# Patient Record
Sex: MALE | Race: WHITE | Employment: FULL TIME | URBAN - METROPOLITAN AREA
[De-identification: names, ages, dates, MRNs, and addresses within clinical notes are randomized per-mention and may not be internally consistent; named-entity substitution may affect disease eponyms.]

---

## 2017-11-24 ENCOUNTER — APPOINTMENT (OUTPATIENT)
Dept: GENERAL RADIOLOGY | Facility: CLINIC | Age: 32
End: 2017-11-24
Attending: EMERGENCY MEDICINE
Payer: COMMERCIAL

## 2017-11-24 ENCOUNTER — HOSPITAL ENCOUNTER (EMERGENCY)
Facility: CLINIC | Age: 32
Discharge: HOME | End: 2017-11-25
Attending: EMERGENCY MEDICINE | Admitting: EMERGENCY MEDICINE
Payer: COMMERCIAL

## 2017-11-24 DIAGNOSIS — R07.9 CHEST PAIN, UNSPECIFIED TYPE: ICD-10-CM

## 2017-11-24 DIAGNOSIS — T50.905A ADVERSE EFFECT OF DRUG, INITIAL ENCOUNTER: ICD-10-CM

## 2017-11-24 PROCEDURE — 93005 ELECTROCARDIOGRAM TRACING: CPT

## 2017-11-24 PROCEDURE — 71020 XR CHEST 2 VW: CPT

## 2017-11-24 PROCEDURE — 99285 EMERGENCY DEPT VISIT HI MDM: CPT | Mod: 25

## 2017-11-24 PROCEDURE — 83690 ASSAY OF LIPASE: CPT | Performed by: EMERGENCY MEDICINE

## 2017-11-24 PROCEDURE — 85025 COMPLETE CBC W/AUTO DIFF WBC: CPT | Performed by: EMERGENCY MEDICINE

## 2017-11-24 PROCEDURE — 83735 ASSAY OF MAGNESIUM: CPT | Performed by: EMERGENCY MEDICINE

## 2017-11-24 PROCEDURE — 80053 COMPREHEN METABOLIC PANEL: CPT | Performed by: EMERGENCY MEDICINE

## 2017-11-24 PROCEDURE — 84484 ASSAY OF TROPONIN QUANT: CPT | Performed by: EMERGENCY MEDICINE

## 2017-11-24 RX ORDER — ASPIRIN 325 MG
325 TABLET ORAL ONCE
Status: COMPLETED | OUTPATIENT
Start: 2017-11-24 | End: 2017-11-25

## 2017-11-24 RX ORDER — IBUPROFEN 600 MG/1
600 TABLET, FILM COATED ORAL ONCE
Status: COMPLETED | OUTPATIENT
Start: 2017-11-24 | End: 2017-11-25

## 2017-11-24 NOTE — ED AVS SNAPSHOT
[facility] Emergency Department    [address]    [facility] [address]    Phone:  [phone]    Fax:  [phone]                                       Anil Montoya   MRN: [id number]    Department:  [facility] Emergency Department   Date of Visit:  11/24/2017           After Visit Summary Signature Page     I have received my discharge instructions, and my questions have been answered. I have discussed any challenges I see with this plan with the nurse or doctor.    ..........................................................................................................................................  Patient/Patient Representative Signature      ..........................................................................................................................................  Patient Representative Print Name and Relationship to Patient    ..................................................               ................................................  Date                                            Time    ..........................................................................................................................................  Reviewed by Signature/Title    ...................................................              ..............................................  Date                                                            Time

## 2017-11-24 NOTE — ED AVS SNAPSHOT
[facility] Emergency Department    [address]    [facility] [address]    Phone:  [phone]    Fax:  [phone]                                       Anil Montoya   MRN: [id number]    Department:  [facility] Emergency Department   Date of Visit:  11/24/2017           Patient Information     Date Of Birth          1985        Your diagnoses for this visit were:     Chest pain, unspecified type     Adverse effect of drug, initial encounter        You were seen by Salo Borrego MD.      Follow-up Information     Follow up with [facility] Emergency Department.    Specialty:  EMERGENCY MEDICINE    Why:  If symptoms worsen    Contact information:    [address]  [facility] [address] [phone]        Schedule an appointment as soon as possible for a visit with Turner Juarez    Specialty:  Internal Medicine    Contact information:    [facility]  [address]   [facility] [address]  [phone]          Schedule an appointment as soon as possible for a visit with Cardiology.    Why:  They will call to schedule stress test and follow up        Discharge Instructions       Discharge Instructions  Chest Pain    You have been seen today for chest pain or discomfort.  At this time, your provider has found no signs that your chest pain is due to a serious or life-threatening condition, (or you have declined more testing and/or admission to the hospital). However, sometimes there is a serious problem that does not show up right away. Your evaluation today may not be complete and you may need further testing and evaluation.     Generally, every Emergency Department visit should have a follow-up clinic visit with either a primary or a specialty clinic/provider. Please follow-up as instructed by your emergency provider today.  Return to the Emergency Department if:    Your chest pain changes, gets worse,  starts to happen more often, or comes with less activity.    You are newly short of breath.    You get very weak or tired.    You pass out or faint.    You have any new symptoms, like fever, cough, numb legs, or you cough up blood.    You have anything else that worries you.    Until you follow-up with your regular provider, please do the following:    Take one aspirin daily unless you have an allergy or are told not to by your provider.    If a stress test appointment has been made, go to the appointment.    If you have questions, contact your regular provider.    Follow-up with your regular provider/clinic as directed; this is very important.    If you were given a prescription for medicine here today, be sure to read all of the information (including the package insert) that comes with your prescription.  This will include important information about the medicine, its side effects, and any warnings that you need to know about.  The pharmacist who fills the prescription can provide more information and answer questions you may have about the medicine.  If you have questions or concerns that the pharmacist cannot address, please call or return to the Emergency Department.       Remember that you can always come back to the Emergency Department if you are not able to see your regular provider in the amount of time listed above, if you get any new symptoms, or if there is anything that worries you.      24 Hour Appointment Hotline       To make an appointment at any [facility], call [phone] ([phone]). If you don't have a family doctor or clinic, we will help you find one. [facility] clinics are conveniently located to serve the needs of you and your family.          ED Discharge Orders     Exercise Stress Echocardiogram       Administration of IV contrast will be tailored to this examination per the appropriate written protocol listed in the Echocardiography department Protocol Book, or by the  supervising Cardiologist. This may result in an order change.    Use of contrast is at the discretion of the supervising Cardiologist.            Follow-Up with Cardiologist                    Review of your medicines      START taking        Dose / Directions Last dose taken    amoxicillin-clavulanate 875-125 MG per tablet   Commonly known as:  AUGMENTIN   Dose:  1 tablet   Quantity:  14 tablet        Take 1 tablet by mouth 2 times daily for 7 days   Refills:  0          Our records show that you are taking the medicines listed below. If these are incorrect, please call your family doctor or clinic.        Dose / Directions Last dose taken    FLUCONAZOLE PO        Take  by mouth.   Refills:  0        HYDROcodone-acetaminophen 5-325 MG per tablet   Commonly known as:  NORCO   Dose:  1-2 tablet   Quantity:  15 tablet        Take 1-2 tablets by mouth every 6 hours as needed for pain.   Refills:  0        levofloxacin 750 MG tablet   Commonly known as:  LEVAQUIN   Dose:  750 mg   Quantity:  7 tablet        Take 1 tablet by mouth daily.   Refills:  0        WELLBUTRIN PO        Take  by mouth.   Refills:  0                Prescriptions were sent or printed at these locations (1 Prescription)                   Other Prescriptions                Printed at Department/Unit printer (1 of 1)         amoxicillin-clavulanate (AUGMENTIN) 875-125 MG per tablet                Procedures and tests performed during your visit     CBC with platelets differential    Cardiac Continuous Monitoring    Comprehensive metabolic panel    EKG 12 lead    Lipase    Magnesium    Troponin I    Troponin I (now)    XR Chest 2 Views      Orders Needing Specimen Collection     None      Pending Results     Date and Time Order Name Status Description    11/24/2017 2341 EKG 12 lead Preliminary             Pending Culture Results     No orders found for last 3 day(s).            Pending Results Instructions     If you had any lab results that were not  finalized at the time of your Discharge, you can call the ED Lab Result RN at [phone]. You will be contacted by this team for any positive Lab results or changes in treatment. The nurses are available 7 days a week from 10A to 6:30P.  You can leave a message 24 hours per day and they will return your call.        Test Results From Your Hospital Stay        11/25/2017 12:13 AM      Component Results     Component Value Ref Range & Units Status    WBC 6.8 4.0 - 11.0 10e9/L Final    RBC Count 5.16 4.4 - 5.9 10e12/L Final    Hemoglobin 15.2 13.3 - 17.7 g/dL Final    Hematocrit 43.7 40.0 - 53.0 % Final    MCV 85 78 - 100 fl Final    MCH 29.5 26.5 - 33.0 pg Final    MCHC 34.8 31.5 - 36.5 g/dL Final    RDW 12.9 10.0 - 15.0 % Final    Platelet Count 215 150 - 450 10e9/L Final    Diff Method Automated Method  Final    % Neutrophils 52.8 % Final    % Lymphocytes 30.7 % Final    % Monocytes 11.1 % Final    % Eosinophils 3.7 % Final    % Basophils 1.0 % Final    % Immature Granulocytes 0.7 % Final    Nucleated RBCs 0 0 /100 Final    Absolute Neutrophil 3.6 1.6 - 8.3 10e9/L Final    Absolute Lymphocytes 2.1 0.8 - 5.3 10e9/L Final    Absolute Monocytes 0.8 0.0 - 1.3 10e9/L Final    Absolute Eosinophils 0.3 0.0 - 0.7 10e9/L Final    Absolute Basophils 0.1 0.0 - 0.2 10e9/L Final    Abs Immature Granulocytes 0.1 0 - 0.4 10e9/L Final    Absolute Nucleated RBC 0.0  Final         11/25/2017 12:35 AM      Component Results     Component Value Ref Range & Units Status    Troponin I ES <0.015 0.000 - 0.045 ug/L Final    The 99th percentile for upper reference range is 0.045 ug/L.  Troponin values   in the range of 0.045 - 0.120 ug/L may be associated with risks of adverse   clinical events.           11/25/2017  1:04 AM      Narrative     XR CHEST 2 VW   11/25/2017 12:58 AM     INDICATION: Chest pain.    COMPARISON: 1/20/2014.        Impression     IMPRESSION: No infiltrates or other acute findings. Normal-sized  cardiac silhouette.      ABUNDIO STAUFFER MD         11/25/2017 12:35 AM      Component Results     Component Value Ref Range & Units Status    Sodium 137 133 - 144 mmol/L Final    Potassium 3.8 3.4 - 5.3 mmol/L Final    Chloride 104 94 - 109 mmol/L Final    Carbon Dioxide 24 20 - 32 mmol/L Final    Anion Gap 9 3 - 14 mmol/L Final    Glucose 270 (H) 70 - 99 mg/dL Final    Urea Nitrogen 18 7 - 30 mg/dL Final    Creatinine 1.07 0.66 - 1.25 mg/dL Final    GFR Estimate 81 >60 mL/min/1.7m2 Final    Non  GFR Calc    GFR Estimate If Black >90 >60 mL/min/1.7m2 Final    African American GFR Calc    Calcium 8.3 (L) 8.5 - 10.1 mg/dL Final    Bilirubin Total 0.3 0.2 - 1.3 mg/dL Final    Albumin 3.5 3.4 - 5.0 g/dL Final    Protein Total 7.5 6.8 - 8.8 g/dL Final    Alkaline Phosphatase 87 40 - 150 U/L Final    ALT 51 0 - 70 U/L Final    AST 28 0 - 45 U/L Final         11/25/2017 12:35 AM      Component Results     Component Value Ref Range & Units Status    Lipase 270 73 - 393 U/L Final         11/25/2017 12:35 AM      Component Results     Component Value Ref Range & Units Status    Magnesium 2.2 1.6 - 2.3 mg/dL Final         11/25/2017  3:19 AM      Component Results     Component Value Ref Range & Units Status    Troponin I ES <0.015 0.000 - 0.045 ug/L Final    The 99th percentile for upper reference range is 0.045 ug/L.  Troponin values   in the range of 0.045 - 0.120 ug/L may be associated with risks of adverse   clinical events.                  Clinical Quality Measure: Blood Pressure Screening     Your blood pressure was checked while you were in the emergency department today. The last reading we obtained was  BP: 155/86 . Please read the guidelines below about what these numbers mean and what you should do about them.  If your systolic blood pressure (the top number) is less than 120 and your diastolic blood pressure (the bottom number) is less than 80, then your blood pressure is normal. There is nothing more that you need  "to do about it.  If your systolic blood pressure (the top number) is 120-139 or your diastolic blood pressure (the bottom number) is 80-89, your blood pressure may be higher than it should be. You should have your blood pressure rechecked within a year by a primary care provider.  If your systolic blood pressure (the top number) is 140 or greater or your diastolic blood pressure (the bottom number) is 90 or greater, you may have high blood pressure. High blood pressure is treatable, but if left untreated over time it can put you at risk for heart attack, stroke, or kidney failure. You should have your blood pressure rechecked by a primary care provider within the next 4 weeks.  If your provider in the emergency department today gave you specific instructions to follow-up with your doctor or provider even sooner than that, you should follow that instruction and not wait for up to 4 weeks for your follow-up visit.        Thank you for choosing [facility]       Thank you for choosing [facility] for your care. Our goal is always to provide you with excellent care. Hearing back from our patients is one way we can continue to improve our services. Please take a few minutes to complete the written survey that you may receive in the mail after you visit with us. Thank you!        [employer] Information     [employer] lets you send messages to your doctor, view your test results, renew your prescriptions, schedule appointments and more. To sign up, go to [email or web address] . Click on \"Log in\" on the left side of the screen, which will take you to the Welcome page. Then click on \"Sign up Now\" on the right side of the page.     You will be asked to enter the access code listed below, as well as some personal information. Please follow the directions to create your username and password.     Your access code is: [email or web address]  Expires: 2018  3:27 AM     Your access code will  in 90 days. If you need help or a new code, " please call your [place] clinic or [phone].        Care EveryWhere ID     This is your Care EveryWhere ID. This could be used by other organizations to access your [place] medical records  [id number]        Equal Access to Services     MAKENNA HUNTER : Eddi Claire, waaxda luqadaha, qaybta kaalmada alessiaxanderteresa, josselyn contreras. So [facility] [phone].    ATENCIÓN: Si habla español, tiene a alba disposición servicios gratuitos de asistencia lingüística. Llame al [phone].    We comply with applicable federal civil rights laws and [place] laws. We do not discriminate on the basis of race, color, national origin, age, disability, sex, sexual orientation, or gender identity.            After Visit Summary       This is your record. Keep this with you and show to your community pharmacist(s) and doctor(s) at your next visit.

## 2017-11-25 VITALS
HEART RATE: 73 BPM | DIASTOLIC BLOOD PRESSURE: 86 MMHG | RESPIRATION RATE: 13 BRPM | SYSTOLIC BLOOD PRESSURE: 155 MMHG | OXYGEN SATURATION: 97 % | TEMPERATURE: 98.7 F

## 2017-11-25 LAB
ALBUMIN SERPL-MCNC: 3.5 G/DL (ref 3.4–5)
ALP SERPL-CCNC: 87 U/L (ref 40–150)
ALT SERPL W P-5'-P-CCNC: 51 U/L (ref 0–70)
ANION GAP SERPL CALCULATED.3IONS-SCNC: 9 MMOL/L (ref 3–14)
AST SERPL W P-5'-P-CCNC: 28 U/L (ref 0–45)
BASOPHILS # BLD AUTO: 0.1 10E9/L (ref 0–0.2)
BASOPHILS NFR BLD AUTO: 1 %
BILIRUB SERPL-MCNC: 0.3 MG/DL (ref 0.2–1.3)
BUN SERPL-MCNC: 18 MG/DL (ref 7–30)
CALCIUM SERPL-MCNC: 8.3 MG/DL (ref 8.5–10.1)
CHLORIDE SERPL-SCNC: 104 MMOL/L (ref 94–109)
CO2 SERPL-SCNC: 24 MMOL/L (ref 20–32)
CREAT SERPL-MCNC: 1.07 MG/DL (ref 0.66–1.25)
DIFFERENTIAL METHOD BLD: NORMAL
EOSINOPHIL # BLD AUTO: 0.3 10E9/L (ref 0–0.7)
EOSINOPHIL NFR BLD AUTO: 3.7 %
ERYTHROCYTE [DISTWIDTH] IN BLOOD BY AUTOMATED COUNT: 12.9 % (ref 10–15)
GFR SERPL CREATININE-BSD FRML MDRD: 81 ML/MIN/1.7M2
GLUCOSE SERPL-MCNC: 270 MG/DL (ref 70–99)
HCT VFR BLD AUTO: 43.7 % (ref 40–53)
HGB BLD-MCNC: 15.2 G/DL (ref 13.3–17.7)
IMM GRANULOCYTES # BLD: 0.1 10E9/L (ref 0–0.4)
IMM GRANULOCYTES NFR BLD: 0.7 %
LIPASE SERPL-CCNC: 270 U/L (ref 73–393)
LYMPHOCYTES # BLD AUTO: 2.1 10E9/L (ref 0.8–5.3)
LYMPHOCYTES NFR BLD AUTO: 30.7 %
MAGNESIUM SERPL-MCNC: 2.2 MG/DL (ref 1.6–2.3)
MCH RBC QN AUTO: 29.5 PG (ref 26.5–33)
MCHC RBC AUTO-ENTMCNC: 34.8 G/DL (ref 31.5–36.5)
MCV RBC AUTO: 85 FL (ref 78–100)
MONOCYTES # BLD AUTO: 0.8 10E9/L (ref 0–1.3)
MONOCYTES NFR BLD AUTO: 11.1 %
NEUTROPHILS # BLD AUTO: 3.6 10E9/L (ref 1.6–8.3)
NEUTROPHILS NFR BLD AUTO: 52.8 %
NRBC # BLD AUTO: 0 10*3/UL
NRBC BLD AUTO-RTO: 0 /100
PLATELET # BLD AUTO: 215 10E9/L (ref 150–450)
POTASSIUM SERPL-SCNC: 3.8 MMOL/L (ref 3.4–5.3)
PROT SERPL-MCNC: 7.5 G/DL (ref 6.8–8.8)
RBC # BLD AUTO: 5.16 10E12/L (ref 4.4–5.9)
SODIUM SERPL-SCNC: 137 MMOL/L (ref 133–144)
TROPONIN I SERPL-MCNC: <0.015 UG/L (ref 0–0.04)
TROPONIN I SERPL-MCNC: <0.015 UG/L (ref 0–0.04)
WBC # BLD AUTO: 6.8 10E9/L (ref 4–11)

## 2017-11-25 PROCEDURE — 25000132 ZZH RX MED GY IP 250 OP 250 PS 637: Performed by: EMERGENCY MEDICINE

## 2017-11-25 PROCEDURE — 25000125 ZZHC RX 250: Performed by: EMERGENCY MEDICINE

## 2017-11-25 PROCEDURE — 84484 ASSAY OF TROPONIN QUANT: CPT | Performed by: EMERGENCY MEDICINE

## 2017-11-25 RX ADMIN — ASPIRIN 325 MG ORAL TABLET 325 MG: 325 PILL ORAL at 00:00

## 2017-11-25 RX ADMIN — LIDOCAINE HYDROCHLORIDE 30 ML: 20 SOLUTION ORAL; TOPICAL at 00:00

## 2017-11-25 RX ADMIN — IBUPROFEN 600 MG: 600 TABLET ORAL at 00:00

## 2017-11-25 ASSESSMENT — ENCOUNTER SYMPTOMS
COUGH: 1
FEVER: 0

## 2017-11-25 NOTE — ED PROVIDER NOTES
History     Chief Complaint:  Chest Pain    HPI   Anil Montoya is a 32 year old male with a history of cryptococcus pneumonia and untreated high cholesterol who presents to the emergency department for evaluation of chest pain. The patient reports being treated with Clindamycin for an infected dog bite on his right hand starting with a dose on Tuesday, 2017. He experienced onset of chest pain about 45 minutes after taking each dose of the Clindamycin, with the pain lasting 2 to 3 hours. Tonight he presents to the emergency department because his chest pain has become more persistent, and is accompanied by a cough. The pain is provoked when eating, laying down, or breathing deeply. The patient's wife is concerned about his reaction to the medication with his past medical history, and adds that he typically gets the most severe side effects when taking antibiotics since his hospitalization at [facility]. The patient denies any fever and does not smoke or drink alcohol. Of note, the patient's three children have are recovering from strep throat. Of further note, the patient has a family history of blood clots and his mother  of complications of diabetes.     HEART Score  Background  Calculates the overall risk of adverse event in patient's presenting with chest pain.  Based on 5 criteria (each assigned 0-2 points) including suspiciousness of history, EKG, age, risk factors and troponin.    Data  32 year old male   does not have a problem list on file.   has no tobacco history on file.  family history is not on file.  Lab Results   Component Value Date    TROPI <0.012 2014     Criteria   0-2 points for each of 5 items (maximum of 10 points):  Score 0- History slightly suspicious for coronary syndrome  Score 1- EKG with Non-specific repolarization disturbance  Score 0- Age <45 years old  Score 1- One to 2 risk factors for atherosclerotic disease  Score 0- Within normal limits for  troponin levels  Interpretation  Risk of adverse outcome  Heart Score: 2  Total Score 0-3- Adverse Outcome Risk 2.5% - Supports early discharge with appropriate follow-up    Allergies:  Vancomycin    Medications:    Fluconazole  Wellbutrin  Hydrocodone-acetaminophen  Levaquin    Past Medical History:    Pneumonia    Past Surgical History:    Past surgical history reviewed. No pertinent surgical history.    Family History:    Family history reviewed. No pertinent family history.    Social History:  The patient was accompanied to the emergency department by his wife.  Smoking Status: Never Smoker  Tobacco Use: Unknown  Alcohol Use: No  Marital Status:      Review of Systems   Constitutional: Negative for fever.   Respiratory: Positive for cough.    Cardiovascular: Positive for chest pain.     Physical Exam     Patient Vitals for the past 24 hrs:   BP Temp Pulse Heart Rate Resp SpO2   11/25/17 0148 - - - 79 - 97 %   11/25/17 0147 - - - 74 13 98 %   11/25/17 0145 - - - 86 13 98 %   11/25/17 0142 - - - 84 - 97 %   11/25/17 0139 - - - 78 - 97 %   11/25/17 0136 - - - 78 - 98 %   11/25/17 0130 - - - - 17 97 %   11/25/17 0115 - - - - 21 96 %   11/25/17 0100 - - - - 17 98 %   11/25/17 0030 155/86 - - 75 17 96 %   11/25/17 0015 154/85 - - 71 17 97 %   11/25/17 0000 (!) 183/122 - - 75 14 97 %   11/24/17 2345 (!) 180/109 - - - - 96 %   11/24/17 2330 (!) 187/142 - - - - 98 %   11/24/17 2242 (!) 160/105 98.7  F (37.1  C) 73 - 16 99 %     Physical Exam  General: Alert, appears well-developed and well-nourished. Cooperative.     In mild distress  HEENT:  Head:  Atraumatic  Ears:  External ears are normal  Mouth/Throat:  Oropharynx is without erythema or exudate and mucous membranes are moist.   Eyes:   Conjunctivae normal and EOM are normal. No scleral icterus.    Pupils are equal, round, and reactive to light.   Neck:   Normal range of motion. Neck supple.  CV:  Normal rate, regular rhythm, normal heart sounds and radial and  dorsalis pedis pulses are 2+ and symmetric.  No murmur.  Resp:  Breath sounds are clear bilaterally    Non-labored, no retractions or accessory muscle use  GI:  Obese. Abdomen is soft, no distension, no tenderness. No rebound or guarding.  MS:  Normal range of motion. No edema.    Normal strength in all 4 extremities.     Back atraumatic.  Skin:  Warm and dry.  Papular lesion to right dorsal hand from prior animal scratch.  No new lesions identified.  No evidence of cellulitis.  Neuro: Alert. Normal strength.  Sensation intact in all 4 extremities. GCS: 15  Psych:  Normal mood and affect.    Emergency Department Course     ECG:  ECG taken at 2338, ECG read at 2342  Normal sinus rhythm  Anterior infarct, age undetermined  Abnormal ECG  No significant change compared to EKG dated 1/20/2014  Rate 63 bpm. CT interval 170 ms. QRS duration 106 ms. QT/QTc 434/444 ms. P-R-T axes 50 50 16.    Imaging:  Radiology findings were communicated with the patient who voiced understanding of the findings.    XR Chest 2 Views  No infiltrates or other acute findings. Normal-sized  cardiac silhouette.   Reading per radiology.    Laboratory:  Laboratory findings were communicated with the patient who voiced understanding of the findings.    CBC: WBC 6.8, HGB 15.2,   Troponin (Collected 2350): <0.015   Troponin (Collected 0250): <0.015  CMP: Glucose 270 (H), Calcium 8.3 (L) o/w WNL (Creatinine 1.07)  Lipase: 270  Magnesium: 2.2    Interventions:  0000 Aspirin 325 mg PO  0000 Ibuprofen 600 mg PO  0000 GI cocktail 30 mL PO    Emergency Department Course:    Nursing notes and vitals reviewed.    I performed an exam of the patient as documented above.     0150 I reassessed and updated the patient.    I personally reviewed the laboratory, imaging, and EKG results with the patient and answered all related questions prior to discharge.    Impression & Plan      Medical Decision Making:  Anil Montoya is a 32 year old male with a history  of cryptococcal pneumonia who presents with intermittent chest pain since Tuesday. Patient notes the chest pain is mid sternum. No chest wall tenderness. No radiation of this chest pain. He does relate it to ingestion of clindamycin tablets for which he is taking for an dog bite wound to his right hand. The clindamycin was prescribed at an outside facility and there is no other ingestion of medications or food that influenced this chest pain presentation. Patient had no improvement with a GI cocktail, so I am much less concerned for gastritis or peptic ulcer disease. His EKG was non ischemic.  Patient had a negative troponin. Low concern for ACS at this time. Patient's heart score was 2 and with a repeat delta troponin at three hours being negative, I am much more reassured that this is unlikely ACS. Patient had a chest x-ray which showed no evidence of pneumothorax or pneumonia. Lab work was grossly unremarkable with normal hepatic function tests and lipase. Normal electrolytes. No elevated white blood cell count. I discussed with patient that this may be related to the clindamycin as he believes this to be so. The chest pain has only been around since he has been taking the clindamycin and typically resolves a couple hours after ingestion of the clindamycin. It seems a little more suspect for possible pill associated gastritis with the clindamycin, but as stated previously, patient had no improvement with GI cocktails and I have lower suspicion for this. Patient does have elevated blood pressure, no significant history of hypertension but I assume with a blood pressure greater than 150 he likely does carry a history of hypertension.     I think he warrants a stress test with these chest pain intermittent episodes over the last several days. I have scheduled this for him and he would get this performed in an outpatient setting. We did switch the patient's clindamycin to Augmentin for animal bite infection of the  right dorsum of his hand. He will follow up closely with his primary care provider and stress test as scheduled on outpatient basis. Patient understood close return precautions and follow up instructions. Discharged home.    Diagnosis:    ICD-10-CM    1. Chest pain, unspecified type R07.9 Troponin I (now)     Follow-Up with Cardiologist     Exercise Stress Echocardiogram   2. Adverse effect of drug, initial encounter T88.7XXA      Disposition:   The patient was discharged home.    Discharge Medications:  Discharge Medication List as of 11/25/2017  3:35 AM      START taking these medications    Details   amoxicillin-clavulanate (AUGMENTIN) 875-125 MG per tablet Take 1 tablet by mouth 2 times daily for 7 days, Disp-14 tablet, R-0, Local Print           Scribe Disclosure:  I, Merry Valderrama, am serving as a scribe at 11:40 PM on 11/24/2017 to document services personally performed by Salo Borrego MD, based on my observations and the provider's statements to me.    [facility] EMERGENCY DEPARTMENT       aSlo Borrego MD  11/25/17 1549

## 2017-11-26 LAB — INTERPRETATION ECG - MUSE: NORMAL

## 2022-03-22 ENCOUNTER — APPOINTMENT (OUTPATIENT)
Dept: GENERAL RADIOLOGY | Facility: CLINIC | Age: 37
DRG: 871 | End: 2022-03-22
Attending: HOSPITALIST
Payer: COMMERCIAL

## 2022-03-22 ENCOUNTER — APPOINTMENT (OUTPATIENT)
Dept: ULTRASOUND IMAGING | Facility: CLINIC | Age: 37
DRG: 871 | End: 2022-03-22
Attending: EMERGENCY MEDICINE
Payer: COMMERCIAL

## 2022-03-22 ENCOUNTER — APPOINTMENT (OUTPATIENT)
Dept: CT IMAGING | Facility: CLINIC | Age: 37
DRG: 871 | End: 2022-03-22
Attending: EMERGENCY MEDICINE
Payer: COMMERCIAL

## 2022-03-22 ENCOUNTER — HOSPITAL ENCOUNTER (INPATIENT)
Facility: CLINIC | Age: 37
LOS: 6 days | Discharge: ANOTHER HOSPITAL | DRG: 871 | End: 2022-03-28
Attending: EMERGENCY MEDICINE | Admitting: HOSPITALIST
Payer: COMMERCIAL

## 2022-03-22 DIAGNOSIS — J18.9 PNEUMONIA OF LEFT LOWER LOBE DUE TO INFECTIOUS ORGANISM: ICD-10-CM

## 2022-03-22 DIAGNOSIS — R94.31 LONG QT INTERVAL: ICD-10-CM

## 2022-03-22 DIAGNOSIS — E11.65 HYPERGLYCEMIA DUE TO DIABETES MELLITUS (H): ICD-10-CM

## 2022-03-22 LAB
ANION GAP SERPL CALCULATED.3IONS-SCNC: 10 MMOL/L (ref 3–14)
BASOPHILS # BLD MANUAL: 0 10E3/UL (ref 0–0.2)
BASOPHILS NFR BLD MANUAL: 0 %
BUN SERPL-MCNC: 33 MG/DL (ref 7–30)
CALCIUM SERPL-MCNC: 8.3 MG/DL (ref 8.5–10.1)
CHLORIDE BLD-SCNC: 101 MMOL/L (ref 94–109)
CO2 SERPL-SCNC: 22 MMOL/L (ref 20–32)
CREAT SERPL-MCNC: 1.06 MG/DL (ref 0.66–1.25)
CRP SERPL-MCNC: 393 MG/L (ref 0–8)
EOSINOPHIL # BLD MANUAL: 0.3 10E3/UL (ref 0–0.7)
EOSINOPHIL NFR BLD MANUAL: 3 %
ERYTHROCYTE [DISTWIDTH] IN BLOOD BY AUTOMATED COUNT: 13.7 % (ref 10–15)
FLUAV RNA SPEC QL NAA+PROBE: NEGATIVE
FLUBV RNA RESP QL NAA+PROBE: NEGATIVE
GFR SERPL CREATININE-BSD FRML MDRD: >90 ML/MIN/1.73M2
GLUCOSE BLD-MCNC: 457 MG/DL (ref 70–99)
GLUCOSE BLDC GLUCOMTR-MCNC: 414 MG/DL (ref 70–99)
GLUCOSE BLDC GLUCOMTR-MCNC: 432 MG/DL (ref 70–99)
HCT VFR BLD AUTO: 40.1 % (ref 40–53)
HGB BLD-MCNC: 13.3 G/DL (ref 13.3–17.7)
HOLD SPECIMEN: NORMAL
KETONES BLD-SCNC: 0.9 MMOL/L (ref 0–0.6)
LACTATE SERPL-SCNC: 1.6 MMOL/L (ref 0.7–2)
LACTATE SERPL-SCNC: 1.7 MMOL/L (ref 0.7–2)
LACTATE SERPL-SCNC: 2.3 MMOL/L (ref 0.7–2)
LYMPHOCYTES # BLD MANUAL: 1 10E3/UL (ref 0.8–5.3)
LYMPHOCYTES NFR BLD MANUAL: 12 %
MAGNESIUM SERPL-MCNC: 2.1 MG/DL (ref 1.6–2.3)
MCH RBC QN AUTO: 29 PG (ref 26.5–33)
MCHC RBC AUTO-ENTMCNC: 33.2 G/DL (ref 31.5–36.5)
MCV RBC AUTO: 88 FL (ref 78–100)
METAMYELOCYTES # BLD MANUAL: 0.2 10E3/UL
METAMYELOCYTES NFR BLD MANUAL: 2 %
MONOCYTES # BLD MANUAL: 0.3 10E3/UL (ref 0–1.3)
MONOCYTES NFR BLD MANUAL: 4 %
MYELOCYTES # BLD MANUAL: 0.1 10E3/UL
MYELOCYTES NFR BLD MANUAL: 1 %
NEUTROPHILS # BLD MANUAL: 6.6 10E3/UL (ref 1.6–8.3)
NEUTROPHILS NFR BLD MANUAL: 78 %
PH BLDV: 7.39 [PH] (ref 7.32–7.43)
PLAT MORPH BLD: ABNORMAL
PLATELET # BLD AUTO: 98 10E3/UL (ref 150–450)
POTASSIUM BLD-SCNC: 3.7 MMOL/L (ref 3.4–5.3)
PROCALCITONIN SERPL-MCNC: 6.19 NG/ML
RBC # BLD AUTO: 4.58 10E6/UL (ref 4.4–5.9)
RBC MORPH BLD: ABNORMAL
SARS-COV-2 RNA RESP QL NAA+PROBE: NEGATIVE
SODIUM SERPL-SCNC: 133 MMOL/L (ref 133–144)
WBC # BLD AUTO: 8.5 10E3/UL (ref 4–11)

## 2022-03-22 PROCEDURE — 36415 COLL VENOUS BLD VENIPUNCTURE: CPT | Performed by: EMERGENCY MEDICINE

## 2022-03-22 PROCEDURE — 82800 BLOOD PH: CPT | Performed by: EMERGENCY MEDICINE

## 2022-03-22 PROCEDURE — 87149 DNA/RNA DIRECT PROBE: CPT | Performed by: EMERGENCY MEDICINE

## 2022-03-22 PROCEDURE — 76705 ECHO EXAM OF ABDOMEN: CPT

## 2022-03-22 PROCEDURE — 250N000011 HC RX IP 250 OP 636: Performed by: EMERGENCY MEDICINE

## 2022-03-22 PROCEDURE — 99223 1ST HOSP IP/OBS HIGH 75: CPT | Mod: AI | Performed by: HOSPITALIST

## 2022-03-22 PROCEDURE — 99285 EMERGENCY DEPT VISIT HI MDM: CPT | Mod: 25

## 2022-03-22 PROCEDURE — 250N000013 HC RX MED GY IP 250 OP 250 PS 637: Performed by: HOSPITALIST

## 2022-03-22 PROCEDURE — 250N000012 HC RX MED GY IP 250 OP 636 PS 637: Performed by: EMERGENCY MEDICINE

## 2022-03-22 PROCEDURE — 93005 ELECTROCARDIOGRAM TRACING: CPT

## 2022-03-22 PROCEDURE — 87636 SARSCOV2 & INF A&B AMP PRB: CPT | Performed by: HOSPITALIST

## 2022-03-22 PROCEDURE — 85027 COMPLETE CBC AUTOMATED: CPT | Performed by: EMERGENCY MEDICINE

## 2022-03-22 PROCEDURE — 258N000003 HC RX IP 258 OP 636: Performed by: HOSPITALIST

## 2022-03-22 PROCEDURE — 83605 ASSAY OF LACTIC ACID: CPT | Performed by: HOSPITALIST

## 2022-03-22 PROCEDURE — C9803 HOPD COVID-19 SPEC COLLECT: HCPCS

## 2022-03-22 PROCEDURE — 250N000011 HC RX IP 250 OP 636: Performed by: HOSPITALIST

## 2022-03-22 PROCEDURE — 87077 CULTURE AEROBIC IDENTIFY: CPT | Performed by: EMERGENCY MEDICINE

## 2022-03-22 PROCEDURE — 96366 THER/PROPH/DIAG IV INF ADDON: CPT

## 2022-03-22 PROCEDURE — 258N000003 HC RX IP 258 OP 636: Performed by: EMERGENCY MEDICINE

## 2022-03-22 PROCEDURE — 74177 CT ABD & PELVIS W/CONTRAST: CPT

## 2022-03-22 PROCEDURE — 36415 COLL VENOUS BLD VENIPUNCTURE: CPT | Performed by: HOSPITALIST

## 2022-03-22 PROCEDURE — 84145 PROCALCITONIN (PCT): CPT | Performed by: HOSPITALIST

## 2022-03-22 PROCEDURE — 83605 ASSAY OF LACTIC ACID: CPT | Performed by: EMERGENCY MEDICINE

## 2022-03-22 PROCEDURE — 71045 X-RAY EXAM CHEST 1 VIEW: CPT

## 2022-03-22 PROCEDURE — 96361 HYDRATE IV INFUSION ADD-ON: CPT

## 2022-03-22 PROCEDURE — 120N000001 HC R&B MED SURG/OB

## 2022-03-22 PROCEDURE — 80048 BASIC METABOLIC PNL TOTAL CA: CPT | Performed by: EMERGENCY MEDICINE

## 2022-03-22 PROCEDURE — 83735 ASSAY OF MAGNESIUM: CPT | Performed by: EMERGENCY MEDICINE

## 2022-03-22 PROCEDURE — 86140 C-REACTIVE PROTEIN: CPT | Performed by: EMERGENCY MEDICINE

## 2022-03-22 PROCEDURE — 82010 KETONE BODYS QUAN: CPT | Performed by: EMERGENCY MEDICINE

## 2022-03-22 PROCEDURE — 87633 RESP VIRUS 12-25 TARGETS: CPT | Performed by: HOSPITALIST

## 2022-03-22 PROCEDURE — 96365 THER/PROPH/DIAG IV INF INIT: CPT

## 2022-03-22 PROCEDURE — 96375 TX/PRO/DX INJ NEW DRUG ADDON: CPT

## 2022-03-22 PROCEDURE — 87486 CHLMYD PNEUM DNA AMP PROBE: CPT | Performed by: HOSPITALIST

## 2022-03-22 PROCEDURE — 80048 BASIC METABOLIC PNL TOTAL CA: CPT | Performed by: HOSPITALIST

## 2022-03-22 RX ORDER — LEVOTHYROXINE SODIUM 125 UG/1
150 TABLET ORAL DAILY
COMMUNITY

## 2022-03-22 RX ORDER — SODIUM CHLORIDE, SODIUM LACTATE, POTASSIUM CHLORIDE, CALCIUM CHLORIDE 600; 310; 30; 20 MG/100ML; MG/100ML; MG/100ML; MG/100ML
INJECTION, SOLUTION INTRAVENOUS CONTINUOUS
Status: DISCONTINUED | OUTPATIENT
Start: 2022-03-22 | End: 2022-03-24

## 2022-03-22 RX ORDER — NICOTINE POLACRILEX 4 MG
15-30 LOZENGE BUCCAL
Status: DISCONTINUED | OUTPATIENT
Start: 2022-03-22 | End: 2022-03-28 | Stop reason: HOSPADM

## 2022-03-22 RX ORDER — IBUPROFEN 200 MG
600 TABLET ORAL EVERY 6 HOURS PRN
Status: ON HOLD | COMMUNITY
End: 2022-04-18

## 2022-03-22 RX ORDER — BUPROPION HYDROCHLORIDE 150 MG/1
300 TABLET ORAL EVERY MORNING
Status: DISCONTINUED | OUTPATIENT
Start: 2022-03-23 | End: 2022-03-28 | Stop reason: HOSPADM

## 2022-03-22 RX ORDER — LEVOTHYROXINE SODIUM 125 UG/1
250 TABLET ORAL
Status: DISCONTINUED | OUTPATIENT
Start: 2022-03-27 | End: 2022-03-28 | Stop reason: HOSPADM

## 2022-03-22 RX ORDER — ONDANSETRON 2 MG/ML
4 INJECTION INTRAMUSCULAR; INTRAVENOUS EVERY 6 HOURS PRN
Status: DISCONTINUED | OUTPATIENT
Start: 2022-03-22 | End: 2022-03-28 | Stop reason: HOSPADM

## 2022-03-22 RX ORDER — KETOROLAC TROMETHAMINE 15 MG/ML
15 INJECTION, SOLUTION INTRAMUSCULAR; INTRAVENOUS ONCE
Status: COMPLETED | OUTPATIENT
Start: 2022-03-22 | End: 2022-03-22

## 2022-03-22 RX ORDER — ACETAMINOPHEN 325 MG/1
650 TABLET ORAL EVERY 6 HOURS PRN
Status: DISCONTINUED | OUTPATIENT
Start: 2022-03-22 | End: 2022-03-28 | Stop reason: HOSPADM

## 2022-03-22 RX ORDER — LEVOTHYROXINE SODIUM 125 UG/1
125 TABLET ORAL
Status: DISCONTINUED | OUTPATIENT
Start: 2022-03-22 | End: 2022-03-28 | Stop reason: HOSPADM

## 2022-03-22 RX ORDER — MORPHINE SULFATE 2 MG/ML
4 INJECTION, SOLUTION INTRAMUSCULAR; INTRAVENOUS ONCE
Status: COMPLETED | OUTPATIENT
Start: 2022-03-22 | End: 2022-03-22

## 2022-03-22 RX ORDER — CIPROFLOXACIN 750 MG/1
750 TABLET, FILM COATED ORAL 2 TIMES DAILY
Status: ON HOLD | COMMUNITY
End: 2022-04-18

## 2022-03-22 RX ORDER — AZITHROMYCIN 500 MG/5ML
500 INJECTION, POWDER, LYOPHILIZED, FOR SOLUTION INTRAVENOUS EVERY 24 HOURS
Status: DISCONTINUED | OUTPATIENT
Start: 2022-03-22 | End: 2022-03-24

## 2022-03-22 RX ORDER — SODIUM CHLORIDE 9 MG/ML
INJECTION, SOLUTION INTRAVENOUS CONTINUOUS
Status: DISCONTINUED | OUTPATIENT
Start: 2022-03-22 | End: 2022-03-22

## 2022-03-22 RX ORDER — ONDANSETRON 4 MG/1
4 TABLET, ORALLY DISINTEGRATING ORAL EVERY 6 HOURS PRN
Status: DISCONTINUED | OUTPATIENT
Start: 2022-03-22 | End: 2022-03-28 | Stop reason: HOSPADM

## 2022-03-22 RX ORDER — ACETAMINOPHEN 500 MG
1000 TABLET ORAL EVERY 6 HOURS PRN
Status: ON HOLD | COMMUNITY
End: 2022-04-18

## 2022-03-22 RX ORDER — IOPAMIDOL 755 MG/ML
500 INJECTION, SOLUTION INTRAVASCULAR ONCE
Status: COMPLETED | OUTPATIENT
Start: 2022-03-22 | End: 2022-03-22

## 2022-03-22 RX ORDER — LOSARTAN POTASSIUM 50 MG/1
50 TABLET ORAL DAILY
COMMUNITY
End: 2022-04-18

## 2022-03-22 RX ORDER — BUPROPION HYDROCHLORIDE 300 MG/1
300 TABLET ORAL EVERY MORNING
COMMUNITY

## 2022-03-22 RX ORDER — LIDOCAINE 40 MG/G
CREAM TOPICAL
Status: DISCONTINUED | OUTPATIENT
Start: 2022-03-22 | End: 2022-03-28 | Stop reason: HOSPADM

## 2022-03-22 RX ORDER — CEFTRIAXONE 2 G/1
2 INJECTION, POWDER, FOR SOLUTION INTRAMUSCULAR; INTRAVENOUS EVERY 24 HOURS
Status: DISCONTINUED | OUTPATIENT
Start: 2022-03-22 | End: 2022-03-24

## 2022-03-22 RX ORDER — LEVOTHYROXINE SODIUM 125 UG/1
250 TABLET ORAL WEEKLY
COMMUNITY
End: 2024-09-14

## 2022-03-22 RX ORDER — ACETAMINOPHEN 650 MG/1
650 SUPPOSITORY RECTAL EVERY 6 HOURS PRN
Status: DISCONTINUED | OUTPATIENT
Start: 2022-03-22 | End: 2022-03-28 | Stop reason: HOSPADM

## 2022-03-22 RX ORDER — LIRAGLUTIDE 6 MG/ML
1.8 INJECTION SUBCUTANEOUS DAILY
COMMUNITY
End: 2024-09-14

## 2022-03-22 RX ORDER — DEXTROSE MONOHYDRATE 25 G/50ML
25-50 INJECTION, SOLUTION INTRAVENOUS
Status: DISCONTINUED | OUTPATIENT
Start: 2022-03-22 | End: 2022-03-28 | Stop reason: HOSPADM

## 2022-03-22 RX ADMIN — SODIUM CHLORIDE 1000 ML: 9 INJECTION, SOLUTION INTRAVENOUS at 15:28

## 2022-03-22 RX ADMIN — CEFTRIAXONE 2 G: 2 INJECTION, POWDER, FOR SOLUTION INTRAMUSCULAR; INTRAVENOUS at 23:03

## 2022-03-22 RX ADMIN — AZITHROMYCIN MONOHYDRATE 500 MG: 500 INJECTION, POWDER, LYOPHILIZED, FOR SOLUTION INTRAVENOUS at 20:34

## 2022-03-22 RX ADMIN — LEVOTHYROXINE SODIUM 125 MCG: 125 TABLET ORAL at 23:02

## 2022-03-22 RX ADMIN — SODIUM CHLORIDE, POTASSIUM CHLORIDE, SODIUM LACTATE AND CALCIUM CHLORIDE: 600; 310; 30; 20 INJECTION, SOLUTION INTRAVENOUS at 20:35

## 2022-03-22 RX ADMIN — INSULIN ASPART 10 UNITS: 100 INJECTION, SOLUTION INTRAVENOUS; SUBCUTANEOUS at 20:30

## 2022-03-22 RX ADMIN — TAZOBACTAM SODIUM AND PIPERACILLIN SODIUM 3.38 G: 375; 3 INJECTION, SOLUTION INTRAVENOUS at 13:50

## 2022-03-22 RX ADMIN — IOPAMIDOL 100 ML: 755 INJECTION, SOLUTION INTRAVENOUS at 15:12

## 2022-03-22 RX ADMIN — KETOROLAC TROMETHAMINE 15 MG: 15 INJECTION, SOLUTION INTRAMUSCULAR; INTRAVENOUS at 13:08

## 2022-03-22 RX ADMIN — MORPHINE SULFATE 4 MG: 2 INJECTION, SOLUTION INTRAMUSCULAR; INTRAVENOUS at 14:08

## 2022-03-22 RX ADMIN — SODIUM CHLORIDE 1000 ML: 9 INJECTION, SOLUTION INTRAVENOUS at 13:02

## 2022-03-22 ASSESSMENT — ACTIVITIES OF DAILY LIVING (ADL)
WEAR_GLASSES_OR_BLIND: NO
ADLS_ACUITY_SCORE: 14
WALKING_OR_CLIMBING_STAIRS_DIFFICULTY: NO
ADLS_ACUITY_SCORE: 14
ADLS_ACUITY_SCORE: 14
TOILETING_ISSUES: NO
DOING_ERRANDS_INDEPENDENTLY_DIFFICULTY: NO
CHANGE_IN_FUNCTIONAL_STATUS_SINCE_ONSET_OF_CURRENT_ILLNESS/INJURY: YES
FALL_HISTORY_WITHIN_LAST_SIX_MONTHS: NO
DIFFICULTY_COMMUNICATING: NO
DIFFICULTY_EATING/SWALLOWING: NO
ADLS_ACUITY_SCORE: 14
CONCENTRATING,_REMEMBERING_OR_MAKING_DECISIONS_DIFFICULTY: NO
HEARING_DIFFICULTY_OR_DEAF: NO
DRESSING/BATHING_DIFFICULTY: NO
ADLS_ACUITY_SCORE: 14

## 2022-03-22 ASSESSMENT — ENCOUNTER SYMPTOMS
DIARRHEA: 0
DYSURIA: 0
WOUND: 1
SORE THROAT: 0
FATIGUE: 1
VOMITING: 0
MYALGIAS: 1
RHINORRHEA: 0
FEVER: 1
ABDOMINAL PAIN: 1

## 2022-03-22 NOTE — ED PROVIDER NOTES
"  History     Chief Complaint:  Generalized Weakness      HPI   Anil Montoya is a 36 year old male who presents with generalized weakness and generalized pain \"like my whole body is a cramp.\"  Started on Thursday (~5 days ago) with fever to 101.9 F.  Patient then developed R flank pain.  He was finally seen at Urgent Care yesterday.  They dx a UTI and started him on ciprofloxacin.  Today he wasn't feeling much better so he went back to Urgent Care.  They did labs and a CXR.  Labs showed hyperglycemia, elevated LFTs.  CXR was neg.  They gave patient an IVF bolus and sent him to the ED.  Pt is a diabetic on Victoza.  Also has history of cryptococcal pneumonia.      Review of Systems   Constitutional: Positive for fatigue and fever.   HENT: Negative for rhinorrhea and sore throat.    Cardiovascular: Negative for chest pain.   Gastrointestinal: Positive for abdominal pain. Negative for diarrhea and vomiting.   Genitourinary: Negative for dysuria.   Musculoskeletal: Positive for myalgias.   Skin: Positive for wound.   All other systems reviewed and are negative.      Allergies:  Clindamycin  Vancomycin    Medications:    acetaminophen (TYLENOL) 500 MG tablet  buPROPion (WELLBUTRIN XL) 300 MG 24 hr tablet  ciprofloxacin (CIPRO) 750 MG tablet  dextromethorphan (TUSSIN COUGH) 15 MG/5ML syrup  ibuprofen (ADVIL/MOTRIN) 200 MG tablet  levothyroxine (SYNTHROID/LEVOTHROID) 125 MCG tablet  levothyroxine (SYNTHROID/LEVOTHROID) 125 MCG tablet  liraglutide (VICTOZA) 18 MG/3ML solution  losartan (COZAAR) 50 MG tablet  metFORMIN (GLUCOPHAGE) 1000 MG tablet         Past Medical History:   Past Surgical History:     Past Medical History:   Diagnosis Date     Pneumonia     No past surgical history on file.   Patient Active Problem List    Diagnosis Date Noted     Long QT interval 03/22/2022     Priority: Medium     Pneumonia of left lower lobe due to infectious organism 03/22/2022     Priority: Medium     Hyperglycemia " due to diabetes mellitus (H) 03/22/2022     Priority: Medium          Family History  No family history on file.    Social History:  Presents to the ED with his wife.  They have four children.  No tobacco or recreational drug use.  Rare ETOH.    Physical Exam     Patient Vitals for the past 24 hrs:   BP Temp Temp src Pulse Resp SpO2 Weight   03/22/22 1815 (!) 155/88 -- -- 113 -- -- --   03/22/22 1800 (!) 148/86 -- -- 108 -- -- --   03/22/22 1745 (!) 143/87 -- -- 106 -- -- --   03/22/22 1730 129/75 -- -- 104 -- -- --   03/22/22 1715 139/84 -- -- 107 -- -- --   03/22/22 1700 (!) 148/87 -- -- 111 -- -- --   03/22/22 1645 (!) 140/84 -- -- 104 -- -- --   03/22/22 1630 (!) 144/88 -- -- 104 -- -- --   03/22/22 1615 (!) 153/90 -- -- 108 -- -- --   03/22/22 1600 (!) 150/89 -- -- 103 -- -- --   03/22/22 1545 (!) 144/90 -- -- 104 -- -- --   03/22/22 1530 (!) 138/90 -- -- 107 -- -- --   03/22/22 1500 124/75 -- -- 106 -- -- --   03/22/22 1445 126/77 -- -- 101 -- -- --   03/22/22 1430 127/74 -- -- 102 -- -- --   03/22/22 1400 127/81 -- -- 107 -- -- --   03/22/22 1330 133/84 -- -- -- -- -- --   03/22/22 1300 -- -- -- -- 20 99 % --   03/22/22 1124 (!) 152/93 97.4  F (36.3  C) Oral 108 20 99 % 113.4 kg (250 lb)       Physical Exam  Vitals and nursing note reviewed.   Constitutional:       General: He is not in acute distress.     Appearance: Normal appearance. He is obese. He is ill-appearing. He is not toxic-appearing.   HENT:      Head: Normocephalic and atraumatic.      Right Ear: External ear normal.      Left Ear: External ear normal.      Nose: Nose normal.   Eyes:      Conjunctiva/sclera: Conjunctivae normal.   Cardiovascular:      Rate and Rhythm: Regular rhythm. Tachycardia present.      Heart sounds: No murmur heard.  Pulmonary:      Effort: Pulmonary effort is normal. No respiratory distress.      Breath sounds: No wheezing, rhonchi or rales.   Abdominal:      General: Abdomen is flat. There is no distension.       Palpations: Abdomen is soft.      Tenderness: There is abdominal tenderness in the right upper quadrant. There is right CVA tenderness and guarding (RUQ). There is no rebound.   Musculoskeletal:         General: No swelling or deformity.      Cervical back: Normal range of motion and neck supple.   Skin:     General: Skin is warm and dry.      Findings: No rash.      Comments: Small open wound to the R medial upper thigh without sig drainage, erythema, induration   Neurological:      Mental Status: He is alert and oriented to person, place, and time.   Psychiatric:         Mood and Affect: Mood normal.         Behavior: Behavior normal.         Emergency Department Course   ECG:  ECG taken at 1305, ECG read at 1707  Sinus tachycardia, Long QT   Sinus tachy and long QT as compared to prior, dated 11/24/17.  Rate 107 bpm. MD interval 168 ms. QRS duration 110 ms. QT/QTc 390/520 ms. P-R-T axes 59 19 30.     Imaging:  CT Abdomen Pelvis w Contrast   Final Result   IMPRESSION:    1. Patchy consolidation at the left lung base suggestive for   pneumonia.   2. Bilateral nonobstructing intrarenal stones. No hydronephrosis.   3. Fatty liver. Enlarged liver.   4. Indeterminant pulmonary nodule at the right lung base. See below   for follow up imaging guidelines.      Recommendations for an incidental lung nodule = or > 6mm to 8mm:     Low risk patients: Initial follow-up CT at 6-12 months, then   consider CT at 18-24 months if no change.     High risk patients: Initial follow-up CT at 6-12 months, then CT at   18-24 months if no change.      *Low Risk: Minimal or absent history of smoking or other known risk   factors.   *Nonsolid (ground-glass) or partly solid nodules may require longer   follow-up to exclude indolent adenocarcinoma.   *Recommendations based on Guidelines for the Management of Incidental   Pulmonary Nodules Detected at CT: From the Fleischner Society 2017,   Radiology 2017.      ALIZA DONATO MD            SYSTEM  ID:  [id number]      US Abdomen Limited (RUQ)   Final Result   IMPRESSION:   1.  Fatty liver.   2.  No gallstones. Negative sonographic Rachel sign.      ALIZA DONATO MD            SYSTEM ID:  [id number]      XR Chest Port 1 View    (Results Pending)       Laboratory:  Labs Ordered and Resulted from Time of ED Arrival to Time of ED Departure   CRP INFLAMMATION - Abnormal       Result Value    CRP Inflammation 393.0 (*)    LACTIC ACID WHOLE BLOOD - Abnormal    Lactic Acid 2.3 (*)    KETONE BETA-HYDROXYBUTYRATE QUANTITATIVE, RAPID - Abnormal    Ketone (Beta-Hydroxybutyrate) Quantitative 0.9 (*)    CBC WITH PLATELETS AND DIFFERENTIAL - Abnormal    WBC Count 8.5      RBC Count 4.58      Hemoglobin 13.3      Hematocrit 40.1      MCV 88      MCH 29.0      MCHC 33.2      RDW 13.7      Platelet Count 98 (*)    DIFFERENTIAL - Abnormal    % Neutrophils 78      % Lymphocytes 12      % Monocytes 4      % Eosinophils 3      % Basophils 0      % Metamyelocytes 2      % Myelocytes 1      Absolute Neutrophils 6.6      Absolute Lymphocytes 1.0      Absolute Monocytes 0.3      Absolute Eosinophils 0.3      Absolute Basophils 0.0      Absolute Metamyelocytes 0.2 (*)     Absolute Myelocytes 0.1 (*)     RBC Morphology Confirmed RBC Indices      Platelet Assessment        Value: Automated Count Confirmed. Platelet morphology is normal.   BASIC METABOLIC PANEL - Abnormal    Sodium 133      Potassium 3.7      Chloride 101      Carbon Dioxide (CO2) 22      Anion Gap 10      Urea Nitrogen 33 (*)     Creatinine 1.06      Calcium 8.3 (*)     Glucose 457 (*)     GFR Estimate >90     PH VENOUS - Normal    pH Venous 7.39     MAGNESIUM - Normal    Magnesium 2.1     LACTIC ACID WHOLE BLOOD - Normal    Lactic Acid 1.7     INFLUENZA A/B & SARS-COV2 PCR MULTIPLEX   PROCALCITONIN   BLOOD CULTURE   BLOOD CULTURE         Emergency Department Course:           Reviewed:  I reviewed nursing notes, vitals, past history and care  "everywhere    Assessments/Consults:   ED Course as of 03/22/22 1836   Tue Mar 22, 2022   1704 Basic metabolic panel       Interventions:  Medications   0.9% sodium chloride BOLUS (0 mLs Intravenous Stopped 3/22/22 1524)     Followed by   sodium chloride 0.9% infusion (has no administration in time range)   insulin aspart (NovoLOG) injection (RAPID ACTING) (has no administration in time range)   ketorolac (TORADOL) injection 15 mg (15 mg Intravenous Given 3/22/22 1308)   piperacillin-tazobactam (ZOSYN) infusion 3.375 g (0 g Intravenous Stopped 3/22/22 1621)   0.9% sodium chloride BOLUS (0 mLs Intravenous Stopped 3/22/22 1633)   morphine (PF) injection 4 mg (4 mg Intravenous Given 3/22/22 1408)   iopamidol (ISOVUE-370) solution 500 mL (100 mLs Intravenous Given 3/22/22 1512)   sodium chloride (PF) 0.9% PF flush 100 mL (65 mLs Intravenous Given 3/22/22 1512)       Disposition:  The patient was admitted to the hospital under the care of Dr. Valdivia.    Impression & Plan        CMS Diagnoses:   The patient has signs of Severe Sepsis as evidenced by:    1. 2 SIRS criteria, AND  2. Suspected infection, AND   3. Organ dysfunction: Lactic Acidosis with value >2.0    Time severe sepsis diagnosis confirmed: 1254  03/22/22 as this was the time when Lactate resulted, and the level was > 2.0    3 Hour Severe Sepsis Bundle Completion:    1. Initial Lactic Acid Result:   Recent Labs   Lab Test 03/22/22  1633 03/22/22  1254   LACT 1.7 2.3*     2. Blood Cultures before Antibiotics: Yes  3. Broad Spectrum Antibiotics Administered:  yes       Anti-infectives (From admission through now)    Start     Dose/Rate Route Frequency Ordered Stop    03/22/22 1340  piperacillin-tazobactam (ZOSYN) infusion 3.375 g        Note to Pharmacy: For [facility], [facility] and [facility]: For Zosyn-naive patients, use the \"Zosyn initial dose + extended infusion\" order panel.    3.375 g  100 mL/hr over 30 Minutes Intravenous ONCE 03/22/22 1336 03/22/22 1621          4. Fluid " volume administered in ED:  Obese patient (BMI >30); 30 mL/kg bolus based on IBW given (see amount below).    BMI Readings from Last 1 Encounters:   No data found for BMI     30 mL/kg fluids based on weight: 3,400 mL  30 mL/kg fluids based on IBW (must be >= 60 inches tall): Patient ideal weight not available.                Severe Sepsis reassessment:  1. Repeat Lactic Acid Level: 1.7  2. MAP>65 after initial IVF bolus, will continue to monitor fluid status and vital signs    I attest to having performed a repeat sepsis exam and assessment of perfusion at 1640   and the results demonstrate improved perfusion.       Medical Decision Makin yo M with fever, fatigue, found to be hyperglycemic with elevated LFTs at clinic today.  Has RUQ and flank tenderness on exam.  On cipro currently for UTI.  DDx includes cholecystitis or choledocholithiasis, pyelo, infected ureteral stone, DKA, HNK, etc.  Reviewed labs from clinic today.  Will send cx's and lactic, repeat CBC, add CRP.  Will check RUQ US and give IVF and pain meds.      1804 D/w Dr Valdivia.  Patient's CRP is significantly elevated.  Ketones are only mildly elevated and venous PH is normal.  Repeat BMP after 2 L of IVF shows resolution of elevated AG and CO2 is now normal as well. His RUQ was neg, a CT was ordered for further eval and actually shows a LLL pneumonia.  He was given empiric Zosyn and IVF boluses.  Gave <30 mL/kg due to patient's obesity.  He also received IVF at  prior to arrival to ED. His lactic did improve with the IVF.  Dr Valdivia accepts admission, requests 10 units of Novolog be given to patient.        Covid-19  Anil Montoya was evaluated during a global COVID-19 pandemic, which necessitated consideration that the patient might be at risk for infection with the SARS-CoV-2 virus that causes COVID-19.  Applicable protocols for evaluation were followed during the patient's care. COVID-19 was considered as part of the patient's  evaluation.       Diagnosis:    ICD-10-CM    1. Pneumonia of left lower lobe due to infectious organism  J18.9    2. Hyperglycemia due to diabetes mellitus (H)  E11.65    3. Long QT interval  R94.31        Discharge Medications:  New Prescriptions    No medications on file              Terrence Grover MD  03/22/22 7208

## 2022-03-22 NOTE — ED TRIAGE NOTES
Pt presents from clinic with , generalized body aches, and kidney infection. Fever of 101.9 this weekend, afebrile since Sunday. No CP or SOB. Blood work and UA done at clinic. ABCs intact.

## 2022-03-22 NOTE — PHARMACY-ADMISSION MEDICATION HISTORY
Admission medication history interview status for this patient is complete. See [facility] admission navigator for allergy information, prior to admission medications and immunization status.     Medication history interview done, indicate source(s): Patient and Family - spouse, Arin  Medication history resources (including written lists, pill bottles, clinic record): SureScripts and Care Everywhere  Pharmacy: [facility]. [facility] for discharge    Changes made to PTA medication list:  Added: all meds  Changed: -  Reported as Not Taking: -  Removed: fluconazole, norco, levaquin (all from 2013)    Actions taken by pharmacist (provider contacted, etc):None     Additional medication history information: Patient has taken 3/20 doses of ciprofloxacin thus far (2 doses on 3/22/22 already)    Medication reconciliation/reorder completed by provider prior to medication history?  N   (Y/N)     Prior to Admission medications    Medication Sig Last Dose Taking? Auth Provider   acetaminophen (TYLENOL) 500 MG tablet Take 1,000 mg by mouth every 6 hours as needed for mild pain 3/21/2022 at PM Yes Unknown, Entered By History   buPROPion (WELLBUTRIN XL) 300 MG 24 hr tablet Take 300 mg by mouth every morning 3/21/2022 at AM Yes Unknown, Entered By History   ciprofloxacin (CIPRO) 750 MG tablet Take 750 mg by mouth 2 times daily For 10 days (3/21-3/31). 3/22/2022 at 1000 Yes Unknown, Entered By History   dextromethorphan (TUSSIN COUGH) 15 MG/5ML syrup Take 10 mLs by mouth 4 times daily as needed for cough 3/18/2022 at AM Yes Unknown, Entered By History   ibuprofen (ADVIL/MOTRIN) 200 MG tablet Take 600 mg by mouth every 6 hours as needed for mild pain 3/21/2022 at PM Yes Unknown, Entered By History   levothyroxine (SYNTHROID/LEVOTHROID) 125 MCG tablet Take by mouth daily 125mcg Monday - Saturday  250mcg Sundays 3/21/2022 at AM Yes Unknown, Entered By History   liraglutide (VICTOZA) 18 MG/3ML solution Inject 1.8 mg  Subcutaneous daily 3/21/2022 at AM Yes Unknown, Entered By History   losartan (COZAAR) 50 MG tablet Take 50 mg by mouth daily 3/21/2022 at AM Yes Unknown, Entered By History   metFORMIN (GLUCOPHAGE) 1000 MG tablet Take 1,000 mg by mouth 2 times daily (with meals) 3/21/2022 at PM Yes Unknown, Entered By History

## 2022-03-22 NOTE — LETTER
[facility]  [address] [facility] [address]  [phone] March 28, 2022    RE:  Anil Montoya                                                                                                                                                       To whom it may concern:    Anil Montoya is under my professional care for acute illness.  He will be under medical care for the forseeable future. Ultimate duration of illness is unclear at this point. However, it will at least be for 4-6 weeks. Please call with any questions.       Sincerely,        Justo Bright, DO

## 2022-03-22 NOTE — ED NOTES
"[facility]  ED Nurse Handoff Report    Anil Montoya is a 36 year old male   ED Chief complaint: Generalized Weakness  . ED Diagnosis:   Final diagnoses:   None     Allergies:   Allergies   Allergen Reactions     Clindamycin      Vancomycin      \"Red Sonia Syndrome\"       Code Status: Full Code  Activity level - Baseline/Home:  Independent. Activity Level - Current:   Assist x 1 Lift room needed: No. Bariatric: No   Needed: No   Isolation: No. Infection: Not Applicable.     Vital Signs:   Vitals:    03/22/22 1400 03/22/22 1430 03/22/22 1445 03/22/22 1500   BP: 127/81 127/74 126/77 124/75   Pulse: 107 102 101 106   Resp:       Temp:       TempSrc:       SpO2:       Weight:           Cardiac Rhythm:  ,      Pain level:    Patient confused: No. Patient Falls Risk: Yes.   Elimination Status: Has voided   Patient Report - Initial Complaint: generalized weakness. Focused Assessment: Pt presents from clinic with , generalized body aches, and kidney infection. Fever of 101.9 this weekend, afebrile since Sunday. No CP or SOB. Blood work and UA done at clinic. ABCs intact.    Tests Performed: imaging, labs Abnormal Results:   CT Abdomen Pelvis w Contrast   Final Result   IMPRESSION:    1. Patchy consolidation at the left lung base suggestive for   pneumonia.   2. Bilateral nonobstructing intrarenal stones. No hydronephrosis.   3. Fatty liver. Enlarged liver.   4. Indeterminant pulmonary nodule at the right lung base. See below   for follow up imaging guidelines.      Recommendations for an incidental lung nodule = or > 6mm to 8mm:     Low risk patients: Initial follow-up CT at 6-12 months, then   consider CT at 18-24 months if no change.     High risk patients: Initial follow-up CT at 6-12 months, then CT at   18-24 months if no change.      *Low Risk: Minimal or absent history of smoking or other known risk   factors.   *Nonsolid (ground-glass) or partly solid nodules may " require longer   follow-up to exclude indolent adenocarcinoma.   *Recommendations based on Guidelines for the Management of Incidental   Pulmonary Nodules Detected at CT: From the Fleischner Society 2017,   Radiology 2017.      ALIZA DONATO MD            SYSTEM ID:  [id number]      US Abdomen Limited (RUQ)   Final Result   IMPRESSION:   1.  Fatty liver.   2.  No gallstones. Negative sonographic Rachel sign.      ALIZA DONATO MD            SYSTEM ID:  [id number]        Labs Ordered and Resulted from Time of ED Arrival to Time of ED Departure   CRP INFLAMMATION - Abnormal       Result Value    CRP Inflammation 393.0 (*)    LACTIC ACID WHOLE BLOOD - Abnormal    Lactic Acid 2.3 (*)    KETONE BETA-HYDROXYBUTYRATE QUANTITATIVE, RAPID - Abnormal    Ketone (Beta-Hydroxybutyrate) Quantitative 0.9 (*)    CBC WITH PLATELETS AND DIFFERENTIAL - Abnormal    WBC Count 8.5      RBC Count 4.58      Hemoglobin 13.3      Hematocrit 40.1      MCV 88      MCH 29.0      MCHC 33.2      RDW 13.7      Platelet Count 98 (*)    DIFFERENTIAL - Abnormal    % Neutrophils 78      % Lymphocytes 12      % Monocytes 4      % Eosinophils 3      % Basophils 0      % Metamyelocytes 2      % Myelocytes 1      Absolute Neutrophils 6.6      Absolute Lymphocytes 1.0      Absolute Monocytes 0.3      Absolute Eosinophils 0.3      Absolute Basophils 0.0      Absolute Metamyelocytes 0.2 (*)     Absolute Myelocytes 0.1 (*)     RBC Morphology Confirmed RBC Indices      Platelet Assessment        Value: Automated Count Confirmed. Platelet morphology is normal.   PH VENOUS - Normal    pH Venous 7.39     MAGNESIUM - Normal    Magnesium 2.1     LACTIC ACID WHOLE BLOOD   BASIC METABOLIC PANEL   BLOOD CULTURE   BLOOD CULTURE      Treatments provided: See MAR  Family Comments: family at bedside  OBS brochure/video discussed/provided to patient:  No  ED Medications:   Medications   0.9% sodium chloride BOLUS (0 mLs Intravenous Stopped 3/22/22 6462)     Followed by   sodium  chloride 0.9% infusion (has no administration in time range)   0.9% sodium chloride BOLUS (1,000 mLs Intravenous New Bag 3/22/22 1528)   ketorolac (TORADOL) injection 15 mg (15 mg Intravenous Given 3/22/22 1308)   piperacillin-tazobactam (ZOSYN) infusion 3.375 g (3.375 g Intravenous New Bag 3/22/22 1350)   morphine (PF) injection 4 mg (4 mg Intravenous Given 3/22/22 1408)   iopamidol (ISOVUE-370) solution 500 mL (100 mLs Intravenous Given 3/22/22 1512)   sodium chloride (PF) 0.9% PF flush 100 mL (65 mLs Intravenous Given 3/22/22 1512)     Drips infusing:  Yes  For the majority of the shift, the patient's behavior Green. Interventions performed were N/A.    Sepsis treatment initiated: No     Patient tested for COVID 19 prior to admission: NO - negative test 3/21 per chart review.    ED Nurse Name/Phone Number: Reema Workman RN,   4:03 PM    RECEIVING UNIT ED HANDOFF REVIEW    Above ED Nurse Handoff Report was reviewed: Yes  Reviewed by: Shashank Antonio RN on March 22, 2022 at 6:39 PM

## 2022-03-22 NOTE — H&P
[facility]    History and Physical  Hospitalist       Date of Admission:  3/22/2022    Assessment & Plan   Anil Montoya is a 36 year old obese male with a past medical history of cryptococcal pneumonia in 2012, diabetes mellitus, hypothyroidism and hypertension who presents with generalized weakness.    #Severe sepsis secondary to possible CAP vs. Viral infection: Patient notes his symptoms started on Thursday, 3/17 with fevers at home.  He had also noticed headache at that time.  Through the weekend he then had general weakness and myalgias but fever broke.  He has just been feeling very weak.  Has had cough without much production.  Denies any dysuria or frequency of urination.  No nausea or vomiting.  He does have some upper abdominal discomfort but not severe.  No diarrhea.  He has not noticed any skin changes.  No changes in vision.  No unilateral weakness.  Denies recent travel. No neck stiffness/decreased ROM of cervical spine.   -Patient initially went to urgent care on 3/21.  He was diagnosed with UTI and given ciprofloxacin.  His symptoms did not improve so back to urgent care.  He was noted to have mild elevation in LFTs along with glucose of 465.  Bicarb mildly low at 19 with anion gap of 18.  He had an x-ray done that showed no acute infiltrate.  He was sent to the ER for evaluation.  -ER, pt afebrile, tachycardic and tachyneic to low 20s.  Saturating well on RA.  CBC without leukocytosis but with thrombocytopenia.  Timothy BMP showed bicarb of 22.  CRP elevated at 393.  Lactic acid elevated at 2.3 but improved to 1.7 with IV fluids.  Underwent CT abdomen pelvis that showed patchy consolidation of the left lung base suggestive for pneumonia along with bilateral nonobstructing intrarenal stones.  No hydronephrosis.  He also had abdominal ultrasound that was negative for any gallstones or Rachel sign.  His UA from 3/22 was negative nitrite and LE.  Blood cultures  drawn and patient given Zosyn.  -Follow blood cultures and check procalcitonin.  Will also check respiratory viral panel.  Check sputum culture.   -Recheck lactic acid in AM (normalized with IVF).   -Will treat for possible CAP with ceftriaxone and azithromycin.  Check for COVID-19 and influenza (not vaccinated for COVID-19). Will have precautions in place until we have negative test.   -Consider ID consulted in AM pending w/u as above.     #Uncontrolled DM2: Pt with hyperglycemia with BS over 400.  At urgent care his bicarb was mildly low at 19 with anion gap of 18.  In the ER, bicarb at 22, pH within normal limits.  Given 10 units novolog in the ED.  He is chronically on metformin and victoza.  -Check A1c.  -Will start lantus 10 units daily. sliding scale insulin.  Monitor BMP every 6 hours overnight. Holding metformin in setting of mildly elevated lactic acid.     #Thrombocytopenia: Suspect secondary to sepsis as above.  Monitor daily. No evidence of bleeding.     #Elevation of LFT's: Mild elevation noted at urgent care. AST 65, ALT 74, Bilirubin 1.5.  Suspicious for possible viral infection. CT A/P without major abnormality. US without biliary dilation.  Showed fatty liver.  -Monitor LFT's.     #Hypothyroidism: Continue home LT4  #Anxiety/ADHD: Resume home meds once verfiied.   #HTN: Hold losartan for now in setting of severe sepsis.      DVT Prophylaxis: Pneumatic Compression Devices  Code Status: Full Code  Dispo: Admit to inpatient    Salo Valdivia MD    Primary Care Physician   Amanda Dawkins    Chief Complaint   Generalized weakness    History is obtained from the patient, patient's chart and discussed with ER physician    History of Present Illness   Anil Kolby Montoya is a 36 year old obese male with a past medical history of diabetes mellitus, hypothyroidism and hypertension who presents with generalized weakness.    Patient notes his symptoms started on Thursday with fevers at home.  He had  also noticed headache at that time.  Through the weekend he then had general weakness and myalgias.  He has just been feeling very weak.  Has had cough without much production.  Denies any dysuria or frequency of urination.  No nausea or vomiting.  He does have some upper abdominal discomfort but not severe.  No diarrhea.  He has not noticed any skin changes.  No changes in vision.  No unilateral weakness.  Denies recent travel.     Patient initially went to urgent care on 3/21.  He was diagnosed with UTI and given ciprofloxacin.  His symptoms did not improve so back to urgent care.  He was noted to have mild elevation in LFTs along with glucose of 465.  Bicarb mildly low at 19 with anion gap of 18.  He had an x-ray done that showed no acute infiltrate.  He was sent to the ER for evaluation.    In the ER, pt afebrile, tachycardic and tachyneic to low 20s.  Saturating well on RA.  CBC without leukocytosis but with thrombocytopenia.  Timothy BMP showed bicarb of 22.  CRP elevated at 393.  Lactic acid elevated at 2.3 but improved to 1.7 with IV fluids.  Underwent CT abdomen pelvis that showed patchy consolidation of the left lung base suggestive for pneumonia along with bilateral nonobstructing intrarenal stones.  No hydronephrosis.  He also had abdominal ultrasound that was negative for any gallstones or Rachel sign.  Blood cultures drawn and patient given Zosyn.    Past Medical History    I have reviewed this patient's medical history and updated it with pertinent information if needed.   Past Medical History:   Diagnosis Date     Pneumonia    Diabetes Mellitus  HTN  Hypothyroidism    Past Surgical History   Thoacotomy in past    Prior to Admission Medications   Prior to Admission Medications   Prescriptions Last Dose Informant Patient Reported? Taking?   acetaminophen (TYLENOL) 500 MG tablet 3/21/2022 at PM  Yes Yes   Sig: Take 1,000 mg by mouth every 6 hours as needed for mild pain   buPROPion (WELLBUTRIN XL) 300 MG  "24 hr tablet 3/21/2022 at AM  Yes Yes   Sig: Take 300 mg by mouth every morning   ciprofloxacin (CIPRO) 750 MG tablet 3/22/2022 at 1000  Yes Yes   Sig: Take 750 mg by mouth 2 times daily For 10 days (3/21-3/31).   dextromethorphan (TUSSIN COUGH) 15 MG/5ML syrup 3/18/2022 at AM  Yes Yes   Sig: Take 10 mLs by mouth 4 times daily as needed for cough   ibuprofen (ADVIL/MOTRIN) 200 MG tablet 3/21/2022 at PM  Yes Yes   Sig: Take 600 mg by mouth every 6 hours as needed for mild pain   levothyroxine (SYNTHROID/LEVOTHROID) 125 MCG tablet 3/21/2022 at AM  Yes Yes   Sig: Take by mouth daily 125mcg Monday - Saturday  250mcg Sundays   liraglutide (VICTOZA) 18 MG/3ML solution 3/21/2022 at AM  Yes Yes   Sig: Inject 1.8 mg Subcutaneous daily   losartan (COZAAR) 50 MG tablet 3/21/2022 at AM  Yes Yes   Sig: Take 50 mg by mouth daily   metFORMIN (GLUCOPHAGE) 1000 MG tablet 3/21/2022 at PM  Yes Yes   Sig: Take 1,000 mg by mouth 2 times daily (with meals)      Facility-Administered Medications: None     Allergies   Allergies   Allergen Reactions     Clindamycin      Vancomycin      \"Red Sonia Syndrome\"       Social History   I have reviewed this patient's social history and updated it with pertinent information if needed. Anil Montoya    Nonsmoker. Rare drinker. No illicit drug use    Family History   Reviewed - noncontributroy    Review of Systems   The 10 point Review of Systems is negative other than noted in the HPI or here.     Physical Exam   Temp: 97.4  F (36.3  C) Temp src: Oral BP: 139/84 Pulse: 107   Resp: 20 SpO2: 99 % O2 Device: None (Room air)    Vital Signs with Ranges  Temp:  [97.4  F (36.3  C)] 97.4  F (36.3  C)  Pulse:  [101-111] 107  Resp:  [20] 20  BP: (124-153)/(74-93) 139/84  SpO2:  [99 %] 99 %  250 lbs 0 oz    Constitutional: Obese. Fatigued. Answers appropriately.   HEENT: Normocephalic, Mucous membranes dry. No elevation of JVD noted. Full ROM of cervical spine.   Respiratory: +tachypnea. " Crackles at bases bilaterally with rhonchi at left base. No wheeze.   Cardiovascular: Tachy, Regular, no murmur  GI: Obese, BS+, NT, ND, no rebound or guarding  Lymph/Hematologic: No bruising. No cervical LAD  Skin: No rash  Musculoskeletal: Nl Tone, No edema noted  Neurologic: A&Ox3, Answers appropriately. CNII-XII intact. Moves all extremities. No tremor  Psychiatric: Calm    Data   Data reviewed today:  I personally reviewed  Recent Labs   Lab 03/22/22  1204   WBC 8.5   HGB 13.3   MCV 88   PLT 98*      POTASSIUM 3.7   CHLORIDE 101   CO2 22   BUN 33*   CR 1.06   ANIONGAP 10   IMAN 8.3*   *       Recent Results (from the past 24 hour(s))   US Abdomen Limited (RUQ)    Narrative    ULTRASOUND ABDOMEN LIMITED  3/22/2022 1:36 PM    CLINICAL HISTORY: Right upper quadrant abdominal pain, fever, elevated  LFTs.    TECHNIQUE: Limited abdominal ultrasound.    COMPARISON: None.    FINDINGS:  GALLBLADDER: The gallbladder is normal. No gallstones, wall  thickening, or pericholecystic fluid. Negative sonographic Rachel's  sign.    BILE DUCTS: There is no biliary dilatation. The common duct measures 3  mm.    LIVER: Fatty liver. No focal lesion.    RIGHT KIDNEY: No hydronephrosis.    PANCREAS: The visualized portions of the pancreas are normal.    No ascites.      Impression    IMPRESSION:  1.  Fatty liver.  2.  No gallstones. Negative sonographic Rachel sign.    ALIZA DONATO MD         SYSTEM ID:  [id number]   CT Abdomen Pelvis w Contrast    Narrative    CT ABDOMEN AND PELVIS WITH CONTRAST 3/22/2022 3:21 PM    CLINICAL HISTORY: Abdominal pain, fever; Abdominal pain, acute,  nonlocalized.    TECHNIQUE: CT scan of the abdomen and pelvis was performed following  injection of IV contrast. Multiplanar reformats were obtained. Dose  reduction techniques were used.    CONTRAST: 100mL Isovue-370    COMPARISON: Ultrasound abdomen 3/22/2022.    FINDINGS:   LOWER CHEST: Patchy groundglass consolidation of the left lower  lobe  series 4 image 26. Solid nodule medial right lower lobe is 0.6 cm  image 14.    HEPATOBILIARY: Hepatic steatosis. No acute liver abnormality otherwise  seen. Hepatomegaly. Unremarkable gallbladder.    PANCREAS: Normal.    SPLEEN: Normal.    ADRENAL GLANDS: Normal.    KIDNEYS/BLADDER: A few small bilateral renal cysts not requiring  specific imaging follow-up. Bilateral intrarenal stones. An example at  the right mid kidney is 0.4 cm series 4 image 129. No bladder stones.  No hydronephrosis or ureter stone.    BOWEL: Normal appendix. No obstruction or inflammation.    PELVIC ORGANS: Normal.    ADDITIONAL FINDINGS: None.    MUSCULOSKELETAL: Unremarkable.      Impression    IMPRESSION:   1. Patchy consolidation at the left lung base suggestive for  pneumonia.  2. Bilateral nonobstructing intrarenal stones. No hydronephrosis.  3. Fatty liver. Enlarged liver.  4. Indeterminant pulmonary nodule at the right lung base. See below  for follow up imaging guidelines.    Recommendations for an incidental lung nodule = or > 6mm to 8mm:    Low risk patients: Initial follow-up CT at 6-12 months, then  consider CT at 18-24 months if no change.    High risk patients: Initial follow-up CT at 6-12 months, then CT at  18-24 months if no change.    *Low Risk: Minimal or absent history of smoking or other known risk  factors.  *Nonsolid (ground-glass) or partly solid nodules may require longer  follow-up to exclude indolent adenocarcinoma.  *Recommendations based on Guidelines for the Management of Incidental  Pulmonary Nodules Detected at CT: From the Fleischner Society 2017,  Radiology 2017.    ALIZA DONATO MD         SYSTEM ID:  [id number]       Clinically Significant Risk Factors Present on Admission                # Thrombocytopenia: Plts = 98 10e3/uL (Ref range: 150 - 450 10e3/uL) on admission, will monitor for bleeding

## 2022-03-23 LAB
ALBUMIN SERPL-MCNC: 1.5 G/DL (ref 3.4–5)
ALP SERPL-CCNC: 225 U/L (ref 40–150)
ALT SERPL W P-5'-P-CCNC: 67 U/L (ref 0–70)
ANION GAP SERPL CALCULATED.3IONS-SCNC: 4 MMOL/L (ref 3–14)
ANION GAP SERPL CALCULATED.3IONS-SCNC: 7 MMOL/L (ref 3–14)
ANION GAP SERPL CALCULATED.3IONS-SCNC: 7 MMOL/L (ref 3–14)
ANION GAP SERPL CALCULATED.3IONS-SCNC: 8 MMOL/L (ref 3–14)
AST SERPL W P-5'-P-CCNC: 63 U/L (ref 0–45)
ATRIAL RATE - MUSE: 107 BPM
BACTERIA SPT CULT: NORMAL
BILIRUB SERPL-MCNC: 1.2 MG/DL (ref 0.2–1.3)
BUN SERPL-MCNC: 20 MG/DL (ref 7–30)
BUN SERPL-MCNC: 22 MG/DL (ref 7–30)
C PNEUM DNA SPEC QL NAA+PROBE: NOT DETECTED
CALCIUM SERPL-MCNC: 8.1 MG/DL (ref 8.5–10.1)
CALCIUM SERPL-MCNC: 8.3 MG/DL (ref 8.5–10.1)
CALCIUM SERPL-MCNC: 8.3 MG/DL (ref 8.5–10.1)
CALCIUM SERPL-MCNC: 8.6 MG/DL (ref 8.5–10.1)
CHLORIDE BLD-SCNC: 104 MMOL/L (ref 94–109)
CHLORIDE BLD-SCNC: 106 MMOL/L (ref 94–109)
CO2 SERPL-SCNC: 22 MMOL/L (ref 20–32)
CO2 SERPL-SCNC: 23 MMOL/L (ref 20–32)
CO2 SERPL-SCNC: 23 MMOL/L (ref 20–32)
CO2 SERPL-SCNC: 26 MMOL/L (ref 20–32)
CREAT SERPL-MCNC: 0.82 MG/DL (ref 0.66–1.25)
CREAT SERPL-MCNC: 0.87 MG/DL (ref 0.66–1.25)
CREAT SERPL-MCNC: 0.87 MG/DL (ref 0.66–1.25)
CREAT SERPL-MCNC: 0.99 MG/DL (ref 0.66–1.25)
DIASTOLIC BLOOD PRESSURE - MUSE: NORMAL MMHG
ENTEROCOCCUS FAECALIS: NOT DETECTED
ENTEROCOCCUS FAECIUM: NOT DETECTED
ERYTHROCYTE [DISTWIDTH] IN BLOOD BY AUTOMATED COUNT: 14 % (ref 10–15)
FLUAV H1 2009 PAND RNA SPEC QL NAA+PROBE: NOT DETECTED
FLUAV H1 RNA SPEC QL NAA+PROBE: NOT DETECTED
FLUAV H3 RNA SPEC QL NAA+PROBE: NOT DETECTED
FLUAV RNA SPEC QL NAA+PROBE: NOT DETECTED
FLUBV RNA SPEC QL NAA+PROBE: NOT DETECTED
GFR SERPL CREATININE-BSD FRML MDRD: >90 ML/MIN/1.73M2
GLUCOSE BLD-MCNC: 316 MG/DL (ref 70–99)
GLUCOSE BLD-MCNC: 316 MG/DL (ref 70–99)
GLUCOSE BLD-MCNC: 341 MG/DL (ref 70–99)
GLUCOSE BLD-MCNC: 347 MG/DL (ref 70–99)
GLUCOSE BLDC GLUCOMTR-MCNC: 273 MG/DL (ref 70–99)
GLUCOSE BLDC GLUCOMTR-MCNC: 285 MG/DL (ref 70–99)
GLUCOSE BLDC GLUCOMTR-MCNC: 303 MG/DL (ref 70–99)
GLUCOSE BLDC GLUCOMTR-MCNC: 318 MG/DL (ref 70–99)
GLUCOSE BLDC GLUCOMTR-MCNC: 325 MG/DL (ref 70–99)
GRAM STAIN RESULT: NORMAL
HADV DNA SPEC QL NAA+PROBE: NOT DETECTED
HCOV PNL SPEC NAA+PROBE: NOT DETECTED
HCT VFR BLD AUTO: 39.4 % (ref 40–53)
HGB BLD-MCNC: 12.5 G/DL (ref 13.3–17.7)
HMPV RNA SPEC QL NAA+PROBE: NOT DETECTED
HPIV1 RNA SPEC QL NAA+PROBE: NOT DETECTED
HPIV2 RNA SPEC QL NAA+PROBE: NOT DETECTED
HPIV3 RNA SPEC QL NAA+PROBE: NOT DETECTED
HPIV4 RNA SPEC QL NAA+PROBE: NOT DETECTED
INTERPRETATION ECG - MUSE: NORMAL
LACTATE SERPL-SCNC: 1.4 MMOL/L (ref 0.7–2)
LISTERIA SPECIES (DETECTED/NOT DETECTED): NOT DETECTED
M PNEUMO DNA SPEC QL NAA+PROBE: NOT DETECTED
MCH RBC QN AUTO: 29 PG (ref 26.5–33)
MCHC RBC AUTO-ENTMCNC: 31.7 G/DL (ref 31.5–36.5)
MCV RBC AUTO: 91 FL (ref 78–100)
P AXIS - MUSE: 59 DEGREES
PLATELET # BLD AUTO: 100 10E3/UL (ref 150–450)
POTASSIUM BLD-SCNC: 3.6 MMOL/L (ref 3.4–5.3)
POTASSIUM BLD-SCNC: 3.7 MMOL/L (ref 3.4–5.3)
PR INTERVAL - MUSE: 168 MS
PROT SERPL-MCNC: 5.9 G/DL (ref 6.8–8.8)
QRS DURATION - MUSE: 110 MS
QT - MUSE: 390 MS
QTC - MUSE: 520 MS
R AXIS - MUSE: 19 DEGREES
RBC # BLD AUTO: 4.31 10E6/UL (ref 4.4–5.9)
RSV RNA SPEC QL NAA+PROBE: NOT DETECTED
RSV RNA SPEC QL NAA+PROBE: NOT DETECTED
RV+EV RNA SPEC QL NAA+PROBE: NOT DETECTED
SODIUM SERPL-SCNC: 134 MMOL/L (ref 133–144)
SODIUM SERPL-SCNC: 136 MMOL/L (ref 133–144)
STAPHYLOCOCCUS AUREUS: DETECTED
STAPHYLOCOCCUS EPIDERMIDIS: NOT DETECTED
STAPHYLOCOCCUS LUGDUNENSIS: NOT DETECTED
STREPTOCOCCUS AGALACTIAE: NOT DETECTED
STREPTOCOCCUS ANGINOSUS GROUP: NOT DETECTED
STREPTOCOCCUS PNEUMONIAE: NOT DETECTED
STREPTOCOCCUS PYOGENES: NOT DETECTED
STREPTOCOCCUS SPECIES: NOT DETECTED
SYSTOLIC BLOOD PRESSURE - MUSE: NORMAL MMHG
T AXIS - MUSE: 30 DEGREES
VENTRICULAR RATE- MUSE: 107 BPM
WBC # BLD AUTO: 9.4 10E3/UL (ref 4–11)

## 2022-03-23 PROCEDURE — 82040 ASSAY OF SERUM ALBUMIN: CPT | Performed by: HOSPITALIST

## 2022-03-23 PROCEDURE — 250N000013 HC RX MED GY IP 250 OP 250 PS 637: Performed by: HOSPITALIST

## 2022-03-23 PROCEDURE — 36415 COLL VENOUS BLD VENIPUNCTURE: CPT | Performed by: HOSPITALIST

## 2022-03-23 PROCEDURE — 258N000003 HC RX IP 258 OP 636: Performed by: INTERNAL MEDICINE

## 2022-03-23 PROCEDURE — 85014 HEMATOCRIT: CPT | Performed by: HOSPITALIST

## 2022-03-23 PROCEDURE — 87070 CULTURE OTHR SPECIMN AEROBIC: CPT | Performed by: HOSPITALIST

## 2022-03-23 PROCEDURE — 83605 ASSAY OF LACTIC ACID: CPT | Performed by: HOSPITALIST

## 2022-03-23 PROCEDURE — 250N000011 HC RX IP 250 OP 636: Performed by: INTERNAL MEDICINE

## 2022-03-23 PROCEDURE — 87077 CULTURE AEROBIC IDENTIFY: CPT | Performed by: INTERNAL MEDICINE

## 2022-03-23 PROCEDURE — 36415 COLL VENOUS BLD VENIPUNCTURE: CPT | Performed by: INTERNAL MEDICINE

## 2022-03-23 PROCEDURE — 120N000001 HC R&B MED SURG/OB

## 2022-03-23 PROCEDURE — 250N000012 HC RX MED GY IP 250 OP 636 PS 637: Performed by: HOSPITALIST

## 2022-03-23 PROCEDURE — 99233 SBSQ HOSP IP/OBS HIGH 50: CPT | Performed by: HOSPITALIST

## 2022-03-23 PROCEDURE — 250N000011 HC RX IP 250 OP 636: Performed by: HOSPITALIST

## 2022-03-23 PROCEDURE — 258N000003 HC RX IP 258 OP 636: Performed by: HOSPITALIST

## 2022-03-23 PROCEDURE — 82310 ASSAY OF CALCIUM: CPT | Performed by: HOSPITALIST

## 2022-03-23 RX ORDER — DEXTROMETHORPHAN POLISTIREX 30 MG/5ML
5 SUSPENSION ORAL 2 TIMES DAILY PRN
Status: DISCONTINUED | OUTPATIENT
Start: 2022-03-23 | End: 2022-03-28 | Stop reason: HOSPADM

## 2022-03-23 RX ORDER — CEFAZOLIN SODIUM 1 G/50ML
2500 SOLUTION INTRAVENOUS ONCE
Status: COMPLETED | OUTPATIENT
Start: 2022-03-23 | End: 2022-03-23

## 2022-03-23 RX ORDER — IBUPROFEN 600 MG/1
600 TABLET, FILM COATED ORAL EVERY 6 HOURS PRN
Status: DISCONTINUED | OUTPATIENT
Start: 2022-03-23 | End: 2022-03-28 | Stop reason: HOSPADM

## 2022-03-23 RX ORDER — LOSARTAN POTASSIUM 50 MG/1
50 TABLET ORAL DAILY
Status: DISCONTINUED | OUTPATIENT
Start: 2022-03-23 | End: 2022-03-28 | Stop reason: HOSPADM

## 2022-03-23 RX ORDER — CEFAZOLIN SODIUM 1 G/50ML
2500 SOLUTION INTRAVENOUS ONCE
Status: DISCONTINUED | OUTPATIENT
Start: 2022-03-23 | End: 2022-03-23

## 2022-03-23 RX ADMIN — ACETAMINOPHEN 650 MG: 325 TABLET, FILM COATED ORAL at 10:01

## 2022-03-23 RX ADMIN — AZITHROMYCIN MONOHYDRATE 500 MG: 500 INJECTION, POWDER, LYOPHILIZED, FOR SOLUTION INTRAVENOUS at 21:26

## 2022-03-23 RX ADMIN — IBUPROFEN 600 MG: 600 TABLET, FILM COATED ORAL at 14:42

## 2022-03-23 RX ADMIN — ACETAMINOPHEN 650 MG: 325 TABLET, FILM COATED ORAL at 03:40

## 2022-03-23 RX ADMIN — VANCOMYCIN HYDROCHLORIDE 2500 MG: 10 INJECTION, POWDER, LYOPHILIZED, FOR SOLUTION INTRAVENOUS at 06:03

## 2022-03-23 RX ADMIN — ACETAMINOPHEN 650 MG: 325 TABLET, FILM COATED ORAL at 20:21

## 2022-03-23 RX ADMIN — SODIUM CHLORIDE, POTASSIUM CHLORIDE, SODIUM LACTATE AND CALCIUM CHLORIDE: 600; 310; 30; 20 INJECTION, SOLUTION INTRAVENOUS at 23:37

## 2022-03-23 RX ADMIN — IBUPROFEN 600 MG: 600 TABLET, FILM COATED ORAL at 21:35

## 2022-03-23 RX ADMIN — BUPROPION HYDROCHLORIDE 300 MG: 150 TABLET, FILM COATED, EXTENDED RELEASE ORAL at 08:00

## 2022-03-23 RX ADMIN — LOSARTAN POTASSIUM 50 MG: 50 TABLET, FILM COATED ORAL at 14:42

## 2022-03-23 RX ADMIN — INSULIN GLARGINE 10 UNITS: 100 INJECTION, SOLUTION SUBCUTANEOUS at 21:27

## 2022-03-23 RX ADMIN — LEVOTHYROXINE SODIUM 125 MCG: 125 TABLET ORAL at 08:02

## 2022-03-23 RX ADMIN — SODIUM CHLORIDE, POTASSIUM CHLORIDE, SODIUM LACTATE AND CALCIUM CHLORIDE: 600; 310; 30; 20 INJECTION, SOLUTION INTRAVENOUS at 11:43

## 2022-03-23 RX ADMIN — INSULIN GLARGINE 10 UNITS: 100 INJECTION, SOLUTION SUBCUTANEOUS at 00:47

## 2022-03-23 RX ADMIN — CEFTRIAXONE 2 G: 2 INJECTION, POWDER, FOR SOLUTION INTRAMUSCULAR; INTRAVENOUS at 20:08

## 2022-03-23 RX ADMIN — VANCOMYCIN HYDROCHLORIDE 1250 MG: 10 INJECTION, POWDER, LYOPHILIZED, FOR SOLUTION INTRAVENOUS at 18:13

## 2022-03-23 RX ADMIN — DEXTROMETHORPHAN POLISTIREX 30 MG: 30 SUSPENSION ORAL at 16:03

## 2022-03-23 ASSESSMENT — ACTIVITIES OF DAILY LIVING (ADL)
ADLS_ACUITY_SCORE: 5
ADLS_ACUITY_SCORE: 5
ADLS_ACUITY_SCORE: 14
ADLS_ACUITY_SCORE: 14
ADLS_ACUITY_SCORE: 5
ADLS_ACUITY_SCORE: 4
ADLS_ACUITY_SCORE: 4
ADLS_ACUITY_SCORE: 14
ADLS_ACUITY_SCORE: 4
ADLS_ACUITY_SCORE: 14
ADLS_ACUITY_SCORE: 4
ADLS_ACUITY_SCORE: 5
ADLS_ACUITY_SCORE: 5
ADLS_ACUITY_SCORE: 4
ADLS_ACUITY_SCORE: 14
ADLS_ACUITY_SCORE: 4
ADLS_ACUITY_SCORE: 4
ADLS_ACUITY_SCORE: 14
ADLS_ACUITY_SCORE: 4
ADLS_ACUITY_SCORE: 4
ADLS_ACUITY_SCORE: 14
ADLS_ACUITY_SCORE: 5

## 2022-03-23 NOTE — PROGRESS NOTES
[facility]    Medicine Progress Note - Hospitalist Service    Date of Admission:  3/22/2022    Assessment & Plan            Anil Montoya is a 36 year old obese male with a past medical history of cryptococcal pneumonia in 2012, diabetes mellitus, hypothyroidism and hypertension who presents with generalized weakness.  Patient notes his symptoms started on Thursday, 3/17 with fevers at home.  He had also noticed headache at that time.  Through the weekend he then had general weakness and myalgias but fever broke.  He has just been feeling very weak.  Has had cough without much production.  Denies any dysuria or frequency of urination.  No nausea or vomiting.  He does have some upper abdominal discomfort but not severe.  No diarrhea.  He has not noticed any skin changes.  No changes in vision.  No unilateral weakness.  Denies recent travel. No neck stiffness/decreased ROM of cervical spine.   -Patient initially went to urgent care on 3/21.  He was diagnosed with UTI and given ciprofloxacin.  His symptoms did not improve so back to urgent care.  He was noted to have mild elevation in LFTs along with glucose of 465.  Bicarb mildly low at 19 with anion gap of 18.  He had an x-ray done that showed no acute infiltrate.  He was sent to the ER for evaluation.  -ER, pt afebrile, tachycardic and tachyneic to low 20s.  Saturating well on RA.  CBC without leukocytosis but with thrombocytopenia.  Timothy BMP showed bicarb of 22.  CRP elevated at 393.  Lactic acid elevated at 2.3 but improved to 1.7 with IV fluids.  Underwent CT abdomen pelvis that showed patchy consolidation of the left lung base suggestive for pneumonia along with bilateral nonobstructing intrarenal stones.  No hydronephrosis.  He also had abdominal ultrasound that was negative for any gallstones or Rachel sign.  His UA from 3/22 was negative nitrite and LE.  Blood cultures drawn and patient given Zosyn.    #Severe sepsis  secondary to possible CAP   -Blood cultures are positive for gram-positive cocci in clusters.  -Repeat blood cultures were obtained  -Procalcitonin was elevated  -Started him on vancomycin  -Continue ceftriaxone and azithromycin along with vancomycin  -Recheck lactic acid in AM (normalized with IVF).   -Negative for Covid      #Uncontrolled DM2: Pt with hyperglycemia with BS over 400.  At urgent care his bicarb was mildly low at 19 with anion gap of 18.  In the ER, bicarb at 22, pH within normal limits.  Given 10 units novolog in the ED.  He is chronically on metformin and victoza.  -Check A1c.  -Will start lantus 10 units daily. sliding scale insulin.  -Continue to hold Metformin    #Thrombocytopenia: Suspect secondary to sepsis as above.  Monitor daily. No evidence of bleeding.     #Elevation of LFT's: Mild elevation noted at urgent care. AST 65, ALT 74, Bilirubin 1.5.  Suspicious for possible viral infection. CT A/P without major abnormality. US without biliary dilation.  Showed fatty liver.  -Monitor LFT's.     #Hypothyroidism: Continue home LT4   #Anxiety/ADHD: Resume home meds once verfiied.   #HTN: Hold losartan for now in setting of severe sepsis.             Diet: Moderate Consistent Carb (60 g CHO per Meal) Diet    DVT Prophylaxis: Pneumatic Compression Devices  Schwartz Catheter: Not present  Central Lines: None  Cardiac Monitoring: None  Code Status: Full Code      Disposition Plan   Expected Discharge: 03/24/2022     Anticipated discharge location:  Awaiting care coordination huddle       The patient's care was discussed with the Patient.    Peri Campbell MD  Hospitalist Service  [facility]  Securely message with the Vocera Web Console (learn more here)  Text page via [employer] Paging/Directory       ______________________________________________________________________    Interval History   Patient is feeling little better still tired.  He denies any chest pain, shortness of  breath, cough.  No fevers or chills noted.    Data reviewed today: I reviewed all medications, new labs and imaging results over the last 24 hours.    Physical Exam   Vital Signs: Temp: (!) 96.6  F (35.9  C) Temp src: Temporal BP: (!) 146/85 Pulse: 103   Resp: 18 SpO2: 97 % O2 Device: Nasal cannula Oxygen Delivery: 2 LPM  Weight: 275 lbs 0 oz  Constitutional: awake, alert, cooperative, no apparent distress, and appears stated age  Eyes: Lids and lashes normal, pupils equal, round and reactive to light, extra ocular muscles intact, sclera clear, conjunctiva normal  Respiratory: No increased work of breathing, good air exchange, clear to auscultation bilaterally, no crackles or wheezing  Cardiovascular: Normal apical impulse, regular rate and rhythm, normal S1 and S2, no S3 or S4, and no murmur noted  GI: No scars, normal bowel sounds, soft, non-distended, non-tender, no masses palpated, no hepatosplenomegally  Neurologic: Awake, alert, oriented to name, place and time.  Cranial nerves II-XII are grossly intact.  Motor is 5 out of 5 bilaterally.  Cerebellar finger to nose, heel to shin intact.  Sensory is intact.  Babinski down going, Romberg negative, and gait is normal.    Data   Recent Labs   Lab 03/23/22  1218 03/23/22  1154 03/23/22  0740 03/23/22  0231 03/22/22  2340 03/22/22  1918 03/22/22  1204   WBC  --   --  9.4  --   --   --  8.5   HGB  --   --  12.5*  --   --   --  13.3   MCV  --   --  91  --   --   --  88   PLT  --   --  100*  --   --   --  98*     --  134  134  --  136  --  133   POTASSIUM 3.7  --  3.6  3.6  --  3.6  --  3.7   CHLORIDE 104  --  104  104  --  106  --  101   CO2 26  --  23  23  --  22  --  22   BUN 20  --  20  20  --  22  --  33*   CR 0.82  --  0.87  0.87  --  0.99  --  1.06   ANIONGAP 4  --  7  7  --  8  --  10   IMAN 8.6  --  8.3*  8.3*  --  8.1*  --  8.3*   * 273* 316*  316*   < > 347*   < > 457*   ALBUMIN  --   --  1.5*  --   --   --   --    PROTTOTAL  --   --   5.9*  --   --   --   --    BILITOTAL  --   --  1.2  --   --   --   --    ALKPHOS  --   --  225*  --   --   --   --    ALT  --   --  67  --   --   --   --    AST  --   --  63*  --   --   --   --     < > = values in this interval not displayed.     Recent Results (from the past 24 hour(s))   CT Abdomen Pelvis w Contrast    Narrative    CT ABDOMEN AND PELVIS WITH CONTRAST 3/22/2022 3:21 PM    CLINICAL HISTORY: Abdominal pain, fever; Abdominal pain, acute,  nonlocalized.    TECHNIQUE: CT scan of the abdomen and pelvis was performed following  injection of IV contrast. Multiplanar reformats were obtained. Dose  reduction techniques were used.    CONTRAST: 100mL Isovue-370    COMPARISON: Ultrasound abdomen 3/22/2022.    FINDINGS:   LOWER CHEST: Patchy groundglass consolidation of the left lower lobe  series 4 image 26. Solid nodule medial right lower lobe is 0.6 cm  image 14.    HEPATOBILIARY: Hepatic steatosis. No acute liver abnormality otherwise  seen. Hepatomegaly. Unremarkable gallbladder.    PANCREAS: Normal.    SPLEEN: Normal.    ADRENAL GLANDS: Normal.    KIDNEYS/BLADDER: A few small bilateral renal cysts not requiring  specific imaging follow-up. Bilateral intrarenal stones. An example at  the right mid kidney is 0.4 cm series 4 image 129. No bladder stones.  No hydronephrosis or ureter stone.    BOWEL: Normal appendix. No obstruction or inflammation.    PELVIC ORGANS: Normal.    ADDITIONAL FINDINGS: None.    MUSCULOSKELETAL: Unremarkable.      Impression    IMPRESSION:   1. Patchy consolidation at the left lung base suggestive for  pneumonia.  2. Bilateral nonobstructing intrarenal stones. No hydronephrosis.  3. Fatty liver. Enlarged liver.  4. Indeterminant pulmonary nodule at the right lung base. See below  for follow up imaging guidelines.    Recommendations for an incidental lung nodule = or > 6mm to 8mm:    Low risk patients: Initial follow-up CT at 6-12 months, then  consider CT at 18-24 months if no  change.    High risk patients: Initial follow-up CT at 6-12 months, then CT at  18-24 months if no change.    *Low Risk: Minimal or absent history of smoking or other known risk  factors.  *Nonsolid (ground-glass) or partly solid nodules may require longer  follow-up to exclude indolent adenocarcinoma.  *Recommendations based on Guidelines for the Management of Incidental  Pulmonary Nodules Detected at CT: From the Fleischner Society 2017,  Radiology 2017.    ALIZA DONATO MD         SYSTEM ID:  [id number]   XR Chest Port 1 View    Narrative    EXAM: XR CHEST PORT 1 VIEW  LOCATION: [facility]  DATE/TIME: 3/22/2022 8:01 PM    INDICATION: SOB, fevers.  COMPARISON: 01/01/2013      Impression    IMPRESSION: Negative chest.

## 2022-03-23 NOTE — PLAN OF CARE
Pt still on IV abxs, able to ambulate in the medeiros to room 600 and back, BM today, generalized back leg pain controlled with PRN tylenol and ibuprofen. Some chest pain with coughing relieved with cough syrup. Sputum culture rejected, unable to cough up anything during shift. Will keep monitoring.

## 2022-03-23 NOTE — PLAN OF CARE
Tachy hr. Afebrile. Lungs clr-O2 2L mid 90s. Voiding. Ivf. Generalized achiness. Rocephin & Zithromax for antibiotic therapy. Tolerating diet. Repositioning self. Scab to thigh. No other significant issues noted this evening.

## 2022-03-23 NOTE — PROGRESS NOTES
MD called and said to wait to hang Vancomycin until blood cultures are collected.       Pharmacy also called and advised running vancomycin slowly around 3 hours to avoid adverse reaction.

## 2022-03-23 NOTE — PROGRESS NOTES
Lab called at 2:52 a.m and notified Writer pt. Has gram positive Cocci in cluster. Pageanabell BANKS.       Patient A & O x4. C/o of some back pain, dry mouth and weakness. Patient Covid test come back negative.Voiding adequately via urinal. Fluids running. BG monitoring. Will continues to monitor.

## 2022-03-23 NOTE — PROGRESS NOTES
Cross Cover    Notified that patient has positive blood cultures with gram positive cocci in clusters.  Vancomycin ordered.    Consuelo Grace MD

## 2022-03-23 NOTE — PROGRESS NOTES
Notified that the second set of blood culture also came back positive for gram-positive cocci in clusters.  Blood cultures x2 start ordered, patient has not receive yet the vancomycin ordered about an hour ago.  Follow-up further identification and sensitivity result    Addendum: 5:47AM  Culture came back positive for Staph aureus, continue same antibiotics for now.  Consider involving ID in AM.

## 2022-03-24 ENCOUNTER — APPOINTMENT (OUTPATIENT)
Dept: MRI IMAGING | Facility: CLINIC | Age: 37
DRG: 871 | End: 2022-03-24
Attending: HOSPITALIST
Payer: COMMERCIAL

## 2022-03-24 LAB
ALBUMIN SERPL-MCNC: 1.4 G/DL (ref 3.4–5)
ALP SERPL-CCNC: 252 U/L (ref 40–150)
ALT SERPL W P-5'-P-CCNC: 67 U/L (ref 0–70)
ANION GAP SERPL CALCULATED.3IONS-SCNC: 8 MMOL/L (ref 3–14)
AST SERPL W P-5'-P-CCNC: 63 U/L (ref 0–45)
BASOPHILS # BLD MANUAL: 0 10E3/UL (ref 0–0.2)
BASOPHILS NFR BLD MANUAL: 0 %
BILIRUB SERPL-MCNC: 1 MG/DL (ref 0.2–1.3)
BUN SERPL-MCNC: 20 MG/DL (ref 7–30)
CALCIUM SERPL-MCNC: 8.5 MG/DL (ref 8.5–10.1)
CHLORIDE BLD-SCNC: 105 MMOL/L (ref 94–109)
CO2 SERPL-SCNC: 23 MMOL/L (ref 20–32)
CREAT SERPL-MCNC: 0.78 MG/DL (ref 0.66–1.25)
EOSINOPHIL # BLD MANUAL: 0 10E3/UL (ref 0–0.7)
EOSINOPHIL NFR BLD MANUAL: 0 %
ERYTHROCYTE [DISTWIDTH] IN BLOOD BY AUTOMATED COUNT: 14.4 % (ref 10–15)
GFR SERPL CREATININE-BSD FRML MDRD: >90 ML/MIN/1.73M2
GLUCOSE BLD-MCNC: 273 MG/DL (ref 70–99)
GLUCOSE BLDC GLUCOMTR-MCNC: 271 MG/DL (ref 70–99)
GLUCOSE BLDC GLUCOMTR-MCNC: 302 MG/DL (ref 70–99)
GLUCOSE BLDC GLUCOMTR-MCNC: 348 MG/DL (ref 70–99)
GLUCOSE BLDC GLUCOMTR-MCNC: 393 MG/DL (ref 70–99)
HCT VFR BLD AUTO: 38.2 % (ref 40–53)
HGB BLD-MCNC: 12.3 G/DL (ref 13.3–17.7)
LACTATE SERPL-SCNC: 1.2 MMOL/L (ref 0.7–2)
LYMPHOCYTES # BLD MANUAL: 0.3 10E3/UL (ref 0.8–5.3)
LYMPHOCYTES NFR BLD MANUAL: 3 %
MCH RBC QN AUTO: 28.7 PG (ref 26.5–33)
MCHC RBC AUTO-ENTMCNC: 32.2 G/DL (ref 31.5–36.5)
MCV RBC AUTO: 89 FL (ref 78–100)
METAMYELOCYTES # BLD MANUAL: 0.2 10E3/UL
METAMYELOCYTES NFR BLD MANUAL: 2 %
MONOCYTES # BLD MANUAL: 0.7 10E3/UL (ref 0–1.3)
MONOCYTES NFR BLD MANUAL: 7 %
MYELOCYTES # BLD MANUAL: 0.2 10E3/UL
MYELOCYTES NFR BLD MANUAL: 2 %
NEUTROPHILS # BLD MANUAL: 8.7 10E3/UL (ref 1.6–8.3)
NEUTROPHILS NFR BLD MANUAL: 84 %
PLAT MORPH BLD: ABNORMAL
PLATELET # BLD AUTO: 97 10E3/UL (ref 150–450)
POTASSIUM BLD-SCNC: 3.6 MMOL/L (ref 3.4–5.3)
PROMYELOCYTES # BLD MANUAL: 0.2 10E3/UL
PROMYELOCYTES NFR BLD MANUAL: 2 %
PROT SERPL-MCNC: 5.9 G/DL (ref 6.8–8.8)
RBC # BLD AUTO: 4.29 10E6/UL (ref 4.4–5.9)
RBC MORPH BLD: ABNORMAL
SODIUM SERPL-SCNC: 136 MMOL/L (ref 133–144)
TOXIC GRANULES BLD QL SMEAR: PRESENT
WBC # BLD AUTO: 10.4 10E3/UL (ref 4–11)

## 2022-03-24 PROCEDURE — 82040 ASSAY OF SERUM ALBUMIN: CPT | Performed by: HOSPITALIST

## 2022-03-24 PROCEDURE — 36415 COLL VENOUS BLD VENIPUNCTURE: CPT | Performed by: HOSPITALIST

## 2022-03-24 PROCEDURE — 250N000013 HC RX MED GY IP 250 OP 250 PS 637: Performed by: HOSPITALIST

## 2022-03-24 PROCEDURE — 99233 SBSQ HOSP IP/OBS HIGH 50: CPT | Performed by: HOSPITALIST

## 2022-03-24 PROCEDURE — 250N000011 HC RX IP 250 OP 636: Performed by: INTERNAL MEDICINE

## 2022-03-24 PROCEDURE — 255N000002 HC RX 255 OP 636: Performed by: HOSPITALIST

## 2022-03-24 PROCEDURE — 80053 COMPREHEN METABOLIC PANEL: CPT | Performed by: HOSPITALIST

## 2022-03-24 PROCEDURE — 72158 MRI LUMBAR SPINE W/O & W/DYE: CPT

## 2022-03-24 PROCEDURE — 120N000001 HC R&B MED SURG/OB

## 2022-03-24 PROCEDURE — 83605 ASSAY OF LACTIC ACID: CPT | Performed by: HOSPITALIST

## 2022-03-24 PROCEDURE — 85027 COMPLETE CBC AUTOMATED: CPT | Performed by: HOSPITALIST

## 2022-03-24 PROCEDURE — 87077 CULTURE AEROBIC IDENTIFY: CPT | Performed by: HOSPITALIST

## 2022-03-24 PROCEDURE — A9585 GADOBUTROL INJECTION: HCPCS | Performed by: HOSPITALIST

## 2022-03-24 PROCEDURE — 258N000003 HC RX IP 258 OP 636: Performed by: INTERNAL MEDICINE

## 2022-03-24 RX ORDER — GADOBUTROL 604.72 MG/ML
12.5 INJECTION INTRAVENOUS ONCE
Status: COMPLETED | OUTPATIENT
Start: 2022-03-24 | End: 2022-03-24

## 2022-03-24 RX ORDER — CEFAZOLIN SODIUM 2 G/100ML
2 INJECTION, SOLUTION INTRAVENOUS EVERY 8 HOURS
Status: DISCONTINUED | OUTPATIENT
Start: 2022-03-24 | End: 2022-03-28 | Stop reason: HOSPADM

## 2022-03-24 RX ADMIN — LEVOTHYROXINE SODIUM 125 MCG: 125 TABLET ORAL at 07:53

## 2022-03-24 RX ADMIN — CEFAZOLIN SODIUM 2 G: 2 INJECTION, SOLUTION INTRAVENOUS at 17:57

## 2022-03-24 RX ADMIN — BUPROPION HYDROCHLORIDE 300 MG: 150 TABLET, FILM COATED, EXTENDED RELEASE ORAL at 07:53

## 2022-03-24 RX ADMIN — IBUPROFEN 600 MG: 600 TABLET, FILM COATED ORAL at 06:41

## 2022-03-24 RX ADMIN — GADOBUTROL 12.5 ML: 604.72 INJECTION INTRAVENOUS at 20:31

## 2022-03-24 RX ADMIN — LOSARTAN POTASSIUM 50 MG: 50 TABLET, FILM COATED ORAL at 07:53

## 2022-03-24 RX ADMIN — VANCOMYCIN HYDROCHLORIDE 1250 MG: 10 INJECTION, POWDER, LYOPHILIZED, FOR SOLUTION INTRAVENOUS at 07:22

## 2022-03-24 RX ADMIN — IBUPROFEN 600 MG: 600 TABLET, FILM COATED ORAL at 16:52

## 2022-03-24 RX ADMIN — DEXTROMETHORPHAN POLISTIREX 30 MG: 30 SUSPENSION ORAL at 18:06

## 2022-03-24 ASSESSMENT — ACTIVITIES OF DAILY LIVING (ADL)
ADLS_ACUITY_SCORE: 5
ADLS_ACUITY_SCORE: 9
ADLS_ACUITY_SCORE: 6
ADLS_ACUITY_SCORE: 9
ADLS_ACUITY_SCORE: 5
ADLS_ACUITY_SCORE: 6
ADLS_ACUITY_SCORE: 9
ADLS_ACUITY_SCORE: 9
ADLS_ACUITY_SCORE: 5
ADLS_ACUITY_SCORE: 5
ADLS_ACUITY_SCORE: 9
ADLS_ACUITY_SCORE: 5
ADLS_ACUITY_SCORE: 9
ADLS_ACUITY_SCORE: 6
ADLS_ACUITY_SCORE: 5

## 2022-03-24 NOTE — CONSULTS
Consult Date: 03/24/2022    INFECTIOUS DISEASE CONSULTATION    LOCATION:  Room 547, [facility].    REFERRING PHYSICIAN:  Dr. Sifuentes    IMPRESSION:     1.  A 36-year-old male with 1 week febrile illness, cause now immediately apparent with multiple blood cultures growing sensitive Staphylococcus aureus, he has Staph aureus bacteremia syndrome, probably right groin is source and concern for back is secondary involved site.  2.  Severe low back pain, new and acute problem, coincident with this infection raises concern about secondary diskitis.  3.  Right groin boil-like area, resolving on its own, but likely primary source for this bacteremia.  4.  Pulmonary and urine findings are likely insignificant, not the source of this nor involved areas -- nonspecific sepsis related findings.  5.  Prior history in 2012 of a very unusual severe cryptococcal lung infection with prolonged treatment needed.  This is fully resolved.  Never had any other signs of underlying immunosuppression, AIDS or other underlying conditions and has had no other opportunistic infections. Fully resolved with no chronic lung problems.  6.  In 2005, Staph aureus bacteremia and skin boil requiring prolonged treatment.  7.  Diabetes mellitus, control level poor recently.  8.  VANCOMYCIN AND CLINDAMYCIN LISTED ALLERGIES WITH VANCOMYCIN UNLIKELY TO BE REAL AND HAS BEEN RECHALLENGED.    RECOMMENDATIONS:     1.  Discontinue current antibiotics to Ancef alone.  2.  Serial daily blood cultures until clear.  3.  We will need prolonged treatment, but no long line until clear.  4.  Follow for secondary sites and already has an obvious concerning site, which is his back, needs an MRI of the spine.  5.  If blood cultures are not clearing, will need transesophageal echocardiogram, but okay to delay that for now.  6.  Follow for other secondary sites.  7.  Discussed in great detail with the patient and family that, despite the 2 prior unusual  infections several years apart and now his third episode,  nothing really to suggest immunosuppression or long-term risks above normal.    HISTORY OF PRESENT ILLNESS:  This 36-year-old male is seen in consultation due to Staphylococcus aureus bacteremia.  The patient has a history of being in his usual state of health, which has included some prior infection problems, but no recent issues. A couple of weeks ago, he had a boil-like area in his right groin.  It actually drained spontaneously when left alone with no antibiotics.  Then, about 1 week ago, he had acute onset of chills, malaise, fatigue and low back pain.  These all started at the same time.  He subsequent to that had intermittent fevers as high as 102.  He eventually sought urgent care attention, was diagnosed with a possible UTI.  It is unclear if the urine showed, he was put on antibiotics.  He then continued to have severe and unrelenting back pain, ongoing fevers and sought medical attention. At admission, he had some mild respiratory distress with a negative chest x-ray, diagnosed with pneumonia and put on multiple pneumonia-type treating antibiotics despite negative chest x-ray. The actual cause of problem is now immediately apparent as multiple admissions blood cultures are growing Staphylococcus aureus, which turns out to be sensitive Staph.  Of note, he is having ongoing, unrelenting major lower back pain of high level concern.  Also, he is having discomfort radiating into his legs and thighs and muscle discomfort.    PAST MEDICAL HISTORY:    1.  Prior history of Staphylococcus aureus bacteremia in the [facility] system in the early 2000s, had a boil at that time as well.  2.  Prior history of cryptococcal pneumonia with a severe long-term course with 1 year of antifungal treatment.  This has fully resolved,  never any signs of any underlying immunosuppression, a very unusual problem, but has not recurred and he has had no other opportunistic  infections, so likely no major immunologic problems.  3.  History of obesity and diabetes control level has been poor lately as he stopped some of his meds.    SOCIAL AND FAMILY HISTORY:  No recent travel exposures.  No history of resistant Staph.    ALLERGIES:  LISTED AS VANCOMYCIN AND CLINDAMYCIN, BOTH QUESTIONABLE INCLUDING THE VANCOMYCIN BEING DUE TO RAPID INFUSION-TYPE REACTION.    MEDICATIONS:  As listed.    REVIEW OF SYSTEMS:  Largely as above.  Ongoing back pain is his only real pain complaints, some weakness in his legs and pain in the thighs.    PHYSICAL EXAMINATION:    GENERAL:  The patient appears his stated age.  He is an excellent historian.  He is on oxygen, but actually not hypoxic, does not look that toxic or ill.   VITAL SIGNS:  Earlier temp was 102.  He is tachycardic at 100.  HEENT:  No thrush or intraoral lesions.  Pupils reactive.  NECK:  Supple and nontender.  HEART:  Tachycardic, but no major murmur.  LUNGS:  Clear bilaterally, good air movement throughout.  ABDOMEN:  Soft and nontender.  In his right groin area, there is some slight erythema, a residual from a possible prior boil.  EXTREMITIES:  Fairly unremarkable.  No embolic lesions.    LABORATORY DATA:  Blood cultures, all bottles and all cultures rapidly positive for Staphylococcus aureus and the first followup culture on  also positive.  No imaging of the back yet.  Chest x-ray was negative.  Urine not repeated here.    Thank you very much for this consultation.  I will follow the patient with you.     Eliu Eng MD        D: 2022   T: 2022   MT: [id number]    Name:     DARION OROPEZA  MRN:      [id number]        Account:      [id number]   :      1985           Consult Date: 2022     Document: [id number]

## 2022-03-24 NOTE — CONSULTS
ID consult dictated IMP 1 35 yo male acute sepsis, cause immediately apparent MSSA high grade [id number], likely resolving R groin abscess as source, likely disacitis    REC 1 to ancef , daily BC MRI lumbar spine, will need 4-6 wks Iv, if not clearing TEJINDER

## 2022-03-24 NOTE — PROGRESS NOTES
[facility]    Medicine Progress Note - Hospitalist Service    Date of Admission:  3/22/2022    Assessment & Plan            Anil Montoya is a 36 year old obese male with a past medical history of cryptococcal pneumonia in 2012, diabetes mellitus, hypothyroidism and hypertension who presents with generalized weakness.  Patient notes his symptoms started on Thursday, 3/17 with fevers at home.  He had also noticed headache at that time.  Through the weekend he then had general weakness and myalgias but fever broke.  He has just been feeling very weak.  Has had cough without much production.  Denies any dysuria or frequency of urination.  No nausea or vomiting.  He does have some upper abdominal discomfort but not severe.  No diarrhea.  He has not noticed any skin changes.  No changes in vision.  No unilateral weakness.  Denies recent travel. No neck stiffness/decreased ROM of cervical spine.   -Patient initially went to urgent care on 3/21.  He was diagnosed with UTI and given ciprofloxacin.  His symptoms did not improve so back to urgent care.  He was noted to have mild elevation in LFTs along with glucose of 465.  Bicarb mildly low at 19 with anion gap of 18.  He had an x-ray done that showed no acute infiltrate.  He was sent to the ER for evaluation.  -ER, pt afebrile, tachycardic and tachyneic to low 20s.  Saturating well on RA.  CBC without leukocytosis but with thrombocytopenia.  Timothy BMP showed bicarb of 22.  CRP elevated at 393.  Lactic acid elevated at 2.3 but improved to 1.7 with IV fluids.  Underwent CT abdomen pelvis that showed patchy consolidation of the left lung base suggestive for pneumonia along with bilateral nonobstructing intrarenal stones.  No hydronephrosis.  He also had abdominal ultrasound that was negative for any gallstones or Rachel sign.  His UA from 3/22 was negative nitrite and LE.  Blood cultures drawn and patient given Zosyn.    #Severe sepsis  secondary to possible CAP   Resolving groin abscess- possible source  -Blood cultures are positive for gram-positive cocci in clusters.  -Repeat blood cultures were obtained  -Procalcitonin was elevated  -stopped vancomycin  -ID consulted- appreciate the consult- recommended Ancef and MRI lumbar spine  - repeat BC ordered      #Uncontrolled DM2:   -Increased Lantus to 15 units  -Continue  sliding scale insulin.  -Continue to hold Metformin    #Thrombocytopenia: Suspect secondary to sepsis as above.  Monitor daily. No evidence of bleeding.     #Elevation of LFT's:   -From above   -CT A/P without major abnormality. US without biliary dilation. Showed fatty liver.  -Monitor LFT's.     #Hypothyroidism: Continue home LT4   #Anxiety/ADHD: Resume home meds  #HTN: Hold losartan for now in setting of severe sepsis.           Diet: Moderate Consistent Carb (60 g CHO per Meal) Diet    DVT Prophylaxis: Pneumatic Compression Devices  Schwartz Catheter: Not present  Central Lines: None  Cardiac Monitoring: None  Code Status: Full Code      Disposition Plan   Expected Discharge: 03/24/2022     Anticipated discharge location:  Awaiting care coordination huddle       The patient's care was discussed with the Patient.    Peri Campbell MD  Hospitalist Service  [facility]  Securely message with the [employer] Web Console (learn more here)  Text page via [employer] Paging/Directory       ______________________________________________________________________    Interval History   Patient is feeling little better still tired.  He denies any chest pain, shortness of breath, cough.  No fevers or chills noted.    Data reviewed today: I reviewed all medications, new labs and imaging results over the last 24 hours.    Physical Exam   Vital Signs: Temp: 97.1  F (36.2  C) Temp src: Temporal BP: (!) 152/88 Pulse: 110   Resp: 22 SpO2: 94 % O2 Device: Nasal cannula Oxygen Delivery: 2.5 LPM  Weight: 275 lbs 4.8 oz  Constitutional:  awake, alert, cooperative, no apparent distress, and appears stated age  Eyes: Lids and lashes normal, pupils equal, round and reactive to light, extra ocular muscles intact, sclera clear, conjunctiva normal  Respiratory: No increased work of breathing, good air exchange, clear to auscultation bilaterally, no crackles or wheezing  Cardiovascular: Normal apical impulse, regular rate and rhythm, normal S1 and S2, no S3 or S4, and no murmur noted  GI: No scars, normal bowel sounds, soft, non-distended, non-tender, no masses palpated, no hepatosplenomegally  Neurologic: Awake, alert, oriented to name, place and time.  Cranial nerves II-XII are grossly intact.  Motor is 5 out of 5 bilaterally.  Cerebellar finger to nose, heel to shin intact.  Sensory is intact.  Babinski down going, Romberg negative, and gait is normal.    Data   Recent Labs   Lab 03/24/22  0657 03/24/22  0216 03/23/22  2125 03/23/22  1753 03/23/22  1218 03/23/22  1154 03/23/22  0740 03/22/22  1918 03/22/22  1204   WBC 10.4  --   --   --   --   --  9.4  --  8.5   HGB 12.3*  --   --   --   --   --  12.5*  --  13.3   MCV 89  --   --   --   --   --  91  --  88   PLT 97*  --   --   --   --   --  100*  --  98*     --   --   --  134  --  134  134   < > 133   POTASSIUM 3.6  --   --   --  3.7  --  3.6  3.6   < > 3.7   CHLORIDE 105  --   --   --  104  --  104  104   < > 101   CO2 23  --   --   --  26  --  23  23   < > 22   BUN 20  --   --   --  20  --  20  20   < > 33*   CR 0.78  --   --   --  0.82  --  0.87  0.87   < > 1.06   ANIONGAP 8  --   --   --  4  --  7  7   < > 10   IMAN 8.5  --   --   --  8.6  --  8.3*  8.3*   < > 8.3*   * 271* 285*   < > 341*   < > 316*  316*   < > 457*   ALBUMIN 1.4*  --   --   --   --   --  1.5*  --   --    PROTTOTAL 5.9*  --   --   --   --   --  5.9*  --   --    BILITOTAL 1.0  --   --   --   --   --  1.2  --   --    ALKPHOS 252*  --   --   --   --   --  225*  --   --    ALT 67  --   --   --   --   --  67  --    --    AST 63*  --   --   --   --   --  63*  --   --     < > = values in this interval not displayed.     No results found for this or any previous visit (from the past 24 hour(s)).

## 2022-03-24 NOTE — PLAN OF CARE
A&Ox4. VSS; on 2L at night; usually wears a cpap at home. LR infusing. Tolerating a mod CHO diet. Ambulates with Ax1 using the walker and gait belt. Lungs clear; infrequent, nonproductive cough. Red face and neck from Vancomycin. Taking Advil and Tylenol for back pain. Blood cxs growing gram + cocci in clusters; taking Azithromycin, Vanco, and Rocephin.   Plan: discharge pending based on pt progress.

## 2022-03-24 NOTE — PLAN OF CARE
Ms. Wright    Thank you for visiting [facility] in [place].  Please contact me if you have questions or problems before your next appointment.  If your symptoms worsen or do not resolve please return to see me or visit our Walk-In.  Visit the nearest Emergency Room for emergent symptoms.  If you are unsure of whom to follow up with, call [phone] and ask to speak to my office or the on-call provider.     Test results:  If we ordered blood work or tests today and you have an upcoming  appointment, we will discuss these results at your appointment.  I will notify you sooner if there are any concerning findings that cannot wait.  If you do not have an upcoming follow up appointment, results can always be obtained via our patient portal, [employer], [email or web address].  Alternatively, we will contact you via letter or phone with testing results within 1 week.    Referrals:  If you are referred to a specialist, our Referrals department will call you with your appointment date and time within 2 business days. If you have not heard from them in this time frame, please call [phone] and ask for the Referrals department.  If you were scheduled for an appointment with a specialist during your visit, your appointment information will be listed below.  If you have been referred to behavioral health.  You will need to call to set up an appointment with them.  Call [phone] and ask for behavioral health.    Radiology: please call our Pre-Service Department to schedule anterior earliest convenience [phone].    Contact Us:  For your convenience, please consider using the online patient portal.  This will allow you to communicate with your care team when it is convenient for you.  [employer] gives you immediate online access to your personal health information, including lab tests and results.  It also allows you to schedule appointments, send messages to me or my nurse with questions, request refills, and  Pt now receiving IV ancef only, saline locked, good appetite, back pain controlled with heating pad, no tylenol or ibuprofen requested. Will keep monitoring.    even pay your bills.  Sign up now at [email or web address].      Thank you again for visiting [facility].  Donald Ace MD

## 2022-03-25 LAB
ALBUMIN SERPL-MCNC: 1.3 G/DL (ref 3.4–5)
ALP SERPL-CCNC: 223 U/L (ref 40–150)
ALT SERPL W P-5'-P-CCNC: 61 U/L (ref 0–70)
ANION GAP SERPL CALCULATED.3IONS-SCNC: 6 MMOL/L (ref 3–14)
AST SERPL W P-5'-P-CCNC: 51 U/L (ref 0–45)
BACTERIA BLD CULT: ABNORMAL
BASOPHILS # BLD MANUAL: 0 10E3/UL (ref 0–0.2)
BASOPHILS NFR BLD MANUAL: 0 %
BILIRUB SERPL-MCNC: 0.8 MG/DL (ref 0.2–1.3)
BUN SERPL-MCNC: 21 MG/DL (ref 7–30)
CALCIUM SERPL-MCNC: 8.3 MG/DL (ref 8.5–10.1)
CHLORIDE BLD-SCNC: 103 MMOL/L (ref 94–109)
CO2 SERPL-SCNC: 25 MMOL/L (ref 20–32)
CREAT SERPL-MCNC: 0.81 MG/DL (ref 0.66–1.25)
EOSINOPHIL # BLD MANUAL: 0.1 10E3/UL (ref 0–0.7)
EOSINOPHIL NFR BLD MANUAL: 1 %
ERYTHROCYTE [DISTWIDTH] IN BLOOD BY AUTOMATED COUNT: 14.2 % (ref 10–15)
GFR SERPL CREATININE-BSD FRML MDRD: >90 ML/MIN/1.73M2
GLUCOSE BLD-MCNC: 367 MG/DL (ref 70–99)
GLUCOSE BLDC GLUCOMTR-MCNC: 295 MG/DL (ref 70–99)
GLUCOSE BLDC GLUCOMTR-MCNC: 299 MG/DL (ref 70–99)
GLUCOSE BLDC GLUCOMTR-MCNC: 302 MG/DL (ref 70–99)
GLUCOSE BLDC GLUCOMTR-MCNC: 309 MG/DL (ref 70–99)
GLUCOSE BLDC GLUCOMTR-MCNC: 359 MG/DL (ref 70–99)
GLUCOSE BLDC GLUCOMTR-MCNC: 362 MG/DL (ref 70–99)
HCT VFR BLD AUTO: 35.5 % (ref 40–53)
HGB BLD-MCNC: 11.3 G/DL (ref 13.3–17.7)
LYMPHOCYTES # BLD MANUAL: 0.6 10E3/UL (ref 0.8–5.3)
LYMPHOCYTES NFR BLD MANUAL: 5 %
MCH RBC QN AUTO: 28.3 PG (ref 26.5–33)
MCHC RBC AUTO-ENTMCNC: 31.8 G/DL (ref 31.5–36.5)
MCV RBC AUTO: 89 FL (ref 78–100)
METAMYELOCYTES # BLD MANUAL: 0.1 10E3/UL
METAMYELOCYTES NFR BLD MANUAL: 1 %
MONOCYTES # BLD MANUAL: 0.7 10E3/UL (ref 0–1.3)
MONOCYTES NFR BLD MANUAL: 6 %
MYELOCYTES # BLD MANUAL: 0.1 10E3/UL
MYELOCYTES NFR BLD MANUAL: 1 %
NEUTROPHILS # BLD MANUAL: 9.8 10E3/UL (ref 1.6–8.3)
NEUTROPHILS NFR BLD MANUAL: 84 %
PLAT MORPH BLD: ABNORMAL
PLATELET # BLD AUTO: 110 10E3/UL (ref 150–450)
POTASSIUM BLD-SCNC: 3.8 MMOL/L (ref 3.4–5.3)
PROMYELOCYTES # BLD MANUAL: 0.2 10E3/UL
PROMYELOCYTES NFR BLD MANUAL: 2 %
PROT SERPL-MCNC: 5.7 G/DL (ref 6.8–8.8)
RBC # BLD AUTO: 3.99 10E6/UL (ref 4.4–5.9)
RBC MORPH BLD: ABNORMAL
SODIUM SERPL-SCNC: 134 MMOL/L (ref 133–144)
TOXIC GRANULES BLD QL SMEAR: PRESENT
WBC # BLD AUTO: 11.7 10E3/UL (ref 4–11)

## 2022-03-25 PROCEDURE — 120N000001 HC R&B MED SURG/OB

## 2022-03-25 PROCEDURE — 36415 COLL VENOUS BLD VENIPUNCTURE: CPT | Performed by: INTERNAL MEDICINE

## 2022-03-25 PROCEDURE — 99233 SBSQ HOSP IP/OBS HIGH 50: CPT | Performed by: HOSPITALIST

## 2022-03-25 PROCEDURE — 250N000011 HC RX IP 250 OP 636: Performed by: INTERNAL MEDICINE

## 2022-03-25 PROCEDURE — 80053 COMPREHEN METABOLIC PANEL: CPT | Performed by: HOSPITALIST

## 2022-03-25 PROCEDURE — 250N000013 HC RX MED GY IP 250 OP 250 PS 637: Performed by: HOSPITALIST

## 2022-03-25 PROCEDURE — 85027 COMPLETE CBC AUTOMATED: CPT | Performed by: HOSPITALIST

## 2022-03-25 PROCEDURE — 87077 CULTURE AEROBIC IDENTIFY: CPT | Performed by: INTERNAL MEDICINE

## 2022-03-25 RX ADMIN — DEXTROMETHORPHAN POLISTIREX 30 MG: 30 SUSPENSION ORAL at 18:28

## 2022-03-25 RX ADMIN — LOSARTAN POTASSIUM 50 MG: 50 TABLET, FILM COATED ORAL at 08:02

## 2022-03-25 RX ADMIN — ACETAMINOPHEN 650 MG: 325 TABLET, FILM COATED ORAL at 08:06

## 2022-03-25 RX ADMIN — CEFAZOLIN SODIUM 2 G: 2 INJECTION, SOLUTION INTRAVENOUS at 01:15

## 2022-03-25 RX ADMIN — IBUPROFEN 600 MG: 600 TABLET, FILM COATED ORAL at 18:23

## 2022-03-25 RX ADMIN — CEFAZOLIN SODIUM 2 G: 2 INJECTION, SOLUTION INTRAVENOUS at 09:45

## 2022-03-25 RX ADMIN — BUPROPION HYDROCHLORIDE 300 MG: 150 TABLET, FILM COATED, EXTENDED RELEASE ORAL at 08:02

## 2022-03-25 RX ADMIN — ACETAMINOPHEN 650 MG: 325 TABLET, FILM COATED ORAL at 15:50

## 2022-03-25 RX ADMIN — ACETAMINOPHEN 650 MG: 325 TABLET, FILM COATED ORAL at 01:14

## 2022-03-25 RX ADMIN — CEFAZOLIN SODIUM 2 G: 2 INJECTION, SOLUTION INTRAVENOUS at 17:44

## 2022-03-25 RX ADMIN — LEVOTHYROXINE SODIUM 125 MCG: 125 TABLET ORAL at 08:02

## 2022-03-25 RX ADMIN — IBUPROFEN 600 MG: 600 TABLET, FILM COATED ORAL at 05:23

## 2022-03-25 ASSESSMENT — ACTIVITIES OF DAILY LIVING (ADL)
ADLS_ACUITY_SCORE: 7
ADLS_ACUITY_SCORE: 9
ADLS_ACUITY_SCORE: 7
ADLS_ACUITY_SCORE: 9
ADLS_ACUITY_SCORE: 7
ADLS_ACUITY_SCORE: 9
ADLS_ACUITY_SCORE: 9
ADLS_ACUITY_SCORE: 7
ADLS_ACUITY_SCORE: 7
ADLS_ACUITY_SCORE: 9
ADLS_ACUITY_SCORE: 7
ADLS_ACUITY_SCORE: 9
ADLS_ACUITY_SCORE: 7
ADLS_ACUITY_SCORE: 9
ADLS_ACUITY_SCORE: 7
ADLS_ACUITY_SCORE: 9
ADLS_ACUITY_SCORE: 7

## 2022-03-25 NOTE — PLAN OF CARE
DX: PNA, long QT  Tele: na  A&O x4  Activity: A-1 walker, gait belt  Diet: Mod Carb  VSS: Q8  O2: 2L nc  B, 302, 359 insulin orders changed to high correction sliding scale   PIV: SL LA  Pain: Denies  GI/: up to bathroom, urinal at bedside  CMS: intact  Labs: Blood cultures pos gram cocci clusters, pos MSSA, Staph aureus   Plan: Ancef Q8, ID  Discharge: TBD continue plan of care

## 2022-03-25 NOTE — PLAN OF CARE
For vital signs and complete assessments, please see documentation flowsheets.   RN cares [phone]    Pertinent assessments: Pt is alert, oriented x4.  Lungs are diminished with cough present, non productive, requires 2 L 02.  Pt has low grade fever, mildly tachycardic.  Sepsis protocol fired with normal lactic result.  Skin with generalized pink-red rash, hung to R chest.  Reports having diarrhea BM this pm.  BG are in the 300s.  ABX to Ancef IV. Gave delsym prn for cough.  Right groin has intact skin with scab, pink skin. Pt eating voiding without difficulty. Pt appears to have generalized trace edema and to LEs as well.  Will monitor urine output.  Major Shift Events: MRI at 2000 done.  Await today's Blood Culture results.  Plan (Upcoming Events): Monitor temps, labs, MRI results.  Discharge/Transfer Needs: Per ID, TBD    Bedside Shift Report Completed : Yes  Bedside Safety Check Completed: Yes       Goal Outcome Evaluation:

## 2022-03-25 NOTE — PROGRESS NOTES
Pt. is alert and oriented, lung sounds diminished, up with assist of one walker and gait. Tolerating regular diet, passing gas, md sánchez.RA,non productive cough.Tylenol iboprufen given for pain. Redness to face and neck continues to clear,repeat Blood culture growing gram +ve cocci in clusters,Continues on Ancef q8hrs, Voiding without difficulty, will continue to monitor.

## 2022-03-25 NOTE — PROGRESS NOTES
[facility]    Medicine Progress Note - Hospitalist Service    Date of Admission:  3/22/2022    Assessment & Plan              Anil Montoya is a 36 year old obese male with a past medical history of cryptococcal pneumonia in 2012, diabetes mellitus, hypothyroidism and hypertension who presents with generalized weakness.  Patient notes his symptoms started on Thursday, 3/17 with fevers at home.  He had also noticed headache at that time.  Through the weekend he then had general weakness and myalgias but fever broke.  He has just been feeling very weak.  Has had cough without much production.  Denies any dysuria or frequency of urination.  No nausea or vomiting.  He does have some upper abdominal discomfort but not severe.  No diarrhea.  He has not noticed any skin changes.  No changes in vision.  No unilateral weakness.  Denies recent travel. No neck stiffness/decreased ROM of cervical spine.   -Patient initially went to urgent care on 3/21.  He was diagnosed with UTI and given ciprofloxacin.  His symptoms did not improve so back to urgent care.  He was noted to have mild elevation in LFTs along with glucose of 465.  Bicarb mildly low at 19 with anion gap of 18.  He had an x-ray done that showed no acute infiltrate.  He was sent to the ER for evaluation.  -ER, pt afebrile, tachycardic and tachyneic to low 20s.  Saturating well on RA.  CBC without leukocytosis but with thrombocytopenia.  Timothy BMP showed bicarb of 22.  CRP elevated at 393.  Lactic acid elevated at 2.3 but improved to 1.7 with IV fluids.  Underwent CT abdomen pelvis that showed patchy consolidation of the left lung base suggestive for pneumonia along with bilateral nonobstructing intrarenal stones.  No hydronephrosis.  He also had abdominal ultrasound that was negative for any gallstones or Rachel sign.  His UA from 3/22 was negative nitrite and LE.  Blood cultures drawn and patient given Zosyn.    #Severe sepsis  secondary to possible CAP   - so far 3 sets of Blood cultures remaine positive showing staph aureus- first culture showed it is sensitive to Ancef and patient is on it.  Repeat BC x3 ordered  -Procalcitonin was elevated on admission  -stopped vancomycin  -ID on board-  -MRI spine- negaitve      #Uncontrolled DM2:   -Increased Lantus to 15 units  -Continue  sliding scale insulin- increased to high correction  -Continue to hold Metformin    #Thrombocytopenia: Suspect secondary to sepsis as above.  Improving  Monitor daily. No evidence of bleeding.     #Elevation of LFT's:   -From above - remained stable  -CT A/P without major abnormality. US without biliary dilation. Showed fatty liver.  -Monitor LFT's.     #Hypothyroidism: Continue home LT4   #Anxiety/ADHD: Resume home meds  #HTN: Hold losartan for now in setting of severe sepsis.           Diet: Moderate Consistent Carb (60 g CHO per Meal) Diet    DVT Prophylaxis: Pneumatic Compression Devices  Schwartz Catheter: Not present  Central Lines: None  Cardiac Monitoring: None  Code Status: Full Code      Disposition Plan   Expected Discharge: 03/28/2022     Anticipated discharge location:  Awaiting care coordination huddle       The patient's care was discussed with the Patient.    Peri Campbell MD  Hospitalist Service  [facility]  Securely message with the [email or web address] Web Console (learn more here)  Text page via [employer] Paging/Directory       ______________________________________________________________________    Interval History   Patient is c/o low back pain , no other symptoms. He denies any chest pain, shortness of breath, cough.  No fevers or chills noted.    Data reviewed today: I reviewed all medications, new labs and imaging results over the last 24 hours.    Physical Exam   Vital Signs: Temp: 97.3  F (36.3  C) Temp src: Oral BP: (!) 146/89 Pulse: 94   Resp: 20 SpO2: 97 % O2 Device: Nasal cannula Oxygen Delivery: 2 LPM  Weight: 280 lbs 0  oz  Constitutional: awake, alert, cooperative, no apparent distress, and appears stated age  Eyes: Lids and lashes normal, pupils equal, round and reactive to light, extra ocular muscles intact, sclera clear, conjunctiva normal  Respiratory: No increased work of breathing, good air exchange, clear to auscultation bilaterally, no crackles or wheezing  Cardiovascular: Normal apical impulse, regular rate and rhythm, normal S1 and S2, no S3 or S4, and no murmur noted  GI: No scars, normal bowel sounds, soft, non-distended, non-tender, no masses palpated, no hepatosplenomegally  Neurologic: Awake, alert, oriented to name, place and time.  Cranial nerves II-XII are grossly intact.  Motor is 5 out of 5 bilaterally.  Cerebellar finger to nose, heel to shin intact.  Sensory is intact.  Babinski down going, Romberg negative, and gait is normal.    Data   Recent Labs   Lab 03/25/22  1151 03/25/22  0848 03/25/22  0738 03/24/22  1235 03/24/22  0657 03/23/22  1753 03/23/22  1218 03/23/22  1154 03/23/22  0740   WBC  --  11.7*  --   --  10.4  --   --   --  9.4   HGB  --  11.3*  --   --  12.3*  --   --   --  12.5*   MCV  --  89  --   --  89  --   --   --  91   PLT  --  110*  --   --  97*  --   --   --  100*   NA  --  134  --   --  136  --  134  --  134  134   POTASSIUM  --  3.8  --   --  3.6  --  3.7  --  3.6  3.6   CHLORIDE  --  103  --   --  105  --  104  --  104  104   CO2  --  25  --   --  23  --  26  --  23  23   BUN  --  21  --   --  20  --  20  --  20  20   CR  --  0.81  --   --  0.78  --  0.82  --  0.87  0.87   ANIONGAP  --  6  --   --  8  --  4  --  7  7   IMAN  --  8.3*  --   --  8.5  --  8.6  --  8.3*  8.3*   * 367* 295*   < > 273*   < > 341*   < > 316*  316*   ALBUMIN  --  1.3*  --   --  1.4*  --   --   --  1.5*   PROTTOTAL  --  5.7*  --   --  5.9*  --   --   --  5.9*   BILITOTAL  --  0.8  --   --  1.0  --   --   --  1.2   ALKPHOS  --  223*  --   --  252*  --   --   --  225*   ALT  --  61  --   --  67  --    --   --  67   AST  --  51*  --   --  63*  --   --   --  63*    < > = values in this interval not displayed.     Recent Results (from the past 24 hour(s))   MR Lumbar Spine w/o & w Contrast    Narrative    EXAM: MR LUMBAR SPINE W/O and W CONTRAST  LOCATION: [facility]  DATE/TIME: 3/24/2022 7:50 PM    INDICATION: Low back pain, infection suspected  COMPARISON: None.  CONTRAST: 12.5 mL Gadavist   TECHNIQUE: Routine Lumbar Spine MRI without and with IV contrast.    FINDINGS:   Nomenclature is based on 5 lumbar type vertebral bodies. Normal vertebral body heights, alignment and marrow signal. Normal distal spinal cord and cauda equina with conus medullaris at T12-L1. No extraspinal abnormality. Unremarkable visualized bony   pelvis. No pathologic enhancement.    T12-L1: Normal disc height and signal. No herniation. Normal facets. No spinal canal or neural foraminal stenosis.     L1-L2: Normal disc height and signal. No herniation. Normal facets. No spinal canal or neural foraminal stenosis.    L2-L3: Normal disc height and signal. No herniation. Normal facets. No spinal canal or neural foraminal stenosis.     L3-L4: Normal disc height and signal. No herniation. Mild disc bulge. Mild facet arthropathy. No spinal canal or neural foraminal stenosis.    L4-L5: Mild loss of disc space height and signal. Tiny central disc protrusion. Mild disc bulge. Normal facets. No significant spinal canal or neural foraminal stenosis.    L5-S1: Mild loss of disc height and signal. No herniation. Mild disc bulge. Mild facet arthropathy. No spinal canal or neural foraminal stenosis. Prominent epidural fat incidentally noted L5-S2.      Impression    IMPRESSION:  1.  Mild multilevel lumbar spondylosis.  2.  No evidence of discitis or other infection. No abnormal enhancement following contrast administration.

## 2022-03-25 NOTE — PROGRESS NOTES
Notified repeat blood culture from 3/24/2022 came back positive for gram-positive cocci in clusters.  Blood culture from 3/23/2022 is positive for MSSA.   -Blood culture daily x 3 days ordered.  -Follow-up culture and sensitivity result for repeat blood culture expect to be the same organism and sensitivity pattern.  -Continue Ancef.

## 2022-03-26 ENCOUNTER — APPOINTMENT (OUTPATIENT)
Dept: CARDIOLOGY | Facility: CLINIC | Age: 37
DRG: 871 | End: 2022-03-26
Attending: STUDENT IN AN ORGANIZED HEALTH CARE EDUCATION/TRAINING PROGRAM
Payer: COMMERCIAL

## 2022-03-26 LAB
ALBUMIN SERPL-MCNC: 1.5 G/DL (ref 3.4–5)
ALP SERPL-CCNC: 216 U/L (ref 40–150)
ALT SERPL W P-5'-P-CCNC: 57 U/L (ref 0–70)
ANION GAP SERPL CALCULATED.3IONS-SCNC: 5 MMOL/L (ref 3–14)
AST SERPL W P-5'-P-CCNC: 54 U/L (ref 0–45)
BASOPHILS # BLD MANUAL: 0 10E3/UL (ref 0–0.2)
BASOPHILS NFR BLD MANUAL: 0 %
BILIRUB SERPL-MCNC: 1.1 MG/DL (ref 0.2–1.3)
BUN SERPL-MCNC: 20 MG/DL (ref 7–30)
CALCIUM SERPL-MCNC: 7.9 MG/DL (ref 8.5–10.1)
CHLORIDE BLD-SCNC: 104 MMOL/L (ref 94–109)
CO2 SERPL-SCNC: 27 MMOL/L (ref 20–32)
CREAT SERPL-MCNC: 0.81 MG/DL (ref 0.66–1.25)
EOSINOPHIL # BLD MANUAL: 0 10E3/UL (ref 0–0.7)
EOSINOPHIL NFR BLD MANUAL: 0 %
ERYTHROCYTE [DISTWIDTH] IN BLOOD BY AUTOMATED COUNT: 14.2 % (ref 10–15)
GFR SERPL CREATININE-BSD FRML MDRD: >90 ML/MIN/1.73M2
GLUCOSE BLD-MCNC: 251 MG/DL (ref 70–99)
GLUCOSE BLDC GLUCOMTR-MCNC: 239 MG/DL (ref 70–99)
GLUCOSE BLDC GLUCOMTR-MCNC: 279 MG/DL (ref 70–99)
GLUCOSE BLDC GLUCOMTR-MCNC: 285 MG/DL (ref 70–99)
GLUCOSE BLDC GLUCOMTR-MCNC: 348 MG/DL (ref 70–99)
GLUCOSE BLDC GLUCOMTR-MCNC: 356 MG/DL (ref 70–99)
HCT VFR BLD AUTO: 37.8 % (ref 40–53)
HGB BLD-MCNC: 12.2 G/DL (ref 13.3–17.7)
LVEF ECHO: NORMAL
LYMPHOCYTES # BLD MANUAL: 1.5 10E3/UL (ref 0.8–5.3)
LYMPHOCYTES NFR BLD MANUAL: 10 %
MCH RBC QN AUTO: 28.6 PG (ref 26.5–33)
MCHC RBC AUTO-ENTMCNC: 32.3 G/DL (ref 31.5–36.5)
MCV RBC AUTO: 89 FL (ref 78–100)
METAMYELOCYTES # BLD MANUAL: 0.2 10E3/UL
METAMYELOCYTES NFR BLD MANUAL: 1 %
MONOCYTES # BLD MANUAL: 1.4 10E3/UL (ref 0–1.3)
MONOCYTES NFR BLD MANUAL: 9 %
MYELOCYTES # BLD MANUAL: 0.6 10E3/UL
MYELOCYTES NFR BLD MANUAL: 4 %
NEUTROPHILS # BLD MANUAL: 11.6 10E3/UL (ref 1.6–8.3)
NEUTROPHILS NFR BLD MANUAL: 76 %
PLAT MORPH BLD: ABNORMAL
PLATELET # BLD AUTO: 124 10E3/UL (ref 150–450)
POTASSIUM BLD-SCNC: 3.3 MMOL/L (ref 3.4–5.3)
POTASSIUM BLD-SCNC: 3.4 MMOL/L (ref 3.4–5.3)
POTASSIUM BLD-SCNC: 3.6 MMOL/L (ref 3.4–5.3)
PROT SERPL-MCNC: 6.3 G/DL (ref 6.8–8.8)
RBC # BLD AUTO: 4.27 10E6/UL (ref 4.4–5.9)
RBC MORPH BLD: ABNORMAL
SODIUM SERPL-SCNC: 136 MMOL/L (ref 133–144)
WBC # BLD AUTO: 15.3 10E3/UL (ref 4–11)

## 2022-03-26 PROCEDURE — 84132 ASSAY OF SERUM POTASSIUM: CPT | Performed by: STUDENT IN AN ORGANIZED HEALTH CARE EDUCATION/TRAINING PROGRAM

## 2022-03-26 PROCEDURE — 93306 TTE W/DOPPLER COMPLETE: CPT | Mod: 26 | Performed by: INTERNAL MEDICINE

## 2022-03-26 PROCEDURE — 36415 COLL VENOUS BLD VENIPUNCTURE: CPT | Performed by: HOSPITALIST

## 2022-03-26 PROCEDURE — 250N000011 HC RX IP 250 OP 636: Performed by: INTERNAL MEDICINE

## 2022-03-26 PROCEDURE — 99207 PR MOONLIGHTING INDICATOR: CPT | Performed by: INTERNAL MEDICINE

## 2022-03-26 PROCEDURE — 250N000013 HC RX MED GY IP 250 OP 250 PS 637: Performed by: HOSPITALIST

## 2022-03-26 PROCEDURE — 36415 COLL VENOUS BLD VENIPUNCTURE: CPT | Performed by: STUDENT IN AN ORGANIZED HEALTH CARE EDUCATION/TRAINING PROGRAM

## 2022-03-26 PROCEDURE — 87077 CULTURE AEROBIC IDENTIFY: CPT | Performed by: STUDENT IN AN ORGANIZED HEALTH CARE EDUCATION/TRAINING PROGRAM

## 2022-03-26 PROCEDURE — 85027 COMPLETE CBC AUTOMATED: CPT | Performed by: HOSPITALIST

## 2022-03-26 PROCEDURE — 120N000001 HC R&B MED SURG/OB

## 2022-03-26 PROCEDURE — 250N000013 HC RX MED GY IP 250 OP 250 PS 637: Performed by: INTERNAL MEDICINE

## 2022-03-26 PROCEDURE — 99233 SBSQ HOSP IP/OBS HIGH 50: CPT | Performed by: STUDENT IN AN ORGANIZED HEALTH CARE EDUCATION/TRAINING PROGRAM

## 2022-03-26 PROCEDURE — 999N000208 ECHOCARDIOGRAM COMPLETE

## 2022-03-26 PROCEDURE — 80053 COMPREHEN METABOLIC PANEL: CPT | Performed by: HOSPITALIST

## 2022-03-26 PROCEDURE — 255N000002 HC RX 255 OP 636: Performed by: HOSPITALIST

## 2022-03-26 PROCEDURE — 250N000013 HC RX MED GY IP 250 OP 250 PS 637: Performed by: STUDENT IN AN ORGANIZED HEALTH CARE EDUCATION/TRAINING PROGRAM

## 2022-03-26 PROCEDURE — 87077 CULTURE AEROBIC IDENTIFY: CPT | Performed by: INTERNAL MEDICINE

## 2022-03-26 RX ORDER — POTASSIUM CHLORIDE 1500 MG/1
40 TABLET, EXTENDED RELEASE ORAL ONCE
Status: COMPLETED | OUTPATIENT
Start: 2022-03-26 | End: 2022-03-26

## 2022-03-26 RX ORDER — FUROSEMIDE 20 MG
20 TABLET ORAL ONCE
Status: COMPLETED | OUTPATIENT
Start: 2022-03-26 | End: 2022-03-26

## 2022-03-26 RX ADMIN — POTASSIUM CHLORIDE 40 MEQ: 1500 TABLET, EXTENDED RELEASE ORAL at 17:18

## 2022-03-26 RX ADMIN — LOSARTAN POTASSIUM 50 MG: 50 TABLET, FILM COATED ORAL at 07:39

## 2022-03-26 RX ADMIN — ACETAMINOPHEN 650 MG: 325 TABLET, FILM COATED ORAL at 16:31

## 2022-03-26 RX ADMIN — DEXTROMETHORPHAN POLISTIREX 30 MG: 30 SUSPENSION ORAL at 12:44

## 2022-03-26 RX ADMIN — CEFAZOLIN SODIUM 2 G: 2 INJECTION, SOLUTION INTRAVENOUS at 17:16

## 2022-03-26 RX ADMIN — FUROSEMIDE 20 MG: 20 TABLET ORAL at 21:39

## 2022-03-26 RX ADMIN — IBUPROFEN 600 MG: 600 TABLET, FILM COATED ORAL at 20:11

## 2022-03-26 RX ADMIN — HUMAN ALBUMIN MICROSPHERES AND PERFLUTREN 3 ML: 10; .22 INJECTION, SOLUTION INTRAVENOUS at 11:17

## 2022-03-26 RX ADMIN — ACETAMINOPHEN 650 MG: 325 TABLET, FILM COATED ORAL at 02:02

## 2022-03-26 RX ADMIN — BUPROPION HYDROCHLORIDE 300 MG: 150 TABLET, FILM COATED, EXTENDED RELEASE ORAL at 07:39

## 2022-03-26 RX ADMIN — CEFAZOLIN SODIUM 2 G: 2 INJECTION, SOLUTION INTRAVENOUS at 02:03

## 2022-03-26 RX ADMIN — LEVOTHYROXINE SODIUM 125 MCG: 125 TABLET ORAL at 09:01

## 2022-03-26 RX ADMIN — IBUPROFEN 600 MG: 600 TABLET, FILM COATED ORAL at 07:42

## 2022-03-26 RX ADMIN — CEFAZOLIN SODIUM 2 G: 2 INJECTION, SOLUTION INTRAVENOUS at 10:31

## 2022-03-26 ASSESSMENT — ACTIVITIES OF DAILY LIVING (ADL)
ADLS_ACUITY_SCORE: 7
ADLS_ACUITY_SCORE: 11
ADLS_ACUITY_SCORE: 11
ADLS_ACUITY_SCORE: 7

## 2022-03-26 NOTE — PROGRESS NOTES
Pt. is alert and oriented, lung sounds diminished,2L of O2, up with assist of one walker and gait. Tolerating Mod carb diet, passing gas, non productive cough.Tylenol/ iboprufen given for pain. Redness to face and neck continues to clear,  Serial daily Blood cultures, Continues on Ancef q8hrs, Voiding without difficulty, will continue to monitor.

## 2022-03-26 NOTE — PROGRESS NOTES
Cross cover notified of patient with persistent hyperglycemia at 348.  He received 20 minutes of glargine this evening and 5 units aspart roughly 3 hours ago.  Reviewed his blood glucose/insulin readings on the last 48 hours.  He appears to be quite resistant.  -Give additional 10 units aspart now

## 2022-03-26 NOTE — PLAN OF CARE
Patient had a good shift. Resting through out shift.  Rash on face and chest is improving.   Noticeable swelling on bilateral arms/hands and legs. MD aware.  Echo done today.   K+ 3.3, replaced, redraw scheduled for this evening.     IV ancef in place.   BC's remain positive

## 2022-03-26 NOTE — PROVIDER NOTIFICATION
Pt's hs blood glucose 362, asymptomatic.Given sliding scale of novolog  Insulin 7 units per orders.Md notified.

## 2022-03-26 NOTE — PROGRESS NOTES
INFECTIOUS DISEASE Progress Note  March 26, 2022  [id number]  Anil Montoya      ANTIBIOTICS:  Cefazolin 2 g iv q 8  hours    SUBJECTIVE: afebrile, notes reviewed, c/o low back pain, worse when he gets up, also cramps in calves, feels like he has hot flashes, no other pain    TTE  3/26-  Technically difficult imaging  Left ventricular systolic function is normal.  The visual ejection fraction is 55-60%.  The left ventricle is normal in size.  The right ventricle is normal in structure, function and size.  Doppler interrogation does not demonstrate significant stenosis or  insufficiency involving cardiac valevs.     NO old studies available for comparison  ______________________________________________________________________________  Left Ventricle  The left ventricle is normal in size. There is normal left ventricular wall  thickness. Left ventricular systolic function is normal. The visual ejection  fraction is 55-60%. Left ventricular diastolic function is indeterminate. No  regional wall motion abnormalities noted. There is no thrombus seen in the  left ventricle.     Right Ventricle  The right ventricle is normal in structure, function and size. There is no  mass or thrombus in the right ventricle.     Atria  Normal left atrial size. Right atrial size is normal. There is no atrial shunt  seen. The left atrial appendage is not well visualized.     Mitral Valve  The mitral valve leaflets appear normal. There is no evidence of stenosis,  fluttering, or prolapse. There is no mitral regurgitation noted. There is no  mitral valve stenosis.     Tricuspid Valve  Normal tricuspid valve. No tricuspid regurgitation. Right ventricular systolic  pressure could not be approximated due to inadequate tricuspid regurgitation.  There is no tricuspid stenosis.     Aortic Valve  The aortic valve is trileaflet. No aortic regurgitation is present. No aortic  stenosis is present.     Pulmonic Valve  The pulmonic  "valve is not well visualized. There is no pulmonic valvular  regurgitation. There is no pulmonic valvular stenosis.  OBJECTIVE:  BP (!) 152/90 (BP Location: Right arm)   Pulse 107   Temp 97.1  F (36.2  C) (Temporal)   Resp 18   Ht 1.778 m (5' 10\")   Wt 125.2 kg (276 lb 1.6 oz)   SpO2 93%   BMI 39.62 kg/m    No stigmata IE  Conj ok  Lung CTA  RRR, no murmur I could hear  abd soft, N T,  ND  R proximal medial thigh--healing boil-no pus or erythema,   No other skin lesions    LAB Data:    MICROBIOLOGY:  BC 3/22 MSSA  BC 3/23 MSSA  BC 3/24 S.aureus  BC 3/25 staph  BC 3/26 pending    IMAGING:    CT abd-  IMPRESSION:   1. Patchy consolidation at the left lung base suggestive for  pneumonia.  2. Bilateral nonobstructing intrarenal stones. No hydronephrosis.  3. Fatty liver. Enlarged liver.  4. Indeterminant pulmonary nodule at the right lung base. See below  for follow up imaging guidelines.  MRI  FINDINGS:   Nomenclature is based on 5 lumbar type vertebral bodies. Normal vertebral body heights, alignment and marrow signal. Normal distal spinal cord and cauda equina with conus medullaris at T12-L1. No extraspinal abnormality. Unremarkable visualized bony   pelvis. No pathologic enhancement.     T12-L1: Normal disc height and signal. No herniation. Normal facets. No spinal canal or neural foraminal stenosis.      L1-L2: Normal disc height and signal. No herniation. Normal facets. No spinal canal or neural foraminal stenosis.     L2-L3: Normal disc height and signal. No herniation. Normal facets. No spinal canal or neural foraminal stenosis.      L3-L4: Normal disc height and signal. No herniation. Mild disc bulge. Mild facet arthropathy. No spinal canal or neural foraminal stenosis.     L4-L5: Mild loss of disc space height and signal. Tiny central disc protrusion. Mild disc bulge. Normal facets. No significant spinal canal or neural foraminal stenosis.     L5-S1: Mild loss of disc height and signal. No herniation. " Mild disc bulge. Mild facet arthropathy. No spinal canal or neural foraminal stenosis. Prominent epidural fat incidentally noted L5-S2.                                                                      IMPRESSION:  1.  Mild multilevel lumbar spondylosis.  2.  No evidence of discitis or other infection. No abnormal enhancement following contrast administration.      Attestation:  I have reviewed today's vital signs, notes, medications, labs and imaging.    ASSESSMENT:  1. S.AUREUS, MSSA BACTEREMIA-F/U BC remain +, TTE neg,   2. LOW BACK PAIN-MRI OK  3. H/O BOIL resolved, ? Source  4. H/O CRYPTOCOCCAL PNEUMONIA, S.AUREUS BACTEREMIA AGE 19, ? IMMUNE Defect    REC  1. Serial BC  2. Cont cefazolin 2 g iv q 8 hours  3. F/u for complications  4. TEJINDER needed      URI SMITH M.D.  O:[phone]   B:[phone]

## 2022-03-26 NOTE — PLAN OF CARE
Goal Outcome Evaluation:  Vital signs stable, on O 2@ 2 lpm nasal cannula, sats 90's.  Lungs diminished occasional non productive cough, Delsym susp given with relief.  C/o lower back pain, tylenol and ibuprofen take,  Face  and chest still red but blanchable.  Pt on iv Ancef.Encouraged inspirometer use, ambulation and po hydration.  Blood glucose monitoring.  Plan of Care Reviewed With: patient

## 2022-03-26 NOTE — PROGRESS NOTES
[facility]  Hospitalist Progress Note  Justo Bright, DO 03/26/22       Reason for Stay (Diagnosis): MSSA bacteremia         Assessment and Plan:      Summary of Stay:Anil Montoya is a 36 year old obese male with a past medical history of cryptococcal pneumonia in 2012, diabetes mellitus, hypothyroidism and hypertension admitted on 3/22/2022 with generalized weakness.    ER, pt afebrile, tachycardic and tachyneic to low 20s.  Saturating well on RA.  CBC without leukocytosis but with thrombocytopenia.  Timothy BMP showed bicarb of 22.  CRP elevated at 393.  Lactic acid elevated at 2.3 but improved to 1.7 with IV fluids.  Underwent CT abdomen pelvis that showed patchy consolidation of the left lung base suggestive for pneumonia along with bilateral nonobstructing intrarenal stones.  The patient was admitted on Vanco/Zosyn given sepsis.    Ultimately, the patient's blood cultures resulted positive for MSSA.  These have been persistently positive for 3 to 4 days now.  Infectious disease have been consulted and recommend ongoing treatment with IV Ancef.    Problem List/Assessment and Plan:   Severe sepsis 2/2 MSSA bacteremia  So far 3 sets of Blood cultures remaine positive showing MSSA.  Source of bacteremia is unclear but most likely related to right groin boil which is now resolved on its own.  Suspect that patient has an increased risk due to uncontrolled diabetes.  It is unclear if the patient has endocarditis at this point and therefore I have ordered a TTE for further work-up.  Of note the patient had severe low back pain which is new and acute -initial imaging with MRI negative for osteo or discitis.   -Infectious disease following, appreciate recommendations   -IV Ancef, will need prolonged course   -TEJINDER on Monday  -I will order TTE today as it be helpful to know if the patient has any obvious vegetations on echo  -Daily blood cultures until negative x48 hours     Uncontrolled DM2:  "  -Lantus increased to 20 units daily  -HD sliding scale insulin  -2 units NovoLog per 15 g carb  -Continue to hold Metformin     Thrombocytopenia:   Suspect secondary to sepsis as above.  Improving.  No evidence of bleeding.   -Daily CBC     Elevation of LFT's:   Suspect secondary to severe sepsis.  CT A/P without major abnormality.  US without biliary dilation.  Showed fatty liver.     Hypothyroidism: Continue home LT4   Anxiety/ADHD: Resume home meds  HTN: Hold losartan for now in setting of severe sepsis.         DVT Prophylaxis: Pneumatic Compression Devices  Code Status: Full Code  Discharge Dispo/Date: Pending clearance of blood cultures and formal antibiotic regimen per ID recommendations.  I anticipate that the patient will be here at least 2-3 more days.        Interval History (Subjective):      The patient is overall feeling okay.  Today his primary complaint is ongoing back pain and sensation of lower extremity swelling.  The swelling is most notable per his report in the groin region.  On examination there is no obvious swelling of his groin.  He does have trace edema the bilateral lower extremities.  He reports the last time he experiences when he was critically ill in the ICU and intubated.                  Physical Exam:      Last Vital Signs:  BP (!) 160/88 (BP Location: Right arm)   Pulse 107   Temp 97.1  F (36.2  C) (Temporal)   Resp 20   Ht 1.778 m (5' 10\")   Wt 125.2 kg (276 lb 1.6 oz)   SpO2 92%   BMI 39.62 kg/m      I/O last 3 completed shifts:  In: 915 [P.O.:915]  Out: -     General: Alert, awake, no acute distress. Mildly flushed/diaphoretic  HEENT: NC/AT, eyes anicteric, external occular movements intact, face symmetric.  Dentition WNL, MM moist.  Cardiac: Mild tachycardia, S1, S2.  No murmurs appreciated.  Pulmonary: Normal work of breathing.  Lungs CTAB.  Abdomen: soft, non-tender, non-distended.  Bowel sounds present.  No guarding.  Extremities: no deformities.  Warm, well " perfused. No obvious edema of the thighs. Trace edema of the BLE.  Skin: no rashes or lesions noted.  Warm and dry.  Neuro: No gross deficits noted.  Speech clear.    Psych: Appropriate affect.         Medications:      All current medications were reviewed with changes reflected in problem list.         Data:      All new lab and imaging data was reviewed.   Labs:       Lab Results   Component Value Date     03/26/2022     03/25/2022     03/24/2022     11/24/2017    Lab Results   Component Value Date    CHLORIDE 104 03/26/2022    CHLORIDE 103 03/25/2022    CHLORIDE 105 03/24/2022    CHLORIDE 104 11/24/2017    Lab Results   Component Value Date    BUN 20 03/26/2022    BUN 21 03/25/2022    BUN 20 03/24/2022    BUN 18 11/24/2017      Lab Results   Component Value Date    POTASSIUM 3.3 03/26/2022    POTASSIUM 3.8 03/25/2022    POTASSIUM 3.6 03/24/2022    POTASSIUM 3.8 11/24/2017    Lab Results   Component Value Date    CO2 27 03/26/2022    CO2 25 03/25/2022    CO2 23 03/24/2022    CO2 24 11/24/2017    Lab Results   Component Value Date    CR 0.81 03/26/2022    CR 0.81 03/25/2022    CR 0.78 03/24/2022    CR 1.07 11/24/2017        Recent Labs   Lab 03/26/22  0740   WBC 15.3*   HGB 12.2*   HCT 37.8*   MCV 89   *      Imaging:   Recent Results (from the past 48 hour(s))   MR Lumbar Spine w/o & w Contrast    Narrative    EXAM: MR LUMBAR SPINE W/O and W CONTRAST  LOCATION: [facility]  DATE/TIME: 3/24/2022 7:50 PM    INDICATION: Low back pain, infection suspected  COMPARISON: None.  CONTRAST: 12.5 mL Gadavist   TECHNIQUE: Routine Lumbar Spine MRI without and with IV contrast.    FINDINGS:   Nomenclature is based on 5 lumbar type vertebral bodies. Normal vertebral body heights, alignment and marrow signal. Normal distal spinal cord and cauda equina with conus medullaris at T12-L1. No extraspinal abnormality. Unremarkable visualized bony   pelvis. No pathologic  enhancement.    T12-L1: Normal disc height and signal. No herniation. Normal facets. No spinal canal or neural foraminal stenosis.     L1-L2: Normal disc height and signal. No herniation. Normal facets. No spinal canal or neural foraminal stenosis.    L2-L3: Normal disc height and signal. No herniation. Normal facets. No spinal canal or neural foraminal stenosis.     L3-L4: Normal disc height and signal. No herniation. Mild disc bulge. Mild facet arthropathy. No spinal canal or neural foraminal stenosis.    L4-L5: Mild loss of disc space height and signal. Tiny central disc protrusion. Mild disc bulge. Normal facets. No significant spinal canal or neural foraminal stenosis.    L5-S1: Mild loss of disc height and signal. No herniation. Mild disc bulge. Mild facet arthropathy. No spinal canal or neural foraminal stenosis. Prominent epidural fat incidentally noted L5-S2.      Impression    IMPRESSION:  1.  Mild multilevel lumbar spondylosis.  2.  No evidence of discitis or other infection. No abnormal enhancement following contrast administration.   Echocardiogram Complete   Result Value    LVEF  55-60%    [facility]    [id number]  [id number]  [id number]  [id number]     [facility]  Echocardiography Laboratory  [address] [place] [address]     Name: DARION OROPEZA  MRN: [id number]  : 1985  Study Date: 2022 10:55 AM  Age: 36 yrs  Gender: Male  Patient Location: [facility] [place]  Reason For Study: Abn EKG  Ordering Physician: LYDAI GUSMAN  Referring Physician: Amanda Dawkins  Performed By: Ngozi Meneses RDCS     BSA: 2.4 m2  Height: 70 in  Weight: 276 lb  BP: 152/90 mmHg  ______________________________________________________________________________  Procedure  Complete Portable Echo Adult. Optison (NDC #[id number]) given intravenously.  ______________________________________________________________________________  Interpretation Summary     Technically difficult  imaging  Left ventricular systolic function is normal.  The visual ejection fraction is 55-60%.  The left ventricle is normal in size.  The right ventricle is normal in structure, function and size.  Doppler interrogation does not demonstrate significant stenosis or  insufficiency involving cardiac valevs.     NO old studies available for comparison  ______________________________________________________________________________  Left Ventricle  The left ventricle is normal in size. There is normal left ventricular wall  thickness. Left ventricular systolic function is normal. The visual ejection  fraction is 55-60%. Left ventricular diastolic function is indeterminate. No  regional wall motion abnormalities noted. There is no thrombus seen in the  left ventricle.     Right Ventricle  The right ventricle is normal in structure, function and size. There is no  mass or thrombus in the right ventricle.     Atria  Normal left atrial size. Right atrial size is normal. There is no atrial shunt  seen. The left atrial appendage is not well visualized.     Mitral Valve  The mitral valve leaflets appear normal. There is no evidence of stenosis,  fluttering, or prolapse. There is no mitral regurgitation noted. There is no  mitral valve stenosis.     Tricuspid Valve  Normal tricuspid valve. No tricuspid regurgitation. Right ventricular systolic  pressure could not be approximated due to inadequate tricuspid regurgitation.  There is no tricuspid stenosis.     Aortic Valve  The aortic valve is trileaflet. No aortic regurgitation is present. No aortic  stenosis is present.     Pulmonic Valve  The pulmonic valve is not well visualized. There is no pulmonic valvular  regurgitation. There is no pulmonic valvular stenosis.     Vessels  The aortic root is normal size. Normal size ascending aorta. Inferior vena  cava not well visualized for estimation of right atrial pressure. The  pulmonary artery is normal size.     Pericardium  The  pericardium appears normal. There is no pleural effusion.     Rhythm  Sinus rhythm was noted.  ______________________________________________________________________________  MMode/2D Measurements & Calculations     IVSd: 0.96 cm  LVIDd: 4.1 cm  LVIDs: 3.5 cm  LVPWd: 1.2 cm  FS: 14.2 %  LV mass(C)d: 143.6 grams  LV mass(C)dI: 60.0 grams/m2  Ao root diam: 3.9 cm  LA dimension: 3.5 cm  asc Aorta Diam: 3.4 cm  LA/Ao: 0.90  LVOT diam: 2.5 cm  LVOT area: 4.7 cm2  LA Volume (BP): 62.3 ml  LA Volume Index (BP): 26.1 ml/m2  RWT: 0.57     Doppler Measurements & Calculations  MV E max teresa: 67.9 cm/sec  MV A max teresa: 81.9 cm/sec  MV E/A: 0.83  MV dec time: 0.18 sec  Ao V2 max: 144.0 cm/sec  Ao max P.0 mmHg  PA acc time: 0.15 sec  E/E' av.7  Lateral E/e': 7.5  Medial E/e': 7.8     ______________________________________________________________________________  Report approved by: Dr. Kolby Ledezma 2022 11:57 AM                 Justo Bright DO

## 2022-03-26 NOTE — PROGRESS NOTES
XC    Blood glucose of 362.  Following changes done:  -Additional 5 units of Lantus.  Increase Lantus to 20 units at bedtime from tomorrow.  -Additional 5 units of NovoLog.  -Added premeal insulin per carb counting (2 units per 15 g of carbs).

## 2022-03-27 LAB
ANION GAP SERPL CALCULATED.3IONS-SCNC: 4 MMOL/L (ref 3–14)
BACTERIA BLD CULT: ABNORMAL
BUN SERPL-MCNC: 21 MG/DL (ref 7–30)
CALCIUM SERPL-MCNC: 7.8 MG/DL (ref 8.5–10.1)
CHLORIDE BLD-SCNC: 103 MMOL/L (ref 94–109)
CO2 SERPL-SCNC: 28 MMOL/L (ref 20–32)
CREAT SERPL-MCNC: 0.88 MG/DL (ref 0.66–1.25)
ERYTHROCYTE [DISTWIDTH] IN BLOOD BY AUTOMATED COUNT: 13.9 % (ref 10–15)
GFR SERPL CREATININE-BSD FRML MDRD: >90 ML/MIN/1.73M2
GLUCOSE BLD-MCNC: 278 MG/DL (ref 70–99)
GLUCOSE BLDC GLUCOMTR-MCNC: 261 MG/DL (ref 70–99)
GLUCOSE BLDC GLUCOMTR-MCNC: 267 MG/DL (ref 70–99)
GLUCOSE BLDC GLUCOMTR-MCNC: 295 MG/DL (ref 70–99)
GLUCOSE BLDC GLUCOMTR-MCNC: 303 MG/DL (ref 70–99)
GLUCOSE BLDC GLUCOMTR-MCNC: 310 MG/DL (ref 70–99)
HBA1C MFR BLD: 12.6 % (ref 0–5.6)
HCT VFR BLD AUTO: 34.1 % (ref 40–53)
HGB BLD-MCNC: 10.9 G/DL (ref 13.3–17.7)
LACTATE SERPL-SCNC: 0.8 MMOL/L (ref 0.7–2)
MCH RBC QN AUTO: 28.7 PG (ref 26.5–33)
MCHC RBC AUTO-ENTMCNC: 32 G/DL (ref 31.5–36.5)
MCV RBC AUTO: 90 FL (ref 78–100)
PLATELET # BLD AUTO: 152 10E3/UL (ref 150–450)
POTASSIUM BLD-SCNC: 3.8 MMOL/L (ref 3.4–5.3)
RBC # BLD AUTO: 3.8 10E6/UL (ref 4.4–5.9)
SODIUM SERPL-SCNC: 135 MMOL/L (ref 133–144)
WBC # BLD AUTO: 12.5 10E3/UL (ref 4–11)

## 2022-03-27 PROCEDURE — 87077 CULTURE AEROBIC IDENTIFY: CPT | Performed by: STUDENT IN AN ORGANIZED HEALTH CARE EDUCATION/TRAINING PROGRAM

## 2022-03-27 PROCEDURE — 120N000001 HC R&B MED SURG/OB

## 2022-03-27 PROCEDURE — 36415 COLL VENOUS BLD VENIPUNCTURE: CPT | Performed by: INTERNAL MEDICINE

## 2022-03-27 PROCEDURE — 83605 ASSAY OF LACTIC ACID: CPT | Performed by: STUDENT IN AN ORGANIZED HEALTH CARE EDUCATION/TRAINING PROGRAM

## 2022-03-27 PROCEDURE — 87077 CULTURE AEROBIC IDENTIFY: CPT | Performed by: INTERNAL MEDICINE

## 2022-03-27 PROCEDURE — 99207 PR NO BILLABLE SERVICE THIS VISIT: CPT | Performed by: STUDENT IN AN ORGANIZED HEALTH CARE EDUCATION/TRAINING PROGRAM

## 2022-03-27 PROCEDURE — 250N000013 HC RX MED GY IP 250 OP 250 PS 637: Performed by: HOSPITALIST

## 2022-03-27 PROCEDURE — 250N000011 HC RX IP 250 OP 636: Performed by: INTERNAL MEDICINE

## 2022-03-27 PROCEDURE — 250N000013 HC RX MED GY IP 250 OP 250 PS 637: Performed by: STUDENT IN AN ORGANIZED HEALTH CARE EDUCATION/TRAINING PROGRAM

## 2022-03-27 PROCEDURE — 85027 COMPLETE CBC AUTOMATED: CPT | Performed by: STUDENT IN AN ORGANIZED HEALTH CARE EDUCATION/TRAINING PROGRAM

## 2022-03-27 PROCEDURE — 83036 HEMOGLOBIN GLYCOSYLATED A1C: CPT | Performed by: HOSPITALIST

## 2022-03-27 PROCEDURE — 99233 SBSQ HOSP IP/OBS HIGH 50: CPT | Performed by: STUDENT IN AN ORGANIZED HEALTH CARE EDUCATION/TRAINING PROGRAM

## 2022-03-27 PROCEDURE — 80048 BASIC METABOLIC PNL TOTAL CA: CPT | Performed by: STUDENT IN AN ORGANIZED HEALTH CARE EDUCATION/TRAINING PROGRAM

## 2022-03-27 PROCEDURE — 36415 COLL VENOUS BLD VENIPUNCTURE: CPT | Performed by: STUDENT IN AN ORGANIZED HEALTH CARE EDUCATION/TRAINING PROGRAM

## 2022-03-27 PROCEDURE — 87149 DNA/RNA DIRECT PROBE: CPT | Performed by: STUDENT IN AN ORGANIZED HEALTH CARE EDUCATION/TRAINING PROGRAM

## 2022-03-27 RX ORDER — FUROSEMIDE 20 MG
20 TABLET ORAL DAILY
Status: DISCONTINUED | OUTPATIENT
Start: 2022-03-27 | End: 2022-03-28 | Stop reason: HOSPADM

## 2022-03-27 RX ADMIN — ACETAMINOPHEN 650 MG: 325 TABLET, FILM COATED ORAL at 01:16

## 2022-03-27 RX ADMIN — LOSARTAN POTASSIUM 50 MG: 50 TABLET, FILM COATED ORAL at 09:11

## 2022-03-27 RX ADMIN — CEFAZOLIN SODIUM 2 G: 2 INJECTION, SOLUTION INTRAVENOUS at 09:56

## 2022-03-27 RX ADMIN — CEFAZOLIN SODIUM 2 G: 2 INJECTION, SOLUTION INTRAVENOUS at 18:53

## 2022-03-27 RX ADMIN — ACETAMINOPHEN 650 MG: 325 TABLET, FILM COATED ORAL at 11:32

## 2022-03-27 RX ADMIN — FUROSEMIDE 20 MG: 20 TABLET ORAL at 09:56

## 2022-03-27 RX ADMIN — BUPROPION HYDROCHLORIDE 300 MG: 150 TABLET, FILM COATED, EXTENDED RELEASE ORAL at 09:11

## 2022-03-27 RX ADMIN — CEFAZOLIN SODIUM 2 G: 2 INJECTION, SOLUTION INTRAVENOUS at 01:15

## 2022-03-27 RX ADMIN — LEVOTHYROXINE SODIUM 250 MCG: 125 TABLET ORAL at 09:13

## 2022-03-27 RX ADMIN — IBUPROFEN 600 MG: 600 TABLET, FILM COATED ORAL at 16:09

## 2022-03-27 ASSESSMENT — ACTIVITIES OF DAILY LIVING (ADL)
ADLS_ACUITY_SCORE: 11
ADLS_ACUITY_SCORE: 11
ADLS_ACUITY_SCORE: 9
ADLS_ACUITY_SCORE: 11
ADLS_ACUITY_SCORE: 9
ADLS_ACUITY_SCORE: 9
ADLS_ACUITY_SCORE: 11
ADLS_ACUITY_SCORE: 11
ADLS_ACUITY_SCORE: 9
ADLS_ACUITY_SCORE: 11
ADLS_ACUITY_SCORE: 9

## 2022-03-27 NOTE — PROGRESS NOTES
INFECTIOUS DISEASE Progress Note  March 26, 2022  [id number]  Anil Montoya      ANTIBIOTICS:  Cefazolin 2 g iv q 8  hours    SUBJECTIVE: c/o R rib pain R  Upper abdomen  In  Flank area,  afebrile, notes reviewed, c/o low back pain, worse when he gets up, also cramps in calves, feels like he has hot flashes, he  Did walk and  Showered this am    TTE  3/26-  Technically difficult imaging  Left ventricular systolic function is normal.  The visual ejection fraction is 55-60%.  The left ventricle is normal in size.  The right ventricle is normal in structure, function and size.  Doppler interrogation does not demonstrate significant stenosis or  insufficiency involving cardiac valevs.     NO old studies available for comparison  ______________________________________________________________________________  Left Ventricle  The left ventricle is normal in size. There is normal left ventricular wall  thickness. Left ventricular systolic function is normal. The visual ejection  fraction is 55-60%. Left ventricular diastolic function is indeterminate. No  regional wall motion abnormalities noted. There is no thrombus seen in the  left ventricle.     Right Ventricle  The right ventricle is normal in structure, function and size. There is no  mass or thrombus in the right ventricle.     Atria  Normal left atrial size. Right atrial size is normal. There is no atrial shunt  seen. The left atrial appendage is not well visualized.     Mitral Valve  The mitral valve leaflets appear normal. There is no evidence of stenosis,  fluttering, or prolapse. There is no mitral regurgitation noted. There is no  mitral valve stenosis.     Tricuspid Valve  Normal tricuspid valve. No tricuspid regurgitation. Right ventricular systolic  pressure could not be approximated due to inadequate tricuspid regurgitation.  There is no tricuspid stenosis.     Aortic Valve  The aortic valve is trileaflet. No aortic regurgitation is  "present. No aortic  stenosis is present.     Pulmonic Valve  The pulmonic valve is not well visualized. There is no pulmonic valvular  regurgitation. There is no pulmonic valvular stenosis.  OBJECTIVE:  BP (!) 140/91 (BP Location: Right arm)   Pulse 92   Temp (!) 96.1  F (35.6  C) (Temporal)   Resp 18   Ht 1.778 m (5' 10\")   Wt 125.7 kg (277 lb 3.2 oz)   SpO2 93%   BMI 39.77 kg/m    Alert,  Ox 3 , NAD  No stigmata IE  Conj ok  Lung CTA  RRR, no murmur I could hear  abd soft, N T,  ND  R lateral  Chest rib  Tenderness in lower  Chest  area  R proximal medial thigh--healing boil-no pus or erythema,   No other skin lesions    LAB Data:    MICROBIOLOGY:  BC 3/22 MSSA  BC 3/23 MSSA  BC 3/24 S.aureus  BC 3/25 staph  BC 3/26 Staph  BC 3/27 x 2 pending    IMAGING:    CT abd-  IMPRESSION:   1. Patchy consolidation at the left lung base suggestive for  pneumonia.  2. Bilateral nonobstructing intrarenal stones. No hydronephrosis.  3. Fatty liver. Enlarged liver.  4. Indeterminant pulmonary nodule at the right lung base. See below  for follow up imaging guidelines.  MRI  FINDINGS:   Nomenclature is based on 5 lumbar type vertebral bodies. Normal vertebral body heights, alignment and marrow signal. Normal distal spinal cord and cauda equina with conus medullaris at T12-L1. No extraspinal abnormality. Unremarkable visualized bony   pelvis. No pathologic enhancement.     T12-L1: Normal disc height and signal. No herniation. Normal facets. No spinal canal or neural foraminal stenosis.      L1-L2: Normal disc height and signal. No herniation. Normal facets. No spinal canal or neural foraminal stenosis.     L2-L3: Normal disc height and signal. No herniation. Normal facets. No spinal canal or neural foraminal stenosis.      L3-L4: Normal disc height and signal. No herniation. Mild disc bulge. Mild facet arthropathy. No spinal canal or neural foraminal stenosis.     L4-L5: Mild loss of disc space height and signal. Tiny central " disc protrusion. Mild disc bulge. Normal facets. No significant spinal canal or neural foraminal stenosis.     L5-S1: Mild loss of disc height and signal. No herniation. Mild disc bulge. Mild facet arthropathy. No spinal canal or neural foraminal stenosis. Prominent epidural fat incidentally noted L5-S2.                                                                      IMPRESSION:  1.  Mild multilevel lumbar spondylosis.  2.  No evidence of discitis or other infection. No abnormal enhancement following contrast administration.      Attestation:  I have reviewed today's vital signs, notes, medications, labs and imaging.    ASSESSMENT:  1. S.AUREUS, MSSA BACTEREMIA-F/U BC remain +, TTE neg, bacteremia ongoin  2. LOW BACK PAIN-MRI OK  3. H/O BOIL resolved, ? Source  4. H/O CRYPTOCOCCAL PNEUMONIA, S.AUREUS BACTEREMIA AGE 19, ? IMMUNE Defect  5. R LATERAL  CHEST/RIB TENDERNESS-?  Etiology, ?  Seeding of bacteremia  But  Would  But  Odd  location    REC  1. Serial BC  2. Cont cefazolin 2 g iv q 8 hours  3. F/u for complications  4. TEJINDER needed      URI SMITH M.D.  O:[phone]   B:[phone]

## 2022-03-27 NOTE — PROGRESS NOTES
CROSS COVER    Nursing notified pt c/o swelling in hands and feet so much so he cannot get his ring off.  Pt seen and examined.  Pt with minimal edema.  Denies sob.  Pt did get some IVF for elevated LA.  Now off IVF.  TTE today showed EF 55%.  Admitted for severe sepsis with MSSA bacteremia.  Plan for TEJINDER Monday.  Will give lasix 20 mg PO x1.  Pt agreeable.

## 2022-03-27 NOTE — PROGRESS NOTES
[facility]  Hospitalist Progress Note  Justo Bright,  03/27/22       Reason for Stay (Diagnosis): MSSA bacteremia         Assessment and Plan:      Summary of Stay:Anil Montoya is a 36 year old obese male with a past medical history of cryptococcal pneumonia in 2012, diabetes mellitus, hypothyroidism and hypertension admitted on 3/22/2022 with generalized weakness.    ER, pt afebrile, tachycardic and tachyneic to low 20s.  Saturating well on RA.  CBC without leukocytosis but with thrombocytopenia.  Timothy BMP showed bicarb of 22.  CRP elevated at 393.  Lactic acid elevated at 2.3 but improved to 1.7 with IV fluids.  Underwent CT abdomen pelvis that showed patchy consolidation of the left lung base suggestive for pneumonia along with bilateral nonobstructing intrarenal stones.  The patient was admitted on Vanco/Zosyn given sepsis.    Ultimately, the patient's blood cultures resulted positive for MSSA.  These have been persistently positive for 3 to 4 days now.  Infectious disease have been consulted and recommend ongoing treatment with IV Ancef.    Problem List/Assessment and Plan:   Severe sepsis 2/2 MSSA bacteremia  So far 5 sets of blood cultures remaine positive showing MSSA.  Source of bacteremia is unclear but thought related to right groin boil which is now resolved on its own.  Suspect that patient has an increased risk due to uncontrolled diabetes.  It is unclear if the patient has endocarditis at this point. TTE negative for vegetations, but plan for TEJINDER on 3/28. Of note the patient had severe low back pain which is new and acute -initial imaging with MRI negative for osteo or discitis.   -Infectious disease following, appreciate recommendations   -IV Ancef, will need prolonged course   -TEJINDER on Monday, NPO at midnight  -Daily blood cultures until negative x48 hours     Uncontrolled DM2:   -Increase Lantus to 30 units daily (approximately 0.25 unit(s)/KG)  -HD sliding scale  "insulin  -2 units NovoLog per 15 g carb  -Hold Metformin     Thrombocytopenia, resolved:   Suspect secondary to sepsis as above.  Improving.  No evidence of bleeding.   -Daily CBC     Elevation of LFT's:   Suspect secondary to severe sepsis.  CT A/P without major abnormality.  US without biliary dilation.  Showed fatty liver.     Hypothyroidism: Continue home synthroid  Anxiety/ADHD: Resume home meds  HTN: PTA losartan 50 mg daily       DVT Prophylaxis: Pneumatic Compression Devices  Code Status: Full Code  Discharge Dispo/Date: Pending clearance of blood cultures and formal antibiotic regimen per ID recommendations.  I anticipate that the patient will be here at least 2-3 more days.        Interval History (Subjective):      Patient reports that he had increased swelling last night became quite uncomfortable.  The overnight document dose of Lasix and he reports that his symptoms have improved significantly.  He also reports that for some reason the Lasix improved the pain that he was experiencing in his ribs and lower back.  Otherwise there were no acute events overnight.  Blood cultures remain positive over the past 5 days.  No new symptoms.                  Physical Exam:      Last Vital Signs:  BP (!) 150/90 (BP Location: Right arm)   Pulse 93   Temp 97.5  F (36.4  C) (Temporal)   Resp 18   Ht 1.778 m (5' 10\")   Wt 125.7 kg (277 lb 3.2 oz)   SpO2 91%   BMI 39.77 kg/m      I/O last 3 completed shifts:  In: 1303 [P.O.:1300; I.V.:3]  Out: 900 [Urine:900]    General: Alert, awake, no acute distress.  HEENT: NC/AT, eyes anicteric, external occular movements intact, face symmetric.  Cardiac: Mild tachycardia, S1, S2.  No murmurs appreciated.  Pulmonary: Normal work of breathing.  Lungs CTAB.  Abdomen: soft, non-tender, non-distended.  Bowel sounds present.  No guarding.  Extremities: no deformities.  Warm, well perfused. No obvious edema of the thighs. Trace edema of the BLE.  Skin: no rashes or lesions noted.  " Warm and dry.  Neuro: No gross deficits noted.  Speech clear.    Psych: Appropriate affect.         Medications:      All current medications were reviewed with changes reflected in problem list.         Data:      All new lab and imaging data was reviewed.   Labs:       Lab Results   Component Value Date     03/26/2022     03/25/2022     03/24/2022     11/24/2017    Lab Results   Component Value Date    CHLORIDE 104 03/26/2022    CHLORIDE 103 03/25/2022    CHLORIDE 105 03/24/2022    CHLORIDE 104 11/24/2017    Lab Results   Component Value Date    BUN 20 03/26/2022    BUN 21 03/25/2022    BUN 20 03/24/2022    BUN 18 11/24/2017      Lab Results   Component Value Date    POTASSIUM 3.3 03/26/2022    POTASSIUM 3.8 03/25/2022    POTASSIUM 3.6 03/24/2022    POTASSIUM 3.8 11/24/2017    Lab Results   Component Value Date    CO2 27 03/26/2022    CO2 25 03/25/2022    CO2 23 03/24/2022    CO2 24 11/24/2017    Lab Results   Component Value Date    CR 0.81 03/26/2022    CR 0.81 03/25/2022    CR 0.78 03/24/2022    CR 1.07 11/24/2017        Recent Labs   Lab 03/27/22  0652   WBC 12.5*   HGB 10.9*   HCT 34.1*   MCV 90         Imaging:   Recent Results (from the past 48 hour(s))   MR Lumbar Spine w/o & w Contrast    Narrative    EXAM: MR LUMBAR SPINE W/O and W CONTRAST  LOCATION: [facility]  DATE/TIME: 3/24/2022 7:50 PM    INDICATION: Low back pain, infection suspected  COMPARISON: None.  CONTRAST: 12.5 mL Gadavist   TECHNIQUE: Routine Lumbar Spine MRI without and with IV contrast.    FINDINGS:   Nomenclature is based on 5 lumbar type vertebral bodies. Normal vertebral body heights, alignment and marrow signal. Normal distal spinal cord and cauda equina with conus medullaris at T12-L1. No extraspinal abnormality. Unremarkable visualized bony   pelvis. No pathologic enhancement.    T12-L1: Normal disc height and signal. No herniation. Normal facets. No spinal canal or neural  foraminal stenosis.     L1-L2: Normal disc height and signal. No herniation. Normal facets. No spinal canal or neural foraminal stenosis.    L2-L3: Normal disc height and signal. No herniation. Normal facets. No spinal canal or neural foraminal stenosis.     L3-L4: Normal disc height and signal. No herniation. Mild disc bulge. Mild facet arthropathy. No spinal canal or neural foraminal stenosis.    L4-L5: Mild loss of disc space height and signal. Tiny central disc protrusion. Mild disc bulge. Normal facets. No significant spinal canal or neural foraminal stenosis.    L5-S1: Mild loss of disc height and signal. No herniation. Mild disc bulge. Mild facet arthropathy. No spinal canal or neural foraminal stenosis. Prominent epidural fat incidentally noted L5-S2.      Impression    IMPRESSION:  1.  Mild multilevel lumbar spondylosis.  2.  No evidence of discitis or other infection. No abnormal enhancement following contrast administration.   Echocardiogram Complete   Result Value    LVEF  55-60%    Narrative    [id number]  [id number]  [id number]  [id number]     [facility]  Echocardiography Laboratory  [address] [place] [address]     Name: DARION OROPEZA  MRN: [id number]  : 1985  Study Date: 2022 10:55 AM  Age: 36 yrs  Gender: Male  Patient Location: [facility]  Reason For Study: Abn EKG  Ordering Physician: LYDIA GUSMAN  Referring Physician: Amanda Dawkins  Performed By: Ngozi Meneses RDCS     BSA: 2.4 m2  Height: 70 in  Weight: 276 lb  BP: 152/90 mmHg  ______________________________________________________________________________  Procedure  Complete Portable Echo Adult. Optison (NDC #[id number]) given intravenously.  ______________________________________________________________________________  Interpretation Summary     Technically difficult imaging  Left ventricular systolic function is normal.  The visual ejection fraction is 55-60%.  The left  ventricle is normal in size.  The right ventricle is normal in structure, function and size.  Doppler interrogation does not demonstrate significant stenosis or  insufficiency involving cardiac valevs.     NO old studies available for comparison  ______________________________________________________________________________  Left Ventricle  The left ventricle is normal in size. There is normal left ventricular wall  thickness. Left ventricular systolic function is normal. The visual ejection  fraction is 55-60%. Left ventricular diastolic function is indeterminate. No  regional wall motion abnormalities noted. There is no thrombus seen in the  left ventricle.     Right Ventricle  The right ventricle is normal in structure, function and size. There is no  mass or thrombus in the right ventricle.     Atria  Normal left atrial size. Right atrial size is normal. There is no atrial shunt  seen. The left atrial appendage is not well visualized.     Mitral Valve  The mitral valve leaflets appear normal. There is no evidence of stenosis,  fluttering, or prolapse. There is no mitral regurgitation noted. There is no  mitral valve stenosis.     Tricuspid Valve  Normal tricuspid valve. No tricuspid regurgitation. Right ventricular systolic  pressure could not be approximated due to inadequate tricuspid regurgitation.  There is no tricuspid stenosis.     Aortic Valve  The aortic valve is trileaflet. No aortic regurgitation is present. No aortic  stenosis is present.     Pulmonic Valve  The pulmonic valve is not well visualized. There is no pulmonic valvular  regurgitation. There is no pulmonic valvular stenosis.     Vessels  The aortic root is normal size. Normal size ascending aorta. Inferior vena  cava not well visualized for estimation of right atrial pressure. The  pulmonary artery is normal size.     Pericardium  The pericardium appears normal. There is no pleural effusion.     Rhythm  Sinus rhythm was  noted.  ______________________________________________________________________________  MMode/2D Measurements & Calculations     IVSd: 0.96 cm  LVIDd: 4.1 cm  LVIDs: 3.5 cm  LVPWd: 1.2 cm  FS: 14.2 %  LV mass(C)d: 143.6 grams  LV mass(C)dI: 60.0 grams/m2  Ao root diam: 3.9 cm  LA dimension: 3.5 cm  asc Aorta Diam: 3.4 cm  LA/Ao: 0.90  LVOT diam: 2.5 cm  LVOT area: 4.7 cm2  LA Volume (BP): 62.3 ml  LA Volume Index (BP): 26.1 ml/m2  RWT: 0.57     Doppler Measurements & Calculations  MV E max teresa: 67.9 cm/sec  MV A max teresa: 81.9 cm/sec  MV E/A: 0.83  MV dec time: 0.18 sec  Ao V2 max: 144.0 cm/sec  Ao max P.0 mmHg  PA acc time: 0.15 sec  E/E' av.7  Lateral E/e': 7.5  Medial E/e': 7.8     ______________________________________________________________________________  Report approved by: Dr. Kolby Ledezma 2022 11:57 AM             Justo Bright DO

## 2022-03-27 NOTE — PLAN OF CARE
Patient had a good shift. VSS. No fever.   Showered this afternoon.   Lasix started, good urine output. Patient reports feeling less swollen in his hands and legs.     Will be NPO at midnight for TEJINDER in am.    Prn Tylenol and Ibuprofen given today for back pain. Heat pack in place.     Blood Sugars 261, 295, 303    Ancef in place.   BC's remain positive.

## 2022-03-27 NOTE — PLAN OF CARE
Problem: Plan of Care - These are the overarching goals to be used throughout the patient stay.    Goal: Optimal Comfort and Wellbeing  Intervention: Monitor Pain and Promote Comfort  Recent Flowsheet Documentation  Taken 3/26/2022 2011 by Miguel Sotelo RN  Pain Management Interventions: medication (see MAR)     Problem: Fluid Imbalance (Pneumonia)  Goal: Fluid Balance  Outcome: Ongoing, Progressing     Problem: Infection (Pneumonia)  Goal: Resolution of Infection Signs and Symptoms  Outcome: Ongoing, Progressing   Goal Outcome Evaluation:    Pt is alerts and oriented. Ibuprofen and tylenol were administered for generalized body ache-- it was effective. Pt reported swollen hand and legs, hospitalist was web paged and ordered one time dose of furosemide. Pt denied SOB. He still has some redness in his face and body. Potassium came back as 3.6, will recheck in the morning.

## 2022-03-27 NOTE — PROVIDER NOTIFICATION
Hospitalist web paged--Pt is c/o swelling in his bilateral upper and lower extremities. he said his finger is so swollen that he cant take his ring off. he also c/o pain with the swelling and requesting meds to help reduce swelling. i gave ibuprofen for pain.

## 2022-03-28 ENCOUNTER — APPOINTMENT (OUTPATIENT)
Dept: CARDIOLOGY | Facility: CLINIC | Age: 37
DRG: 871 | End: 2022-03-28
Attending: STUDENT IN AN ORGANIZED HEALTH CARE EDUCATION/TRAINING PROGRAM
Payer: COMMERCIAL

## 2022-03-28 ENCOUNTER — HOSPITAL ENCOUNTER (INPATIENT)
Facility: CLINIC | Age: 37
LOS: 21 days | Discharge: HOME | DRG: 853 | End: 2022-04-18
Attending: HOSPITALIST | Admitting: INTERNAL MEDICINE
Payer: COMMERCIAL

## 2022-03-28 VITALS
WEIGHT: 267 LBS | OXYGEN SATURATION: 96 % | RESPIRATION RATE: 22 BRPM | DIASTOLIC BLOOD PRESSURE: 85 MMHG | HEIGHT: 70 IN | TEMPERATURE: 97.2 F | SYSTOLIC BLOOD PRESSURE: 130 MMHG | HEART RATE: 95 BPM | BODY MASS INDEX: 38.22 KG/M2

## 2022-03-28 DIAGNOSIS — R78.81 MSSA BACTEREMIA: ICD-10-CM

## 2022-03-28 DIAGNOSIS — I38 ENDOCARDITIS DETERMINED BY ECHOCARDIOGRAPHY: ICD-10-CM

## 2022-03-28 DIAGNOSIS — B95.61 MSSA BACTEREMIA: ICD-10-CM

## 2022-03-28 DIAGNOSIS — Z95.4 S/P TVR (TRICUSPID VALVE REPLACEMENT): Primary | ICD-10-CM

## 2022-03-28 DIAGNOSIS — I46.9 CARDIAC ARREST (H): ICD-10-CM

## 2022-03-28 LAB
ANION GAP SERPL CALCULATED.3IONS-SCNC: 4 MMOL/L (ref 3–14)
BACTERIA BLD CULT: ABNORMAL
BACTERIA BLD CULT: ABNORMAL
BUN SERPL-MCNC: 19 MG/DL (ref 7–30)
CALCIUM SERPL-MCNC: 8.1 MG/DL (ref 8.5–10.1)
CHLORIDE BLD-SCNC: 103 MMOL/L (ref 94–109)
CO2 SERPL-SCNC: 27 MMOL/L (ref 20–32)
CREAT SERPL-MCNC: 0.82 MG/DL (ref 0.66–1.25)
ENTEROCOCCUS FAECALIS: NOT DETECTED
ENTEROCOCCUS FAECIUM: NOT DETECTED
ERYTHROCYTE [DISTWIDTH] IN BLOOD BY AUTOMATED COUNT: 13.9 % (ref 10–15)
GFR SERPL CREATININE-BSD FRML MDRD: >90 ML/MIN/1.73M2
GLUCOSE BLD-MCNC: 291 MG/DL (ref 70–99)
GLUCOSE BLDC GLUCOMTR-MCNC: 255 MG/DL (ref 70–99)
GLUCOSE BLDC GLUCOMTR-MCNC: 262 MG/DL (ref 70–99)
GLUCOSE BLDC GLUCOMTR-MCNC: 264 MG/DL (ref 70–99)
GLUCOSE BLDC GLUCOMTR-MCNC: 295 MG/DL (ref 70–99)
GLUCOSE BLDC GLUCOMTR-MCNC: 337 MG/DL (ref 70–99)
HCT VFR BLD AUTO: 34.2 % (ref 40–53)
HGB BLD-MCNC: 10.8 G/DL (ref 13.3–17.7)
LISTERIA SPECIES (DETECTED/NOT DETECTED): NOT DETECTED
MCH RBC QN AUTO: 28.6 PG (ref 26.5–33)
MCHC RBC AUTO-ENTMCNC: 31.6 G/DL (ref 31.5–36.5)
MCV RBC AUTO: 91 FL (ref 78–100)
PLATELET # BLD AUTO: 239 10E3/UL (ref 150–450)
POTASSIUM BLD-SCNC: 4.3 MMOL/L (ref 3.4–5.3)
RBC # BLD AUTO: 3.78 10E6/UL (ref 4.4–5.9)
SODIUM SERPL-SCNC: 134 MMOL/L (ref 133–144)
STAPHYLOCOCCUS AUREUS: DETECTED
STAPHYLOCOCCUS EPIDERMIDIS: NOT DETECTED
STAPHYLOCOCCUS LUGDUNENSIS: NOT DETECTED
STREPTOCOCCUS AGALACTIAE: NOT DETECTED
STREPTOCOCCUS ANGINOSUS GROUP: NOT DETECTED
STREPTOCOCCUS PNEUMONIAE: NOT DETECTED
STREPTOCOCCUS PYOGENES: NOT DETECTED
STREPTOCOCCUS SPECIES: NOT DETECTED
WBC # BLD AUTO: 14.1 10E3/UL (ref 4–11)

## 2022-03-28 PROCEDURE — 250N000011 HC RX IP 250 OP 636: Performed by: HOSPITALIST

## 2022-03-28 PROCEDURE — 99223 1ST HOSP IP/OBS HIGH 75: CPT | Mod: AI | Performed by: INTERNAL MEDICINE

## 2022-03-28 PROCEDURE — 36415 COLL VENOUS BLD VENIPUNCTURE: CPT | Performed by: STUDENT IN AN ORGANIZED HEALTH CARE EDUCATION/TRAINING PROGRAM

## 2022-03-28 PROCEDURE — 250N000009 HC RX 250: Performed by: HOSPITALIST

## 2022-03-28 PROCEDURE — 82310 ASSAY OF CALCIUM: CPT | Performed by: STUDENT IN AN ORGANIZED HEALTH CARE EDUCATION/TRAINING PROGRAM

## 2022-03-28 PROCEDURE — 85027 COMPLETE CBC AUTOMATED: CPT | Performed by: STUDENT IN AN ORGANIZED HEALTH CARE EDUCATION/TRAINING PROGRAM

## 2022-03-28 PROCEDURE — 99222 1ST HOSP IP/OBS MODERATE 55: CPT | Mod: 25 | Performed by: INTERNAL MEDICINE

## 2022-03-28 PROCEDURE — 250N000011 HC RX IP 250 OP 636: Performed by: INTERNAL MEDICINE

## 2022-03-28 PROCEDURE — 99152 MOD SED SAME PHYS/QHP 5/>YRS: CPT | Performed by: INTERNAL MEDICINE

## 2022-03-28 PROCEDURE — 87077 CULTURE AEROBIC IDENTIFY: CPT | Performed by: INTERNAL MEDICINE

## 2022-03-28 PROCEDURE — 93312 ECHO TRANSESOPHAGEAL: CPT | Mod: 26 | Performed by: INTERNAL MEDICINE

## 2022-03-28 PROCEDURE — 93320 DOPPLER ECHO COMPLETE: CPT | Mod: 26 | Performed by: INTERNAL MEDICINE

## 2022-03-28 PROCEDURE — 250N000013 HC RX MED GY IP 250 OP 250 PS 637: Performed by: HOSPITALIST

## 2022-03-28 PROCEDURE — 93325 DOPPLER ECHO COLOR FLOW MAPG: CPT

## 2022-03-28 PROCEDURE — 99239 HOSP IP/OBS DSCHRG MGMT >30: CPT | Performed by: STUDENT IN AN ORGANIZED HEALTH CARE EDUCATION/TRAINING PROGRAM

## 2022-03-28 PROCEDURE — 120N000001 HC R&B MED SURG/OB

## 2022-03-28 PROCEDURE — 93325 DOPPLER ECHO COLOR FLOW MAPG: CPT | Mod: 26 | Performed by: INTERNAL MEDICINE

## 2022-03-28 PROCEDURE — 250N000013 HC RX MED GY IP 250 OP 250 PS 637: Performed by: STUDENT IN AN ORGANIZED HEALTH CARE EDUCATION/TRAINING PROGRAM

## 2022-03-28 PROCEDURE — 99207 PR NO BILLABLE SERVICE THIS VISIT: CPT | Performed by: STUDENT IN AN ORGANIZED HEALTH CARE EDUCATION/TRAINING PROGRAM

## 2022-03-28 RX ORDER — ONDANSETRON 4 MG/1
4 TABLET, ORALLY DISINTEGRATING ORAL EVERY 6 HOURS PRN
Status: DISCONTINUED | OUTPATIENT
Start: 2022-03-28 | End: 2022-04-01

## 2022-03-28 RX ORDER — NALOXONE HYDROCHLORIDE 0.4 MG/ML
0.4 INJECTION, SOLUTION INTRAMUSCULAR; INTRAVENOUS; SUBCUTANEOUS
Status: DISCONTINUED | OUTPATIENT
Start: 2022-03-28 | End: 2022-04-01

## 2022-03-28 RX ORDER — ONDANSETRON 2 MG/ML
4 INJECTION INTRAMUSCULAR; INTRAVENOUS EVERY 6 HOURS PRN
Status: DISCONTINUED | OUTPATIENT
Start: 2022-03-28 | End: 2022-04-01

## 2022-03-28 RX ORDER — FENTANYL CITRATE 50 UG/ML
25 INJECTION, SOLUTION INTRAMUSCULAR; INTRAVENOUS
Status: DISCONTINUED | OUTPATIENT
Start: 2022-03-28 | End: 2022-03-28 | Stop reason: HOSPADM

## 2022-03-28 RX ORDER — AMOXICILLIN 250 MG
1 CAPSULE ORAL 2 TIMES DAILY PRN
Status: DISCONTINUED | OUTPATIENT
Start: 2022-03-28 | End: 2022-04-01

## 2022-03-28 RX ORDER — AMOXICILLIN 250 MG
2 CAPSULE ORAL 2 TIMES DAILY PRN
Status: DISCONTINUED | OUTPATIENT
Start: 2022-03-28 | End: 2022-04-01

## 2022-03-28 RX ORDER — GLYCOPYRROLATE 0.2 MG/ML
INJECTION INTRAMUSCULAR; INTRAVENOUS
Status: DISPENSED
Start: 2022-03-28 | End: 2022-03-28

## 2022-03-28 RX ORDER — FENTANYL CITRATE 50 UG/ML
INJECTION, SOLUTION INTRAMUSCULAR; INTRAVENOUS
Status: DISPENSED
Start: 2022-03-28 | End: 2022-03-28

## 2022-03-28 RX ORDER — NALOXONE HYDROCHLORIDE 0.4 MG/ML
0.2 INJECTION, SOLUTION INTRAMUSCULAR; INTRAVENOUS; SUBCUTANEOUS
Status: DISCONTINUED | OUTPATIENT
Start: 2022-03-28 | End: 2022-04-01

## 2022-03-28 RX ORDER — DEXTROSE MONOHYDRATE 25 G/50ML
9.5 INJECTION, SOLUTION INTRAVENOUS
Status: DISCONTINUED | OUTPATIENT
Start: 2022-03-28 | End: 2022-03-28 | Stop reason: HOSPADM

## 2022-03-28 RX ORDER — NALOXONE HYDROCHLORIDE 0.4 MG/ML
0.2 INJECTION, SOLUTION INTRAMUSCULAR; INTRAVENOUS; SUBCUTANEOUS
Status: DISCONTINUED | OUTPATIENT
Start: 2022-03-28 | End: 2022-03-28 | Stop reason: HOSPADM

## 2022-03-28 RX ORDER — ACETAMINOPHEN 325 MG/1
650 TABLET ORAL EVERY 6 HOURS PRN
Status: DISCONTINUED | OUTPATIENT
Start: 2022-03-28 | End: 2022-04-01

## 2022-03-28 RX ORDER — HYDROMORPHONE HYDROCHLORIDE 1 MG/ML
0.5 INJECTION, SOLUTION INTRAMUSCULAR; INTRAVENOUS; SUBCUTANEOUS
Status: DISCONTINUED | OUTPATIENT
Start: 2022-03-28 | End: 2022-03-31

## 2022-03-28 RX ORDER — LIDOCAINE HYDROCHLORIDE 20 MG/ML
SOLUTION OROPHARYNGEAL
Status: DISPENSED
Start: 2022-03-28 | End: 2022-03-28

## 2022-03-28 RX ORDER — FLUMAZENIL 0.1 MG/ML
0.2 INJECTION, SOLUTION INTRAVENOUS
Status: DISCONTINUED | OUTPATIENT
Start: 2022-03-28 | End: 2022-03-28 | Stop reason: HOSPADM

## 2022-03-28 RX ORDER — CEFAZOLIN SODIUM 2 G/100ML
2 INJECTION, SOLUTION INTRAVENOUS EVERY 8 HOURS
Status: DISCONTINUED | OUTPATIENT
Start: 2022-03-29 | End: 2022-04-07

## 2022-03-28 RX ORDER — GLYCOPYRROLATE 0.2 MG/ML
0.1 INJECTION, SOLUTION INTRAMUSCULAR; INTRAVENOUS ONCE
Status: COMPLETED | OUTPATIENT
Start: 2022-03-28 | End: 2022-03-28

## 2022-03-28 RX ORDER — LIDOCAINE 50 MG/G
OINTMENT TOPICAL ONCE
Status: DISCONTINUED | OUTPATIENT
Start: 2022-03-28 | End: 2022-03-28 | Stop reason: HOSPADM

## 2022-03-28 RX ORDER — PROCHLORPERAZINE MALEATE 5 MG
10 TABLET ORAL EVERY 6 HOURS PRN
Status: DISCONTINUED | OUTPATIENT
Start: 2022-03-28 | End: 2022-04-01

## 2022-03-28 RX ORDER — SODIUM CHLORIDE 9 MG/ML
INJECTION, SOLUTION INTRAVENOUS CONTINUOUS PRN
Status: DISCONTINUED | OUTPATIENT
Start: 2022-03-28 | End: 2022-03-28 | Stop reason: HOSPADM

## 2022-03-28 RX ORDER — FENTANYL CITRATE 50 UG/ML
50 INJECTION, SOLUTION INTRAMUSCULAR; INTRAVENOUS ONCE
Status: COMPLETED | OUTPATIENT
Start: 2022-03-28 | End: 2022-03-28

## 2022-03-28 RX ORDER — LANOLIN ALCOHOL/MO/W.PET/CERES
3 CREAM (GRAM) TOPICAL
Status: DISCONTINUED | OUTPATIENT
Start: 2022-03-28 | End: 2022-04-18 | Stop reason: HOSPADM

## 2022-03-28 RX ORDER — PROCHLORPERAZINE 25 MG
25 SUPPOSITORY, RECTAL RECTAL EVERY 12 HOURS PRN
Status: DISCONTINUED | OUTPATIENT
Start: 2022-03-28 | End: 2022-04-01

## 2022-03-28 RX ORDER — LIDOCAINE 40 MG/G
CREAM TOPICAL
Status: DISCONTINUED | OUTPATIENT
Start: 2022-03-28 | End: 2022-03-28 | Stop reason: HOSPADM

## 2022-03-28 RX ORDER — POLYETHYLENE GLYCOL 3350 17 G/17G
17 POWDER, FOR SOLUTION ORAL DAILY PRN
Status: DISCONTINUED | OUTPATIENT
Start: 2022-03-28 | End: 2022-04-01

## 2022-03-28 RX ORDER — LACTOBACILLUS RHAMNOSUS GG 10B CELL
1 CAPSULE ORAL 2 TIMES DAILY
Status: DISCONTINUED | OUTPATIENT
Start: 2022-03-28 | End: 2022-03-28 | Stop reason: HOSPADM

## 2022-03-28 RX ORDER — LIDOCAINE HYDROCHLORIDE 40 MG/ML
1.5 SOLUTION TOPICAL ONCE
Status: DISCONTINUED | OUTPATIENT
Start: 2022-03-28 | End: 2022-03-28 | Stop reason: HOSPADM

## 2022-03-28 RX ORDER — FLUMAZENIL 0.1 MG/ML
INJECTION, SOLUTION INTRAVENOUS
Status: DISCONTINUED
Start: 2022-03-28 | End: 2022-03-28 | Stop reason: WASHOUT

## 2022-03-28 RX ORDER — NALOXONE HYDROCHLORIDE 0.4 MG/ML
INJECTION, SOLUTION INTRAMUSCULAR; INTRAVENOUS; SUBCUTANEOUS
Status: DISCONTINUED
Start: 2022-03-28 | End: 2022-03-28 | Stop reason: WASHOUT

## 2022-03-28 RX ORDER — NALOXONE HYDROCHLORIDE 0.4 MG/ML
0.4 INJECTION, SOLUTION INTRAMUSCULAR; INTRAVENOUS; SUBCUTANEOUS
Status: DISCONTINUED | OUTPATIENT
Start: 2022-03-28 | End: 2022-03-28 | Stop reason: HOSPADM

## 2022-03-28 RX ORDER — BISACODYL 10 MG
10 SUPPOSITORY, RECTAL RECTAL DAILY PRN
Status: DISCONTINUED | OUTPATIENT
Start: 2022-03-28 | End: 2022-04-01

## 2022-03-28 RX ORDER — LIDOCAINE 40 MG/G
CREAM TOPICAL
Status: DISCONTINUED | OUTPATIENT
Start: 2022-03-28 | End: 2022-04-01

## 2022-03-28 RX ORDER — HYDROMORPHONE HYDROCHLORIDE 2 MG/1
4 TABLET ORAL EVERY 4 HOURS PRN
Status: DISCONTINUED | OUTPATIENT
Start: 2022-03-28 | End: 2022-03-29

## 2022-03-28 RX ORDER — LIDOCAINE HYDROCHLORIDE 20 MG/ML
15 SOLUTION OROPHARYNGEAL ONCE
Status: COMPLETED | OUTPATIENT
Start: 2022-03-28 | End: 2022-03-28

## 2022-03-28 RX ORDER — ACETAMINOPHEN 650 MG/1
650 SUPPOSITORY RECTAL EVERY 6 HOURS PRN
Status: DISCONTINUED | OUTPATIENT
Start: 2022-03-28 | End: 2022-04-01

## 2022-03-28 RX ADMIN — FENTANYL CITRATE 25 MCG: 50 INJECTION INTRAMUSCULAR; INTRAVENOUS at 08:33

## 2022-03-28 RX ADMIN — ACETAMINOPHEN 650 MG: 325 TABLET, FILM COATED ORAL at 09:53

## 2022-03-28 RX ADMIN — IBUPROFEN 600 MG: 600 TABLET, FILM COATED ORAL at 18:33

## 2022-03-28 RX ADMIN — TOPICAL ANESTHETIC 1 ML: 200 SPRAY DENTAL; PERIODONTAL at 08:12

## 2022-03-28 RX ADMIN — IBUPROFEN 600 MG: 600 TABLET, FILM COATED ORAL at 12:19

## 2022-03-28 RX ADMIN — MIDAZOLAM 1 MG: 1 INJECTION INTRAMUSCULAR; INTRAVENOUS at 08:33

## 2022-03-28 RX ADMIN — CEFAZOLIN SODIUM 2 G: 2 INJECTION, SOLUTION INTRAVENOUS at 09:53

## 2022-03-28 RX ADMIN — MIDAZOLAM 1 MG: 1 INJECTION INTRAMUSCULAR; INTRAVENOUS at 08:36

## 2022-03-28 RX ADMIN — Medication 1 CAPSULE: at 19:41

## 2022-03-28 RX ADMIN — FUROSEMIDE 20 MG: 20 TABLET ORAL at 09:53

## 2022-03-28 RX ADMIN — CEFAZOLIN SODIUM 2 G: 2 INJECTION, SOLUTION INTRAVENOUS at 18:33

## 2022-03-28 RX ADMIN — MIDAZOLAM 2 MG: 1 INJECTION INTRAMUSCULAR; INTRAVENOUS at 08:30

## 2022-03-28 RX ADMIN — FENTANYL CITRATE 50 MCG: 50 INJECTION INTRAMUSCULAR; INTRAVENOUS at 08:31

## 2022-03-28 RX ADMIN — ACETAMINOPHEN 650 MG: 325 TABLET, FILM COATED ORAL at 21:53

## 2022-03-28 RX ADMIN — LEVOTHYROXINE SODIUM 125 MCG: 125 TABLET ORAL at 09:53

## 2022-03-28 RX ADMIN — ACETAMINOPHEN 650 MG: 325 TABLET, FILM COATED ORAL at 15:35

## 2022-03-28 RX ADMIN — BUPROPION HYDROCHLORIDE 300 MG: 150 TABLET, FILM COATED, EXTENDED RELEASE ORAL at 09:53

## 2022-03-28 RX ADMIN — Medication 1 CAPSULE: at 10:44

## 2022-03-28 RX ADMIN — CEFAZOLIN SODIUM 2 G: 2 INJECTION, SOLUTION INTRAVENOUS at 02:01

## 2022-03-28 RX ADMIN — GLYCOPYRROLATE 0.1 MG: 0.2 INJECTION, SOLUTION INTRAMUSCULAR; INTRAVENOUS at 08:09

## 2022-03-28 RX ADMIN — LIDOCAINE HYDROCHLORIDE 30 ML: 20 SOLUTION ORAL; TOPICAL at 08:08

## 2022-03-28 RX ADMIN — FENTANYL CITRATE 25 MCG: 50 INJECTION INTRAMUSCULAR; INTRAVENOUS at 08:36

## 2022-03-28 RX ADMIN — LOSARTAN POTASSIUM 50 MG: 50 TABLET, FILM COATED ORAL at 09:53

## 2022-03-28 ASSESSMENT — ACTIVITIES OF DAILY LIVING (ADL)
ADLS_ACUITY_SCORE: 9

## 2022-03-28 NOTE — PROGRESS NOTES
[facility]  Infectious Disease Progress Note          Assessment and Plan:   IMPRESSION:     1.  A 36-year-old male with 1 week febrile illness, cause now immediately apparent with multiple blood cultures growing sensitive Staphylococcus aureus, he has Staph aureus bacteremia syndrome, probably right groin is source and concern for back is secondary involved site.  2.  Severe low back pain, new and acute problem, coincident with this infection raises concern about secondary diskitis.  3.  Right groin boil-like area, resolving on its own, but likely primary source for this bacteremia.  4.  Pulmonary and urine findings are likely insignificant, not the source of this nor involved areas -- nonspecific sepsis related findings.  5.  Prior history in 2012 of a very unusual severe cryptococcal lung infection with prolonged treatment needed.  This is fully resolved.  Never had any other signs of underlying immunosuppression, AIDS or other underlying conditions and has had no other opportunistic infections. Fully resolved with no chronic lung problems.  6.  In 2005, Staph aureus bacteremia and skin boil requiring prolonged treatment.  7.  Diabetes mellitus, control level poor recently.  8.  VANCOMYCIN AND CLINDAMYCIN LISTED ALLERGIES WITH VANCOMYCIN UNLIKELY TO BE REAL AND HAS BEEN RECHALLENGED.     RECOMMENDATIONS:     1.  Continue  Ancef   2.  Serial daily blood cultures , all rapidly +thru 3/27  3.  We will need prolonged treatment, but no long line until clear.  4.  Follow for secondary sites and already has an obvious concerning site, which is his back,  MRI  Spine neg , may still be early discitis but ABX alone likely OK.  5. With  blood cultures  not clearing,  transesophageal echocardiogram, defines the situation major veg and valve issue    6.  Discussed in great detail with the patient  And wife, to [facility] will follow there    7 despite the 2 prior unusual infections several years apart and now  "his third episode,  nothing really to suggest immunosuppression or long-term risks above normal.        Interval History:   no new complaints but still fever and back pain no cp, sob, n/v/d, or abd pain. No new pain site or issue on exam  BC all + TEJINDER as noted              Medications:       benzocaine 20%         buPROPion  300 mg Oral QAM     ceFAZolin  2 g Intravenous Q8H     fentaNYL (PF)         furosemide  20 mg Oral Daily     glycopyrrolate         insulin aspart  1-10 Units Subcutaneous TID AC     insulin aspart  1-7 Units Subcutaneous At Bedtime     insulin aspart   Subcutaneous TID AC     insulin glargine  25 Units Subcutaneous BID     lactobacillus rhamnosus (GG)  1 capsule Oral BID     levothyroxine  125 mcg Oral Once per day on Mon Tue Wed Thu Fri Sat     levothyroxine  250 mcg Oral Once per day on Sun     lidocaine (viscous)         lidocaine   Topical Once    Or     lidocaine  1.5 mL Topical Once     losartan  50 mg Oral Daily     midazolam         sodium chloride (PF)  3 mL Intracatheter Q8H     sodium chloride (PF)  3 mL Intracatheter Q8H                  Physical Exam:   Blood pressure 129/76, pulse 109, temperature 97.8  F (36.6  C), temperature source Temporal, resp. rate 24, height 1.778 m (5' 10\"), weight 121.1 kg (267 lb), SpO2 94 %.  Wt Readings from Last 2 Encounters:   03/28/22 121.1 kg (267 lb)   01/01/13 113.4 kg (250 lb)     Vital Signs with Ranges  Temp:  [97.8  F (36.6  C)-99.5  F (37.5  C)] 97.8  F (36.6  C)  Pulse:  [104-118] 109  Resp:  [18-28] 24  BP: (120-162)/(76-94) 129/76  SpO2:  [93 %-97 %] 94 %    Constitutional: Awake, alert, cooperative, no apparent distress rigors now   Lungs: Clear to auscultation bilaterally, no crackles or wheezing   Cardiovascular: Regular rate and rhythm, normal S1 and S2, and no murmur noted   Abdomen: Normal bowel sounds, soft, non-distended, non-tender   Skin: No rashes, no cyanosis, no edema   Other:           Data:   All microbiology laboratory " data reviewed.  Recent Labs   Lab Test 03/28/22  0647 03/27/22  0652 03/26/22  0740   WBC 14.1* 12.5* 15.3*   HGB 10.8* 10.9* 12.2*   HCT 34.2* 34.1* 37.8*   MCV 91 90 89    152 124*     Recent Labs   Lab Test 03/28/22  0647 03/27/22  0652 03/26/22  0740   CR 0.82 0.88 0.81     No lab results found.  No lab results found.    Invalid input(s): UC

## 2022-03-28 NOTE — PLAN OF CARE
Problem: Plan of Care - These are the overarching goals to be used throughout the patient stay.    Goal: Optimal Comfort and Wellbeing  Outcome: Ongoing, Progressing     Problem: Fluid Imbalance (Pneumonia)  Goal: Fluid Balance  Outcome: Ongoing, Progressing     Problem: Infection (Pneumonia)  Goal: Resolution of Infection Signs and Symptoms  Outcome: Ongoing, Progressing     Problem: Respiratory Compromise (Pneumonia)  Goal: Effective Oxygenation and Ventilation  Intervention: Optimize Oxygenation and Ventilation  Recent Flowsheet Documentation  Taken 3/28/2022 0000 by Miguel Sotelo RN  Head of Bed (HOB) Positioning: HOB at 20-30 degrees  Taken 3/27/2022 1954 by Miguel Sotelo RN  Head of Bed (HOB) Positioning: HOB at 20-30 degrees   Goal Outcome Evaluation:    Pt is alert and oriented x 4. He ambulated in room and hallway this shift, he is assist of 1 with gait belt and walker. He remains on IV antibiotics therapy, otherwise he is saline locked. Lungs sound clear, he denied SOB. PT is NPO at Midnight d/t TEJINDER schedule for AM. He is on K protocol--RN managed. VSS

## 2022-03-28 NOTE — CONSULTS
[facility]    Cardiology Consultation     Date of Admission:  3/22/2022    Primary Care Physician   Amanda Dawkisn     Consult Date: 03/28/2022    REASON FOR CONSULTATION:  Bacterial endocarditis.    IMPRESSION:  This is a pleasant 36-year-old gentleman with uncontrolled diabetes mellitus admitted with Staphylococcus aureus sepsis with persistently positive blood cultures.  His transesophageal echocardiogram was personally reviewed with Dr. Tate.  He has multiple large vegetations involving the tricuspid valve, as well as right ventricular endothelium.  The largest one is at least 2.5 cm.  As such, I think he will need open heart surgery to have these removed.  His QTc is prolonged and may be due to hypocalcemia    Currently, he has no evidence of CHF.   I recommend transfer to [facility] with consultation with our CV surgeons.  I discussed my recommendations with him and his wife and they are agreeable.  I have also discussed it with Dr. Bright, who has kindly agreed to arrange to transfer.     HISTORY OF PRESENT ILLNESS:  Harris 36-year-old gentleman who works for [employer].  No previous cardiac history.  No history of intravenous drug use.  No history of illicit drug abuse, tobacco abuse or alcohol abuse.  He has been diabetic on and off for the past 10 years or so.  More recently, he has stopped taking his Victoza, per his wife, and his glucoses have been as high as 460.  He was admitted several days ago with very profound weakness.  A few days prior to that, he had fevers at home.  Two weeks prior to that, he had hematuria, but he did not seek any medical attention.  He denies chest pains, worsening shortness of breath, or GI disturbances.  Last in hospital, his blood cultures came back as positive for Staph aureus.  A transesophageal echocardiogram was performed by Dr. Erasto Tate with the results as stated above.  Fortunately, no other vegetations are seen on his other cardiac  valves.  His wife thinks his illness may have started off with a small boil in the inner aspect of his right thigh.      Past Medical History   I have reviewed this patient's medical history and updated it with pertinent information if needed.   Past Medical History:   Diagnosis Date     Pneumonia        Past Surgical History   I have reviewed this patient's surgical history and updated it with pertinent information if needed.  No past surgical history on file.    Prior to Admission Medications   Prior to Admission Medications   Prescriptions Last Dose Informant Patient Reported? Taking?   acetaminophen (TYLENOL) 500 MG tablet 3/21/2022 at PM  Yes Yes   Sig: Take 1,000 mg by mouth every 6 hours as needed for mild pain   buPROPion (WELLBUTRIN XL) 300 MG 24 hr tablet 3/21/2022 at AM  Yes Yes   Sig: Take 300 mg by mouth every morning   ciprofloxacin (CIPRO) 750 MG tablet 3/22/2022 at 1000  Yes Yes   Sig: Take 750 mg by mouth 2 times daily For 10 days (3/21-3/31).   dextromethorphan (TUSSIN COUGH) 15 MG/5ML syrup 3/18/2022 at AM  Yes Yes   Sig: Take 10 mLs by mouth 4 times daily as needed for cough   ibuprofen (ADVIL/MOTRIN) 200 MG tablet 3/21/2022 at PM  Yes Yes   Sig: Take 600 mg by mouth every 6 hours as needed for mild pain   levothyroxine (SYNTHROID/LEVOTHROID) 125 MCG tablet 3/21/2022 at AM  Yes Yes   Sig: Take 125 mcg by mouth six times a week Monday thru Saturday   levothyroxine (SYNTHROID/LEVOTHROID) 125 MCG tablet   Yes Yes   Sig: Take 250 mcg by mouth once a week On Sunday   liraglutide (VICTOZA) 18 MG/3ML solution 3/21/2022 at AM  Yes Yes   Sig: Inject 1.8 mg Subcutaneous daily   losartan (COZAAR) 50 MG tablet 3/21/2022 at AM  Yes Yes   Sig: Take 50 mg by mouth daily   metFORMIN (GLUCOPHAGE) 1000 MG tablet 3/21/2022 at PM  Yes Yes   Sig: Take 1,000 mg by mouth 2 times daily (with meals)      Facility-Administered Medications: None     Allergies   Allergies   Allergen Reactions     Clindamycin      Vancomycin  "     \"Red [place]onia Syndrome\"       Social History   I have reviewed this patient's social history and updated it with pertinent information if needed. Anil Montoya      Family History   I have reviewed this patient's family history and updated it with pertinent information if needed.   No family history on file.    Review of Systems   The 10 point Review of Systems is negative other than noted in the HPI or here.     Physical Exam   Temp: 97.8  F (36.6  C) Temp src: Temporal BP: 129/76 Pulse: 109   Resp: 24 SpO2: 94 % O2 Device: None (Room air) Oxygen Delivery: 2 LPM  Vital Signs with Ranges  Temp:  [97.8  F (36.6  C)-99.5  F (37.5  C)] 97.8  F (36.6  C)  Pulse:  [104-118] 109  Resp:  [18-28] 24  BP: (120-162)/(76-94) 129/76  SpO2:  [93 %-97 %] 94 %  267 lbs 0 oz    GENERAL:  Pleasant gentleman, in no acute distress.  VITAL SIGNS:  Temperature is 97.8.  SKIN:  No clubbing or cutaneous signs of bacterial endocarditis.  HEENT:  Mucous membranes appear a little pale.  NECK:  JVP not elevated.  No thyromegaly.  CARDIAC:  [place] not palpable.  Heart sounds are unremarkable.  CHEST:  Symmetrical expansion without the use of accessory muscles.  LUNGS:  Breath sounds are normal.  ABDOMEN:  Soft and nontender.  No hepatosplenomegaly.  The abdominal aorta is not palpable.  EXTREMITIES:  No significant peripheral edema.  Foot pulses are preserved and intact.  CENTRAL NERVOUS SYSTEM:  Grossly intact.  PSYCHIATRIC:  Mood appears normal.    LABORATORY INVESTIGATIONS:  Hemoglobin 10.8 with mildly decreased MCV.  Electrolytes within normal limits.  Glucose of 262.  Chest x-ray was reported as negative.  MRI of the lumbar spine showed mild multilevel lumbar spondylosis.  Ultrasound of the abdomen shows fatty liver, but no gallstones.  Blood cultures positive for Staph aureus.      Data   Results for orders placed or performed during the hospital encounter of 03/22/22 (from the past 24 hour(s))   Glucose by meter "   Result Value Ref Range    GLUCOSE BY METER POCT 303 (H) 70 - 99 mg/dL   Glucose by meter   Result Value Ref Range    GLUCOSE BY METER POCT 310 (H) 70 - 99 mg/dL   Glucose by meter   Result Value Ref Range    GLUCOSE BY METER POCT 264 (H) 70 - 99 mg/dL   Basic metabolic panel   Result Value Ref Range    Sodium 134 133 - 144 mmol/L    Potassium 4.3 3.4 - 5.3 mmol/L    Chloride 103 94 - 109 mmol/L    Carbon Dioxide (CO2) 27 20 - 32 mmol/L    Anion Gap 4 3 - 14 mmol/L    Urea Nitrogen 19 7 - 30 mg/dL    Creatinine 0.82 0.66 - 1.25 mg/dL    Calcium 8.1 (L) 8.5 - 10.1 mg/dL    Glucose 291 (H) 70 - 99 mg/dL    GFR Estimate >90 >60 mL/min/1.73m2   CBC with platelets   Result Value Ref Range    WBC Count 14.1 (H) 4.0 - 11.0 10e3/uL    RBC Count 3.78 (L) 4.40 - 5.90 10e6/uL    Hemoglobin 10.8 (L) 13.3 - 17.7 g/dL    Hematocrit 34.2 (L) 40.0 - 53.0 %    MCV 91 78 - 100 fL    MCH 28.6 26.5 - 33.0 pg    MCHC 31.6 31.5 - 36.5 g/dL    RDW 13.9 10.0 - 15.0 %    Platelet Count 239 150 - 450 10e3/uL   Echocardiogram TEJINDER   Result Value Ref Range    LVEF  55-60%     [facility]    [id number]  [id number]  [id number]^UZMA^LYDIA     [facility]  Echocardiography Laboratory  [address] [place] [address]     Name: DARION OROPEZA  MRN: [id number]  : 1985  Study Date: 2022 08:00 AM  Age: 36 yrs  Gender: Male  Patient Location: [facility]  Reason For Study: Endocarditis  Ordering Physician: LYDIA GUSMAN  Performed By: Jazzy Hui     BSA: 2.4 m2  Height: 70 in  Weight: 267 lb  HR: 111  ______________________________________________________________________________  Procedure  Complete TEJINDER Adult. TEJINDER Probe serial #[id number] (R) was used during the  procedure. The heart rate, respiratory rate and response to care were  monitored throughout the procedure with the assistance of the nurse.  ______________________________________________________________________________  Interpretation  Summary     Tricuspid valve leaflets are thickened, and the atrial aspect of the septal  leaflet is irregularly bordered with small mobile filamentous elements (0.7cm  long). There is a large fixed heterogeneous mass in the right ventricle  attached to the interventricular septum (2.5 x 2cm), appears to be connected  to the tricuspid subvalvular apparatus, with a pedunculated round mobile  element (1.8 x 1cm). There is no significant tricuspid regurgitation.  Differential includes endocarditis involving the tricuspid valve with  extension of a large vegetation into the RV with associated thrombus, or  possibly endocarditis and a separate RV tumor with associated thrombus.  This mobile RVOT component does not appear to interfere with the pulmonary  valve apparatus, normal PV function on Doppler interrogation.     Left ventricular systolic function is normal. The visual ejection fraction is  55-60%.  Right ventricle is normal in structure, function and size.  A contrast injection (Bubble Study) was performed that was negative for flow  across the interatrial septum.  The left atrial appendage was well visualized and free of thrombus.  No evidence of vegetations involving the mitral valve aortic valve, pulmonic  valve.  ______________________________________________________________________________  TEJINDER  Normal transesophageal echocardiogram. I determined this patient to be an  appropriate candidate for the planned sedation and procedure and have  reassessed the patient immediately prior to sedation and procedure. Total  sedation time: 12 mins minutes of continuous bedside 1:1 monitoring. Versed  (4mg) was given intravenously. Fentanyl (100mcg) was given intravenously.  Consent to the procedure was obtained prior to sedation. Prior to the exam,  the oral cavity was checked and no overcrowding was noted. The transesophageal  probe was passed without difficulty. There were no complications associated  with this  procedure.     Left Ventricle  The left ventricle is normal in size. Left ventricular systolic function is  normal. The visual ejection fraction is 55-60%.     Right Ventricle  The right ventricle is normal in structure, function and size.     Atria  Normal left atrial size. Right atrial size is normal. Intact atrial septum. A  contrast injection (Bubble Study) was performed that was negative for flow  across the interatrial septum. The left atrial appendage was well visualized  and free of thrombus.     Mitral Valve  The mitral valve is normal in structure and function. No evidence of  vegetations seen.     Tricuspid Valve  Tricuspid valve leaflets are thickened, and the atrial aspect of the septal  leaflet is irregularly bordered with small mobile filamentous elements (0.7cm  long). There is a large fixed heterogeneous mass in the right ventricle  attached to the interventricular septum (2.5 x 2cm), appears to be connected  to the tricuspid subvalvular apparatus, with a pedunculated round mobile  element (1.8 x 1cm). There is no significant tricuspid regurgitation.  Differential includes endocarditis involving the tricuspid valve with  extension of a large vegetation into the RV with associated thrombus, or  possibly endocarditis and a separate RV tumor with associated thrombus.  This mobile RVOT component does not appear to interfere with the pulmonary  valve apparatus, normal PV function on Doppler interrogation. Vegetation on  tricuspid valve leaflet(s).     Aortic Valve  The aortic valve is normal in structure and function.     Pulmonic Valve  The pulmonic valve is normal in structure and function. No evidence of  vegetations seen.     Vessels  The aortic root is normal size. Inferior vena cava not well visualized for  estimation of right atrial pressure.     Pericardial/Pleural  There is no pericardial effusion.  ______________________________________________________________________________  Report approved  by: Sourav Steel 2022 09:51 AM     ______________________________________________________________________________      Glucose by meter   Result Value Ref Range    GLUCOSE BY METER POCT 262 (H) 70 - 99 mg/dL           Raghavendra Dorsey MD, [facility]        D: 2022   T: 2022   MT: CURT    Name:     DARION OROPEZAJulia  MRN:      [id number]        Account:      [id number]   :      1985           Consult Date: 2022     Document: [id number]

## 2022-03-28 NOTE — PROVIDER NOTIFICATION
Hospitalist web paged  FYI--Pts blood culture from yesterday 3/27 came back as gram positive cocci, growing in clusters

## 2022-03-28 NOTE — SEDATION DOCUMENTATION
TEJINDER done with Mod sedation-4mg Versed and 100mcg of fentanyl- pt monitored throughout Procedure-  Findings reported to Pt/ Pt's family and Hospital MD's per Dr Tate.

## 2022-03-28 NOTE — PROGRESS NOTES
[facility]  Hospitalist Progress Note  Justo Bright, DO 03/28/22       Reason for Stay (Diagnosis): MSSA bacteremia         Assessment and Plan:      Summary of Stay:Anil Montoya is a 36 year old obese male with a past medical history of cryptococcal pneumonia in 2012, diabetes mellitus, hypothyroidism and hypertension admitted on 3/22/2022 with generalized weakness.    ER, pt afebrile, tachycardic and tachyneic to low 20s.  Saturating well on RA.  CBC without leukocytosis but with thrombocytopenia.  Timothy BMP showed bicarb of 22.  CRP elevated at 393.  Lactic acid elevated at 2.3 but improved to 1.7 with IV fluids.  Underwent CT abdomen pelvis that showed patchy consolidation of the left lung base suggestive for pneumonia along with bilateral nonobstructing intrarenal stones.  The patient was admitted on Vanco/Zosyn given sepsis.    Ultimately, the patient's blood cultures resulted positive for MSSA.  These have been persistently positive for 3 to 4 days now.  TEJINDER notable for large TV vegetation consistent with infective endocarditis.  Infectious disease have been consulted and recommend ongoing treatment with IV Ancef.  Cardiology recommend transfer to [facility] for CV surgery evaluation.     Patient will transfer to [facility] once bed available. Signed out to accepting hospitalist, Dr. Orozco.    Problem List/Assessment and Plan:   TV infective endocarditis  Severe sepsis 2/2 MSSA bacteremia  So far 5 sets of blood cultures remaine positive showing MSSA.  Source of bacteremia is unclear but thought related to right groin boil which is now resolved on its own.  Suspect that patient has an increased risk due to uncontrolled diabetes.  TTE unremarkable. TEJINDER with evidence for large vegetation on TV valve.  Of note the patient had severe low back pain which is new and acute -initial imaging with MRI negative for osteo or discitis.   -Infectious disease following, appreciate recommendations   -IV  "Ancef, will need prolonged course  -I discussed the case with cardiology today 3/28  -Daily blood cultures until negative x48 hours     Uncontrolled DM2:   A1c. Poorly controlled. Patient stopped taking Victoza and metformin prior to admission for unclear reasons. Has never required insulin per patient and spouse.   -Increase Lantus to 25u BID  -HD sliding scale insulin  -2 units NovoLog per 15 g carb  -Hold PTA Metformin     Thrombocytopenia, resolved:   Suspect secondary to sepsis as above.  Improving.  No evidence of bleeding.   -Daily CBC     Elevation of LFT's:   Suspect secondary to severe sepsis.  CT A/P without major abnormality.  US without biliary dilation.  Showed fatty liver.     Hypothyroidism: Continue home synthroid  Anxiety/ADHD: Resume home meds  HTN: PTA losartan 50 mg daily       DVT Prophylaxis: Pneumatic Compression Devices  Code Status: Full Code  Discharge Dispo/Date: Will transfer to [facility] for CV surgery evaluation.         Interval History (Subjective):      Patient feels okay today.  He does endorse some mild ongoing back pain and right-sided rib pain which she reports is usually treated by his chiropractor.  Otherwise denies any significant symptoms.    His wife was at bedside and had many questions which I answered to the best my ability.                  Physical Exam:      Last Vital Signs:  /76 (BP Location: Right arm)   Pulse 109   Temp 97.8  F (36.6  C) (Temporal)   Resp 24   Ht 1.778 m (5' 10\")   Wt 121.1 kg (267 lb)   SpO2 94%   BMI 38.31 kg/m      I/O last 3 completed shifts:  In: 1613 [P.O.:1610; I.V.:3]  Out: 2200 [Urine:2200]    General: Alert, awake, no acute distress.  HEENT: NC/AT, eyes anicteric, external occular movements intact, face symmetric.  Cardiac: Mild tachycardia, S1, S2.  No murmurs appreciated.  Pulmonary: Normal work of breathing.  Lungs CTAB.  Abdomen: soft, non-tender, non-distended.  Bowel sounds present.  No guarding.  Extremities: no " deformities.  Warm, well perfused. No obvious edema of the thighs. Trace edema of the BLE.  Skin: no rashes or lesions noted.  Warm and dry.  Neuro: No gross deficits noted.  Speech clear.    Psych: Appropriate affect.         Medications:      All current medications were reviewed with changes reflected in problem list.         Data:      All new lab and imaging data was reviewed.   Labs:       Lab Results   Component Value Date     03/26/2022     03/25/2022     03/24/2022     11/24/2017    Lab Results   Component Value Date    CHLORIDE 104 03/26/2022    CHLORIDE 103 03/25/2022    CHLORIDE 105 03/24/2022    CHLORIDE 104 11/24/2017    Lab Results   Component Value Date    BUN 20 03/26/2022    BUN 21 03/25/2022    BUN 20 03/24/2022    BUN 18 11/24/2017      Lab Results   Component Value Date    POTASSIUM 3.3 03/26/2022    POTASSIUM 3.8 03/25/2022    POTASSIUM 3.6 03/24/2022    POTASSIUM 3.8 11/24/2017    Lab Results   Component Value Date    CO2 27 03/26/2022    CO2 25 03/25/2022    CO2 23 03/24/2022    CO2 24 11/24/2017    Lab Results   Component Value Date    CR 0.81 03/26/2022    CR 0.81 03/25/2022    CR 0.78 03/24/2022    CR 1.07 11/24/2017        Recent Labs   Lab 03/28/22  0647   WBC 14.1*   HGB 10.8*   HCT 34.2*   MCV 91         Imaging:   Recent Results (from the past 48 hour(s))   MR Lumbar Spine w/o & w Contrast    Narrative    EXAM: MR LUMBAR SPINE W/O and W CONTRAST  LOCATION: [facility]  DATE/TIME: 3/24/2022 7:50 PM    INDICATION: Low back pain, infection suspected  COMPARISON: None.  CONTRAST: 12.5 mL Gadavist   TECHNIQUE: Routine Lumbar Spine MRI without and with IV contrast.    FINDINGS:   Nomenclature is based on 5 lumbar type vertebral bodies. Normal vertebral body heights, alignment and marrow signal. Normal distal spinal cord and cauda equina with conus medullaris at T12-L1. No extraspinal abnormality. Unremarkable visualized bony   pelvis. No  pathologic enhancement.    T12-L1: Normal disc height and signal. No herniation. Normal facets. No spinal canal or neural foraminal stenosis.     L1-L2: Normal disc height and signal. No herniation. Normal facets. No spinal canal or neural foraminal stenosis.    L2-L3: Normal disc height and signal. No herniation. Normal facets. No spinal canal or neural foraminal stenosis.     L3-L4: Normal disc height and signal. No herniation. Mild disc bulge. Mild facet arthropathy. No spinal canal or neural foraminal stenosis.    L4-L5: Mild loss of disc space height and signal. Tiny central disc protrusion. Mild disc bulge. Normal facets. No significant spinal canal or neural foraminal stenosis.    L5-S1: Mild loss of disc height and signal. No herniation. Mild disc bulge. Mild facet arthropathy. No spinal canal or neural foraminal stenosis. Prominent epidural fat incidentally noted L5-S2.      Impression    IMPRESSION:  1.  Mild multilevel lumbar spondylosis.  2.  No evidence of discitis or other infection. No abnormal enhancement following contrast administration.   Echocardiogram Complete   Result Value    LVEF  55-60%    [facility]    [id number]  [id number]  [id number]  [id number]^UZMA^LYDIA     [facility]  Echocardiography Laboratory  [address] [place] [address]     Name: DARION OROPEZA  MRN: [id number]  : 1985  Study Date: 2022 10:55 AM  Age: 36 yrs  Gender: Male  Patient Location: [facility]  Reason For Study: Abn EKG  Ordering Physician: LYDIA GUSMAN  Referring Physician: Amanda Dawkins  Performed By: Ngozi Meneses RDCS     BSA: 2.4 m2  Height: 70 in  Weight: 276 lb  BP: 152/90 mmHg  ______________________________________________________________________________  Procedure  Complete Portable Echo Adult. Optison (NDC #[id number]) given intravenously.  ______________________________________________________________________________  Interpretation Summary      Technically difficult imaging  Left ventricular systolic function is normal.  The visual ejection fraction is 55-60%.  The left ventricle is normal in size.  The right ventricle is normal in structure, function and size.  Doppler interrogation does not demonstrate significant stenosis or  insufficiency involving cardiac valevs.     NO old studies available for comparison  ______________________________________________________________________________  Left Ventricle  The left ventricle is normal in size. There is normal left ventricular wall  thickness. Left ventricular systolic function is normal. The visual ejection  fraction is 55-60%. Left ventricular diastolic function is indeterminate. No  regional wall motion abnormalities noted. There is no thrombus seen in the  left ventricle.     Right Ventricle  The right ventricle is normal in structure, function and size. There is no  mass or thrombus in the right ventricle.     Atria  Normal left atrial size. Right atrial size is normal. There is no atrial shunt  seen. The left atrial appendage is not well visualized.     Mitral Valve  The mitral valve leaflets appear normal. There is no evidence of stenosis,  fluttering, or prolapse. There is no mitral regurgitation noted. There is no  mitral valve stenosis.     Tricuspid Valve  Normal tricuspid valve. No tricuspid regurgitation. Right ventricular systolic  pressure could not be approximated due to inadequate tricuspid regurgitation.  There is no tricuspid stenosis.     Aortic Valve  The aortic valve is trileaflet. No aortic regurgitation is present. No aortic  stenosis is present.     Pulmonic Valve  The pulmonic valve is not well visualized. There is no pulmonic valvular  regurgitation. There is no pulmonic valvular stenosis.     Vessels  The aortic root is normal size. Normal size ascending aorta. Inferior vena  cava not well visualized for estimation of right atrial pressure. The  pulmonary artery is normal  size.     Pericardium  The pericardium appears normal. There is no pleural effusion.     Rhythm  Sinus rhythm was noted.  ______________________________________________________________________________  MMode/2D Measurements & Calculations     IVSd: 0.96 cm  LVIDd: 4.1 cm  LVIDs: 3.5 cm  LVPWd: 1.2 cm  FS: 14.2 %  LV mass(C)d: 143.6 grams  LV mass(C)dI: 60.0 grams/m2  Ao root diam: 3.9 cm  LA dimension: 3.5 cm  asc Aorta Diam: 3.4 cm  LA/Ao: 0.90  LVOT diam: 2.5 cm  LVOT area: 4.7 cm2  LA Volume (BP): 62.3 ml  LA Volume Index (BP): 26.1 ml/m2  RWT: 0.57     Doppler Measurements & Calculations  MV E max teresa: 67.9 cm/sec  MV A max teresa: 81.9 cm/sec  MV E/A: 0.83  MV dec time: 0.18 sec  Ao V2 max: 144.0 cm/sec  Ao max P.0 mmHg  PA acc time: 0.15 sec  E/E' av.7  Lateral E/e': 7.5  Medial E/e': 7.8     ______________________________________________________________________________  Report approved by: Dr. Kolby Ledezma 2022 11:57 AM             Justo Bright DO

## 2022-03-28 NOTE — PLAN OF CARE
"Goal Outcome Evaluation:    /76 (BP Location: Right arm)   Pulse 109   Temp 97.8  F (36.6  C) (Temporal)   Resp 24   Ht 1.778 m (5' 10\")   Wt 121.1 kg (267 lb)   SpO2 94%   BMI 38.31 kg/m        VSS. AO x4. Tylenol/Ibuprofen for back pain. Awaiting transfer to [facility] for further workup.                      "

## 2022-03-28 NOTE — PRE-PROCEDURE
GENERAL PRE-PROCEDURE:   Procedure:  Transesophageal echocardiogram   Date/Time:  3/28/2022 8:27 AM    Verbal consent obtained?: Yes    Written consent obtained?: Yes    Risks and benefits: Risks, benefits and alternatives were discussed    Consent given by:  Patient  Patient states understanding of procedure being performed: Yes    Patient's understanding of procedure matches consent: Yes    Procedure consent matches procedure scheduled: Yes    Expected level of sedation:  Moderate  Appropriately NPO:  Yes  Mallampati  :  Grade 3- soft palate visible, posterior pharyngeal wall not visible  Lungs:  Lungs clear with good breath sounds bilaterally  Heart:  RRR  History & Physical reviewed:  History and physical reviewed and no updates needed  Statement of review:  I have reviewed the lab findings, diagnostic data, medications, and the plan for sedation

## 2022-03-29 ENCOUNTER — APPOINTMENT (OUTPATIENT)
Dept: CT IMAGING | Facility: CLINIC | Age: 37
DRG: 853 | End: 2022-03-29
Attending: SURGERY
Payer: COMMERCIAL

## 2022-03-29 ENCOUNTER — PREP FOR PROCEDURE (OUTPATIENT)
Dept: CARDIOLOGY | Facility: CLINIC | Age: 37
End: 2022-03-29

## 2022-03-29 DIAGNOSIS — I38 ENDOCARDITIS: Primary | ICD-10-CM

## 2022-03-29 LAB
ABO/RH(D): NORMAL
ALBUMIN SERPL-MCNC: 1.8 G/DL (ref 3.4–5)
ALBUMIN UR-MCNC: NEGATIVE MG/DL
ALP SERPL-CCNC: 150 U/L (ref 40–150)
ALT SERPL W P-5'-P-CCNC: 172 U/L (ref 0–70)
ANION GAP SERPL CALCULATED.3IONS-SCNC: 8 MMOL/L (ref 3–14)
ANTIBODY SCREEN: NEGATIVE
APPEARANCE UR: CLEAR
AST SERPL W P-5'-P-CCNC: 115 U/L (ref 0–45)
BACTERIA BLD CULT: ABNORMAL
BILIRUB DIRECT SERPL-MCNC: 0.2 MG/DL (ref 0–0.2)
BILIRUB SERPL-MCNC: 0.5 MG/DL (ref 0.2–1.3)
BILIRUB UR QL STRIP: NEGATIVE
BUN SERPL-MCNC: 18 MG/DL (ref 7–30)
CALCIUM SERPL-MCNC: 8.6 MG/DL (ref 8.5–10.1)
CHLORIDE BLD-SCNC: 103 MMOL/L (ref 94–109)
CO2 SERPL-SCNC: 25 MMOL/L (ref 20–32)
COLOR UR AUTO: ABNORMAL
CREAT SERPL-MCNC: 0.78 MG/DL (ref 0.66–1.25)
ERYTHROCYTE [DISTWIDTH] IN BLOOD BY AUTOMATED COUNT: 14.1 % (ref 10–15)
GFR SERPL CREATININE-BSD FRML MDRD: >90 ML/MIN/1.73M2
GLUCOSE BLD-MCNC: 171 MG/DL (ref 70–99)
GLUCOSE BLDC GLUCOMTR-MCNC: 151 MG/DL (ref 70–99)
GLUCOSE BLDC GLUCOMTR-MCNC: 163 MG/DL (ref 70–99)
GLUCOSE BLDC GLUCOMTR-MCNC: 193 MG/DL (ref 70–99)
GLUCOSE BLDC GLUCOMTR-MCNC: 230 MG/DL (ref 70–99)
GLUCOSE BLDC GLUCOMTR-MCNC: 247 MG/DL (ref 70–99)
GLUCOSE BLDC GLUCOMTR-MCNC: 263 MG/DL (ref 70–99)
GLUCOSE UR STRIP-MCNC: 100 MG/DL
HCT VFR BLD AUTO: 33.8 % (ref 40–53)
HGB BLD-MCNC: 11.1 G/DL (ref 13.3–17.7)
HGB UR QL STRIP: NEGATIVE
KETONES UR STRIP-MCNC: NEGATIVE MG/DL
LEUKOCYTE ESTERASE UR QL STRIP: NEGATIVE
LVEF ECHO: NORMAL
MCH RBC QN AUTO: 29 PG (ref 26.5–33)
MCHC RBC AUTO-ENTMCNC: 32.8 G/DL (ref 31.5–36.5)
MCV RBC AUTO: 88 FL (ref 78–100)
MUCOUS THREADS #/AREA URNS LPF: PRESENT /LPF
NITRATE UR QL: NEGATIVE
PH UR STRIP: 6 [PH] (ref 5–7)
PLATELET # BLD AUTO: 287 10E3/UL (ref 150–450)
POTASSIUM BLD-SCNC: 4.3 MMOL/L (ref 3.4–5.3)
PROT SERPL-MCNC: 7.3 G/DL (ref 6.8–8.8)
RBC # BLD AUTO: 3.83 10E6/UL (ref 4.4–5.9)
RBC URINE: 1 /HPF
SODIUM SERPL-SCNC: 136 MMOL/L (ref 133–144)
SP GR UR STRIP: 1.02 (ref 1–1.03)
SPECIMEN EXPIRATION DATE: NORMAL
UROBILINOGEN UR STRIP-MCNC: NORMAL MG/DL
WBC # BLD AUTO: 16.2 10E3/UL (ref 4–11)
WBC URINE: 2 /HPF

## 2022-03-29 PROCEDURE — 36415 COLL VENOUS BLD VENIPUNCTURE: CPT | Performed by: INTERNAL MEDICINE

## 2022-03-29 PROCEDURE — 36415 COLL VENOUS BLD VENIPUNCTURE: CPT | Performed by: SURGERY

## 2022-03-29 PROCEDURE — 120N000001 HC R&B MED SURG/OB

## 2022-03-29 PROCEDURE — 250N000012 HC RX MED GY IP 250 OP 636 PS 637: Performed by: INTERNAL MEDICINE

## 2022-03-29 PROCEDURE — 99233 SBSQ HOSP IP/OBS HIGH 50: CPT | Performed by: INTERNAL MEDICINE

## 2022-03-29 PROCEDURE — 85018 HEMOGLOBIN: CPT | Performed by: INTERNAL MEDICINE

## 2022-03-29 PROCEDURE — 86850 RBC ANTIBODY SCREEN: CPT | Performed by: SURGERY

## 2022-03-29 PROCEDURE — 99222 1ST HOSP IP/OBS MODERATE 55: CPT | Performed by: SURGERY

## 2022-03-29 PROCEDURE — 94660 CPAP INITIATION&MGMT: CPT

## 2022-03-29 PROCEDURE — 86901 BLOOD TYPING SEROLOGIC RH(D): CPT | Performed by: SURGERY

## 2022-03-29 PROCEDURE — 70486 CT MAXILLOFACIAL W/O DYE: CPT

## 2022-03-29 PROCEDURE — 250N000013 HC RX MED GY IP 250 OP 250 PS 637: Performed by: INTERNAL MEDICINE

## 2022-03-29 PROCEDURE — 82248 BILIRUBIN DIRECT: CPT | Performed by: SURGERY

## 2022-03-29 PROCEDURE — 999N000157 HC STATISTIC RCP TIME EA 10 MIN

## 2022-03-29 PROCEDURE — 80053 COMPREHEN METABOLIC PANEL: CPT | Performed by: INTERNAL MEDICINE

## 2022-03-29 PROCEDURE — 81001 URINALYSIS AUTO W/SCOPE: CPT | Performed by: SURGERY

## 2022-03-29 PROCEDURE — 250N000011 HC RX IP 250 OP 636: Performed by: INTERNAL MEDICINE

## 2022-03-29 PROCEDURE — 87077 CULTURE AEROBIC IDENTIFY: CPT | Performed by: INTERNAL MEDICINE

## 2022-03-29 RX ORDER — LOSARTAN POTASSIUM 50 MG/1
50 TABLET ORAL DAILY
Status: DISCONTINUED | OUTPATIENT
Start: 2022-03-29 | End: 2022-04-01

## 2022-03-29 RX ORDER — OXYCODONE HYDROCHLORIDE 5 MG/1
5-10 TABLET ORAL EVERY 4 HOURS PRN
Status: DISCONTINUED | OUTPATIENT
Start: 2022-03-29 | End: 2022-04-01

## 2022-03-29 RX ORDER — HYDROCODONE BITARTRATE AND ACETAMINOPHEN 5; 325 MG/1; MG/1
1-2 TABLET ORAL EVERY 4 HOURS PRN
Status: DISCONTINUED | OUTPATIENT
Start: 2022-03-29 | End: 2022-03-29

## 2022-03-29 RX ORDER — DEXTROSE MONOHYDRATE 25 G/50ML
25-50 INJECTION, SOLUTION INTRAVENOUS
Status: DISCONTINUED | OUTPATIENT
Start: 2022-03-29 | End: 2022-04-01

## 2022-03-29 RX ORDER — LIDOCAINE 4 G/G
1 PATCH TOPICAL
Status: DISCONTINUED | OUTPATIENT
Start: 2022-03-29 | End: 2022-04-01

## 2022-03-29 RX ORDER — NICOTINE POLACRILEX 4 MG
15-30 LOZENGE BUCCAL
Status: DISCONTINUED | OUTPATIENT
Start: 2022-03-29 | End: 2022-04-01

## 2022-03-29 RX ORDER — LEVOTHYROXINE SODIUM 125 UG/1
125 TABLET ORAL
Status: DISCONTINUED | OUTPATIENT
Start: 2022-03-29 | End: 2022-04-07

## 2022-03-29 RX ORDER — BUPROPION HYDROCHLORIDE 150 MG/1
300 TABLET ORAL EVERY MORNING
Status: DISCONTINUED | OUTPATIENT
Start: 2022-03-29 | End: 2022-04-18 | Stop reason: HOSPADM

## 2022-03-29 RX ORDER — ACETAMINOPHEN 500 MG
1000 TABLET ORAL 3 TIMES DAILY
Status: DISCONTINUED | OUTPATIENT
Start: 2022-03-29 | End: 2022-04-01

## 2022-03-29 RX ADMIN — OXYCODONE HYDROCHLORIDE 10 MG: 5 TABLET ORAL at 13:21

## 2022-03-29 RX ADMIN — INSULIN ASPART 8 UNITS: 100 INJECTION, SOLUTION INTRAVENOUS; SUBCUTANEOUS at 08:05

## 2022-03-29 RX ADMIN — ACETAMINOPHEN 1000 MG: 500 TABLET, FILM COATED ORAL at 21:56

## 2022-03-29 RX ADMIN — CEFAZOLIN SODIUM 2 G: 2 INJECTION, SOLUTION INTRAVENOUS at 18:10

## 2022-03-29 RX ADMIN — LOSARTAN POTASSIUM 50 MG: 50 TABLET, FILM COATED ORAL at 14:06

## 2022-03-29 RX ADMIN — ACETAMINOPHEN 1000 MG: 500 TABLET, FILM COATED ORAL at 15:38

## 2022-03-29 RX ADMIN — CEFAZOLIN SODIUM 2 G: 2 INJECTION, SOLUTION INTRAVENOUS at 10:10

## 2022-03-29 RX ADMIN — INSULIN ASPART 6 UNITS: 100 INJECTION, SOLUTION INTRAVENOUS; SUBCUTANEOUS at 18:11

## 2022-03-29 RX ADMIN — CEFAZOLIN SODIUM 2 G: 2 INJECTION, SOLUTION INTRAVENOUS at 02:00

## 2022-03-29 RX ADMIN — ACETAMINOPHEN 650 MG: 325 TABLET ORAL at 08:04

## 2022-03-29 RX ADMIN — INSULIN ASPART 6 UNITS: 100 INJECTION, SOLUTION INTRAVENOUS; SUBCUTANEOUS at 12:07

## 2022-03-29 RX ADMIN — LEVOTHYROXINE SODIUM 125 MCG: 125 TABLET ORAL at 14:06

## 2022-03-29 RX ADMIN — HYDROMORPHONE HYDROCHLORIDE 4 MG: 2 TABLET ORAL at 00:35

## 2022-03-29 RX ADMIN — INSULIN GLARGINE 25 UNITS: 100 INJECTION, SOLUTION SUBCUTANEOUS at 08:04

## 2022-03-29 RX ADMIN — HYDROMORPHONE HYDROCHLORIDE 4 MG: 2 TABLET ORAL at 08:52

## 2022-03-29 RX ADMIN — BUPROPION HYDROCHLORIDE 300 MG: 300 TABLET, EXTENDED RELEASE ORAL at 14:06

## 2022-03-29 RX ADMIN — OXYCODONE HYDROCHLORIDE 10 MG: 5 TABLET ORAL at 18:09

## 2022-03-29 ASSESSMENT — ACTIVITIES OF DAILY LIVING (ADL)
ADLS_ACUITY_SCORE: 5
ADLS_ACUITY_SCORE: 7
ADLS_ACUITY_SCORE: 5
ADLS_ACUITY_SCORE: 5
ADLS_ACUITY_SCORE: 7
ADLS_ACUITY_SCORE: 5
ADLS_ACUITY_SCORE: 12
ADLS_ACUITY_SCORE: 5
ADLS_ACUITY_SCORE: 7
ADLS_ACUITY_SCORE: 5
ADLS_ACUITY_SCORE: 7
ADLS_ACUITY_SCORE: 12
ADLS_ACUITY_SCORE: 5
ADLS_ACUITY_SCORE: 7

## 2022-03-29 NOTE — DISCHARGE SUMMARY
[facility]  Discharge Summary  Name: Anil Montoya    MRN: [id number]  YOB: 1985    Age: 36 year old  Date of Discharge:  3/28/2022 10:00 PM  Date of Admission: 3/22/2022  Primary Care Provider: Amanda Dawkins  Discharge Physician:  Justo Bright DO  Discharging Service:  Hospitalist      Hospital Course  Summary of Stay:Anil Montoya is a 36 year old obese male with a past medical history of cryptococcal pneumonia in 2012, diabetes mellitus, hypothyroidism and hypertension admitted on 3/22/2022 with generalized weakness.     ER, pt afebrile, tachycardic and tachyneic to low 20s.  Saturating well on RA.  CBC without leukocytosis but with thrombocytopenia.  Timothy BMP showed bicarb of 22.  CRP elevated at 393.  Lactic acid elevated at 2.3 but improved to 1.7 with IV fluids.  Underwent CT abdomen pelvis that showed patchy consolidation of the left lung base suggestive for pneumonia along with bilateral nonobstructing intrarenal stones.  The patient was admitted on Vanco/Zosyn given sepsis.     Ultimately, the patient's blood cultures resulted positive for MSSA.  These have been persistently positive for 5-6 days now.  TEJINDER notable for large TV vegetation consistent with infective endocarditis.  Infectious disease have been consulted and recommend ongoing treatment with IV Ancef.  Cardiology recommend transfer to [facility] for CV surgery evaluation.      Patient will transfer to [facility] once bed available. Signed out to accepting hospitalist, Dr. Orozco.    Discharge Diagnoses:  TV infective endocarditis  Severe sepsis 2/2 MSSA bacteremia  So far 5 sets of blood cultures remaine positive showing MSSA.  Source of bacteremia is unclear but thought related to right groin boil which is now resolved on its own.  Suspect that patient has an increased risk due to uncontrolled diabetes.  TTE unremarkable. TEJINDER with evidence for large vegetation on TV valve.  Of note the  "patient had severe low back pain which is new and acute -initial imaging with MRI negative for osteo or discitis.   -Infectious disease following, appreciate recommendations               -IV Ancef, will need prolonged course  -I discussed the case with cardiology today 3/28  -Daily blood cultures until negative x48 hours     Uncontrolled DM2:   A1c. Poorly controlled. Patient stopped taking Victoza and metformin prior to admission for unclear reasons. Has never required insulin per patient and spouse.   -Increase Lantus to 25u BID  -HD sliding scale insulin  -2 units NovoLog per 15 g carb  -Hold PTA Metformin     Thrombocytopenia, resolved:   Suspect secondary to sepsis as above.  Improving.  No evidence of bleeding.   -Daily CBC     Elevation of LFT's:   Suspect secondary to severe sepsis.  CT A/P without major abnormality.  US without biliary dilation.  Showed fatty liver.     Hypothyroidism: Continue home synthroid  Anxiety/ADHD: Resume home meds  HTN: PTA losartan 50 mg daily    Discharge Disposition:  Transferred to [facility] for CV surgery evaluation.    Allergies:  Allergies   Allergen Reactions     Clindamycin      Vancomycin      \"Red Sonia Syndrome\"        Discharge Medications:        Review of your medicines      UNREVIEWED medicines. Ask your doctor about these medicines      Dose / Directions   acetaminophen 500 MG tablet  Commonly known as: TYLENOL      Dose: 1,000 mg  Take 1,000 mg by mouth every 6 hours as needed for mild pain  Refills: 0     buPROPion 300 MG 24 hr tablet  Commonly known as: WELLBUTRIN XL  Ask about: Which instructions should I use?      Dose: 300 mg  Take 300 mg by mouth every morning  Refills: 0     ciprofloxacin 750 MG tablet  Commonly known as: CIPRO      Dose: 750 mg  Take 750 mg by mouth 2 times daily For 10 days (3/21-3/31).  Refills: 0     dextromethorphan 15 MG/5ML syrup  Commonly known as: TUSSIN COUGH      Dose: 10 mL  Take 10 mLs by mouth 4 times daily " "as needed for cough  Refills: 0     ibuprofen 200 MG tablet  Commonly known as: ADVIL/MOTRIN      Dose: 600 mg  Take 600 mg by mouth every 6 hours as needed for mild pain  Refills: 0     * levothyroxine 125 MCG tablet  Commonly known as: SYNTHROID/LEVOTHROID      Dose: 125 mcg  Take 125 mcg by mouth six times a week Monday thru Saturday  Refills: 0     * levothyroxine 125 MCG tablet  Commonly known as: SYNTHROID/LEVOTHROID      Dose: 250 mcg  Take 250 mcg by mouth once a week On Sunday  Refills: 0     liraglutide 18 MG/3ML solution  Commonly known as: VICTOZA      Dose: 1.8 mg  Inject 1.8 mg Subcutaneous daily  Refills: 0     losartan 50 MG tablet  Commonly known as: COZAAR      Dose: 50 mg  Take 50 mg by mouth daily  Refills: 0     metFORMIN 1000 MG tablet  Commonly known as: GLUCOPHAGE      Dose: 1,000 mg  Take 1,000 mg by mouth 2 times daily (with meals)  Refills: 0         * This list has 2 medication(s) that are the same as other medications prescribed for you. Read the directions carefully, and ask your doctor or other care provider to review them with you.                Condition on Discharge:  Discharge condition: Stable       Code status on discharge: Full Code     History of Illness:  See detailed admission note for full details.    Physical Exam:  Vital signs:  Temp: 97.2  F (36.2  C) Temp src: Temporal BP: 130/85 Pulse: 95   Resp: 22 SpO2: 96 % O2 Device: None (Room air) Oxygen Delivery: 2 LPM Height: 177.8 cm (5' 10\") Weight: 121.1 kg (267 lb)  Estimated body mass index is 38.31 kg/m  as calculated from the following:    Height as of this encounter: 1.778 m (5' 10\").    Weight as of this encounter: 121.1 kg (267 lb).    Wt Readings from Last 1 Encounters:   03/28/22 121.1 kg (267 lb)     General: Alert, awake, no acute distress.  HEENT: NC/AT, eyes anicteric, external occular movements intact, face symmetric.  Cardiac: Mild tachycardia, S1, S2.  No murmurs appreciated.  Pulmonary: Normal work of " breathing.  Lungs CTAB.  Abdomen: soft, non-tender, non-distended.  Bowel sounds present.  No guarding.  Extremities: no deformities.  Warm, well perfused. No obvious edema of the thighs. Trace edema of the BLE.  Skin: no rashes or lesions noted.  Warm and dry.  Neuro: No gross deficits noted.  Speech clear.    Psych: Appropriate affect.    Procedures other than Imaging:  TEJINDER     Imaging:  Recent Results (from the past 48 hour(s))   Echocardiogram TEJINDER   Result Value    LVEF  55-60%    Narrative    [id number]  [id number]  [id number]                                                                       Version 2     [facility]  Echocardiography Laboratory  [address] [place] [address]     Name: DARION OROPEZA  MRN: [id number]  : 1985  Study Date: 2022 08:00 AM  Age: 36 yrs  Gender: Male  Patient Location: [facility]  Reason For Study: Endocarditis  Ordering Physician: LYDIA GUSMAN  Performed By: Jazzy Hui     BSA: 2.4 m2  Height: 70 in  Weight: 267 lb  HR: 111  ______________________________________________________________________________  Procedure  Complete TEJINDER Adult. TEJINDER Probe serial #[id number] (R) was used during the  procedure. The heart rate, respiratory rate and response to care were  monitored throughout the procedure with the assistance of the nurse.  ______________________________________________________________________________  Interpretation Summary     Tricuspid valve leaflets are thickened, and the atrial aspect of the septal  leaflet is irregularly bordered with small mobile filamentous elements (0.7cm  long). There is a large fixed heterogeneous mass in the right ventricle  attached to the interventricular septum (2.5 x 2cm), appears to be connected  to the tricuspid subvalvular apparatus, with a pedunculated round mobile  element (1.8 x 1cm). There is no significant tricuspid regurgitation.  Differential includes  endocarditis involving the tricuspid valve with  extension of a large vegetation into the RV with associated thrombus, or  possibly endocarditis and a separate RV tumor with associated thrombus.  This mobile RVOT component does not appear to interfere with the pulmonary  valve apparatus, normal PV function on Doppler interrogation.     Aortic valve cusps are slightly thickened. There are very small filamentous  strands (0.2cm) on the aortic aspect of the valve on the 3-chamber view, which  may reflect benign fibrin, however cannot exclude early endocarditis. Aortic  valve function is normal.     Left ventricular systolic function is normal. The visual ejection fraction is  55-60%.  Right ventricle is normal in structure, function and size.  A contrast injection (Bubble Study) was performed that was negative for flow  across the interatrial septum.  The left atrial appendage was well visualized and free of thrombus.  No clear evidence of vegetations involving the mitral valve, pulmonic valve.     Addendum: Reviewed aortic valve as above, discussed with CT surgeon.     ______________________________________________________________________________  TEJINDER  Normal transesophageal echocardiogram. I determined this patient to be an  appropriate candidate for the planned sedation and procedure and have  reassessed the patient immediately prior to sedation and procedure. Total  sedation time: 12 mins minutes of continuous bedside 1:1 monitoring. Versed  (4mg) was given intravenously. Fentanyl (100mcg) was given intravenously.  Consent to the procedure was obtained prior to sedation. Prior to the exam,  the oral cavity was checked and no overcrowding was noted. The transesophageal  probe was passed without difficulty. There were no complications associated  with this procedure.     Left Ventricle  The left ventricle is normal in size. Left ventricular systolic function is  normal. The visual ejection fraction is 55-60%.      Right Ventricle  The right ventricle is normal in structure, function and size.     Atria  Normal left atrial size. Right atrial size is normal. Intact atrial septum. A  contrast injection (Bubble Study) was performed that was negative for flow  across the interatrial septum. The left atrial appendage was well visualized  and free of thrombus.     Mitral Valve  The mitral valve is normal in structure and function. No evidence of  vegetations seen.     Tricuspid Valve  Tricuspid valve leaflets are thickened, and the atrial aspect of the septal  leaflet is irregularly bordered with small mobile filamentous elements (0.7cm  long). There is a large fixed heterogeneous mass in the right ventricle  attached to the interventricular septum (2.5 x 2cm), appears to be connected  to the tricuspid subvalvular apparatus, with a pedunculated round mobile  element (1.8 x 1cm). There is no significant tricuspid regurgitation.  Differential includes endocarditis involving the tricuspid valve with  extension of a large vegetation into the RV with associated thrombus, or  possibly endocarditis and a separate RV tumor with associated thrombus.  This mobile RVOT component does not appear to interfere with the pulmonary  valve apparatus, normal PV function on Doppler interrogation. Vegetation on  tricuspid valve leaflet(s).     Aortic Valve  The aortic valve is trileaflet. Aortic valve cusps are slightly thickened.  There are very small filamentous strands (0.2cm) on the aortic aspect of the  valve on the 3-chamber view, which may reflect benign fibrin, however cannot  exclude early vegetation.     Pulmonic Valve  The pulmonic valve is normal in structure and function. No evidence of  vegetations seen.     Vessels  The aortic root is normal size. Inferior vena cava not well visualized for  estimation of right atrial pressure.     Pericardial/Pleural  There is no pericardial  effusion.  ______________________________________________________________________________  Report approved by: Sourav Steel 03/29/2022 11:15 AM     ______________________________________________________________________________      CT Dental wo Contrast    Narrative    CT SCAN OF THE FACE WITHOUT CONTRAST 3/29/2022 1:21 PM     HISTORY: Preop clearance, endocarditis.    TECHNIQUE: Axial CT images of the facial bones were completed with  sagittal and coronal reformations. Radiation dose for this scan was  reduced using automated exposure control, adjustment of the mA and/or  kV according to patient size, or iterative reconstruction technique.     COMPARISON: None.     FINDINGS: A few dental restorations are noted. No periapical lucency  identified to suggest a periapical abscess. There is partial impaction  of the bilateral third maxillary mandibular molars. The right  maxillary sinus is smaller than the left, which may be developmental.  No significant mucosal thickening or air-fluid levels in the  visualized paranasal sinuses. Visualized aspects of the orbits appear  normal. The mastoid and middle ear cavities are clear.      Impression    IMPRESSION:  No periapical lucency identified to suggest periapical  abscess.        Consultations:  Consultation during this admission received from cardiology and infectious disease.       Recent Lab Results:  Recent Labs   Lab 03/29/22  0755 03/28/22  0647 03/27/22  0652   WBC 16.2* 14.1* 12.5*   HGB 11.1* 10.8* 10.9*   HCT 33.8* 34.2* 34.1*   MCV 88 91 90    239 152          Lab Results   Component Value Date     03/29/2022     03/28/2022     03/27/2022     11/24/2017    Lab Results   Component Value Date    CHLORIDE 103 03/29/2022    CHLORIDE 103 03/28/2022    CHLORIDE 103 03/27/2022    CHLORIDE 104 11/24/2017    Lab Results   Component Value Date    BUN 18 03/29/2022    BUN 19 03/28/2022    BUN 21 03/27/2022    BUN 18 11/24/2017      Lab  Results   Component Value Date    POTASSIUM 4.3 03/29/2022    POTASSIUM 4.3 03/28/2022    POTASSIUM 3.8 03/27/2022    POTASSIUM 3.8 11/24/2017    Lab Results   Component Value Date    CO2 25 03/29/2022    CO2 27 03/28/2022    CO2 28 03/27/2022    CO2 24 11/24/2017    Lab Results   Component Value Date    CR 0.78 03/29/2022    CR 0.82 03/28/2022    CR 0.88 03/27/2022    CR 1.07 11/24/2017             Pending Results:    Unresulted Labs Ordered in the Past 30 Days of this Admission     Date and Time Order Name Status Description    3/28/2022 12:01 AM Blood Culture Arm, Right Preliminary     3/27/2022  7:17 AM Blood Culture Arm, Left Preliminary     3/27/2022 12:01 AM Blood Culture Arm, Right Preliminary            Discharge Instructions and Follow-Up:   No discharge procedures on file.    Total time spent in face to face contact with the patient and coordinating discharge was:  >30 Minutes.    Justo Bright DO

## 2022-03-29 NOTE — PROGRESS NOTES
[facility]    Received referral for IV abx yesterday from [facility]. Anil has coverage for IV Abx in the home.    Deductible: $2350 Met: $241.77  Co-Ins: 70/30  OOP max: $4850 Met: $241.77    Once annual out of pocket max is met, Anil will have 100% coverage for home IV abx.     Thank you     Sumaya Powers RN  [facility] Liaison  [phone] (Mon thru Fri 8am - 5pm)  [phone] Office

## 2022-03-29 NOTE — PLAN OF CARE
Evening RN     Pt transferred to [facility] at approximately 2200 with [facility] Transport service.  Report had been given to Verónica DYE on receiving unit at approx 1545 this afternoon by this RN.  Pt in agreement with transfer, and provided with consistent updates regarding schedule for this afternoon shift as it became known to this RN.  Pt VSS and stable for transfer to other hospital for continued workup and care.  - Transfer paperwork forgotten to be sent with [facility] upon transfer, this RN informed charge RN after realizing this - [facility] unit was called and paperwork faxed over.    Patient vital signs are at baseline: Yes  Patient able to ambulate as they were prior to admission or with assist devices provided by therapies during their stay:  Yes  Patient MUST void prior to discharge:  Yes  Patient able to tolerate oral intake:  Yes  Pain has adequate pain control using Oral analgesics:  Yes  Does patient have an identified :  Yes  Has goal D/C date and time been discussed with patient:  No,  Reason:  Discharge TBD.    Pt A/O x4.  VSS and afebrile.  Pain managed adequately with PRN PO Tylenol and Ibuprofen.  CMS intact.  Skin ok - healing boil in groin area.  Up SBA with walker and gait belt.  Voiding adequately.  Tolerating regular diet well.  BG was 255 and 295.  IV Ancef as antibiotic.  Plan is transfer to [facility] for continued cares.  Will continue to monitor.

## 2022-03-29 NOTE — PROGRESS NOTES
[facility]    Medicine Progress Note - Hospitalist Service       Date of Admission:  3/28/2022    Assessment & Plan   Anil Montoya is a 36 year old male with hx of DM2, HTN, WARNER, and anxiety who was initially hospitalized at [facility] on 3/22/2022 for severe sepsis initially attributed to pneumonia vs viral infection. He was found to have MSSA bacteremia with ongoing positive cultures. He underwent a TEJINDER on 3/27/2022 which revealed tricuspid endocarditis, and he was transferred to [facility] on 3/28/2022 for CV Surgery consultation    Sepsis with MSSA bacteremia due to tricuspid valve endocarditis  * Presented to [facility] on 3/22 with generalized weakness, malaise, and myalgias. He was initially treated with ceftriaxone and azithromycin for possible community-acquired pneumonia, and ID was consulted. Blood cultures returned positive for MSSA. Broad work-up including CT abd/pelvis, MR lumbar spine, CT dental, and TTE were unrevealing  * Initially treated with IV ceftriaxone and azithromycin; switched to IV vanco on 3/23 when blood cultures returned with Staph; switched to IV cefazolin on 3/24 upon sensitivities  * Underwent TEJINDER on 3/27 which showed thickened tricuspid valve leaflets with atrial aspect of the septal leaflet with an irregular border and small mobile filamentous elements, large fixed heterogeneous mass in the right ventricle attached to the interventricular septum (2.5 x 2 cm) appearing connected to the tricuspid subvalvular annular apparatus with a pedunculated round mobile element (1.8 x 1 cm).  No significant tricuspid regurgitation reported. LVEF 55 to 60%. Negative bubble study.  * Transferred to [facility] on 3/28 for CV Surgery consult  - Plan surgical excision of mass with possible tricuspid valve replacement some time this week, timing TBD  - Continues on IV cefazolin as per ID  - Daily blood cultures    DM2 without complications, long-term insulin use,  uncontrolled  * A1c 12.6. Had not been taking his diabetes medications for some time  * He was initiated on basal bolus insulin while at [facility]  - Increase Lantus to 28 units BID  - Increase pre-meal aspart to 1u per 10g CHO  - Medium SSI available  Recent Labs   Lab 03/29/22  1147 03/29/22  0755 03/29/22  0739 03/29/22  0159 03/29/22  0017 03/28/22  2139   * 171* 163* 247* 263* 295*       Acute low back pain  * Noted while at [facility]  * MR lumbar spine (3/24) showed mild multilevel lumbar spondylosis, but no evidence of discitis  - Start APAP 1000 mg TID and lidocaine patch daily, oxycodone prn      Elevated transaminases, mild   * Attributed to sepsis  - Repeat LFTs in AM    Essential hypertension  - Continues on PTA losartan 50 mg daily    WARNER  * Chronic and stable on CPAP    Hypothyroidism  * Chronic and stable on levothyroxine    Generalized anxiety disorder  ADHD  * Chronic and stable on bupropion    Morbid obesity  * BMI 38.31. Increased risk of all cause morbidity and mortality    COVID-19 screen  * Unvaccinated  * PCR 3/22/22 negative    Diet: Combination Diet Regular Diet Adult; Moderate Consistent Carb (60 g CHO per Meal) Diet    DVT Prophylaxis: Pneumatic Compression Devices  Schwartz Catheter: Not present  Code Status: Full Code         Disposition: Expected discharge once cleared by CV Surgery and therapies, 5-7 days    The patient's care was discussed with the Bedside Nurse, Patient and Patient's Family.    Ana Vaca MD  Hospitalist Service  [facility]  Contact information available via [facility] Paging/Directory    ______________________________________________________________________    Interval History   Transferred from [facility] last night. Reports ongoing, but stable back pain. Denies cp/sob, dizziness/lightheadedness. Reports    Data reviewed today: I reviewed all medications, new labs and imaging results over the last 24 hours. I personally reviewed no images  or EKG's today.    Physical Exam   Vital Signs: Temp: 97.7  F (36.5  C) Temp src: Oral BP: (!) 147/90 Pulse: 107   Resp: 18 SpO2: 95 % O2 Device: None (Room air)    Weight: 0 lbs 0 oz  Constitutional: Appears comfortable  HEENT: Sclera white, MMM  Respiratory: Breathing non-labored. Lungs CTAB - no wheezes, crackles, or rhonchi  Cardiovascular: Heart RRR, no m/r/g. No pedal edema  GI: +BS, abd soft/NT  Skin/Integument: No rash  Musculoskeletal: Normal muscle bulk and tone  Neuro: Alert and appropriate, MONREAL  Psych: Calm and cooperative    Data   Recent Labs   Lab 03/29/22  1147 03/29/22  0755 03/29/22  0739 03/28/22  0930 03/28/22  0647 03/27/22  0659 03/27/22  0652 03/26/22  0758 03/26/22  0740 03/25/22  1151 03/25/22  0848   WBC  --  16.2*  --   --  14.1*  --  12.5*  --  15.3*  --  11.7*   HGB  --  11.1*  --   --  10.8*  --  10.9*  --  12.2*  --  11.3*   MCV  --  88  --   --  91  --  90  --  89  --  89   PLT  --  287  --   --  239  --  152  --  124*  --  110*   NA  --  136  --   --  134  --  135  --  136  --  134   POTASSIUM  --  4.3  --   --  4.3  --  3.8   < > 3.3*  --  3.8   CHLORIDE  --  103  --   --  103  --  103  --  104  --  103   CO2  --  25  --   --  27  --  28  --  27  --  25   BUN  --  18  --   --  19  --  21  --  20  --  21   CR  --  0.78  --   --  0.82  --  0.88  --  0.81  --  0.81   ANIONGAP  --  8  --   --  4  --  4  --  5  --  6   IMAN  --  8.6  --   --  8.1*  --  7.8*  --  7.9*  --  8.3*   * 171* 163*   < > 291*   < > 278*   < > 251*   < > 367*   ALBUMIN  --   --   --   --   --   --   --   --  1.5*  --  1.3*   PROTTOTAL  --   --   --   --   --   --   --   --  6.3*  --  5.7*   BILITOTAL  --   --   --   --   --   --   --   --  1.1  --  0.8   ALKPHOS  --   --   --   --   --   --   --   --  216*  --  223*   ALT  --   --   --   --   --   --   --   --  57  --  61   AST  --   --   --   --   --   --   --   --  54*  --  51*    < > = values in this interval not displayed.         No results found for  this or any previous visit (from the past 24 hour(s)).    Medications       ceFAZolin  2 g Intravenous Q8H     insulin aspart   Subcutaneous TID AC     insulin glargine  25 Units Subcutaneous QAM AC     insulin glargine  25 Units Subcutaneous At Bedtime     sodium chloride (PF)  3 mL Intracatheter Q8H

## 2022-03-29 NOTE — PLAN OF CARE
Goal Outcome Evaluation:      Summary: Transferred from [facility] for Cardiology consult, +endocarditis, +BC  DATE & TIME: 03/29/22 ([id number])  Cognitive Concerns/ Orientation : Alert and oriented x4; pleasant   BEHAVIOR & AGGRESSION TOOL COLOR: GREEN  CIWA SCORE: NA   ABNL VS/O2: WDL on RA, tachy at times  MOBILITY: SBAx1 due to pain, up to bathroom   PAIN MANAGMENT: Dilaudid PO 4mg x1 with relief, tylenol PRN given (now on scheduled tylenol), Dilaudid discontinued due to causing irritability, and changed to oxycodone given x2 10MG with relief, pt refused Lidocaine patches   DIET: Diabetic diet, appetite good  BOWEL/BLADDER: Continent- using urinal. Bm x1, UA sent today  ABNL LAB/BG: ECHO was abnormal; Positive BCs continue to be positive, repeat BC drawn this am; , 230, 151, WBC 16.2  DRAIN/DEVICES: L IV SL (Iv Ancef)  TELEMETRY RHYTHM: Sinus Tach  SKIN: Intact, old boil on R side groin healed; flushed   TESTS/PROCEDURES: Heart surgery/Valve replacement tentatively scheduled for Thursday, Pt had dental CT done today negative  D/C DAY/GOALS/PLACE: TBD- Will need Iv antibiotics at discharge  OTHER IMPORTANT INFO: ID/Cardiac thoracic following

## 2022-03-29 NOTE — PROGRESS NOTES
"SPIRITUAL HEALTH SERVICES Progress Note  [facility]  68    Visited PT per Epic request for  services. Provided compassionate listening and opportunity for reflection, affirmation of goals of care, and prayer (per request by PT.)    PT expresses some feelings of remorse over not attending to his medical needs sooner. PT states that he believes that \"I put myself here.\" In discussing the matter PT acknowledges that he must practice better self care in several areas and be more willing to seek medical help when it is needed.    PT indicates that he will have surgery in the next few days and then a period of recovery at home for six to eight weeks. PT has made preparations for this including applying for short term disability.    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    PT is aware of the availability of  services by request and indicates that he is open to future visits from .     Follow-up with PT while he remains in hospital.        Mario Westbrook  Associate      "

## 2022-03-29 NOTE — PROGRESS NOTES
[facility]  Infectious Disease Progress Note          Assessment and Plan:   IMPRESSION:     1.  A 36-year-old male with 1 week febrile illness, cause now immediately apparent with multiple blood cultures growing sensitive Staphylococcus aureus, he has Staph aureus bacteremia syndrome, probably right groin is source and concern for back is secondary involved site.  2.  Severe low back pain, new and acute problem, coincident with this infection raises concern about secondary diskitis.  3.  Right groin boil-like area, resolving on its own, but likely primary source for this bacteremia.  4.  Pulmonary and urine findings are likely insignificant, not the source of this nor involved areas -- nonspecific sepsis related findings.  5.  Prior history in 2012 of a very unusual severe cryptococcal lung infection with prolonged treatment needed.  This is fully resolved.  Never had any other signs of underlying immunosuppression, AIDS or other underlying conditions and has had no other opportunistic infections. Fully resolved with no chronic lung problems.  6.  In 2005, Staph aureus bacteremia and skin boil requiring prolonged treatment.  7.  Diabetes mellitus, control level poor recently.  8.  VANCOMYCIN AND CLINDAMYCIN LISTED ALLERGIES WITH VANCOMYCIN UNLIKELY TO BE REAL AND HAS BEEN RECHALLENGED.     RECOMMENDATIONS:     1.  Continue  Ancef   2.  Serial daily blood cultures , all rapidly +thru 3/27, but 3/28 neg at 1 day  3.  We will need prolonged treatment, but no long line until cleared.  4.  Follow for secondary sites and already has an obvious concerning site, which is his back,  MRI  Spine neg , may still be early discitis, but ABX alone likely OK.  5. With  blood cultures  not clearing,  transesophageal echocardiogram, defines the situation major veg and valve issue  Now at [facility] await surgery eval    6.  Discussed in great detail with the patient  and wife 3/28, now D will following here   7  despite the 2 prior unusual infections several years apart and now his third episode,  nothing really to suggest immunosuppression or long-term risks above normal.        Interval History:   no new complaints less fever still back pain no cp, sob, n/v/d, or abd pain. No new pain site or issue on exam  BC all + until 1 on 3/28 neg so far TEJINDER as noted              Medications:       ceFAZolin  2 g Intravenous Q8H     insulin aspart   Subcutaneous TID AC     insulin glargine  25 Units Subcutaneous QAM AC     insulin glargine  25 Units Subcutaneous At Bedtime     sodium chloride (PF)  3 mL Intracatheter Q8H                  Physical Exam:   Blood pressure (!) 147/90, pulse 107, temperature 97.7  F (36.5  C), temperature source Oral, resp. rate 18, SpO2 95 %.  Wt Readings from Last 2 Encounters:   03/28/22 121.1 kg (267 lb)   01/01/13 113.4 kg (250 lb)     Vital Signs with Ranges  Temp:  [97.2  F (36.2  C)-98.3  F (36.8  C)] 97.7  F (36.5  C)  Pulse:  [] 107  Resp:  [18-22] 18  BP: (125-147)/(82-98) 147/90  SpO2:  [94 %-97 %] 95 %    Constitutional: Awake, alert, cooperative, no apparent distress rigors now   Lungs: Clear to auscultation bilaterally, no crackles or wheezing   Cardiovascular: Regular rate and rhythm, normal S1 and S2, and no murmur noted   Abdomen: Normal bowel sounds, soft, non-distended, non-tender   Skin: No rashes, no cyanosis, no edema   Other:           Data:   All microbiology laboratory data reviewed.  Recent Labs   Lab Test 03/29/22  0755 03/28/22  0647 03/27/22  0652   WBC 16.2* 14.1* 12.5*   HGB 11.1* 10.8* 10.9*   HCT 33.8* 34.2* 34.1*   MCV 88 91 90    239 152     Recent Labs   Lab Test 03/29/22  0755 03/28/22  0647 03/27/22  0652   CR 0.78 0.82 0.88     No lab results found.  No lab results found.    Invalid input(s): PASTORA

## 2022-03-29 NOTE — PROGRESS NOTES
Arrived to 614, VSS, tachy. Afebrile. States his back pain worsens with activity. Resting in bed comfortably. Waiting for orders.

## 2022-03-29 NOTE — PROVIDER NOTIFICATION
.MD Notification    Notified Person: MD    Notified Person Name:Dr. Vaca     Notification Date/Time:3/29 1215    Notification Interaction:Vocera paging    Purpose of Notification:+BC gram cocci in clusters from 28th     Orders Received:Pt currently on IV antibiotics, new BC already drawn today    Comments:

## 2022-03-29 NOTE — CONSULTS
Cardiothoracic Surgery Consult    Date of Service: 3/29/2022    REASON FOR CONSULTATION: Staphylococcus aureus tricuspid endocarditis     HISTORY OF PRESENT ILLNESS: Mr. Montoya is a 36 year old diabetic male who presents with couple weeks of fever found to have staph aureus bacteremia.  He and his wife state he had a 'boil' on the left aspect of his groin which may have precipitated these febrile episodes. He has back pain which radiates across his abdomen but is better recently. MRI did not show any major findings of discitis. TEJINDER showed a large vegetation on the tricuspid valve without any impairment of tricuspid valve function.    IMPRESSION AND PLAN: Mr. 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    He has had extensive workup for his bacteremia. MRI did not show any radiographic findings of discitis with his recent back pain. Given the large size of the tricuspid mass, I do not think this is treatable with medical management alone. I discussed with Mr. Montoya and his wife that surgical excision of the mass with possible tricuspid valve replacement is recommended. I did discuss the need for IV antibiotics post op to prevent infection of the new valve. I also discussed small risk of pacemaker.     I would like to plan for surgery this week pending scheduling and OR staffing availability. I reviewed the details of the operation with the patient and his wife. Expected recovery in hospital will be roughly one week followed by sternal precautions for 6-8 weeks and IV abx.     Will follow up with timing of surgery.    Thank you very much for this referral.     ADDENDUM: discussed TEJINDER further with Jeff Nails and Bebeto. The aortic valve leaflets to appear slightly thickened with small strands. It is difficult to say if this valve is involved on  "TEJINDER. I plan on exploring the aortic valve during the case. If the aortic valve looks infected, I will replace it with mechanical valve. Will discuss with patient and wife.    Additionally, we'll plan on coronary angiogram prior to surgery. Patient is young but does have multiple cardiovascular co morbidities.    Tentative plan for OR on Thursday pending OR staff availability.    Marti Melton MD   Cardiothoracic Surgery  P: [phone]  C: [phone]      PAST MEDICAL HISTORY:   Past Medical History:   Diagnosis Date     Pneumonia        PAST SURGICAL HISTORY: No past surgical history on file.    ALLERGIES:   Allergies   Allergen Reactions     Clindamycin      Vancomycin      \"Red Sonia Syndrome\"        CURRENT MEDICATIONS:  Current Facility-Administered Medications   Medication     acetaminophen (TYLENOL) tablet 650 mg    Or     acetaminophen (TYLENOL) Suppository 650 mg     bisacodyl (DULCOLAX) Suppository 10 mg     ceFAZolin (ANCEF) intermittent infusion 2 g in 100 mL dextrose PRE-MIX     glucose gel 15-30 g    Or     dextrose 50 % injection 25-50 mL    Or     glucagon injection 1 mg     HYDROmorphone (DILAUDID) tablet 4 mg     HYDROmorphone (PF) (DILAUDID) injection 0.5 mg     insulin aspart (NovoLOG) injection (RAPID ACTING)     insulin glargine (LANTUS PEN) injection 25 Units     insulin glargine (LANTUS PEN) injection 25 Units     lidocaine (LMX4) cream     lidocaine 1 % 0.1-1 mL     melatonin tablet 3 mg     naloxone (NARCAN) injection 0.2 mg    Or     naloxone (NARCAN) injection 0.4 mg    Or     naloxone (NARCAN) injection 0.2 mg    Or     naloxone (NARCAN) injection 0.4 mg     ondansetron (ZOFRAN-ODT) ODT tab 4 mg    Or     ondansetron (ZOFRAN) injection 4 mg     polyethylene glycol (MIRALAX) Packet 17 g     prochlorperazine (COMPAZINE) injection 10 mg    Or     prochlorperazine (COMPAZINE) tablet 10 mg    Or     prochlorperazine (COMPAZINE) suppository 25 mg     senna-docusate (SENOKOT-S/PERICOLACE) " 8.6-50 MG per tablet 1 tablet    Or     senna-docusate (SENOKOT-S/PERICOLACE) 8.6-50 MG per tablet 2 tablet     sodium chloride (PF) 0.9% PF flush 3 mL     sodium chloride (PF) 0.9% PF flush 3 mL     sodium phosphate (FLEET ENEMA) 1 enema         FAMILY HISTORY: No family history on file.  The family history was reviewed and is not pertinent to the patient's disease/illness      SOCIAL HISTORY:   Social History     Socioeconomic History     Marital status:      Spouse name: Not on file     Number of children: Not on file     Years of education: Not on file     Highest education level: Not on file   Occupational History     Not on file   Tobacco Use     Smoking status: Not on file     Smokeless tobacco: Not on file   Substance and Sexual Activity     Alcohol use: Not on file     Drug use: Not on file     Sexual activity: Not on file   Other Topics Concern     Not on file   Social History Narrative     Not on file     Social Determinants of Health     Financial Resource Strain: Not on file   Food Insecurity: Not on file   Transportation Needs: Not on file   Physical Activity: Not on file   Stress: Not on file   Social Connections: Not on file   Intimate Partner Violence: Not on file   Housing Stability: Not on file         REVIEW OF SYSTEMS:  Review of Systems - Negative except feves  A complete 10 point review of systems was obtained and is negative other than the above stated complaints    PHYSICAL EXAMINATION:   Vitals: BP (!) 147/90 (BP Location: Left arm, Patient Position: Supine)   Pulse 107   Temp 97.7  F (36.5  C) (Oral)   Resp 18   SpO2 95%   GENERAL:  Well developed and well nourished   HEENT: conjunctiva anicteric and sclera clear  NECK: no JVD  RESPIRATIONS: breathing comfortably, no audible wheezing  ABDOMEN: soft, non distended  EXTREMITIES: no deformity or swelling   NEUROLOGIC: intact and symmetric with no focal deficits.   PSYCHIATRIC: alert and oriented    LABORATORY STUDIES:   Lab Results    Component Value Date    WBC 16.2 (H) 03/29/2022    HGB 11.1 (L) 03/29/2022    HCT 33.8 (L) 03/29/2022    MCV 88 03/29/2022     03/29/2022      Lab Results   Component Value Date    BUN 18 03/29/2022     03/29/2022    CO2 25 03/29/2022     No results found for: HGBA1C    TRANSTHORACIC ECHOCARDIOGRAM: This study was personally reviewed by me.

## 2022-03-29 NOTE — PLAN OF CARE
Summary: Transferred from [facility] for Cardiology consult  DATE & TIME: 03/28-03/29/22 Night shift  Cognitive Concerns/ Orientation : Alert and oriented x4; pleasant   BEHAVIOR & AGGRESSION TOOL COLOR: GREEN  CIWA SCORE: NA   ABNL VS/O2: WDL on RA  MOBILITY: SBAx1 due to pain  PAIN MANAGMENT: Dilaudid PO 4mg x1 with relief  DIET: Diabetic diet, nothing to eat since admission  BOWEL/BLADDER: Continent- using urinal. No BM.  ABNL LAB/BG: ECHO was abnormal; Positive BCs;   DRAIN/DEVICES: L IV SL  TELEMETRY RHYTHM: NSR  SKIN: Intact, old boil on R side groin healed; flushed and sweaty  TESTS/PROCEDURES: Waiting for Cardiology to see today  D/C DAY/GOALS/PLACE: TBD- just admitted  OTHER IMPORTANT INFO: ID is following; possible diskitis as well    Goal Outcome Evaluation: ongoing

## 2022-03-29 NOTE — PHARMACY-ADMISSION MEDICATION HISTORY
Pharmacy Medication History  Admission medication history interview status for the 3/28/2022  admission is complete. See EPIC admission navigator for prior to admission medications     Location of Interview: [facility]   Medication history sources: Med rec list is done at [facility] by pharmacy intern     Significant changes made to the medication list:  None    In the past week, patient estimated taking medication this percent of the time: greater than 90%    Additional medication history information:   None    Medication reconciliation completed by provider prior to medication history? No    Time spent in this activity: 5 minutes     Prior to Admission medications    Medication Sig Last Dose Taking? Auth Provider   acetaminophen (TYLENOL) 500 MG tablet Take 1,000 mg by mouth every 6 hours as needed for mild pain 3/21/2022 at pm Yes Unknown, Entered By History   levothyroxine (SYNTHROID/LEVOTHROID) 125 MCG tablet Take 250 mcg by mouth once a week On Sunday Unknown at Unknown time Yes Unknown, Entered By History   buPROPion (WELLBUTRIN XL) 300 MG 24 hr tablet Take 300 mg by mouth every morning 3/21/2022 at am  Unknown, Entered By History   ciprofloxacin (CIPRO) 750 MG tablet Take 750 mg by mouth 2 times daily For 10 days (3/21-3/31). 3/22/2022 at 1000  Unknown, Entered By History   dextromethorphan (TUSSIN COUGH) 15 MG/5ML syrup Take 10 mLs by mouth 4 times daily as needed for cough 3/18/2022 at am  Unknown, Entered By History   ibuprofen (ADVIL/MOTRIN) 200 MG tablet Take 600 mg by mouth every 6 hours as needed for mild pain 3/21/2022 at pm  Unknown, Entered By History   levothyroxine (SYNTHROID/LEVOTHROID) 125 MCG tablet Take 125 mcg by mouth six times a week Monday thru Saturday 3/21/2022 at am  Unknown, Entered By History   liraglutide (VICTOZA) 18 MG/3ML solution Inject 1.8 mg Subcutaneous daily 3/21/2022 at am  Unknown, Entered By History   losartan (COZAAR) 50 MG tablet Take 50 mg  by mouth daily 3/21/2022 at am  Unknown, Entered By History   metFORMIN (GLUCOPHAGE) 1000 MG tablet Take 1,000 mg by mouth 2 times daily (with meals) 3/21/2022 at pm  Unknown, Entered By History       The information provided in this note is only as accurate as the sources available at the time of update(s)

## 2022-03-30 LAB
BACTERIA BLD CULT: ABNORMAL
GLUCOSE BLDC GLUCOMTR-MCNC: 194 MG/DL (ref 70–99)
GLUCOSE BLDC GLUCOMTR-MCNC: 220 MG/DL (ref 70–99)
GLUCOSE BLDC GLUCOMTR-MCNC: 220 MG/DL (ref 70–99)
GLUCOSE BLDC GLUCOMTR-MCNC: 245 MG/DL (ref 70–99)
GLUCOSE BLDC GLUCOMTR-MCNC: 254 MG/DL (ref 70–99)

## 2022-03-30 PROCEDURE — 250N000011 HC RX IP 250 OP 636: Performed by: INTERNAL MEDICINE

## 2022-03-30 PROCEDURE — 250N000013 HC RX MED GY IP 250 OP 250 PS 637: Performed by: INTERNAL MEDICINE

## 2022-03-30 PROCEDURE — 120N000001 HC R&B MED SURG/OB

## 2022-03-30 PROCEDURE — 87040 BLOOD CULTURE FOR BACTERIA: CPT | Performed by: INTERNAL MEDICINE

## 2022-03-30 PROCEDURE — 99233 SBSQ HOSP IP/OBS HIGH 50: CPT | Performed by: INTERNAL MEDICINE

## 2022-03-30 PROCEDURE — 36415 COLL VENOUS BLD VENIPUNCTURE: CPT | Performed by: INTERNAL MEDICINE

## 2022-03-30 RX ADMIN — BUPROPION HYDROCHLORIDE 300 MG: 300 TABLET, EXTENDED RELEASE ORAL at 08:33

## 2022-03-30 RX ADMIN — ACETAMINOPHEN 1000 MG: 500 TABLET, FILM COATED ORAL at 20:41

## 2022-03-30 RX ADMIN — INSULIN ASPART 5 UNITS: 100 INJECTION, SOLUTION INTRAVENOUS; SUBCUTANEOUS at 08:38

## 2022-03-30 RX ADMIN — LEVOTHYROXINE SODIUM 125 MCG: 125 TABLET ORAL at 06:10

## 2022-03-30 RX ADMIN — ACETAMINOPHEN 1000 MG: 500 TABLET, FILM COATED ORAL at 08:32

## 2022-03-30 RX ADMIN — ACETAMINOPHEN 650 MG: 325 TABLET ORAL at 06:09

## 2022-03-30 RX ADMIN — LOSARTAN POTASSIUM 50 MG: 50 TABLET, FILM COATED ORAL at 08:33

## 2022-03-30 RX ADMIN — LIDOCAINE 1 PATCH: 246 PATCH TOPICAL at 08:37

## 2022-03-30 RX ADMIN — OXYCODONE HYDROCHLORIDE 10 MG: 5 TABLET ORAL at 18:12

## 2022-03-30 RX ADMIN — ACETAMINOPHEN 1000 MG: 500 TABLET, FILM COATED ORAL at 16:07

## 2022-03-30 RX ADMIN — CEFAZOLIN SODIUM 2 G: 2 INJECTION, SOLUTION INTRAVENOUS at 18:10

## 2022-03-30 RX ADMIN — INSULIN ASPART 6 UNITS: 100 INJECTION, SOLUTION INTRAVENOUS; SUBCUTANEOUS at 12:05

## 2022-03-30 RX ADMIN — OXYCODONE HYDROCHLORIDE 10 MG: 5 TABLET ORAL at 13:45

## 2022-03-30 RX ADMIN — OXYCODONE HYDROCHLORIDE 10 MG: 5 TABLET ORAL at 08:33

## 2022-03-30 RX ADMIN — CEFAZOLIN SODIUM 2 G: 2 INJECTION, SOLUTION INTRAVENOUS at 09:52

## 2022-03-30 RX ADMIN — CEFAZOLIN SODIUM 2 G: 2 INJECTION, SOLUTION INTRAVENOUS at 02:07

## 2022-03-30 RX ADMIN — INSULIN ASPART 6 UNITS: 100 INJECTION, SOLUTION INTRAVENOUS; SUBCUTANEOUS at 18:09

## 2022-03-30 ASSESSMENT — ACTIVITIES OF DAILY LIVING (ADL)
ADLS_ACUITY_SCORE: 7

## 2022-03-30 NOTE — PROVIDER NOTIFICATION
.MD Notification    Notified Person: MD    Notified Person Name:Dr. Vaca    Notification Date/Time:3/30/22 804 am    Notification Interaction:Sourav godinez    Purpose of Notification:+BC from 3/29 gram cocci in clusters     Orders Received:Currently on IV antibiotics, no new orders at this time    Comments:CTZACH

## 2022-03-30 NOTE — PROGRESS NOTES
"Clinically Significant Risk Factors Present on Admission            # Diabetes, type II: last A1C 12.6 % (Ref range: 0.0 - 5.6 %)  # Obesity: Estimated body mass index is 38.31 kg/m  as calculated from the following:    Height as of 3/22/22: 1.778 m (5' 10\").    Weight as of an earlier encounter on 3/28/22: 121.1 kg (267 lb).    Cardiovascular: Endocarditis and heart valve disorders in diseases classified elsewhere                                   "

## 2022-03-30 NOTE — PLAN OF CARE
Summary: Transferred from [facility] for Cardiology consult, +endocarditis, +BC  DATE & TIME: 03/30/2022 [id number]  Cognitive Concerns/ Orientation : Alert and oriented x4; pleasant   BEHAVIOR & AGGRESSION TOOL COLOR: GREEN  CIWA SCORE: NA   ABNL VS/O2: VSS on RA, tachy at times  MOBILITY: SBAx1 due to pain  PAIN MANAGMENT: Scheduled Tylenol with relief, Oxy given x3, pt has lidocaine patch on lower back  DIET: Diabetic diet, appetite good  BOWEL/BLADDER: Continent- using urinal. BM this AM.   ABNL LAB/BG: ECHO was abnormal; Blood culture from 3/29 draw is gram + cocci in clusters - MD notified,; , 245, 220; WBC 16.2  DRAIN/DEVICES: L IV SL (IV Ancef)  TELEMETRY RHYTHM: Tachycardic  SKIN: Intact, old boil on R side groin healed; flushed   TESTS/PROCEDURES: Heart surgery/Valve replacement scheduled tentatively Th, no time as of yet,  D/C DAY/GOALS/PLACE: TBD- Will need Iv antibiotics at discharge  OTHER IMPORTANT INFO: ID/Cardiac thoracic following; pt. asked for only his wife to be notified of his health status moving forward

## 2022-03-30 NOTE — PROGRESS NOTES
[facility]    Medicine Progress Note - Hospitalist Service       Date of Admission:  3/28/2022    Assessment & Plan   Anil Montoya is a 36 year old male with hx of DM2, HTN, WARNER, and anxiety who was initially hospitalized at [facility] on 3/22/2022 for severe sepsis initially attributed to pneumonia vs viral infection. He was found to have MSSA bacteremia with ongoing positive cultures. 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 He was initially treated with ceftriaxone and azithromycin for possible community-acquired pneumonia, and ID was consulted. Blood cultures returned positive for MSSA, and serial blood cultures continued to return positive. Broad work-up including CT abd/pelvis, MR lumbar spine, CT dental, and TTE were unrevealing  * Initially treated with IV ceftriaxone and azithromycin; switched to IV vanco on 3/23 when blood cultures returned with Staph; switched to IV cefazolin on 3/24 upon sensitivities  * Underwent TEJINDER on 3/27 which showed thickened tricuspid valve leaflets with atrial aspect of the septal leaflet with an irregular border and small mobile filamentous elements, large fixed heterogeneous mass in the right ventricle attached to the interventricular septum (2.5 x 2 cm) appearing connected to the tricuspid subvalvular annular apparatus with a pedunculated round mobile element (1.8 x 1 cm).  No significant tricuspid regurgitation reported. LVEF 55 to 60%. Negative bubble study.  * Transferred to [facility] on 3/28 for CV Surgery consult  - Surgical excision of mass with possible tricuspid valve replacement per CV Surgery hopefully soon  - Continues on IV cefazolin as per ID  - Daily blood cultures remain  positive, will follow  - ID, Cardiology, CV Surgery following    DM2 without complications, long-term insulin use, uncontrolled  * A1c 12.6. Had not been taking his diabetes medications for some time  * He was initiated on basal bolus insulin while at [facility]  - Increase Lantus to 34 units BID  - On aspart to 1u per 10g CHO  - Increase from medium to high SSI today, 3/30  Recent Labs   Lab 03/30/22  0807 03/30/22  0210 03/29/22  2132 03/29/22  1727 03/29/22  1147 03/29/22  0755   * 194* 193* 151* 230* 171*       Acute low back pain  * Noted while at [facility]  * MR lumbar spine (3/24) showed mild multilevel lumbar spondylosis, but no evidence of discitis  - Start APAP 1000 mg TID and lidocaine patch daily, oxycodone prn      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 RUQ US (3/22) showed fatty liver, but normal gallbladder and normal bile ducts  - Repeat LFTs in AM    Essential hypertension  - Continues on PTA losartan 50 mg daily    WARNER  * Chronic and stable on CPAP    Hypothyroidism  * Chronic and stable on levothyroxine    Generalized anxiety disorder  ADHD  * Chronic and stable on bupropion    Morbid obesity  * BMI 38.31. Increased risk of all cause morbidity and mortality    COVID-19 screen  * Unvaccinated  * PCR 3/22/22 negative    Diet: Combination Diet Regular Diet Adult; Moderate Consistent Carb (60 g CHO per Meal) Diet    DVT Prophylaxis: Pneumatic Compression Devices  Schwartz Catheter: Not present  Code Status: Full Code         Disposition: Expected discharge once cleared by CV Surgery, ID, and therapies, 7+ days    The patient's care was discussed with the Bedside Nurse and Patient.    Ana Vaca MD  Hospitalist Service  [facility]  Contact information available via [facility] Paging/Directory    ______________________________________________________________________    Interval History   No  events overnight. Back pain stable. Denies cp/sob, dizziness/lightheadedness. Blood cultures remain positive    Data reviewed today: I reviewed all medications, new labs and imaging results over the last 24 hours. I personally reviewed no images or EKG's today.    Physical Exam   Vital Signs: Temp: 97.8  F (36.6  C) Temp src: Oral BP: 134/87 Pulse: 95   Resp: 18 SpO2: 98 % O2 Device: None (Room air)    Weight: 0 lbs 0 oz  Constitutional: Appears comfortable  HEENT: Sclera white, MMM  Respiratory: Breathing non-labored. Lungs CTAB - no wheezes, crackles, or rhonchi  Cardiovascular: Heart RRR, no m/r/g. No pedal edema  GI: +BS, abd soft/NT  Skin/Integument: No rash  Musculoskeletal: Normal muscle bulk and tone  Neuro: Alert and appropriate, MONREAL  Psych: Calm and cooperative    Data   Recent Labs   Lab 03/30/22  0807 03/30/22  0210 03/29/22  2132 03/29/22  1147 03/29/22  0755 03/28/22  0930 03/28/22  0647 03/27/22  0659 03/27/22  0652 03/26/22  0758 03/26/22  0740   WBC  --   --   --   --  16.2*  --  14.1*  --  12.5*  --  15.3*   HGB  --   --   --   --  11.1*  --  10.8*  --  10.9*  --  12.2*   MCV  --   --   --   --  88  --  91  --  90  --  89   PLT  --   --   --   --  287  --  239  --  152  --  124*   NA  --   --   --   --  136  --  134  --  135  --  136   POTASSIUM  --   --   --   --  4.3  --  4.3  --  3.8   < > 3.3*   CHLORIDE  --   --   --   --  103  --  103  --  103  --  104   CO2  --   --   --   --  25  --  27  --  28  --  27   BUN  --   --   --   --  18  --  19  --  21  --  20   CR  --   --   --   --  0.78  --  0.82  --  0.88  --  0.81   ANIONGAP  --   --   --   --  8  --  4  --  4  --  5   IMAN  --   --   --   --  8.6  --  8.1*  --  7.8*  --  7.9*   * 194* 193*   < > 171*   < > 291*   < > 278*   < > 251*   ALBUMIN  --   --   --   --  1.8*  --   --   --   --   --  1.5*   PROTTOTAL  --   --   --   --  7.3  --   --   --   --   --  6.3*   BILITOTAL  --   --   --   --  0.5  --   --   --   --   --  1.1   ALKPHOS   --   --   --   --  150  --   --   --   --   --  216*   ALT  --   --   --   --  172*  --   --   --   --   --  57   AST  --   --   --   --  115*  --   --   --   --   --  54*    < > = values in this interval not displayed.         Recent Results (from the past 24 hour(s))   CT Dental wo Contrast    Narrative    CT SCAN OF THE FACE WITHOUT CONTRAST 3/29/2022 1:21 PM     HISTORY: Preop clearance, endocarditis.    TECHNIQUE: Axial CT images of the facial bones were completed with  sagittal and coronal reformations. Radiation dose for this scan was  reduced using automated exposure control, adjustment of the mA and/or  kV according to patient size, or iterative reconstruction technique.     COMPARISON: None.     FINDINGS: A few dental restorations are noted. No periapical lucency  identified to suggest a periapical abscess. There is partial impaction  of the bilateral third maxillary mandibular molars. The right  maxillary sinus is smaller than the left, which may be developmental.  No significant mucosal thickening or air-fluid levels in the  visualized paranasal sinuses. Visualized aspects of the orbits appear  normal. The mastoid and middle ear cavities are clear.      Impression    IMPRESSION:  No periapical lucency identified to suggest periapical  abscess.    MORRIS GRANADOS MD         SYSTEM ID:  [id number]       Medications       acetaminophen  1,000 mg Oral TID     buPROPion  300 mg Oral QAM     ceFAZolin  2 g Intravenous Q8H     insulin aspart   Subcutaneous TID AC     insulin glargine  28 Units Subcutaneous BID     levothyroxine  125 mcg Oral Once per day on Mon Tue Wed Thu Fri Sat     [START ON 4/3/2022] levothyroxine  250 mcg Oral Weekly     lidocaine  1 patch Transdermal Q24H     lidocaine   Transdermal Q8H     losartan  50 mg Oral Daily     sodium chloride (PF)  3 mL Intracatheter Q8H

## 2022-03-30 NOTE — PROGRESS NOTES
[facility]  Infectious Disease Progress Note          Assessment and Plan:   IMPRESSION:     1.  A 36-year-old male with 1 week febrile illness, cause now immediately apparent with multiple blood cultures growing sensitive Staphylococcus aureus, he has Staph aureus bacteremia syndrome, probably right groin is source and concern for back is secondary involved site.  2.  Severe low back pain, new and acute problem, coincident with this infection raises concern about secondary diskitis.  3.  Right groin boil-like area, resolving on its own, but likely primary source for this bacteremia.  4.  Pulmonary and urine findings are likely insignificant, not the source of this nor involved areas -- nonspecific sepsis related findings.  5.  Prior history in 2012 of a very unusual severe cryptococcal lung infection with prolonged treatment needed.  This is fully resolved.  Never had any other signs of underlying immunosuppression, AIDS or other underlying conditions and has had no other opportunistic infections. Fully resolved with no chronic lung problems.  6.  In 2005, Staph aureus bacteremia and skin boil requiring prolonged treatment.  7.  Diabetes mellitus, control level poor recently.  8.  VANCOMYCIN AND CLINDAMYCIN LISTED ALLERGIES WITH VANCOMYCIN UNLIKELY TO BE REAL AND HAS BEEN RECHALLENGED.     RECOMMENDATIONS:     1.  Continue  Ancef   2.  Serial daily blood cultures , all rapidly +thru 3/29  3.  We will need prolonged treatment, but no long line until cleared.  4.  Follow for secondary sites and already has an obvious concerning site, which is his back,  MRI  Spine neg , may still be early discitis, but ABX alone likely OK.  5. With  blood cultures  not clearing,  transesophageal echocardiogram, it has defined the situation, incl BC not clearing, major veg and sig valve issue  [facility] , ? tomorrow    6.  Discussed in great detail with the patient  and wife 3/28, now [facility] will  following here   7 despite the 2 prior unusual infections several years apart and now his third episode,  nothing really to suggest immunosuppression or long-term risks above normal.        Interval History:   no new complaints less fever still back pain no cp, sob, n/v/d, or abd pain. No new pain site or issue on exam  BC all + thru 3/29 so far TEJINDER as noted              Medications:       acetaminophen  1,000 mg Oral TID     buPROPion  300 mg Oral QAM     ceFAZolin  2 g Intravenous Q8H     insulin aspart   Subcutaneous TID AC     insulin glargine  28 Units Subcutaneous BID     levothyroxine  125 mcg Oral Once per day on Mon Tue Wed Thu Fri Sat     [START ON 4/3/2022] levothyroxine  250 mcg Oral Weekly     lidocaine  1 patch Transdermal Q24H     lidocaine   Transdermal Q8H     losartan  50 mg Oral Daily     sodium chloride (PF)  3 mL Intracatheter Q8H                  Physical Exam:   Blood pressure 134/87, pulse 95, temperature 97.8  F (36.6  C), temperature source Oral, resp. rate 18, SpO2 98 %.  Wt Readings from Last 2 Encounters:   03/28/22 121.1 kg (267 lb)   01/01/13 113.4 kg (250 lb)     Vital Signs with Ranges  Temp:  [97.8  F (36.6  C)-99.3  F (37.4  C)] 97.8  F (36.6  C)  Pulse:  [] 95  Resp:  [18-20] 18  BP: (129-144)/(80-91) 134/87  SpO2:  [95 %-98 %] 98 %    Constitutional: Awake, alert, cooperative, no apparent distress rigors now   Lungs: Clear to auscultation bilaterally, no crackles or wheezing   Cardiovascular: Regular rate and rhythm, normal S1 and S2, and no murmur noted   Abdomen: Normal bowel sounds, soft, non-distended, non-tender   Skin: No rashes, no cyanosis, no edema   Other:           Data:   All microbiology laboratory data reviewed.  Recent Labs   Lab Test 03/29/22  0755 03/28/22  0647 03/27/22  0652   WBC 16.2* 14.1* 12.5*   HGB 11.1* 10.8* 10.9*   HCT 33.8* 34.2* 34.1*   MCV 88 91 90    239 152     Recent Labs   Lab Test 03/29/22  0755 03/28/22  0647 03/27/22  0652   CR  0.78 0.82 0.88     No lab results found.  No lab results found.    Invalid input(s): UC

## 2022-03-30 NOTE — PROVIDER NOTIFICATION
MD Notification    Notified Person: MD    Notified Person Name: Dr. Eller    Notification Date/Time: 3/30/2022 5:05am    Notification Interaction: [employer] web    Purpose of Notification: Lab called with critical result of positive blood culture - gram + cocci in clusters. Pt is currently on IV cefazolin.     Orders Received:    Comments:

## 2022-03-30 NOTE — PLAN OF CARE
Goal Outcome Evaluation:      Summary: Transferred from [facility] for Cardiology consult, +endocarditis, +BC  DATE & TIME: 03/29/22-3/30/22 [id number]  Cognitive Concerns/ Orientation : Alert and oriented x4; pleasant   BEHAVIOR & AGGRESSION TOOL COLOR: GREEN  CIWA SCORE: NA   ABNL VS/O2: VSS on RA, tachy at times  MOBILITY: SBAx1 due to pain  PAIN MANAGMENT: Scheduled Tylenol with relief, pt refused Lidocaine patches   DIET: Diabetic diet, appetite good  BOWEL/BLADDER: Continent- using urinal.   ABNL LAB/BG: ECHO was abnormal; Blood culture from 3/29 draw is gram + cocci in clusters - MD notified,; , 194, WBC 16.2  DRAIN/DEVICES: L IV SL (IV Ancef)  TELEMETRY RHYTHM: NSR  SKIN: Intact, old boil on R side groin healed; flushed   TESTS/PROCEDURES: Heart surgery/Valve replacement scheduled tentatively Th, no time as of yet,   D/C DAY/GOALS/PLACE: TBD- Will need Iv antibiotics at discharge  OTHER IMPORTANT INFO: ID/Cardiac thoracic following

## 2022-03-31 ENCOUNTER — ANESTHESIA EVENT (OUTPATIENT)
Dept: SURGERY | Facility: CLINIC | Age: 37
DRG: 853 | End: 2022-03-31
Payer: COMMERCIAL

## 2022-03-31 LAB
ANION GAP SERPL CALCULATED.3IONS-SCNC: 5 MMOL/L (ref 3–14)
BACTERIA BLD CULT: ABNORMAL
BACTERIA BLD CULT: ABNORMAL
BUN SERPL-MCNC: 21 MG/DL (ref 7–30)
CALCIUM SERPL-MCNC: 9 MG/DL (ref 8.5–10.1)
CHLORIDE BLD-SCNC: 100 MMOL/L (ref 94–109)
CO2 SERPL-SCNC: 28 MMOL/L (ref 20–32)
CREAT SERPL-MCNC: 0.96 MG/DL (ref 0.66–1.25)
ERYTHROCYTE [DISTWIDTH] IN BLOOD BY AUTOMATED COUNT: 13.7 % (ref 10–15)
GFR SERPL CREATININE-BSD FRML MDRD: >90 ML/MIN/1.73M2
GLUCOSE BLD-MCNC: 198 MG/DL (ref 70–99)
GLUCOSE BLDC GLUCOMTR-MCNC: 111 MG/DL (ref 70–99)
GLUCOSE BLDC GLUCOMTR-MCNC: 157 MG/DL (ref 70–99)
GLUCOSE BLDC GLUCOMTR-MCNC: 184 MG/DL (ref 70–99)
GLUCOSE BLDC GLUCOMTR-MCNC: 227 MG/DL (ref 70–99)
GLUCOSE BLDC GLUCOMTR-MCNC: 96 MG/DL (ref 70–99)
HCT VFR BLD AUTO: 34.4 % (ref 40–53)
HGB BLD-MCNC: 11 G/DL (ref 13.3–17.7)
MCH RBC QN AUTO: 28.6 PG (ref 26.5–33)
MCHC RBC AUTO-ENTMCNC: 32 G/DL (ref 31.5–36.5)
MCV RBC AUTO: 89 FL (ref 78–100)
PLATELET # BLD AUTO: 327 10E3/UL (ref 150–450)
POTASSIUM BLD-SCNC: 4.8 MMOL/L (ref 3.4–5.3)
RBC # BLD AUTO: 3.85 10E6/UL (ref 4.4–5.9)
SODIUM SERPL-SCNC: 133 MMOL/L (ref 133–144)
WBC # BLD AUTO: 13.8 10E3/UL (ref 4–11)

## 2022-03-31 PROCEDURE — 250N000009 HC RX 250: Performed by: INTERNAL MEDICINE

## 2022-03-31 PROCEDURE — 250N000011 HC RX IP 250 OP 636: Performed by: INTERNAL MEDICINE

## 2022-03-31 PROCEDURE — 99232 SBSQ HOSP IP/OBS MODERATE 35: CPT | Performed by: INTERNAL MEDICINE

## 2022-03-31 PROCEDURE — 93454 CORONARY ARTERY ANGIO S&I: CPT | Performed by: INTERNAL MEDICINE

## 2022-03-31 PROCEDURE — 258N000003 HC RX IP 258 OP 636: Performed by: PHYSICIAN ASSISTANT

## 2022-03-31 PROCEDURE — 99152 MOD SED SAME PHYS/QHP 5/>YRS: CPT | Mod: GC | Performed by: INTERNAL MEDICINE

## 2022-03-31 PROCEDURE — C1769 GUIDE WIRE: HCPCS | Performed by: INTERNAL MEDICINE

## 2022-03-31 PROCEDURE — 82310 ASSAY OF CALCIUM: CPT | Performed by: INTERNAL MEDICINE

## 2022-03-31 PROCEDURE — 99152 MOD SED SAME PHYS/QHP 5/>YRS: CPT | Performed by: INTERNAL MEDICINE

## 2022-03-31 PROCEDURE — 93010 ELECTROCARDIOGRAM REPORT: CPT | Performed by: INTERNAL MEDICINE

## 2022-03-31 PROCEDURE — 250N000013 HC RX MED GY IP 250 OP 250 PS 637: Performed by: INTERNAL MEDICINE

## 2022-03-31 PROCEDURE — 87077 CULTURE AEROBIC IDENTIFY: CPT | Performed by: SURGERY

## 2022-03-31 PROCEDURE — 93005 ELECTROCARDIOGRAM TRACING: CPT

## 2022-03-31 PROCEDURE — 36415 COLL VENOUS BLD VENIPUNCTURE: CPT | Performed by: INTERNAL MEDICINE

## 2022-03-31 PROCEDURE — 272N000001 HC OR GENERAL SUPPLY STERILE: Performed by: INTERNAL MEDICINE

## 2022-03-31 PROCEDURE — 250N000013 HC RX MED GY IP 250 OP 250 PS 637: Performed by: PHYSICIAN ASSISTANT

## 2022-03-31 PROCEDURE — C1894 INTRO/SHEATH, NON-LASER: HCPCS | Performed by: INTERNAL MEDICINE

## 2022-03-31 PROCEDURE — 93454 CORONARY ARTERY ANGIO S&I: CPT | Mod: 26 | Performed by: INTERNAL MEDICINE

## 2022-03-31 PROCEDURE — 87040 BLOOD CULTURE FOR BACTERIA: CPT | Performed by: INTERNAL MEDICINE

## 2022-03-31 PROCEDURE — 85027 COMPLETE CBC AUTOMATED: CPT | Performed by: INTERNAL MEDICINE

## 2022-03-31 PROCEDURE — 99232 SBSQ HOSP IP/OBS MODERATE 35: CPT | Performed by: PHYSICIAN ASSISTANT

## 2022-03-31 PROCEDURE — B211YZZ FLUOROSCOPY OF MULTIPLE CORONARY ARTERIES USING OTHER CONTRAST: ICD-10-PCS | Performed by: INTERNAL MEDICINE

## 2022-03-31 PROCEDURE — 120N000001 HC R&B MED SURG/OB

## 2022-03-31 RX ORDER — LIDOCAINE 40 MG/G
CREAM TOPICAL
Status: DISCONTINUED | OUTPATIENT
Start: 2022-03-31 | End: 2022-03-31 | Stop reason: HOSPADM

## 2022-03-31 RX ORDER — OXYCODONE HYDROCHLORIDE 5 MG/1
10 TABLET ORAL EVERY 4 HOURS PRN
Status: DISCONTINUED | OUTPATIENT
Start: 2022-03-31 | End: 2022-04-01

## 2022-03-31 RX ORDER — FLUMAZENIL 0.1 MG/ML
0.2 INJECTION, SOLUTION INTRAVENOUS
Status: DISCONTINUED | OUTPATIENT
Start: 2022-03-31 | End: 2022-04-01

## 2022-03-31 RX ORDER — ASPIRIN 81 MG/1
243 TABLET, CHEWABLE ORAL ONCE
Status: COMPLETED | OUTPATIENT
Start: 2022-03-31 | End: 2022-03-31

## 2022-03-31 RX ORDER — NALOXONE HYDROCHLORIDE 0.4 MG/ML
0.2 INJECTION, SOLUTION INTRAMUSCULAR; INTRAVENOUS; SUBCUTANEOUS
Status: DISCONTINUED | OUTPATIENT
Start: 2022-03-31 | End: 2022-04-01

## 2022-03-31 RX ORDER — NALOXONE HYDROCHLORIDE 0.4 MG/ML
0.4 INJECTION, SOLUTION INTRAMUSCULAR; INTRAVENOUS; SUBCUTANEOUS
Status: DISCONTINUED | OUTPATIENT
Start: 2022-03-31 | End: 2022-04-01

## 2022-03-31 RX ORDER — OXYCODONE HYDROCHLORIDE 5 MG/1
5 TABLET ORAL EVERY 4 HOURS PRN
Status: DISCONTINUED | OUTPATIENT
Start: 2022-03-31 | End: 2022-04-01

## 2022-03-31 RX ORDER — LORAZEPAM 2 MG/ML
0.5 INJECTION INTRAMUSCULAR
Status: DISCONTINUED | OUTPATIENT
Start: 2022-03-31 | End: 2022-03-31 | Stop reason: HOSPADM

## 2022-03-31 RX ORDER — FENTANYL CITRATE 50 UG/ML
INJECTION, SOLUTION INTRAMUSCULAR; INTRAVENOUS
Status: DISCONTINUED | OUTPATIENT
Start: 2022-03-31 | End: 2022-03-31 | Stop reason: HOSPADM

## 2022-03-31 RX ORDER — ASPIRIN 325 MG
325 TABLET ORAL ONCE
Status: COMPLETED | OUTPATIENT
Start: 2022-03-31 | End: 2022-03-31

## 2022-03-31 RX ORDER — LORAZEPAM 0.5 MG/1
0.5 TABLET ORAL
Status: DISCONTINUED | OUTPATIENT
Start: 2022-03-31 | End: 2022-03-31 | Stop reason: HOSPADM

## 2022-03-31 RX ORDER — MORPHINE SULFATE 2 MG/ML
2-4 INJECTION, SOLUTION INTRAMUSCULAR; INTRAVENOUS
Status: DISCONTINUED | OUTPATIENT
Start: 2022-03-31 | End: 2022-04-01

## 2022-03-31 RX ORDER — FENTANYL CITRATE 50 UG/ML
25 INJECTION, SOLUTION INTRAMUSCULAR; INTRAVENOUS
Status: DISCONTINUED | OUTPATIENT
Start: 2022-03-31 | End: 2022-04-01

## 2022-03-31 RX ORDER — SODIUM CHLORIDE 9 MG/ML
INJECTION, SOLUTION INTRAVENOUS CONTINUOUS
Status: DISCONTINUED | OUTPATIENT
Start: 2022-03-31 | End: 2022-03-31 | Stop reason: HOSPADM

## 2022-03-31 RX ORDER — ACETAMINOPHEN 325 MG/1
650 TABLET ORAL EVERY 4 HOURS PRN
Status: DISCONTINUED | OUTPATIENT
Start: 2022-03-31 | End: 2022-04-01

## 2022-03-31 RX ORDER — POTASSIUM CHLORIDE 1500 MG/1
20 TABLET, EXTENDED RELEASE ORAL
Status: DISCONTINUED | OUTPATIENT
Start: 2022-03-31 | End: 2022-03-31 | Stop reason: HOSPADM

## 2022-03-31 RX ORDER — SODIUM CHLORIDE 9 MG/ML
INJECTION, SOLUTION INTRAVENOUS CONTINUOUS
Status: DISCONTINUED | OUTPATIENT
Start: 2022-03-31 | End: 2022-04-01

## 2022-03-31 RX ORDER — ATROPINE SULFATE 0.1 MG/ML
0.5 INJECTION INTRAVENOUS
Status: DISCONTINUED | OUTPATIENT
Start: 2022-03-31 | End: 2022-04-01

## 2022-03-31 RX ORDER — SODIUM CHLORIDE 9 MG/ML
75 INJECTION, SOLUTION INTRAVENOUS CONTINUOUS
Status: ACTIVE | OUTPATIENT
Start: 2022-03-31 | End: 2022-03-31

## 2022-03-31 RX ORDER — MORPHINE SULFATE 2 MG/ML
1-2 INJECTION, SOLUTION INTRAMUSCULAR; INTRAVENOUS
Status: DISCONTINUED | OUTPATIENT
Start: 2022-03-31 | End: 2022-03-31

## 2022-03-31 RX ADMIN — OXYCODONE HYDROCHLORIDE 10 MG: 5 TABLET ORAL at 01:49

## 2022-03-31 RX ADMIN — INSULIN ASPART: 100 INJECTION, SOLUTION INTRAVENOUS; SUBCUTANEOUS at 19:00

## 2022-03-31 RX ADMIN — CEFAZOLIN SODIUM 2 G: 2 INJECTION, SOLUTION INTRAVENOUS at 18:07

## 2022-03-31 RX ADMIN — BUPROPION HYDROCHLORIDE 300 MG: 300 TABLET, EXTENDED RELEASE ORAL at 08:47

## 2022-03-31 RX ADMIN — CEFAZOLIN SODIUM 2 G: 2 INJECTION, SOLUTION INTRAVENOUS at 01:49

## 2022-03-31 RX ADMIN — ACETAMINOPHEN 1000 MG: 500 TABLET, FILM COATED ORAL at 08:46

## 2022-03-31 RX ADMIN — LEVOTHYROXINE SODIUM 125 MCG: 125 TABLET ORAL at 08:51

## 2022-03-31 RX ADMIN — CEFAZOLIN SODIUM 2 G: 2 INJECTION, SOLUTION INTRAVENOUS at 10:39

## 2022-03-31 RX ADMIN — OXYCODONE HYDROCHLORIDE 10 MG: 5 TABLET ORAL at 23:51

## 2022-03-31 RX ADMIN — ACETAMINOPHEN 1000 MG: 500 TABLET, FILM COATED ORAL at 21:08

## 2022-03-31 RX ADMIN — OXYCODONE HYDROCHLORIDE 10 MG: 5 TABLET ORAL at 11:06

## 2022-03-31 RX ADMIN — OXYCODONE HYDROCHLORIDE 10 MG: 5 TABLET ORAL at 17:27

## 2022-03-31 RX ADMIN — SODIUM CHLORIDE: 9 INJECTION, SOLUTION INTRAVENOUS at 12:32

## 2022-03-31 RX ADMIN — OXYCODONE HYDROCHLORIDE 10 MG: 5 TABLET ORAL at 06:32

## 2022-03-31 RX ADMIN — ACETAMINOPHEN 1000 MG: 500 TABLET, FILM COATED ORAL at 17:27

## 2022-03-31 RX ADMIN — ASPIRIN 325 MG ORAL TABLET 325 MG: 325 PILL ORAL at 12:34

## 2022-03-31 RX ADMIN — LOSARTAN POTASSIUM 50 MG: 50 TABLET, FILM COATED ORAL at 08:47

## 2022-03-31 ASSESSMENT — ACTIVITIES OF DAILY LIVING (ADL)
ADLS_ACUITY_SCORE: 7

## 2022-03-31 ASSESSMENT — LIFESTYLE VARIABLES: TOBACCO_USE: 0

## 2022-03-31 NOTE — PROGRESS NOTES
[facility]    Medicine Progress Note - Hospitalist Service       Date of Admission:  3/28/2022    Assessment & Plan   Anil Montoya is a 36 year old male with hx of DM2, HTN, WARNER, and anxiety who was initially hospitalized at [facility] on 3/22/2022 for severe sepsis initially attributed to pneumonia vs viral infection. He was found to have MSSA bacteremia with ongoing positive cultures. He underwent a TEJINDER on 3/27/2022 which revealed tricuspid endocarditis, and he was transferred to [facility] on 3/28/2022 for CV Surgery consultation    Sepsis with MSSA bacteremia due to tricuspid valve endocarditis  * Presented to [facility] on 3/22 with generalized weakness, malaise, and myalgias. He was initially treated with ceftriaxone and azithromycin for possible community-acquired pneumonia, and ID was consulted. Blood cultures returned positive for MSSA, and serial blood cultures continued to return positive. Broad work-up including CT abd/pelvis, MR lumbar spine, CT dental, and TTE were unrevealing  * Initially treated with IV ceftriaxone and azithromycin; switched to IV vanco on 3/23 when blood cultures returned with Staph; switched to IV cefazolin on 3/24 upon sensitivities  * Underwent TEJINDER on 3/27 which showed thickened tricuspid valve leaflets with atrial aspect of the septal leaflet with an irregular border and small mobile filamentous elements, large fixed heterogeneous mass in the right ventricle attached to the interventricular septum (2.5 x 2 cm) appearing connected to the tricuspid subvalvular annular apparatus with a pedunculated round mobile element (1.8 x 1 cm).  No significant tricuspid regurgitation reported. LVEF 55 to 60%. Negative bubble study.  * Transferred to [facility] on 3/28 for CV Surgery consult  - Pre-op coronary angiogram today, 3/31  - Surgical excision of mass with possible tricuspid valve replacement per CV Surgery limited by need for ICU bed; timing TBD -  hopefully tomorrow  - Continues on IV cefazolin as per ID  - Daily blood cultures remain positive, will follow  - ID, Cardiology, CV Surgery following    DM2 without complications, long-term insulin use, uncontrolled  * A1c 12.6. Had not been taking his diabetes medications for some time  * He was initiated on basal bolus insulin while at [facility]  - On Lantus 34 units BID; will only give 10 units tomorrow morning in anticipation of possible surgery  - On aspart to 1u per 10g CHO  - High SSI available  Recent Labs   Lab 03/31/22  0753 03/31/22  0743 03/31/22  0214 03/30/22  2102 03/30/22  1743 03/30/22  1143   * 198* 111* 254* 220* 245*       Acute low back pain  * Noted while at [facility]  * MR lumbar spine (3/24) showed mild multilevel lumbar spondylosis, but no evidence of discitis  - Start APAP 1000 mg TID and lidocaine patch daily, oxycodone prn      Elevated transaminases due to non-alcohol fatty liver disease  * Due to NAFLD, possibly worsened in the setting of infection. RUQ US (3/22) showed fatty liver, but normal gallbladder and normal bile ducts  - Repeat LFTs in AM    Essential hypertension  - Continues on PTA losartan 50 mg daily    WARNER  * Chronic and stable on CPAP    Hypothyroidism  * Chronic and stable on levothyroxine    Generalized anxiety disorder  ADHD  * Chronic and stable on bupropion    Morbid obesity  * BMI 38.31. Increased risk of all cause morbidity and mortality    COVID-19 screen  * Unvaccinated  * PCR 3/22/22 negative    Diet: Combination Diet Regular Diet Adult; Moderate Consistent Carb (60 g CHO per Meal) Diet    DVT Prophylaxis: Pneumatic Compression Devices  Schwartz Catheter: Not present  Code Status: Full Code         Disposition: Expected discharge once cleared by CV Surgery, ID, and therapies, 5-7 days    The patient's care was discussed with the Bedside Nurse and Patient.    Ana Vaca MD  Hospitalist Service  [facility]  Contact information  available via [facility] Paging/Directory    ______________________________________________________________________    Interval History   No events overnight. Back pain stable. Denies cp/sob, dizziness/lightheadedness. Blood cultures remain positive. CV Surgery requesting angiogram prior to surgery - Cardiology consult ordered. surgery timing limited by lack of ICU beds; timing still TBD    Data reviewed today: I reviewed all medications, new labs and imaging results over the last 24 hours. I personally reviewed no images or EKG's today.    Physical Exam   Vital Signs: Temp: 98.8  F (37.1  C) Temp src: Oral BP: 119/77 Pulse: 103   Resp: 16 SpO2: 96 % O2 Device: None (Room air)    Weight: 0 lbs 0 oz  Constitutional: Appears comfortable  HEENT: Sclera white, MMM  Respiratory: Breathing non-labored. Lungs CTAB - no wheezes, crackles, or rhonchi  Cardiovascular: Heart RRR, no m/r/g. No pedal edema  GI: +BS, abd soft/NT  Skin/Integument: No rash  Musculoskeletal: Normal muscle bulk and tone  Neuro: Alert and appropriate, MONREAL  Psych: Calm and cooperative    Data   Recent Labs   Lab 03/31/22  0753 03/31/22  0743 03/31/22  0214 03/29/22  1147 03/29/22  0755 03/28/22  0930 03/28/22  0647 03/26/22  0758 03/26/22  0740   WBC  --  13.8*  --   --  16.2*  --  14.1*   < > 15.3*   HGB  --  11.0*  --   --  11.1*  --  10.8*   < > 12.2*   MCV  --  89  --   --  88  --  91   < > 89   PLT  --  327  --   --  287  --  239   < > 124*   NA  --  133  --   --  136  --  134   < > 136   POTASSIUM  --  4.8  --   --  4.3  --  4.3   < > 3.3*   CHLORIDE  --  100  --   --  103  --  103   < > 104   CO2  --  28  --   --  25  --  27   < > 27   BUN  --  21  --   --  18  --  19   < > 20   CR  --  0.96  --   --  0.78  --  0.82   < > 0.81   ANIONGAP  --  5  --   --  8  --  4   < > 5   IMAN  --  9.0  --   --  8.6  --  8.1*   < > 7.9*   * 198* 111*   < > 171*   < > 291*   < > 251*   ALBUMIN  --   --   --   --  1.8*  --   --   --  1.5*   PROTTOTAL  --   --    --   --  7.3  --   --   --  6.3*   BILITOTAL  --   --   --   --  0.5  --   --   --  1.1   ALKPHOS  --   --   --   --  150  --   --   --  216*   ALT  --   --   --   --  172*  --   --   --  57   AST  --   --   --   --  115*  --   --   --  54*    < > = values in this interval not displayed.         No results found for this or any previous visit (from the past 24 hour(s)).    Medications       acetaminophen  1,000 mg Oral TID     buPROPion  300 mg Oral QAM     ceFAZolin  2 g Intravenous Q8H     insulin aspart  1-10 Units Subcutaneous TID AC     insulin aspart  1-7 Units Subcutaneous At Bedtime     insulin aspart   Subcutaneous TID AC     [Held by provider] insulin glargine  34 Units Subcutaneous BID     levothyroxine  125 mcg Oral Once per day on Mon Tue Wed Thu Fri Sat     [START ON 4/3/2022] levothyroxine  250 mcg Oral Weekly     lidocaine  1 patch Transdermal Q24H     lidocaine   Transdermal Q8H     losartan  50 mg Oral Daily     sodium chloride (PF)  3 mL Intracatheter Q8H

## 2022-03-31 NOTE — PROGRESS NOTES
CVTS Brief Note    Preoperative teaching completed and questions answered. Scheduled for surgery tomorrow 4/1 at 07:30 with Dr. Melton. Preop orders under signed and held.    MACARIO Edge, ACNPC-AG, CCRN  Nurse Practitioner  Cardiothoracic Surgery  Pager: [phone]     Called Dr Yessica Hernandez for admission orders, received.  See orders

## 2022-03-31 NOTE — PROGRESS NOTES
Pre op planning for TVR possible AVR. Left heart cath needed. Orders placed per request of Dr Melton.

## 2022-03-31 NOTE — PLAN OF CARE
Goal Outcome Evaluation:  Summary: Transferred from [facility] for Cardiology consult, +endocarditis, +BC  DATE & TIME: 03/31/22 ([id number])  Cognitive Concerns/ Orientation : Alert and oriented x4; pleasant   BEHAVIOR & AGGRESSION TOOL COLOR: GREEN  CIWA SCORE: NA   ABNL VS/O2: VSS on RA, tachy at times  MOBILITY: SBAx1  (walker) due to pain  PAIN MANAGMENT: Scheduled Tylenol, PRN Oxy given   DIET: NPO for angiogram, MOD CHO after procedure, NPO after midnight for surgery   BOWEL/BLADDER: Continent- using urinal, had BM today  ABNL LAB/BG: ECHO was abnormal; Blood culture gram + cocci in clusters -, 157, 96  DRAIN/DEVICES: L IV NS infusing 75/hr (IV Ancef), to complete after bedrest completed   TELEMETRY RHYTHM: NSR, tachy at times  SKIN: Intact, old boil on R side groin healed; flushed   TESTS/PROCEDURES: Angiogram (Valve surgery scheduled for tomorrow 4/1 at 7:20)   D/C DAY/GOALS/PLACE: TBD- Will need Iv antibiotics at discharge  OTHER IMPORTANT INFO: ID/Cardiac thoracic/Cardiology following; pt. asked for only his wife to be notified of his health status moving forward

## 2022-03-31 NOTE — PLAN OF CARE
Goal Outcome Evaluation:       Summary: Transferred from [facility] for Cardiology consult, +endocarditis, +BC  DATE & TIME: 03/30/[id number]  Cognitive Concerns/ Orientation : Alert and oriented x4; pleasant   BEHAVIOR & AGGRESSION TOOL COLOR: GREEN  CIWA SCORE: NA   ABNL VS/O2: VSS on RA, tachy at times  MOBILITY: SBAx1  (walker) due to pain  PAIN MANAGMENT: Scheduled Tylenol, PRN Oxy  DIET: Diabetic diet  BOWEL/BLADDER: Continent- using urinal  ABNL LAB/BG: ECHO was abnormal; Blood culture from 3/29 draw is gram + cocci in clusters - MD notified,; ; 111 WBC 16.2  DRAIN/DEVICES: L IV SL (IV Ancef)  TELEMETRY RHYTHM: Tachycardic  SKIN: Intact, old boil on R side groin healed; flushed   TESTS/PROCEDURES: Possible Heart surgery/Valve replacement scheduled  for AM (will keep NPO @ [facility])  D/C DAY/GOALS/PLACE: TBD- Will need Iv antibiotics at discharge  OTHER IMPORTANT INFO: ID/Cardiac thoracic following; pt. asked for only his wife to be notified of his health status moving forward

## 2022-03-31 NOTE — PROGRESS NOTES
[facility]  Infectious Disease Progress Note          Assessment and Plan:   IMPRESSION:     1.  A 36-year-old male with 1 week febrile illness, cause now immediately apparent with multiple blood cultures growing sensitive Staphylococcus aureus, he has Staph aureus bacteremia syndrome, probably right groin is source and concern for back is secondary involved site.  2.  Severe low back pain, new and acute problem, coincident with this infection raises concern about secondary diskitis.  3.  Right groin boil-like area, resolving on its own, but likely primary source for this bacteremia.  4.  Pulmonary and urine findings are likely insignificant, not the source of this nor involved areas -- nonspecific sepsis related findings.  5.  Prior history in 2012 of a very unusual severe cryptococcal lung infection with prolonged treatment needed.  This is fully resolved.  Never had any other signs of underlying immunosuppression, AIDS or other underlying conditions and has had no other opportunistic infections. Fully resolved with no chronic lung problems.  6.  In 2005, Staph aureus bacteremia and skin boil requiring prolonged treatment.  7.  Diabetes mellitus, control level poor recently.  8.  VANCOMYCIN AND CLINDAMYCIN LISTED ALLERGIES WITH VANCOMYCIN UNLIKELY TO BE REAL AND HAS BEEN RECHALLENGED.     RECOMMENDATIONS:     1.  Continue  Ancef   2.  Serial daily blood cultures , all rapidly +thru 3/29  3.  We will need prolonged treatment, but no long line until cleared.  4.  Follow for secondary sites and already has an obvious concerning site, which is his back,  MRI  Spine neg , may still be early discitis, but ABX alone likely OK.  5. With  blood cultures  not clearing,  transesophageal echocardiogram, it has defined the situation, incl BC not clearing, major veg and sig valve issue  Now at [facility] await surgery , ? today    6.  Discussed in great detail with the patient  and wife 3/28, now [facility] will  following here   7 despite the 2 prior unusual infections several years apart and now his third episode,  nothing really to suggest immunosuppression or long-term risks above normal.        Interval History:   no new complaints less fever still back pain no cp, sob, n/v/d, or abd pain. No new pain site or issue on exam  BC all + thru 3/29 so far, 3/30 neg TEJINDER as noted              Medications:       acetaminophen  1,000 mg Oral TID     buPROPion  300 mg Oral QAM     ceFAZolin  2 g Intravenous Q8H     insulin aspart  1-10 Units Subcutaneous TID AC     insulin aspart  1-7 Units Subcutaneous At Bedtime     insulin aspart   Subcutaneous TID AC     insulin glargine  10 Units Subcutaneous Daily     insulin glargine  34 Units Subcutaneous At Bedtime     [Held by provider] insulin glargine  34 Units Subcutaneous BID     levothyroxine  125 mcg Oral Once per day on Mon Tue Wed Thu Fri Sat     [START ON 4/3/2022] levothyroxine  250 mcg Oral Weekly     lidocaine  1 patch Transdermal Q24H     lidocaine   Transdermal Q8H     losartan  50 mg Oral Daily     sodium chloride (PF)  3 mL Intracatheter Q8H                  Physical Exam:   Blood pressure 119/77, pulse 103, temperature 98.8  F (37.1  C), temperature source Oral, resp. rate 16, SpO2 96 %.  Wt Readings from Last 2 Encounters:   03/28/22 121.1 kg (267 lb)   01/01/13 113.4 kg (250 lb)     Vital Signs with Ranges  Temp:  [98.1  F (36.7  C)-99.3  F (37.4  C)] 98.8  F (37.1  C)  Pulse:  [] 103  Resp:  [16-20] 16  BP: (118-128)/(69-88) 119/77  SpO2:  [95 %-96 %] 96 %    Constitutional: Awake, alert, cooperative, no apparent distress rigors now   Lungs: Clear to auscultation bilaterally, no crackles or wheezing   Cardiovascular: Regular rate and rhythm, normal S1 and S2, and no murmur noted   Abdomen: Normal bowel sounds, soft, non-distended, non-tender   Skin: No rashes, no cyanosis, no edema   Other:           Data:   All microbiology laboratory data reviewed.  Recent  Labs   Lab Test 03/31/22  0743 03/29/22  0755 03/28/22  0647   WBC 13.8* 16.2* 14.1*   HGB 11.0* 11.1* 10.8*   HCT 34.4* 33.8* 34.2*   MCV 89 88 91    287 239     Recent Labs   Lab Test 03/31/22  0743 03/29/22  0755 03/28/22  0647   CR 0.96 0.78 0.82     No lab results found.  No lab results found.    Invalid input(s): UC

## 2022-03-31 NOTE — PRE-PROCEDURE
GENERAL PRE-PROCEDURE:   Procedure:  Cor angio possible PCI  Date/Time:  3/31/2022 4:45 PM    Written consent obtained?: Yes    Risks and benefits: Risks, benefits and alternatives were discussed    Consent given by:  Patient  Patient states understanding of procedure being performed: Yes    Patient's understanding of procedure matches consent: Yes    Procedure consent matches procedure scheduled: Yes    Expected level of sedation:  Moderate  Appropriately NPO:  Yes  Mallampati  :  Grade 1- soft palate, uvula, tonsillar pillars, and posterior pharyngeal wall visible  Heart:  Sinus tach  History & Physical reviewed:  History and physical reviewed and no updates needed  Statement of review:  I have reviewed the lab findings, diagnostic data, medications, and the plan for sedation

## 2022-03-31 NOTE — PROGRESS NOTES
[facility]  Cardiology Progress Note    Date of Service (when I saw the patient): 03/31/2022  Primary Cardiologist: no previous cardiology care       Interval History:   No CP, SOB, or palpitations.     ----------------------------------------------------------------------------------------    Assessment:  Anil Montoya is a 36 year old male who was admitted on 3/28/2022 with progressive weakness, hematuria, and SOB.     Anil has no previous cardiac history.  No history of intravenous drug use.  No history of illicit drug abuse, tobacco abuse or alcohol abuse.  He has been diabetic on and off for the past 10 years or so.  More recently, he has stopped taking his Victoza, per his wife, and his glucoses have been as high as 460.  He was admitted several days ago with very profound weakness.  A few days prior to that, he had fevers at home.  Two weeks prior to that, he had hematuria, but he did not seek any medical attention.  He denies chest pains, worsening shortness of breath, or GI disturbances.  Last in hospital, his blood cultures came back as positive for Staph aureus.  A transesophageal echocardiogram was performed and showed multiple large vegetations involving the tricuspid valve, as well as right ventricular endothelium. The largest one is at least 2.5 cm.  [facility] was consulted and he is felt to be a good candidate for surgery.     Cardiology is consulted again to review risk and benefits for diagnostic coronary angiogram prior to planned surgery for multiple tricuspid valve vegetations.     ----------------------------------------------------------------------------------------    Plan:  -- Reviewed risk and benefits of diagnostic coronary angiogram and signed consent form; has no hx of contrast dye allergy; BMP shows normal renal fxn; has been NPO since last night   -- Patient is set for diagnostic coronary angiogram today prior to planned CV surgery for  surgical excision and possible tricuspid valve replacement.    I spent 10 minutes face-to-face or coordinating care of Anil Montoya. Over 50% of our time on the unit was spent counseling the patient and/or coordinating.      ----------------------------------------------------------------------------------------  Physical Exam   Temp: 97.9  F (36.6  C) Temp src: Oral BP: 119/77 Pulse: 103   Resp: 16 SpO2: 96 % O2 Device: None (Room air)    There were no vitals filed for this visit.    GEN:  NAD.  HEENT: Mucous membranes moist.  NECK:  No JVD.  C/V:  Regular rate and rhythm with 2/6 holosystolic murmur  RESP:CTA, melvin  GI: Abdomen soft, nontender, nondistended.    EXTREM: No pitting LE edema.   NEURO: Alert and oriented, cooperative.   PSYCH: Normal affect.  SKIN: Warm and dry.   VASC: 2+ radial and dorsalis pedis pulses bilaterally.      Medications     - MEDICATION INSTRUCTIONS -       sodium chloride         acetaminophen  1,000 mg Oral TID     aspirin  325 mg Oral Once    Or     aspirin  243 mg Oral Once     buPROPion  300 mg Oral QAM     ceFAZolin  2 g Intravenous Q8H     insulin aspart  1-10 Units Subcutaneous TID AC     insulin aspart  1-7 Units Subcutaneous At Bedtime     insulin aspart   Subcutaneous TID AC     insulin glargine  10 Units Subcutaneous Daily     insulin glargine  34 Units Subcutaneous At Bedtime     [Held by provider] insulin glargine  34 Units Subcutaneous BID     levothyroxine  125 mcg Oral Once per day on Mon Tue Wed Thu Fri Sat     [START ON 4/3/2022] levothyroxine  250 mcg Oral Weekly     lidocaine  1 patch Transdermal Q24H     lidocaine   Transdermal Q8H     losartan  50 mg Oral Daily     sodium chloride (PF)  3 mL Intracatheter Q8H     sodium chloride (PF)  3 mL Intracatheter Q8H       Data   Reviewed.      Bean Colunga PA-C   3/31/2022  Pager: [phone]

## 2022-04-01 ENCOUNTER — APPOINTMENT (OUTPATIENT)
Dept: GENERAL RADIOLOGY | Facility: CLINIC | Age: 37
DRG: 853 | End: 2022-04-01
Attending: SURGERY
Payer: COMMERCIAL

## 2022-04-01 ENCOUNTER — ANESTHESIA (OUTPATIENT)
Dept: SURGERY | Facility: CLINIC | Age: 37
DRG: 853 | End: 2022-04-01
Payer: COMMERCIAL

## 2022-04-01 PROBLEM — I38 ENDOCARDITIS DETERMINED BY ECHOCARDIOGRAPHY: Status: ACTIVE | Noted: 2022-04-01

## 2022-04-01 LAB
ABO/RH(D): NORMAL
ALBUMIN SERPL-MCNC: 1.7 G/DL (ref 3.4–5)
ALBUMIN SERPL-MCNC: 1.9 G/DL (ref 3.4–5)
ALP SERPL-CCNC: 106 U/L (ref 40–150)
ALP SERPL-CCNC: 143 U/L (ref 40–150)
ALT SERPL W P-5'-P-CCNC: 107 U/L (ref 0–70)
ALT SERPL W P-5'-P-CCNC: 81 U/L (ref 0–70)
ANION GAP SERPL CALCULATED.3IONS-SCNC: 4 MMOL/L (ref 3–14)
ANION GAP SERPL CALCULATED.3IONS-SCNC: 4 MMOL/L (ref 3–14)
ANION GAP SERPL CALCULATED.3IONS-SCNC: 5 MMOL/L (ref 3–14)
ANTIBODY SCREEN: NEGATIVE
APTT PPP: 33 SECONDS (ref 22–38)
APTT PPP: 36 SECONDS (ref 22–38)
AST SERPL W P-5'-P-CCNC: 60 U/L (ref 0–45)
AST SERPL W P-5'-P-CCNC: 63 U/L (ref 0–45)
BACTERIA BLD CULT: ABNORMAL
BASE EXCESS BLDA CALC-SCNC: -2.3 MMOL/L (ref -9–1.8)
BILIRUB SERPL-MCNC: 0.4 MG/DL (ref 0.2–1.3)
BILIRUB SERPL-MCNC: 0.5 MG/DL (ref 0.2–1.3)
BLD PROD TYP BPU: NORMAL
BLOOD COMPONENT TYPE: NORMAL
BUN SERPL-MCNC: 22 MG/DL (ref 7–30)
BUN SERPL-MCNC: 24 MG/DL (ref 7–30)
BUN SERPL-MCNC: 27 MG/DL (ref 7–30)
CA-I BLD-MCNC: 4.6 MG/DL (ref 4.4–5.2)
CALCIUM SERPL-MCNC: 8.4 MG/DL (ref 8.5–10.1)
CALCIUM SERPL-MCNC: 8.6 MG/DL (ref 8.5–10.1)
CALCIUM SERPL-MCNC: 8.9 MG/DL (ref 8.5–10.1)
CHLORIDE BLD-SCNC: 100 MMOL/L (ref 94–109)
CHLORIDE BLD-SCNC: 105 MMOL/L (ref 94–109)
CHLORIDE BLD-SCNC: 107 MMOL/L (ref 94–109)
CO2 SERPL-SCNC: 24 MMOL/L (ref 20–32)
CO2 SERPL-SCNC: 25 MMOL/L (ref 20–32)
CO2 SERPL-SCNC: 26 MMOL/L (ref 20–32)
CODING SYSTEM: NORMAL
CREAT SERPL-MCNC: 0.9 MG/DL (ref 0.66–1.25)
CREAT SERPL-MCNC: 1.04 MG/DL (ref 0.66–1.25)
CREAT SERPL-MCNC: 1.05 MG/DL (ref 0.66–1.25)
CROSSMATCH: NORMAL
ERYTHROCYTE [DISTWIDTH] IN BLOOD BY AUTOMATED COUNT: 13.4 % (ref 10–15)
ERYTHROCYTE [DISTWIDTH] IN BLOOD BY AUTOMATED COUNT: 13.5 % (ref 10–15)
ERYTHROCYTE [DISTWIDTH] IN BLOOD BY AUTOMATED COUNT: 13.6 % (ref 10–15)
FIBRINOGEN PPP-MCNC: 463 MG/DL (ref 170–490)
GFR SERPL CREATININE-BSD FRML MDRD: >90 ML/MIN/1.73M2
GLUCOSE BLD-MCNC: 250 MG/DL (ref 70–99)
GLUCOSE BLD-MCNC: 259 MG/DL (ref 70–99)
GLUCOSE BLD-MCNC: 317 MG/DL (ref 70–99)
GLUCOSE BLDC GLUCOMTR-MCNC: 108 MG/DL (ref 70–99)
GLUCOSE BLDC GLUCOMTR-MCNC: 129 MG/DL (ref 70–99)
GLUCOSE BLDC GLUCOMTR-MCNC: 172 MG/DL (ref 70–99)
GLUCOSE BLDC GLUCOMTR-MCNC: 211 MG/DL (ref 70–99)
GLUCOSE BLDC GLUCOMTR-MCNC: 246 MG/DL (ref 70–99)
GLUCOSE BLDC GLUCOMTR-MCNC: 250 MG/DL (ref 70–99)
GLUCOSE BLDC GLUCOMTR-MCNC: 307 MG/DL (ref 70–99)
GRAM STAIN RESULT: NORMAL
GRAM STAIN RESULT: NORMAL
HCO3 BLD-SCNC: 24 MMOL/L (ref 21–28)
HCT VFR BLD AUTO: 27.9 % (ref 40–53)
HCT VFR BLD AUTO: 29.1 % (ref 40–53)
HCT VFR BLD AUTO: 33.1 % (ref 40–53)
HGB BLD-MCNC: 10.4 G/DL (ref 13.3–17.7)
HGB BLD-MCNC: 8.5 G/DL (ref 13.3–17.7)
HGB BLD-MCNC: 9 G/DL (ref 13.3–17.7)
INR PPP: 1.55 (ref 0.85–1.15)
INR PPP: 1.64 (ref 0.85–1.15)
ISSUE DATE AND TIME: NORMAL
ISSUE DATE AND TIME: NORMAL
LACTATE SERPL-SCNC: 1.4 MMOL/L (ref 0.7–2)
MAGNESIUM SERPL-MCNC: 3 MG/DL (ref 1.6–2.3)
MCH RBC QN AUTO: 28.6 PG (ref 26.5–33)
MCH RBC QN AUTO: 28.8 PG (ref 26.5–33)
MCH RBC QN AUTO: 29.1 PG (ref 26.5–33)
MCHC RBC AUTO-ENTMCNC: 30.5 G/DL (ref 31.5–36.5)
MCHC RBC AUTO-ENTMCNC: 30.9 G/DL (ref 31.5–36.5)
MCHC RBC AUTO-ENTMCNC: 31.4 G/DL (ref 31.5–36.5)
MCV RBC AUTO: 92 FL (ref 78–100)
MCV RBC AUTO: 94 FL (ref 78–100)
MCV RBC AUTO: 94 FL (ref 78–100)
MRSA DNA SPEC QL NAA+PROBE: NEGATIVE
O2/TOTAL GAS SETTING VFR VENT: 60 %
OXYHGB MFR BLD: 93 % (ref 92–100)
PCO2 BLD: 52 MM HG (ref 35–45)
PH BLD: 7.28 [PH] (ref 7.35–7.45)
PHOSPHATE SERPL-MCNC: 5.8 MG/DL (ref 2.5–4.5)
PLATELET # BLD AUTO: 439 10E3/UL (ref 150–450)
PLATELET # BLD AUTO: 442 10E3/UL (ref 150–450)
PLATELET # BLD AUTO: 566 10E3/UL (ref 150–450)
PO2 BLD: 81 MM HG (ref 80–105)
POTASSIUM BLD-SCNC: 4.4 MMOL/L (ref 3.4–5.3)
POTASSIUM BLD-SCNC: 5.1 MMOL/L (ref 3.4–5.3)
PROT SERPL-MCNC: 6.4 G/DL (ref 6.8–8.8)
PROT SERPL-MCNC: 7.6 G/DL (ref 6.8–8.8)
RBC # BLD AUTO: 2.97 10E6/UL (ref 4.4–5.9)
RBC # BLD AUTO: 3.09 10E6/UL (ref 4.4–5.9)
RBC # BLD AUTO: 3.61 10E6/UL (ref 4.4–5.9)
SA TARGET DNA: NEGATIVE
SODIUM SERPL-SCNC: 131 MMOL/L (ref 133–144)
SODIUM SERPL-SCNC: 134 MMOL/L (ref 133–144)
SODIUM SERPL-SCNC: 135 MMOL/L (ref 133–144)
SPECIMEN EXPIRATION DATE: NORMAL
UNIT ABO/RH: NORMAL
UNIT NUMBER: NORMAL
UNIT STATUS: NORMAL
UNIT TYPE ISBT: 6200
WBC # BLD AUTO: 14.4 10E3/UL (ref 4–11)
WBC # BLD AUTO: 30.4 10E3/UL (ref 4–11)
WBC # BLD AUTO: 38.3 10E3/UL (ref 4–11)

## 2022-04-01 PROCEDURE — 250N000011 HC RX IP 250 OP 636: Performed by: SURGERY

## 2022-04-01 PROCEDURE — 258N000003 HC RX IP 258 OP 636: Performed by: ANESTHESIOLOGY

## 2022-04-01 PROCEDURE — 250N000013 HC RX MED GY IP 250 OP 250 PS 637: Performed by: INTERNAL MEDICINE

## 2022-04-01 PROCEDURE — 85027 COMPLETE CBC AUTOMATED: CPT | Performed by: SURGERY

## 2022-04-01 PROCEDURE — 250N000009 HC RX 250: Performed by: SURGERY

## 2022-04-01 PROCEDURE — 999N000157 HC STATISTIC RCP TIME EA 10 MIN

## 2022-04-01 PROCEDURE — 258N000003 HC RX IP 258 OP 636: Performed by: SURGERY

## 2022-04-01 PROCEDURE — 360N000079 HC SURGERY LEVEL 6, PER MIN: Performed by: SURGERY

## 2022-04-01 PROCEDURE — 88312 SPECIAL STAINS GROUP 1: CPT | Mod: TC | Performed by: SURGERY

## 2022-04-01 PROCEDURE — 85610 PROTHROMBIN TIME: CPT | Performed by: SURGERY

## 2022-04-01 PROCEDURE — 999N000141 HC STATISTIC PRE-PROCEDURE NURSING ASSESSMENT: Performed by: SURGERY

## 2022-04-01 PROCEDURE — 258N000003 HC RX IP 258 OP 636: Performed by: INTERNAL MEDICINE

## 2022-04-01 PROCEDURE — 250N000013 HC RX MED GY IP 250 OP 250 PS 637: Performed by: SURGERY

## 2022-04-01 PROCEDURE — 370N000017 HC ANESTHESIA TECHNICAL FEE, PER MIN: Performed by: SURGERY

## 2022-04-01 PROCEDURE — 94660 CPAP INITIATION&MGMT: CPT

## 2022-04-01 PROCEDURE — 84100 ASSAY OF PHOSPHORUS: CPT | Performed by: SURGERY

## 2022-04-01 PROCEDURE — 93005 ELECTROCARDIOGRAM TRACING: CPT

## 2022-04-01 PROCEDURE — 250N000011 HC RX IP 250 OP 636

## 2022-04-01 PROCEDURE — 84132 ASSAY OF SERUM POTASSIUM: CPT | Performed by: SURGERY

## 2022-04-01 PROCEDURE — 87040 BLOOD CULTURE FOR BACTERIA: CPT | Performed by: SPECIALIST

## 2022-04-01 PROCEDURE — 87101 SKIN FUNGI CULTURE: CPT | Performed by: SURGERY

## 2022-04-01 PROCEDURE — 93010 ELECTROCARDIOGRAM REPORT: CPT | Performed by: INTERNAL MEDICINE

## 2022-04-01 PROCEDURE — 87070 CULTURE OTHR SPECIMN AEROBIC: CPT | Performed by: SURGERY

## 2022-04-01 PROCEDURE — 84155 ASSAY OF PROTEIN SERUM: CPT | Performed by: SURGERY

## 2022-04-01 PROCEDURE — P9016 RBC LEUKOCYTES REDUCED: HCPCS | Performed by: INTERNAL MEDICINE

## 2022-04-01 PROCEDURE — 36415 COLL VENOUS BLD VENIPUNCTURE: CPT | Performed by: INTERNAL MEDICINE

## 2022-04-01 PROCEDURE — 200N000001 HC R&B ICU

## 2022-04-01 PROCEDURE — 999N000065 XR ABDOMEN PORT 1 VIEWS

## 2022-04-01 PROCEDURE — 82805 BLOOD GASES W/O2 SATURATION: CPT | Performed by: SURGERY

## 2022-04-01 PROCEDURE — 82330 ASSAY OF CALCIUM: CPT | Performed by: SURGERY

## 2022-04-01 PROCEDURE — 86923 COMPATIBILITY TEST ELECTRIC: CPT | Performed by: INTERNAL MEDICINE

## 2022-04-01 PROCEDURE — 258N000003 HC RX IP 258 OP 636

## 2022-04-01 PROCEDURE — 272N000001 HC OR GENERAL SUPPLY STERILE: Performed by: SURGERY

## 2022-04-01 PROCEDURE — 87641 MR-STAPH DNA AMP PROBE: CPT | Performed by: SURGERY

## 2022-04-01 PROCEDURE — 02JA4ZZ INSPECTION OF HEART, PERCUTANEOUS ENDOSCOPIC APPROACH: ICD-10-PCS | Performed by: SURGERY

## 2022-04-01 PROCEDURE — 87389 HIV-1 AG W/HIV-1&-2 AB AG IA: CPT | Performed by: SPECIALIST

## 2022-04-01 PROCEDURE — 278N000051 HC OR IMPLANT GENERAL: Performed by: SURGERY

## 2022-04-01 PROCEDURE — 02RJ48Z REPLACEMENT OF TRICUSPID VALVE WITH ZOOPLASTIC TISSUE, PERCUTANEOUS ENDOSCOPIC APPROACH: ICD-10-PCS | Performed by: SURGERY

## 2022-04-01 PROCEDURE — 258N000003 HC RX IP 258 OP 636: Performed by: SPECIALIST

## 2022-04-01 PROCEDURE — 86923 COMPATIBILITY TEST ELECTRIC: CPT | Performed by: SURGERY

## 2022-04-01 PROCEDURE — 85730 THROMBOPLASTIN TIME PARTIAL: CPT | Performed by: SURGERY

## 2022-04-01 PROCEDURE — 99233 SBSQ HOSP IP/OBS HIGH 50: CPT | Mod: GC | Performed by: INTERNAL MEDICINE

## 2022-04-01 PROCEDURE — 250N000024 HC ISOFLURANE, PER MIN: Performed by: SURGERY

## 2022-04-01 PROCEDURE — 85027 COMPLETE CBC AUTOMATED: CPT | Performed by: ANESTHESIOLOGY

## 2022-04-01 PROCEDURE — 410N000006: Performed by: SURGERY

## 2022-04-01 PROCEDURE — 85384 FIBRINOGEN ACTIVITY: CPT | Performed by: SURGERY

## 2022-04-01 PROCEDURE — C1713 ANCHOR/SCREW BN/BN,TIS/BN: HCPCS | Performed by: SURGERY

## 2022-04-01 PROCEDURE — 250N000011 HC RX IP 250 OP 636: Performed by: INTERNAL MEDICINE

## 2022-04-01 PROCEDURE — 999N000065 XR CHEST PORT 1 VIEW

## 2022-04-01 PROCEDURE — 83605 ASSAY OF LACTIC ACID: CPT | Performed by: SURGERY

## 2022-04-01 PROCEDURE — 87103 BLOOD FUNGUS CULTURE: CPT | Performed by: NURSE PRACTITIONER

## 2022-04-01 PROCEDURE — 33465 REPLACE TRICUSPID VALVE: CPT | Mod: GC | Performed by: SURGERY

## 2022-04-01 PROCEDURE — 250N000009 HC RX 250

## 2022-04-01 PROCEDURE — 87205 SMEAR GRAM STAIN: CPT | Performed by: SURGERY

## 2022-04-01 PROCEDURE — 250N000011 HC RX IP 250 OP 636: Performed by: SPECIALIST

## 2022-04-01 PROCEDURE — 36415 COLL VENOUS BLD VENIPUNCTURE: CPT | Performed by: SPECIALIST

## 2022-04-01 PROCEDURE — 80048 BASIC METABOLIC PNL TOTAL CA: CPT | Performed by: SURGERY

## 2022-04-01 PROCEDURE — 250N000011 HC RX IP 250 OP 636: Performed by: ANESTHESIOLOGY

## 2022-04-01 PROCEDURE — 87040 BLOOD CULTURE FOR BACTERIA: CPT | Performed by: INTERNAL MEDICINE

## 2022-04-01 PROCEDURE — 94002 VENT MGMT INPAT INIT DAY: CPT

## 2022-04-01 PROCEDURE — 86901 BLOOD TYPING SEROLOGIC RH(D): CPT | Performed by: ANESTHESIOLOGY

## 2022-04-01 PROCEDURE — 80053 COMPREHEN METABOLIC PANEL: CPT | Performed by: ANESTHESIOLOGY

## 2022-04-01 PROCEDURE — 87075 CULTR BACTERIA EXCEPT BLOOD: CPT | Performed by: SURGERY

## 2022-04-01 PROCEDURE — 83735 ASSAY OF MAGNESIUM: CPT | Performed by: SURGERY

## 2022-04-01 PROCEDURE — 250N000012 HC RX MED GY IP 250 OP 636 PS 637: Performed by: SURGERY

## 2022-04-01 PROCEDURE — 410N000005 HC PER-PERFUSION, SH ONLY, 1ST 30 MIN: Performed by: SURGERY

## 2022-04-01 DEVICE — ST. JUDE MEDICAL PORCINE MITRAL BIOPROSTHETIC HEART VALVE
Type: IMPLANTABLE DEVICE | Site: CHEST | Status: FUNCTIONAL
Brand: EPIC™

## 2022-04-01 DEVICE — SU DEVICE ENDO COR KNOT QUICK LOAD 030850: Type: IMPLANTABLE DEVICE | Site: CHEST | Status: FUNCTIONAL

## 2022-04-01 RX ORDER — CHLORHEXIDINE GLUCONATE ORAL RINSE 1.2 MG/ML
15 SOLUTION DENTAL EVERY 12 HOURS
Status: DISCONTINUED | OUTPATIENT
Start: 2022-04-01 | End: 2022-04-01 | Stop reason: CLARIF

## 2022-04-01 RX ORDER — PROTAMINE SULFATE 10 MG/ML
INJECTION, SOLUTION INTRAVENOUS PRN
Status: DISCONTINUED | OUTPATIENT
Start: 2022-04-01 | End: 2022-04-01

## 2022-04-01 RX ORDER — IPRATROPIUM BROMIDE AND ALBUTEROL SULFATE 2.5; .5 MG/3ML; MG/3ML
3 SOLUTION RESPIRATORY (INHALATION) EVERY 4 HOURS PRN
Status: DISCONTINUED | OUTPATIENT
Start: 2022-04-01 | End: 2022-04-04

## 2022-04-01 RX ORDER — LIDOCAINE 40 MG/G
CREAM TOPICAL
Status: DISCONTINUED | OUTPATIENT
Start: 2022-04-01 | End: 2022-04-01

## 2022-04-01 RX ORDER — MUPIROCIN 20 MG/G
0.5 OINTMENT TOPICAL 2 TIMES DAILY
Status: DISCONTINUED | OUTPATIENT
Start: 2022-04-01 | End: 2022-04-01 | Stop reason: CLARIF

## 2022-04-01 RX ORDER — DEXTROSE MONOHYDRATE, SODIUM CHLORIDE, AND POTASSIUM CHLORIDE 50; 1.49; 4.5 G/1000ML; G/1000ML; G/1000ML
INJECTION, SOLUTION INTRAVENOUS CONTINUOUS
Status: DISCONTINUED | OUTPATIENT
Start: 2022-04-01 | End: 2022-04-03

## 2022-04-01 RX ORDER — PROPOFOL 10 MG/ML
INJECTION, EMULSION INTRAVENOUS CONTINUOUS PRN
Status: DISCONTINUED | OUTPATIENT
Start: 2022-04-01 | End: 2022-04-01

## 2022-04-01 RX ORDER — ONDANSETRON 4 MG/1
4 TABLET, ORALLY DISINTEGRATING ORAL EVERY 6 HOURS PRN
Status: DISCONTINUED | OUTPATIENT
Start: 2022-04-01 | End: 2022-04-18 | Stop reason: HOSPADM

## 2022-04-01 RX ORDER — ACETAMINOPHEN 325 MG/1
650 TABLET ORAL EVERY 4 HOURS PRN
Status: DISCONTINUED | OUTPATIENT
Start: 2022-04-04 | End: 2022-04-12

## 2022-04-01 RX ORDER — NICOTINE POLACRILEX 4 MG
15-30 LOZENGE BUCCAL
Status: DISCONTINUED | OUTPATIENT
Start: 2022-04-01 | End: 2022-04-01

## 2022-04-01 RX ORDER — CALCIUM GLUCONATE 20 MG/ML
1 INJECTION, SOLUTION INTRAVENOUS
Status: ACTIVE | OUTPATIENT
Start: 2022-04-01 | End: 2022-04-04

## 2022-04-01 RX ORDER — HYDROMORPHONE HCL IN WATER/PF 6 MG/30 ML
0.2 PATIENT CONTROLLED ANALGESIA SYRINGE INTRAVENOUS
Status: DISCONTINUED | OUTPATIENT
Start: 2022-04-01 | End: 2022-04-18 | Stop reason: HOSPADM

## 2022-04-01 RX ORDER — CEFAZOLIN SODIUM IN 0.9 % NACL 3 G/100 ML
3 INTRAVENOUS SOLUTION, PIGGYBACK (ML) INTRAVENOUS
Status: COMPLETED | OUTPATIENT
Start: 2022-04-01 | End: 2022-04-01

## 2022-04-01 RX ORDER — GABAPENTIN 100 MG/1
100 CAPSULE ORAL 3 TIMES DAILY
Status: DISCONTINUED | OUTPATIENT
Start: 2022-04-01 | End: 2022-04-18 | Stop reason: HOSPADM

## 2022-04-01 RX ORDER — ACETAMINOPHEN 325 MG/1
975 TABLET ORAL EVERY 8 HOURS
Status: COMPLETED | OUTPATIENT
Start: 2022-04-01 | End: 2022-04-04

## 2022-04-01 RX ORDER — DEXTROSE MONOHYDRATE 25 G/50ML
25-50 INJECTION, SOLUTION INTRAVENOUS
Status: DISCONTINUED | OUTPATIENT
Start: 2022-04-01 | End: 2022-04-01

## 2022-04-01 RX ORDER — ALBUMIN, HUMAN INJ 5% 5 %
500 SOLUTION INTRAVENOUS
Status: DISCONTINUED | OUTPATIENT
Start: 2022-04-01 | End: 2022-04-04

## 2022-04-01 RX ORDER — CHLORHEXIDINE GLUCONATE ORAL RINSE 1.2 MG/ML
10 SOLUTION DENTAL ONCE
Status: COMPLETED | OUTPATIENT
Start: 2022-04-01 | End: 2022-04-01

## 2022-04-01 RX ORDER — LIDOCAINE HYDROCHLORIDE 20 MG/ML
INJECTION, SOLUTION INFILTRATION; PERINEURAL PRN
Status: DISCONTINUED | OUTPATIENT
Start: 2022-04-01 | End: 2022-04-01

## 2022-04-01 RX ORDER — HEPARIN SODIUM 5000 [USP'U]/.5ML
5000 INJECTION, SOLUTION INTRAVENOUS; SUBCUTANEOUS EVERY 8 HOURS
Status: DISCONTINUED | OUTPATIENT
Start: 2022-04-02 | End: 2022-04-11

## 2022-04-01 RX ORDER — HYDROMORPHONE HCL IN WATER/PF 6 MG/30 ML
0.4 PATIENT CONTROLLED ANALGESIA SYRINGE INTRAVENOUS
Status: DISCONTINUED | OUTPATIENT
Start: 2022-04-01 | End: 2022-04-18 | Stop reason: HOSPADM

## 2022-04-01 RX ORDER — NALOXONE HYDROCHLORIDE 0.4 MG/ML
0.4 INJECTION, SOLUTION INTRAMUSCULAR; INTRAVENOUS; SUBCUTANEOUS
Status: DISCONTINUED | OUTPATIENT
Start: 2022-04-01 | End: 2022-04-18 | Stop reason: HOSPADM

## 2022-04-01 RX ORDER — DEXMEDETOMIDINE HYDROCHLORIDE 4 UG/ML
.2-.7 INJECTION, SOLUTION INTRAVENOUS CONTINUOUS
Status: DISCONTINUED | OUTPATIENT
Start: 2022-04-01 | End: 2022-04-03

## 2022-04-01 RX ORDER — POLYETHYLENE GLYCOL 3350 17 G/17G
17 POWDER, FOR SOLUTION ORAL DAILY
Status: DISCONTINUED | OUTPATIENT
Start: 2022-04-02 | End: 2022-04-03

## 2022-04-01 RX ORDER — FENTANYL CITRATE 0.05 MG/ML
50 INJECTION, SOLUTION INTRAMUSCULAR; INTRAVENOUS
Status: COMPLETED | OUTPATIENT
Start: 2022-04-01 | End: 2022-04-01

## 2022-04-01 RX ORDER — BISACODYL 10 MG
10 SUPPOSITORY, RECTAL RECTAL DAILY PRN
Status: DISCONTINUED | OUTPATIENT
Start: 2022-04-01 | End: 2022-04-05

## 2022-04-01 RX ORDER — EPINEPHRINE IN 0.9 % SOD CHLOR 5 MG/250ML
.01-.1 PLASTIC BAG, INJECTION (ML) INTRAVENOUS CONTINUOUS
Status: DISCONTINUED | OUTPATIENT
Start: 2022-04-01 | End: 2022-04-03

## 2022-04-01 RX ORDER — OXYCODONE HYDROCHLORIDE 5 MG/1
10 TABLET ORAL EVERY 4 HOURS PRN
Status: DISCONTINUED | OUTPATIENT
Start: 2022-04-01 | End: 2022-04-12 | Stop reason: ALTCHOICE

## 2022-04-01 RX ORDER — PROPOFOL 10 MG/ML
5-75 INJECTION, EMULSION INTRAVENOUS CONTINUOUS
Status: DISCONTINUED | OUTPATIENT
Start: 2022-04-01 | End: 2022-04-03

## 2022-04-01 RX ORDER — ASPIRIN 81 MG/1
81 TABLET, CHEWABLE ORAL
Status: DISCONTINUED | OUTPATIENT
Start: 2022-04-01 | End: 2022-04-01

## 2022-04-01 RX ORDER — NALOXONE HYDROCHLORIDE 0.4 MG/ML
0.2 INJECTION, SOLUTION INTRAMUSCULAR; INTRAVENOUS; SUBCUTANEOUS
Status: DISCONTINUED | OUTPATIENT
Start: 2022-04-01 | End: 2022-04-18 | Stop reason: HOSPADM

## 2022-04-01 RX ORDER — NICOTINE POLACRILEX 4 MG
15-30 LOZENGE BUCCAL
Status: DISCONTINUED | OUTPATIENT
Start: 2022-04-01 | End: 2022-04-18 | Stop reason: HOSPADM

## 2022-04-01 RX ORDER — ASPIRIN 81 MG/1
162 TABLET, CHEWABLE ORAL
Status: DISCONTINUED | OUTPATIENT
Start: 2022-04-01 | End: 2022-04-01

## 2022-04-01 RX ORDER — CEFAZOLIN SODIUM IN 0.9 % NACL 3 G/100 ML
3 INTRAVENOUS SOLUTION, PIGGYBACK (ML) INTRAVENOUS SEE ADMIN INSTRUCTIONS
Status: DISCONTINUED | OUTPATIENT
Start: 2022-04-01 | End: 2022-04-01

## 2022-04-01 RX ORDER — CEFAZOLIN SODIUM/WATER 2 G/20 ML
2 SYRINGE (ML) INTRAVENOUS EVERY 8 HOURS
Status: DISCONTINUED | OUTPATIENT
Start: 2022-04-01 | End: 2022-04-01

## 2022-04-01 RX ORDER — ASPIRIN 81 MG/1
81 TABLET, CHEWABLE ORAL DAILY
Status: DISCONTINUED | OUTPATIENT
Start: 2022-04-02 | End: 2022-04-18 | Stop reason: HOSPADM

## 2022-04-01 RX ORDER — LIDOCAINE 40 MG/G
CREAM TOPICAL
Status: DISCONTINUED | OUTPATIENT
Start: 2022-04-01 | End: 2022-04-18 | Stop reason: HOSPADM

## 2022-04-01 RX ORDER — AMOXICILLIN 250 MG
1 CAPSULE ORAL 2 TIMES DAILY
Status: DISCONTINUED | OUTPATIENT
Start: 2022-04-01 | End: 2022-04-18 | Stop reason: HOSPADM

## 2022-04-01 RX ORDER — ONDANSETRON 2 MG/ML
4 INJECTION INTRAMUSCULAR; INTRAVENOUS EVERY 6 HOURS PRN
Status: DISCONTINUED | OUTPATIENT
Start: 2022-04-01 | End: 2022-04-18 | Stop reason: HOSPADM

## 2022-04-01 RX ORDER — SODIUM CHLORIDE, SODIUM LACTATE, POTASSIUM CHLORIDE, CALCIUM CHLORIDE 600; 310; 30; 20 MG/100ML; MG/100ML; MG/100ML; MG/100ML
INJECTION, SOLUTION INTRAVENOUS CONTINUOUS
Status: DISCONTINUED | OUTPATIENT
Start: 2022-04-01 | End: 2022-04-01

## 2022-04-01 RX ORDER — FAMOTIDINE 20 MG/1
20 TABLET, FILM COATED ORAL
Status: COMPLETED | OUTPATIENT
Start: 2022-04-01 | End: 2022-04-01

## 2022-04-01 RX ORDER — HEPARIN SODIUM 1000 [USP'U]/ML
INJECTION, SOLUTION INTRAVENOUS; SUBCUTANEOUS PRN
Status: DISCONTINUED | OUTPATIENT
Start: 2022-04-01 | End: 2022-04-01

## 2022-04-01 RX ORDER — OXYCODONE HYDROCHLORIDE 5 MG/1
5 TABLET ORAL EVERY 4 HOURS PRN
Status: DISCONTINUED | OUTPATIENT
Start: 2022-04-01 | End: 2022-04-12 | Stop reason: ALTCHOICE

## 2022-04-01 RX ORDER — FENTANYL CITRATE 50 UG/ML
25-50 INJECTION, SOLUTION INTRAMUSCULAR; INTRAVENOUS
Status: DISCONTINUED | OUTPATIENT
Start: 2022-04-01 | End: 2022-04-02

## 2022-04-01 RX ORDER — SODIUM CHLORIDE 9 MG/ML
INJECTION, SOLUTION INTRAVENOUS CONTINUOUS
Status: DISCONTINUED | OUTPATIENT
Start: 2022-04-01 | End: 2022-04-18 | Stop reason: HOSPADM

## 2022-04-01 RX ORDER — PROPOFOL 10 MG/ML
INJECTION, EMULSION INTRAVENOUS PRN
Status: DISCONTINUED | OUTPATIENT
Start: 2022-04-01 | End: 2022-04-01

## 2022-04-01 RX ORDER — DEXTROSE MONOHYDRATE 25 G/50ML
25-50 INJECTION, SOLUTION INTRAVENOUS
Status: DISCONTINUED | OUTPATIENT
Start: 2022-04-01 | End: 2022-04-18 | Stop reason: HOSPADM

## 2022-04-01 RX ORDER — PHENYLEPHRINE HCL IN 0.9% NACL 50MG/250ML
.1-4 PLASTIC BAG, INJECTION (ML) INTRAVENOUS CONTINUOUS PRN
Status: DISCONTINUED | OUTPATIENT
Start: 2022-04-01 | End: 2022-04-04

## 2022-04-01 RX ORDER — NOREPINEPHRINE BITARTRATE 0.06 MG/ML
.01-.4 INJECTION, SOLUTION INTRAVENOUS CONTINUOUS
Status: DISCONTINUED | OUTPATIENT
Start: 2022-04-01 | End: 2022-04-03

## 2022-04-01 RX ORDER — CALCIUM GLUCONATE 20 MG/ML
2 INJECTION, SOLUTION INTRAVENOUS
Status: ACTIVE | OUTPATIENT
Start: 2022-04-01 | End: 2022-04-04

## 2022-04-01 RX ORDER — PANTOPRAZOLE SODIUM 40 MG/1
40 TABLET, DELAYED RELEASE ORAL DAILY
Status: DISCONTINUED | OUTPATIENT
Start: 2022-04-01 | End: 2022-04-18 | Stop reason: HOSPADM

## 2022-04-01 RX ORDER — METHOCARBAMOL 750 MG/1
750 TABLET, FILM COATED ORAL EVERY 6 HOURS PRN
Status: DISCONTINUED | OUTPATIENT
Start: 2022-04-01 | End: 2022-04-02

## 2022-04-01 RX ORDER — DEXTROSE MONOHYDRATE 100 MG/ML
INJECTION, SOLUTION INTRAVENOUS CONTINUOUS PRN
Status: DISCONTINUED | OUTPATIENT
Start: 2022-04-01 | End: 2022-04-05

## 2022-04-01 RX ADMIN — SODIUM CHLORIDE: 9 INJECTION, SOLUTION INTRAVENOUS at 12:43

## 2022-04-01 RX ADMIN — CEFAZOLIN SODIUM 2 G: 2 INJECTION, SOLUTION INTRAVENOUS at 17:44

## 2022-04-01 RX ADMIN — PHENYLEPHRINE HYDROCHLORIDE 0.3 MCG/KG/MIN: 10 INJECTION INTRAVENOUS at 08:09

## 2022-04-01 RX ADMIN — ROCURONIUM BROMIDE 20 MG: 50 INJECTION, SOLUTION INTRAVENOUS at 10:30

## 2022-04-01 RX ADMIN — FENTANYL CITRATE 50 MCG: 0.05 INJECTION, SOLUTION INTRAMUSCULAR; INTRAVENOUS at 08:06

## 2022-04-01 RX ADMIN — PHENYLEPHRINE HYDROCHLORIDE 100 MCG: 10 INJECTION INTRAVENOUS at 11:22

## 2022-04-01 RX ADMIN — CHLORHEXIDINE GLUCONATE 10 ML: 1.2 SOLUTION ORAL at 03:36

## 2022-04-01 RX ADMIN — OXYCODONE HYDROCHLORIDE 10 MG: 5 TABLET ORAL at 22:56

## 2022-04-01 RX ADMIN — PROTAMINE SULFATE 300 MG: 10 INJECTION, SOLUTION INTRAVENOUS at 11:27

## 2022-04-01 RX ADMIN — FENTANYL CITRATE 100 MCG: 0.05 INJECTION, SOLUTION INTRAMUSCULAR; INTRAVENOUS at 08:46

## 2022-04-01 RX ADMIN — ROCURONIUM BROMIDE 20 MG: 50 INJECTION, SOLUTION INTRAVENOUS at 09:47

## 2022-04-01 RX ADMIN — SODIUM CHLORIDE: 9 INJECTION, SOLUTION INTRAVENOUS at 07:49

## 2022-04-01 RX ADMIN — CEFAZOLIN SODIUM 2 G: 2 INJECTION, SOLUTION INTRAVENOUS at 02:41

## 2022-04-01 RX ADMIN — Medication 3 G: at 11:55

## 2022-04-01 RX ADMIN — ACETAMINOPHEN 975 MG: 325 TABLET ORAL at 22:56

## 2022-04-01 RX ADMIN — SODIUM CHLORIDE, POTASSIUM CHLORIDE, SODIUM LACTATE AND CALCIUM CHLORIDE: 600; 310; 30; 20 INJECTION, SOLUTION INTRAVENOUS at 07:49

## 2022-04-01 RX ADMIN — SODIUM CHLORIDE: 9 INJECTION, SOLUTION INTRAVENOUS at 20:00

## 2022-04-01 RX ADMIN — SODIUM CHLORIDE, POTASSIUM CHLORIDE, SODIUM LACTATE AND CALCIUM CHLORIDE: 600; 310; 30; 20 INJECTION, SOLUTION INTRAVENOUS at 12:43

## 2022-04-01 RX ADMIN — MICAFUNGIN SODIUM 100 MG: 50 INJECTION, POWDER, LYOPHILIZED, FOR SOLUTION INTRAVENOUS at 16:22

## 2022-04-01 RX ADMIN — Medication 40 MG: at 15:14

## 2022-04-01 RX ADMIN — PHENYLEPHRINE HYDROCHLORIDE 100 MCG: 10 INJECTION INTRAVENOUS at 11:26

## 2022-04-01 RX ADMIN — ROCURONIUM BROMIDE 20 MG: 50 INJECTION, SOLUTION INTRAVENOUS at 11:27

## 2022-04-01 RX ADMIN — PHENYLEPHRINE HYDROCHLORIDE 100 MCG: 10 INJECTION INTRAVENOUS at 11:24

## 2022-04-01 RX ADMIN — MIDAZOLAM 3 MG: 1 INJECTION INTRAMUSCULAR; INTRAVENOUS at 08:01

## 2022-04-01 RX ADMIN — MIDAZOLAM 2 MG: 1 INJECTION INTRAMUSCULAR; INTRAVENOUS at 11:07

## 2022-04-01 RX ADMIN — ACETAMINOPHEN 975 MG: 325 TABLET ORAL at 15:13

## 2022-04-01 RX ADMIN — VANCOMYCIN HYDROCHLORIDE 2000 MG: 5 INJECTION, POWDER, LYOPHILIZED, FOR SOLUTION INTRAVENOUS at 07:43

## 2022-04-01 RX ADMIN — OXYCODONE HYDROCHLORIDE 10 MG: 5 TABLET ORAL at 19:06

## 2022-04-01 RX ADMIN — FENTANYL CITRATE 50 MCG: 0.05 INJECTION, SOLUTION INTRAMUSCULAR; INTRAVENOUS at 07:51

## 2022-04-01 RX ADMIN — HEPARIN SODIUM 48000 UNITS: 1000 INJECTION INTRAVENOUS; SUBCUTANEOUS at 08:55

## 2022-04-01 RX ADMIN — LIDOCAINE HYDROCHLORIDE 100 MG: 20 INJECTION, SOLUTION INFILTRATION; PERINEURAL at 08:01

## 2022-04-01 RX ADMIN — ROCURONIUM BROMIDE 30 MG: 50 INJECTION, SOLUTION INTRAVENOUS at 09:05

## 2022-04-01 RX ADMIN — OXYCODONE HYDROCHLORIDE 10 MG: 5 TABLET ORAL at 15:13

## 2022-04-01 RX ADMIN — ROCURONIUM BROMIDE 50 MG: 50 INJECTION, SOLUTION INTRAVENOUS at 08:02

## 2022-04-01 RX ADMIN — HYDROMORPHONE HYDROCHLORIDE 0.4 MG: 0.2 INJECTION, SOLUTION INTRAMUSCULAR; INTRAVENOUS; SUBCUTANEOUS at 19:52

## 2022-04-01 RX ADMIN — SODIUM CHLORIDE 4 UNITS/HR: 9 INJECTION, SOLUTION INTRAVENOUS at 18:49

## 2022-04-01 RX ADMIN — MIDAZOLAM HYDROCHLORIDE 1 MG: 1 INJECTION, SOLUTION INTRAMUSCULAR; INTRAVENOUS at 07:11

## 2022-04-01 RX ADMIN — PHENYLEPHRINE HYDROCHLORIDE 100 MCG: 10 INJECTION INTRAVENOUS at 11:52

## 2022-04-01 RX ADMIN — PHENYLEPHRINE HYDROCHLORIDE 1.2 MCG/KG/MIN: 10 INJECTION INTRAVENOUS at 17:43

## 2022-04-01 RX ADMIN — PROPOFOL 50 MCG/KG/MIN: 10 INJECTION, EMULSION INTRAVENOUS at 12:08

## 2022-04-01 RX ADMIN — POTASSIUM CHLORIDE, DEXTROSE MONOHYDRATE AND SODIUM CHLORIDE 30 ML/HR: 150; 5; 450 INJECTION, SOLUTION INTRAVENOUS at 13:00

## 2022-04-01 RX ADMIN — METOPROLOL TARTRATE 12.5 MG: 25 TABLET, FILM COATED ORAL at 03:37

## 2022-04-01 RX ADMIN — HYDROMORPHONE HYDROCHLORIDE 0.4 MG: 0.2 INJECTION, SOLUTION INTRAMUSCULAR; INTRAVENOUS; SUBCUTANEOUS at 23:39

## 2022-04-01 RX ADMIN — FENTANYL CITRATE 50 MCG: 0.05 INJECTION, SOLUTION INTRAMUSCULAR; INTRAVENOUS at 09:03

## 2022-04-01 RX ADMIN — Medication 3 G: at 07:51

## 2022-04-01 RX ADMIN — FENTANYL CITRATE 100 MCG: 0.05 INJECTION, SOLUTION INTRAMUSCULAR; INTRAVENOUS at 08:56

## 2022-04-01 RX ADMIN — PROPOFOL 50 MG: 10 INJECTION, EMULSION INTRAVENOUS at 08:01

## 2022-04-01 RX ADMIN — ROCURONIUM BROMIDE 20 MG: 50 INJECTION, SOLUTION INTRAVENOUS at 08:50

## 2022-04-01 RX ADMIN — DEXMEDETOMIDINE HYDROCHLORIDE 0.3 MCG/KG/HR: 100 INJECTION, SOLUTION INTRAVENOUS at 08:31

## 2022-04-01 RX ADMIN — PROPOFOL 50 MCG/KG/MIN: 10 INJECTION, EMULSION INTRAVENOUS at 14:31

## 2022-04-01 RX ADMIN — ROCURONIUM BROMIDE 10 MG: 50 INJECTION, SOLUTION INTRAVENOUS at 10:54

## 2022-04-01 RX ADMIN — OXYCODONE HYDROCHLORIDE 10 MG: 5 TABLET ORAL at 03:41

## 2022-04-01 RX ADMIN — ROCURONIUM BROMIDE 30 MG: 50 INJECTION, SOLUTION INTRAVENOUS at 08:34

## 2022-04-01 RX ADMIN — PHENYLEPHRINE HYDROCHLORIDE 100 MCG: 10 INJECTION INTRAVENOUS at 11:42

## 2022-04-01 RX ADMIN — ROCURONIUM BROMIDE 20 MG: 50 INJECTION, SOLUTION INTRAVENOUS at 08:25

## 2022-04-01 RX ADMIN — FENTANYL CITRATE 50 MCG: 0.05 INJECTION, SOLUTION INTRAMUSCULAR; INTRAVENOUS at 08:09

## 2022-04-01 RX ADMIN — MIDAZOLAM 1 MG: 1 INJECTION INTRAMUSCULAR; INTRAVENOUS at 10:37

## 2022-04-01 RX ADMIN — METHOCARBAMOL 750 MG: 750 TABLET ORAL at 17:49

## 2022-04-01 RX ADMIN — FENTANYL CITRATE 50 MCG: 50 INJECTION, SOLUTION INTRAMUSCULAR; INTRAVENOUS at 14:32

## 2022-04-01 RX ADMIN — SODIUM CHLORIDE, POTASSIUM CHLORIDE, SODIUM LACTATE AND CALCIUM CHLORIDE: 600; 310; 30; 20 INJECTION, SOLUTION INTRAVENOUS at 07:15

## 2022-04-01 RX ADMIN — FENTANYL CITRATE 50 MCG: 0.05 INJECTION, SOLUTION INTRAMUSCULAR; INTRAVENOUS at 07:11

## 2022-04-01 RX ADMIN — FAMOTIDINE 20 MG: 20 TABLET ORAL at 03:37

## 2022-04-01 RX ADMIN — VANCOMYCIN HYDROCHLORIDE 1500 MG: 5 INJECTION, POWDER, LYOPHILIZED, FOR SOLUTION INTRAVENOUS at 20:14

## 2022-04-01 RX ADMIN — SENNOSIDES AND DOCUSATE SODIUM 1 TABLET: 50; 8.6 TABLET ORAL at 20:23

## 2022-04-01 RX ADMIN — Medication 3 UNITS/HR: at 11:58

## 2022-04-01 RX ADMIN — FENTANYL CITRATE 150 MCG: 0.05 INJECTION, SOLUTION INTRAMUSCULAR; INTRAVENOUS at 08:01

## 2022-04-01 RX ADMIN — GABAPENTIN 100 MG: 100 CAPSULE ORAL at 20:23

## 2022-04-01 RX ADMIN — GABAPENTIN 100 MG: 100 CAPSULE ORAL at 15:14

## 2022-04-01 RX ADMIN — HYDROMORPHONE HYDROCHLORIDE 0.4 MG: 0.2 INJECTION, SOLUTION INTRAMUSCULAR; INTRAVENOUS; SUBCUTANEOUS at 17:49

## 2022-04-01 RX ADMIN — SUGAMMADEX 250 MG: 100 INJECTION, SOLUTION INTRAVENOUS at 12:50

## 2022-04-01 RX ADMIN — EPINEPHRINE 0.03 MCG/KG/MIN: 1 INJECTION INTRAMUSCULAR; INTRAVENOUS; SUBCUTANEOUS at 11:06

## 2022-04-01 RX ADMIN — MIDAZOLAM 2 MG: 1 INJECTION INTRAMUSCULAR; INTRAVENOUS at 07:49

## 2022-04-01 RX ADMIN — FENTANYL CITRATE 50 MCG: 0.05 INJECTION, SOLUTION INTRAMUSCULAR; INTRAVENOUS at 08:39

## 2022-04-01 RX ADMIN — AMINOCAPROIC ACID 5 G/HR: 250 INJECTION, SOLUTION INTRAVENOUS at 08:09

## 2022-04-01 RX ADMIN — MIDAZOLAM 2 MG: 1 INJECTION INTRAMUSCULAR; INTRAVENOUS at 09:05

## 2022-04-01 RX ADMIN — ROCURONIUM BROMIDE 10 MG: 50 INJECTION, SOLUTION INTRAVENOUS at 12:00

## 2022-04-01 RX ADMIN — AMIODARONE HYDROCHLORIDE 150 MG: 1.5 INJECTION, SOLUTION INTRAVENOUS at 11:03

## 2022-04-01 ASSESSMENT — ACTIVITIES OF DAILY LIVING (ADL)
ADLS_ACUITY_SCORE: 7

## 2022-04-01 NOTE — PROGRESS NOTES
Extubation Note    Successful completion of SBT (Yes or No):yes  Extubation time:1602    Patient assessment:  Lung sounds:clear  Stridor Present (Yes or No): no  Patient tolerance: good    Oxygen device:  Liter flow:4 LNC  SpO2:98    Plan:Will continue to follow.  Gaby Rick, RT

## 2022-04-01 NOTE — PLAN OF CARE
Goal Outcome Evaluation:       Summary: Transferred from [facility] for Cardiology consult, +endocarditis, +BC  DATE & TIME: 03/31/22 ([id number])  Cognitive Concerns/ Orientation : Alert and oriented x4; pleasant   BEHAVIOR & AGGRESSION TOOL COLOR: GREEN  CIWA SCORE: NA   ABNL VS/O2: VSS on RA, tachy at times  MOBILITY: SBAx1  (walker) due to pain  PAIN MANAGMENT: Scheduled Tylenol, PRN Oxy  DIET: NPO (TVR)  BOWEL/BLADDER: Continent- using urinal  ABNL LAB/BG: ECHO was abnormal; Blood culture gram + cocci in clusters -, 307  DRAIN/DEVICES: PIV  TELEMETRY RHYTHM: NSR, tachy at times  SKIN: Intact, old boil on R side groin healed; flushed   TESTS/PROCEDURES: Angiogram (future Valve replacement planned 4/1/2022   D/C DAY/GOALS/PLACE: TBD- Will need Iv antibiotics at discharge  OTHER IMPORTANT INFO: ID/Cardiac thoracic/Cardiology following; pt. asked for only his wife to be notified of his health status moving forward

## 2022-04-01 NOTE — ANESTHESIA PROCEDURE NOTES
Airway       Patient location during procedure: OR       Procedure Start/Stop Times: 4/1/2022 8:05 AM  Staff -        Anesthesiologist:  Jay Jay Hernandez MD       CRNA: Sarwat Moon APRN CRNA       Performed By: CRNA  Consent for Airway        Urgency: elective  Indications and Patient Condition       Indications for airway management: shira-procedural       Induction type:intravenous       Mask difficulty assessment: 1 - vent by mask    Final Airway Details       Final airway type: endotracheal airway       Successful airway: ETT - single  Endotracheal Airway Details        ETT size (mm): 8.0       Cuffed: yes       Successful intubation technique: video laryngoscopy       VL Blade Size: Reid 4       Grade View of Cords: 1       Adjucts: stylet       Position: Right       Measured from: gums/teeth       Secured at (cm): 23       Bite block used: None    Post intubation assessment        Placement verified by: capnometry, equal breath sounds and chest rise        Number of attempts at approach: 1       Number of other approaches attempted: 0       Secured with: pink tape       Ease of procedure: easy       Dentition: Intact and Unchanged

## 2022-04-01 NOTE — PROGRESS NOTES
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 Possible secondary seeding of the lumbar spine though MRI is negative. He is s/p tricuspid valve replacement surgery and aortic valve exploration for control of infection today 04/01 with positive gram stain of tricuspid valve tissue for GPC and yeast. Cxs are awaited.    Recommendations:  1. Repeat blood cxs X 2 sets , and daily X 2 sets until clear  2. Continue Cefazolin  3. Add Micafungin given positive yeast seen on gram stain on tricuspid valve  4. HIV antigen/antobody test  5. Follow valve tissue cx  6. Glycemic control  7. Follow clinical status and labs      Sol Jolly MD    Interval History   Patient seen and examined, chart reviewed  Prolonged high grade MSSA sepsis with tricuspid valve endocarditis. S/o valve replacement surgery today. Blood cxs have not sterilized, valve tissue stain also shows yeast    Physical Exam   Temp: 97.4  F (36.3  C) Temp src: Temporal BP: 127/82 Pulse: 85   Resp: 18 SpO2: 100 % O2 Device: Mechanical Ventilator Oxygen Delivery: 3 LPM  Vitals:    04/01/22 0632   Weight: 121.1 kg (267 lb)     Vital Signs with Ranges  Temp:  [97.4  F (36.3  C)-98.8  F (37.1  C)] 97.4  F (36.3  C)  Pulse:  [] 85  Resp:  [13-18] 18  BP: (112-149)/(73-98) 127/82  MAP:  [72 mmHg-77 mmHg] 72 mmHg  Arterial Line BP: (104-111)/(56-61) 104/56  FiO2 (%):  [60 %] 60 %  SpO2:  [95 %-100 %] 100 %    Constitutional: intubated  Lungs anterior auscultation clear  Cardiovascular: S1 S2, surgical dressing in place  Abdomen: soft  Skin: No rash    Other:    Medications     aminocaproic acid (AMICAR) infusion 7.5gm/150mL ADULT       dexmedetomidine       dextrose        dextrose 5% and 0.45% NaCl + KCl 20 mEq/L 30 mL/hr (04/01/22 1300)     EPINEPHrine 0.03 mcg/kg/min (04/01/22 1345)     insulin regular 3 Units/hr (04/01/22 1300)     propofol (DIPRIVAN) infusion 50 mcg/kg/min (04/01/22 1431)    And     - MEDICATION INSTRUCTIONS -       norepinephrine       phenylephrine 0.7 mcg/kg/min (04/01/22 1343)     BETA BLOCKER NOT PRESCRIBED         acetaminophen  975 mg Oral Q8H     [START ON 4/2/2022] aspirin  81 mg Oral or NG Tube Daily     buPROPion  300 mg Oral QAM     ceFAZolin  2 g Intravenous Q8H     chlorhexidine  15 mL Mouth/Throat Q12H     gabapentin  100 mg Oral TID     [START ON 4/2/2022] heparin ANTICOAGULANT  5,000 Units Subcutaneous Q8H     lactated ringers  250 mL Intravenous Once     levothyroxine  125 mcg Oral Once per day on Mon Tue Wed Thu Fri Sat     [START ON 4/3/2022] levothyroxine  250 mcg Oral Weekly     lidocaine   Transdermal Q8H     pantoprazole  40 mg Oral or NG Tube Daily    Or     pantoprazole  40 mg Oral Daily     [START ON 4/2/2022] polyethylene glycol  17 g Oral Daily     senna-docusate  1 tablet Oral BID     sodium chloride (PF)  3 mL Intracatheter Q8H     vancomycin  1,500 mg Intravenous Q12H       Data   All microbiology laboratory data reviewed.  Recent Labs   Lab Test 04/01/22  1300 04/01/22  1141 04/01/22  0351   WBC 38.3* 30.4* 14.4*   HGB 9.0* 8.5* 10.4*   HCT 29.1* 27.9* 33.1*   MCV 94 94 92   * 442 439     Recent Labs   Lab Test 04/01/22  1300 04/01/22  1141 04/01/22  0351   CR 1.04 1.05 0.90     Component      Latest Ref Rng & Units 4/1/2022   MRSA Target DNA      Negative Negative   SA Target DNA       Negative     Component      Latest Ref Rng & Units 3/27/2022   Hemoglobin A1C      0.0 - 5.6 % 12.6 (H)     Microbiology  4/1 tricuspid valve tissue  Heart Valve, Tricuspid; Tissue          0 Result Notes         (important suggestion)  Newer results are available. Click to view them now.     Gram Stain Result   Abnormal   1+ Gram  positive cocci      1+ Yeast      4+ WBC seen   Predominantly PMNs           Blood cultures 3/22-3/31 MSSA  Peripheral Blood          0 Result Notes    Culture Positive on the 1st day of incubation Abnormal        Staphylococcus aureus Panic     1 of 2 bottles        Resulting Agency: [facility]       Susceptibility     Staphylococcus aureus     BLAZE     Clindamycin <=0.25 ug/mL Susceptible     Erythromycin <=0.25 ug/mL Susceptible     Gentamicin <=0.5 ug/mL Susceptible     Oxacillin <=0.25 ug/mL Susceptible 1     Tetracycline <=1.0 ug/mL Susceptible     Trimethoprim/Sulfamethoxazole <=0.5/9.5 u... Susceptible     Vancomycin 2.0 ug/mL Susceptible                   Imaging  3/22 TEJINDER  Interpretation Summary     Tricuspid valve leaflets are thickened, and the atrial aspect of the septal  leaflet is irregularly bordered with small mobile filamentous elements (0.7cm  long). There is a large fixed heterogeneous mass in the right ventricle  attached to the interventricular septum (2.5 x 2cm), appears to be connected  to the tricuspid subvalvular apparatus, with a pedunculated round mobile  element (1.8 x 1cm). There is no significant tricuspid regurgitation.  Differential includes endocarditis involving the tricuspid valve with  extension of a large vegetation into the RV with associated thrombus, or  possibly endocarditis and a separate RV tumor with associated thrombus.  This mobile RVOT component does not appear to interfere with the pulmonary  valve apparatus, normal PV function on Doppler interrogation.     Aortic valve cusps are slightly thickened. There are very small filamentous  strands (0.2cm) on the aortic aspect of the valve on the 3-chamber view, which  may reflect benign fibrin, however cannot exclude early endocarditis. Aortic  valve function is normal.     Left ventricular systolic function is normal. The visual ejection fraction is  55-60%.  Right ventricle is normal in structure, function and size.  A contrast  injection (Bubble Study) was performed that was negative for flow  across the interatrial septum.  The left atrial appendage was well visualized and free of thrombus.  No clear evidence of vegetations involving the mitral valve, pulmonic valve.     Addendum: Reviewed aortic valve as above, discussed with CT surgeon.

## 2022-04-01 NOTE — ANESTHESIA POSTPROCEDURE EVALUATION
Patient: Anil Montoya    Procedure: Procedure(s):  TRICUSPID VALVE REPLACEMENT  AORTIC VALVE exploration       Anesthesia Type:  General    Note:  Disposition: ICU            ICU Sign Out: Anesthesiologist/ICU physician sign out WAS performed   Postop Pain Control:    PONV:    Neuro/Psych:    Airway/Respiratory:             Sign Out: AIRWAY IN SITU/Resp. Support               Airway in situ/Resp. Support: ETT                 Reason: Planned Pre-op   CV/Hemodynamics: Uneventful            Sign Out: Acceptable CV status; No obvious hypovolemia; No obvious fluid overload   Other NRE: NONE   DID A NON-ROUTINE EVENT OCCUR? No    Event details/Postop Comments:  Patient transported from the OR to the ICU, intubated and sedated, while monitored.  Sign-out was given to the MD, RN and RT.  All questions were answered.  Transport was without incident.      Jay Jay Hernandez MD             Last vitals:  Vitals Value Taken Time   /73 04/01/22 1302   Temp 37.9  C (100.22  F) 04/01/22 1433   Pulse 192 04/01/22 1433   Resp 25 04/01/22 1433   SpO2 100 % 04/01/22 1433   Vitals shown include unvalidated device data.    Electronically Signed By: Jay Jay Hernandez MD  April 1, 2022  2:34 PM

## 2022-04-01 NOTE — CONSULTS
CARDIAC SURGERY NUTRITION CONSULT    Received standing order to assess and educate patient.    Will follow and complete assessment once patient is extubated and/or is transferred to medical unit.    Patient will receive nutrition education during the Outpatient Cardiac Rehab Program (nutrition classes/dietitian counseling).    Marimar Leonard, MATTHIAS, LD, [facility]

## 2022-04-01 NOTE — ANESTHESIA PROCEDURE NOTES
Arterial Line Procedure Note    Pre-Procedure   Staff -        Anesthesiologist:  Jay Jay Hernandez MD       Performed By: Anesthesiologist       Location: pre-op       Pre-Anesthestic Checklist: patient identified, IV checked, site marked, risks and benefits discussed, informed consent, monitors and equipment checked, pre-op evaluation and at physician/surgeon's request  Timeout:       Correct Patient: Yes        Correct Procedure: Yes        Correct Site: Yes        Correct Position: Yes   Line Placement:   This line was placed Pre Induction starting at 4/1/2022 6:55 AM and ending at 4/1/2022 7:06 AM  Procedure   Procedure: arterial line       Laterality: right       Insertion Site: radial (Radial).  Sterile Prep        All elements of maximal sterile barrier technique followed       Patient Prep/Sterile Barriers: hand hygiene, sterile gloves, hat, mask, draped, sterile gown, draped       Skin prep: Chloraprep  Insertion/Injection        Technique: Seldinger Technique        Catheter Type/Size: 20 gauge, 1.75 in/4.5 cm quick cath (integral wire)  Narrative         Secured by: suture       Tegaderm dressing used.       Arterial waveform: Yes        IBP within 10% of NIBP: Yes   Comments:  Two attempts made on left side prior to getting access on Right

## 2022-04-01 NOTE — PROVIDER NOTIFICATION
JAZ Hernandez and MD Melton notified of allergy to Vanco and Clinda. ID note states likely not an allergy and has been rechallenged. OK to give Vanco per Dr. Hernandez.

## 2022-04-01 NOTE — ANESTHESIA PREPROCEDURE EVALUATION
"Anesthesia Pre-Procedure Evaluation    Patient: Anil Montoya   MRN: [id number] : 1985        Procedure : Procedure(s):  TRICUSPID VALVE REPLACEMENT  AORTIC VALVE REPLACEMENT          Past Medical History:   Diagnosis Date     Pneumonia       No past surgical history on file.   Allergies   Allergen Reactions     Clindamycin      Vancomycin      \"Red Sonia Syndrome\"      Social History     Tobacco Use     Smoking status: Not on file     Smokeless tobacco: Not on file   Substance Use Topics     Alcohol use: Not on file      Wt Readings from Last 1 Encounters:   22 121.1 kg (267 lb)        Anesthesia Evaluation            ROS/MED HX  ENT/Pulmonary: Comment: Hx of PNA, and per patient left him with 80% residual lung function.    (+) sleep apnea,  (-) tobacco use   Neurologic:       Cardiovascular:     (+) hypertension-----valvular problems/murmurs Bacterial endocarditis involving TV and AV. Previous cardiac testing   Echo: Date: 3/28/22 Results:  Interpretation Summary     Tricuspid valve leaflets are thickened, and the atrial aspect of the septal  leaflet is irregularly bordered with small mobile filamentous elements (0.7cm  long). There is a large fixed heterogeneous mass in the right ventricle  attached to the interventricular septum (2.5 x 2cm), appears to be connected  to the tricuspid subvalvular apparatus, with a pedunculated round mobile  element (1.8 x 1cm). There is no significant tricuspid regurgitation.  Differential includes endocarditis involving the tricuspid valve with  extension of a large vegetation into the RV with associated thrombus, or  possibly endocarditis and a separate RV tumor with associated thrombus.  This mobile RVOT component does not appear to interfere with the pulmonary  valve apparatus, normal PV function on Doppler interrogation.     Aortic valve cusps are slightly thickened. There are very small filamentous  strands (0.2cm) on the aortic aspect of the " valve on the 3-chamber view, which  may reflect benign fibrin, however cannot exclude early endocarditis. Aortic  valve function is normal.     Left ventricular systolic function is normal. The visual ejection fraction is  55-60%.  Right ventricle is normal in structure, function and size.  A contrast injection (Bubble Study) was performed that was negative for flow  across the interatrial septum.  The left atrial appendage was well visualized and free of thrombus.  No clear evidence of vegetations involving the mitral valve, pulmonic valve.     Addendum: Reviewed aortic valve as above, discussed with CT surgeon.     ______________________________________________________________________________  TEJINDER  Normal transesophageal echocardiogram. I determined this patient to be an  appropriate candidate for the planned sedation and procedure and have  reassessed the patient immediately prior to sedation and procedure. Total  sedation time: 12 mins minutes of continuous bedside 1:1 monitoring. Versed  (4mg) was given intravenously. Fentanyl (100mcg) was given intravenously.  Consent to the procedure was obtained prior to sedation. Prior to the exam,  the oral cavity was checked and no overcrowding was noted. The transesophageal  probe was passed without difficulty. There were no complications associated  with this procedure.     Left Ventricle  The left ventricle is normal in size. Left ventricular systolic function is  normal. The visual ejection fraction is 55-60%.     Right Ventricle  The right ventricle is normal in structure, function and size.     Atria  Normal left atrial size. Right atrial size is normal. Intact atrial septum. A  contrast injection (Bubble Study) was performed that was negative for flow  across the interatrial septum. The left atrial appendage was well visualized  and free of thrombus.     Mitral Valve  The mitral valve is normal in structure and function. No evidence of  vegetations seen.      Tricuspid Valve  Tricuspid valve leaflets are thickened, and the atrial aspect of the septal  leaflet is irregularly bordered with small mobile filamentous elements (0.7cm  long). There is a large fixed heterogeneous mass in the right ventricle  attached to the interventricular septum (2.5 x 2cm), appears to be connected  to the tricuspid subvalvular apparatus, with a pedunculated round mobile  element (1.8 x 1cm). There is no significant tricuspid regurgitation.  Differential includes endocarditis involving the tricuspid valve with  extension of a large vegetation into the RV with associated thrombus, or  possibly endocarditis and a separate RV tumor with associated thrombus.  This mobile RVOT component does not appear to interfere with the pulmonary  valve apparatus, normal PV function on Doppler interrogation. Vegetation on  tricuspid valve leaflet(s).     Aortic Valve  The aortic valve is trileaflet. Aortic valve cusps are slightly thickened.  There are very small filamentous strands (0.2cm) on the aortic aspect of the  valve on the 3-chamber view, which may reflect benign fibrin, however cannot  exclude early vegetation.     Pulmonic Valve  The pulmonic valve is normal in structure and function. No evidence of  vegetations seen.     Vessels  The aortic root is normal size. Inferior vena cava not well visualized for  estimation of right atrial pressure.     Pericardial/Pleural  There is no pericardial effusion.  Stress Test: Date: Results:    ECG Reviewed: Date: 2/28/22 Results:  ST at 117bpm  Cath:  Date: 3/31/22 Results:  Conclusion      Angiographically normal coronary arteries.        METS/Exercise Tolerance:     Hematologic:       Musculoskeletal: Comment: Diskitis      GI/Hepatic:    (-) GERD   Renal/Genitourinary:       Endo:     (+) type II DM, thyroid problem, hypothyroidism, Obesity,     Psychiatric/Substance Use:     (+) psychiatric history anxiety and other (comment)     Infectious Disease:        Malignancy:       Other:            Physical Exam    Airway        Mallampati: III   TM distance: > 3 FB   Neck ROM: full   Mouth opening: > 3 cm    Respiratory Devices and Support         Dental  no notable dental history         Cardiovascular           (+) murmur       Pulmonary   pulmonary exam normal                OUTSIDE LABS:  CBC:   Lab Results   Component Value Date    WBC 13.8 (H) 03/31/2022    WBC 16.2 (H) 03/29/2022    HGB 11.0 (L) 03/31/2022    HGB 11.1 (L) 03/29/2022    HCT 34.4 (L) 03/31/2022    HCT 33.8 (L) 03/29/2022     03/31/2022     03/29/2022     BMP:   Lab Results   Component Value Date     03/31/2022     03/29/2022    POTASSIUM 4.8 03/31/2022    POTASSIUM 4.3 03/29/2022    CHLORIDE 100 03/31/2022    CHLORIDE 103 03/29/2022    CO2 28 03/31/2022    CO2 25 03/29/2022    BUN 21 03/31/2022    BUN 18 03/29/2022    CR 0.96 03/31/2022    CR 0.78 03/29/2022    GLC 96 03/31/2022     (H) 03/31/2022     COAGS: No results found for: PTT, INR, FIBR  POC: No results found for: BGM, HCG, HCGS  HEPATIC:   Lab Results   Component Value Date    ALBUMIN 1.8 (L) 03/29/2022    PROTTOTAL 7.3 03/29/2022     (H) 03/29/2022     (H) 03/29/2022    ALKPHOS 150 03/29/2022    BILITOTAL 0.5 03/29/2022     OTHER:   Lab Results   Component Value Date    LACT 0.8 03/27/2022    A1C 12.6 (H) 03/27/2022    IMAN 9.0 03/31/2022    MAG 2.1 03/22/2022    LIPASE 270 11/24/2017    .0 (H) 03/22/2022       Anesthesia Plan    ASA Status:  4   NPO Status:  NPO Appropriate    Anesthesia Type: General.     - Airway: ETT   Induction: Intravenous.   Maintenance: Inhalation.   Techniques and Equipment:     - Airway: Video-Laryngoscope     - Lines/Monitors: Arterial Line, Central Line, CVP, TEJINDER            TEJINDER Absolute Contra-indication: NONE            TEJINDER Relative Contra-indication: NONE     - Blood: Blood in Room, PRBC     - Drips/Meds: Dexmed. infusion     Consents    Anesthesia Plan(s)  and associated risks, benefits, and realistic alternatives discussed. Questions answered and patient/representative(s) expressed understanding.    - Discussed:     - Discussed with:  Patient      - Extended Intubation/Ventilatory Support Discussed: Yes.      - Patient is DNR/DNI Status: No    Use of blood products discussed: Yes.     - Discussed with: Patient.     - Consented: consented to blood products            Reason for refusal: other.     Postoperative Care    Pain management: IV analgesics, Oral pain medications.   PONV prophylaxis: Ondansetron (or other 5HT-3)     Comments:    Other Comments: Induction with Lidocaine, Fentanyl, Versed, Ronn and Propofol  Precedex gtt after Induction  50mL bag of NS and micro-dripper for Protamine admin  Propofol gtt for transport            Jay Jay Hernandez MD

## 2022-04-01 NOTE — ANESTHESIA CARE TRANSFER NOTE
Patient: Anil Montoya    Procedure: Procedure(s):  TRICUSPID VALVE REPLACEMENT  AORTIC VALVE exploration       Diagnosis: Endocarditis [I38]  Diagnosis Additional Information: No value filed.    Anesthesia Type:   General     Note:    Oropharynx: oral gastic tube in place, ventilatory support and endotracheal tube in place  Level of Consciousness: iatrogenic sedation    Level of Supplemental Oxygen (L/min / FiO2): 10  Independent Airway: airway patency not satisfactory and stable  Dentition: dentition unchanged  Vital Signs Stable: post-procedure vital signs reviewed and stable  Report to RN Given: handoff report given  Patient transferred to: ICU (CV ICU)  Comments: Patient connected to SpO2, EKG, and arterial blood pressure transport monitors and accompanied by CRNA, anesthesiologist, circulating RN, surgeon (fellow) to ICU room. Patient ventilated by anesthesiologist with ambu via ETT with O2 at 10 liters per minute during transport.     On arrival to ICU, endotracheal tube position unchanged, equal, bilateral breath sounds auscultated in ICU room, patient placed on ICU ventilator by respiratory therapist, teeth and oral mucosa intact and unchanged at handoff of care. At anesthesia handoff of care, clinical monitors and alarms on and functioning, vent is on, pt vitals are stable. Report given to RN and labs being drawn. Pt was reversed per MDA.   ICU Handoff: Call for PAUSE to initiate/utilize ICU HANDOFF, Identified Patient, Identified Responsible Provider, Reviewed the Pertinent Medical History, Discussed Surgical Course, Reviewed Intra-OP Anesthesia Management and Issues during Anesthesia, Set Expectations for Post Procedure Period and Allowed Opportunity for Questions and Acknowledgement of Understanding      Vitals:  Vitals Value Taken Time   /73 04/01/22 1302   Temp     Pulse 176 04/01/22 1305   Resp 24 04/01/22 1305   SpO2 100 % 04/01/22 1305   Vitals shown include unvalidated  device data.    Electronically Signed By: MACARIO Calvo CRNA  April 1, 2022  1:06 PM

## 2022-04-01 NOTE — PROGRESS NOTES
[facility]  Cardiovascular and Thoracic Surgery Daily Note          Assessment and Plan:   Anil Montoya is a 36 year old gentleman who presented to [facility] ED on 3/22/22 with weakness, recent hematuria and shortness of breath. Workup demonstrated MSSA bacteremia likely secondary to right groin skin source. TEJINDER on 3/27/22 demonstrated tricuspid valve endocarditis. In completing full work up, there was concern for discitis with seeding however MRI negative. He was transferred on 3/28/22 to [facility] for consideration of surgical intervention.    PMH includes: Poorly controlled diabetic, HTN, hypothyroidism, obesity, WARNER, anxiety, cryptococcal pneumonia in 2012 with previous history of staph aureus bacteremia.    Most recent echo demonstrates LVEF 55-60%, normal RV function, mildly thickened cusps on aortic valve and thickened tricuspid valve leaflets, small mobile filamentous elements on the septal leaflet of TV and larged fixed heterogeneous mass in the RV attached to the interventricular septum.  Coronary angiogram 3/31/22 with no e/o CAD.      POD #1 s/p:  1. Tricuspid valve replacement (31mm Epic stented valve)  2. Endoscopic aortic valve exploration  3. Transesophageal echocardiogram on 4/1/22 with Dr. 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  Advanced HD: CVP 6-8; currently AV paced with underlying rhythm of second degree HB  [PTA meds on hold: losartan 50 mg daily]  - ASA 81 mg daily  - Defer BB given heart block  - No statin indicated  - ID as below  - CTs in to suction, leave in, continue pacing    Resp:   WARNER  History of cryptococcal pneumonia  Extubated on POD 0, now saturating well on 2 LPM cannula  - Continue pulm hygiene efforts: IS/flutter valve/mobility  - Titrate O2 to maintain sats >92%    Neuro:    Acute postoperative pain  MDD/VIVEK  - Not well controlled with current regimen this AM, add toradol x1  - Robaxin scheduled, lidocaine  patches added  - PTA Wellbutrin resumed    Renal/Electrolyte:   Creat stable, below preoperative weight  - Strict I/O, daily weights  - Avoid/limit nephrotoxins as able  - Replete lytes per protocol    GI/Nutrition:    Mildly elevated LFTs, improving  - Tolerating current diet:  Orders Placed This Encounter      Moderate Consistent Carb (60 g CHO per Meal) Diet   - Continue bowel regimen, no BM yet  - PPI    :    - Schwartz in place for strict I/O    Endo:  Stress induced hyperglycemia   Poorly controlled DM2  Hypothyroidism  Preop A1c   Lab Results   Component Value Date    A1C 12.6 03/27/2022    [PTA meds on hold: Victoza and metformin]  - On insulin infusion, transition to sliding scale insulin and lantus  - Hospitalists managing diabetic regimen, appreciate  - Goal BG <180 for optimal healing  - PTA levothyroxine resumed    ID:  Stress induced leukocytosis  Tricuspid valve endocarditis  MSSA bacteremia  History cryptococcal pneumonia  Prelim intraop gram stain positive for yeast from tricuspid valve  WBC 16.1, Tmax 100.4; no s/sx infection  - Completing perioperative ABX  - Continue Cefazolin per ID  - Micafungin started 4/1 per ID for positive yeast on gram stain  - HIV antigen/antibody test sent   - Per ID: repeat blood cultures x 2 sets daily until clear  - Will need optho consult once more stable  - Continue to follow fever curve, WBC and inflammatory markers as appropriate    Heme:  Acute blood loss anemia  Acute blood loss thrombocytopenia  Hgb 9.1, Plts 535; no s/sx active bleeding  - CBC in AM  - Goal Hgb >7    -Proph: PPI, subcutaneous heparin, SCDs  -Dispo: Continue in ICU with current pacing needs, continue therapies, pulm hygiene          Interval History:   No acute events overnight. States pain is not very well managed on current regimen, Breathing is tough/tight. Tolerating clears, is not yet passing flatus, +belching, no BM. Denies chest pain, palpitations, syncopal symptoms, chills, myalgias,  sternal popping/clicking. Endorses dizziness.         Medications:       acetaminophen  975 mg Oral Q8H     [START ON 4/2/2022] aspirin  81 mg Oral or NG Tube Daily     buPROPion  300 mg Oral QAM     ceFAZolin  2 g Intravenous Q8H     chlorhexidine  15 mL Mouth/Throat Q12H     gabapentin  100 mg Oral TID     [START ON 4/2/2022] heparin ANTICOAGULANT  5,000 Units Subcutaneous Q8H     lactated ringers  250 mL Intravenous Once     levothyroxine  125 mcg Oral Once per day on Mon Tue Wed Thu Fri Sat     [START ON 4/3/2022] levothyroxine  250 mcg Oral Weekly     lidocaine   Transdermal Q8H     micafungin  100 mg Intravenous Q24H     pantoprazole  40 mg Oral or NG Tube Daily    Or     pantoprazole  40 mg Oral Daily     [START ON 4/2/2022] polyethylene glycol  17 g Oral Daily     senna-docusate  1 tablet Oral BID     sodium chloride (PF)  3 mL Intracatheter Q8H     vancomycin  1,500 mg Intravenous Q12H             Physical Exam:   Vitals were reviewed  Blood pressure 104/69, pulse 80, temperature 100.4  F (38  C), resp. rate 20, weight 121.1 kg (267 lb), SpO2 100 %.  Rhythm: Paced    Lungs: CTA anterior/dim bases, +O2    Cardiovascular: S1, S2, RRR, no m/r/g    Abdomen: S/ND/ND, rare BS    Extremeties: mild edema, warm and well perfused    Incision: CDI    CT: to suction, no air leak, no crepitus    Weight:   Vitals:    04/01/22 0632   Weight: 121.1 kg (267 lb)            Data:   Labs:   Lab Results   Component Value Date    WBC 38.3 04/01/2022    WBC 6.8 11/24/2017     Lab Results   Component Value Date    RBC 3.09 04/01/2022    RBC 5.16 11/24/2017     Lab Results   Component Value Date    HGB 9.0 04/01/2022    HGB 15.2 11/24/2017     Lab Results   Component Value Date    HCT 29.1 04/01/2022    HCT 43.7 11/24/2017     No components found for: MCT  Lab Results   Component Value Date    MCV 94 04/01/2022    MCV 85 11/24/2017     Lab Results   Component Value Date    MCH 29.1 04/01/2022    MCH 29.5 11/24/2017     Lab Results    Component Value Date    MCHC 30.9 04/01/2022    MCHC 34.8 11/24/2017     Lab Results   Component Value Date    RDW 13.5 04/01/2022    RDW 12.9 11/24/2017     Lab Results   Component Value Date     04/01/2022     11/24/2017       Last Basic Metabolic Panel:  Lab Results   Component Value Date     04/01/2022     11/24/2017      Lab Results   Component Value Date    POTASSIUM 5.1 04/01/2022    POTASSIUM 5.1 04/01/2022    POTASSIUM 3.8 11/24/2017     Lab Results   Component Value Date    CHLORIDE 107 04/01/2022    CHLORIDE 104 11/24/2017     Lab Results   Component Value Date    IMAN 8.4 04/01/2022    IMAN 8.3 11/24/2017     Lab Results   Component Value Date    CO2 24 04/01/2022    CO2 24 11/24/2017     Lab Results   Component Value Date    BUN 27 04/01/2022    BUN 18 11/24/2017     Lab Results   Component Value Date    CR 1.04 04/01/2022    CR 1.07 11/24/2017     Lab Results   Component Value Date     04/01/2022     04/01/2022     11/24/2017       Imaging:  Recent Results (from the past 24 hour(s))   XR Chest Port 1 View    Narrative    CHEST ONE VIEW  4/1/2022 1:51 PM     HISTORY: Postop CVTS surgery.    COMPARISON: March 22, 2022      Impression    IMPRESSION: Endotracheal tube tip 4 cm from the robert. Right IJ line  tip in the mid SVC. Chest tubes in place, small pneumothorax evident  on the right even given the supine positioning. No left pneumothorax.    MORRIS REDMOND MD         SYSTEM ID:  [id number]   XR Abdomen Port 1 View    Narrative    XR ABDOMEN PORT 1 VIEWS   4/1/2022 1:52 PM     HISTORY: Check NG/OG tube placement    COMPARISON: 3/22/2022      Impression    IMPRESSION: NG tube tip and sidehole in the stomach. Partly visualized  mediastinal surgical drains and epicardial pacing wires. Median  sternotomy.    YESSICA FARRAR MD         SYSTEM ID:  [id number]        Rounded and discussed with Dr. Shanae Silverio, APRN, ACNPC-AG, CCRN  Nurse  Practitioner  Cardiothoracic Surgery  Pager: [phone]

## 2022-04-01 NOTE — ANESTHESIA PROCEDURE NOTES
Central Line/PA Catheter Placement    Pre-Procedure   Staff -        Anesthesiologist:  Jay Jay Hernandez MD       Performed By: Anesthesiologist       Location: pre-op       Pre-Anesthestic Checklist: patient identified, IV checked, site marked, risks and benefits discussed, informed consent, monitors and equipment checked, pre-op evaluation and at physician/surgeon's request  Timeout:       Correct Patient: Yes        Correct Procedure: Yes        Correct Site: Yes        Correct Position: Yes        Correct Laterality: Yes   Line Placement:   This line was placed Pre Induction starting at 4/1/2022 7:10 AM and ending at 4/1/2022 7:25 AM    Procedure   Procedure: central line, new line and elective       Laterality: right       Insertion Site: right, internal jugular.       Patient Position: Trendelenburg  Sterile Prep        All elements of maximal sterile barrier technique followed       Patient Prep/Sterile Barriers: draped, hand hygiene, gloves , hat , mask , draped, gown, sterile gel and probe cover       Skin prep: Chloraprep  Insertion/Injection        Local skin infiltrated with 5 mL of 1% lidocaine.        Technique: ultrasound guided and Seldinger Technique        1. Ultrasound was used to evaluate the access site.       2. Vein evaluated via ultrasound for patency/adequacy.       3. Using real-time ultrasound the needle/catheter was observed entering the artery/vein.       4. Permanent image was captured and entered into the patient's record.       5. The visualized structures were anatomically normal.       6. There were no apparent abnormal pathologic findings.       Introducer Type: 9 Fr, 7 cm        Type: Introducer  Narrative         Secured by: suture       Tegaderm and Biopatch dressing used.       Complications: None apparent,        blood aspirated from all lumens,        All lumens flushed: Yes       Verification method: Ultrasound, Placement to be verified post-op and X-ray (CXR in ICU)    Comments:  Ultrasound Interpretation, central venous    1. Ultrasound guidance was used to evaluate potential access sites.  2. Ultrasound was also used to verify the patency of the vessel specified above.   3. Ultrasound was used to visualize the needle entering the vessel.   4. The visualized structures were anatomically normal.  5. There were no apparent abnormal pathological findings.  6. A permanent ultrasound image was saved in the patient's record.

## 2022-04-01 NOTE — BRIEF OP NOTE
[facility]    Brief Operative Note    Pre-operative diagnosis: Endocarditis [I38]  Post-operative diagnosis Same as pre-operative diagnosis    Procedure: Procedure(s):  TRICUSPID VALVE REPLACEMENT  AORTIC VALVE exploration  Surgeon: Surgeon(s) and Role:     * Marti Melton MD - Primary  Anesthesia: General   Estimated Blood Loss: 900ml;    Drains: Mediastinal x2  Specimens:   ID Type Source Tests Collected by Time Destination   1 : RIGHT VENTRICULAR MASS Tissue Other SURGICAL PATHOLOGY EXAM Marti Melton MD 4/1/2022 10:26 AM    2 : TRICUSPIC VALVE Tissue Heart Valve, Tricuspid SURGICAL PATHOLOGY EXAM Marti Melton MD 4/1/2022 10:40 AM    A : Tricuspid valve vegatation Tissue Heart Valve, Tricuspid ANAEROBIC BACTERIAL CULTURE ROUTINE, GRAM STAIN, FUNGAL OR YEAST CULTURE ROUTINE, AEROBIC BACTERIAL CULTURE ROUTINE Marti Melton MD 4/1/2022 10:05 AM    B : RIGHT VENTRICULAR MASS Tissue Other ANAEROBIC BACTERIAL CULTURE ROUTINE, GRAM STAIN, FUNGAL OR YEAST CULTURE ROUTINE, AEROBIC BACTERIAL CULTURE ROUTINE Marti Melton MD 4/1/2022 10:27 AM    C : Blood Blood Line, arterial CBC WITH PLATELETS, BASIC METABOLIC PANEL, FIBRINOGEN ACTIVITY, PARTIAL THROMBOPLASTIN TIME, INR Marti Melton MD 4/1/2022 11:40 AM    D : blood Blood Line, arterial CBC WITH PLATELETS, BASIC METABOLIC PANEL, FIBRINOGEN ACTIVITY, PARTIAL THROMBOPLASTIN TIME, INR Eliot Ahumada APRN CRNA 4/1/2022 11:41 AM      Findings:   severe TR with destroyed valve leaflets, intraop gram stain +GPC.  Complications: None.  Implants:   Implant Name Type Inv. Item Serial No.  Lot No. LRB No. Used Action   VALVE MITRAL EPIC STENTED PORCINE 31MM [id number] - [id number] Valve VALVE MITRAL EPIC STENTED PORCINE 31MM [id number] [employer]  N/A 1 Implanted

## 2022-04-01 NOTE — OP NOTE
DATE OF SERVICE: 4/1/2022      PREOPERATIVE DIAGNOSES:  1. Tricuspid valve endocarditis  2. Uncontrolled diabetes  3. Obesity  4. Cryptococcal infection      POSTOPERATIVE DIAGNOSES:  1. Tricuspid valve endocarditis  2. Uncontrolled diabetes  3. Obesity  4. Cryptococcal infection       PROCEDURE PERFORMED:  1. Tricuspid valve replacement (31mm Epic stented valve)  2. Endoscopic aortic valve exploration  3. Transesophageal echocardiogram      SURGEON:  Marti Melton MD, PhD      FELLOW:  Leslie Castillo MD      ANESTHESIA:  General endotracheal anesthesia.        ESTIMATED BLOOD LOSS:  1000 mL      SPECIMEN:  Tricuspid valve leaflets    Total cardiopulmonary bypass time was 132 minutes.    Aortic crossclamp time was 89 minutes.       INDICATIONS FOR PROCEDURE:  Mr. Casas is a 36 year-old man with uncontrolled diabetes who presented with sepsis found to have tricuspid valve vegetations and some abnormal aortic valve thickening on CT. He is obese with uncontrolled diabetes. He likely had a groin abscess as the source. He does have a history of cryptococcal infection in 2012 which he recovered from. Coronary angiography demonstrates no significant coronary artery disease. We discussed that tricuspid valve replacement would be necessary due to the large size of vegetations and possible aortic valve if infected on visual examination in the OR. I discussed the high risk of pacemaker need due to the infection near AV node as well. The patient understands the risks and benefits of the procedure and wishes to undergo the operation.       OPERATIVE FINDINGS:  On endoscopic exam of the aortic valve, this was normal with no vegetations; TEJINDER confirmed findings as well. This was left alone. The tricuspid valve was clearly infected, particularly the septal leaflet. There was extension of the the endocarditis vegetation towards the interventricular septum which was debrided. The entire valve and infected anulus portions were  excised sharply. 31mm Epic valve was sized and secured. Left ventricular function was normal preoperatively and unchanged after bypass without inotropic support.      OPERATIVE DESCRIPTION IN DETAIL:  After obtaining informed consent, the patient was brought to the operating room and placed in the supine position on the operating room table. Appropriate lines and devices for monitoring were placed by the anesthesia team. The patient underwent smooth induction of general anesthesia, and the trachea was intubated orally.  A right internal jugular Cordis introducer was placed. The patient was prepped and draped, and a timeout was performed to confirm the correct patient identity, as well as the procedure to be performed. A median sternotomy was performed and the pericardium was opened.      The patient was given full dose heparin, and after cannulation of the distal ascending aorta, the SVC and IVC, cardiopulmonary bypass was commenced. In addition to a left ventricular vent placed through the right superior pulmonary vein, antegrade and retrograde cardioplegia cannulae were placed, and the heart was arrested with 1 liter of coldantegrade blood cardioplegia.  Subsequent maintenance doses of 300 mL of cold retrograde blood cardioplegia were given approximately every 20 minutes.  Topical cold saline slush was applied and the patient was cooled to 32 degrees Celsius for additional protection.      Through a 5mm aortotomy, and endoscope was inserted. A 2mm aortotomy was made to pass a microsucker. The aortic valve was examined with the above findings noted. The aortotomies were closed with pledgeted 4-0 Prolene. Next, the IVC and SVC were snared for total bypass. A vertical right atriotomy was performed. Retractors were placed to expose the tricuspid valve. On exam, the septal leaflet was the most destructive. All leaflets were excised. Extension of mass was noted on the ventricular size adjacent to septal leaflet. This  "excised and sent for culture/pathology. Next, 2-0 ethibond horizontal pledgeted sutures were placed around the annulus (pledget on atrial side). The sutures were passed through the valve sewing ring and secured with corknots. The valve was test and found to be patent. Atriotomy was closed in two layers of 4-0 prolene.    A \"hotshot\" was delivered through the retrograde cannula, and the left heart was de-aired.      The aortic cross clamp was released and the heart was resuscitated. All bleeding points were controlled. A bipolar ventricular pacing lead was placed and brought out through a separate stab incision. A bipolar right atrial pacing lead was placed and brought out through a separate stab incision.  The patient weaned from cardiopulmonary bypass, was given protamine and decannulated.  A 32-Anguillan  drain was placed in the mediastinum and brought out through a separate stab incision.  A 32-Anguillan chest tube was placed in the anterior mediastinum and brought out through a separate stab incision. The sternal edges were re-enforced with a combination of single and double stainless steel wires.  The muscle, fascia, and skin were closed in layers with absorbable suture.  Dermabond was applied.  All sponge, needle andinstrument counts were correct at the end of the case.      Marti Melton MD   Cardiothoracic Surgery  P: [phone]  C: [phone]          "

## 2022-04-01 NOTE — PROGRESS NOTES
Patient went to surgery early morning, unable to see him.  Now on the vent.  Discussed with intensivist, they will be the primary today, if extubated will resume care tomorrow morning.

## 2022-04-01 NOTE — PROGRESS NOTES
pre reported by inpatient RN 7 units insulin given by floor RN. JAZ Hernandez notified. Recheck in OR.

## 2022-04-02 ENCOUNTER — APPOINTMENT (OUTPATIENT)
Dept: OCCUPATIONAL THERAPY | Facility: CLINIC | Age: 37
DRG: 853 | End: 2022-04-02
Attending: SURGERY
Payer: COMMERCIAL

## 2022-04-02 ENCOUNTER — APPOINTMENT (OUTPATIENT)
Dept: GENERAL RADIOLOGY | Facility: CLINIC | Age: 37
DRG: 853 | End: 2022-04-02
Attending: SURGERY
Payer: COMMERCIAL

## 2022-04-02 LAB
ALBUMIN SERPL-MCNC: 1.8 G/DL (ref 3.4–5)
ALP SERPL-CCNC: 108 U/L (ref 40–150)
ALT SERPL W P-5'-P-CCNC: 65 U/L (ref 0–70)
ANION GAP SERPL CALCULATED.3IONS-SCNC: 5 MMOL/L (ref 3–14)
AST SERPL W P-5'-P-CCNC: 63 U/L (ref 0–45)
BILIRUB SERPL-MCNC: 0.3 MG/DL (ref 0.2–1.3)
BUN SERPL-MCNC: 23 MG/DL (ref 7–30)
CA-I BLD-MCNC: 4.8 MG/DL (ref 4.4–5.2)
CALCIUM SERPL-MCNC: 8.7 MG/DL (ref 8.5–10.1)
CHLORIDE BLD-SCNC: 106 MMOL/L (ref 94–109)
CO2 SERPL-SCNC: 24 MMOL/L (ref 20–32)
CREAT SERPL-MCNC: 0.88 MG/DL (ref 0.66–1.25)
ERYTHROCYTE [DISTWIDTH] IN BLOOD BY AUTOMATED COUNT: 13.8 % (ref 10–15)
GFR SERPL CREATININE-BSD FRML MDRD: >90 ML/MIN/1.73M2
GLUCOSE BLD-MCNC: 136 MG/DL (ref 70–99)
GLUCOSE BLDC GLUCOMTR-MCNC: 110 MG/DL (ref 70–99)
GLUCOSE BLDC GLUCOMTR-MCNC: 116 MG/DL (ref 70–99)
GLUCOSE BLDC GLUCOMTR-MCNC: 118 MG/DL (ref 70–99)
GLUCOSE BLDC GLUCOMTR-MCNC: 127 MG/DL (ref 70–99)
GLUCOSE BLDC GLUCOMTR-MCNC: 139 MG/DL (ref 70–99)
GLUCOSE BLDC GLUCOMTR-MCNC: 244 MG/DL (ref 70–99)
GLUCOSE BLDC GLUCOMTR-MCNC: 256 MG/DL (ref 70–99)
GLUCOSE BLDC GLUCOMTR-MCNC: 283 MG/DL (ref 70–99)
GLUCOSE BLDC GLUCOMTR-MCNC: 301 MG/DL (ref 70–99)
GLUCOSE BLDC GLUCOMTR-MCNC: 301 MG/DL (ref 70–99)
HCT VFR BLD AUTO: 29.1 % (ref 40–53)
HGB BLD-MCNC: 9.1 G/DL (ref 13.3–17.7)
MAGNESIUM SERPL-MCNC: 2.1 MG/DL (ref 1.6–2.3)
MCH RBC QN AUTO: 28.2 PG (ref 26.5–33)
MCHC RBC AUTO-ENTMCNC: 31.3 G/DL (ref 31.5–36.5)
MCV RBC AUTO: 90 FL (ref 78–100)
PHOSPHATE SERPL-MCNC: 4 MG/DL (ref 2.5–4.5)
PLATELET # BLD AUTO: 535 10E3/UL (ref 150–450)
POTASSIUM BLD-SCNC: 4.8 MMOL/L (ref 3.4–5.3)
PROT SERPL-MCNC: 6.8 G/DL (ref 6.8–8.8)
RBC # BLD AUTO: 3.23 10E6/UL (ref 4.4–5.9)
SODIUM SERPL-SCNC: 135 MMOL/L (ref 133–144)
WBC # BLD AUTO: 16.1 10E3/UL (ref 4–11)

## 2022-04-02 PROCEDURE — 84100 ASSAY OF PHOSPHORUS: CPT | Performed by: SURGERY

## 2022-04-02 PROCEDURE — 258N000003 HC RX IP 258 OP 636: Performed by: SPECIALIST

## 2022-04-02 PROCEDURE — 87040 BLOOD CULTURE FOR BACTERIA: CPT | Performed by: SURGERY

## 2022-04-02 PROCEDURE — 250N000013 HC RX MED GY IP 250 OP 250 PS 637: Performed by: SURGERY

## 2022-04-02 PROCEDURE — 80053 COMPREHEN METABOLIC PANEL: CPT | Performed by: SURGERY

## 2022-04-02 PROCEDURE — 82330 ASSAY OF CALCIUM: CPT | Performed by: SURGERY

## 2022-04-02 PROCEDURE — 250N000012 HC RX MED GY IP 250 OP 636 PS 637: Performed by: INTERNAL MEDICINE

## 2022-04-02 PROCEDURE — 250N000011 HC RX IP 250 OP 636: Performed by: NURSE PRACTITIONER

## 2022-04-02 PROCEDURE — 85041 AUTOMATED RBC COUNT: CPT | Performed by: SURGERY

## 2022-04-02 PROCEDURE — 36592 COLLECT BLOOD FROM PICC: CPT | Performed by: SPECIALIST

## 2022-04-02 PROCEDURE — 200N000001 HC R&B ICU

## 2022-04-02 PROCEDURE — 250N000012 HC RX MED GY IP 250 OP 636 PS 637: Performed by: SURGERY

## 2022-04-02 PROCEDURE — 93010 ELECTROCARDIOGRAM REPORT: CPT | Performed by: INTERNAL MEDICINE

## 2022-04-02 PROCEDURE — 999N000157 HC STATISTIC RCP TIME EA 10 MIN

## 2022-04-02 PROCEDURE — 97165 OT EVAL LOW COMPLEX 30 MIN: CPT | Mod: GO

## 2022-04-02 PROCEDURE — 250N000011 HC RX IP 250 OP 636: Performed by: SURGERY

## 2022-04-02 PROCEDURE — 258N000003 HC RX IP 258 OP 636: Performed by: SURGERY

## 2022-04-02 PROCEDURE — 71045 X-RAY EXAM CHEST 1 VIEW: CPT

## 2022-04-02 PROCEDURE — 93005 ELECTROCARDIOGRAM TRACING: CPT

## 2022-04-02 PROCEDURE — 83735 ASSAY OF MAGNESIUM: CPT | Performed by: SURGERY

## 2022-04-02 PROCEDURE — 97530 THERAPEUTIC ACTIVITIES: CPT | Mod: GO

## 2022-04-02 PROCEDURE — 87040 BLOOD CULTURE FOR BACTERIA: CPT | Performed by: SPECIALIST

## 2022-04-02 PROCEDURE — 94660 CPAP INITIATION&MGMT: CPT

## 2022-04-02 PROCEDURE — 250N000011 HC RX IP 250 OP 636: Performed by: SPECIALIST

## 2022-04-02 PROCEDURE — 99233 SBSQ HOSP IP/OBS HIGH 50: CPT | Performed by: INTERNAL MEDICINE

## 2022-04-02 PROCEDURE — 97110 THERAPEUTIC EXERCISES: CPT | Mod: GO

## 2022-04-02 PROCEDURE — 250N000013 HC RX MED GY IP 250 OP 250 PS 637: Performed by: NURSE PRACTITIONER

## 2022-04-02 RX ORDER — METHOCARBAMOL 750 MG/1
750 TABLET, FILM COATED ORAL EVERY 6 HOURS
Status: DISCONTINUED | OUTPATIENT
Start: 2022-04-02 | End: 2022-04-08

## 2022-04-02 RX ORDER — DEXTROSE MONOHYDRATE 25 G/50ML
25-50 INJECTION, SOLUTION INTRAVENOUS
Status: DISCONTINUED | OUTPATIENT
Start: 2022-04-02 | End: 2022-04-02

## 2022-04-02 RX ORDER — NICOTINE POLACRILEX 4 MG
15-30 LOZENGE BUCCAL
Status: DISCONTINUED | OUTPATIENT
Start: 2022-04-02 | End: 2022-04-02

## 2022-04-02 RX ORDER — KETOROLAC TROMETHAMINE 15 MG/ML
30 INJECTION, SOLUTION INTRAMUSCULAR; INTRAVENOUS ONCE
Status: COMPLETED | OUTPATIENT
Start: 2022-04-02 | End: 2022-04-02

## 2022-04-02 RX ORDER — LIDOCAINE 4 G/G
2 PATCH TOPICAL
Status: DISCONTINUED | OUTPATIENT
Start: 2022-04-02 | End: 2022-04-18 | Stop reason: HOSPADM

## 2022-04-02 RX ADMIN — ASPIRIN 81 MG CHEWABLE TABLET 81 MG: 81 TABLET CHEWABLE at 08:02

## 2022-04-02 RX ADMIN — OXYCODONE HYDROCHLORIDE 10 MG: 5 TABLET ORAL at 17:15

## 2022-04-02 RX ADMIN — CEFAZOLIN SODIUM 2 G: 2 INJECTION, SOLUTION INTRAVENOUS at 02:19

## 2022-04-02 RX ADMIN — METHOCARBAMOL 750 MG: 750 TABLET ORAL at 10:02

## 2022-04-02 RX ADMIN — HYDROMORPHONE HYDROCHLORIDE 0.4 MG: 0.2 INJECTION, SOLUTION INTRAMUSCULAR; INTRAVENOUS; SUBCUTANEOUS at 04:45

## 2022-04-02 RX ADMIN — PHENYLEPHRINE HYDROCHLORIDE 0.6 MCG/KG/MIN: 10 INJECTION INTRAVENOUS at 00:16

## 2022-04-02 RX ADMIN — ACETAMINOPHEN 975 MG: 325 TABLET ORAL at 21:54

## 2022-04-02 RX ADMIN — GABAPENTIN 100 MG: 100 CAPSULE ORAL at 08:03

## 2022-04-02 RX ADMIN — ACETAMINOPHEN 975 MG: 325 TABLET ORAL at 06:14

## 2022-04-02 RX ADMIN — SENNOSIDES AND DOCUSATE SODIUM 1 TABLET: 50; 8.6 TABLET ORAL at 20:09

## 2022-04-02 RX ADMIN — SENNOSIDES AND DOCUSATE SODIUM 1 TABLET: 50; 8.6 TABLET ORAL at 08:03

## 2022-04-02 RX ADMIN — OXYCODONE HYDROCHLORIDE 10 MG: 5 TABLET ORAL at 08:02

## 2022-04-02 RX ADMIN — LIDOCAINE 2 PATCH: 560 PATCH PERCUTANEOUS; TOPICAL; TRANSDERMAL at 10:02

## 2022-04-02 RX ADMIN — OXYCODONE HYDROCHLORIDE 10 MG: 5 TABLET ORAL at 13:06

## 2022-04-02 RX ADMIN — HYDROMORPHONE HYDROCHLORIDE 0.4 MG: 0.2 INJECTION, SOLUTION INTRAMUSCULAR; INTRAVENOUS; SUBCUTANEOUS at 23:45

## 2022-04-02 RX ADMIN — POLYETHYLENE GLYCOL 3350 17 G: 17 POWDER, FOR SOLUTION ORAL at 08:03

## 2022-04-02 RX ADMIN — INSULIN ASPART 3 UNITS: 100 INJECTION, SOLUTION INTRAVENOUS; SUBCUTANEOUS at 17:14

## 2022-04-02 RX ADMIN — BUPROPION HYDROCHLORIDE 300 MG: 300 TABLET, EXTENDED RELEASE ORAL at 08:21

## 2022-04-02 RX ADMIN — HEPARIN SODIUM 5000 UNITS: 5000 INJECTION, SOLUTION INTRAVENOUS; SUBCUTANEOUS at 13:06

## 2022-04-02 RX ADMIN — HYDROMORPHONE HYDROCHLORIDE 0.4 MG: 0.2 INJECTION, SOLUTION INTRAMUSCULAR; INTRAVENOUS; SUBCUTANEOUS at 08:03

## 2022-04-02 RX ADMIN — CEFAZOLIN SODIUM 2 G: 2 INJECTION, SOLUTION INTRAVENOUS at 18:37

## 2022-04-02 RX ADMIN — MICAFUNGIN SODIUM 100 MG: 50 INJECTION, POWDER, LYOPHILIZED, FOR SOLUTION INTRAVENOUS at 17:15

## 2022-04-02 RX ADMIN — GABAPENTIN 100 MG: 100 CAPSULE ORAL at 13:06

## 2022-04-02 RX ADMIN — OXYCODONE HYDROCHLORIDE 10 MG: 5 TABLET ORAL at 04:00

## 2022-04-02 RX ADMIN — ACETAMINOPHEN 975 MG: 325 TABLET ORAL at 13:05

## 2022-04-02 RX ADMIN — KETOROLAC TROMETHAMINE 30 MG: 15 INJECTION, SOLUTION INTRAMUSCULAR; INTRAVENOUS at 08:21

## 2022-04-02 RX ADMIN — METHOCARBAMOL 750 MG: 750 TABLET ORAL at 17:15

## 2022-04-02 RX ADMIN — HYDROMORPHONE HYDROCHLORIDE 0.4 MG: 0.2 INJECTION, SOLUTION INTRAMUSCULAR; INTRAVENOUS; SUBCUTANEOUS at 20:27

## 2022-04-02 RX ADMIN — HEPARIN SODIUM 5000 UNITS: 5000 INJECTION, SOLUTION INTRAVENOUS; SUBCUTANEOUS at 21:54

## 2022-04-02 RX ADMIN — HYDROMORPHONE HYDROCHLORIDE 0.4 MG: 0.2 INJECTION, SOLUTION INTRAMUSCULAR; INTRAVENOUS; SUBCUTANEOUS at 02:20

## 2022-04-02 RX ADMIN — SODIUM CHLORIDE 1.5 UNITS/HR: 9 INJECTION, SOLUTION INTRAVENOUS at 06:34

## 2022-04-02 RX ADMIN — HYDROMORPHONE HYDROCHLORIDE 0.4 MG: 0.2 INJECTION, SOLUTION INTRAMUSCULAR; INTRAVENOUS; SUBCUTANEOUS at 10:17

## 2022-04-02 RX ADMIN — INSULIN ASPART 3 UNITS: 100 INJECTION, SOLUTION INTRAVENOUS; SUBCUTANEOUS at 11:28

## 2022-04-02 RX ADMIN — VANCOMYCIN HYDROCHLORIDE 1500 MG: 5 INJECTION, POWDER, LYOPHILIZED, FOR SOLUTION INTRAVENOUS at 08:22

## 2022-04-02 RX ADMIN — HYDROMORPHONE HYDROCHLORIDE 0.4 MG: 0.2 INJECTION, SOLUTION INTRAMUSCULAR; INTRAVENOUS; SUBCUTANEOUS at 14:55

## 2022-04-02 RX ADMIN — LEVOTHYROXINE SODIUM 125 MCG: 125 TABLET ORAL at 08:22

## 2022-04-02 RX ADMIN — METHOCARBAMOL 750 MG: 750 TABLET ORAL at 04:00

## 2022-04-02 RX ADMIN — METHOCARBAMOL 750 MG: 750 TABLET ORAL at 21:54

## 2022-04-02 RX ADMIN — OXYCODONE HYDROCHLORIDE 10 MG: 5 TABLET ORAL at 21:58

## 2022-04-02 RX ADMIN — PANTOPRAZOLE SODIUM 40 MG: 40 TABLET, DELAYED RELEASE ORAL at 08:03

## 2022-04-02 RX ADMIN — GABAPENTIN 100 MG: 100 CAPSULE ORAL at 20:09

## 2022-04-02 RX ADMIN — CEFAZOLIN SODIUM 2 G: 2 INJECTION, SOLUTION INTRAVENOUS at 10:30

## 2022-04-02 RX ADMIN — INSULIN GLARGINE 18 UNITS: 100 INJECTION, SOLUTION SUBCUTANEOUS at 09:57

## 2022-04-02 ASSESSMENT — ACTIVITIES OF DAILY LIVING (ADL)
ADLS_ACUITY_SCORE: 7
PREVIOUS_RESPONSIBILITIES: WORK;DRIVING
ADLS_ACUITY_SCORE: 7

## 2022-04-02 NOTE — PROGRESS NOTES
[facility]    Medicine Progress Note - Hospitalist Service        Date of Admission:  3/28/2022 10:33 PM    Assessment & Plan:   Anil Montoya is a 36 year old male with hx of DM2, HTN, WARNER, and anxiety who was initially hospitalized at [facility] on 3/22/2022 for severe sepsis initially attributed to pneumonia vs viral infection. He was found to have MSSA bacteremia with ongoing positive cultures. He underwent a TEJINDER on 3/27/2022 which revealed tricuspid endocarditis, and he was transferred to [facility] on 3/28/2022 for CV Surgery consultation.     Sepsis with MSSA bacteremia due to tricuspid valve endocarditis.  Tricuspid valve endocarditis s/p tricuspid valve replacement on 4/1/2022  * Presented to [facility] on 3/22 with generalized weakness, malaise, and myalgias. He was initially treated with ceftriaxone and azithromycin for possible community-acquired pneumonia, and ID was consulted. Blood cultures returned positive for MSSA, and serial blood cultures continued to return positive. Broad work-up including CT abd/pelvis, MR lumbar spine, CT dental, and TTE were unrevealing  * Initially treated with IV ceftriaxone and azithromycin; switched to IV vanco on 3/23 when blood cultures returned with Staph; switched to IV cefazolin on 3/24 upon sensitivities  * Underwent TEJINDER on 3/27 which showed thickened tricuspid valve leaflets with atrial aspect of the septal leaflet with an irregular border and small mobile filamentous elements, large fixed heterogeneous mass in the right ventricle attached to the interventricular septum (2.5 x 2 cm) appearing connected to the tricuspid subvalvular annular apparatus with a pedunculated round mobile element (1.8 x 1 cm).  No significant tricuspid regurgitation reported. LVEF 55 to 60%. Negative bubble study.  * Transferred to [facility] on 3/28 for CV Surgery consult  - Patient underwent tricuspid valve replacement with(31 mm epic stented valve) on 4/1/2022  and endoscopic aortic valve exploration.  - Gram stain from tissue from heart valve from 4/1 showing yeast  - Micafungin added on 4/1  - Continues on IV cefazolin   - Daily blood cultures, last positive was from 3/31  - ID, Cardiology, CV Surgery following     Second-degree AV block  -Patient appears to be in second-degree AV block, currently has epicardial AV pacer set at 80  -Continue to monitor closely on telemetry, will await further opinion from cardiothoracic surgery and cardiology    DM2 without complications, long-term insulin use, uncontrolled  * A1c 12.6. Had not been taking his diabetes medications for some time  * He was initiated on basal bolus insulin while at [facility]  - Patient was on insulin drip following surgery, will discontinue insulin drip.  Start Lantus 18 units twice a day.  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 RUQ US (3/22) showed fatty liver, but normal gallbladder and normal bile ducts  - LFTs improving      Essential hypertension  -Hold prior to admission losartan     WARNER  * CPAP per home settings     Hypothyroidism  * Chronic and stable on levothyroxine     Generalized anxiety disorder  ADHD  * Chronic and stable on bupropion      Diet: Moderate Consistent Carb (60 g CHO per Meal) Diet     DVT Prophylaxis: Pneumatic Compression Devices   Schwartz Catheter: PRESENT, indication: Strict 1-2 Hour I&O  Code Status: Full Code     Disposition Plan    Expected Discharge: 04/08/2022     Anticipated discharge location: home with family    Delays:     Other (Add Comment)          Entered: Joseph Iraheta MD 04/02/2022, 7:56 AM     no "    Clinically Significant Risk Factors Present on Admission                    The patient's care was discussed with the Bedside Nurse and Patient.    Joseph Iraheta MD  Hospitalist Service  [facility]  Text Page 7AM-6PM  Securely message with the Vocera Web Console (learn more here)  Text page via [employer] Paging/Directory    ______________________________________________________________________    Interval History   Patient underwent tricuspid valve replacement yesterday, was extubated yesterday evening and is doing well on 2 L oxygen.  Endorses pain at surgical site.  No nausea or vomiting.  T-max of 100.4, afebrile this morning.    Data reviewed today: I reviewed all medications, new labs and imaging results over the last 24 hours. I personally reviewed the EKG tracing showing ?  Second-degree AV block.    Physical Exam   Vital signs:  Temp: 99.9  F (37.7  C) Temp src: Bladder BP: 103/66 Pulse: 98   Resp: 21 SpO2: 96 % O2 Device: BiPAP/CPAP Oxygen Delivery: 2 LPM   Weight: 118.9 kg (262 lb 2 oz)  Estimated body mass index is 37.61 kg/m  as calculated from the following:    Height as of 3/22/22: 1.778 m (5' 10\").    Weight as of this encounter: 118.9 kg (262 lb 2 oz).      Wt Readings from Last 2 Encounters:   04/02/22 118.9 kg (262 lb 2 oz)   03/28/22 121.1 kg (267 lb)       Gen: AAOX3, NAD, comfortable  HEENT: Supple neck, moist oral mucosa, no pallor  Resp: CTA B/L, normal WOB  CVS: RRR, no murmur, chest tube +  Abd/GI: Soft, non-tender. BS- normoactive.   Skin: Warm, dry no rashes  MSK: Trace-1+ pedal edema  Neuro- CN- intact. No focal deficits.      Data   Recent Labs   Lab 04/02/22  0605 04/02/22  0422 04/02/22  0420 04/01/22  1440 04/01/22  1300 04/01/22  1256 04/01/22  1141   WBC  --  16.1*  --   --  38.3*  --  30.4*   HGB  --  9.1*  --   --  9.0*  --  8.5*   MCV  --  90  --   --  94  --  94   PLT  --  535*  --   --  566*  --  442   INR  --   --   --   --  1.55*  --  1.64*   NA  " --  135  --   --  135  --  134   POTASSIUM  --  4.8  --   --  5.1  5.1  --  5.1   CHLORIDE  --  106  --   --  107  --  105   CO2  --  24  --   --  24  --  25   BUN  --  23  --   --  27  --  24   CR  --  0.88  --   --  1.04  --  1.05   ANIONGAP  --  5  --   --  4  --  4   IMAN  --  8.7  --   --  8.4*  --  8.9   * 136* 139*   < > 250*   < > 259*   ALBUMIN  --  1.8*  --   --  1.7*  --   --    PROTTOTAL  --  6.8  --   --  6.4*  --   --    BILITOTAL  --  0.3  --   --  0.5  --   --    ALKPHOS  --  108  --   --  106  --   --    ALT  --  65  --   --  81*  --   --    AST  --  63*  --   --  63*  --   --     < > = values in this interval not displayed.       Recent Results (from the past 24 hour(s))   XR Chest Port 1 View    Narrative    CHEST ONE VIEW  4/1/2022 1:51 PM     HISTORY: Postop CVTS surgery.    COMPARISON: March 22, 2022      Impression    IMPRESSION: Endotracheal tube tip 4 cm from the robert. Right IJ line  tip in the mid SVC. Chest tubes in place, small pneumothorax evident  on the right even given the supine positioning. No left pneumothorax.    MORRIS REDMOND MD         SYSTEM ID:  [id number]   XR Abdomen Port 1 View    Narrative    XR ABDOMEN PORT 1 VIEWS   4/1/2022 1:52 PM     HISTORY: Check NG/OG tube placement    COMPARISON: 3/22/2022      Impression    IMPRESSION: NG tube tip and sidehole in the stomach. Partly visualized  mediastinal surgical drains and epicardial pacing wires. Median  sternotomy.    YESSICA FARRAR MD         SYSTEM ID:  [id number]   XR Chest Port 1 View    Narrative    EXAM: XR CHEST PORTABLE 1 VIEW  LOCATION: [facility]  DATE/TIME: 04/02/2022, 5:17 AM    INDICATION: Postop CVTS surgery.  COMPARISON: 03/22/2022.      Impression    IMPRESSION: Sternotomy. Cardiac enlargement. Mediastinal drain and left-sided chest tube. There is a small right pneumothorax seen laterally. Right IJ venous catheter with tip in the SVC.    NOTE: ABNORMAL REPORT    THE DICTATION  ABOVE DESCRIBES AN ABNORMALITY FOR WHICH FOLLOW-UP IS NEEDED.        Medications     aminocaproic acid (AMICAR) infusion 7.5gm/150mL ADULT       dexmedetomidine Stopped (04/01/22 1705)     dextrose       dextrose 5% and 0.45% NaCl + KCl 20 mEq/L 30 mL/hr at 04/01/22 2000     EPINEPHrine Stopped (04/01/22 1627)     propofol (DIPRIVAN) infusion Stopped (04/01/22 1525)    And     - MEDICATION INSTRUCTIONS -       norepinephrine       phenylephrine Stopped (04/02/22 6990)     BETA BLOCKER NOT PRESCRIBED       sodium chloride 20 mL/hr at 04/01/22 2000       acetaminophen  975 mg Oral Q8H     aspirin  81 mg Oral or NG Tube Daily     buPROPion  300 mg Oral QAM     ceFAZolin  2 g Intravenous Q8H     gabapentin  100 mg Oral TID     heparin ANTICOAGULANT  5,000 Units Subcutaneous Q8H     insulin aspart  1-10 Units Subcutaneous TID AC     insulin aspart  1-7 Units Subcutaneous At Bedtime     insulin aspart   Subcutaneous TID AC     insulin glargine  18 Units Subcutaneous BID     ketorolac  30 mg Intravenous Once     lactated ringers  250 mL Intravenous Once     levothyroxine  125 mcg Oral Once per day on Mon Tue Wed Thu Fri Sat     [START ON 4/3/2022] levothyroxine  250 mcg Oral Weekly     lidocaine   Transdermal Q8H     micafungin  100 mg Intravenous Q24H     pantoprazole  40 mg Oral or NG Tube Daily    Or     pantoprazole  40 mg Oral Daily     polyethylene glycol  17 g Oral Daily     senna-docusate  1 tablet Oral BID     sodium chloride (PF)  3 mL Intracatheter Q8H     vancomycin  1,500 mg Intravenous Q12H

## 2022-04-02 NOTE — PLAN OF CARE
End of Shift Nursing Summary    Neuro:  A/O x 4.  Up with assist of 2 to manage lines.    Pain: IV dilaudid, Robaxin, and oxycodone.  CV:  AV paced.  Clint infusing to maintain map > 65 mmHg.  CVP's ~ 10-12 mmHg  Pulm: Extubated to nasal cannula @ 1602.  Lung sounds clear -  diminished. Chest tube output 110 since surgery.     GI/: Tolerating ice chips and water.  Schwartz cath with adequate UOP.  Endocrine: Insulin drip infusing per protocol.  Skin: Extremely diaphoretic.  Linen and CVC dressing changed.  Fever: 38 T-Max.  Tylenol given, now 37.1  Lines/drips: CVC, radial art line, chest tubes, epicardial pace maker wires.    Additional: Micafungin started per ID for heart tissue growing fungus.  Additional fungal culture drawn.     *See EPIC flowsheet for full nursing assessment findings

## 2022-04-02 NOTE — PROGRESS NOTES
Spouse Arin called and updated.  Discussed events post extubation/shift change at 1900 until call made.  Discussed routine plan of care for rest of the night into the morning.  Attempts made to answer multiple questions primarily centered on surgical findings and use of antibiotics and plan of care and encouraged spouse to discuss them with physician when able to speak with him.

## 2022-04-02 NOTE — PROGRESS NOTES
[facility]    Infectious Disease Progress Note    Date of Service : 04/02/2022     Assessment:  36YM with uncontrolled diabetes and HbA1c of >12%, and prior cryptococcal pneumonia 10 years ago without HIV diagnosis, who is hospitalized with high grade prolonged MSSA bacteremia since 3/22 with tricuspid valve endocarditis. Possible secondary seeding of the lumbar spine though MRI is negative. He is s/p tricuspid valve replacement surgery and aortic valve exploration for control of infection today 04/01 with positive gram stain of tricuspid valve tissue for GPC and yeast. Cxs are awaited.     Recommendations:  1. Follow blood cxs until clear  2. Continue Cefazolin / Micafungin given positive yeast seen on gram stain on tricuspid valve  3. HIV antigen/antobody test  4. Follow valve tissue cx  5. Glycemic control  6. Follow clinical status and labs    Sol Jolly MD    Interval History   Extubated, resting, pain controlled, tolerating antibiotics without side effects    Physical Exam   Temp: 99.5  F (37.5  C) Temp src: Bladder BP: 100/67 Pulse: 96   Resp: 19 SpO2: 98 % O2 Device: BiPAP/CPAP Oxygen Delivery: 2 LPM  Vitals:    04/01/22 0632 04/02/22 0257   Weight: 121.1 kg (267 lb) 118.9 kg (262 lb 2 oz)     Vital Signs with Ranges  Temp:  [98.8  F (37.1  C)-100.4  F (38  C)] 99.5  F (37.5  C)  Pulse:  [] 96  Resp:  [8-44] 19  BP: ()/(62-73) 100/67  MAP:  [58 mmHg-121 mmHg] 70 mmHg  Arterial Line BP: ()/(38-92) 118/47  FiO2 (%):  [40 %-60 %] 40 %  SpO2:  [92 %-100 %] 98 %    Constitutional: Awake, alert, cooperative, no apparent distress  Lungs: Clear to auscultation bilaterally, no crackles or wheezing  Cardiovascular: S1S2, sternal surgical site intact, mediastinal chest tubes in place  Abdomen:  soft,non-tender  Skin: No rash    Other:    Medications     aminocaproic acid (AMICAR) infusion 7.5gm/150mL ADULT       dexmedetomidine Stopped (04/01/22 1705)     dextrose        dextrose 5% and 0.45% NaCl + KCl 20 mEq/L 30 mL/hr at 04/01/22 2000     EPINEPHrine Stopped (04/01/22 1627)     propofol (DIPRIVAN) infusion Stopped (04/01/22 1525)    And     - MEDICATION INSTRUCTIONS -       norepinephrine       phenylephrine Stopped (04/02/22 7800)     BETA BLOCKER NOT PRESCRIBED       sodium chloride 20 mL/hr at 04/01/22 2000       acetaminophen  975 mg Oral Q8H     aspirin  81 mg Oral or NG Tube Daily     buPROPion  300 mg Oral QAM     ceFAZolin  2 g Intravenous Q8H     gabapentin  100 mg Oral TID     heparin ANTICOAGULANT  5,000 Units Subcutaneous Q8H     insulin aspart  1-10 Units Subcutaneous TID AC     insulin aspart  1-7 Units Subcutaneous At Bedtime     insulin aspart   Subcutaneous TID AC     insulin glargine  18 Units Subcutaneous BID     lactated ringers  250 mL Intravenous Once     levothyroxine  125 mcg Oral Once per day on Mon Tue Wed Thu Fri Sat     [START ON 4/3/2022] levothyroxine  250 mcg Oral Weekly     lidocaine  2 patch Transdermal Q24H     lidocaine   Transdermal Q8H     methocarbamol  750 mg Oral Q6H     micafungin  100 mg Intravenous Q24H     pantoprazole  40 mg Oral or NG Tube Daily    Or     pantoprazole  40 mg Oral Daily     polyethylene glycol  17 g Oral Daily     senna-docusate  1 tablet Oral BID     sodium chloride (PF)  3 mL Intracatheter Q8H       Data   All microbiology laboratory data reviewed.  Recent Labs   Lab Test 04/02/22 0422 04/01/22  1300 04/01/22  1141   WBC 16.1* 38.3* 30.4*   HGB 9.1* 9.0* 8.5*   HCT 29.1* 29.1* 27.9*   MCV 90 94 94   * 566* 442     Recent Labs   Lab Test 04/02/22  0422 04/01/22  1300 04/01/22  1141   CR 0.88 1.04 1.05     Microbiology  Blood cx 4/1 pending, Blood cultures 3/22-3/31 MSSA  4/1 tricuspid valve tissue  Heart Valve, Tricuspid; Tissue          0 Result Notes    Culture Culture in progress       1+ Staphylococcus aureus Abnormal         Heart Valve, Tricuspid; Tissue          0 Result Notes         (important  suggestion)  Newer results are available. Click to view them now.     Gram Stain Result   Abnormal   1+ Gram positive cocci      1+ Yeast      4+ WBC seen   Predominantly PMNs             Peripheral Blood            0 Result Notes     Culture Positive on the 1st day of incubation Abnormal         Staphylococcus aureus Panic     1 of 2 bottles         Resulting Agency: [facility]         Susceptibility              Staphylococcus aureus       BLAZE       Clindamycin <=0.25 ug/mL Susceptible       Erythromycin <=0.25 ug/mL Susceptible       Gentamicin <=0.5 ug/mL Susceptible       Oxacillin <=0.25 ug/mL Susceptible 1       Tetracycline <=1.0 ug/mL Susceptible       Trimethoprim/Sulfamethoxazole <=0.5/9.5 u... Susceptible       Vancomycin 2.0 ug/mL Susceptible

## 2022-04-02 NOTE — PROGRESS NOTES
"NUTRITION ASSESSMENT      REASON FOR ASSESSMENT:  Cardiac Surgery Nutrition Consult    CURRENT DIET / INTAKE:  Diet: Moderate consistent CHO    PO: Patient consumed 100% of breakfast this morning consisting of sausage and cheese omelet, banana and pudding. He has a good appetite and is drinking plenty of water   Family at bedside this morning     Recent Labs   Lab 04/02/22  0840 04/02/22  0605 04/02/22  0422 04/02/22  0420 04/02/22  0226 04/01/22  2353   * 116* 136* 139* 110* 127*     Lab Results   Component Value Date    A1C 12.6 03/27/2022       ANTHROPOMETRICS:   Ht: 5' 10\"   Wt: 118.9 kg (262 lb 2 oz)  BMI: 37.61 kg/m^2  IBW: 75.5 kg   Weight Status: Obesity Grade II BMI 35-39.9  %IBW: 160%    MALNUTRITION:  Patient does not meet two of the following criteria necessary for diagnosing malnutrition:  significant weight loss, reduced intake, subcutaneous fat loss, muscle loss or fluid retention. Nutrition Focused Physical Assessment (NFPA) not appropriate at this time.    NUTRITION DIAGNOSIS:   Increase nutrient needs (protein/calorie) related to increased demand for healing s/p cardiac surgery as evidenced by need for >/=25 kcal/kg and >/=1.2 g protein/kg    INTERVENTIONS:    Nutrition Prescription:  Continue MCHO diet   No need for protein supplements at this time but can be available if intake, appetite changes     Implementation:  Nutrition Education (Content):  Discussed the importance of adequate nutrition post-op for healing and energy, emphasizing protein foods, and encouraged patient to order a protein food at each meal.    Goals:  Patient to consume ~75% at meals in the next 3 - 5 days    Follow Up/Monitoring (InPatient):  Food and Fluid intake -  Monitor for adequacy    Follow Up/Monitoring (OutPatient):  Patient will participate in out-patient cardiac rehab and attend nutrition classes during the program      Michelle Chino RD, LD  Clinical Dietitian   Weekend pager [phone]    "

## 2022-04-02 NOTE — PLAN OF CARE
Pt A&O able to wean Phenylephrine drip off over coarse of shift.  O2 2 LNC along with CPAP overnight.  Sating well into the mid to high 90's.  PRN and scheduled pain medications.  PRN Oxycodone and Dilaudid together efffective.  Epicardial A/V Pacer set at 80, underlying rhythm 2nd AVB.  Brisk urine output overnight.  Minimal chest tube output.  Pt's father at bedside start of shift, updated at bedside, spouse called and updated late evening.

## 2022-04-03 ENCOUNTER — APPOINTMENT (OUTPATIENT)
Dept: OCCUPATIONAL THERAPY | Facility: CLINIC | Age: 37
DRG: 853 | End: 2022-04-03
Attending: HOSPITALIST
Payer: COMMERCIAL

## 2022-04-03 LAB
ALBUMIN SERPL-MCNC: 1.6 G/DL (ref 3.4–5)
ALP SERPL-CCNC: 101 U/L (ref 40–150)
ALT SERPL W P-5'-P-CCNC: 37 U/L (ref 0–70)
ANION GAP SERPL CALCULATED.3IONS-SCNC: 6 MMOL/L (ref 3–14)
AST SERPL W P-5'-P-CCNC: 29 U/L (ref 0–45)
BACTERIA BLD CULT: ABNORMAL
BACTERIA BLD CULT: ABNORMAL
BILIRUB SERPL-MCNC: 0.3 MG/DL (ref 0.2–1.3)
BUN SERPL-MCNC: 19 MG/DL (ref 7–30)
CA-I BLD-MCNC: 4.6 MG/DL (ref 4.4–5.2)
CALCIUM SERPL-MCNC: 8.3 MG/DL (ref 8.5–10.1)
CHLORIDE BLD-SCNC: 100 MMOL/L (ref 94–109)
CO2 SERPL-SCNC: 25 MMOL/L (ref 20–32)
CREAT SERPL-MCNC: 0.85 MG/DL (ref 0.66–1.25)
ERYTHROCYTE [DISTWIDTH] IN BLOOD BY AUTOMATED COUNT: 13.8 % (ref 10–15)
GFR SERPL CREATININE-BSD FRML MDRD: >90 ML/MIN/1.73M2
GLUCOSE BLD-MCNC: 271 MG/DL (ref 70–99)
GLUCOSE BLDC GLUCOMTR-MCNC: 173 MG/DL (ref 70–99)
GLUCOSE BLDC GLUCOMTR-MCNC: 196 MG/DL (ref 70–99)
GLUCOSE BLDC GLUCOMTR-MCNC: 215 MG/DL (ref 70–99)
GLUCOSE BLDC GLUCOMTR-MCNC: 219 MG/DL (ref 70–99)
GLUCOSE BLDC GLUCOMTR-MCNC: 219 MG/DL (ref 70–99)
GLUCOSE BLDC GLUCOMTR-MCNC: 233 MG/DL (ref 70–99)
GLUCOSE BLDC GLUCOMTR-MCNC: 251 MG/DL (ref 70–99)
GLUCOSE BLDC GLUCOMTR-MCNC: 253 MG/DL (ref 70–99)
GLUCOSE BLDC GLUCOMTR-MCNC: 270 MG/DL (ref 70–99)
GLUCOSE BLDC GLUCOMTR-MCNC: 270 MG/DL (ref 70–99)
GLUCOSE BLDC GLUCOMTR-MCNC: 317 MG/DL (ref 70–99)
GLUCOSE BLDC GLUCOMTR-MCNC: 332 MG/DL (ref 70–99)
GRAM STAIN RESULT: ABNORMAL
HBA1C MFR BLD: 11.9 % (ref 0–5.6)
HCT VFR BLD AUTO: 24.4 % (ref 40–53)
HGB BLD-MCNC: 7.7 G/DL (ref 13.3–17.7)
MAGNESIUM SERPL-MCNC: 1.8 MG/DL (ref 1.6–2.3)
MCH RBC QN AUTO: 28.5 PG (ref 26.5–33)
MCHC RBC AUTO-ENTMCNC: 31.6 G/DL (ref 31.5–36.5)
MCV RBC AUTO: 90 FL (ref 78–100)
PHOSPHATE SERPL-MCNC: 3.1 MG/DL (ref 2.5–4.5)
PLATELET # BLD AUTO: 456 10E3/UL (ref 150–450)
POTASSIUM BLD-SCNC: 4.8 MMOL/L (ref 3.4–5.3)
PROT SERPL-MCNC: 6.7 G/DL (ref 6.8–8.8)
RBC # BLD AUTO: 2.7 10E6/UL (ref 4.4–5.9)
SODIUM SERPL-SCNC: 131 MMOL/L (ref 133–144)
WBC # BLD AUTO: 15 10E3/UL (ref 4–11)

## 2022-04-03 PROCEDURE — 258N000003 HC RX IP 258 OP 636: Performed by: INTERNAL MEDICINE

## 2022-04-03 PROCEDURE — 250N000011 HC RX IP 250 OP 636: Performed by: SURGERY

## 2022-04-03 PROCEDURE — 36415 COLL VENOUS BLD VENIPUNCTURE: CPT | Performed by: INTERNAL MEDICINE

## 2022-04-03 PROCEDURE — 36592 COLLECT BLOOD FROM PICC: CPT | Performed by: SURGERY

## 2022-04-03 PROCEDURE — 250N000012 HC RX MED GY IP 250 OP 636 PS 637: Performed by: INTERNAL MEDICINE

## 2022-04-03 PROCEDURE — 97530 THERAPEUTIC ACTIVITIES: CPT | Mod: GO

## 2022-04-03 PROCEDURE — 87040 BLOOD CULTURE FOR BACTERIA: CPT | Performed by: SURGERY

## 2022-04-03 PROCEDURE — 36415 COLL VENOUS BLD VENIPUNCTURE: CPT | Performed by: SURGERY

## 2022-04-03 PROCEDURE — 82330 ASSAY OF CALCIUM: CPT | Performed by: SURGERY

## 2022-04-03 PROCEDURE — 85027 COMPLETE CBC AUTOMATED: CPT | Performed by: SURGERY

## 2022-04-03 PROCEDURE — 250N000013 HC RX MED GY IP 250 OP 250 PS 637: Performed by: SURGERY

## 2022-04-03 PROCEDURE — 97110 THERAPEUTIC EXERCISES: CPT | Mod: GO

## 2022-04-03 PROCEDURE — 83735 ASSAY OF MAGNESIUM: CPT | Performed by: SURGERY

## 2022-04-03 PROCEDURE — 200N000001 HC R&B ICU

## 2022-04-03 PROCEDURE — 250N000011 HC RX IP 250 OP 636: Performed by: SPECIALIST

## 2022-04-03 PROCEDURE — 99233 SBSQ HOSP IP/OBS HIGH 50: CPT | Performed by: INTERNAL MEDICINE

## 2022-04-03 PROCEDURE — 84100 ASSAY OF PHOSPHORUS: CPT | Performed by: SURGERY

## 2022-04-03 PROCEDURE — 250N000011 HC RX IP 250 OP 636: Performed by: NURSE PRACTITIONER

## 2022-04-03 PROCEDURE — 258N000003 HC RX IP 258 OP 636: Performed by: SPECIALIST

## 2022-04-03 PROCEDURE — 87149 DNA/RNA DIRECT PROBE: CPT | Performed by: INTERNAL MEDICINE

## 2022-04-03 PROCEDURE — 250N000013 HC RX MED GY IP 250 OP 250 PS 637: Performed by: NURSE PRACTITIONER

## 2022-04-03 PROCEDURE — 83036 HEMOGLOBIN GLYCOSYLATED A1C: CPT | Performed by: INTERNAL MEDICINE

## 2022-04-03 PROCEDURE — 250N000011 HC RX IP 250 OP 636: Performed by: INTERNAL MEDICINE

## 2022-04-03 PROCEDURE — 80053 COMPREHEN METABOLIC PANEL: CPT | Performed by: SURGERY

## 2022-04-03 PROCEDURE — 93010 ELECTROCARDIOGRAM REPORT: CPT | Performed by: INTERNAL MEDICINE

## 2022-04-03 PROCEDURE — 87077 CULTURE AEROBIC IDENTIFY: CPT | Performed by: INTERNAL MEDICINE

## 2022-04-03 PROCEDURE — 93005 ELECTROCARDIOGRAM TRACING: CPT

## 2022-04-03 RX ORDER — POLYETHYLENE GLYCOL 3350 17 G/17G
17 POWDER, FOR SOLUTION ORAL 2 TIMES DAILY
Status: DISCONTINUED | OUTPATIENT
Start: 2022-04-03 | End: 2022-04-18 | Stop reason: HOSPADM

## 2022-04-03 RX ORDER — MAGNESIUM SULFATE HEPTAHYDRATE 40 MG/ML
2 INJECTION, SOLUTION INTRAVENOUS ONCE
Status: COMPLETED | OUTPATIENT
Start: 2022-04-03 | End: 2022-04-03

## 2022-04-03 RX ORDER — HYDRALAZINE HYDROCHLORIDE 20 MG/ML
10 INJECTION INTRAMUSCULAR; INTRAVENOUS EVERY 4 HOURS PRN
Status: DISCONTINUED | OUTPATIENT
Start: 2022-04-03 | End: 2022-04-18 | Stop reason: HOSPADM

## 2022-04-03 RX ORDER — NICOTINE POLACRILEX 4 MG
15-30 LOZENGE BUCCAL
Status: DISCONTINUED | OUTPATIENT
Start: 2022-04-03 | End: 2022-04-03

## 2022-04-03 RX ORDER — DEXTROSE MONOHYDRATE 100 MG/ML
INJECTION, SOLUTION INTRAVENOUS CONTINUOUS PRN
Status: DISCONTINUED | OUTPATIENT
Start: 2022-04-03 | End: 2022-04-18 | Stop reason: HOSPADM

## 2022-04-03 RX ORDER — DEXTROSE MONOHYDRATE 25 G/50ML
25-50 INJECTION, SOLUTION INTRAVENOUS
Status: DISCONTINUED | OUTPATIENT
Start: 2022-04-03 | End: 2022-04-03

## 2022-04-03 RX ORDER — FUROSEMIDE 10 MG/ML
20 INJECTION INTRAMUSCULAR; INTRAVENOUS ONCE
Status: COMPLETED | OUTPATIENT
Start: 2022-04-03 | End: 2022-04-03

## 2022-04-03 RX ADMIN — OXYCODONE HYDROCHLORIDE 10 MG: 5 TABLET ORAL at 08:05

## 2022-04-03 RX ADMIN — SODIUM CHLORIDE 11 UNITS/HR: 9 INJECTION, SOLUTION INTRAVENOUS at 20:50

## 2022-04-03 RX ADMIN — LIDOCAINE 2 PATCH: 560 PATCH PERCUTANEOUS; TOPICAL; TRANSDERMAL at 10:09

## 2022-04-03 RX ADMIN — HYDROMORPHONE HYDROCHLORIDE 0.4 MG: 0.2 INJECTION, SOLUTION INTRAMUSCULAR; INTRAVENOUS; SUBCUTANEOUS at 05:55

## 2022-04-03 RX ADMIN — OXYCODONE HYDROCHLORIDE 10 MG: 5 TABLET ORAL at 17:14

## 2022-04-03 RX ADMIN — CEFAZOLIN SODIUM 2 G: 2 INJECTION, SOLUTION INTRAVENOUS at 10:14

## 2022-04-03 RX ADMIN — HYDROMORPHONE HYDROCHLORIDE 0.4 MG: 0.2 INJECTION, SOLUTION INTRAMUSCULAR; INTRAVENOUS; SUBCUTANEOUS at 16:13

## 2022-04-03 RX ADMIN — CEFAZOLIN SODIUM 2 G: 2 INJECTION, SOLUTION INTRAVENOUS at 18:26

## 2022-04-03 RX ADMIN — SENNOSIDES AND DOCUSATE SODIUM 1 TABLET: 50; 8.6 TABLET ORAL at 08:32

## 2022-04-03 RX ADMIN — ASPIRIN 81 MG CHEWABLE TABLET 81 MG: 81 TABLET CHEWABLE at 08:32

## 2022-04-03 RX ADMIN — SODIUM CHLORIDE 8 UNITS/HR: 9 INJECTION, SOLUTION INTRAVENOUS at 23:53

## 2022-04-03 RX ADMIN — ACETAMINOPHEN 975 MG: 325 TABLET ORAL at 05:55

## 2022-04-03 RX ADMIN — METHOCARBAMOL 750 MG: 750 TABLET ORAL at 16:13

## 2022-04-03 RX ADMIN — METHOCARBAMOL 750 MG: 750 TABLET ORAL at 21:52

## 2022-04-03 RX ADMIN — MAGNESIUM SULFATE HEPTAHYDRATE 2 G: 40 INJECTION, SOLUTION INTRAVENOUS at 05:55

## 2022-04-03 RX ADMIN — GABAPENTIN 100 MG: 100 CAPSULE ORAL at 13:08

## 2022-04-03 RX ADMIN — PANTOPRAZOLE SODIUM 40 MG: 40 TABLET, DELAYED RELEASE ORAL at 08:32

## 2022-04-03 RX ADMIN — OXYCODONE HYDROCHLORIDE 10 MG: 5 TABLET ORAL at 13:08

## 2022-04-03 RX ADMIN — GABAPENTIN 100 MG: 100 CAPSULE ORAL at 19:51

## 2022-04-03 RX ADMIN — SODIUM CHLORIDE 7 UNITS/HR: 9 INJECTION, SOLUTION INTRAVENOUS at 15:18

## 2022-04-03 RX ADMIN — HEPARIN SODIUM 5000 UNITS: 5000 INJECTION, SOLUTION INTRAVENOUS; SUBCUTANEOUS at 13:08

## 2022-04-03 RX ADMIN — BUPROPION HYDROCHLORIDE 300 MG: 300 TABLET, EXTENDED RELEASE ORAL at 08:32

## 2022-04-03 RX ADMIN — OXYCODONE HYDROCHLORIDE 10 MG: 5 TABLET ORAL at 21:52

## 2022-04-03 RX ADMIN — HYDROMORPHONE HYDROCHLORIDE 0.4 MG: 0.2 INJECTION, SOLUTION INTRAMUSCULAR; INTRAVENOUS; SUBCUTANEOUS at 02:36

## 2022-04-03 RX ADMIN — ACETAMINOPHEN 975 MG: 325 TABLET ORAL at 13:08

## 2022-04-03 RX ADMIN — ACETAMINOPHEN 975 MG: 325 TABLET ORAL at 21:52

## 2022-04-03 RX ADMIN — CEFAZOLIN SODIUM 2 G: 2 INJECTION, SOLUTION INTRAVENOUS at 01:15

## 2022-04-03 RX ADMIN — FUROSEMIDE 20 MG: 10 INJECTION, SOLUTION INTRAVENOUS at 11:05

## 2022-04-03 RX ADMIN — MICAFUNGIN SODIUM 100 MG: 50 INJECTION, POWDER, LYOPHILIZED, FOR SOLUTION INTRAVENOUS at 16:14

## 2022-04-03 RX ADMIN — OXYCODONE HYDROCHLORIDE 10 MG: 5 TABLET ORAL at 02:36

## 2022-04-03 RX ADMIN — HEPARIN SODIUM 5000 UNITS: 5000 INJECTION, SOLUTION INTRAVENOUS; SUBCUTANEOUS at 21:52

## 2022-04-03 RX ADMIN — HEPARIN SODIUM 5000 UNITS: 5000 INJECTION, SOLUTION INTRAVENOUS; SUBCUTANEOUS at 05:55

## 2022-04-03 RX ADMIN — POLYETHYLENE GLYCOL 3350 17 G: 17 POWDER, FOR SOLUTION ORAL at 21:52

## 2022-04-03 RX ADMIN — LEVOTHYROXINE SODIUM 250 MCG: 125 TABLET ORAL at 08:31

## 2022-04-03 RX ADMIN — METHOCARBAMOL 750 MG: 750 TABLET ORAL at 10:14

## 2022-04-03 RX ADMIN — POLYETHYLENE GLYCOL 3350 17 G: 17 POWDER, FOR SOLUTION ORAL at 08:32

## 2022-04-03 RX ADMIN — HYDROMORPHONE HYDROCHLORIDE 0.2 MG: 0.2 INJECTION, SOLUTION INTRAMUSCULAR; INTRAVENOUS; SUBCUTANEOUS at 08:05

## 2022-04-03 RX ADMIN — HYDROMORPHONE HYDROCHLORIDE 0.4 MG: 0.2 INJECTION, SOLUTION INTRAMUSCULAR; INTRAVENOUS; SUBCUTANEOUS at 11:07

## 2022-04-03 RX ADMIN — SODIUM CHLORIDE 13 UNITS/HR: 9 INJECTION, SOLUTION INTRAVENOUS at 18:20

## 2022-04-03 RX ADMIN — GABAPENTIN 100 MG: 100 CAPSULE ORAL at 08:32

## 2022-04-03 RX ADMIN — SODIUM CHLORIDE 3 UNITS/HR: 9 INJECTION, SOLUTION INTRAVENOUS at 08:40

## 2022-04-03 RX ADMIN — METHOCARBAMOL 750 MG: 750 TABLET ORAL at 04:18

## 2022-04-03 RX ADMIN — HYDROMORPHONE HYDROCHLORIDE 0.4 MG: 0.2 INJECTION, SOLUTION INTRAMUSCULAR; INTRAVENOUS; SUBCUTANEOUS at 19:29

## 2022-04-03 RX ADMIN — SENNOSIDES AND DOCUSATE SODIUM 1 TABLET: 50; 8.6 TABLET ORAL at 19:51

## 2022-04-03 ASSESSMENT — ACTIVITIES OF DAILY LIVING (ADL)
ADLS_ACUITY_SCORE: 7

## 2022-04-03 NOTE — PROVIDER NOTIFICATION
Provider Notification    Notified Person Name:  Dr. Jolly, Infectious Disease    Notification Date/Time:  4/3/2022, 15:38    Notification Interaction:  Text page, phone interaction    Purpose of Notification: Micro lab results.  Tricuspid Valve tissue gramstain has no yeast upon review.      Orders Received: Will stop micafungin

## 2022-04-03 NOTE — PROVIDER NOTIFICATION
Provider Notification    Notified Person Name:  Cross coverage hospitalist    Notification Date/Time:  4/3/22, 1820    Notification Interaction:  Text page, phone call    Purpose of Notification: request order for prandial insulin in addition to insulin drip as patient is eating    Orders Received: MD will enter coverage

## 2022-04-03 NOTE — PROGRESS NOTES
[facility]    Medicine Progress Note - Hospitalist Service        Date of Admission:  3/28/2022 10:33 PM    Assessment & Plan:   Anil Montoya is a 36 year old male with hx of DM2, HTN, WARNER, and anxiety who was initially hospitalized at [facility] on 3/22/2022 for severe sepsis initially attributed to pneumonia vs viral infection. He was found to have MSSA bacteremia with ongoing positive cultures. He underwent a TEJINDER on 3/27/2022 which revealed tricuspid endocarditis, and he was transferred to [facility] on 3/28/2022 for CV Surgery consultation.     Sepsis with MSSA bacteremia due to tricuspid valve endocarditis.  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 He was initially treated with ceftriaxone and azithromycin for possible community-acquired pneumonia, and ID was consulted. Blood cultures returned positive for MSSA, and serial blood cultures continued to return positive. Broad work-up including CT abd/pelvis, MR lumbar spine, CT dental, and TTE were unrevealing  * Initially treated with IV ceftriaxone and azithromycin; switched to IV vanco on 3/23 when blood cultures returned with Staph; switched to IV cefazolin on 3/24 upon sensitivities  * Underwent TEJINDER on 3/27 which showed thickened tricuspid valve leaflets with atrial aspect of the septal leaflet with an irregular border and small mobile filamentous elements, large fixed heterogeneous mass in the right ventricle attached to the interventricular septum (2.5 x 2 cm) appearing connected to the tricuspid subvalvular annular apparatus with a pedunculated round mobile element (1.8 x 1 cm).  No significant tricuspid regurgitation reported. LVEF 55 to 60%. Negative bubble study.  * Transferred to [facility] on 3/28 for CV Surgery consult  - Patient underwent tricuspid valve replacement with prosthetic  valve (31 mm epic stented valve) on 4/1/2022 and endoscopic aortic valve exploration.  - Gram stain from tissue from heart valve from 4/1 showing yeast  - Micafungin added on 4/1  - Continues on IV cefazolin   - Daily blood cultures, last positive was from 3/31  - ID, Cardiology, CV Surgery following     Second-degree AV block  -Patient appears to be in second-degree AV block, currently has epicardial AV pacer set at 80  -Continue to monitor closely on telemetry, will await further opinion from cardiothoracic surgery and cardiology    DM2 without complications, long-term insulin use, uncontrolled  * A1c 12.6. Had not been taking his diabetes medications for some time  * He was initiated on basal bolus insulin while at [facility]  - Patient was on insulin drip following surgery, this was transitioned to basal bolus regimen yesterday however blood sugars are poorly controlled.  We will switch back to insulin drip today.    Acute postop blood loss anemia  -Hemoglobin preop was around 12 which had slowly trended down.  Hemoglobin dropped to 7.7 this morning, most likely acute blood loss from surgery.  Continue to monitor and transfuse as needed.    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 RUQ US (3/22) showed fatty liver, but normal gallbladder and normal bile ducts  - LFTs improving      Essential hypertension  - Hold prior to admission losartan     WARNER  * CPAP per home settings     Hypothyroidism  * Chronic and stable on levothyroxine     Generalized anxiety disorder  ADHD  * Chronic and stable on bupropion      Diet: Moderate Consistent Carb (60 g CHO per Meal) Diet     DVT Prophylaxis: Pneumatic Compression Devices   Schwartz Catheter: PRESENT, indication: Strict  "1-2 Hour I&O  Code Status: Full Code     Disposition Plan    Expected Discharge: 04/08/2022     Anticipated discharge location: home with family    Delays:     Other (Add Comment)          Entered: Joseph Iraheta MD 04/03/2022, 10:29 AM        Clinically Significant Risk Factors Present on Admission                    The patient's care was discussed with the Bedside Nurse and Patient.    Joseph Iraheta MD  Hospitalist Service  [facility]  Text Page 7AM-6PM  Securely message with the Vocera Web Console (learn more here)  Text page via [employer] Street Paging/Directory    ______________________________________________________________________    Interval History   Blood sugar has been poorly controlled in the last 24 hours, therefore switched back to insulin drip.  Patient denies dyspnea.  Endorses pain at surgical site but adequately managed at the moment.  Hemoglobin dropped to 7.7 this morning.    Data reviewed today: I reviewed all medications, new labs and imaging results over the last 24 hours. I personally reviewed the EKG tracing showing ?  Second-degree AV block.    Physical Exam   Vital signs:  Temp: (!) 100.9  F (38.3  C) Temp src: Bladder BP: 110/67 Pulse: 103   Resp: 19 SpO2: 95 % O2 Device: BiPAP/CPAP Oxygen Delivery: 2 LPM   Weight: 117.8 kg (259 lb 11.2 oz)  Estimated body mass index is 37.26 kg/m  as calculated from the following:    Height as of 3/22/22: 1.778 m (5' 10\").    Weight as of this encounter: 117.8 kg (259 lb 11.2 oz).      Wt Readings from Last 2 Encounters:   04/03/22 117.8 kg (259 lb 11.2 oz)   03/28/22 121.1 kg (267 lb)       Gen: AAOX3, NAD, comfortable  HEENT:  no pallor  Resp: CTA B/L, normal WOB  CVS: RRR, no murmur, chest tube +  Abd/GI: Soft, non-tender. BS- normoactive.   Skin: Warm, dry no rashes  MSK: Trace-1+ pedal edema  Neuro- CN- intact. No focal deficits.      Data   Recent Labs   Lab 04/03/22  0957 04/03/22  0809 04/03/22  0347 04/02/22  0605 " 04/02/22  0422 04/01/22  1440 04/01/22  1300 04/01/22  1256 04/01/22  1141   WBC  --   --  15.0*  --  16.1*  --  38.3*  --  30.4*   HGB  --   --  7.7*  --  9.1*  --  9.0*  --  8.5*   MCV  --   --  90  --  90  --  94  --  94   PLT  --   --  456*  --  535*  --  566*  --  442   INR  --   --   --   --   --   --  1.55*  --  1.64*   NA  --   --  131*  --  135  --  135  --  134   POTASSIUM  --   --  4.8  --  4.8  --  5.1  5.1  --  5.1   CHLORIDE  --   --  100  --  106  --  107  --  105   CO2  --   --  25  --  24  --  24  --  25   BUN  --   --  19  --  23  --  27  --  24   CR  --   --  0.85  --  0.88  --  1.04  --  1.05   ANIONGAP  --   --  6  --  5  --  4  --  4   IMAN  --   --  8.3*  --  8.7  --  8.4*  --  8.9   * 253* 271*   < > 136*   < > 250*   < > 259*   ALBUMIN  --   --  1.6*  --  1.8*  --  1.7*  --   --    PROTTOTAL  --   --  6.7*  --  6.8  --  6.4*  --   --    BILITOTAL  --   --  0.3  --  0.3  --  0.5  --   --    ALKPHOS  --   --  101  --  108  --  106  --   --    ALT  --   --  37  --  65  --  81*  --   --    AST  --   --  29  --  63*  --  63*  --   --     < > = values in this interval not displayed.       No results found for this or any previous visit (from the past 24 hour(s)).  Medications     dextrose       dextrose       insulin regular 9 Units/hr (04/03/22 0955)     phenylephrine Stopped (04/02/22 0450)     BETA BLOCKER NOT PRESCRIBED       sodium chloride Stopped (04/02/22 1200)       acetaminophen  975 mg Oral Q8H     aspirin  81 mg Oral or NG Tube Daily     buPROPion  300 mg Oral QAM     ceFAZolin  2 g Intravenous Q8H     furosemide  20 mg Intravenous Once     gabapentin  100 mg Oral TID     heparin ANTICOAGULANT  5,000 Units Subcutaneous Q8H     lactated ringers  250 mL Intravenous Once     levothyroxine  125 mcg Oral Once per day on Mon Tue Wed Thu Fri Sat     levothyroxine  250 mcg Oral Weekly     lidocaine  2 patch Transdermal Q24H     lidocaine   Transdermal Q8H     methocarbamol  750 mg Oral  Q6H     micafungin  100 mg Intravenous Q24H     pantoprazole  40 mg Oral or NG Tube Daily    Or     pantoprazole  40 mg Oral Daily     polyethylene glycol  17 g Oral BID     senna-docusate  1 tablet Oral BID     sodium chloride (PF)  3 mL Intracatheter Q8H

## 2022-04-03 NOTE — PROGRESS NOTES
[facility]  Cardiovascular and Thoracic Surgery Daily Note          Assessment and Plan:   Anil Montoya is a 36 year old gentleman who presented to [facility] ED on 3/22/22 with weakness, recent hematuria and shortness of breath. Workup demonstrated MSSA bacteremia likely secondary to right groin skin source. TEJINDER on 3/27/22 demonstrated tricuspid valve endocarditis. In completing full work up, there was concern for discitis with seeding however MRI negative. He was transferred on 3/28/22 to [facility] for consideration of surgical intervention.    PMH includes: Poorly controlled diabetic, HTN, hypothyroidism, obesity, WARNER, anxiety, cryptococcal pneumonia in 2012 with previous history of staph aureus bacteremia.    Most recent echo demonstrates LVEF 55-60%, normal RV function, mildly thickened cusps on aortic valve and thickened tricuspid valve leaflets, small mobile filamentous elements on the septal leaflet of TV and larged fixed heterogeneous mass in the RV attached to the interventricular septum.  Coronary angiogram 3/31/22 with no e/o CAD.      POD #2 s/p:  1. Tricuspid valve replacement (31mm Epic stented valve)  2. Endoscopic aortic valve exploration  3. Transesophageal echocardiogram on 4/1/22 with Dr. 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  HD stable; in second degree type II heart block, stable and asymptomatic  [PTA meds on hold: losartan 50 mg daily]  - ASA 81 mg daily  - Defer BB given heart block  - No statin indicated  - ID as below  - CTs in to suction, leave in, continue back up pacing/standby in the event of progression of HB    Resp:   WARNER  History of cryptococcal pneumonia  Extubated on POD 0, now saturating well on 2 LPM cannula  - Continue pulm hygiene efforts: IS/flutter valve/mobility  - Titrate O2 to maintain sats >92%  - CPAP at HS    Neuro:    Acute postoperative pain  MDD/VIVEK  - Not well controlled with current regimen this AM,  received toradol x1  - Robaxin scheduled, lidocaine patches added  - PTA Wellbutrin resumed    Renal/Electrolyte:   Creat stable, below preoperative weight, has some edema  - Strict I/O, daily weights  - Avoid/limit nephrotoxins as able  - Replete lytes per protocol  - Lasix 20 mg IV x1 and evaluate response    GI/Nutrition:    Mildly elevated LFTs, resolved  - Tolerating current diet:  Orders Placed This Encounter      Moderate Consistent Carb (60 g CHO per Meal) Diet   - Continue bowel regimen, no BM yet, + flatus  - PPI    :    - Schwartz in place for strict I/O, remove today    Endo:  Stress induced hyperglycemia   Poorly controlled DM2  Hypothyroidism  Preop A1c   Lab Results   Component Value Date    A1C 12.6 03/27/2022    [PTA meds on hold: Victoza and metformin]  - Was on insulin infusion, transitioned to sliding scale insulin and lantus 4/2, much more hyperglycemic past 24h, resume insulin infusion  - Hospitalists managing diabetic regimen, appreciate  - Goal BG <180 for optimal healing  - PTA levothyroxine resumed    ID:  Stress induced leukocytosis  Tricuspid valve endocarditis  MSSA bacteremia  History cryptococcal pneumonia  WBC 15, Tmax 101.5; no s/sx infection  Prelim intraop gram stain positive for yeast from tricuspid valve, culture with 1+ staph aureus, continue to follow  BC postop NGTD thus far  Fungal BC sent on 4/1 with NGTD  - Completing perioperative ABX  - Continue Cefazolin per ID  - Micafungin started 4/1 per ID for positive yeast on gram stain  - HIV antigen/antibody test sent   - Per ID: repeat blood cultures x 2 sets daily until clear  - Will need optho consult once more stable  - Continue to follow fever curve, WBC and inflammatory markers as appropriate    Heme:  Acute blood loss anemia  Acute blood loss thrombocytopenia  Hgb 7.7, Plts 456; no s/sx active bleeding  - CBC in AM  - Goal Hgb >7    -Proph: PPI, subcutaneous heparin, SCDs  -Dispo: Continue in ICU with current pacing needs  and insulin gtt, continue therapies, pulm hygiene          Interval History:   No acute events overnight. States pain is well managed on current regimen, slept well. Breathing is stable on NC. Tolerating diet, is passing flatus, no BM. Denies chest pain, palpitations, dizziness, syncopal symptoms, chills, myalgias, sternal popping/clicking.         Medications:       acetaminophen  975 mg Oral Q8H     aspirin  81 mg Oral or NG Tube Daily     buPROPion  300 mg Oral QAM     ceFAZolin  2 g Intravenous Q8H     gabapentin  100 mg Oral TID     heparin ANTICOAGULANT  5,000 Units Subcutaneous Q8H     lactated ringers  250 mL Intravenous Once     levothyroxine  125 mcg Oral Once per day on Mon Tue Wed Thu Fri Sat     levothyroxine  250 mcg Oral Weekly     lidocaine  2 patch Transdermal Q24H     lidocaine   Transdermal Q8H     methocarbamol  750 mg Oral Q6H     micafungin  100 mg Intravenous Q24H     pantoprazole  40 mg Oral or NG Tube Daily    Or     pantoprazole  40 mg Oral Daily     polyethylene glycol  17 g Oral Daily     senna-docusate  1 tablet Oral BID     sodium chloride (PF)  3 mL Intracatheter Q8H             Physical Exam:   Vitals were reviewed  Blood pressure 110/67, pulse 103, temperature (Abnormal) 100.9  F (38.3  C), resp. rate 19, weight 117.8 kg (259 lb 11.2 oz), SpO2 95 %.  Rhythm: second degree type II rates 70s    Lungs: bibasilar crackles, +O2    Cardiovascular: S1, S2, RRR, no m/r/g    Abdomen: S/ND/ND, +BS    Extremeties: mild edema, warm and well perfused    Incision: CDI    CT: to suction, no air leak, no crepitus    Weight:   Vitals:    04/01/22 0632 04/02/22 0257 04/03/22 0600   Weight: 121.1 kg (267 lb) 118.9 kg (262 lb 2 oz) 117.8 kg (259 lb 11.2 oz)            Data:   Labs:   Lab Results   Component Value Date    WBC 38.3 04/01/2022    WBC 6.8 11/24/2017     Lab Results   Component Value Date    RBC 3.09 04/01/2022    RBC 5.16 11/24/2017     Lab Results   Component Value Date    HGB 9.0  04/01/2022    HGB 15.2 11/24/2017     Lab Results   Component Value Date    HCT 29.1 04/01/2022    HCT 43.7 11/24/2017     No components found for: MCT  Lab Results   Component Value Date    MCV 94 04/01/2022    MCV 85 11/24/2017     Lab Results   Component Value Date    MCH 29.1 04/01/2022    MCH 29.5 11/24/2017     Lab Results   Component Value Date    MCHC 30.9 04/01/2022    MCHC 34.8 11/24/2017     Lab Results   Component Value Date    RDW 13.5 04/01/2022    RDW 12.9 11/24/2017     Lab Results   Component Value Date     04/01/2022     11/24/2017       Last Basic Metabolic Panel:  Lab Results   Component Value Date     04/01/2022     11/24/2017      Lab Results   Component Value Date    POTASSIUM 5.1 04/01/2022    POTASSIUM 5.1 04/01/2022    POTASSIUM 3.8 11/24/2017     Lab Results   Component Value Date    CHLORIDE 107 04/01/2022    CHLORIDE 104 11/24/2017     Lab Results   Component Value Date    IMAN 8.4 04/01/2022    IMAN 8.3 11/24/2017     Lab Results   Component Value Date    CO2 24 04/01/2022    CO2 24 11/24/2017     Lab Results   Component Value Date    BUN 27 04/01/2022    BUN 18 11/24/2017     Lab Results   Component Value Date    CR 1.04 04/01/2022    CR 1.07 11/24/2017     Lab Results   Component Value Date     04/01/2022     04/01/2022     11/24/2017       Imaging:  No results found for this or any previous visit (from the past 24 hour(s)).     Rounded and discussed with Dr. Linh Silverio, APRN, ACNPC-AG, CCRN  Nurse Practitioner  Cardiothoracic Surgery  Pager: [phone]

## 2022-04-03 NOTE — PROGRESS NOTES
[facility]    Infectious Disease Progress Note    Date of Service : 04/03/2022     Assessment:  36YM with uncontrolled diabetes and HbA1c of >12%, and prior cryptococcal pneumonia 10 years ago without HIV diagnosis, who is hospitalized with high grade prolonged MSSA bacteremia since 3/22 with tricuspid valve endocarditis. Possible secondary seeding of the lumbar spine though MRI is negative. He is s/p tricuspid valve replacement surgery and aortic valve exploration for control of infection on 04/01 with positive gram stain and culture of tricuspid valve tissue for staph aureus.  Yeast also seen on stain, Cxs are awaited.     Recommendations:  1. Follow blood cxs . Last 2 sets negative. Possible PICC tomorrow if cxs remain negative  2. Continue Cefazolin / Micafungin given yeast seen on gram stain of tricuspid valve, cx pending  3. HIV antigen/antibody test  4. Follow valve tissue cx  5. Glycemic control  6. Follow clinical status and labs      ADDENDUM  Micro results  updated by lab. Initial read of 1+ yeast on valve tissue on 4/1 was corrected and updated. Only staph aureus on valve tissue cx, no yeast seen on stain.  -discontinue Micafungin    Sol Jolly MD    Interval History   Resting, ongoing chest pain at surgical site, mother at bedside. Tolerating antibiotics without side effects. Leukocytosis improving, still some ongoing fever    Physical Exam   Temp: 99.9  F (37.7  C) Temp src: Bladder BP: (!) 141/69 Pulse: 85   Resp: 25 SpO2: 93 % O2 Device: BiPAP/CPAP Oxygen Delivery: 2 LPM  Vitals:    04/01/22 0632 04/02/22 0257 04/03/22 0600   Weight: 121.1 kg (267 lb) 118.9 kg (262 lb 2 oz) 117.8 kg (259 lb 11.2 oz)     Vital Signs with Ranges  Temp:  [99.9  F (37.7  C)-101.5  F (38.6  C)] 99.9  F (37.7  C)  Pulse:  [] 85  Resp:  [14-37] 25  BP: ()/(62-82) 141/69  SpO2:  [92 %-100 %] 93 %    Constitutional: Awake, alert, cooperative, no apparent distress  Lungs: Clear to  auscultation bilaterally, no crackles or wheezing  Cardiovascular: S1S2, sternal surgical site intact, mediastinal chest tubes in place  Abdomen:  soft,non-tender  Skin: No rash      Other:    Medications     dextrose       dextrose       insulin regular 10 Units/hr (04/03/22 1313)     phenylephrine Stopped (04/02/22 0450)     BETA BLOCKER NOT PRESCRIBED       sodium chloride Stopped (04/02/22 1200)       acetaminophen  975 mg Oral Q8H     aspirin  81 mg Oral or NG Tube Daily     buPROPion  300 mg Oral QAM     ceFAZolin  2 g Intravenous Q8H     gabapentin  100 mg Oral TID     heparin ANTICOAGULANT  5,000 Units Subcutaneous Q8H     lactated ringers  250 mL Intravenous Once     levothyroxine  125 mcg Oral Once per day on Mon Tue Wed Thu Fri Sat     levothyroxine  250 mcg Oral Weekly     lidocaine  2 patch Transdermal Q24H     lidocaine   Transdermal Q8H     methocarbamol  750 mg Oral Q6H     micafungin  100 mg Intravenous Q24H     pantoprazole  40 mg Oral or NG Tube Daily    Or     pantoprazole  40 mg Oral Daily     polyethylene glycol  17 g Oral BID     senna-docusate  1 tablet Oral BID     sodium chloride (PF)  3 mL Intracatheter Q8H       Data   All microbiology laboratory data reviewed.  Recent Labs   Lab Test 04/03/22  0347 04/02/22  0422 04/01/22  1300   WBC 15.0* 16.1* 38.3*   HGB 7.7* 9.1* 9.0*   HCT 24.4* 29.1* 29.1*   MCV 90 90 94   * 535* 566*     Recent Labs   Lab Test 04/03/22  0347 04/02/22  0422 04/01/22  1300   CR 0.85 0.88 1.04     Microbiology  Blood cx 4/1 pending, Blood cultures 3/22-3/31 MSSA  4/1 tricuspid valve tissue  Heart Valve, Tricuspid; Tissue            0 Result Notes     Culture Culture in progress        1+ Staphylococcus aureus Abnormal          Heart Valve, Tricuspid; Tissue            0 Result Notes          (important suggestion)  Newer results are available. Click to view them now.      Gram Stain Result   Abnormal   1+ Gram positive cocci       1+ Yeast       4+ WBC seen    Predominantly PMNs               Peripheral Blood            0 Result Notes     Culture Positive on the 1st day of incubation Abnormal         Staphylococcus aureus Panic     1 of 2 bottles         Resulting Agency: [facility]         Susceptibility                   Staphylococcus aureus       BLAZE       Clindamycin <=0.25 ug/mL Susceptible       Erythromycin <=0.25 ug/mL Susceptible       Gentamicin <=0.5 ug/mL Susceptible       Oxacillin <=0.25 ug/mL Susceptible 1       Tetracycline <=1.0 ug/mL Susceptible       Trimethoprim/Sulfamethoxazole <=0.5/9.5 u... Susceptible       Vancomycin 2.0 ug/mL Susceptible

## 2022-04-03 NOTE — PLAN OF CARE
A/O x 4.  Up with SBA to manage lines.  BP stable.  100% AV paced with underlying junctional rhythm.  Will most likely need PPM.   Awaiting blood cultures to clear for at least 48 hours for PICC placement for long term abx.  IV dilaudid, robaxin, toradol x 1, and oxycodone for pain. Chest tube output 70 ml over 12 hours. Dad at bedside.

## 2022-04-04 ENCOUNTER — APPOINTMENT (OUTPATIENT)
Dept: OCCUPATIONAL THERAPY | Facility: CLINIC | Age: 37
DRG: 853 | End: 2022-04-04
Attending: HOSPITALIST
Payer: COMMERCIAL

## 2022-04-04 LAB
ANION GAP SERPL CALCULATED.3IONS-SCNC: 4 MMOL/L (ref 3–14)
BACTERIA BLD CULT: NO GROWTH
BACTERIA TISS BX CULT: ABNORMAL
BUN SERPL-MCNC: 20 MG/DL (ref 7–30)
CALCIUM SERPL-MCNC: 8.4 MG/DL (ref 8.5–10.1)
CHLORIDE BLD-SCNC: 102 MMOL/L (ref 94–109)
CO2 SERPL-SCNC: 26 MMOL/L (ref 20–32)
CREAT SERPL-MCNC: 0.89 MG/DL (ref 0.66–1.25)
ERYTHROCYTE [DISTWIDTH] IN BLOOD BY AUTOMATED COUNT: 13.7 % (ref 10–15)
GFR SERPL CREATININE-BSD FRML MDRD: >90 ML/MIN/1.73M2
GLUCOSE BLD-MCNC: 177 MG/DL (ref 70–99)
GLUCOSE BLDC GLUCOMTR-MCNC: 104 MG/DL (ref 70–99)
GLUCOSE BLDC GLUCOMTR-MCNC: 108 MG/DL (ref 70–99)
GLUCOSE BLDC GLUCOMTR-MCNC: 114 MG/DL (ref 70–99)
GLUCOSE BLDC GLUCOMTR-MCNC: 115 MG/DL (ref 70–99)
GLUCOSE BLDC GLUCOMTR-MCNC: 136 MG/DL (ref 70–99)
GLUCOSE BLDC GLUCOMTR-MCNC: 139 MG/DL (ref 70–99)
GLUCOSE BLDC GLUCOMTR-MCNC: 145 MG/DL (ref 70–99)
GLUCOSE BLDC GLUCOMTR-MCNC: 145 MG/DL (ref 70–99)
GLUCOSE BLDC GLUCOMTR-MCNC: 147 MG/DL (ref 70–99)
GLUCOSE BLDC GLUCOMTR-MCNC: 153 MG/DL (ref 70–99)
GLUCOSE BLDC GLUCOMTR-MCNC: 158 MG/DL (ref 70–99)
GLUCOSE BLDC GLUCOMTR-MCNC: 166 MG/DL (ref 70–99)
GLUCOSE BLDC GLUCOMTR-MCNC: 174 MG/DL (ref 70–99)
GLUCOSE BLDC GLUCOMTR-MCNC: 179 MG/DL (ref 70–99)
GLUCOSE BLDC GLUCOMTR-MCNC: 181 MG/DL (ref 70–99)
GLUCOSE BLDC GLUCOMTR-MCNC: 197 MG/DL (ref 70–99)
GLUCOSE BLDC GLUCOMTR-MCNC: 229 MG/DL (ref 70–99)
GLUCOSE BLDC GLUCOMTR-MCNC: 98 MG/DL (ref 70–99)
HCT VFR BLD AUTO: 23.4 % (ref 40–53)
HGB BLD-MCNC: 7.3 G/DL (ref 13.3–17.7)
HIV 1+2 AB+HIV1 P24 AG SERPL QL IA: NONREACTIVE
MAGNESIUM SERPL-MCNC: 2.1 MG/DL (ref 1.6–2.3)
MCH RBC QN AUTO: 28.9 PG (ref 26.5–33)
MCHC RBC AUTO-ENTMCNC: 31.2 G/DL (ref 31.5–36.5)
MCV RBC AUTO: 93 FL (ref 78–100)
PHOSPHATE SERPL-MCNC: 3.7 MG/DL (ref 2.5–4.5)
PLATELET # BLD AUTO: 532 10E3/UL (ref 150–450)
POTASSIUM BLD-SCNC: 4.4 MMOL/L (ref 3.4–5.3)
RBC # BLD AUTO: 2.53 10E6/UL (ref 4.4–5.9)
SODIUM SERPL-SCNC: 132 MMOL/L (ref 133–144)
WBC # BLD AUTO: 16 10E3/UL (ref 4–11)

## 2022-04-04 PROCEDURE — 250N000013 HC RX MED GY IP 250 OP 250 PS 637: Performed by: NURSE PRACTITIONER

## 2022-04-04 PROCEDURE — 250N000013 HC RX MED GY IP 250 OP 250 PS 637: Performed by: SURGERY

## 2022-04-04 PROCEDURE — 97530 THERAPEUTIC ACTIVITIES: CPT | Mod: GO | Performed by: OCCUPATIONAL THERAPIST

## 2022-04-04 PROCEDURE — 250N000011 HC RX IP 250 OP 636: Performed by: SURGERY

## 2022-04-04 PROCEDURE — 250N000011 HC RX IP 250 OP 636: Performed by: NURSE PRACTITIONER

## 2022-04-04 PROCEDURE — 93010 ELECTROCARDIOGRAM REPORT: CPT | Performed by: INTERNAL MEDICINE

## 2022-04-04 PROCEDURE — 250N000012 HC RX MED GY IP 250 OP 636 PS 637: Performed by: NURSE PRACTITIONER

## 2022-04-04 PROCEDURE — 83735 ASSAY OF MAGNESIUM: CPT | Performed by: INTERNAL MEDICINE

## 2022-04-04 PROCEDURE — 97110 THERAPEUTIC EXERCISES: CPT | Mod: GO | Performed by: OCCUPATIONAL THERAPIST

## 2022-04-04 PROCEDURE — 93005 ELECTROCARDIOGRAM TRACING: CPT

## 2022-04-04 PROCEDURE — 85027 COMPLETE CBC AUTOMATED: CPT | Performed by: NURSE PRACTITIONER

## 2022-04-04 PROCEDURE — 258N000003 HC RX IP 258 OP 636: Performed by: NURSE PRACTITIONER

## 2022-04-04 PROCEDURE — 250N000012 HC RX MED GY IP 250 OP 636 PS 637: Performed by: INTERNAL MEDICINE

## 2022-04-04 PROCEDURE — 99233 SBSQ HOSP IP/OBS HIGH 50: CPT | Performed by: INTERNAL MEDICINE

## 2022-04-04 PROCEDURE — 99222 1ST HOSP IP/OBS MODERATE 55: CPT | Mod: 24 | Performed by: INTERNAL MEDICINE

## 2022-04-04 PROCEDURE — 120N000001 HC R&B MED SURG/OB

## 2022-04-04 PROCEDURE — 36415 COLL VENOUS BLD VENIPUNCTURE: CPT | Performed by: NURSE PRACTITIONER

## 2022-04-04 PROCEDURE — 80048 BASIC METABOLIC PNL TOTAL CA: CPT | Performed by: NURSE PRACTITIONER

## 2022-04-04 PROCEDURE — 84100 ASSAY OF PHOSPHORUS: CPT | Performed by: SURGERY

## 2022-04-04 PROCEDURE — 258N000003 HC RX IP 258 OP 636: Performed by: INTERNAL MEDICINE

## 2022-04-04 RX ORDER — LOSARTAN POTASSIUM 25 MG/1
25 TABLET ORAL DAILY
Status: DISCONTINUED | OUTPATIENT
Start: 2022-04-04 | End: 2022-04-10

## 2022-04-04 RX ORDER — LIDOCAINE 40 MG/G
CREAM TOPICAL
Status: DISCONTINUED | OUTPATIENT
Start: 2022-04-04 | End: 2022-04-09

## 2022-04-04 RX ORDER — FUROSEMIDE 10 MG/ML
20 INJECTION INTRAMUSCULAR; INTRAVENOUS EVERY 8 HOURS
Status: COMPLETED | OUTPATIENT
Start: 2022-04-04 | End: 2022-04-04

## 2022-04-04 RX ADMIN — HEPARIN SODIUM 5000 UNITS: 5000 INJECTION, SOLUTION INTRAVENOUS; SUBCUTANEOUS at 21:04

## 2022-04-04 RX ADMIN — FUROSEMIDE 20 MG: 10 INJECTION, SOLUTION INTRAVENOUS at 14:42

## 2022-04-04 RX ADMIN — HEPARIN SODIUM 5000 UNITS: 5000 INJECTION, SOLUTION INTRAVENOUS; SUBCUTANEOUS at 05:57

## 2022-04-04 RX ADMIN — SODIUM CHLORIDE 7 UNITS/HR: 9 INJECTION, SOLUTION INTRAVENOUS at 21:04

## 2022-04-04 RX ADMIN — SODIUM CHLORIDE 10 UNITS/HR: 9 INJECTION, SOLUTION INTRAVENOUS at 15:34

## 2022-04-04 RX ADMIN — CEFAZOLIN SODIUM 2 G: 2 INJECTION, SOLUTION INTRAVENOUS at 10:28

## 2022-04-04 RX ADMIN — HYDROMORPHONE HYDROCHLORIDE 0.2 MG: 0.2 INJECTION, SOLUTION INTRAMUSCULAR; INTRAVENOUS; SUBCUTANEOUS at 05:58

## 2022-04-04 RX ADMIN — METHOCARBAMOL 750 MG: 750 TABLET ORAL at 16:43

## 2022-04-04 RX ADMIN — CEFAZOLIN SODIUM 2 G: 2 INJECTION, SOLUTION INTRAVENOUS at 18:40

## 2022-04-04 RX ADMIN — PANTOPRAZOLE SODIUM 40 MG: 40 TABLET, DELAYED RELEASE ORAL at 07:50

## 2022-04-04 RX ADMIN — METHOCARBAMOL 750 MG: 750 TABLET ORAL at 03:30

## 2022-04-04 RX ADMIN — ACETAMINOPHEN 975 MG: 325 TABLET ORAL at 05:57

## 2022-04-04 RX ADMIN — HEPARIN SODIUM 5000 UNITS: 5000 INJECTION, SOLUTION INTRAVENOUS; SUBCUTANEOUS at 13:23

## 2022-04-04 RX ADMIN — SODIUM CHLORIDE 9 UNITS/HR: 9 INJECTION, SOLUTION INTRAVENOUS at 11:28

## 2022-04-04 RX ADMIN — OXYCODONE HYDROCHLORIDE 10 MG: 5 TABLET ORAL at 03:29

## 2022-04-04 RX ADMIN — POLYETHYLENE GLYCOL 3350 17 G: 17 POWDER, FOR SOLUTION ORAL at 08:12

## 2022-04-04 RX ADMIN — METHOCARBAMOL 750 MG: 750 TABLET ORAL at 10:28

## 2022-04-04 RX ADMIN — CEFAZOLIN SODIUM 2 G: 2 INJECTION, SOLUTION INTRAVENOUS at 01:33

## 2022-04-04 RX ADMIN — LEVOTHYROXINE SODIUM 125 MCG: 125 TABLET ORAL at 08:15

## 2022-04-04 RX ADMIN — OXYCODONE HYDROCHLORIDE 10 MG: 5 TABLET ORAL at 18:05

## 2022-04-04 RX ADMIN — SENNOSIDES AND DOCUSATE SODIUM 1 TABLET: 50; 8.6 TABLET ORAL at 07:51

## 2022-04-04 RX ADMIN — METHOCARBAMOL 750 MG: 750 TABLET ORAL at 21:04

## 2022-04-04 RX ADMIN — HYDROMORPHONE HYDROCHLORIDE 0.4 MG: 0.2 INJECTION, SOLUTION INTRAMUSCULAR; INTRAVENOUS; SUBCUTANEOUS at 17:20

## 2022-04-04 RX ADMIN — SODIUM CHLORIDE 7 UNITS/HR: 9 INJECTION, SOLUTION INTRAVENOUS at 07:50

## 2022-04-04 RX ADMIN — HYDROMORPHONE HYDROCHLORIDE 0.4 MG: 0.2 INJECTION, SOLUTION INTRAMUSCULAR; INTRAVENOUS; SUBCUTANEOUS at 01:33

## 2022-04-04 RX ADMIN — OXYCODONE HYDROCHLORIDE 10 MG: 5 TABLET ORAL at 13:23

## 2022-04-04 RX ADMIN — LOSARTAN POTASSIUM 25 MG: 25 TABLET, FILM COATED ORAL at 11:28

## 2022-04-04 RX ADMIN — SENNOSIDES AND DOCUSATE SODIUM 1 TABLET: 50; 8.6 TABLET ORAL at 21:04

## 2022-04-04 RX ADMIN — OXYCODONE HYDROCHLORIDE 10 MG: 5 TABLET ORAL at 08:34

## 2022-04-04 RX ADMIN — FUROSEMIDE 20 MG: 10 INJECTION, SOLUTION INTRAVENOUS at 07:50

## 2022-04-04 RX ADMIN — ASPIRIN 81 MG CHEWABLE TABLET 81 MG: 81 TABLET CHEWABLE at 07:51

## 2022-04-04 RX ADMIN — GABAPENTIN 100 MG: 100 CAPSULE ORAL at 13:23

## 2022-04-04 RX ADMIN — GABAPENTIN 100 MG: 100 CAPSULE ORAL at 07:51

## 2022-04-04 RX ADMIN — GABAPENTIN 100 MG: 100 CAPSULE ORAL at 21:04

## 2022-04-04 RX ADMIN — SODIUM CHLORIDE 7 UNITS/HR: 9 INJECTION, SOLUTION INTRAVENOUS at 18:00

## 2022-04-04 RX ADMIN — HYDROMORPHONE HYDROCHLORIDE 0.4 MG: 0.2 INJECTION, SOLUTION INTRAMUSCULAR; INTRAVENOUS; SUBCUTANEOUS at 14:42

## 2022-04-04 RX ADMIN — LIDOCAINE 2 PATCH: 560 PATCH PERCUTANEOUS; TOPICAL; TRANSDERMAL at 08:12

## 2022-04-04 RX ADMIN — ACETAMINOPHEN 650 MG: 325 TABLET, FILM COATED ORAL at 18:05

## 2022-04-04 RX ADMIN — BUPROPION HYDROCHLORIDE 300 MG: 300 TABLET, EXTENDED RELEASE ORAL at 08:13

## 2022-04-04 ASSESSMENT — ACTIVITIES OF DAILY LIVING (ADL)
ADLS_ACUITY_SCORE: 7

## 2022-04-04 NOTE — CONSULTS
[facility]    Electrophysiology Consultation     Date of Admission:  3/28/2022  Date of Consult (When we saw the patient): 04/04/22    Assessment & Plan   Anil Montoya is a 36 year old male with h/o DM2, HTN, VIVEK, ADHD, hypothyroidism, obesity and h/o cryptococcal PNA 2012 who presented to [facility] 3/22-28/2022 d/t c/o weakness and SOB.  Transferred to [facility] 3/28/2022 d/t discovery of MSSA bacteremia and TV endocardiits. We were asked to see the patient for post-op heart block.    1. Post-operative CHB -   - No previous h/o arrhythmias, bradycardia  - Pre-op echo with nl LVEF 55-60%  - Pre-op cath 3/31 with nl Dylon    - EKG with CHB and good escape rhythm - not using his temp pacing wires at all (set 60 bpm)    - Hemodynamically stable with SBPs >110s  - Heart Block not unexpected given recent surgery. He's s/p TVR but also debridement of septum (near septal leaflet)     PLAN:   1. Continue to monitor rhythm. Expect AVN function may recover as he continues to get further out from surgery, though will watch closely given septal debridement. Given good escape rhythm, no plan for PPM implantation. Likely home with a monitor    2. Continue to avoid AVN blocking agents   3. If PPM is needed, would need ID OK given bacteremia/TV endocarditis    2. TV endocarditis -  - Thought d/t skin infection in R groin  - Prelim intraop gram stain positive for yeast from tricuspid valve, culture with 1+ staph aureus, no yeast (Micafungin 4/1-4/3 only)  - BC post-op with NGTD thus far  - Fungal BC sent 4/1 with NGTD.      PLAN:   1. Blood cultures x 2 sets daily until clear per ID   2. Currently on cefazolin            Marielena Umanzor PA-C MSPAS    Code Status    Full Code    Reason for Consult   Reason for consult: We were asked by CV Surgery to evaluate for post-operative heart block    Primary Care Physician   Amanda Dawkins    Chief Complaint   Heart Block    History is obtained  from the patient and EPIC chart.      History of Present Illness   Anil Montoya is a 36 year old male with poorly controlled DM2 (A1c >12%), HTN, h/o cryptococcus pneumonia, VIVEK, and ADHD who presented to [facility] 3/22 d/t c/o weakness, recent hematuria and SOB.  Noted to have MSSA bacteremia thought d/t R groin skin source. TEJINDER 3/27/2022 showed TV vegetation. MRI of back done d/t concern re: discitis with seeding, which was negative. Transferred to [facility] 3/28 d/t consideration of surgical intervention.    Now s/p TVR with 31 mm Epic stented valve on 4/1/2022, along with AV exploration and some septal debridement for extension of mass on the ventricular side adjacent to septal leaflet.    Has Temp Wires in place; good escape rhythm so currently not requiring.     Past Medical History   I have reviewed this patient's medical history and updated it with pertinent information if needed.   Past Medical History:   Diagnosis Date     Pneumonia        Past Surgical History   I have reviewed this patient's surgical history and updated it with pertinent information if needed.  Past Surgical History:   Procedure Laterality Date     CV CORONARY ANGIOGRAM N/A 3/31/2022    Procedure: Coronary Angiogram;  Surgeon: Lidia Quintanilla MD;  Location:  HEART CARDIAC CATH LAB       Prior to Admission Medications   Prior to Admission Medications   Prescriptions Last Dose Informant Patient Reported? Taking?   acetaminophen (TYLENOL) 500 MG tablet 3/21/2022 at pm Pharmacy Yes Yes   Sig: Take 1,000 mg by mouth every 6 hours as needed for mild pain   buPROPion (WELLBUTRIN XL) 300 MG 24 hr tablet 3/21/2022 at am Pharmacy Yes No   Sig: Take 300 mg by mouth every morning   ciprofloxacin (CIPRO) 750 MG tablet 3/22/2022 at 1000 Pharmacy Yes No   Sig: Take 750 mg by mouth 2 times daily For 10 days (3/21-3/31).   dextromethorphan (TUSSIN COUGH) 15 MG/5ML syrup 3/18/2022 at am Pharmacy Yes No   Sig: Take 10 mLs by mouth 4  times daily as needed for cough   ibuprofen (ADVIL/MOTRIN) 200 MG tablet 3/21/2022 at pm Pharmacy Yes No   Sig: Take 600 mg by mouth every 6 hours as needed for mild pain   levothyroxine (SYNTHROID/LEVOTHROID) 125 MCG tablet 3/21/2022 at am Pharmacy Yes No   Sig: Take 125 mcg by mouth six times a week Monday thru Saturday   levothyroxine (SYNTHROID/LEVOTHROID) 125 MCG tablet Unknown at Unknown time Pharmacy Yes Yes   Sig: Take 250 mcg by mouth once a week On Sunday   liraglutide (VICTOZA) 18 MG/3ML solution 3/21/2022 at am Pharmacy Yes No   Sig: Inject 1.8 mg Subcutaneous daily   losartan (COZAAR) 50 MG tablet 3/21/2022 at am Pharmacy Yes No   Sig: Take 50 mg by mouth daily   metFORMIN (GLUCOPHAGE) 1000 MG tablet 3/21/2022 at pm Pharmacy Yes No   Sig: Take 1,000 mg by mouth 2 times daily (with meals)      Facility-Administered Medications: None         Medications     dextrose       dextrose       insulin regular 10 Units/hr (04/04/22 0938)     BETA BLOCKER NOT PRESCRIBED       sodium chloride 10 mL/hr at 04/04/22 0800       aspirin  81 mg Oral or NG Tube Daily     buPROPion  300 mg Oral QAM     ceFAZolin  2 g Intravenous Q8H     furosemide  20 mg Intravenous Q8H     gabapentin  100 mg Oral TID     heparin ANTICOAGULANT  5,000 Units Subcutaneous Q8H     insulin aspart   Subcutaneous TID w/meals     lactated ringers  250 mL Intravenous Once     levothyroxine  125 mcg Oral Once per day on Mon Tue Wed Thu Fri Sat     levothyroxine  250 mcg Oral Weekly     lidocaine  2 patch Transdermal Q24H     lidocaine   Transdermal Q8H     losartan  25 mg Oral Daily     methocarbamol  750 mg Oral Q6H     pantoprazole  40 mg Oral or NG Tube Daily    Or     pantoprazole  40 mg Oral Daily     polyethylene glycol  17 g Oral BID     senna-docusate  1 tablet Oral BID     sodium chloride (PF)  3 mL Intracatheter Q8H     sodium chloride (PF)  3 mL Intracatheter Q8H       Allergies   Allergies   Allergen Reactions     Clindamycin       "Vancomycin      \"Red Sonia Syndrome\"       Social History   I have updated and reviewed the following Social History Narrative:   Social History     Social History Narrative     Not on file    Works at [employer]  Non/never smoker  Rare EtOH    Family History   I have reviewed this patient's family history and updated it with pertinent information if needed.   History reviewed. No pertinent family history.    Review of Systems   The 5 point Review of Systems is negative other than noted in the HPI or here.       Physical Exam   Temp: 97  F (36.1  C) Temp src: Oral BP: 128/84 Pulse: 85   Resp: 23 SpO2: 95 % O2 Device: Nasal cannula Oxygen Delivery: 2 LPM  Vital Signs with Ranges  Temp:  [97  F (36.1  C)-99.9  F (37.7  C)] 97  F (36.1  C)  Pulse:  [75-85] 85  Resp:  [10-27] 23  BP: (111-150)/(58-84) 128/84  SpO2:  [84 %-100 %] 95 %  258 lbs 2.54 oz    Telemetry:  Currently CHB with good escape 84 bpm    Constitutional: Awake, alert  Eyes: Lids and lashes normal  ENT: Normocephalic, atraumatic  Respiratory: Soft bibasilar crackles noted. CT in place  Cardiovascular: Regular  GI: BS present  Skin: Scattered bruises  Musculoskeletal: LE edema mild  Neurologic: Awake, alert, oriented    Data   I personally reviewed the EKG tracing showing SR with CHB and accelerate junctional rhythm 80 bpm.  Results for orders placed or performed during the hospital encounter of 03/28/22 (from the past 24 hour(s))   EKG 12-lead, tracing only   Result Value Ref Range    Systolic Blood Pressure  mmHg    Diastolic Blood Pressure  mmHg    Ventricular Rate 75 BPM    Atrial Rate 105 BPM    NM Interval  ms    QRS Duration 92 ms     ms    QTc 475 ms    P Axis  degrees    R AXIS 10 degrees    T Axis 13 degrees    Interpretation ECG       Sinus tachycardia with complete heart block and Accelerated Junctional rhythm  ST & T wave abnormality, consider anterior ischemia  Abnormal ECG  When compared with ECG of 02-APR-2022 07:51, (unconfirmed)  Sinus " rhythm is now with complete heart block  QT has lengthened     Glucose by meter   Result Value Ref Range    GLUCOSE BY METER POCT 332 (H) 70 - 99 mg/dL   Glucose by meter   Result Value Ref Range    GLUCOSE BY METER POCT 270 (H) 70 - 99 mg/dL   Glucose by meter   Result Value Ref Range    GLUCOSE BY METER POCT 251 (H) 70 - 99 mg/dL   Glucose by meter   Result Value Ref Range    GLUCOSE BY METER POCT 219 (H) 70 - 99 mg/dL   Glucose by meter   Result Value Ref Range    GLUCOSE BY METER POCT 270 (H) 70 - 99 mg/dL   Glucose by meter   Result Value Ref Range    GLUCOSE BY METER POCT 233 (H) 70 - 99 mg/dL   Glucose by meter   Result Value Ref Range    GLUCOSE BY METER POCT 215 (H) 70 - 99 mg/dL   Glucose by meter   Result Value Ref Range    GLUCOSE BY METER POCT 219 (H) 70 - 99 mg/dL   Glucose by meter   Result Value Ref Range    GLUCOSE BY METER POCT 196 (H) 70 - 99 mg/dL   Glucose by meter   Result Value Ref Range    GLUCOSE BY METER POCT 173 (H) 70 - 99 mg/dL   Glucose by meter   Result Value Ref Range    GLUCOSE BY METER POCT 145 (H) 70 - 99 mg/dL   Glucose by meter   Result Value Ref Range    GLUCOSE BY METER POCT 108 (H) 70 - 99 mg/dL   Glucose by meter   Result Value Ref Range    GLUCOSE BY METER POCT 104 (H) 70 - 99 mg/dL   Glucose by meter   Result Value Ref Range    GLUCOSE BY METER POCT 98 70 - 99 mg/dL   Glucose by meter   Result Value Ref Range    GLUCOSE BY METER POCT 114 (H) 70 - 99 mg/dL   Glucose by meter   Result Value Ref Range    GLUCOSE BY METER POCT 147 (H) 70 - 99 mg/dL   Phosphorus   Result Value Ref Range    Phosphorus 3.7 2.5 - 4.5 mg/dL   Magnesium   Result Value Ref Range    Magnesium 2.1 1.6 - 2.3 mg/dL   CBC with platelets   Result Value Ref Range    WBC Count 16.0 (H) 4.0 - 11.0 10e3/uL    RBC Count 2.53 (L) 4.40 - 5.90 10e6/uL    Hemoglobin 7.3 (L) 13.3 - 17.7 g/dL    Hematocrit 23.4 (L) 40.0 - 53.0 %    MCV 93 78 - 100 fL    MCH 28.9 26.5 - 33.0 pg    MCHC 31.2 (L) 31.5 - 36.5 g/dL    RDW  13.7 10.0 - 15.0 %    Platelet Count 532 (H) 150 - 450 10e3/uL   Basic metabolic panel   Result Value Ref Range    Sodium 132 (L) 133 - 144 mmol/L    Potassium 4.4 3.4 - 5.3 mmol/L    Chloride 102 94 - 109 mmol/L    Carbon Dioxide (CO2) 26 20 - 32 mmol/L    Anion Gap 4 3 - 14 mmol/L    Urea Nitrogen 20 7 - 30 mg/dL    Creatinine 0.89 0.66 - 1.25 mg/dL    Calcium 8.4 (L) 8.5 - 10.1 mg/dL    Glucose 177 (H) 70 - 99 mg/dL    GFR Estimate >90 >60 mL/min/1.73m2   Glucose by meter   Result Value Ref Range    GLUCOSE BY METER POCT 179 (H) 70 - 99 mg/dL   Glucose by meter   Result Value Ref Range    GLUCOSE BY METER POCT 181 (H) 70 - 99 mg/dL   EKG 12-lead, tracing only   Result Value Ref Range    Systolic Blood Pressure  mmHg    Diastolic Blood Pressure  mmHg    Ventricular Rate 80 BPM    Atrial Rate 98 BPM    FL Interval  ms    QRS Duration 100 ms     ms    QTc 519 ms    P Axis 37 degrees    R AXIS 76 degrees    T Axis 76 degrees    Interpretation ECG       Sinus rhythm with complete heart block and Accelerated Junctional rhythm  ST & T wave abnormality, consider anterior ischemia  Prolonged QT  Abnormal ECG  When compared with ECG of 03-APR-2022 10:23, (unconfirmed)  Questionable change in QRS axis     Glucose by meter   Result Value Ref Range    GLUCOSE BY METER POCT 153 (H) 70 - 99 mg/dL   Glucose by meter   Result Value Ref Range    GLUCOSE BY METER POCT 174 (H) 70 - 99 mg/dL

## 2022-04-04 NOTE — PLAN OF CARE
End of Shift Nursing Summary    Continues to follow post op heart pathway.  Stayed in ICU for 2nd degree type 2 heart block and persistent hyperglycemia.  Insulin drip restarted.  BP stable.  Pacer on standby.  Out of bed several times today. IV dilaudid, oxycodone and robaxin for pain. Dad and step at bedside.       *See EPIC flowsheet for full nursing assessment findings

## 2022-04-04 NOTE — PLAN OF CARE
Goal Outcome Evaluation:  Neuro: AOx4   CV: 2nd Degree type II block. VS intact  Respiratory: CPAP at night, 2L during day  GI/: Bowel sounds active, voiding in urinal   Skin: MSI clean, CT dressing clean, dry and intact  Activity: Up with 1 to chair and walks  Diet: Carb count diet  Drips: Insulin gtt, following algorithm per protocol  Other: Pain controlled. Possible PICC line placement today  Plan: See above, possible transfer to [facility]

## 2022-04-04 NOTE — PROGRESS NOTES
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 Possible secondary seeding of the lumbar spine though MRI is negative. He is s/p tricuspid valve replacement surgery and aortic valve exploration for control of infection on 04/01 with positive gram stain and culture of tricuspid valve tissue for staph aureus.  Yeast also seen on stain, Cxs are awaited.     Recommendations:  1. Follow blood cxs . Last 2 sets negative. Possible PICC tomorrow if cxs remain negative  2. Continue Cefazolin,  Micafungin discontinued as Micro results  updated by lab. Initial read of 1+ yeast on valve tissue on 4/1 was corrected and updated. Only staph aureus on valve tissue cx, no yeast seen on stain.  3. HIV antigen/antibody test pending.   4. Follow valve tissue cx  5. Glycemic control  6. Follow clinical status and labs          Sreedhar Bell MD    Interval History   Resting, ongoing chest pain at surgical site. . Tolerating antibiotics without side effects. Leukocytosis improving, still some ongoing fever    Physical Exam   Temp: 97  F (36.1  C) Temp src: Oral BP: 139/71 Pulse: 87   Resp: 15 SpO2: 95 % O2 Device: Nasal cannula Oxygen Delivery: 2 LPM  Vitals:    04/02/22 0257 04/03/22 0600 04/04/22 0600   Weight: 118.9 kg (262 lb 2 oz) 117.8 kg (259 lb 11.2 oz) 117.1 kg (258 lb 2.5 oz)     Vital Signs with Ranges  Temp:  [97  F (36.1  C)-99.9  F (37.7  C)] 97  F (36.1  C)  Pulse:  [75-87] 87  Resp:  [10-27] 15  BP: (111-150)/(58-84) 139/71  SpO2:  [84 %-100 %] 95 %    Constitutional: Awake, alert, cooperative, no apparent distress  Lungs: Clear to auscultation bilaterally, no crackles or wheezing  Cardiovascular: S1S2, sternal surgical site  intact, mediastinal chest tubes in place  Abdomen:  soft,non-tender  Skin: No rash      Other:    Medications    dextrose      dextrose      insulin regular 10 Units/hr (04/04/22 0938)    BETA BLOCKER NOT PRESCRIBED      sodium chloride 10 mL/hr at 04/04/22 0800      aspirin  81 mg Oral or NG Tube Daily    buPROPion  300 mg Oral QAM    ceFAZolin  2 g Intravenous Q8H    furosemide  20 mg Intravenous Q8H    gabapentin  100 mg Oral TID    heparin ANTICOAGULANT  5,000 Units Subcutaneous Q8H    insulin aspart   Subcutaneous TID w/meals    lactated ringers  250 mL Intravenous Once    levothyroxine  125 mcg Oral Once per day on Mon Tue Wed Thu Fri Sat    levothyroxine  250 mcg Oral Weekly    lidocaine  2 patch Transdermal Q24H    lidocaine   Transdermal Q8H    losartan  25 mg Oral Daily    methocarbamol  750 mg Oral Q6H    pantoprazole  40 mg Oral or NG Tube Daily    Or    pantoprazole  40 mg Oral Daily    polyethylene glycol  17 g Oral BID    senna-docusate  1 tablet Oral BID    sodium chloride (PF)  3 mL Intracatheter Q8H    sodium chloride (PF)  3 mL Intracatheter Q8H       Data   All microbiology laboratory data reviewed.  Recent Labs   Lab Test 04/04/22  0545 04/03/22  0347 04/02/22  0422   WBC 16.0* 15.0* 16.1*   HGB 7.3* 7.7* 9.1*   HCT 23.4* 24.4* 29.1*   MCV 93 90 90   * 456* 535*     Recent Labs   Lab Test 04/04/22  0545 04/03/22  0347 04/02/22  0422   CR 0.89 0.85 0.88     Microbiology  Blood cx 4/1 pending, Blood cultures 3/22-3/31 MSSA  4/1 tricuspid valve tissue  Heart Valve, Tricuspid; Tissue           0 Result Notes     Culture Culture in progress        1+ Staphylococcus aureus Abnormal          Heart Valve, Tricuspid; Tissue           0 Result Notes          (important suggestion)  Newer results are available. Click to view them now.      Gram Stain Result   Abnormal   1+ Gram positive cocci       1+ Yeast       4+ WBC seen   Predominantly PMNs               Peripheral Blood           0 Result  Notes     Culture Positive on the 1st day of incubation Abnormal         Staphylococcus aureus Panic     1 of 2 bottles         Resulting Agency: [facility]         Susceptibility                   Staphylococcus aureus       BLAZE       Clindamycin <=0.25 ug/mL Susceptible       Erythromycin <=0.25 ug/mL Susceptible       Gentamicin <=0.5 ug/mL Susceptible       Oxacillin <=0.25 ug/mL Susceptible 1       Tetracycline <=1.0 ug/mL Susceptible       Trimethoprim/Sulfamethoxazole <=0.5/9.5 u... Susceptible       Vancomycin 2.0 ug/mL Susceptible

## 2022-04-04 NOTE — PROGRESS NOTES
[facility]  Cardiovascular and Thoracic Surgery Daily Note          Assessment and Plan:   Anil Montoya is a 36 year old gentleman who presented to [facility] ED on 3/22/22 with weakness, recent hematuria and shortness of breath. Workup demonstrated MSSA bacteremia likely secondary to right groin skin source. TEJINDER on 3/27/22 demonstrated tricuspid valve endocarditis. In completing full work up, there was concern for discitis with seeding however MRI negative. He was transferred on 3/28/22 to [facility] for consideration of surgical intervention.    PMH includes: Poorly controlled diabetic, HTN, hypothyroidism, obesity, WARNER, anxiety, cryptococcal pneumonia in 2012 with previous history of staph aureus bacteremia.    Most recent echo demonstrates LVEF 55-60%, normal RV function, mildly thickened cusps on aortic valve and thickened tricuspid valve leaflets, small mobile filamentous elements on the septal leaflet of TV and larged fixed heterogeneous mass in the RV attached to the interventricular septum.  Coronary angiogram 3/31/22 with no e/o CAD.      POD #3 s/p:  1. Tricuspid valve replacement (31mm Epic stented valve)  2. Endoscopic aortic valve exploration  3. Transesophageal echocardiogram on 4/1/22 with Dr. 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 NSR this am with occasional junctional beats  [PTA meds on hold: losartan 50 mg daily]  - ASA 81 mg daily  - Defer BB given heart block  - Losartan 25 mg daily and up titrate PRN  - No statin indicated  - ID as below  - Consulted EP per surgeon, appreciate recommendations  - CTs in to suction, leave in, continue back up pacing/standby in the event of progression of HB    Resp:   WARNER  History of cryptococcal pneumonia  Extubated on POD 0, now saturating well on room air  - Continue pulm hygiene efforts: IS/flutter valve/mobility  - Titrate O2 to maintain  sats >92%  - CPAP at HS    Neuro:    Acute postoperative pain  MDD/VIVEK  - Better controlled with current regimen this AM, received toradol x1  - Robaxin scheduled, lidocaine patches added  - PTA Wellbutrin resumed    Renal/Electrolyte:   Creat stable, below preoperative weight, has less edema  - Strict I/O, daily weights  - Avoid/limit nephrotoxins as able  - Replete lytes per protocol  - Lasix 20 mg IV x2 today    GI/Nutrition:    Mildly elevated LFTs, resolved  - Tolerating current diet:  Orders Placed This Encounter      Moderate Consistent Carb (60 g CHO per Meal) Diet   - Continue bowel regimen, no BM yet, + flatus  - PPI    :    - Voiding well on own    Endo:  Stress induced hyperglycemia   Poorly controlled DM2  Hypothyroidism  Preop A1c   Lab Results   Component Value Date    A1C 12.6 03/27/2022    [PTA meds on hold: Victoza and metformin]  - Was on insulin infusion, transitioned to sliding scale insulin and lantus 4/2, much more hyperglycemic, resumed insulin infusion POD 2 with prandial coverage  - Hospitalists managing diabetic regimen, appreciate  - Goal BG <180 for optimal healing  - PTA levothyroxine resumed    ID:  Stress induced leukocytosis  Tricuspid valve endocarditis  MSSA bacteremia  History cryptococcal pneumonia  WBC 16, Tmax 101.1; no s/sx infection  Prelim intraop gram stain positive for yeast from tricuspid valve, culture with 1+ staph aureus, no yeast, continue to follow  BC postop NGTD thus far  Fungal BC sent on 4/1 with NGTD  - Completing perioperative ABX  - Continue Cefazolin per ID  - Micafungin started 4/1 per ID for positive yeast on gram stain, discontinued with negative cx on 4/3  - HIV antigen/antibody test sent   - Per ID: repeat blood cultures x 2 sets daily until clear  - Will need optho consult once more stable  - Continue to follow fever curve, WBC and inflammatory markers as appropriate    Heme:  Acute blood loss anemia  Acute blood loss thrombocytopenia  Hgb 7.3, Plts  532; no s/sx active bleeding  - CBC in AM  - Goal Hgb >7    -Proph: PPI, subcutaneous heparin, SCDs  -Dispo: Transfer to station 33, continue therapies, pulm hygiene          Interval History:   No acute events overnight. States pain is well managed on current regimen, slept well. Breathing is stable on room air. IS to 750 mL. Tolerating diet, is passing flatus, no BM. Denies chest pain, palpitations, dizziness, syncopal symptoms, chills, myalgias, sternal popping/clicking.         Medications:       aspirin  81 mg Oral or NG Tube Daily     buPROPion  300 mg Oral QAM     ceFAZolin  2 g Intravenous Q8H     furosemide  20 mg Intravenous Q8H     gabapentin  100 mg Oral TID     heparin ANTICOAGULANT  5,000 Units Subcutaneous Q8H     insulin aspart   Subcutaneous TID w/meals     lactated ringers  250 mL Intravenous Once     levothyroxine  125 mcg Oral Once per day on Mon Tue Wed Thu Fri Sat     levothyroxine  250 mcg Oral Weekly     lidocaine  2 patch Transdermal Q24H     lidocaine   Transdermal Q8H     methocarbamol  750 mg Oral Q6H     pantoprazole  40 mg Oral or NG Tube Daily    Or     pantoprazole  40 mg Oral Daily     polyethylene glycol  17 g Oral BID     senna-docusate  1 tablet Oral BID     sodium chloride (PF)  3 mL Intracatheter Q8H             Physical Exam:   Vitals were reviewed  Blood pressure 128/84, pulse 85, temperature 97  F (36.1  C), temperature source Oral, resp. rate 23, weight 117.1 kg (258 lb 2.5 oz), SpO2 95 %.  Rhythm: NSR with junctional beats, rates 70-90s    Lungs: bibasilar crackles, RA    Cardiovascular: S1, S2, RRR, no m/r/g    Abdomen: S/ND/ND, +BS    Extremeties: mild edema, warm and well perfused    Incision: CDI    CT: to suction, no air leak, no crepitus    Weight:   Vitals:    04/01/22 0632 04/02/22 0257 04/03/22 0600 04/04/22 0600   Weight: 121.1 kg (267 lb) 118.9 kg (262 lb 2 oz) 117.8 kg (259 lb 11.2 oz) 117.1 kg (258 lb 2.5 oz)            Data:   Labs:   Lab Results   Component  Value Date    WBC 38.3 04/01/2022    WBC 6.8 11/24/2017     Lab Results   Component Value Date    RBC 3.09 04/01/2022    RBC 5.16 11/24/2017     Lab Results   Component Value Date    HGB 9.0 04/01/2022    HGB 15.2 11/24/2017     Lab Results   Component Value Date    HCT 29.1 04/01/2022    HCT 43.7 11/24/2017     No components found for: MCT  Lab Results   Component Value Date    MCV 94 04/01/2022    MCV 85 11/24/2017     Lab Results   Component Value Date    MCH 29.1 04/01/2022    MCH 29.5 11/24/2017     Lab Results   Component Value Date    MCHC 30.9 04/01/2022    MCHC 34.8 11/24/2017     Lab Results   Component Value Date    RDW 13.5 04/01/2022    RDW 12.9 11/24/2017     Lab Results   Component Value Date     04/01/2022     11/24/2017       Last Basic Metabolic Panel:  Lab Results   Component Value Date     04/01/2022     11/24/2017      Lab Results   Component Value Date    POTASSIUM 5.1 04/01/2022    POTASSIUM 5.1 04/01/2022    POTASSIUM 3.8 11/24/2017     Lab Results   Component Value Date    CHLORIDE 107 04/01/2022    CHLORIDE 104 11/24/2017     Lab Results   Component Value Date    IMAN 8.4 04/01/2022    IMAN 8.3 11/24/2017     Lab Results   Component Value Date    CO2 24 04/01/2022    CO2 24 11/24/2017     Lab Results   Component Value Date    BUN 27 04/01/2022    BUN 18 11/24/2017     Lab Results   Component Value Date    CR 1.04 04/01/2022    CR 1.07 11/24/2017     Lab Results   Component Value Date     04/01/2022     04/01/2022     11/24/2017       Imaging:  No results found for this or any previous visit (from the past 24 hour(s)).     Rounded and discussed with Dr. Linh Silverio, APRN, ACNPC-AG, CCRN  Nurse Practitioner  Cardiothoracic Surgery  Pager: [phone]

## 2022-04-04 NOTE — PROGRESS NOTES
Cross Cover     Added prandial insulin coverage due to uncontrolled BG while on the insulin drip    Viviana Levin MD

## 2022-04-04 NOTE — PROGRESS NOTES
[facility]    Medicine Progress Note - Hospitalist Service        Date of Admission:  3/28/2022 10:33 PM    Assessment & Plan:   Anil Montoya is a 36 year old male with hx of DM2, HTN, WARNER, and anxiety who was initially hospitalized at [facility] on 3/22/2022 for severe sepsis initially attributed to pneumonia vs viral infection. He was found to have MSSA bacteremia with ongoing positive cultures. He underwent a TEJINDER on 3/27/2022 which revealed tricuspid endocarditis, and he was transferred to [facility] on 3/28/2022 for CV Surgery consultation.     Sepsis with MSSA bacteremia due to tricuspid valve endocarditis.  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 He was initially treated with ceftriaxone and azithromycin for possible community-acquired pneumonia, and ID was consulted. Blood cultures returned positive for MSSA, and serial blood cultures continued to return positive. Broad work-up including CT abd/pelvis, MR lumbar spine, CT dental, and TTE were unrevealing  * Initially treated with IV ceftriaxone and azithromycin; switched to IV vanco on 3/23 when blood cultures returned with Staph; switched to IV cefazolin on 3/24 upon sensitivities  * Underwent TEJINDER on 3/27 which showed thickened tricuspid valve leaflets with atrial aspect of the septal leaflet with an irregular border and small mobile filamentous elements, large fixed heterogeneous mass in the right ventricle attached to the interventricular septum (2.5 x 2 cm) appearing connected to the tricuspid subvalvular annular apparatus with a pedunculated round mobile element (1.8 x 1 cm).  No significant tricuspid regurgitation reported. LVEF 55 to 60%. Negative bubble study.  * Transferred to [facility] on 3/28 for CV Surgery consult  - Patient underwent tricuspid valve replacement with bioprosthetic  valve (31 mm epic stented valve) on 4/1/2022 and endoscopic aortic valve exploration.  - Initially micafungin added on 4/1 as Gram stain from tissue from heart valve from 4/ 1 initially was reported as yeast, which was subsequently corrected as not to be present.  Micafungin now discontinued.  - Continues on IV cefazolin   - Daily blood cultures, last positive was from 3/31  - ID, Cardiology, CV Surgery following     Second-degree AV block  -Patient appears to be in second-degree AV block, currently has epicardial AV pacer set at 80  -Continue to monitor closely on telemetry  -EP following, continue to observe for now, no immediate need for permanent pacemaker at this time.    DM2 without complications, long-term insulin use, uncontrolled  * A1c 12.6. Had not been taking his diabetes medications for some time  * He was initiated on basal bolus insulin while at [facility]  -Continue insulin drip with prandial coverage, blood sugars much better in the last 24 hours, anticipate switching to basal bolus regimen tomorrow morning.    Acute postop blood loss anemia  -Hemoglobin preop was around 12 which had slowly trended down.  Hemoglobin dropped to 7.3 this morning, most likely acute blood loss from surgery.  Continue to monitor and transfuse as needed.    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 RUQ US (3/22) showed fatty liver, but normal gallbladder and normal bile ducts  - LFTs improved.      Essential hypertension  - Hold prior to admission losartan, likely will resume in the next day or 2.     WARNER  * CPAP per home settings     Hypothyroidism  * Chronic and stable on levothyroxine     Generalized anxiety disorder  ADHD  * Chronic and stable on  "bupropion      Diet: Moderate Consistent Carb (60 g CHO per Meal) Diet     DVT Prophylaxis: Pneumatic Compression Devices   Schwartz Catheter: Not present  Code Status: Full Code     Disposition Plan    Expected Discharge: 04/08/2022     Anticipated discharge location: home with family    Delays:     Other (Add Comment)          Entered: Joseph Iraheta MD 04/04/2022, 10:58 AM        Clinically Significant Risk Factors Present on Admission                    The patient's care was discussed with the Bedside Nurse and Patient.    Joseph Iraheta MD  Hospitalist Service  [facility]  Text Page 7AM-6PM  Securely message with the Vocera Web Console (learn more here)  Text page via [employer] Paging/Directory    ______________________________________________________________________    Interval History   Blood sugar much better controlled on insulin drip.  Patient denies nausea or vomiting.  Afebrile.  Surgical pain adequately controlled.  Continues to be in second-degree AV block.    Data reviewed today: I reviewed all medications, new labs and imaging results over the last 24 hours. I personally reviewed the EKG tracing showing ?  Second-degree AV block.    Physical Exam   Vital signs:  Temp: 97  F (36.1  C) Temp src: Oral BP: 139/71 Pulse: 87   Resp: 15 SpO2: 95 % O2 Device: Nasal cannula Oxygen Delivery: 2 LPM   Weight: 117.1 kg (258 lb 2.5 oz)  Estimated body mass index is 37.04 kg/m  as calculated from the following:    Height as of 3/22/22: 1.778 m (5' 10\").    Weight as of this encounter: 117.1 kg (258 lb 2.5 oz).      Wt Readings from Last 2 Encounters:   04/04/22 117.1 kg (258 lb 2.5 oz)   03/28/22 121.1 kg (267 lb)       Gen: AAOX3, NAD, comfortable  HEENT:  no pallor  Resp: CTA B/L, normal WOB  CVS: RRR, no murmur, chest tube +  Abd/GI: Soft, non-tender. BS- normoactive.   Skin: Warm, dry no rashes  MSK: Trace-1+ pedal edema  Neuro- CN- intact. No focal deficits.      Data   Recent Labs "   Lab 04/04/22  1034 04/04/22  0928 04/04/22  0744 04/04/22  0614 04/04/22  0545 04/03/22  0809 04/03/22  0347 04/02/22  0605 04/02/22  0422 04/01/22  1440 04/01/22  1300 04/01/22  1256 04/01/22  1141   WBC  --   --   --   --  16.0*  --  15.0*  --  16.1*  --  38.3*  --  30.4*   HGB  --   --   --   --  7.3*  --  7.7*  --  9.1*  --  9.0*  --  8.5*   MCV  --   --   --   --  93  --  90  --  90  --  94  --  94   PLT  --   --   --   --  532*  --  456*  --  535*  --  566*  --  442   INR  --   --   --   --   --   --   --   --   --   --  1.55*  --  1.64*   NA  --   --   --   --  132*  --  131*  --  135  --  135  --  134   POTASSIUM  --   --   --   --  4.4  --  4.8  --  4.8  --  5.1  5.1  --  5.1   CHLORIDE  --   --   --   --  102  --  100  --  106  --  107  --  105   CO2  --   --   --   --  26  --  25  --  24  --  24  --  25   BUN  --   --   --   --  20  --  19  --  23  --  27  --  24   CR  --   --   --   --  0.89  --  0.85  --  0.88  --  1.04  --  1.05   ANIONGAP  --   --   --   --  4  --  6  --  5  --  4  --  4   IMAN  --   --   --   --  8.4*  --  8.3*  --  8.7  --  8.4*  --  8.9   * 174* 153*   < > 177*   < > 271*   < > 136*   < > 250*   < > 259*   ALBUMIN  --   --   --   --   --   --  1.6*  --  1.8*  --  1.7*  --   --    PROTTOTAL  --   --   --   --   --   --  6.7*  --  6.8  --  6.4*  --   --    BILITOTAL  --   --   --   --   --   --  0.3  --  0.3  --  0.5  --   --    ALKPHOS  --   --   --   --   --   --  101  --  108  --  106  --   --    ALT  --   --   --   --   --   --  37  --  65  --  81*  --   --    AST  --   --   --   --   --   --  29  --  63*  --  63*  --   --     < > = values in this interval not displayed.       No results found for this or any previous visit (from the past 24 hour(s)).  Medications     dextrose       dextrose       insulin regular 10 Units/hr (04/04/22 0938)     BETA BLOCKER NOT PRESCRIBED       sodium chloride 10 mL/hr at 04/04/22 0800       aspirin  81 mg Oral or NG Tube Daily      buPROPion  300 mg Oral QAM     ceFAZolin  2 g Intravenous Q8H     furosemide  20 mg Intravenous Q8H     gabapentin  100 mg Oral TID     heparin ANTICOAGULANT  5,000 Units Subcutaneous Q8H     insulin aspart   Subcutaneous TID w/meals     lactated ringers  250 mL Intravenous Once     levothyroxine  125 mcg Oral Once per day on Mon Tue Wed Thu Fri Sat     levothyroxine  250 mcg Oral Weekly     lidocaine  2 patch Transdermal Q24H     lidocaine   Transdermal Q8H     losartan  25 mg Oral Daily     methocarbamol  750 mg Oral Q6H     pantoprazole  40 mg Oral or NG Tube Daily    Or     pantoprazole  40 mg Oral Daily     polyethylene glycol  17 g Oral BID     senna-docusate  1 tablet Oral BID     sodium chloride (PF)  3 mL Intracatheter Q8H     sodium chloride (PF)  3 mL Intracatheter Q8H

## 2022-04-04 NOTE — PLAN OF CARE
Care plan note:      Recent Vitals:  Temp: 99.1  F (37.3  C) Temp src: Oral BP: 136/73 Pulse: 92   Resp: 20 SpO2: 95 % O2 Device: Nasal cannula      Orientation/Neuro: Alert and Oriented x 4  Pain: Pain is controlled with current analgesics.  Medication(s) being used: acetaminophen and narcotic analgesics including oxycodone.   Tele: Junctional and accelerated.   IV medications:  Insulin drip.   Mobility: Assist of 1, Gait belt, and Walker   Skin: Incision: sternal incision.   GI: WDL  : WDL     Diet: Tolerating diet:   Well  Orders Placed This Encounter      Moderate Consistent Carb (60 g CHO per Meal) Diet      Safety/Concerns:  Fall Risk and Shaun Risk  Aggression Color: Green    Plan: Arrived from ICU at 1730; stable vitals on arrival, rhythm acc. Junctional, sating well on room air. Sternal incision CDI, chest tube patent and pacer wires are capped.   Pain well managed with oxycodone.    Continue to monitor.      Jeff Lemons RN     Goal Outcome Evaluation: Improving.

## 2022-04-05 ENCOUNTER — APPOINTMENT (OUTPATIENT)
Dept: GENERAL RADIOLOGY | Facility: CLINIC | Age: 37
DRG: 853 | End: 2022-04-05
Attending: NURSE PRACTITIONER
Payer: COMMERCIAL

## 2022-04-05 ENCOUNTER — APPOINTMENT (OUTPATIENT)
Dept: CARDIOLOGY | Facility: CLINIC | Age: 37
DRG: 853 | End: 2022-04-05
Attending: PHYSICIAN ASSISTANT
Payer: COMMERCIAL

## 2022-04-05 LAB
ALBUMIN SERPL-MCNC: 1.7 G/DL (ref 3.4–5)
ALBUMIN SERPL-MCNC: 1.7 G/DL (ref 3.4–5)
ALP SERPL-CCNC: 109 U/L (ref 40–150)
ALP SERPL-CCNC: 119 U/L (ref 40–150)
ALT SERPL W P-5'-P-CCNC: 73 U/L (ref 0–70)
ALT SERPL W P-5'-P-CCNC: 73 U/L (ref 0–70)
ANION GAP SERPL CALCULATED.3IONS-SCNC: 5 MMOL/L (ref 3–14)
ANION GAP SERPL CALCULATED.3IONS-SCNC: 6 MMOL/L (ref 3–14)
ANION GAP SERPL CALCULATED.3IONS-SCNC: 8 MMOL/L (ref 3–14)
APTT PPP: 29 SECONDS (ref 22–38)
AST SERPL W P-5'-P-CCNC: 105 U/L (ref 0–45)
AST SERPL W P-5'-P-CCNC: 97 U/L (ref 0–45)
BASOPHILS # BLD MANUAL: 0.5 10E3/UL (ref 0–0.2)
BASOPHILS NFR BLD MANUAL: 3 %
BILIRUB SERPL-MCNC: 0.3 MG/DL (ref 0.2–1.3)
BILIRUB SERPL-MCNC: 0.5 MG/DL (ref 0.2–1.3)
BUN SERPL-MCNC: 19 MG/DL (ref 7–30)
BUN SERPL-MCNC: 20 MG/DL (ref 7–30)
BUN SERPL-MCNC: 20 MG/DL (ref 7–30)
CALCIUM SERPL-MCNC: 8.5 MG/DL (ref 8.5–10.1)
CALCIUM SERPL-MCNC: 8.5 MG/DL (ref 8.5–10.1)
CALCIUM SERPL-MCNC: 8.6 MG/DL (ref 8.5–10.1)
CHLORIDE BLD-SCNC: 102 MMOL/L (ref 94–109)
CHLORIDE BLD-SCNC: 103 MMOL/L (ref 94–109)
CHLORIDE BLD-SCNC: 103 MMOL/L (ref 94–109)
CO2 SERPL-SCNC: 23 MMOL/L (ref 20–32)
CO2 SERPL-SCNC: 27 MMOL/L (ref 20–32)
CO2 SERPL-SCNC: 27 MMOL/L (ref 20–32)
CPB POCT: NO
CREAT SERPL-MCNC: 0.9 MG/DL (ref 0.66–1.25)
CREAT SERPL-MCNC: 1.01 MG/DL (ref 0.66–1.25)
CREAT SERPL-MCNC: 1.04 MG/DL (ref 0.66–1.25)
ENTEROCOCCUS FAECALIS: NOT DETECTED
ENTEROCOCCUS FAECIUM: NOT DETECTED
EOSINOPHIL # BLD MANUAL: 0.3 10E3/UL (ref 0–0.7)
EOSINOPHIL NFR BLD MANUAL: 2 %
ERYTHROCYTE [DISTWIDTH] IN BLOOD BY AUTOMATED COUNT: 14 % (ref 10–15)
ERYTHROCYTE [DISTWIDTH] IN BLOOD BY AUTOMATED COUNT: 14.2 % (ref 10–15)
ERYTHROCYTE [DISTWIDTH] IN BLOOD BY AUTOMATED COUNT: 14.2 % (ref 10–15)
GFR SERPL CREATININE-BSD FRML MDRD: >90 ML/MIN/1.73M2
GLUCOSE BLD-MCNC: 147 MG/DL (ref 70–99)
GLUCOSE BLD-MCNC: 147 MG/DL (ref 70–99)
GLUCOSE BLD-MCNC: 93 MG/DL (ref 70–99)
GLUCOSE BLDC GLUCOMTR-MCNC: 105 MG/DL (ref 70–99)
GLUCOSE BLDC GLUCOMTR-MCNC: 107 MG/DL (ref 70–99)
GLUCOSE BLDC GLUCOMTR-MCNC: 109 MG/DL (ref 70–99)
GLUCOSE BLDC GLUCOMTR-MCNC: 109 MG/DL (ref 70–99)
GLUCOSE BLDC GLUCOMTR-MCNC: 110 MG/DL (ref 70–99)
GLUCOSE BLDC GLUCOMTR-MCNC: 115 MG/DL (ref 70–99)
GLUCOSE BLDC GLUCOMTR-MCNC: 117 MG/DL (ref 70–99)
GLUCOSE BLDC GLUCOMTR-MCNC: 117 MG/DL (ref 70–99)
GLUCOSE BLDC GLUCOMTR-MCNC: 125 MG/DL (ref 70–99)
GLUCOSE BLDC GLUCOMTR-MCNC: 129 MG/DL (ref 70–99)
GLUCOSE BLDC GLUCOMTR-MCNC: 140 MG/DL (ref 70–99)
GLUCOSE BLDC GLUCOMTR-MCNC: 141 MG/DL (ref 70–99)
GLUCOSE BLDC GLUCOMTR-MCNC: 154 MG/DL (ref 70–99)
GLUCOSE BLDC GLUCOMTR-MCNC: 178 MG/DL (ref 70–99)
GLUCOSE BLDC GLUCOMTR-MCNC: 179 MG/DL (ref 70–99)
GLUCOSE BLDC GLUCOMTR-MCNC: 221 MG/DL (ref 70–99)
GLUCOSE BLDC GLUCOMTR-MCNC: 75 MG/DL (ref 70–99)
GLUCOSE BLDC GLUCOMTR-MCNC: 89 MG/DL (ref 70–99)
HCO3 BLDV-SCNC: 25 MMOL/L (ref 21–28)
HCT VFR BLD AUTO: 22.8 % (ref 40–53)
HCT VFR BLD AUTO: 24 % (ref 40–53)
HCT VFR BLD AUTO: 26.4 % (ref 40–53)
HCT VFR BLD CALC: 26 % (ref 40–53)
HGB BLD-MCNC: 7.2 G/DL (ref 13.3–17.7)
HGB BLD-MCNC: 7.4 G/DL (ref 13.3–17.7)
HGB BLD-MCNC: 8 G/DL (ref 13.3–17.7)
HGB BLD-MCNC: 8.8 G/DL (ref 13.3–17.7)
HOLD SPECIMEN: NORMAL
HOLD SPECIMEN: NORMAL
INR PPP: 1.17 (ref 0.85–1.15)
LACTATE SERPL-SCNC: 2.8 MMOL/L (ref 0.7–2)
LISTERIA SPECIES (DETECTED/NOT DETECTED): NOT DETECTED
LYMPHOCYTES # BLD MANUAL: 2.9 10E3/UL (ref 0.8–5.3)
LYMPHOCYTES NFR BLD MANUAL: 18 %
MAGNESIUM SERPL-MCNC: 2 MG/DL (ref 1.6–2.3)
MCH RBC QN AUTO: 28.4 PG (ref 26.5–33)
MCH RBC QN AUTO: 28.5 PG (ref 26.5–33)
MCH RBC QN AUTO: 28.8 PG (ref 26.5–33)
MCHC RBC AUTO-ENTMCNC: 30.3 G/DL (ref 31.5–36.5)
MCHC RBC AUTO-ENTMCNC: 30.8 G/DL (ref 31.5–36.5)
MCHC RBC AUTO-ENTMCNC: 31.6 G/DL (ref 31.5–36.5)
MCV RBC AUTO: 91 FL (ref 78–100)
MCV RBC AUTO: 92 FL (ref 78–100)
MCV RBC AUTO: 94 FL (ref 78–100)
METAMYELOCYTES # BLD MANUAL: 0.3 10E3/UL
METAMYELOCYTES NFR BLD MANUAL: 2 %
MONOCYTES # BLD MANUAL: 2.1 10E3/UL (ref 0–1.3)
MONOCYTES NFR BLD MANUAL: 13 %
MYELOCYTES # BLD MANUAL: 0.2 10E3/UL
MYELOCYTES NFR BLD MANUAL: 1 %
NEUTROPHILS # BLD MANUAL: 9.9 10E3/UL (ref 1.6–8.3)
NEUTROPHILS NFR BLD MANUAL: 61 %
PATH REPORT.COMMENTS IMP SPEC: NORMAL
PATH REPORT.FINAL DX SPEC: NORMAL
PATH REPORT.GROSS SPEC: NORMAL
PATH REPORT.MICROSCOPIC SPEC OTHER STN: NORMAL
PATH REPORT.RELEVANT HX SPEC: NORMAL
PCO2 BLDV: 37 MM HG (ref 40–50)
PH BLDV: 7.44 [PH] (ref 7.32–7.43)
PHOSPHATE SERPL-MCNC: 4.7 MG/DL (ref 2.5–4.5)
PHOTO IMAGE: NORMAL
PLAT MORPH BLD: ABNORMAL
PLATELET # BLD AUTO: 573 10E3/UL (ref 150–450)
PLATELET # BLD AUTO: 582 10E3/UL (ref 150–450)
PLATELET # BLD AUTO: 883 10E3/UL (ref 150–450)
PO2 BLDV: 65 MM HG (ref 25–47)
POLYCHROMASIA BLD QL SMEAR: SLIGHT
POTASSIUM BLD-SCNC: 3.6 MMOL/L (ref 3.4–5.3)
POTASSIUM BLD-SCNC: 3.7 MMOL/L (ref 3.4–5.3)
POTASSIUM BLD-SCNC: 3.7 MMOL/L (ref 3.4–5.3)
POTASSIUM BLD-SCNC: 3.8 MMOL/L (ref 3.4–5.3)
PROT SERPL-MCNC: 7.1 G/DL (ref 6.8–8.8)
PROT SERPL-MCNC: 7.4 G/DL (ref 6.8–8.8)
RBC # BLD AUTO: 2.5 10E6/UL (ref 4.4–5.9)
RBC # BLD AUTO: 2.6 10E6/UL (ref 4.4–5.9)
RBC # BLD AUTO: 2.82 10E6/UL (ref 4.4–5.9)
RBC MORPH BLD: ABNORMAL
SAO2 % BLDV: 93 % (ref 94–100)
SARS-COV-2 RNA RESP QL NAA+PROBE: NEGATIVE
SODIUM BLD-SCNC: 138 MMOL/L (ref 133–144)
SODIUM SERPL-SCNC: 134 MMOL/L (ref 133–144)
SODIUM SERPL-SCNC: 135 MMOL/L (ref 133–144)
SODIUM SERPL-SCNC: 135 MMOL/L (ref 133–144)
STAPHYLOCOCCUS AUREUS: DETECTED
STAPHYLOCOCCUS EPIDERMIDIS: NOT DETECTED
STAPHYLOCOCCUS LUGDUNENSIS: NOT DETECTED
STREPTOCOCCUS AGALACTIAE: NOT DETECTED
STREPTOCOCCUS ANGINOSUS GROUP: NOT DETECTED
STREPTOCOCCUS PNEUMONIAE: NOT DETECTED
STREPTOCOCCUS PYOGENES: NOT DETECTED
STREPTOCOCCUS SPECIES: NOT DETECTED
TROPONIN I SERPL HS-MCNC: 291 NG/L
WBC # BLD AUTO: 12.1 10E3/UL (ref 4–11)
WBC # BLD AUTO: 14.6 10E3/UL (ref 4–11)
WBC # BLD AUTO: 16.2 10E3/UL (ref 4–11)

## 2022-04-05 PROCEDURE — 84100 ASSAY OF PHOSPHORUS: CPT | Performed by: NURSE PRACTITIONER

## 2022-04-05 PROCEDURE — 250N000011 HC RX IP 250 OP 636: Performed by: NURSE PRACTITIONER

## 2022-04-05 PROCEDURE — 36415 COLL VENOUS BLD VENIPUNCTURE: CPT | Performed by: NURSE PRACTITIONER

## 2022-04-05 PROCEDURE — 99233 SBSQ HOSP IP/OBS HIGH 50: CPT | Performed by: INTERNAL MEDICINE

## 2022-04-05 PROCEDURE — 93325 DOPPLER ECHO COLOR FLOW MAPG: CPT

## 2022-04-05 PROCEDURE — 200N000001 HC R&B ICU

## 2022-04-05 PROCEDURE — 258N000003 HC RX IP 258 OP 636: Performed by: NURSE PRACTITIONER

## 2022-04-05 PROCEDURE — 250N000013 HC RX MED GY IP 250 OP 250 PS 637: Performed by: NURSE PRACTITIONER

## 2022-04-05 PROCEDURE — 250N000011 HC RX IP 250 OP 636: Performed by: INTERNAL MEDICINE

## 2022-04-05 PROCEDURE — 36415 COLL VENOUS BLD VENIPUNCTURE: CPT | Performed by: HOSPITALIST

## 2022-04-05 PROCEDURE — 85027 COMPLETE CBC AUTOMATED: CPT | Performed by: NURSE PRACTITIONER

## 2022-04-05 PROCEDURE — 80053 COMPREHEN METABOLIC PANEL: CPT | Performed by: NURSE PRACTITIONER

## 2022-04-05 PROCEDURE — 85730 THROMBOPLASTIN TIME PARTIAL: CPT | Performed by: HOSPITALIST

## 2022-04-05 PROCEDURE — 85610 PROTHROMBIN TIME: CPT | Performed by: HOSPITALIST

## 2022-04-05 PROCEDURE — 93321 DOPPLER ECHO F-UP/LMTD STD: CPT

## 2022-04-05 PROCEDURE — 255N000002 HC RX 255 OP 636: Performed by: HOSPITALIST

## 2022-04-05 PROCEDURE — 36415 COLL VENOUS BLD VENIPUNCTURE: CPT | Performed by: ANESTHESIOLOGY

## 2022-04-05 PROCEDURE — 93325 DOPPLER ECHO COLOR FLOW MAPG: CPT | Mod: 26 | Performed by: INTERNAL MEDICINE

## 2022-04-05 PROCEDURE — 84155 ASSAY OF PROTEIN SERUM: CPT | Performed by: ANESTHESIOLOGY

## 2022-04-05 PROCEDURE — 250N000011 HC RX IP 250 OP 636: Performed by: SURGERY

## 2022-04-05 PROCEDURE — 258N000003 HC RX IP 258 OP 636: Performed by: INTERNAL MEDICINE

## 2022-04-05 PROCEDURE — 88305 TISSUE EXAM BY PATHOLOGIST: CPT | Mod: 26 | Performed by: PATHOLOGY

## 2022-04-05 PROCEDURE — 93005 ELECTROCARDIOGRAM TRACING: CPT

## 2022-04-05 PROCEDURE — 85027 COMPLETE CBC AUTOMATED: CPT | Performed by: ANESTHESIOLOGY

## 2022-04-05 PROCEDURE — 85027 COMPLETE CBC AUTOMATED: CPT | Performed by: HOSPITALIST

## 2022-04-05 PROCEDURE — 93308 TTE F-UP OR LMTD: CPT | Mod: 26 | Performed by: INTERNAL MEDICINE

## 2022-04-05 PROCEDURE — 83605 ASSAY OF LACTIC ACID: CPT | Performed by: HOSPITALIST

## 2022-04-05 PROCEDURE — 99291 CRITICAL CARE FIRST HOUR: CPT | Mod: 24 | Performed by: ANESTHESIOLOGY

## 2022-04-05 PROCEDURE — 99233 SBSQ HOSP IP/OBS HIGH 50: CPT | Mod: 24 | Performed by: INTERNAL MEDICINE

## 2022-04-05 PROCEDURE — 71045 X-RAY EXAM CHEST 1 VIEW: CPT

## 2022-04-05 PROCEDURE — 93321 DOPPLER ECHO F-UP/LMTD STD: CPT | Mod: 26 | Performed by: INTERNAL MEDICINE

## 2022-04-05 PROCEDURE — 250N000013 HC RX MED GY IP 250 OP 250 PS 637: Performed by: INTERNAL MEDICINE

## 2022-04-05 PROCEDURE — U0003 INFECTIOUS AGENT DETECTION BY NUCLEIC ACID (DNA OR RNA); SEVERE ACUTE RESPIRATORY SYNDROME CORONAVIRUS 2 (SARS-COV-2) (CORONAVIRUS DISEASE [COVID-19]), AMPLIFIED PROBE TECHNIQUE, MAKING USE OF HIGH THROUGHPUT TECHNOLOGIES AS DESCRIBED BY CMS-2020-01-R: HCPCS | Performed by: ANESTHESIOLOGY

## 2022-04-05 PROCEDURE — 82803 BLOOD GASES ANY COMBINATION: CPT

## 2022-04-05 PROCEDURE — 93010 ELECTROCARDIOGRAM REPORT: CPT | Performed by: INTERNAL MEDICINE

## 2022-04-05 PROCEDURE — 99291 CRITICAL CARE FIRST HOUR: CPT | Performed by: NURSE PRACTITIONER

## 2022-04-05 PROCEDURE — 87040 BLOOD CULTURE FOR BACTERIA: CPT | Performed by: NURSE PRACTITIONER

## 2022-04-05 PROCEDURE — 250N000012 HC RX MED GY IP 250 OP 636 PS 637: Performed by: NURSE PRACTITIONER

## 2022-04-05 PROCEDURE — 88312 SPECIAL STAINS GROUP 1: CPT | Mod: 26 | Performed by: PATHOLOGY

## 2022-04-05 PROCEDURE — 83735 ASSAY OF MAGNESIUM: CPT | Performed by: NURSE PRACTITIONER

## 2022-04-05 PROCEDURE — 84484 ASSAY OF TROPONIN QUANT: CPT | Performed by: HOSPITALIST

## 2022-04-05 RX ORDER — BISACODYL 10 MG
10 SUPPOSITORY, RECTAL RECTAL ONCE
Status: DISCONTINUED | OUTPATIENT
Start: 2022-04-05 | End: 2022-04-07 | Stop reason: CLARIF

## 2022-04-05 RX ORDER — BISACODYL 10 MG
10 SUPPOSITORY, RECTAL RECTAL DAILY PRN
Status: DISCONTINUED | OUTPATIENT
Start: 2022-04-05 | End: 2022-04-18 | Stop reason: HOSPADM

## 2022-04-05 RX ORDER — POTASSIUM CHLORIDE 1500 MG/1
20 TABLET, EXTENDED RELEASE ORAL ONCE
Status: COMPLETED | OUTPATIENT
Start: 2022-04-05 | End: 2022-04-05

## 2022-04-05 RX ORDER — MAGNESIUM SULFATE HEPTAHYDRATE 40 MG/ML
2 INJECTION, SOLUTION INTRAVENOUS ONCE
Status: COMPLETED | OUTPATIENT
Start: 2022-04-05 | End: 2022-04-05

## 2022-04-05 RX ADMIN — HEPARIN SODIUM 5000 UNITS: 5000 INJECTION, SOLUTION INTRAVENOUS; SUBCUTANEOUS at 14:17

## 2022-04-05 RX ADMIN — HEPARIN SODIUM 5000 UNITS: 5000 INJECTION, SOLUTION INTRAVENOUS; SUBCUTANEOUS at 05:15

## 2022-04-05 RX ADMIN — OXYCODONE HYDROCHLORIDE 10 MG: 5 TABLET ORAL at 15:18

## 2022-04-05 RX ADMIN — GENTAMICIN SULFATE 90 MG: 40 INJECTION, SOLUTION INTRAMUSCULAR; INTRAVENOUS at 12:22

## 2022-04-05 RX ADMIN — METHOCARBAMOL 750 MG: 750 TABLET ORAL at 21:06

## 2022-04-05 RX ADMIN — PANTOPRAZOLE SODIUM 40 MG: 40 TABLET, DELAYED RELEASE ORAL at 08:37

## 2022-04-05 RX ADMIN — POTASSIUM CHLORIDE 20 MEQ: 1500 TABLET, EXTENDED RELEASE ORAL at 08:34

## 2022-04-05 RX ADMIN — OXYCODONE HYDROCHLORIDE 5 MG: 5 TABLET ORAL at 10:48

## 2022-04-05 RX ADMIN — HEPARIN SODIUM 5000 UNITS: 5000 INJECTION, SOLUTION INTRAVENOUS; SUBCUTANEOUS at 21:06

## 2022-04-05 RX ADMIN — ACETAMINOPHEN 650 MG: 325 TABLET, FILM COATED ORAL at 22:09

## 2022-04-05 RX ADMIN — GABAPENTIN 100 MG: 100 CAPSULE ORAL at 20:28

## 2022-04-05 RX ADMIN — GENTAMICIN SULFATE 90 MG: 40 INJECTION, SOLUTION INTRAMUSCULAR; INTRAVENOUS at 19:51

## 2022-04-05 RX ADMIN — METHOCARBAMOL 750 MG: 750 TABLET ORAL at 04:27

## 2022-04-05 RX ADMIN — SODIUM CHLORIDE 5 UNITS/HR: 9 INJECTION, SOLUTION INTRAVENOUS at 01:11

## 2022-04-05 RX ADMIN — GABAPENTIN 100 MG: 100 CAPSULE ORAL at 14:17

## 2022-04-05 RX ADMIN — LEVOTHYROXINE SODIUM 125 MCG: 125 TABLET ORAL at 08:51

## 2022-04-05 RX ADMIN — LOSARTAN POTASSIUM 25 MG: 25 TABLET, FILM COATED ORAL at 08:35

## 2022-04-05 RX ADMIN — POLYETHYLENE GLYCOL 3350 17 G: 17 POWDER, FOR SOLUTION ORAL at 12:23

## 2022-04-05 RX ADMIN — ACETAMINOPHEN 650 MG: 325 TABLET, FILM COATED ORAL at 14:33

## 2022-04-05 RX ADMIN — MAGNESIUM SULFATE HEPTAHYDRATE 2 G: 40 INJECTION, SOLUTION INTRAVENOUS at 16:05

## 2022-04-05 RX ADMIN — GABAPENTIN 100 MG: 100 CAPSULE ORAL at 08:35

## 2022-04-05 RX ADMIN — ACETAMINOPHEN 650 MG: 325 TABLET, FILM COATED ORAL at 00:16

## 2022-04-05 RX ADMIN — SODIUM CHLORIDE 1 UNITS/HR: 9 INJECTION, SOLUTION INTRAVENOUS at 10:24

## 2022-04-05 RX ADMIN — BUPROPION HYDROCHLORIDE 300 MG: 300 TABLET, EXTENDED RELEASE ORAL at 08:35

## 2022-04-05 RX ADMIN — CEFAZOLIN SODIUM 2 G: 2 INJECTION, SOLUTION INTRAVENOUS at 17:46

## 2022-04-05 RX ADMIN — HYDROMORPHONE HYDROCHLORIDE 0.2 MG: 0.2 INJECTION, SOLUTION INTRAMUSCULAR; INTRAVENOUS; SUBCUTANEOUS at 13:16

## 2022-04-05 RX ADMIN — HUMAN ALBUMIN MICROSPHERES AND PERFLUTREN 9 ML: 10; .22 INJECTION, SOLUTION INTRAVENOUS at 11:21

## 2022-04-05 RX ADMIN — ASPIRIN 81 MG CHEWABLE TABLET 81 MG: 81 TABLET CHEWABLE at 08:39

## 2022-04-05 RX ADMIN — SODIUM CHLORIDE 10 ML/HR: 9 INJECTION, SOLUTION INTRAVENOUS at 10:17

## 2022-04-05 RX ADMIN — HYDROMORPHONE HYDROCHLORIDE 0.4 MG: 0.2 INJECTION, SOLUTION INTRAMUSCULAR; INTRAVENOUS; SUBCUTANEOUS at 16:40

## 2022-04-05 RX ADMIN — OXYCODONE HYDROCHLORIDE 10 MG: 5 TABLET ORAL at 04:27

## 2022-04-05 RX ADMIN — HYDROMORPHONE HYDROCHLORIDE 0.4 MG: 0.2 INJECTION, SOLUTION INTRAMUSCULAR; INTRAVENOUS; SUBCUTANEOUS at 07:11

## 2022-04-05 RX ADMIN — SODIUM CHLORIDE 4 UNITS/HR: 9 INJECTION, SOLUTION INTRAVENOUS at 19:50

## 2022-04-05 RX ADMIN — SENNOSIDES AND DOCUSATE SODIUM 1 TABLET: 50; 8.6 TABLET ORAL at 08:37

## 2022-04-05 RX ADMIN — METHOCARBAMOL 750 MG: 750 TABLET ORAL at 16:05

## 2022-04-05 RX ADMIN — CEFAZOLIN SODIUM 2 G: 2 INJECTION, SOLUTION INTRAVENOUS at 10:30

## 2022-04-05 RX ADMIN — METHOCARBAMOL 750 MG: 750 TABLET ORAL at 10:45

## 2022-04-05 RX ADMIN — OXYCODONE HYDROCHLORIDE 10 MG: 5 TABLET ORAL at 20:28

## 2022-04-05 RX ADMIN — CEFAZOLIN SODIUM 2 G: 2 INJECTION, SOLUTION INTRAVENOUS at 02:14

## 2022-04-05 RX ADMIN — LIDOCAINE 2 PATCH: 560 PATCH PERCUTANEOUS; TOPICAL; TRANSDERMAL at 08:22

## 2022-04-05 RX ADMIN — ACETAMINOPHEN 650 MG: 325 TABLET, FILM COATED ORAL at 04:27

## 2022-04-05 RX ADMIN — OXYCODONE HYDROCHLORIDE 10 MG: 5 TABLET ORAL at 00:16

## 2022-04-05 ASSESSMENT — ACTIVITIES OF DAILY LIVING (ADL)
ADLS_ACUITY_SCORE: 7
ADLS_ACUITY_SCORE: 7
ADLS_ACUITY_SCORE: 10
ADLS_ACUITY_SCORE: 7
ADLS_ACUITY_SCORE: 9
ADLS_ACUITY_SCORE: 7
ADLS_ACUITY_SCORE: 7
ADLS_ACUITY_SCORE: 9
ADLS_ACUITY_SCORE: 7
ADLS_ACUITY_SCORE: 9
ADLS_ACUITY_SCORE: 7
ADLS_ACUITY_SCORE: 9
ADLS_ACUITY_SCORE: 7
ADLS_ACUITY_SCORE: 7
ADLS_ACUITY_SCORE: 9
ADLS_ACUITY_SCORE: 9
ADLS_ACUITY_SCORE: 7
ADLS_ACUITY_SCORE: 10
ADLS_ACUITY_SCORE: 7
ADLS_ACUITY_SCORE: 9
ADLS_ACUITY_SCORE: 7
ADLS_ACUITY_SCORE: 7

## 2022-04-05 NOTE — PROGRESS NOTES
Cardiothoracic Surgery Staff Note    Early morning events noted. Brief arrest for v fib with ROSC achieved very quickly. Patient awake and did not need intubation. At bedside he is anxious from the events but otherwise hemodynamically fine. Labs and xray are not significantly altered. I don't see any other cause for the event except his arrhythmia, will interrogate his pre arrest strip and discuss with EP for timing of pacemaker with ID.    Marti Melton MD   Cardiothoracic Surgery  P: [phone]  C: [phone]

## 2022-04-05 NOTE — PLAN OF CARE
A/Ox4. VSS. LS diminished. +Bs, +flatus, -bm. Voiding adequately. Sternal incision giovanny with liquid bandage. Chest tube to suction, patent, no air leak, serosanguinous drainage. Tolerating diet. On insulin gtt. Currently the gtt is stopped. But patient is in algo 4+2. Managing pain with prn oxycodone, tylenol, and robaxin. Tele: Second degree block vs accelerated junctional. MD aware.     At 0618 writer was informed that pt looked like he was having a v-run. Writer went to patients room and found him agonally breathing. Writer was saying patients name and trying to get a response from him. Writer called a code blue at 0620. Please see house officers note. Pt was transferred to ICU with all belongings with flying squad RNs at 0645. Writer to call report to on coming RN.

## 2022-04-05 NOTE — PROGRESS NOTES
[facility]    Infectious Disease Progress Note    Date of Service : 04/05/2022     Assessment:  36YM with uncontrolled diabetes and HbA1c of >12%, and prior cryptococcal pneumonia 10 years ago without HIV diagnosis, who is hospitalized with high grade prolonged MSSA bacteremia since 3/22 with tricuspid valve endocarditis. Possible secondary seeding of the lumbar spine though MRI is negative and back pain is gone. He is s/p tricuspid valve replacement surgery and aortic valve exploration for control of infection on 04/01 with positive gram stain and culture of tricuspid valve tissue for staph aureus.  Yeast also seen on stain but read corrected later to no yeast.  Micafungin discontinued as Micro results  updated by lab. Initial read of 1+ yeast on valve tissue on 4/1 was corrected and updated. Only staph aureus on valve tissue cx, no yeast seen on stain.  On 4/5, he suffered a ventricular tachycardia/fibrillation arrest.  ROSC achieved after one round of CPR and one shock.  Alert and neurologically intact following.        Recommendations:  1. Follow blood cxs, blood cultures from 4/ 3 coming back positive on 4/5 with MSSA. picc and pacemaker placement which is beong contemplated given the arrythmia event best to hold if not urgent given positive blood cultures. Repeat a set of blood cultures. Given the presence of the new prosthetic valve and positive blood cultures add gentamicin ( synergy dosing ) till he clears the blood cultures. Discussed with Dr. Higgins, pharmacy   2. Continue Cefazolin  3. HIV antigen/antibody test negative.    4. Glycemic control  5. Follow clinical status and labs          Sreedhar Bell MD    Interval History   Unfortunately positive blood cultures from 4/3 positive for MSSA  Events this morning 4/5:  he suffered a ventricular tachycardia/fibrillation arrest.  ROSC achieved after one round of CPR and one shock.  Alert and neurologically intact following    Physical  Exam   Temp: 97.5  F (36.4  C) Temp src: Oral BP: 131/82 Pulse: 79   Resp: 16 SpO2: 99 % O2 Device: Nasal cannula Oxygen Delivery: 4 LPM  Vitals:    04/04/22 0600 04/04/22 1739 04/05/22 0553   Weight: 117.1 kg (258 lb 2.5 oz) 119.1 kg (262 lb 8 oz) 118.4 kg (261 lb 1.6 oz)     Vital Signs with Ranges  Temp:  [97.5  F (36.4  C)-99.1  F (37.3  C)] 97.5  F (36.4  C)  Pulse:  [79-92] 79  Resp:  [12-27] 16  BP: (117-149)/() 131/82  Cuff Mean (mmHg):  [108] 108  SpO2:  [94 %-100 %] 99 %    Constitutional: Awake, alert, cooperative, no apparent distress  Lungs: Clear to auscultation bilaterally, no crackles or wheezing  Cardiovascular: S1S2, sternal surgical site intact, mediastinal chest tubes in place  Abdomen:  soft,non-tender  Skin: No rash      Other:    Medications     dextrose       insulin regular 4 Units/hr (04/05/22 0849)     BETA BLOCKER NOT PRESCRIBED       sodium chloride 10 mL/hr at 04/04/22 0800       aspirin  81 mg Oral or NG Tube Daily     bisacodyl  10 mg Rectal Once     buPROPion  300 mg Oral QAM     ceFAZolin  2 g Intravenous Q8H     gabapentin  100 mg Oral TID     heparin ANTICOAGULANT  5,000 Units Subcutaneous Q8H     insulin aspart   Subcutaneous TID w/meals     lactated ringers  250 mL Intravenous Once     levothyroxine  125 mcg Oral Once per day on Mon Tue Wed Thu Fri Sat     levothyroxine  250 mcg Oral Weekly     lidocaine  2 patch Transdermal Q24H     lidocaine   Transdermal Q8H     losartan  25 mg Oral Daily     methocarbamol  750 mg Oral Q6H     pantoprazole  40 mg Oral or NG Tube Daily    Or     pantoprazole  40 mg Oral Daily     polyethylene glycol  17 g Oral BID     senna-docusate  1 tablet Oral BID       Data   All microbiology laboratory data reviewed.  Recent Labs   Lab Test 04/05/22  0743 04/05/22  0645 04/05/22  0636 04/05/22  0526   WBC 14.6* 16.2*  --  12.1*   HGB 7.4* 8.0* 8.8* 7.2*   HCT 24.0* 26.4* 26* 22.8*   MCV 92 94  --  91   * 883*  --  573*     Recent Labs   Lab  Test 04/05/22  0743 04/05/22  0647 04/05/22  0526   CR 1.04 1.01 0.90     Microbiology  Blood cx 4/1 pending, Blood cultures 3/22-3/31 MSSA  4/1 tricuspid valve tissue  Heart Valve, Tricuspid; Tissue            0 Result Notes     Culture Culture in progress        1+ Staphylococcus aureus Abnormal          Heart Valve, Tricuspid; Tissue            0 Result Notes          (important suggestion)  Newer results are available. Click to view them now.      Gram Stain Result   Abnormal   1+ Gram positive cocci       1+ Yeast       4+ WBC seen   Predominantly PMNs               Peripheral Blood            0 Result Notes     Culture Positive on the 1st day of incubation Abnormal         Staphylococcus aureus Panic     1 of 2 bottles         Resulting Agency: [facility]         Susceptibility                   Staphylococcus aureus       BLAZE       Clindamycin <=0.25 ug/mL Susceptible       Erythromycin <=0.25 ug/mL Susceptible       Gentamicin <=0.5 ug/mL Susceptible       Oxacillin <=0.25 ug/mL Susceptible 1       Tetracycline <=1.0 ug/mL Susceptible       Trimethoprim/Sulfamethoxazole <=0.5/9.5 u... Susceptible       Vancomycin 2.0 ug/mL Susceptible

## 2022-04-05 NOTE — PROGRESS NOTES
Critical Care Services Progress Note:  CC:  V fib cardiac arrest s/p tricuspid valve replacement, aortic valve exploration on 4/1  HPI: Anil Montoya 36 year old male who presented on 3/22/22 to [facility] with progressive weakness, recent history of hematuria, and SOB.  No previous cardiac history. Significant medical history of poorly controlled diabetes (non-compliant with medications), HTN, hypothyroidism, obesity, WARNER, and anxiety.  Of note, he has a history of an unusual cryptococcal infection in 2012 and hx of staph aureus bacteremia, both requiring prolonged treatment with subsequent resolution.  No history of immunosuppressed state, AIDS, or other history of opportunistic infections.  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   He has been covered with ancef. Also, elevated transaminases due to sepsis vs NAFLD.  Also, with his presenting back pain, an MRI was performed due to concern of discitis and seeding, which resulted negative for acute concerns. Transferred to [facility] 3/28 for further CT surgery surgical intervention.  On 4/1, he underwent tricuspid valve replacement, aortic valve exploration.  On 4/5, he suffered a ventricular tachycardia/fibrillation arrest.  ROSC achieved after one round of CPR and one shock.  Alert and neurologically intact following.    PMH/PSH/meds/all/SH/FH reviewed and as noted below.  ROS:  Unable as pt is intubated post procedure.  Exam:  /82   Pulse 79   Temp 97.5  F (36.4  C) (Oral)   Resp 16   Wt 118.4 kg (261 lb 1.6 oz)   SpO2 99%   BMI 37.46 kg/m    GEN: no acute distress, alert  HEENT: head ncat, sclera anicteric, OP patent, trachea midline, PERRL  PULM: unlabored, clear anteriorly    CV: RRR, No rub, no murmur apprecaited.  Midline incision CDI. CTs: scant output, no air leak. Pacer wires remain present and capturing.  ABD: soft, mild  tenderness to palpation at right abdomen  EXT: No edema,  warm x4  NEURO: alert, no focal deficits  SKIN: no obvious rash or lesions    Assessment/plan:  36 year old male on 4/1 s/p tricuspid valve replacement, aortic valve exploration in the setting of MSSA bacteremia. 4/5 suffered a v fib cardiac arrest with ROSC obtained quickly.    Neurology/Psychiatry:   1. Analgesia -- prn tylenol, prn narcotics  2.  Anxiety -- No acute anxiety needs at this time.  3. Depression -- PTA welbutrin    Cardiovascular:   1. Tricuspid valve Staph endocarditis S/p tricuspid valve replacement secondary to suspected right groin infection -- bioprosthetic valve. AV wires present.   2. Rhythm: accelerated junctional rhythm with complete heart block - EP following  3. S/p v fib arrest - EP following - plan for telemetry monitoring, avoid AVN blocking agents, plan for ICD anticipated, timing pending.  4. HTN - avoid AVN blocking agents - on losartan per CV surgery    Pulmonary/Ventilator Management:   1. WARNER -- tolerates CPAP - CPAP at HS  2. Hx of cryptococcal pneumonia - patient endorses 80% lung function - no baseline issues  - on room air    GI and Nutrition :   1. Diet when cleared by CV surgery and EP cards   2. Acid suppression for PUD prophy per CV  3. Elevated LFTs - sepsis-induced vs NAFLD etiology - stable s/p v fib cardiac arrest  4. Mild tenderness at right abdomen -- monitor    Renal/Fluids/Electrolytes:   1. Monitor UOP/creatinine/electrolytes -- stable following arrest  2. Replete electrolytes prn per protocol    Infectious Disease:   1. MSSA bacteremia and endocarditis - blood and tissue cultures Positive for Staphylococcus aureus and negative for the mecA gene - ID following - Ancef and gentamicin for synergy  2. Hx of cryptococcal pneumonia, hx of staph aureus infection -- prolonged courses required and resolved     Endocrine:   1. DM2 - Hgb A1c >12 - poorly controlled, intermittent compliance with PTA meds -- Monitor  for stress induced hyperglycemia - ICU insulin protocol - continue insulin gtt - goal sugar <180   2. Hypothyroidism - continue synthroid    Hematology/Oncology:   1. Acute blood loss anemia - stable in 7s  2. Acute thrombocytosis - 582 - monitor  3. Leukocytosis - stable -- bacteremia/endocarditis -- see ID     IV/Access:   1. Venous access - peripheral  2. Arterial access - none    ICU Prophylaxis:   1. DVT: Hep Subq/mechanical  2. Stress Ulcer: PPI blocker  3. Restraints: NA  4. IV Access - peripheral  5. Disposition - ICU monitoring today      Hector Holliday MD  Surgical Critical Care Fellow       PMH:  Past Medical History:   Diagnosis Date     Pneumonia        PSH:  Past Surgical History:   Procedure Laterality Date     CV CORONARY ANGIOGRAM N/A 3/31/2022    Procedure: Coronary Angiogram;  Surgeon: Lidia Quintanilla MD;  Location:  [facility]       Meds:  Current Facility-Administered Medications   Medication     acetaminophen (TYLENOL) tablet 650 mg     aspirin (ASA) chewable tablet 81 mg     bisacodyl (DULCOLAX) Suppository 10 mg     bisacodyl (DULCOLAX) Suppository 10 mg     buPROPion (WELLBUTRIN XL) 24 hr tablet 300 mg     ceFAZolin (ANCEF) intermittent infusion 2 g in 100 mL dextrose PRE-MIX     dextrose 10% infusion     glucose gel 15-30 g    Or     dextrose 50 % injection 25-50 mL    Or     glucagon injection 1 mg     gabapentin (NEURONTIN) capsule 100 mg     heparin ANTICOAGULANT injection 5,000 Units     hydrALAZINE (APRESOLINE) injection 10 mg     HYDROmorphone (DILAUDID) injection 0.2 mg    Or     HYDROmorphone (DILAUDID) injection 0.4 mg     insulin 1 unit/mL in saline (NovoLIN, HumuLIN Regular) drip - ADULT IV Infusion     insulin aspart (NovoLOG) injection (RAPID ACTING)     lactated ringers BOLUS 250 mL     levothyroxine (SYNTHROID/LEVOTHROID) tablet 125 mcg     levothyroxine (SYNTHROID/LEVOTHROID) tablet 250 mcg     Lidocaine (LIDOCARE) 4 % Patch 2 patch     lidocaine (LMX4) cream  "    lidocaine (LMX4) cream     lidocaine 1 % 0.1-1 mL     lidocaine 1 % 0.1-1 mL     lidocaine patch in PLACE     losartan (COZAAR) tablet 25 mg     magnesium hydroxide (MILK OF MAGNESIA) suspension 30 mL     melatonin tablet 3 mg     methocarbamol (ROBAXIN) tablet 750 mg     naloxone (NARCAN) injection 0.2 mg    Or     naloxone (NARCAN) injection 0.4 mg    Or     naloxone (NARCAN) injection 0.2 mg    Or     naloxone (NARCAN) injection 0.4 mg     ondansetron (ZOFRAN-ODT) ODT tab 4 mg    Or     ondansetron (ZOFRAN) injection 4 mg     oxyCODONE (ROXICODONE) tablet 5 mg    Or     oxyCODONE (ROXICODONE) tablet 10 mg     pantoprazole (PROTONIX) 2 mg/mL suspension 40 mg    Or     pantoprazole (PROTONIX) EC tablet 40 mg     polyethylene glycol (MIRALAX) Packet 17 g     Reason beta blocker order not selected     senna-docusate (SENOKOT-S/PERICOLACE) 8.6-50 MG per tablet 1 tablet     sodium chloride (PF) 0.9% PF flush 3 mL     sodium chloride 0.9% infusion       Allergies:  Allergies   Allergen Reactions     Clindamycin      Vancomycin      \"Red Sonia Syndrome\"       Social Hx:  Social History     Socioeconomic History     Marital status:      Spouse name: Not on file     Number of children: Not on file     Years of education: Not on file     Highest education level: Not on file   Occupational History     Not on file   Tobacco Use     Smoking status: Never Smoker     Smokeless tobacco: Never Used   Substance and Sexual Activity     Alcohol use: Not Currently     Drug use: Never     Sexual activity: Not on file   Other Topics Concern     Parent/sibling w/ CABG, MI or angioplasty before 65F 55M? Not Asked   Social History Narrative     Not on file     Social Determinants of Health     Financial Resource Strain: Not on file   Food Insecurity: Not on file   Transportation Needs: Not on file   Physical Activity: Not on file   Stress: Not on file   Social Connections: Not on file   Intimate Partner Violence: Not on file "   Housing Stability: Not on file       Family Hx:  History reviewed. No pertinent family history.

## 2022-04-05 NOTE — PHARMACY-AMINOGLYCOSIDE DOSING SERVICE
Pharmacy Aminoglycoside Initial Note  Date of Service 2022  Patient's  1985  36 year old, male    Weight (Adjusted):  91.2 kg    Indication: Bacteremia and Synergy    Current estimated CrCl = Estimated Creatinine Clearance: 129.1 mL/min (based on SCr of 1.04 mg/dL).    Creatinine for last 3 days  4/3/2022:  3:47 AM Creatinine 0.85 mg/dL  2022:  5:45 AM Creatinine 0.89 mg/dL  2022:  5:26 AM Creatinine 0.90 mg/dL;  6:47 AM Creatinine 1.01 mg/dL;  7:43 AM Creatinine 1.04 mg/dL     Nephrotoxins and other renal medications (From now, onward)    Start     Dose/Rate Route Frequency Ordered Stop    22 1200  gentamicin (GARAMYCIN) 90 mg in sodium chloride 0.9 % 50 mL intermittent infusion         1 mg/kg × 91.2 kg (Adjusted)  over 60 Minutes Intravenous EVERY 8 HOURS 22 1118            Contrast Orders - past 72 hours (72h ago, onward)            None          Aminoglycoside Levels - past 2 days  No results found for requested labs within last 48 hours.    Aminoglycosides IV Administrations (past 72 hours)      No aminoglycosides orders with administrations in past 72 hours.                    Plan:  1.  Start Gentamicin 90 mg (1 mg/kg) IV q8h.   2.  Target goals based on synergy dosing.  3.  Goal peak level: 3-4 mg/L.  4.  Goal trough level: </= 1 mg/L.  5.  Pharmacy will continue to follow and check levels as appropriate in 1-3 Days    Nataliia Guerin, PharmD, BCPS

## 2022-04-05 NOTE — PROGRESS NOTES
[facility]  Cardiovascular and Thoracic Surgery Daily Note          Assessment and Plan:   Anil Montoya is a 36 year old gentleman who presented to [facility] ED on 3/22/22 with weakness, recent hematuria and shortness of breath. Workup demonstrated MSSA bacteremia likely secondary to right groin skin source. TEJINDER on 3/27/22 demonstrated tricuspid valve endocarditis. In completing full work up, there was concern for discitis with seeding however MRI negative. He was transferred on 3/28/22 to [facility] for consideration of surgical intervention.     PMH includes: Poorly controlled diabetic, HTN, hypothyroidism, obesity, WARNER, anxiety, cryptococcal pneumonia in 2012 with previous history of staph aureus bacteremia.    Most recent echo demonstrates LVEF 55-60%, normal RV function, mildly thickened cusps on aortic valve and thickened tricuspid valve leaflets, small mobile filamentous elements on the septal leaflet of TV and larged fixed heterogeneous mass in the RV attached to the interventricular septum.  Coronary angiogram 3/31/22 with no e/o CAD.    Course c/b CHB with accelerated junctional rhythm and vfib arrest on 4/5 with immediate ROSC after defib x1 and transient CPR.      POD #4 s/p:  1. Tricuspid valve replacement (31mm Epic stented valve)  2. Endoscopic aortic valve exploration  3. Transesophageal echocardiogram on 4/1/22 with Dr. 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  Postoperative CHB with accelerated junctional rhythm, improving  Vfib arrest 4/5 early AM at 6:40 with onset of vfib, shock x1 and an attempt at CPR with immediate ROSC. Did not need intubation and neurologically intact. Electrolytes stable: K+ 3.7, Mg 2. Appreciate EP's involvement. Troponin elevated as expected after event.  [PTA meds on hold: losartan 50 mg daily]  - ASA 81 mg daily  - Defer BB given heart block  - Losartan 25 mg daily and up titrate PRN  - No statin  indicated  - ID as below  - Consulted EP, appreciate recommendations, plans for ICD once blood is sterile  - Echocardiogram today, 4/5  - CTs in to suction, leave in, continue back up pacing/standby in the event of progression of HB    Resp:   WARNER  History of cryptococcal pneumonia  Extubated on POD 0, now saturating well on room air  - Continue pulm hygiene efforts: IS/flutter valve/mobility  - Titrate O2 to maintain sats >92%  - CPAP at HS    Neuro/MSK:    Acute postoperative pain  MDD/VIVEK  - Better controlled with current regimen this AM, received toradol x1  - Sore this AM (4/5) after a few compressions with CPR but overall tolerable  - Robaxin scheduled, lidocaine patches added  - PTA Wellbutrin resumed  - Sternal incision and area intact and feels stable, sires intact on CXR, continue sternal precautions    Renal/Electrolyte:   Creat stable, below preoperative weight, has less edema  - Strict I/O, daily weights  - Avoid/limit nephrotoxins as able  - Replete lytes per protocol  - Defer on diuresis today    GI/Nutrition:    Mildly elevated LFTs, resolved  - Tolerating current diet:  Orders Placed This Encounter      Moderate Consistent Carb (60 g CHO per Meal) Diet   - Continue bowel regimen, no BM yet, + flatus  - PPI    :    - Voiding well on own    Endo:  Stress induced hyperglycemia   Poorly controlled DM2  Hypothyroidism  Preop A1c   Lab Results   Component Value Date    A1C 12.6 03/27/2022    [PTA meds on hold: Victoza and metformin]  - Was on insulin infusion, transitioned to sliding scale insulin and lantus 4/2, much more hyperglycemic, resumed insulin infusion POD 2 with prandial coverage.  - Hospitalists managing diabetic regimen, appreciate  - Goal BG <180 for optimal healing  - PTA levothyroxine resumed    ID:  Stress induced leukocytosis  Tricuspid valve endocarditis  MSSA bacteremia  History cryptococcal pneumonia  WBC 14.6, Tmax 99.1; no s/sx infection  Prelim intraop gram stain positive for  yeast from tricuspid valve, culture with 1+ staph aureus, no yeast, continue to follow  BC 4/3 positive for GPCs, continue to follow  Fungal BC sent on 4/1 with NGTD  - Completing perioperative ABX  - Continue Cefazolin per ID  - Gentamycin added 4/5 per ID for new positive culture  - Micafungin started 4/1 per ID for positive yeast on gram stain, discontinued with negative cx on 4/3  - HIV antigen/antibody test sent   - Per ID: repeat blood cultures x 2 sets daily until clear  - Will need optho consult once more stable  - Continue to follow fever curve, WBC and inflammatory markers as appropriate    Heme:  Acute blood loss anemia  Acute blood loss thrombocytopenia  Hgb 7.4, Plts 582; no s/sx active bleeding  - CBC in AM  - Goal Hgb >7    -Proph: PPI, subcutaneous heparin, SCDs  -Dispo: Transferred back to ICU given code blue, continue therapies, pulm hygiene          Interval History:   Vfib arrest this AM as noted above with ROSC after shock x1 and transient CPR. Neurologically intact and HD stable. Is sore from compressions but overall pain is controlled. Understandably anxious this AM. Denies dizziness or current palpitations.          Medications:       aspirin  81 mg Oral or NG Tube Daily     bisacodyl  10 mg Rectal Once     buPROPion  300 mg Oral QAM     ceFAZolin  2 g Intravenous Q8H     gabapentin  100 mg Oral TID     heparin ANTICOAGULANT  5,000 Units Subcutaneous Q8H     insulin aspart   Subcutaneous TID w/meals     lactated ringers  250 mL Intravenous Once     levothyroxine  125 mcg Oral Once per day on Mon Tue Wed Thu Fri Sat     levothyroxine  250 mcg Oral Weekly     lidocaine  2 patch Transdermal Q24H     lidocaine   Transdermal Q8H     losartan  25 mg Oral Daily     methocarbamol  750 mg Oral Q6H     pantoprazole  40 mg Oral or NG Tube Daily    Or     pantoprazole  40 mg Oral Daily     polyethylene glycol  17 g Oral BID     potassium chloride  20 mEq Oral Once     senna-docusate  1 tablet Oral BID              Physical Exam:   Vitals were reviewed  Blood pressure (Abnormal) 141/93, pulse 79, temperature 97.5  F (36.4  C), temperature source Oral, resp. rate 18, weight 118.4 kg (261 lb 1.6 oz), SpO2 95 %.  Rhythm: acc junctional rates 70-80s    Lungs: Dim throughout, on room air    Cardiovascular: S1, S2, RRR, no m/r/g    Abdomen: S/ND/ND, +BS    Extremeties: trace edema, warm and well perfused    Incision: CDI    CT: to suction, no air leak, no crepitus    Weight:   Vitals:    04/02/22 0257 04/03/22 0600 04/04/22 0600 04/04/22 1739   Weight: 118.9 kg (262 lb 2 oz) 117.8 kg (259 lb 11.2 oz) 117.1 kg (258 lb 2.5 oz) 119.1 kg (262 lb 8 oz)    04/05/22 0553   Weight: 118.4 kg (261 lb 1.6 oz)            Data:   Labs:   Lab Results   Component Value Date    WBC 38.3 04/01/2022    WBC 6.8 11/24/2017     Lab Results   Component Value Date    RBC 3.09 04/01/2022    RBC 5.16 11/24/2017     Lab Results   Component Value Date    HGB 9.0 04/01/2022    HGB 15.2 11/24/2017     Lab Results   Component Value Date    HCT 29.1 04/01/2022    HCT 43.7 11/24/2017     No components found for: MCT  Lab Results   Component Value Date    MCV 94 04/01/2022    MCV 85 11/24/2017     Lab Results   Component Value Date    MCH 29.1 04/01/2022    MCH 29.5 11/24/2017     Lab Results   Component Value Date    MCHC 30.9 04/01/2022    MCHC 34.8 11/24/2017     Lab Results   Component Value Date    RDW 13.5 04/01/2022    RDW 12.9 11/24/2017     Lab Results   Component Value Date     04/01/2022     11/24/2017       Last Basic Metabolic Panel:  Lab Results   Component Value Date     04/01/2022     11/24/2017      Lab Results   Component Value Date    POTASSIUM 5.1 04/01/2022    POTASSIUM 5.1 04/01/2022    POTASSIUM 3.8 11/24/2017     Lab Results   Component Value Date    CHLORIDE 107 04/01/2022    CHLORIDE 104 11/24/2017     Lab Results   Component Value Date    IMAN 8.4 04/01/2022    IMAN 8.3 11/24/2017     Lab Results    Component Value Date    CO2 24 04/01/2022    CO2 24 11/24/2017     Lab Results   Component Value Date    BUN 27 04/01/2022    BUN 18 11/24/2017     Lab Results   Component Value Date    CR 1.04 04/01/2022    CR 1.07 11/24/2017     Lab Results   Component Value Date     04/01/2022     04/01/2022     11/24/2017       Imaging:  Recent Results (from the past 24 hour(s))   XR Chest Port 1 View    Narrative    XR CHEST PORT 1 VIEW 4/5/2022 7:10 AM    HISTORY: post cardiac arrest    COMPARISON: Chest x-ray 4/2/2022      Impression    IMPRESSION: Sternotomy. Interval removal of the right CVC, mediastinal  drain, and left chest tube. Defibrillator pads in place. Enlargement  of the cardiac silhouette with normal pulmonary vasculature. No  residual right pneumothorax. Blunting of the right costophrenic angle  suggesting a trace right pleural effusion. No significant left  effusion.    SHERYL MONTOYA MD         SYSTEM ID:  [id number]        Rounded and discussed with Dr. Linh Silverio, APRN, ACNPC-AG, CCRN  Nurse Practitioner  Cardiothoracic Surgery  Pager: [phone]

## 2022-04-05 NOTE — PROGRESS NOTES
[facility]    Medicine Progress Note - Hospitalist Service        Date of Admission:  3/28/2022 10:33 PM    Assessment & Plan:   Anil Montoya is a 36 year old male with hx of DM2, HTN, WARNER, and anxiety who was initially hospitalized at [facility] on 3/22/2022 for severe sepsis initially attributed to pneumonia vs viral infection. He was found to have MSSA bacteremia with ongoing positive cultures. He underwent a TEJINDER on 3/27/2022 which revealed tricuspid endocarditis, and he was transferred to [facility] on 3/28/2022 for CV Surgery consultation.     Sepsis with MSSA bacteremia due to tricuspid valve endocarditis.  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 He was initially treated with ceftriaxone and azithromycin for possible community-acquired pneumonia, and ID was consulted. Blood cultures returned positive for MSSA, and serial blood cultures continued to return positive. Broad work-up including CT abd/pelvis, MR lumbar spine, CT dental, and TTE were unrevealing  * Initially treated with IV ceftriaxone and azithromycin; switched to IV vanco on 3/23 when blood cultures returned with Staph; switched to IV cefazolin on 3/24 upon sensitivities  * Underwent TEJINDER on 3/27 which showed thickened tricuspid valve leaflets with atrial aspect of the septal leaflet with an irregular border and small mobile filamentous elements, large fixed heterogeneous mass in the right ventricle attached to the interventricular septum (2.5 x 2 cm) appearing connected to the tricuspid subvalvular annular apparatus with a pedunculated round mobile element (1.8 x 1 cm).  No significant tricuspid regurgitation reported. LVEF 55 to 60%. Negative bubble study.  * Transferred to [facility] on 3/28 for CV Surgery consult  - Patient underwent tricuspid valve replacement with bioprosthetic  valve (31 mm epic stented valve) on 4/1/2022 and endoscopic aortic valve exploration.  - Initially micafungin added on 4/1 as Gram stain from tissue from heart valve from 4/ 1 initially was reported as yeast, which was subsequently corrected as not to be present.  Micafungin now discontinued.  - Continues on IV cefazolin   - Daily blood cultures, last positive was from 3/31  - ID, Cardiology, CV Surgery following  - Patient was initially transferred out of the intensive care unit on 4/4/2022 after he was stable however early this morning patient had an episode of V. fib arrest and was transferred back to the ICU.  Please see discussion below     V. fib arrest(4/5/2022)  -Patient was noted to have V. fib on telemetry earlier this morning  -Required brief CPR and 1 shock of 120 J with successful restoration of regular rhythm  -Electrolytes appear to be within normal limits  -Patient transferred back to the intensive care unit for close monitoring  -EP following, plan for limited echo today    Post-op complete heart block  -Patient initially was in second-degree AV block, and now appears to have complete heart block.  -EP following, given good junctional escape no plans for immediate permanent pacemaker implantation, continue to monitor on telemetry closely    DM2 without complications, long-term insulin use, uncontrolled  * A1c 12.6. Had not been taking his diabetes medications for some time  -Continue insulin drip with prandial coverage, blood sugars much better in the last 24 hours, given the events this morning, will continue with insulin drip.  If he is stable, anticipate switching to basal bolus regimen tomorrow morning.  -Prior to surgery he was requiring Lantus 34 units twice a day and mealtime coverage of 1: 10.    Acute postop blood loss anemia  -Hemoglobin preop was around 12 which had slowly trended down.  Hemoglobin dropped to 7.3 on 4/4, most likely acute blood loss from surgery.    -Hemoglobin is lower  but stable at 7.4 this morning  -Continue to monitor and transfuse as needed.    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 RUQ US (3/22) showed fatty liver, but normal gallbladder and normal bile ducts  - LFTs improved.      Essential hypertension  - Hold prior to admission losartan, likely will resume in the next day or 2.     WARNER  * CPAP per home settings     Hypothyroidism  * Chronic and stable on levothyroxine     Generalized anxiety disorder  ADHD  * Chronic and stable on bupropion      Diet: Moderate Consistent Carb (60 g CHO per Meal) Diet     DVT Prophylaxis: Pneumatic Compression Devices   Schwartz Catheter: Not present  Code Status: Full Code     Disposition Plan    Expected Discharge: 04/08/2022     Anticipated discharge location: home with family    Delays:     Other (Add Comment)          Entered: Joseph Iraheta MD 04/05/2022, 7:41 AM        Clinically Significant Risk Factors Present on Admission                    The patient's care was discussed with the Bedside Nurse and Patient.    Joseph Iraheta MD  Hospitalist Service  [facility]  Text Page 7AM-6PM  Securely message with the Vocera Web Console (learn more here)  Text page via [employer] Paging/Directory    ______________________________________________________________________    Interval History   Patient was doing better and was transferred out of the intensive care unit yesterday however he returns back to the ICU this morning after he had an episode of V. fib arrest that required brief CPR and 1 shock.  He is in complete heart block but has good junctional escape.  Endorses retrosternal pain due to CPR.  Denies lightheadedness or dizziness.      Physical Exam  "  Vital signs:  Temp: 98.8  F (37.1  C) Temp src: Oral BP: 124/76 Pulse: 86   Resp: 17 SpO2: 95 % O2 Device: Nasal cannula Oxygen Delivery: 4 LPM   Weight: 118.4 kg (261 lb 1.6 oz)  Estimated body mass index is 37.46 kg/m  as calculated from the following:    Height as of 3/22/22: 1.778 m (5' 10\").    Weight as of this encounter: 118.4 kg (261 lb 1.6 oz).      Wt Readings from Last 2 Encounters:   04/05/22 118.4 kg (261 lb 1.6 oz)   03/28/22 121.1 kg (267 lb)       Gen: AAOX3, NAD, comfortable  HEENT:  no pallor  Resp: CTA B/L, normal WOB  CVS: RRR, no murmur, chest tube +  Abd/GI: Soft, non-tender. BS- normoactive.   Skin: Warm, dry no rashes  MSK: Trace-1+ pedal edema  Neuro- CN- intact. No focal deficits.      Data   Recent Labs   Lab 04/05/22  0647 04/05/22  0645 04/05/22  0636 04/05/22  0627 04/05/22  0612 04/05/22  0526 04/04/22  0614 04/04/22  0545 04/03/22  0809 04/03/22  0347 04/01/22  1440 04/01/22  1300 04/01/22  1256 04/01/22  1141   WBC  --  16.2*  --   --   --  12.1*  --  16.0*  --  15.0*   < > 38.3*  --  30.4*   HGB  --  8.0* 8.8*  --   --  7.2*  --  7.3*  --  7.7*   < > 9.0*  --  8.5*   MCV  --  94  --   --   --  91  --  93  --  90   < > 94  --  94   PLT  --  883*  --   --   --  573*  --  532*  --  456*   < > 566*  --  442   INR  --  1.17*  --   --   --   --   --   --   --   --   --  1.55*  --  1.64*     --  138  --   --  135  --  132*  --  131*   < > 135  --  134   POTASSIUM 3.7  --  3.7  --   --  3.6  --  4.4  --  4.8   < > 5.1  5.1  --  5.1   CHLORIDE 103  --   --   --   --  103  --  102  --  100   < > 107  --  105   CO2 23  --   --   --   --  27  --  26  --  25   < > 24  --  25   BUN 19  --   --   --   --  20  --  20  --  19   < > 27  --  24   CR 1.01  --   --   --   --  0.90  --  0.89  --  0.85   < > 1.04  --  1.05   ANIONGAP 8  --   --   --   --  5  --  4  --  6   < > 4  --  4   IMAN 8.6  --   --   --   --  8.5  --  8.4*  --  8.3*   < > 8.4*  --  8.9   *  --   --  117* 89 93   < > " 177*   < > 271*   < > 250*   < > 259*   ALBUMIN 1.7*  --   --   --   --   --   --   --   --  1.6*   < > 1.7*  --   --    PROTTOTAL 7.4  --   --   --   --   --   --   --   --  6.7*   < > 6.4*  --   --    BILITOTAL 0.5  --   --   --   --   --   --   --   --  0.3   < > 0.5  --   --    ALKPHOS 109  --   --   --   --   --   --   --   --  101   < > 106  --   --    ALT 73*  --   --   --   --   --   --   --   --  37   < > 81*  --   --    AST 97*  --   --   --   --   --   --   --   --  29   < > 63*  --   --     < > = values in this interval not displayed.       Recent Results (from the past 24 hour(s))   XR Chest Port 1 View    Narrative    XR CHEST PORT 1 VIEW 4/5/2022 7:10 AM    HISTORY: post cardiac arrest    COMPARISON: Chest x-ray 4/2/2022      Impression    IMPRESSION: Sternotomy. Interval removal of the right CVC, mediastinal  drain, and left chest tube. Defibrillator pads in place. Enlargement  of the cardiac silhouette with normal pulmonary vasculature. No  residual right pneumothorax. Blunting of the right costophrenic angle  suggesting a trace right pleural effusion. No significant left  effusion.    SHERYL MONTOYA MD         SYSTEM ID:  [id number]     Medications     dextrose       dextrose       insulin regular Stopped (04/05/22 0617)     BETA BLOCKER NOT PRESCRIBED       sodium chloride 10 mL/hr at 04/04/22 0800       aspirin  81 mg Oral or NG Tube Daily     bisacodyl  10 mg Rectal Once     buPROPion  300 mg Oral QAM     ceFAZolin  2 g Intravenous Q8H     gabapentin  100 mg Oral TID     heparin ANTICOAGULANT  5,000 Units Subcutaneous Q8H     insulin aspart   Subcutaneous TID w/meals     lactated ringers  250 mL Intravenous Once     levothyroxine  125 mcg Oral Once per day on Mon Tue Wed Thu Fri Sat     levothyroxine  250 mcg Oral Weekly     lidocaine  2 patch Transdermal Q24H     lidocaine   Transdermal Q8H     losartan  25 mg Oral Daily     methocarbamol  750 mg Oral Q6H     pantoprazole  40 mg Oral or NG Tube  Daily    Or     pantoprazole  40 mg Oral Daily     polyethylene glycol  17 g Oral BID     potassium chloride  20 mEq Oral Once     senna-docusate  1 tablet Oral BID

## 2022-04-05 NOTE — PLAN OF CARE
Goal Outcome Evaluation:    Plan of Care Reviewed With: patient, spouse, father     Overall Patient Progress: no change    Outcome Evaluation: Return to ICU 4/5 s/p polymorphic VT arrest.  Alert, oriented, VSS, rhythm accelerated junctional. No further VT. K and Mg replaced. Pacer wires remain in, attached but off at bedside. Appetite good, insulin gtt infusing with plans to transition to long acting 4/6. Voids using urinal, bowel movement this afternoon. Incision CDI, some bruising surrounding incision. Increased pain after short CPR this am. Father at bedside this afternoon, updated on POC.

## 2022-04-05 NOTE — PROGRESS NOTES
"[facility]    EP Progress Note    Date of Service (when I saw the patient): 04/05/2022     Assessment & Plan   Anil Montoya is a 36 year old male with h/o DM2, HTN, VIVEK, ADHD, hypothyroidism, obesity and h/o cryptococcal PNA 2012 who presented to [facility] 3/22-28/2022 d/t weakness and SOB. Transferred to [facility] 3/28 d/t discovery of MSSA bacteremia and TV endocarditis. Now s/p TVR and septal debridement. Dr. Higgins consulted 4/4 for post-op Heart Block. Had VF arrest this morning 4/5.    1. VF arrest -   - Noted to have VF on telemetry after nursing had left room for pt to use urinal this AM. Blood glucose 89.  - Had CPR and 120J shock, successfully restoring regular rhythm this AM 0620  - Labs with nl electrolytes  - EKG with nl ST elevation - remains with accelerated junctional in 80s  - Reviewed telemetry just prior to event  - remains accelerated junctional in 80s    - Note + blood cultures drawn 4/3 (post-op) - GPC in clusters.     PLAN:   1. Repeat limited echo    2. Post-operative CHB -   - No previous h/o arrhythmias, bradycardia, syncope  - Pre-op echo with nl LVEF 55-60%  - Pre-op cath 3/31 with nl Dylon    - S/p TVR with 31 mm Epic stented valve with debridement of heterogeneous mass in RV attached to interventricular septum (near septal leaflet) and AV exploration on 4/1/2022 - now POD #4    - Temp pacer wires in place but box turned off .     - EKG and tele this AM remain with complete AVB with accelerated junctional rhythm. Remains hemodynamically stable.      PLAN:   1. Continue telemetry   2. No AVN blocking agents   3. Plan for event monitor (not ZioPatch as need \"real time\" monitoring) on discharge. Given good junctional escape, expect not to require PPM implantation    3.Tricuspid Valve Endocarditis -   - Thought d/t skin infection in R groin  - ID following. On cefazolin  - S/p TVR with 31 mm Epic stented valve with debridement of heterogeneous mass in " RV attached to interventricular septum (near septal leaflet on 4/1/2022     PLAN:   1. Per ID, CV Surgery    Marielena Umanzor PA-C [facility]    CARTER Time Statement:     I spent 35 minutes face-to-face or coordinating care of Anil Montoya. Over 50% of our time on the unit was spent counseling the patient and/or coordinating care regarding recent cardiac arrest, positive blood cultures    Interval History   Feeling OK now but more sore after CPR  No dizziness, lightheadedness.     Physical Exam   Temp: 98.8  F (37.1  C) Temp src: Oral BP: 124/76 Pulse: 86   Resp: 17 SpO2: 95 % O2 Device: Nasal cannula Oxygen Delivery: 4 LPM  Vitals:    04/04/22 0600 04/04/22 1739 04/05/22 0553   Weight: 117.1 kg (258 lb 2.5 oz) 119.1 kg (262 lb 8 oz) 118.4 kg (261 lb 1.6 oz)     Vital Signs with Ranges  Temp:  [97  F (36.1  C)-99.1  F (37.3  C)] 98.8  F (37.1  C)  Pulse:  [83-92] 86  Resp:  [12-27] 17  BP: (117-139)/(63-84) 124/76  SpO2:  [94 %-98 %] 95 %  I/O last 3 completed shifts:  In: 672.23 [P.O.:500; I.V.:172.23]  Out: 1940 [Urine:1850; Chest Tube:90]    Telemetry: Accelerated junctional 80    Constitutional: Awake, alert  Respiratory: Anterior only  CTA   Cardiovascular: Regular  Musculoskeletal: Trace LE edema    Medications     dextrose       dextrose       insulin regular Stopped (04/05/22 0617)     BETA BLOCKER NOT PRESCRIBED       sodium chloride 10 mL/hr at 04/04/22 0800       aspirin  81 mg Oral or NG Tube Daily     bisacodyl  10 mg Rectal Once     buPROPion  300 mg Oral QAM     ceFAZolin  2 g Intravenous Q8H     gabapentin  100 mg Oral TID     heparin ANTICOAGULANT  5,000 Units Subcutaneous Q8H     insulin aspart   Subcutaneous TID w/meals     lactated ringers  250 mL Intravenous Once     levothyroxine  125 mcg Oral Once per day on Mon Tue Wed Thu Fri Sat     levothyroxine  250 mcg Oral Weekly     lidocaine  2 patch Transdermal Q24H     lidocaine   Transdermal Q8H     losartan  25 mg Oral Daily      methocarbamol  750 mg Oral Q6H     pantoprazole  40 mg Oral or NG Tube Daily    Or     pantoprazole  40 mg Oral Daily     polyethylene glycol  17 g Oral BID     potassium chloride  20 mEq Oral Once     senna-docusate  1 tablet Oral BID       Data   I personally reviewed the EKG tracing showing CHB with accelerated junctional 80 bpm.  Results for orders placed or performed during the hospital encounter of 03/28/22 (from the past 24 hour(s))   Glucose by meter   Result Value Ref Range    GLUCOSE BY METER POCT 153 (H) 70 - 99 mg/dL   Glucose by meter   Result Value Ref Range    GLUCOSE BY METER POCT 174 (H) 70 - 99 mg/dL   Glucose by meter   Result Value Ref Range    GLUCOSE BY METER POCT 158 (H) 70 - 99 mg/dL   Glucose by meter   Result Value Ref Range    GLUCOSE BY METER POCT 147 (H) 70 - 99 mg/dL   Glucose by meter   Result Value Ref Range    GLUCOSE BY METER POCT 166 (H) 70 - 99 mg/dL   Glucose by meter   Result Value Ref Range    GLUCOSE BY METER POCT 197 (H) 70 - 99 mg/dL   Glucose by meter   Result Value Ref Range    GLUCOSE BY METER POCT 229 (H) 70 - 99 mg/dL   Glucose by meter   Result Value Ref Range    GLUCOSE BY METER POCT 147 (H) 70 - 99 mg/dL   Glucose by meter   Result Value Ref Range    GLUCOSE BY METER POCT 115 (H) 70 - 99 mg/dL   Glucose by meter   Result Value Ref Range    GLUCOSE BY METER POCT 136 (H) 70 - 99 mg/dL   Glucose by meter   Result Value Ref Range    GLUCOSE BY METER POCT 145 (H) 70 - 99 mg/dL   Glucose by meter   Result Value Ref Range    GLUCOSE BY METER POCT 139 (H) 70 - 99 mg/dL   Glucose by meter   Result Value Ref Range    GLUCOSE BY METER POCT 105 (H) 70 - 99 mg/dL   Glucose by meter   Result Value Ref Range    GLUCOSE BY METER POCT 110 (H) 70 - 99 mg/dL   Glucose by meter   Result Value Ref Range    GLUCOSE BY METER POCT 75 70 - 99 mg/dL   Glucose by meter   Result Value Ref Range    GLUCOSE BY METER POCT 107 (H) 70 - 99 mg/dL   Glucose by meter   Result Value Ref Range     GLUCOSE BY METER POCT 117 (H) 70 - 99 mg/dL   Glucose by meter   Result Value Ref Range    GLUCOSE BY METER POCT 109 (H) 70 - 99 mg/dL   Phosphorus   Result Value Ref Range    Phosphorus 4.7 (H) 2.5 - 4.5 mg/dL   CBC with platelets   Result Value Ref Range    WBC Count 12.1 (H) 4.0 - 11.0 10e3/uL    RBC Count 2.50 (L) 4.40 - 5.90 10e6/uL    Hemoglobin 7.2 (L) 13.3 - 17.7 g/dL    Hematocrit 22.8 (L) 40.0 - 53.0 %    MCV 91 78 - 100 fL    MCH 28.8 26.5 - 33.0 pg    MCHC 31.6 31.5 - 36.5 g/dL    RDW 14.0 10.0 - 15.0 %    Platelet Count 573 (H) 150 - 450 10e3/uL   Magnesium   Result Value Ref Range    Magnesium 2.0 1.6 - 2.3 mg/dL   Basic metabolic panel   Result Value Ref Range    Sodium 135 133 - 144 mmol/L    Potassium 3.6 3.4 - 5.3 mmol/L    Chloride 103 94 - 109 mmol/L    Carbon Dioxide (CO2) 27 20 - 32 mmol/L    Anion Gap 5 3 - 14 mmol/L    Urea Nitrogen 20 7 - 30 mg/dL    Creatinine 0.90 0.66 - 1.25 mg/dL    Calcium 8.5 8.5 - 10.1 mg/dL    Glucose 93 70 - 99 mg/dL    GFR Estimate >90 >60 mL/min/1.73m2   Glucose by meter   Result Value Ref Range    GLUCOSE BY METER POCT 89 70 - 99 mg/dL   Glucose by meter   Result Value Ref Range    GLUCOSE BY METER POCT 117 (H) 70 - 99 mg/dL   Extra Tube ([place] Draw)    Narrative    The following orders were created for panel order Extra Tube ([place] Draw).  Procedure                               Abnormality         Status                     ---------                               -----------         ------                     Extra Serum Separator Tu...[[id number]]                      In process                 Extra Blood Bank Purple ...[[id number]]                      In process                   Please view results for these tests on the individual orders.   iStat Gases Electrolytes Venous, POCT   Result Value Ref Range    CPB Applied No     Hematocrit POCT 26 (L) 40 - 53 %    Bicarbonate Venous POCT 25 21 - 28 mmol/L    Hemoglobin POCT 8.8 (L) 13.3 - 17.7 g/dL     Potassium POCT 3.7 3.4 - 5.3 mmol/L    Sodium POCT 138 133 - 144 mmol/L    pCO2 Venous POCT 37 (L) 40 - 50 mm Hg    pH Venous POCT 7.44 (H) 7.32 - 7.43    pO2 Venous POCT 65 (H) 25 - 47 mm Hg    O2 Sat, Venous POCT 93 (L) 94 - 100 %   EKG 12-lead, tracing only   Result Value Ref Range    Systolic Blood Pressure  mmHg    Diastolic Blood Pressure  mmHg    Ventricular Rate 84 BPM    Atrial Rate 105 BPM    MN Interval  ms    QRS Duration 102 ms     ms    QTc 508 ms    P Axis  degrees    R AXIS 94 degrees    T Axis 101 degrees    Interpretation ECG       Accelerated Junctional rhythm  Rightward axis  ST & T wave abnormality, consider anterior ischemia  Prolonged QT  Abnormal ECG  When compared with ECG of 04-APR-2022 07:25, (unconfirmed)  Sinus rhythm is no longer with complete heart block     CBC with Platelets & Differential    Narrative    The following orders were created for panel order CBC with Platelets & Differential.  Procedure                               Abnormality         Status                     ---------                               -----------         ------                     CBC with platelets and d...[[id number]]  Abnormal            Preliminary result           Please view results for these tests on the individual orders.   Lactic acid whole blood   Result Value Ref Range    Lactic Acid 2.8 (H) 0.7 - 2.0 mmol/L   Partial thromboplastin time   Result Value Ref Range    aPTT 29 22 - 38 Seconds   INR   Result Value Ref Range    INR 1.17 (H) 0.85 - 1.15   CBC with platelets and differential   Result Value Ref Range    WBC Count 16.2 (H) 4.0 - 11.0 10e3/uL    RBC Count 2.82 (L) 4.40 - 5.90 10e6/uL    Hemoglobin 8.0 (L) 13.3 - 17.7 g/dL    Hematocrit 26.4 (L) 40.0 - 53.0 %    MCV 94 78 - 100 fL    MCH 28.4 26.5 - 33.0 pg    MCHC 30.3 (L) 31.5 - 36.5 g/dL    RDW 14.2 10.0 - 15.0 %    Platelet Count     Comprehensive metabolic panel   Result Value Ref Range    Sodium 134 133 - 144 mmol/L     Potassium 3.7 3.4 - 5.3 mmol/L    Chloride 103 94 - 109 mmol/L    Carbon Dioxide (CO2) 23 20 - 32 mmol/L    Anion Gap 8 3 - 14 mmol/L    Urea Nitrogen 19 7 - 30 mg/dL    Creatinine 1.01 0.66 - 1.25 mg/dL    Calcium 8.6 8.5 - 10.1 mg/dL    Glucose 147 (H) 70 - 99 mg/dL    Alkaline Phosphatase 109 40 - 150 U/L    AST 97 (H) 0 - 45 U/L    ALT 73 (H) 0 - 70 U/L    Protein Total 7.4 6.8 - 8.8 g/dL    Albumin 1.7 (L) 3.4 - 5.0 g/dL    Bilirubin Total 0.5 0.2 - 1.3 mg/dL    GFR Estimate >90 >60 mL/min/1.73m2   XR Chest Port 1 View    Narrative    XR CHEST PORT 1 VIEW 4/5/2022 7:10 AM    HISTORY: post cardiac arrest    COMPARISON: Chest x-ray 4/2/2022      Impression    IMPRESSION: Sternotomy. Interval removal of the right CVC, mediastinal  drain, and left chest tube. Defibrillator pads in place. Enlargement  of the cardiac silhouette with normal pulmonary vasculature. No  residual right pneumothorax. Blunting of the right costophrenic angle  suggesting a trace right pleural effusion. No significant left  effusion.    SHERYL MONTOYA MD         SYSTEM ID:  [id number]

## 2022-04-05 NOTE — CODE/RAPID RESPONSE
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  He is status post tricuspid valve replacement postop day 4.  Patient struggled with second-degree AV block postoperatively.  Nursing staff report the patient was alert and readily interactive previously CODE BLUE.  He had asked to use the urinal at which time nursing staff left the room.  The patient had been on an insulin drip as well which was placed on hold for blood glucose of 89 just prior to the CODE BLUE it was noted that he was in V. fib on the monitor shortly after.  On arrival to the room nursing staff found the patient with agonal respirations and V. fib on monitor.  On my arrival bedside nursing staff were attempting CPR however they report his chest is so tight that it is difficult to get any movement of the chest at all.  Rhythm check shows V. fib.  Patient shocked with 120 J.  CPR was briefly resumed however with continued difficulties with any chest movement we attempted to reposition the patient at which point a cardiac rhythm was noted on monitor.  Patient had a regular organized rhythm and pulse.  Blood pressure is stable.  Patient continued with somewhat agonal respirations and appeared to obstruct his airway.  Assisted with bag mask ventilation and plan was to intubate patient for airway protection however following a few minutes of bag mask ventilation the patient was more responsive.  He was asking what happened and appears cognitively intact and aware at this point.  He was placed on oxygen mask.  He remained profoundly diaphoretic post arrest.  Blood sugar rechecked noted to be 117.  He will be transferred back to ICU.  CV surgery and intensivist updated regarding CODE BLUE.  EKG and ST elevation noted in V3 and aVF  "possibly however the baseline tracing is wandering secondary to the patient's severe diaphoresis.  Patient wires will be hooked to the pacer box but remain off at this point.  Post CODE BLUE labs pending.  Chest x-ray ordered at the request of Dr. Melton.    INTERVENTIONS:  -See code blue documentation (1 shock at 120J, CPR following briefly, very difficult with minimal chest movement)  -Post code blue labs  -EKG stat  -Transfer to ICU  -Intensivist and Dr. Melton updated  -CXR STAT    At the end of the code blue patient is hemodynamically stable, alert, and transferred to ICU.    Discussed with and defer further cares to Dr. Melton (CV surg) and Dr. Crowley.    Interval History     Anil Montoya is a 36 year old male who was admitted on 3/28/2022 for tricuspid valve endocarditis.    Medical history significant for:   Past Medical History:   Diagnosis Date     Pneumonia      Past Surgical History:   Procedure Laterality Date     CV CORONARY ANGIOGRAM N/A 3/31/2022    Procedure: Coronary Angiogram;  Surgeon: Lidia Quintanilla MD;  Location: [facility] CARDIAC CATH LAB       Code Status: Full Code    Allergies   Allergies   Allergen Reactions     Clindamycin      Vancomycin      \"Red Sonia Syndrome\"       Physical Exam   Vital Signs with Ranges:  Temp:  [97  F (36.1  C)-99.1  F (37.3  C)] 98.8  F (37.1  C)  Pulse:  [83-92] 86  Resp:  [12-27] 17  BP: (117-139)/(63-84) 124/76  SpO2:  [94 %-98 %] 95 %  I/O last 3 completed shifts:  In: 672.23 [P.O.:500; I.V.:172.23]  Out: 1940 [Urine:1850; Chest Tube:90]    Constitutional: alert, cooperative, profoundly diaphoretic  ENT: mucus membranes moist, EOM intact   Neck: supple  Pulmonary: no increased work of breathing  Cardiovascular: regular rate and rhythm  GI: round, semi-firm  Skin/Integumen: pale, diaphoretic, matilda face  Neuro: alert, no focal deficits  Psych:  anixous  Extremities: no peripheral edema    Data     EKG:  Interpreted by Yaneth Sanchez, " APRN CNP  Time reviewed: 0640  Symptoms at time of EKG: post cardiac arrest   Rhythm: 2 degree AV block - type II  Rate: Normal  Axis: Normal  Ectopy: none  Conduction: AV block type II  ST Segments/ T Waves: ST Segement Elevation III and aVF  Q Waves: none  Comparison to prior: possible new or worsening ST elevation lead III and aVF (Dr. Melton aware and will see patient on arrival    Clinical Impression: possible new or worsening ST elevation lead III and aVF (Dr. Melton aware and will see patient on arrival    ABG:  -  Recent Labs   Lab 04/01/22  1258   PH 7.28*   PCO2 52*   PO2 81   HCO3 24   O2PER 60       Troponin:    Recent Labs   Lab Test 11/25/17  0250 11/24/17  2350 01/20/14  2316   TROPI <0.015   < >  --    TROPONIN  --   --  0.00    < > = values in this interval not displayed.       IMAGING: (X-ray/CT/MRI)   No results found for this or any previous visit (from the past 24 hour(s)).    CBC with Diff:  Recent Labs   Lab Test 04/05/22  0636 04/05/22  0526 04/02/22  0422 04/01/22  1300   WBC  --  12.1*   < > 38.3*   HGB 8.8* 7.2*   < > 9.0*   MCV  --  91   < > 94   PLT  --  573*   < > 566*   INR  --   --   --  1.55*    < > = values in this interval not displayed.        Lactic Acid:    Lab Results   Component Value Date    LACT 2.8 04/05/2022           Comprehensive Metabolic Panel:  Recent Labs   Lab 04/05/22  0636 04/05/22  0627 04/05/22  0612 04/05/22  0526 04/03/22  0809 04/03/22  0347     --   --  135   < > 131*   POTASSIUM 3.7  --   --  3.6   < > 4.8   CHLORIDE  --   --   --  103   < > 100   CO2  --   --   --  27   < > 25   ANIONGAP  --   --   --  5   < > 6   GLC  --  117*   < > 93   < > 271*   BUN  --   --   --  20   < > 19   CR  --   --   --  0.90   < > 0.85   GFRESTIMATED  --   --   --  >90   < > >90   MIAN  --   --   --  8.5   < > 8.3*   MAG  --   --   --  2.0   < > 1.8   PHOS  --   --   --  4.7*   < > 3.1   PROTTOTAL  --   --   --   --   --  6.7*   ALBUMIN  --   --   --   --   --  1.6*    BILITOTAL  --   --   --   --   --  0.3   ALKPHOS  --   --   --   --   --  101   AST  --   --   --   --   --  29   ALT  --   --   --   --   --  37    < > = values in this interval not displayed.       INR:    Recent Labs   Lab Test 04/01/22  1300   INR 1.55*       D-DIMER:  No results found for: DIMER    BNP:  No results found for: BNP    UA:  Recent Labs   Lab 03/29/22  1551   COLOR Light Yellow   APPEARANCE Clear   URINEGLC 100 *   URINEBILI Negative   URINEKETONE Negative   SG 1.017   UBLD Negative   URINEPH 6.0   PROTEIN Negative   NITRITE Negative   LEUKEST Negative   RBCU 1   WBCU 2         Time Spent on this Encounter   I spent 30 minutes of critical care time on the unit/floor managing the care of Anil Montoya. Upon evaluation, this patient had a high probability of imminent or life-threatening deterioration due to ventricular fibrillation cardiac arrest, which required my direct attention, intervention, and personal management. 100% of my time was spent at the bedside counseling the patient and/or coordinating care regarding services listed in this note.    MACARIO Hooper CNP

## 2022-04-06 ENCOUNTER — APPOINTMENT (OUTPATIENT)
Dept: OCCUPATIONAL THERAPY | Facility: CLINIC | Age: 37
DRG: 853 | End: 2022-04-06
Attending: HOSPITALIST
Payer: COMMERCIAL

## 2022-04-06 LAB
ALBUMIN SERPL-MCNC: 1.6 G/DL (ref 3.4–5)
ALP SERPL-CCNC: 105 U/L (ref 40–150)
ALT SERPL W P-5'-P-CCNC: 67 U/L (ref 0–70)
ANION GAP SERPL CALCULATED.3IONS-SCNC: 5 MMOL/L (ref 3–14)
AST SERPL W P-5'-P-CCNC: 72 U/L (ref 0–45)
ATRIAL RATE - MUSE: 88 BPM
BACTERIA BLD CULT: NO GROWTH
BACTERIA TISS BX CULT: NO GROWTH
BASE EXCESS BLDA CALC-SCNC: -0.1 MMOL/L (ref -9.6–2)
BASE EXCESS BLDA CALC-SCNC: -0.5 MMOL/L (ref -9.6–2)
BASE EXCESS BLDA CALC-SCNC: -2.2 MMOL/L (ref -9.6–2)
BASE EXCESS BLDA CALC-SCNC: 0.2 MMOL/L (ref -9.6–2)
BASE EXCESS BLDA CALC-SCNC: 1.8 MMOL/L (ref -9.6–2)
BASE EXCESS BLDV CALC-SCNC: 2.8 MMOL/L (ref -8.1–1.9)
BILIRUB SERPL-MCNC: 0.2 MG/DL (ref 0.2–1.3)
BUN SERPL-MCNC: 18 MG/DL (ref 7–30)
CA-I BLD-MCNC: 4.2 MG/DL (ref 4.4–5.2)
CA-I BLD-MCNC: 4.5 MG/DL (ref 4.4–5.2)
CA-I BLD-MCNC: 4.9 MG/DL (ref 4.4–5.2)
CA-I BLD-MCNC: 5.1 MG/DL (ref 4.4–5.2)
CALCIUM SERPL-MCNC: 8.3 MG/DL (ref 8.5–10.1)
CHLORIDE BLD-SCNC: 104 MMOL/L (ref 94–109)
CO2 SERPL-SCNC: 27 MMOL/L (ref 20–32)
CREAT SERPL-MCNC: 0.94 MG/DL (ref 0.66–1.25)
DIASTOLIC BLOOD PRESSURE - MUSE: NORMAL MMHG
ERYTHROCYTE [DISTWIDTH] IN BLOOD BY AUTOMATED COUNT: 14.7 % (ref 10–15)
GENTAMICIN SERPL-MCNC: 1.6 MG/L
GENTAMICIN SERPL-MCNC: 1.9 MG/L
GENTAMICIN SERPL-MCNC: 2.2 MG/L
GENTAMICIN SERPL-MCNC: 3.3 MG/L
GFR SERPL CREATININE-BSD FRML MDRD: >90 ML/MIN/1.73M2
GLUCOSE BLD-MCNC: 105 MG/DL (ref 70–99)
GLUCOSE BLD-MCNC: 144 MG/DL (ref 70–99)
GLUCOSE BLD-MCNC: 157 MG/DL (ref 70–99)
GLUCOSE BLD-MCNC: 170 MG/DL (ref 70–99)
GLUCOSE BLD-MCNC: 184 MG/DL (ref 70–99)
GLUCOSE BLD-MCNC: 192 MG/DL (ref 70–99)
GLUCOSE BLD-MCNC: 259 MG/DL (ref 70–99)
GLUCOSE BLDC GLUCOMTR-MCNC: 101 MG/DL (ref 70–99)
GLUCOSE BLDC GLUCOMTR-MCNC: 102 MG/DL (ref 70–99)
GLUCOSE BLDC GLUCOMTR-MCNC: 106 MG/DL (ref 70–99)
GLUCOSE BLDC GLUCOMTR-MCNC: 108 MG/DL (ref 70–99)
GLUCOSE BLDC GLUCOMTR-MCNC: 109 MG/DL (ref 70–99)
GLUCOSE BLDC GLUCOMTR-MCNC: 110 MG/DL (ref 70–99)
GLUCOSE BLDC GLUCOMTR-MCNC: 126 MG/DL (ref 70–99)
GLUCOSE BLDC GLUCOMTR-MCNC: 135 MG/DL (ref 70–99)
GLUCOSE BLDC GLUCOMTR-MCNC: 145 MG/DL (ref 70–99)
GLUCOSE BLDC GLUCOMTR-MCNC: 150 MG/DL (ref 70–99)
GLUCOSE BLDC GLUCOMTR-MCNC: 151 MG/DL (ref 70–99)
GLUCOSE BLDC GLUCOMTR-MCNC: 153 MG/DL (ref 70–99)
GLUCOSE BLDC GLUCOMTR-MCNC: 168 MG/DL (ref 70–99)
GLUCOSE BLDC GLUCOMTR-MCNC: 196 MG/DL (ref 70–99)
GLUCOSE BLDC GLUCOMTR-MCNC: 212 MG/DL (ref 70–99)
GLUCOSE BLDC GLUCOMTR-MCNC: 216 MG/DL (ref 70–99)
GLUCOSE BLDC GLUCOMTR-MCNC: 254 MG/DL (ref 70–99)
GLUCOSE BLDC GLUCOMTR-MCNC: 94 MG/DL (ref 70–99)
HCO3 BLDA-SCNC: 24 MMOL/L (ref 21–28)
HCO3 BLDA-SCNC: 25 MMOL/L (ref 21–28)
HCO3 BLDA-SCNC: 26 MMOL/L (ref 21–28)
HCO3 BLDA-SCNC: 27 MMOL/L (ref 21–28)
HCO3 BLDA-SCNC: 28 MMOL/L (ref 21–28)
HCO3 BLDV-SCNC: 29 MMOL/L (ref 21–28)
HCT VFR BLD AUTO: 24.2 % (ref 40–53)
HGB BLD-MCNC: 7.3 G/DL (ref 13.3–17.7)
HGB BLD-MCNC: 7.7 G/DL (ref 13.3–17.7)
HGB BLD-MCNC: 8.1 G/DL (ref 13.3–17.7)
HGB BLD-MCNC: 8.2 G/DL (ref 13.3–17.7)
HGB BLD-MCNC: 8.5 G/DL (ref 13.3–17.7)
HGB BLD-MCNC: 9 G/DL (ref 13.3–17.7)
HGB BLD-MCNC: 9.8 G/DL (ref 13.3–17.7)
INTERPRETATION ECG - MUSE: NORMAL
LACTATE BLD-SCNC: 0.9 MMOL/L
LACTATE BLD-SCNC: 0.9 MMOL/L
LACTATE BLD-SCNC: 1 MMOL/L
LACTATE BLD-SCNC: 1.1 MMOL/L
LACTATE BLD-SCNC: 1.2 MMOL/L
LACTATE BLD-SCNC: 1.8 MMOL/L
MAGNESIUM SERPL-MCNC: 2.3 MG/DL (ref 1.6–2.3)
MCH RBC QN AUTO: 29 PG (ref 26.5–33)
MCHC RBC AUTO-ENTMCNC: 30.2 G/DL (ref 31.5–36.5)
MCV RBC AUTO: 96 FL (ref 78–100)
O2/TOTAL GAS SETTING VFR VENT: 100 %
O2/TOTAL GAS SETTING VFR VENT: 100 %
O2/TOTAL GAS SETTING VFR VENT: 80 %
P AXIS - MUSE: NORMAL DEGREES
PCO2 BLDA: 47 MM HG (ref 35–45)
PCO2 BLDA: 48 MM HG (ref 35–45)
PCO2 BLDA: 49 MM HG (ref 35–45)
PCO2 BLDA: 50 MM HG (ref 35–45)
PCO2 BLDA: 55 MM HG (ref 35–45)
PCO2 BLDV: 56 MM HG (ref 40–50)
PH BLDA: 7.3 [PH] (ref 7.35–7.45)
PH BLDA: 7.3 [PH] (ref 7.35–7.45)
PH BLDA: 7.34 [PH] (ref 7.35–7.45)
PH BLDA: 7.34 [PH] (ref 7.35–7.45)
PH BLDA: 7.36 [PH] (ref 7.35–7.45)
PH BLDV: 7.33 [PH] (ref 7.32–7.43)
PHOSPHATE SERPL-MCNC: 4.8 MG/DL (ref 2.5–4.5)
PLATELET # BLD AUTO: 605 10E3/UL (ref 150–450)
PO2 BLDA: 253 MM HG (ref 80–105)
PO2 BLDA: 268 MM HG (ref 80–105)
PO2 BLDA: 310 MM HG (ref 80–105)
PO2 BLDA: 313 MM HG (ref 80–105)
PO2 BLDA: 503 MM HG (ref 80–105)
PO2 BLDV: 44 MM HG (ref 25–47)
POTASSIUM BLD-SCNC: 4 MMOL/L (ref 3.4–5.3)
POTASSIUM BLD-SCNC: 4.3 MMOL/L (ref 3.5–5)
POTASSIUM BLD-SCNC: 4.6 MMOL/L (ref 3.5–5)
POTASSIUM BLD-SCNC: 5 MMOL/L (ref 3.5–5)
POTASSIUM BLD-SCNC: 5.1 MMOL/L (ref 3.5–5)
POTASSIUM BLD-SCNC: 5.1 MMOL/L (ref 3.5–5)
POTASSIUM BLD-SCNC: 5.3 MMOL/L (ref 3.5–5)
PR INTERVAL - MUSE: NORMAL MS
PROT SERPL-MCNC: 6.8 G/DL (ref 6.8–8.8)
QRS DURATION - MUSE: 200 MS
QT - MUSE: 536 MS
QTC - MUSE: 648 MS
R AXIS - MUSE: -29 DEGREES
RBC # BLD AUTO: 2.52 10E6/UL (ref 4.4–5.9)
SODIUM BLD-SCNC: 135 MMOL/L (ref 133–144)
SODIUM BLD-SCNC: 137 MMOL/L (ref 133–144)
SODIUM BLD-SCNC: 138 MMOL/L (ref 133–144)
SODIUM BLD-SCNC: 138 MMOL/L (ref 133–144)
SODIUM SERPL-SCNC: 136 MMOL/L (ref 133–144)
SYSTOLIC BLOOD PRESSURE - MUSE: NORMAL MMHG
T AXIS - MUSE: 82 DEGREES
VENTRICULAR RATE- MUSE: 88 BPM
WBC # BLD AUTO: 10 10E3/UL (ref 4–11)

## 2022-04-06 PROCEDURE — 250N000011 HC RX IP 250 OP 636: Performed by: INTERNAL MEDICINE

## 2022-04-06 PROCEDURE — 80053 COMPREHEN METABOLIC PANEL: CPT | Performed by: NURSE PRACTITIONER

## 2022-04-06 PROCEDURE — 200N000001 HC R&B ICU

## 2022-04-06 PROCEDURE — 250N000012 HC RX MED GY IP 250 OP 636 PS 637: Performed by: NURSE PRACTITIONER

## 2022-04-06 PROCEDURE — 85027 COMPLETE CBC AUTOMATED: CPT | Performed by: NURSE PRACTITIONER

## 2022-04-06 PROCEDURE — 80170 ASSAY OF GENTAMICIN: CPT | Performed by: HOSPITALIST

## 2022-04-06 PROCEDURE — 99232 SBSQ HOSP IP/OBS MODERATE 35: CPT | Mod: 24 | Performed by: INTERNAL MEDICINE

## 2022-04-06 PROCEDURE — 83735 ASSAY OF MAGNESIUM: CPT | Performed by: NURSE PRACTITIONER

## 2022-04-06 PROCEDURE — 93010 ELECTROCARDIOGRAM REPORT: CPT | Performed by: INTERNAL MEDICINE

## 2022-04-06 PROCEDURE — 250N000011 HC RX IP 250 OP 636: Performed by: NURSE PRACTITIONER

## 2022-04-06 PROCEDURE — 36415 COLL VENOUS BLD VENIPUNCTURE: CPT | Performed by: HOSPITALIST

## 2022-04-06 PROCEDURE — 84100 ASSAY OF PHOSPHORUS: CPT | Performed by: NURSE PRACTITIONER

## 2022-04-06 PROCEDURE — 36415 COLL VENOUS BLD VENIPUNCTURE: CPT | Performed by: INTERNAL MEDICINE

## 2022-04-06 PROCEDURE — 93005 ELECTROCARDIOGRAM TRACING: CPT

## 2022-04-06 PROCEDURE — 258N000003 HC RX IP 258 OP 636: Performed by: NURSE PRACTITIONER

## 2022-04-06 PROCEDURE — 999N000127 HC STATISTIC PERIPHERAL IV START W US GUIDANCE

## 2022-04-06 PROCEDURE — 97530 THERAPEUTIC ACTIVITIES: CPT | Mod: GO

## 2022-04-06 PROCEDURE — 36415 COLL VENOUS BLD VENIPUNCTURE: CPT | Performed by: NURSE PRACTITIONER

## 2022-04-06 PROCEDURE — 250N000013 HC RX MED GY IP 250 OP 250 PS 637: Performed by: NURSE PRACTITIONER

## 2022-04-06 PROCEDURE — 99232 SBSQ HOSP IP/OBS MODERATE 35: CPT | Mod: 24 | Performed by: ANESTHESIOLOGY

## 2022-04-06 PROCEDURE — 97110 THERAPEUTIC EXERCISES: CPT | Mod: GO

## 2022-04-06 PROCEDURE — 258N000003 HC RX IP 258 OP 636: Performed by: INTERNAL MEDICINE

## 2022-04-06 PROCEDURE — 87040 BLOOD CULTURE FOR BACTERIA: CPT | Performed by: NURSE PRACTITIONER

## 2022-04-06 PROCEDURE — 80170 ASSAY OF GENTAMICIN: CPT | Performed by: INTERNAL MEDICINE

## 2022-04-06 PROCEDURE — 99232 SBSQ HOSP IP/OBS MODERATE 35: CPT | Performed by: STUDENT IN AN ORGANIZED HEALTH CARE EDUCATION/TRAINING PROGRAM

## 2022-04-06 RX ORDER — GENTAMICIN SULFATE 100 MG/100ML
100 INJECTION, SOLUTION INTRAVENOUS EVERY 12 HOURS
Status: DISCONTINUED | OUTPATIENT
Start: 2022-04-07 | End: 2022-04-10

## 2022-04-06 RX ADMIN — CEFAZOLIN SODIUM 2 G: 2 INJECTION, SOLUTION INTRAVENOUS at 10:07

## 2022-04-06 RX ADMIN — OXYCODONE HYDROCHLORIDE 10 MG: 5 TABLET ORAL at 06:14

## 2022-04-06 RX ADMIN — GENTAMICIN SULFATE 90 MG: 40 INJECTION, SOLUTION INTRAMUSCULAR; INTRAVENOUS at 04:27

## 2022-04-06 RX ADMIN — ASPIRIN 81 MG CHEWABLE TABLET 81 MG: 81 TABLET CHEWABLE at 08:11

## 2022-04-06 RX ADMIN — METHOCARBAMOL 750 MG: 750 TABLET ORAL at 04:31

## 2022-04-06 RX ADMIN — GABAPENTIN 100 MG: 100 CAPSULE ORAL at 08:12

## 2022-04-06 RX ADMIN — LEVOTHYROXINE SODIUM 125 MCG: 125 TABLET ORAL at 09:23

## 2022-04-06 RX ADMIN — HEPARIN SODIUM 5000 UNITS: 5000 INJECTION, SOLUTION INTRAVENOUS; SUBCUTANEOUS at 21:35

## 2022-04-06 RX ADMIN — ACETAMINOPHEN 650 MG: 325 TABLET, FILM COATED ORAL at 02:12

## 2022-04-06 RX ADMIN — LOSARTAN POTASSIUM 25 MG: 25 TABLET, FILM COATED ORAL at 08:12

## 2022-04-06 RX ADMIN — SODIUM CHLORIDE 5 UNITS/HR: 9 INJECTION, SOLUTION INTRAVENOUS at 18:10

## 2022-04-06 RX ADMIN — HYDROMORPHONE HYDROCHLORIDE 0.4 MG: 0.2 INJECTION, SOLUTION INTRAMUSCULAR; INTRAVENOUS; SUBCUTANEOUS at 02:39

## 2022-04-06 RX ADMIN — OXYCODONE HYDROCHLORIDE 10 MG: 5 TABLET ORAL at 00:28

## 2022-04-06 RX ADMIN — BUPROPION HYDROCHLORIDE 300 MG: 300 TABLET, EXTENDED RELEASE ORAL at 08:11

## 2022-04-06 RX ADMIN — LIDOCAINE 2 PATCH: 560 PATCH PERCUTANEOUS; TOPICAL; TRANSDERMAL at 08:11

## 2022-04-06 RX ADMIN — METHOCARBAMOL 750 MG: 750 TABLET ORAL at 10:08

## 2022-04-06 RX ADMIN — SODIUM CHLORIDE 15 UNITS/HR: 9 INJECTION, SOLUTION INTRAVENOUS at 11:07

## 2022-04-06 RX ADMIN — GABAPENTIN 100 MG: 100 CAPSULE ORAL at 19:35

## 2022-04-06 RX ADMIN — GENTAMICIN SULFATE 90 MG: 40 INJECTION, SOLUTION INTRAMUSCULAR; INTRAVENOUS at 19:44

## 2022-04-06 RX ADMIN — SENNOSIDES AND DOCUSATE SODIUM 1 TABLET: 50; 8.6 TABLET ORAL at 08:11

## 2022-04-06 RX ADMIN — HEPARIN SODIUM 5000 UNITS: 5000 INJECTION, SOLUTION INTRAVENOUS; SUBCUTANEOUS at 06:14

## 2022-04-06 RX ADMIN — SODIUM CHLORIDE 4 UNITS/HR: 9 INJECTION, SOLUTION INTRAVENOUS at 00:27

## 2022-04-06 RX ADMIN — GABAPENTIN 100 MG: 100 CAPSULE ORAL at 14:01

## 2022-04-06 RX ADMIN — METHOCARBAMOL 750 MG: 750 TABLET ORAL at 16:01

## 2022-04-06 RX ADMIN — OXYCODONE HYDROCHLORIDE 10 MG: 5 TABLET ORAL at 19:35

## 2022-04-06 RX ADMIN — METHOCARBAMOL 750 MG: 750 TABLET ORAL at 21:35

## 2022-04-06 RX ADMIN — GENTAMICIN SULFATE 90 MG: 40 INJECTION, SOLUTION INTRAMUSCULAR; INTRAVENOUS at 11:47

## 2022-04-06 RX ADMIN — CEFAZOLIN SODIUM 2 G: 2 INJECTION, SOLUTION INTRAVENOUS at 02:33

## 2022-04-06 RX ADMIN — HYDROMORPHONE HYDROCHLORIDE 0.4 MG: 0.2 INJECTION, SOLUTION INTRAMUSCULAR; INTRAVENOUS; SUBCUTANEOUS at 16:32

## 2022-04-06 RX ADMIN — ACETAMINOPHEN 650 MG: 325 TABLET, FILM COATED ORAL at 19:34

## 2022-04-06 RX ADMIN — HYDROMORPHONE HYDROCHLORIDE 0.4 MG: 0.2 INJECTION, SOLUTION INTRAMUSCULAR; INTRAVENOUS; SUBCUTANEOUS at 21:35

## 2022-04-06 RX ADMIN — HEPARIN SODIUM 5000 UNITS: 5000 INJECTION, SOLUTION INTRAVENOUS; SUBCUTANEOUS at 14:00

## 2022-04-06 RX ADMIN — SENNOSIDES AND DOCUSATE SODIUM 1 TABLET: 50; 8.6 TABLET ORAL at 19:35

## 2022-04-06 RX ADMIN — CEFAZOLIN SODIUM 2 G: 2 INJECTION, SOLUTION INTRAVENOUS at 18:13

## 2022-04-06 RX ADMIN — HYDROMORPHONE HYDROCHLORIDE 0.4 MG: 0.2 INJECTION, SOLUTION INTRAMUSCULAR; INTRAVENOUS; SUBCUTANEOUS at 09:17

## 2022-04-06 RX ADMIN — SODIUM CHLORIDE 8 UNITS/HR: 9 INJECTION, SOLUTION INTRAVENOUS at 15:18

## 2022-04-06 RX ADMIN — PANTOPRAZOLE SODIUM 40 MG: 40 TABLET, DELAYED RELEASE ORAL at 08:11

## 2022-04-06 RX ADMIN — MAGNESIUM HYDROXIDE 30 ML: 400 SUSPENSION ORAL at 19:34

## 2022-04-06 RX ADMIN — OXYCODONE HYDROCHLORIDE 10 MG: 5 TABLET ORAL at 11:52

## 2022-04-06 ASSESSMENT — ACTIVITIES OF DAILY LIVING (ADL)
ADLS_ACUITY_SCORE: 7
ADLS_ACUITY_SCORE: 9
ADLS_ACUITY_SCORE: 7
ADLS_ACUITY_SCORE: 10
ADLS_ACUITY_SCORE: 10
ADLS_ACUITY_SCORE: 9
ADLS_ACUITY_SCORE: 10
ADLS_ACUITY_SCORE: 7
ADLS_ACUITY_SCORE: 9
ADLS_ACUITY_SCORE: 10
ADLS_ACUITY_SCORE: 10
ADLS_ACUITY_SCORE: 7
ADLS_ACUITY_SCORE: 7
ADLS_ACUITY_SCORE: 10
ADLS_ACUITY_SCORE: 10
ADLS_ACUITY_SCORE: 9
ADLS_ACUITY_SCORE: 10
ADLS_ACUITY_SCORE: 7
ADLS_ACUITY_SCORE: 7
ADLS_ACUITY_SCORE: 10
ADLS_ACUITY_SCORE: 7
ADLS_ACUITY_SCORE: 10

## 2022-04-06 NOTE — PLAN OF CARE
Goal Outcome Evaluation:    A&Ox4. Tele: accelerated junctional rate 90s- low 100. On RA while awake home Cpap at night. BP WDL. PERRLA, follows commands, MONREAL. LS: diminished. No Bm this shift. Voiding in urinal. CMA intact ex. Edema in hands and feet. Midline incision NGUYEN. Chest tube dressing CDI. 2 to 1 atrium CT to suction. No new noted skin breakdown this shift. Insulin gtt infusing. Mod carb diet. PRN tylenol  Oxy and dilaudid given for pain.

## 2022-04-06 NOTE — PROGRESS NOTES
[facility]  Cardiovascular and Thoracic Surgery Daily Note          Assessment and Plan:   Anil Montoya is a 36 year old gentleman who presented to [facility] ED on 3/22/22 with weakness, recent hematuria and shortness of breath. Workup demonstrated MSSA bacteremia likely secondary to right groin skin source. TEJINDER on 3/27/22 demonstrated tricuspid valve endocarditis. In completing full work up, there was concern for discitis with seeding however MRI negative. He was transferred on 3/28/22 to [facility] for consideration of surgical intervention.     PMH includes: Poorly controlled diabetic, HTN, hypothyroidism, obesity, WARNER, anxiety, cryptococcal pneumonia in 2012 with previous history of staph aureus bacteremia.    Most recent echo demonstrates LVEF 55-60%, normal RV function, mildly thickened cusps on aortic valve and thickened tricuspid valve leaflets, small mobile filamentous elements on the septal leaflet of TV and larged fixed heterogeneous mass in the RV attached to the interventricular septum.  Coronary angiogram 3/31/22 with no e/o CAD.    Course c/b CHB with accelerated junctional rhythm and polymorphic VT arrest on 4/5 with immediate ROSC after defib x1 and transient CPR.      POD #5 s/p:  1. Tricuspid valve replacement (31mm Epic stented valve)  2. Endoscopic aortic valve exploration  3. Transesophageal echocardiogram on 4/1/22 with Dr. 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  Postoperative CHB with accelerated junctional rhythm, improving  Polymorphic VT arrest 4/5 early AM at 6:40 with onset of vfib, shock x1 and an attempt at CPR with immediate ROSC. Did not need intubation and neurologically intact. Electrolytes stable: K+ 3.7, Mg 2. Appreciate EP's involvement. Troponin elevated as expected after event.  Echocardiogram on 4/5 stable but poor images to evaluate tricuspid gradient  [PTA meds on hold: losartan 50 mg daily]  HD stable  overnight in accelerated junctional rhythm  - ASA 81 mg daily  - Defer BB given heart block  - Losartan 25 mg daily and up titrate PRN  - No statin indicated  - ID as below  - Consulted EP, appreciate recommendations, plan for ICD once blood is sterile  - CTs in to water seal, leave in, continue standby in the event of progression of HB    Resp:   WARNER  History of cryptococcal pneumonia  Extubated on POD 0, now saturating well on room air  - Continue pulm hygiene efforts: IS/flutter valve/mobility  - Titrate O2 to maintain sats >92%  - CPAP at HS    Neuro/MSK:    Acute postoperative pain  MDD/VIVEK  - Better controlled with current regimen this AM, received toradol x1  - Sore after a few compressions with CPR but overall tolerable  - Robaxin scheduled, lidocaine patches added  - PTA Wellbutrin resumed  - Sternal incision and area intact and feels stable, wires intact on CXR, continue sternal precautions    Renal/Electrolyte:   Creat stable, below preoperative weight, has less edema  Autodiuresing  - Strict I/O, daily weights  - Avoid/limit nephrotoxins as able  - Replete lytes per protocol  - Defer on diuresis today    GI/Nutrition:    Mildly elevated LFTs  - Tolerating current diet:  Orders Placed This Encounter      Moderate Consistent Carb (60 g CHO per Meal) Diet   - Continue bowel regimen, no BM yet, + flatus  - LFTs had normalized, AST slightly elevated after arrest, trend  - PPI    :    - Voiding well on own    Endo:  Stress induced hyperglycemia   Poorly controlled DM2  Hypothyroidism  Preop A1c   Lab Results   Component Value Date    A1C 12.6 03/27/2022    [PTA meds on hold: Victoza and metformin]  - Was on insulin infusion, transitioned to sliding scale insulin and lantus 4/2, hyperglycemic, resumed insulin infusion POD 2 with prandial coverage.  - Hospitalists managing diabetic regimen, appreciate  - Goal BG <180 for optimal healing  - PTA levothyroxine resumed    ID:  Stress induced leukocytosis,  resolved  Tricuspid valve endocarditis  MSSA bacteremia  History cryptococcal pneumonia  WBC 10, Tmax 99.8; no s/sx infection  Prelim intraop gram stain positive for yeast from tricuspid valve, culture with 1+ staph aureus, no yeast, continue to follow  BC 4/3 positive for staph aureus, continue to follow  BC 4/5 with NGTD thus far  Fungal BC sent on 4/1 with NGTD  Full skin inspection 4/6 with no new wounds  - Completing perioperative ABX  - Continue Cefazolin per ID, Gentamycin added 4/5 for new positive culture  - Micafungin started 4/1 per ID for positive yeast on gram stain, discontinued with negative cx on 4/3  - HIV antigen/antibody test negative  - Per ID: repeat blood cultures x 2 sets daily until clear  - Continue to follow fever curve, WBC and inflammatory markers as appropriate    Heme:  Acute blood loss anemia  Acute blood loss thrombocytopenia  Hgb 7.3, Plts 605; no s/sx active bleeding  - CBC in AM  - Goal Hgb >7    -Proph: PPI, subcutaneous heparin, SCDs  -Dispo: Transferred back to ICU given code blue, continue therapies, pulm hygiene          Interval History:   No acute events overnight. Tearful this AM. States pain is well managed on current regimen, slept well.  Breathing is stable on room air. Tolerating diet, is passing flatus, +BM. Denies chest pain, palpitations, dizziness, syncopal symptoms, chills, myalgias, endorses occasional sternal popping/clicking.         Medications:       aspirin  81 mg Oral or NG Tube Daily     bisacodyl  10 mg Rectal Once     buPROPion  300 mg Oral QAM     ceFAZolin  2 g Intravenous Q8H     gabapentin  100 mg Oral TID     gentamicin  1 mg/kg (Adjusted) Intravenous Q8H     heparin ANTICOAGULANT  5,000 Units Subcutaneous Q8H     insulin aspart   Subcutaneous TID w/meals     lactated ringers  250 mL Intravenous Once     levothyroxine  125 mcg Oral Once per day on Mon Tue Wed Thu Fri Sat     levothyroxine  250 mcg Oral Weekly     lidocaine  2 patch Transdermal Q24H      lidocaine   Transdermal Q8H     losartan  25 mg Oral Daily     methocarbamol  750 mg Oral Q6H     pantoprazole  40 mg Oral or NG Tube Daily    Or     pantoprazole  40 mg Oral Daily     polyethylene glycol  17 g Oral BID     senna-docusate  1 tablet Oral BID             Physical Exam:   Vitals were reviewed  Blood pressure 102/66, pulse 86, temperature 98.7  F (37.1  C), temperature source Oral, resp. rate 13, weight 120.5 kg (265 lb 10.5 oz), SpO2 100 %.  Rhythm: acc junctional rates 70-80s    Lungs: Dim throughout, on room air    Cardiovascular: S1, S2, RRR, no m/r/g    Abdomen: S/ND/ND, +BS    Extremeties: trace edema, warm and well perfused    Incision: CDI    CT: to suction, no air leak, no crepitus    Weight:   Vitals:    04/03/22 0600 04/04/22 0600 04/04/22 1739 04/05/22 0553   Weight: 117.8 kg (259 lb 11.2 oz) 117.1 kg (258 lb 2.5 oz) 119.1 kg (262 lb 8 oz) 118.4 kg (261 lb 1.6 oz)    04/06/22 0600   Weight: 120.5 kg (265 lb 10.5 oz)            Data:   Labs:   Lab Results   Component Value Date    WBC 38.3 04/01/2022    WBC 6.8 11/24/2017     Lab Results   Component Value Date    RBC 3.09 04/01/2022    RBC 5.16 11/24/2017     Lab Results   Component Value Date    HGB 9.0 04/01/2022    HGB 15.2 11/24/2017     Lab Results   Component Value Date    HCT 29.1 04/01/2022    HCT 43.7 11/24/2017     No components found for: MCT  Lab Results   Component Value Date    MCV 94 04/01/2022    MCV 85 11/24/2017     Lab Results   Component Value Date    MCH 29.1 04/01/2022    MCH 29.5 11/24/2017     Lab Results   Component Value Date    MCHC 30.9 04/01/2022    MCHC 34.8 11/24/2017     Lab Results   Component Value Date    RDW 13.5 04/01/2022    RDW 12.9 11/24/2017     Lab Results   Component Value Date     04/01/2022     11/24/2017       Last Basic Metabolic Panel:  Lab Results   Component Value Date     04/01/2022     11/24/2017      Lab Results   Component Value Date    POTASSIUM 5.1  2022    POTASSIUM 5.1 2022    POTASSIUM 3.8 2017     Lab Results   Component Value Date    CHLORIDE 107 2022    CHLORIDE 104 2017     Lab Results   Component Value Date    IMAN 8.4 2022    IMAN 8.3 2017     Lab Results   Component Value Date    CO2 24 2022    CO2 24 2017     Lab Results   Component Value Date    BUN 27 2022    BUN 18 2017     Lab Results   Component Value Date    CR 1.04 2022    CR 1.07 2017     Lab Results   Component Value Date     2022     2022     2017       Imaging:  Recent Results (from the past 24 hour(s))   Echocardiogram Limited    Narrative    [id number]  [id number]  [id number]  [id number]^ECHO^ALLEGRA^ROBERTO     [facility]  Echocardiography Laboratory  [address] [place] [address]     Name: DARION OROPEZA  MRN: [id number]  : 1985  Study Date: 2022 11:00 AM  Age: 36 yrs  Gender: Male  Patient Location: [facility]  Reason For Study: Cardiac Arrest  Ordering Physician: ALLEGRA DODGE  Referring Physician: Amanda Dawkins  Performed By: Salo Salmon RDCS     BSA: 2.3 m2  Height: 70 in  Weight: 261 lb  HR: 84  BP: 124/76 mmHg  ______________________________________________________________________________  Procedure  Limited Portable Echo Adult. Optison (NDC #6746-8664) given intravenously.  ______________________________________________________________________________  Interpretation Summary     study inadequate for complete evaluation of prosthetic Tv due to suboptimal  image quality. No significant TR seen, in addition HR approx 90, so TV mean  gradient likely inaccurate. The study was technically difficult. Contrast was  used without apparent complications.  ______________________________________________________________________________  Left Ventricle  Septal motion is consistent with conduction abnormality.     Tricuspid  Valve  Normal prosthetic tricuspid valve gradient.     Aortic Valve  No aortic stenosis is present.     Pulmonic Valve  The pulmonic valve is not well seen, but is grossly normal.     Vessels  The aortic root is normal size. Normal size ascending aorta.  ______________________________________________________________________________  Doppler Measurements & Calculations  TV V2 max: 156.3 cm/sec  TV max PG: 10.3 mmHg  TV mean P.1 mmHg  TV V2 VTI: 54.0 cm     ______________________________________________________________________________  Report approved by: Sourav Weston 2022 12:52 PM              Rounded and discussed with Dr. Linh Silverio, APRN, ACNPC-AG, CCRN  Nurse Practitioner  Cardiothoracic Surgery  Pager: [phone]

## 2022-04-06 NOTE — PROGRESS NOTES
[facility]    Infectious Disease Progress Note    Date of Service : 04/06/2022     Assessment:  36YM with uncontrolled diabetes and HbA1c of >12%, and prior cryptococcal pneumonia 10 years ago without HIV diagnosis, who is hospitalized with high grade prolonged MSSA bacteremia since 3/22 with tricuspid valve endocarditis. Possible secondary seeding of the lumbar spine though MRI is negative and back pain is gone. He is s/p tricuspid valve replacement surgery and aortic valve exploration for control of infection on 04/01 with positive gram stain and culture of tricuspid valve tissue for staph aureus.  Yeast also seen on stain but read corrected later to no yeast.  Micafungin discontinued as Micro results  updated by lab. Initial read of 1+ yeast on valve tissue on 4/1 was corrected and updated. Only staph aureus on valve tissue cx, no yeast seen on stain.    On 4/5, he suffered a ventricular tachycardia/fibrillation arrest.  ROSC achieved after one round of CPR and one shock.  Alert and neurologically intact following.         Recommendations:  1. Follow blood cxs . 4/5 1 blood cx +  2. PICC/pacemaker being considered given arrythmia event. Hold if non urgent until blood cxs are persistently negative. .   3. Given new prosthetic valve and positive blood cultures gentamicin added ( synergy dosing ) till he clears the blood cultures  4. Continue Cefazolin  5. Follow clinical status and labs    Sol Jolly MD    Interval History   Resting, overall feels discouraged after cardiac event. Complains of pain at chest tube sites and back pain    Physical Exam   Temp: 99.5  F (37.5  C) Temp src: Axillary BP: 129/66 Pulse: 90   Resp: 16 SpO2: 98 % O2 Device: BiPAP/CPAP Oxygen Delivery: 4 LPM  Vitals:    04/04/22 1739 04/05/22 0553 04/06/22 0600   Weight: 119.1 kg (262 lb 8 oz) 118.4 kg (261 lb 1.6 oz) 120.5 kg (265 lb 10.5 oz)     Vital Signs with Ranges  Temp:  [98  F (36.7  C)-99.8  F (37.7  C)] 99.5   F (37.5  C)  Pulse:  [] 90  Resp:  [10-25] 16  BP: ()/(58-83) 129/66  Cuff Mean (mmHg):  [80] 80  SpO2:  [92 %-100 %] 98 %    Constitutional: Awake, alert, cooperative, no apparent distress  Lungs: Clear to auscultation bilaterally, no crackles or wheezing  Cardiovascular: S1S2, sternal surgical site intact, mediastinal chest tubes in place  Abdomen:  soft,non-tender  Skin: No rash    Other:    Medications     dextrose       insulin regular 6 Units/hr (04/06/22 1200)     BETA BLOCKER NOT PRESCRIBED       sodium chloride 10 mL/hr at 04/06/22 0800       aspirin  81 mg Oral or NG Tube Daily     bisacodyl  10 mg Rectal Once     buPROPion  300 mg Oral QAM     ceFAZolin  2 g Intravenous Q8H     gabapentin  100 mg Oral TID     gentamicin  1 mg/kg (Adjusted) Intravenous Q8H     heparin ANTICOAGULANT  5,000 Units Subcutaneous Q8H     insulin aspart   Subcutaneous TID w/meals     lactated ringers  250 mL Intravenous Once     levothyroxine  125 mcg Oral Once per day on Mon Tue Wed Thu Fri Sat     levothyroxine  250 mcg Oral Weekly     lidocaine  2 patch Transdermal Q24H     lidocaine   Transdermal Q8H     losartan  25 mg Oral Daily     methocarbamol  750 mg Oral Q6H     pantoprazole  40 mg Oral or NG Tube Daily    Or     pantoprazole  40 mg Oral Daily     polyethylene glycol  17 g Oral BID     senna-docusate  1 tablet Oral BID       Data   All microbiology laboratory data reviewed.  Recent Labs   Lab Test 04/06/22 0417 04/05/22  0743 04/05/22  0645   WBC 10.0 14.6* 16.2*   HGB 7.3* 7.4* 8.0*   HCT 24.2* 24.0* 26.4*   MCV 96 92 94   * 582* 883*     Recent Labs   Lab Test 04/06/22 0417 04/05/22  0743 04/05/22  0647   CR 0.94 1.04 1.01

## 2022-04-06 NOTE — PROGRESS NOTES
EP CARDIOLOGY PROGRESS NOTE  Tyrel Higgins MD (pager [phone])    36-year-old male with DM type II (uncontrolled), hypertension, VIVEK, ADHD, hypothyroidism who developed MSSA tricuspid valve endocarditis.  Underwent TAVR with debridement on 4/1/2022.  He developed complete AV block after the procedure with accelerated junctional rhythm, heart rate in the 80s.     He was transferred from the ICU to station 33 on 04/05/22.  Two nights ago he suffered cardiac arrest.  Telemetry at the time showed regular rhythm in the 80s with a PVC followed by initiation of polymorphic VT/VF.  He was successfully resuscitated (required 120 J defibrillation) and was transferred back to the ICU.     On 4/5/2022, one of his 4/3/2022 blood cultures became positive for GPC.  Gentamicin was added to cefazolin by ID.    Echocardiography yesterday was a technically difficult study with overall normal LV function, normal inferior wall motion, septal motion abnormality often seen in postthoracotomy state.     He remains in complete AV block with accelerated junctional escape rhythm.  No further ventricular arrhythmia.     -----------------------------------------------------------------    ASSESSMENT:  1. Polymorphic VT/VF arrest 4 days after cardiac surgery for TAVR.  Etiology remains unclear. QTc remains mildly prolonged, but VT/VF initiation was not typical for torsades de pointes VT.  LVEF was normal by echocardiography yesterday (technically difficult study).  ECG does not show ST abnormality.  2. Complete AV block with accelerated junctional escape rhythm.  This has persisted.  3. Tricuspid valve endocarditis, s/p TAVR on 4/1/2022.  Blood cultures still positive, as recently as on 4/3/2022.  On IV cefazolin and gentamicin.    PLAN:  1. Monitor in ICU.  Continue IV antibiotics.  2. Hold off beta-blockers because of AV block.  3. No apparent culprit for QT prolongation.  4. We will plan on implanting a dual-chamber ICD for secondary  prevention, probably early next week to be absolutely certain blood cultures have cleared.  If AV conduction improves, we may consider a subcutaneous ICD sooner (this type of device does not have pacing capability).    Total Time: 25 minutes;   10 minutes spent in direct communication with patient / family and care coordination.             Interval History:     no new complaints          Review of Systems:     CONSTITUTIONAL: NEGATIVE for fever, chills, change in weight  ENT/MOUTH: NEGATIVE for ear, mouth and throat problems  RESP: NEGATIVE for significant cough or SOB  CV: Positive for surgery/CPR chest pain, no palpitation or peripheral edema          Physical Exam:      Blood pressure 130/72, pulse 89, temperature 98.1  F (36.7  C), resp. rate 18, weight 120.5 kg (265 lb 10.5 oz), SpO2 98 %.  Vitals:    04/04/22 1739 04/05/22 0553 04/06/22 0600   Weight: 119.1 kg (262 lb 8 oz) 118.4 kg (261 lb 1.6 oz) 120.5 kg (265 lb 10.5 oz)     Vital Signs with Ranges  Temp:  [98  F (36.7  C)-99.8  F (37.7  C)] 98.1  F (36.7  C)  Pulse:  [] 89  Resp:  [10-25] 18  BP: ()/(58-83) 130/72  Cuff Mean (mmHg):  [80] 80  SpO2:  [92 %-100 %] 98 %  I/O's Last 24 hours  I/O last 3 completed shifts:  In: 2547.17 [P.O.:1640; I.V.:907.17]  Out: 3200 [Urine:3050; Chest Tube:150]    GENERAL: Alert and oriented, in ICU  HEENT:  normocephalic, atraumatic.  NECK:  normal JVP.  LUNGS:  clear bilaterally, no wheezes  CARDIOVASCULAR:  RRR, no gallop, murmur or rub  ABDOMEN: soft, NT, no apparent hepatosplenomegaly  EXTREMITIES:  no edema  VASCULAR:  2+ carotids, 2+ radial pulses           Medications:          aspirin  81 mg Oral or NG Tube Daily     bisacodyl  10 mg Rectal Once     buPROPion  300 mg Oral QAM     ceFAZolin  2 g Intravenous Q8H     gabapentin  100 mg Oral TID     gentamicin  1 mg/kg (Adjusted) Intravenous Q8H     heparin ANTICOAGULANT  5,000 Units Subcutaneous Q8H     insulin aspart   Subcutaneous TID w/meals      lactated ringers  250 mL Intravenous Once     levothyroxine  125 mcg Oral Once per day on Mon Tue Wed Thu Fri Sat     levothyroxine  250 mcg Oral Weekly     lidocaine  2 patch Transdermal Q24H     lidocaine   Transdermal Q8H     losartan  25 mg Oral Daily     methocarbamol  750 mg Oral Q6H     pantoprazole  40 mg Oral or NG Tube Daily    Or     pantoprazole  40 mg Oral Daily     polyethylene glycol  17 g Oral BID     senna-docusate  1 tablet Oral BID            Data:      All new lab and imaging data was reviewed.   Recent Labs   Lab Test 22  0417 22  0743 22  0645 22  0422 22  1300 22  1141   WBC 10.0 14.6* 16.2*   < > 38.3* 30.4*   HGB 7.3* 7.4* 8.0*   < > 9.0* 8.5*   MCV 96 92 94   < > 94 94   * 582* 883*   < > 566* 442   INR  --   --  1.17*  --  1.55* 1.64*    < > = values in this interval not displayed.      Recent Labs   Lab Test 22  0753 22  0608 22  0417 22  0750 22  0743 22  0647   NA  --   --  136  --  135 134   POTASSIUM  --   --  4.0  --  3.8 3.7   CHLORIDE  --   --  104  --  102 103   CO2  --   --  27  --  27 23   BUN  --   --  18  --  20 19   CR  --   --  0.94  --  1.04 1.01   ANIONGAP  --   --  5  --  6 8   IMAN  --   --  8.3*  --  8.5 8.6   * 109* 105*   < > 147* 147*    < > = values in this interval not displayed.     Recent Labs   Lab Test 17  0250 17  2350 14  2316 14  2110   TROPI <0.015 <0.015  --  <0.012   TROPONIN  --   --  0.00  --          EKG results:  Reviewed     Imaging:   Recent Results (from the past 24 hour(s))   Echocardiogram Limited    Narrative    [id number]  [id number]  [id number]  [id number]^ECHO^ALLEGRA^ROBERTO     [facility]  Echocardiography Laboratory  [facility] [place] [address]     Name: DARION OROPEZA  MRN: [id number]  : 1985  Study Date: 2022 11:00 AM  Age: 36 yrs  Gender: Male  Patient Location:  [facility]  Reason For Study: Cardiac Arrest  Ordering Physician: ALLEGRA DODGE  Referring Physician: Amanda Dawkins  Performed By: Salo Salmon RDCS     BSA: 2.3 m2  Height: 70 in  Weight: 261 lb  HR: 84  BP: 124/76 mmHg  ______________________________________________________________________________  Procedure  Limited Portable Echo Adult. Optison (NDC #5977-5795) given intravenously.  ______________________________________________________________________________  Interpretation Summary     study inadequate for complete evaluation of prosthetic Tv due to suboptimal  image quality. No significant TR seen, in addition HR approx 90, so TV mean  gradient likely inaccurate. The study was technically difficult. Contrast was  used without apparent complications.  ______________________________________________________________________________  Left Ventricle  Septal motion is consistent with conduction abnormality.     Tricuspid Valve  Normal prosthetic tricuspid valve gradient.     Aortic Valve  No aortic stenosis is present.     Pulmonic Valve  The pulmonic valve is not well seen, but is grossly normal.     Vessels  The aortic root is normal size. Normal size ascending aorta.  ______________________________________________________________________________  Doppler Measurements & Calculations  TV V2 max: 156.3 cm/sec  TV max PG: 10.3 mmHg  TV mean P.1 mmHg  TV V2 VTI: 54.0 cm     ______________________________________________________________________________  Report approved by: Sourav Weston 2022 12:52 PM

## 2022-04-06 NOTE — PROGRESS NOTES
[facility]    Medicine Progress Note - Hospitalist Service        Date of Admission:  3/28/2022 10:33 PM  Date of Service: 04/06/2022    Assessment & Plan:   Anil Montoya is a 36 year old male with hx of DM2, HTN, WARNER, and anxiety who was initially hospitalized at [facility] on 3/22/2022 for severe sepsis initially attributed to pneumonia vs viral infection. He was found to have MSSA bacteremia with ongoing positive cultures. He underwent a TEJINDER on 3/27/2022 which revealed tricuspid endocarditis, and he was transferred to [facility] on 3/28/2022 for CV Surgery consultation.     Sepsis with MSSA bacteremia due to tricuspid valve endocarditis.  Tricuspid valve endocarditis s/p tricuspid valve replacement on 4/1/2022 with bioprosthetic valve  * Presented to [facility] on 3/22 with generalized weakness, malaise, and myalgias. He was initially treated with ceftriaxone and azithromycin for possible community-acquired pneumonia, and ID was consulted. Blood cultures returned positive for MSSA, and serial blood cultures continued to return positive. Broad work-up including CT abd/pelvis, MR lumbar spine, CT dental, and TTE were unrevealing  * Initially treated with IV ceftriaxone and azithromycin; switched to IV vanco on 3/23 when blood cultures returned with Staph; switched to IV cefazolin on 3/24 upon sensitivities  * Underwent TEJINDER on 3/27 which showed thickened tricuspid valve leaflets with atrial aspect of the septal leaflet with an irregular border and small mobile filamentous elements, large fixed heterogeneous mass in the right ventricle attached to the interventricular septum (2.5 x 2 cm) appearing connected to the tricuspid subvalvular annular apparatus with a pedunculated round mobile element (1.8 x 1 cm).  No significant tricuspid regurgitation reported. LVEF 55 to 60%. Negative bubble study.  * Transferred to [facility] on 3/28 for CV Surgery consult  - Patient underwent tricuspid valve  replacement with bioprosthetic valve (31 mm epic stented valve) on 4/1/2022 and endoscopic aortic valve exploration.  - Initially micafungin added on 4/1 as Gram stain from tissue from heart valve from 4/ 1 initially was reported as yeast, which was subsequently corrected as not to be present.  Micafungin now discontinued.  - Continues on IV cefazolin and given new prosthetic valve and positive blood cultures gentamicin added ( synergy dosing ) until he clears the BCs  - Daily blood cultures, last positive was from 3/31  - ID, Cardiology, CV Surgery following  - Patient was initially transferred out of the intensive care unit on 4/4/2022 after he was stable however patient had an episode of V. fib arrest and was transferred back to the ICU.     V. fib arrest(4/5/2022)  -Patient was noted to have V. fib on telemetry earlier this morning  -Required brief CPR and 1 shock of 120 J with successful restoration of regular rhythm  -Electrolytes appear to be within normal limits  -Patient transferred back to the intensive care unit for close monitoring  -EP following -> plan on implanting a dual-chamber ICD for secondary prevention, probably early next week to be absolutely certain blood cultures have cleared.  If AV conduction improves, may consider a subcutaneous ICD sooner   -ECHO -> LVEF was normal by echocardiography (technically difficult study)    Post-op complete heart block  -Patient initially was in second-degree AV block, and now appears to have complete heart block.  -EP following, given good junctional escape no plans for immediate permanent pacemaker implantation, continue to monitor on telemetry closely    DM2 without complications, long-term insulin use, uncontrolled  * A1c 12.6. Had not been taking his diabetes medications for some time  -Continue insulin drip with prandial coverage, blood sugars much better in the last 24 hours, given the events this morning, will continue with insulin drip.  If he is  stable, anticipate switching to basal bolus regimen tomorrow morning.  -Prior to surgery he was requiring Lantus 34 units twice a day and mealtime coverage of 1: 10.    Acute postop blood loss anemia  -Hemoglobin preop was around 12 which had slowly trended down.  Hemoglobin dropped to 7.3 on 4/4, most likely acute blood loss from surgery.    -Hemoglobin is lower but stable at 7.4 this morning  -Continue to monitor and transfuse as needed.    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 RUQ US (3/22) showed fatty liver, but normal gallbladder and normal bile ducts  - LFTs improved.      Essential hypertension  - Hold prior to admission losartan, likely will resume in the next day or 2.     WARNER  * CPAP per home settings     Hypothyroidism  * Chronic and stable on levothyroxine     Generalized anxiety disorder  ADHD  * Chronic and stable on bupropion      Diet: Moderate Consistent Carb (60 g CHO per Meal) Diet     DVT Prophylaxis: Pneumatic Compression Devices   Schwartz Catheter: Not present  Code Status: Full Code     Disposition Plan    Expected Discharge: 04/11/2022     Anticipated discharge location: home with family    Delays:     Other (Add Comment)          Entered: Edgar Huff MD 04/06/2022, 4:21 PM        Clinically Significant Risk Factors Present on Admission                    The patient's care was discussed with the Bedside Nurse and Patient.    Joseph Iraheta MD  Hospitalist Service  [facility]  Text Page 7AM-6PM  Securely message with the Vocera Web Console (learn more here)  Text page via [employer] Paging/Directory    ______________________________________________________________________    Interval History     No acute  "events overnight  CP controlled today  Denies lightheadedness or dizziness.  No fevers or chills      Physical Exam   Vital signs:  Temp: 99.5  F (37.5  C) Temp src: Axillary BP: 130/79 Pulse: 92   Resp: 15 SpO2: 96 % O2 Device: BiPAP/CPAP Oxygen Delivery: 4 LPM   Weight: 120.5 kg (265 lb 10.5 oz)  Estimated body mass index is 38.12 kg/m  as calculated from the following:    Height as of 3/22/22: 1.778 m (5' 10\").    Weight as of this encounter: 120.5 kg (265 lb 10.5 oz).      Wt Readings from Last 2 Encounters:   04/06/22 120.5 kg (265 lb 10.5 oz)   03/28/22 121.1 kg (267 lb)       Gen: AAOX3, NAD, comfortable  HEENT:  no pallor  Resp: CTA B/L, normal WOB  CVS: RRR, no murmur, chest tube +  Abd/GI: Soft, non-tender. BS- normoactive.   Skin: Warm, dry no rashes  MSK: Trace-1+ pedal edema  Neuro- CN- intact. No focal deficits.      Data   Recent Labs   Lab 04/06/22  1603 04/06/22  1457 04/06/22  1357 04/06/22  0608 04/06/22  0417 04/05/22  0750 04/05/22  0743 04/05/22  0647 04/05/22  0645 04/01/22  1440 04/01/22  1300 04/01/22  1143 04/01/22  1141   WBC  --   --   --   --  10.0  --  14.6*  --  16.2*   < > 38.3*  --  30.4*   HGB  --   --   --   --  7.3*  --  7.4*  --  8.0*   < > 9.0*   < > 8.5*   MCV  --   --   --   --  96  --  92  --  94   < > 94  --  94   PLT  --   --   --   --  605*  --  582*  --  883*   < > 566*  --  442   INR  --   --   --   --   --   --   --   --  1.17*  --  1.55*  --  1.64*   NA  --   --   --   --  136  --  135 134  --    < > 135   < > 134   POTASSIUM  --   --   --   --  4.0  --  3.8 3.7  --    < > 5.1  5.1   < > 5.1   CHLORIDE  --   --   --   --  104  --  102 103  --    < > 107  --  105   CO2  --   --   --   --  27  --  27 23  --    < > 24  --  25   BUN  --   --   --   --  18  --  20 19  --    < > 27  --  24   CR  --   --   --   --  0.94  --  1.04 1.01  --    < > 1.04  --  1.05   ANIONGAP  --   --   --   --  5  --  6 8  --    < > 4  --  4   IMAN  --   --   --   --  8.3*  --  8.5 8.6  --    < > " 8.4*  --  8.9   * 196* 153*   < > 105*   < > 147* 147*  --    < > 250*   < > 259*   ALBUMIN  --   --   --   --  1.6*  --  1.7* 1.7*  --    < > 1.7*  --   --    PROTTOTAL  --   --   --   --  6.8  --  7.1 7.4  --    < > 6.4*  --   --    BILITOTAL  --   --   --   --  0.2  --  0.3 0.5  --    < > 0.5  --   --    ALKPHOS  --   --   --   --  105  --  119 109  --    < > 106  --   --    ALT  --   --   --   --  67  --  73* 73*  --    < > 81*  --   --    AST  --   --   --   --  72*  --  105* 97*  --    < > 63*  --   --     < > = values in this interval not displayed.       No results found for this or any previous visit (from the past 24 hour(s)).  Medications     dextrose       insulin regular 11 Units/hr (04/06/22 1604)     BETA BLOCKER NOT PRESCRIBED       sodium chloride 10 mL/hr at 04/06/22 0800       aspirin  81 mg Oral or NG Tube Daily     bisacodyl  10 mg Rectal Once     buPROPion  300 mg Oral QAM     ceFAZolin  2 g Intravenous Q8H     gabapentin  100 mg Oral TID     gentamicin  1 mg/kg (Adjusted) Intravenous Q8H     heparin ANTICOAGULANT  5,000 Units Subcutaneous Q8H     insulin aspart   Subcutaneous TID w/meals     lactated ringers  250 mL Intravenous Once     levothyroxine  125 mcg Oral Once per day on Mon Tue Wed Thu Fri Sat     levothyroxine  250 mcg Oral Weekly     lidocaine  2 patch Transdermal Q24H     lidocaine   Transdermal Q8H     losartan  25 mg Oral Daily     methocarbamol  750 mg Oral Q6H     pantoprazole  40 mg Oral or NG Tube Daily    Or     pantoprazole  40 mg Oral Daily     polyethylene glycol  17 g Oral BID     senna-docusate  1 tablet Oral BID

## 2022-04-06 NOTE — PLAN OF CARE
Goal Outcome Evaluation:    Pt remains in accelerated junctional with HR 90's. Pt up to chair x 2 today. Pt frequently reminded about sternal precautions and reminded not to push up with his hands/arms when getting up but pt continues to do so. Oxycodone, dilaudid and robaxin for pain with some relief. Temp max 101.1. Scant output from CT's. Insulin gtt requirements from 2 -15 units throughout the day. Family at bedside - updated on plan of care.

## 2022-04-06 NOTE — PROGRESS NOTES
"36-year-old gentleman status post tricuspid valve repair secondary to endocarditis.  He was recovering as expected insula V. fib, V. tach arrest resulting in 1 shock.  Patient had return of spontaneous circulation and is neurologically intact.  When I examined the patient's morning he was sitting in a chair complaining of some incisional tenderness.  Vital signs:  Temp: 98.1  F (36.7  C) Temp src: Oral BP: 123/72 Pulse: 89   Resp: 22 SpO2: 97 % O2 Device: BiPAP/CPAP Oxygen Delivery: 4 LPM   Weight: 120.5 kg (265 lb 10.5 oz)  Estimated body mass index is 38.12 kg/m  as calculated from the following:    Height as of 3/22/22: 1.778 m (5' 10\").    Weight as of this encounter: 120.5 kg (265 lb 10.5 oz).  Awake alert follows commands  Chest clear to auscultation  CV in heart block  Lab Results   Component Value Date    WBC 10.0 04/06/2022    HGB 7.3 (L) 04/06/2022    HCT 24.2 (L) 04/06/2022    MCV 96 04/06/2022     (H) 04/06/2022   Assessment and plan 36-year-old gentleman status post tricuspid valve repair secondary to endocarditis.  He is awaiting AICD placement when his blood cultures have cleared.  We will continue to monitor the patient in the ICU at this time.  Agree with lidocaine for pain control.  Continue to advance diet as tolerated  Agree with plan to hold beta-blocker given heart block.  Patient does have acute blood loss anemia however there is no indication for transfusion at this time.    Shelton Resendiz MD  Critical Care Medicine        "

## 2022-04-07 ENCOUNTER — APPOINTMENT (OUTPATIENT)
Dept: OCCUPATIONAL THERAPY | Facility: CLINIC | Age: 37
DRG: 853 | End: 2022-04-07
Attending: HOSPITALIST
Payer: COMMERCIAL

## 2022-04-07 ENCOUNTER — APPOINTMENT (OUTPATIENT)
Dept: MRI IMAGING | Facility: CLINIC | Age: 37
DRG: 853 | End: 2022-04-07
Attending: PHYSICIAN ASSISTANT
Payer: COMMERCIAL

## 2022-04-07 LAB
ALBUMIN SERPL-MCNC: 1.8 G/DL (ref 3.4–5)
ALP SERPL-CCNC: 102 U/L (ref 40–150)
ALT SERPL W P-5'-P-CCNC: 72 U/L (ref 0–70)
ANION GAP SERPL CALCULATED.3IONS-SCNC: 7 MMOL/L (ref 3–14)
AST SERPL W P-5'-P-CCNC: 73 U/L (ref 0–45)
ATRIAL RATE - MUSE: 100 BPM
ATRIAL RATE - MUSE: 105 BPM
ATRIAL RATE - MUSE: 105 BPM
ATRIAL RATE - MUSE: 117 BPM
ATRIAL RATE - MUSE: 98 BPM
BACTERIA BLD CULT: ABNORMAL
BACTERIA BLD CULT: ABNORMAL
BACTERIA BLD CULT: NO GROWTH
BACTERIA BLD CULT: NO GROWTH
BILIRUB SERPL-MCNC: 0.3 MG/DL (ref 0.2–1.3)
BUN SERPL-MCNC: 17 MG/DL (ref 7–30)
CALCIUM SERPL-MCNC: 8.9 MG/DL (ref 8.5–10.1)
CHLORIDE BLD-SCNC: 104 MMOL/L (ref 94–109)
CO2 SERPL-SCNC: 27 MMOL/L (ref 20–32)
CREAT SERPL-MCNC: 0.93 MG/DL (ref 0.66–1.25)
DIASTOLIC BLOOD PRESSURE - MUSE: NORMAL MMHG
ERYTHROCYTE [DISTWIDTH] IN BLOOD BY AUTOMATED COUNT: 15.4 % (ref 10–15)
GFR SERPL CREATININE-BSD FRML MDRD: >90 ML/MIN/1.73M2
GLUCOSE BLD-MCNC: 113 MG/DL (ref 70–99)
GLUCOSE BLDC GLUCOMTR-MCNC: 111 MG/DL (ref 70–99)
GLUCOSE BLDC GLUCOMTR-MCNC: 112 MG/DL (ref 70–99)
GLUCOSE BLDC GLUCOMTR-MCNC: 118 MG/DL (ref 70–99)
GLUCOSE BLDC GLUCOMTR-MCNC: 123 MG/DL (ref 70–99)
GLUCOSE BLDC GLUCOMTR-MCNC: 163 MG/DL (ref 70–99)
GLUCOSE BLDC GLUCOMTR-MCNC: 170 MG/DL (ref 70–99)
GLUCOSE BLDC GLUCOMTR-MCNC: 187 MG/DL (ref 70–99)
GLUCOSE BLDC GLUCOMTR-MCNC: 188 MG/DL (ref 70–99)
GLUCOSE BLDC GLUCOMTR-MCNC: 200 MG/DL (ref 70–99)
GLUCOSE BLDC GLUCOMTR-MCNC: 220 MG/DL (ref 70–99)
GLUCOSE BLDC GLUCOMTR-MCNC: 247 MG/DL (ref 70–99)
GLUCOSE BLDC GLUCOMTR-MCNC: 251 MG/DL (ref 70–99)
GLUCOSE BLDC GLUCOMTR-MCNC: 80 MG/DL (ref 70–99)
GLUCOSE BLDC GLUCOMTR-MCNC: 80 MG/DL (ref 70–99)
HCT VFR BLD AUTO: 26.8 % (ref 40–53)
HGB BLD-MCNC: 7.9 G/DL (ref 13.3–17.7)
INTERPRETATION ECG - MUSE: NORMAL
MAGNESIUM SERPL-MCNC: 2.1 MG/DL (ref 1.6–2.3)
MCH RBC QN AUTO: 28.3 PG (ref 26.5–33)
MCHC RBC AUTO-ENTMCNC: 29.5 G/DL (ref 31.5–36.5)
MCV RBC AUTO: 96 FL (ref 78–100)
P AXIS - MUSE: 37 DEGREES
P AXIS - MUSE: 53 DEGREES
P AXIS - MUSE: NORMAL DEGREES
PHOSPHATE SERPL-MCNC: 5.2 MG/DL (ref 2.5–4.5)
PLATELET # BLD AUTO: 633 10E3/UL (ref 150–450)
POTASSIUM BLD-SCNC: 4 MMOL/L (ref 3.4–5.3)
PR INTERVAL - MUSE: 158 MS
PR INTERVAL - MUSE: NORMAL MS
PROT SERPL-MCNC: 7.2 G/DL (ref 6.8–8.8)
QRS DURATION - MUSE: 100 MS
QRS DURATION - MUSE: 102 MS
QRS DURATION - MUSE: 90 MS
QRS DURATION - MUSE: 92 MS
QRS DURATION - MUSE: 94 MS
QT - MUSE: 342 MS
QT - MUSE: 400 MS
QT - MUSE: 426 MS
QT - MUSE: 430 MS
QT - MUSE: 450 MS
QTC - MUSE: 406 MS
QTC - MUSE: 475 MS
QTC - MUSE: 477 MS
QTC - MUSE: 508 MS
QTC - MUSE: 519 MS
R AXIS - MUSE: 10 DEGREES
R AXIS - MUSE: 20 DEGREES
R AXIS - MUSE: 48 DEGREES
R AXIS - MUSE: 76 DEGREES
R AXIS - MUSE: 94 DEGREES
RBC # BLD AUTO: 2.79 10E6/UL (ref 4.4–5.9)
SODIUM SERPL-SCNC: 138 MMOL/L (ref 133–144)
SYSTOLIC BLOOD PRESSURE - MUSE: NORMAL MMHG
T AXIS - MUSE: 101 DEGREES
T AXIS - MUSE: 13 DEGREES
T AXIS - MUSE: 47 DEGREES
T AXIS - MUSE: 76 DEGREES
T AXIS - MUSE: 9 DEGREES
VENTRICULAR RATE- MUSE: 117 BPM
VENTRICULAR RATE- MUSE: 62 BPM
VENTRICULAR RATE- MUSE: 75 BPM
VENTRICULAR RATE- MUSE: 80 BPM
VENTRICULAR RATE- MUSE: 84 BPM
WBC # BLD AUTO: 11.7 10E3/UL (ref 4–11)

## 2022-04-07 PROCEDURE — 80053 COMPREHEN METABOLIC PANEL: CPT | Performed by: NURSE PRACTITIONER

## 2022-04-07 PROCEDURE — 36415 COLL VENOUS BLD VENIPUNCTURE: CPT | Performed by: NURSE PRACTITIONER

## 2022-04-07 PROCEDURE — 250N000011 HC RX IP 250 OP 636: Performed by: NURSE PRACTITIONER

## 2022-04-07 PROCEDURE — 250N000013 HC RX MED GY IP 250 OP 250 PS 637: Performed by: NURSE PRACTITIONER

## 2022-04-07 PROCEDURE — 99232 SBSQ HOSP IP/OBS MODERATE 35: CPT | Mod: 24 | Performed by: INTERNAL MEDICINE

## 2022-04-07 PROCEDURE — 87040 BLOOD CULTURE FOR BACTERIA: CPT | Performed by: NURSE PRACTITIONER

## 2022-04-07 PROCEDURE — 97110 THERAPEUTIC EXERCISES: CPT | Mod: GO

## 2022-04-07 PROCEDURE — 99231 SBSQ HOSP IP/OBS SF/LOW 25: CPT | Mod: 24 | Performed by: ANESTHESIOLOGY

## 2022-04-07 PROCEDURE — 84100 ASSAY OF PHOSPHORUS: CPT | Performed by: NURSE PRACTITIONER

## 2022-04-07 PROCEDURE — 97530 THERAPEUTIC ACTIVITIES: CPT | Mod: GO

## 2022-04-07 PROCEDURE — 250N000009 HC RX 250: Performed by: INTERNAL MEDICINE

## 2022-04-07 PROCEDURE — 200N000001 HC R&B ICU

## 2022-04-07 PROCEDURE — A9585 GADOBUTROL INJECTION: HCPCS | Performed by: HOSPITALIST

## 2022-04-07 PROCEDURE — 99232 SBSQ HOSP IP/OBS MODERATE 35: CPT | Performed by: STUDENT IN AN ORGANIZED HEALTH CARE EDUCATION/TRAINING PROGRAM

## 2022-04-07 PROCEDURE — 250N000012 HC RX MED GY IP 250 OP 636 PS 637: Performed by: NURSE PRACTITIONER

## 2022-04-07 PROCEDURE — 255N000002 HC RX 255 OP 636: Performed by: HOSPITALIST

## 2022-04-07 PROCEDURE — 72158 MRI LUMBAR SPINE W/O & W/DYE: CPT

## 2022-04-07 PROCEDURE — 258N000003 HC RX IP 258 OP 636: Performed by: NURSE PRACTITIONER

## 2022-04-07 PROCEDURE — 250N000011 HC RX IP 250 OP 636: Performed by: HOSPITALIST

## 2022-04-07 PROCEDURE — 83735 ASSAY OF MAGNESIUM: CPT | Performed by: PEDIATRICS

## 2022-04-07 PROCEDURE — 85014 HEMATOCRIT: CPT | Performed by: NURSE PRACTITIONER

## 2022-04-07 RX ORDER — GADOBUTROL 604.72 MG/ML
12 INJECTION INTRAVENOUS ONCE
Status: COMPLETED | OUTPATIENT
Start: 2022-04-07 | End: 2022-04-07

## 2022-04-07 RX ORDER — NAFCILLIN SODIUM 2 G/8ML
2 INJECTION, POWDER, FOR SOLUTION INTRAMUSCULAR; INTRAVENOUS EVERY 4 HOURS
Status: DISCONTINUED | OUTPATIENT
Start: 2022-04-07 | End: 2022-04-18 | Stop reason: HOSPADM

## 2022-04-07 RX ADMIN — HEPARIN SODIUM 5000 UNITS: 5000 INJECTION, SOLUTION INTRAVENOUS; SUBCUTANEOUS at 05:48

## 2022-04-07 RX ADMIN — ASPIRIN 81 MG CHEWABLE TABLET 81 MG: 81 TABLET CHEWABLE at 08:16

## 2022-04-07 RX ADMIN — METHOCARBAMOL 750 MG: 750 TABLET ORAL at 03:17

## 2022-04-07 RX ADMIN — GENTAMICIN SULFATE 100 MG: 100 INJECTION, SOLUTION INTRAVENOUS at 20:31

## 2022-04-07 RX ADMIN — GABAPENTIN 100 MG: 100 CAPSULE ORAL at 20:31

## 2022-04-07 RX ADMIN — NAFCILLIN 2 G: 2 POWDER, FOR SOLUTION INTRAMUSCULAR; INTRAVENOUS at 21:40

## 2022-04-07 RX ADMIN — ACETAMINOPHEN 650 MG: 325 TABLET, FILM COATED ORAL at 20:30

## 2022-04-07 RX ADMIN — OXYCODONE HYDROCHLORIDE 10 MG: 5 TABLET ORAL at 10:32

## 2022-04-07 RX ADMIN — GENTAMICIN SULFATE 100 MG: 100 INJECTION, SOLUTION INTRAVENOUS at 08:36

## 2022-04-07 RX ADMIN — HEPARIN SODIUM 5000 UNITS: 5000 INJECTION, SOLUTION INTRAVENOUS; SUBCUTANEOUS at 21:40

## 2022-04-07 RX ADMIN — METHOCARBAMOL 750 MG: 750 TABLET ORAL at 17:03

## 2022-04-07 RX ADMIN — GABAPENTIN 100 MG: 100 CAPSULE ORAL at 08:16

## 2022-04-07 RX ADMIN — METHOCARBAMOL 750 MG: 750 TABLET ORAL at 10:33

## 2022-04-07 RX ADMIN — PANTOPRAZOLE SODIUM 40 MG: 40 TABLET, DELAYED RELEASE ORAL at 08:16

## 2022-04-07 RX ADMIN — METHOCARBAMOL 750 MG: 750 TABLET ORAL at 21:40

## 2022-04-07 RX ADMIN — BUPROPION HYDROCHLORIDE 300 MG: 300 TABLET, EXTENDED RELEASE ORAL at 08:16

## 2022-04-07 RX ADMIN — GABAPENTIN 100 MG: 100 CAPSULE ORAL at 14:47

## 2022-04-07 RX ADMIN — CEFAZOLIN SODIUM 2 G: 2 INJECTION, SOLUTION INTRAVENOUS at 10:32

## 2022-04-07 RX ADMIN — SODIUM CHLORIDE 5.5 UNITS/HR: 9 INJECTION, SOLUTION INTRAVENOUS at 11:51

## 2022-04-07 RX ADMIN — SENNOSIDES AND DOCUSATE SODIUM 1 TABLET: 50; 8.6 TABLET ORAL at 08:16

## 2022-04-07 RX ADMIN — HYDROMORPHONE HYDROCHLORIDE 0.4 MG: 0.2 INJECTION, SOLUTION INTRAMUSCULAR; INTRAVENOUS; SUBCUTANEOUS at 06:52

## 2022-04-07 RX ADMIN — NAFCILLIN 2 G: 2 POWDER, FOR SOLUTION INTRAMUSCULAR; INTRAVENOUS at 18:13

## 2022-04-07 RX ADMIN — OXYCODONE HYDROCHLORIDE 5 MG: 5 TABLET ORAL at 03:20

## 2022-04-07 RX ADMIN — LOSARTAN POTASSIUM 25 MG: 25 TABLET, FILM COATED ORAL at 08:16

## 2022-04-07 RX ADMIN — SODIUM CHLORIDE 3 UNITS/HR: 9 INJECTION, SOLUTION INTRAVENOUS at 00:26

## 2022-04-07 RX ADMIN — INSULIN GLARGINE 40 UNITS: 100 INJECTION, SOLUTION SUBCUTANEOUS at 11:43

## 2022-04-07 RX ADMIN — HYDROMORPHONE HYDROCHLORIDE 0.4 MG: 0.2 INJECTION, SOLUTION INTRAMUSCULAR; INTRAVENOUS; SUBCUTANEOUS at 17:03

## 2022-04-07 RX ADMIN — GADOBUTROL 12 ML: 604.72 INJECTION INTRAVENOUS at 16:37

## 2022-04-07 RX ADMIN — CEFAZOLIN SODIUM 2 G: 2 INJECTION, SOLUTION INTRAVENOUS at 01:07

## 2022-04-07 RX ADMIN — LEVOTHYROXINE SODIUM 125 MCG: 125 TABLET ORAL at 08:17

## 2022-04-07 RX ADMIN — HYDROMORPHONE HYDROCHLORIDE 0.4 MG: 0.2 INJECTION, SOLUTION INTRAMUSCULAR; INTRAVENOUS; SUBCUTANEOUS at 14:45

## 2022-04-07 RX ADMIN — LIDOCAINE 2 PATCH: 560 PATCH PERCUTANEOUS; TOPICAL; TRANSDERMAL at 08:16

## 2022-04-07 RX ADMIN — OXYCODONE HYDROCHLORIDE 10 MG: 5 TABLET ORAL at 20:30

## 2022-04-07 ASSESSMENT — ACTIVITIES OF DAILY LIVING (ADL)
ADLS_ACUITY_SCORE: 9

## 2022-04-07 NOTE — PROGRESS NOTES
[facility]    Infectious Disease Progress Note    Date of Service : 04/07/2022     Assessment:  36YM with uncontrolled diabetes and HbA1c of >12%, and prior cryptococcal pneumonia 10 years ago without HIV diagnosis, who is hospitalized with high grade prolonged MSSA bacteremia since 3/22 with tricuspid valve endocarditis. Possible secondary seeding of the lumbar spine though MRI is negative and back pain is gone. He is s/p tricuspid valve replacement surgery and aortic valve exploration for control of infection on 04/01 with positive gram stain and culture of tricuspid valve tissue for staph aureus.  Yeast also seen on stain but read corrected later to no yeast.  Micafungin discontinued as Micro results  updated by lab. Initial read of 1+ yeast on valve tissue on 4/1 was corrected and updated. Only staph aureus on valve tissue cx, no yeast seen on stain.    On 4/5, he suffered a ventricular tachycardia/fibrillation arrest.  ROSC achieved after one round of CPR and one shock.  Alert and neurologically intact following.   On 4/ 6 back pain again, MRI lumbar spine done on 4/ 7 results are pending.      Recommendations:  1. Follow blood cxs . 4/5 1 blood cx +  2. PICC/pacemaker being considered given arrythmia event. Hold if non urgent until blood cxs are persistently negative. .   3. Given new prosthetic valve and positive blood cultures gentamicin added ( synergy dosing ) till he clears the blood cultures  4. Continue Cefazolin  5. Follow clinical status and labs    Sreedhar Bell MD    Interval History   Resting, overall feels discouraged after cardiac event. Complains of pain at chest tube sites and back pain    Physical Exam   Temp: 98.5  F (36.9  C) Temp src: Oral BP: 132/81 Pulse: 88   Resp: 16 SpO2: 97 % O2 Device: BiPAP/CPAP    Vitals:    04/05/22 0553 04/06/22 0600 04/07/22 0400   Weight: 118.4 kg (261 lb 1.6 oz) 120.5 kg (265 lb 10.5 oz) 119.2 kg (262 lb 12.6 oz)     Vital Signs with  Ranges  Temp:  [98.3  F (36.8  C)-101.1  F (38.4  C)] 98.5  F (36.9  C)  Pulse:  [78-93] 88  Resp:  [9-24] 16  BP: (103-144)/(61-81) 132/81  Cuff Mean (mmHg):  [103] 103  SpO2:  [93 %-98 %] 97 %    Constitutional: Awake, alert, cooperative, no apparent distress  Lungs: Clear to auscultation bilaterally, no crackles or wheezing  Cardiovascular: S1S2, sternal surgical site intact, mediastinal chest tubes in place  Abdomen:  soft,non-tender  Skin: No rash    Other:    Medications     dextrose       insulin regular Stopped (04/07/22 1406)     BETA BLOCKER NOT PRESCRIBED       sodium chloride Stopped (04/07/22 1400)       aspirin  81 mg Oral or NG Tube Daily     buPROPion  300 mg Oral QAM     ceFAZolin  2 g Intravenous Q8H     gabapentin  100 mg Oral TID     gentamicin  100 mg Intravenous Q12H     heparin ANTICOAGULANT  5,000 Units Subcutaneous Q8H     insulin aspart  1-10 Units Subcutaneous TID AC     insulin aspart  1-7 Units Subcutaneous At Bedtime     [START ON 4/8/2022] insulin aspart   Subcutaneous Daily with breakfast     insulin aspart   Subcutaneous Daily with lunch     insulin aspart   Subcutaneous Daily with supper     insulin glargine  10 Units Subcutaneous At Bedtime     insulin glargine  40 Units Subcutaneous QAM AC     [START ON 4/8/2022] levothyroxine  125 mcg Oral Once per day on Mon Tue Wed Thu Fri Sat     levothyroxine  250 mcg Oral Weekly     lidocaine  2 patch Transdermal Q24H     lidocaine   Transdermal Q8H     losartan  25 mg Oral Daily     methocarbamol  750 mg Oral Q6H     pantoprazole  40 mg Oral Daily     polyethylene glycol  17 g Oral BID     senna-docusate  1 tablet Oral BID       Data   All microbiology laboratory data reviewed.  Recent Labs   Lab Test 04/07/22  0532 04/06/22 0417 04/05/22 0743   WBC 11.7* 10.0 14.6*   HGB 7.9* 7.3* 7.4*   HCT 26.8* 24.2* 24.0*   MCV 96 96 92   * 605* 582*     Recent Labs   Lab Test 04/07/22  0532 04/06/22 0417 04/05/22 0743   CR 0.93 0.94 1.04

## 2022-04-07 NOTE — PROGRESS NOTES
CLINICAL NUTRITION SERVICES - REASSESSMENT NOTE      Malnutrition: % Weight Loss:  None noted  % Intake:  No decreased intake noted  Subcutaneous Fat Loss:  None observed (4/2)  Muscle Loss:  None observed (4/2)  Fluid Retention:  Mild (trace per RN flowsheets -- likely not nutritionally significant)    Malnutrition Diagnosis: Patient does not meet two of the above criteria necessary for diagnosing malnutrition       EVALUATION OF PROGRESS TOWARD GOALS   Diet:  Moderate CHO diet     Intake/Tolerance:  Patient has been consuming 100% of meals per flowsheets.  Intake for 4/6/22:  Dinner - Hamburger, chips, pudding, soda  Lunch - Chicken tenders, grapes, pudding, soda  Breakfast - Eggs, Uzbek toast, banana, cantaloupe       NEW FINDINGS:   4/5:  CODE BLUE for Vfib arrest --> Transfer back to ICU     Plan AICD placement     Weight = 119.2 kg (stable)    Previous Goals (4/2):   Patient to consume ~75% at meals in the next 3 - 5 days  Evaluation: Met    Previous Nutrition Diagnosis (4/2):   Increase nutrient needs (protein/calorie) related to increased demand for healing s/p cardiac surgery as evidenced by need for >/=25 kcal/kg and >/=1.2 g protein/kg  Evaluation: No change      MALNUTRITION  % Weight Loss:  None noted  % Intake:  No decreased intake noted  Subcutaneous Fat Loss:  None observed (4/2)  Muscle Loss:  None observed (4/2)  Fluid Retention:  Mild (trace per RN flowsheets -- likely not nutritionally significant)    Malnutrition Diagnosis: Patient does not meet two of the above criteria necessary for diagnosing malnutrition    CURRENT NUTRITION DIAGNOSIS  No nutrition diagnosis identified at this time    INTERVENTIONS  Recommendations / Nutrition Prescription  Continue Moderate CHO diet as tolerated     Implementation  None     Goals  Patient will continue to consume >/= 75% meals     MONITORING AND EVALUATION:  Progress towards goals will be monitored and evaluated per protocol and Practice  Guidelines    Nickie Garay RD, LD, CNSC   Clinical Dietitian - [facility]

## 2022-04-07 NOTE — PHARMACY-AMINOGLYCOSIDE DOSING SERVICE
Pharmacy Aminoglycoside Follow-Up Note  Date of Service 2022  Patient's  1985   36 year old, male    Weight (Adjusted): 92 kg    Indication: Endocarditis - synergy  Current Gentamicin regimen:  90 mg IV q12h  Day of therapy: 2    Target goals based on synergy dosing  Goal Peak level: 3-4 mg/l  Goal Trough level: <1 mg/L    Current estimated CrCl: Estimated Creatinine Clearance: 144.1 mL/min (based on SCr of 0.94 mg/dL).    Creatinine for last 3 days  2022:  5:45 AM Creatinine 0.89 mg/dL  2022:  5:26 AM Creatinine 0.90 mg/dL;  6:47 AM Creatinine 1.01 mg/dL;  7:43 AM Creatinine 1.04 mg/dL  2022:  4:17 AM Creatinine 0.94 mg/dL    Nephrotoxins and other renal medications (From now, onward)    Start     Dose/Rate Route Frequency Ordered Stop    22 0800  gentamicin (GARAMYCIN) infusion 100 mg         100 mg  over 60 Minutes Intravenous EVERY 12 HOURS 22 5612            Contrast Orders - past 72 hours (72h ago, onward)            None          Aminoglycoside Levels - past 2 days  2022: 10:29 AM Gentamicin 2.2 mg/L;  2:26 PM Gentamicin 1.9 mg/L;  7:02 PM Gentamicin 1.6 mg/L;  9:40 PM Gentamicin 3.3 mg/L    Aminoglycosides IV Administrations (past 72 hours)                   gentamicin (GARAMYCIN) 90 mg in sodium chloride 0.9 % 50 mL intermittent infusion (mg) 90 mg New Bag 22     90 mg New Bag  1147     90 mg New Bag  0427     90 mg New Bag 22 195     90 mg New Bag  1222                Pharmacokinetic Analysis  Calculated Peak level: 3.7 mg/L  Calculated Trough level: 1.39 mg/L  Volume of distribution: 33 L/kg  Half-life: 5 hours        Interpretation of levels and current regimen:  Aminoglycoside levels are outside of goal range    Has serum creatinine changed greater than 50% in the last 72 hours: No    Urine output:  good urine output    Renal function: Stable    Plan  1. Change current dose to 100 mg q12h    2.  Method of evaluation: peak and trough    3.  Pharmacy will continue to follow and check levels  as appropriate in 1-3 Days    Venus Coleman RP

## 2022-04-07 NOTE — PLAN OF CARE
Goal Outcome Evaluation:    Pt continues to have back pain. Oxycodone, dilaudid and robaxin for pain. Ice applied. MRI of lumbar spine done this afternoon. CT's and pacer wires removed today. Insulin gtt stopped and transitioned to lantus and sliding scale insulin. Father at bedside - updated on plan of care. Spouse updated by phone. Cont to monitor.

## 2022-04-07 NOTE — PROGRESS NOTES
"36-year-old gentleman status post tricuspid valve repair secondary to endocarditis.  He was recovering as expected V. fib, V. tach arrest resulting in 1 shock.  Patient had return of spontaneous circulation and is neurologically intact.  This morning on exam patient was lying in bed complaining of some positional back pain.  No further episodes of arrhythmia  Vital signs:  Temp: 98.3  F (36.8  C) Temp src: Oral BP: 111/66 Pulse: 79   Resp: 17 SpO2: 98 % O2 Device: BiPAP/CPAP Oxygen Delivery: 4 LPM   Weight: 119.2 kg (262 lb 12.6 oz)  Estimated body mass index is 37.71 kg/m  as calculated from the following:    Height as of 3/22/22: 1.778 m (5' 10\").    Weight as of this encounter: 119.2 kg (262 lb 12.6 oz).  Awake alert follows commands  Chest clear to auscultation  CV in heart block  Lab Results   Component Value Date    WBC 11.7 (H) 04/07/2022    HGB 7.9 (L) 04/07/2022    HCT 26.8 (L) 04/07/2022    MCV 96 04/07/2022     (H) 04/07/2022   Assessment and plan 36-year-old gentleman status post tricuspid valve repair secondary to endocarditis.  He is awaiting AICD placement when his blood cultures have cleared.  We will continue to monitor the patient in the ICU at this time.  Agree with lidocaine for pain control.  Continue to advance diet as tolerated  Agree with plan to hold beta-blocker given heart block.  Patient does have acute blood loss anemia however there is no indication for transfusion at this time.    Shelton Resendiz MD  Critical Care Medicine        "

## 2022-04-07 NOTE — PROGRESS NOTES
[facility]    Medicine Progress Note - Hospitalist Service        Date of Admission:  3/28/2022 10:33 PM  Date of Service: 04/07/2022    Assessment & Plan:   Anil Montoya is a 36 year old male with hx of DM2, HTN, WARNER, and anxiety who was initially hospitalized at [facility] on 3/22/2022 for severe sepsis initially attributed to pneumonia vs viral infection. He was found to have MSSA bacteremia with ongoing positive cultures. He underwent a TEJINDER on 3/27/2022 which revealed tricuspid endocarditis, and he was transferred to [facility] on 3/28/2022 for CV Surgery consultation.     Sepsis with MSSA bacteremia due to tricuspid valve endocarditis.  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 He was initially treated with ceftriaxone and azithromycin for possible community-acquired pneumonia, and ID was consulted. Blood cultures returned positive for MSSA, and serial blood cultures continued to return positive. Broad work-up including CT abd/pelvis, MR lumbar spine, CT dental, and TTE were unrevealing  * Initially treated with IV ceftriaxone and azithromycin; switched to IV vanco on 3/23 when blood cultures returned with Staph; switched to IV cefazolin on 3/24 upon sensitivities  * Underwent TEJINDER on 3/27 which showed thickened tricuspid valve leaflets with atrial aspect of the septal leaflet with an irregular border and small mobile filamentous elements, large fixed heterogeneous mass in the right ventricle attached to the interventricular septum (2.5 x 2 cm) appearing connected to the tricuspid subvalvular annular apparatus with a pedunculated round mobile element (1.8 x 1 cm).  No significant tricuspid regurgitation reported. LVEF 55 to 60%. Negative bubble study.  * Transferred to [facility] on 3/28 for CV Surgery consult  - Patient underwent tricuspid valve  replacement with bioprosthetic valve (31 mm epic stented valve) on 4/1/2022 and endoscopic aortic valve exploration.  - Initially micafungin added on 4/1 as Gram stain from tissue from heart valve from 4/ 1 initially was reported as yeast, which was subsequently corrected as not to be present.  Micafungin now discontinued.  - Continues on IV cefazolin and given new prosthetic valve and positive blood cultures gentamicin added ( synergy dosing ) until he clears the BCs  - Daily blood cultures, last positive was from 3/31  - ID, Cardiology, CV Surgery following  - Patient was initially transferred out of the intensive care unit on 4/4/2022 after he was stable however patient had an episode of V. fib arrest and was transferred back to the ICU.     V. fib arrest(4/5/2022)  -Patient was noted to have V. fib on telemetry earlier this morning  -Required brief CPR and 1 shock of 120 J with successful restoration of regular rhythm  -Electrolytes appear to be within normal limits  -Patient transferred back to the intensive care unit for close monitoring  -EP following -> plan on implanting a dual-chamber ICD for secondary prevention, probably early next week to be absolutely certain blood cultures have cleared.  If AV conduction improves, may consider a subcutaneous ICD sooner   -ECHO -> LVEF was normal by echocardiography (technically difficult study)    Post-op complete heart block  -Patient initially was in second-degree AV block, and now appears to have complete heart block.  -EP following, given good junctional escape no plans for immediate permanent pacemaker implantation, continue to monitor on telemetry closely    DM2 without complications, long-term insulin use, uncontrolled  * A1c 12.6. Had not been taking his diabetes medications for some time  -appears prior to surgery he was requiring Lantus 34 units twice a day and mealtime coverage of 1: 10. -> but reports he was only taking Victoza and Metformin due to job  requirements (can't take Insulin)  Plan:  - Transition -> insulin drip with prandial coverage to lantus 40U in AM and 10U in PM  - sliding scale insulin   - Hypoglycemia protocol    Acute postop blood loss anemia  -Hemoglobin preop was around 12 which had slowly trended down.  Hemoglobin dropped to 7.3 on 4/4, most likely acute blood loss from surgery.    Plan:  -Hemoglobin is lower but stable at 7.9 today  -Continue to monitor and transfuse as needed.    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 RUQ US (3/22) showed fatty liver, but normal gallbladder and normal bile ducts  - LFTs improved.      Essential hypertension  - Hold prior to admission losartan, likely will resume in the next day or 2.     WARNER  * CPAP per home settings     Hypothyroidism  * Chronic and stable on levothyroxine     Generalized anxiety disorder  ADHD  * Chronic and stable on bupropion      Diet: Moderate Consistent Carb (60 g CHO per Meal) Diet     DVT Prophylaxis: Pneumatic Compression Devices   Schwartz Catheter: Not present  Code Status: Full Code     Disposition Plan    Expected Discharge: 04/11/2022     Anticipated discharge location: home with family    Delays:     Other (Add Comment)          Entered: Edgar Huff MD 04/07/2022, 1:38 PM        Clinically Significant Risk Factors Present on Admission                    The patient's care was discussed with the Bedside Nurse and Patient.    Joseph Iraheta MD  Hospitalist Service  [facility]  Text Page 7AM-6PM  Securely message with the Vocera Web Console (learn more here)  Text page via [employer] Paging/Directory    ______________________________________________________________________    Interval History  "    No acute events overnight  CP controlled today  Denies lightheadedness or dizziness.  No fevers or chills  No nausea / vomiting or abdominal pain    Physical Exam   Vital signs:  Temp: 98.5  F (36.9  C) Temp src: Oral BP: 132/81 Pulse: 88   Resp: 16 SpO2: 97 % O2 Device: BiPAP/CPAP Oxygen Delivery: 4 LPM   Weight: 119.2 kg (262 lb 12.6 oz)  Estimated body mass index is 37.71 kg/m  as calculated from the following:    Height as of 3/22/22: 1.778 m (5' 10\").    Weight as of this encounter: 119.2 kg (262 lb 12.6 oz).      Wt Readings from Last 2 Encounters:   04/07/22 119.2 kg (262 lb 12.6 oz)   03/28/22 121.1 kg (267 lb)       Gen: AAOX3, NAD, comfortable  HEENT:  no pallor  Resp: CTA B/L, normal WOB  CVS: RRR, no murmur, chest tube +  Abd/GI: Soft, non-tender. BS- normoactive.   Skin: Warm, dry no rashes  MSK: Trace-1+ pedal edema  Neuro- CN- intact. No focal deficits.      Data   Recent Labs   Lab 04/07/22  1301 04/07/22  1159 04/07/22  1111 04/07/22  0550 04/07/22  0532 04/06/22  0608 04/06/22  0417 04/05/22  0750 04/05/22  0743 04/05/22  0647 04/05/22  0645 04/01/22  1440 04/01/22  1300 04/01/22  1143 04/01/22  1141   WBC  --   --   --   --  11.7*  --  10.0  --  14.6*  --  16.2*   < > 38.3*  --  30.4*   HGB  --   --   --   --  7.9*  --  7.3*  --  7.4*  --  8.0*   < > 9.0*   < > 8.5*   MCV  --   --   --   --  96  --  96  --  92  --  94   < > 94  --  94   PLT  --   --   --   --  633*  --  605*  --  582*  --  883*   < > 566*  --  442   INR  --   --   --   --   --   --   --   --   --   --  1.17*  --  1.55*  --  1.64*   NA  --   --   --   --  138  --  136  --  135   < >  --    < > 135   < > 134   POTASSIUM  --   --   --   --  4.0  --  4.0  --  3.8   < >  --    < > 5.1  5.1   < > 5.1   CHLORIDE  --   --   --   --  104  --  104  --  102   < >  --    < > 107  --  105   CO2  --   --   --   --  27  --  27  --  27   < >  --    < > 24  --  25   BUN  --   --   --   --  17  --  18  --  20   < >  --    < > 27  --  24   CR  " --   --   --   --  0.93  --  0.94  --  1.04   < >  --    < > 1.04  --  1.05   ANIONGAP  --   --   --   --  7  --  5  --  6   < >  --    < > 4  --  4   IMAN  --   --   --   --  8.9  --  8.3*  --  8.5   < >  --    < > 8.4*  --  8.9   * 200* 188*   < > 113*   < > 105*   < > 147*   < >  --    < > 250*   < > 259*   ALBUMIN  --   --   --   --  1.8*  --  1.6*  --  1.7*   < >  --    < > 1.7*  --   --    PROTTOTAL  --   --   --   --  7.2  --  6.8  --  7.1   < >  --    < > 6.4*  --   --    BILITOTAL  --   --   --   --  0.3  --  0.2  --  0.3   < >  --    < > 0.5  --   --    ALKPHOS  --   --   --   --  102  --  105  --  119   < >  --    < > 106  --   --    ALT  --   --   --   --  72*  --  67  --  73*   < >  --    < > 81*  --   --    AST  --   --   --   --  73*  --  72*  --  105*   < >  --    < > 63*  --   --     < > = values in this interval not displayed.       No results found for this or any previous visit (from the past 24 hour(s)).  Medications     dextrose       insulin regular 4 Units/hr (04/07/22 1302)     BETA BLOCKER NOT PRESCRIBED       sodium chloride 10 mL/hr (04/07/22 0804)       aspirin  81 mg Oral or NG Tube Daily     buPROPion  300 mg Oral QAM     ceFAZolin  2 g Intravenous Q8H     gabapentin  100 mg Oral TID     gentamicin  100 mg Intravenous Q12H     heparin ANTICOAGULANT  5,000 Units Subcutaneous Q8H     insulin aspart  1-10 Units Subcutaneous TID AC     insulin aspart  1-7 Units Subcutaneous At Bedtime     [START ON 4/8/2022] insulin aspart   Subcutaneous Daily with breakfast     insulin aspart   Subcutaneous Daily with lunch     insulin aspart   Subcutaneous Daily with supper     insulin glargine  40 Units Subcutaneous QAM AC     [START ON 4/8/2022] levothyroxine  125 mcg Oral Once per day on Mon Tue Wed Thu Fri Sat     levothyroxine  250 mcg Oral Weekly     lidocaine  2 patch Transdermal Q24H     lidocaine   Transdermal Q8H     losartan  25 mg Oral Daily     methocarbamol  750 mg Oral Q6H      pantoprazole  40 mg Oral Daily     polyethylene glycol  17 g Oral BID     senna-docusate  1 tablet Oral BID

## 2022-04-07 NOTE — PROGRESS NOTES
Intensivist cross-cover:  Asked to review MRI of spinal column.  Read shows possible osteo.  Discussed with Dr. Bell of ID.  Changing ancef to nafcillin for better cns penetration.  Continue gentamycin.  Notified LUTHER Hope of the CV surgery svc.

## 2022-04-07 NOTE — PROGRESS NOTES
EP Progress Note          Assessment and Plan:   Anil Montoya is a 36-year-old male with DM type II (uncontrolled, HgA1c >12), hypertension, VIVEK, ADHD, hypothyroidism who developed MSSA tricuspid valve endocarditis.  Underwent Tricuspid valve replacement (31mm Epic stented valve) with debridement on 4/1/2022. He developed complete AV block after the procedure with accelerated junctional rhythm, heart rate in the 80s.     He was transferred from the ICU to station 33 on 04/05/22.  On 4/5 he suffered cardiac arrest.  Telemetry at the time showed regular rhythm in the 80s with a PVC followed by initiation of polymorphic VT/VF.  He was successfully resuscitated (required 120 J defibrillation) and was transferred back to the ICU. No apparent culprit for QT prolongation.     On 4/5/2022, one of his 4/3/2022 blood cultures became positive for GPC. Gentamicin was added to cefazolin by ID.     Echo on 4/5 was a technically difficult study EF 55-60%, normal inferior wall motion, septal motion abnormality often seen in postthoracotomy state.     He remains in complete AV block with accelerated junctional escape rhythm.  No further ventricular arrhythmia is noted.     PLAN:  Avoid beta blockers r/t AV block  Hopeful that AV conduction will improve over the weekend. Pt will need an ICD before d/c.   Pt is requesting that the generator be placed lower in the left pectoral pocket since he enjoys shooting a shotgun using his left shoulder.  IF conduction is restored a subcutaneous ICD will be considered.     Will plan to proceed with ICD for secondary prevention next week if ok with ID.    Marissa Alvarez, CNP        Interval History:   Pt feeling well, but having back discomfort. Is ambulating with assistance and sitting in the chair during the day. Plan for chest tubes and pacing wires to be pulled today.  VSSA. Tele shows accelerated junctional in the 80's.       Medications:       aspirin  81 mg Oral or NG Tube Daily      buPROPion  300 mg Oral QAM     ceFAZolin  2 g Intravenous Q8H     gabapentin  100 mg Oral TID     gentamicin  100 mg Intravenous Q12H     heparin ANTICOAGULANT  5,000 Units Subcutaneous Q8H     insulin aspart   Subcutaneous TID w/meals     levothyroxine  125 mcg Oral Once per day on Mon Tue Wed Thu Fri Sat     levothyroxine  250 mcg Oral Weekly     lidocaine  2 patch Transdermal Q24H     lidocaine   Transdermal Q8H     losartan  25 mg Oral Daily     methocarbamol  750 mg Oral Q6H     pantoprazole  40 mg Oral or NG Tube Daily    Or     pantoprazole  40 mg Oral Daily     polyethylene glycol  17 g Oral BID     senna-docusate  1 tablet Oral BID             Review of Systems: If done, described below  The Review of Systems is negative other than noted in the HPI             Physical Exam:   Blood pressure 111/66, pulse 79, temperature 98.3  F (36.8  C), temperature source Oral, resp. rate 17, weight 119.2 kg (262 lb 12.6 oz), SpO2 98 %.        Vital Sign Ranges  Temperature Temp  Av.1  F (37.3  C)  Min: 98.3  F (36.8  C)  Max: 101.1  F (38.4  C)   Blood pressure Systolic (24hrs), Av , Min:103 , Max:143        Diastolic (24hrs), Av, Min:61, Max:82      Pulse Pulse  Av.4  Min: 79  Max: 95   Respirations Resp  Av.2  Min: 9  Max: 24   Pulse oximetry SpO2  Av.1 %  Min: 93 %  Max: 98 %         Intake/Output Summary (Last 24 hours) at 2022 0838  Last data filed at 2022 0600  Gross per 24 hour   Intake 2172.65 ml   Output 3055 ml   Net -882.35 ml       Constitutional:   in no apparent distress     Lungs:   symmetric, clear to auscultation     Cardiovascular:   regular rate and rhythm, normal S1 and S2 and no murmur noted     Extremities and Back:   No edema              Data:     Lab Results   Component Value Date    WBC 11.7 (H) 2022    HGB 7.9 (L) 2022    HCT 26.8 (L) 2022     (H) 2022     2022    POTASSIUM 4.0 2022    CHLORIDE 104 2022     CO2 27 04/07/2022    BUN 17 04/07/2022    CR 0.93 04/07/2022     (H) 04/07/2022    NTBNPI 138 01/01/2013    TROPONIN 0.00 01/20/2014    TROPI <0.015 11/25/2017    AST 73 (H) 04/07/2022    ALT 72 (H) 04/07/2022    ALKPHOS 102 04/07/2022    BILITOTAL 0.3 04/07/2022    INR 1.17 (H) 04/05/2022

## 2022-04-07 NOTE — PLAN OF CARE
sc  Patient Name: Ainl Montoya   MRN: [id number]   Date of Admission: 3/28/2022    Procedure: Procedure(s):  TRICUSPID VALVE REPLACEMENT  AORTIC VALVE exploration    Post Op day #:6    Subjective (Patient focus/Primary Problem for shift): pain control          Pain Goal3 Pain Rating3-8           Pain Medication/ Regime effective to reduce patient painOxy, Dilaudid, robaxin, ice, lidocaine patch    Objective (Physical assessment):           Rhythm:  accelerated junctional            Bowel Activity: yes if Yes indicate when: 4/5          Bowel Medications: yes, pt refusing miralax, stating it makes him very gassy          Incision: healing well          Incentive Spirometry Q 1-2 hour when awake:  yes Volume: 1250          Epicardial Pacing Wires:  yes, on standby            Patient Activity:           Up to chair for meals: yes          Ambulation with RN x2 (Not including CR): yes            Is patient in home clothes:no             Chest Tubes                 Mediastinal: yes Draining: yes, scant amount              Suction: yes   Dressing Change Daily:yes                      Urinary Catheter: no    Assessment (Nursing primary shift focus): Patient observed sleeping throughout the shift. Insulin ggt if off.Appropriate interventions for patient's safety remain in place.Will continue to monitor.           Lisa Umanzor RN   4/7/2022   4:50 AM

## 2022-04-07 NOTE — PROGRESS NOTES
[facility]  Cardiovascular and Thoracic Surgery Daily Note          Assessment and Plan:   Anil Montoya is a 36 year old gentleman who presented to [facility] ED on 3/22/22 with weakness, recent hematuria and shortness of breath. Workup demonstrated MSSA bacteremia likely secondary to right groin skin source. TEJINDER on 3/27/22 demonstrated tricuspid valve endocarditis. In completing full work up, there was concern for discitis with seeding however MRI negative. He was transferred on 3/28/22 to [facility] for consideration of surgical intervention.      PMH includes: Poorly controlled diabetic, HTN, hypothyroidism, obesity, WARNER, anxiety, cryptococcal pneumonia in 2012 with previous history of staph aureus bacteremia.     Most recent echo demonstrates LVEF 55-60%, normal RV function, mildly thickened cusps on aortic valve and thickened tricuspid valve leaflets, small mobile filamentous elements on the septal leaflet of TV and larged fixed heterogeneous mass in the RV attached to the interventricular septum.  Coronary angiogram 3/31/22 with no e/o CAD.     Course c/b CHB with accelerated junctional rhythm and polymorphic VT arrest on 4/5 with immediate ROSC after defib x1 and transient CPR.        POD #6 s/p:  1. Tricuspid valve replacement (31mm Epic stented valve)  2. Endoscopic aortic valve exploration  3. Transesophageal echocardiogram on 4/1/22 with Dr. 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 Did not need intubation and neurologically intact. Electrolytes stable: K+ 3.7, Mg 2. Appreciate EP's involvement. Troponin elevated as expected after event.  Echocardiogram on 4/5 stable but poor images to evaluate tricuspid gradient  [PTA meds on hold: losartan 50 mg daily]  HD stable  overnight in accelerated junctional rhythm  - ASA 81 mg daily  - Defer BB given heart block  - Losartan 25 mg daily and up titrate PRN  - No statin indicated  - ID as below  - Consulted EP, appreciate recommendations, plan for ICD once blood is sterile  - CTs/wires removed this am, discussed with EP  -ORTHO: Having new lower back pain, similar to what he had when he came in, given new positive blood cultures, will rescan.      Resp:   WARNER  History of cryptococcal pneumonia  Extubated on POD 0, now saturating well on room air  - Continue pulm hygiene efforts: IS/flutter valve/mobility  - Titrate O2 to maintain sats >92%  - CPAP at HS     Neuro/MSK:    Acute postoperative pain  MDD/VIVEK  - Better controlled with current regimen this AM, received toradol x1  - Sore after a few compressions with CPR but overall tolerable  - Robaxin scheduled, lidocaine patches added  - PTA Wellbutrin resumed  - Sternal incision and area intact and feels stable, wires intact on CXR, continue sternal precautions     Renal/Electrolyte:   Creat stable, below preoperative weight, has less edema  Autodiuresing  - Strict I/O, daily weights  - Avoid/limit nephrotoxins as able  - Replete lytes per protocol  - Defer on diuresis today     GI/Nutrition:    Mildly elevated LFTs  - Tolerating current diet:  Orders Placed This Encounter      Moderate Consistent Carb (60 g CHO per Meal) Diet   - Continue bowel regimen, no BM yet, + flatus  - LFTs had normalized, AST slightly elevated after arrest, trend  - PPI     :    - Voiding well on own     Endo:  Stress induced hyperglycemia   Poorly controlled DM2  Hypothyroidism  Preop A1c         Lab Results   Component Value Date     A1C 12.6 03/27/2022    [PTA meds on hold: Victoza and metformin]  - Was on insulin infusion, transitioned to sliding scale insulin and lantus 4/2, hyperglycemic, resumed insulin infusion POD 2 with prandial coverage. Continue close monitoring of blood sugars to allow for  healing  - Hospitalists managing diabetic regimen, appreciate  - Goal BG <180 for optimal healing  - PTA levothyroxine resumed     ID:  Stress induced leukocytosis, resolved  Tricuspid valve endocarditis  MSSA bacteremia  History cryptococcal pneumonia  WBC 11.7<10, Temp (24hrs), Av.1  F (37.3  C), Min:98.3  F (36.8  C), Max:101.1  F (38.4  C); no s/sx infection  Prelim intraop gram stain positive for yeast from tricuspid valve, culture with 1+ staph aureus, no yeast, continue to follow  BC 4/3 positive for staph aureus, continue to follow  BC  with NGTD thus far  Fungal BC sent on  with NGTD  Full skin inspection  with no new wounds  - Completing perioperative ABX  - Continue Cefazolin per ID, Gentamycin added  for new positive culture  - Micafungin started  per ID for positive yeast on gram stain, discontinued with negative cx on 4/3  - HIV antigen/antibody test negative  - Per ID: repeat blood cultures x 2 sets daily until clear  - Continue to follow fever curve, WBC and inflammatory markers as appropriate     Heme:  Acute blood loss anemia  Acute blood loss thrombocytopenia  Hgb 7.9, Plts 633; no s/sx active bleeding  - CBC in AM  - Goal Hgb >7     -Proph: PPI, subcutaneous heparin, SCDs  -Dispo: Transferred back to ICU given code blue, continue therapies, pulm hygiene, discussed with EP will remove wires/tubes and scan back today          Interval History:   Lying in bed, breathing stable, tolerating diet, working with rehab, will remove CT's/wires this am and scan back today         Medications:       aspirin  81 mg Oral or NG Tube Daily     buPROPion  300 mg Oral QAM     ceFAZolin  2 g Intravenous Q8H     gabapentin  100 mg Oral TID     gentamicin  100 mg Intravenous Q12H     heparin ANTICOAGULANT  5,000 Units Subcutaneous Q8H     insulin aspart   Subcutaneous TID w/meals     levothyroxine  125 mcg Oral Once per day on  Sat     levothyroxine  250 mcg Oral Weekly      lidocaine  2 patch Transdermal Q24H     lidocaine   Transdermal Q8H     losartan  25 mg Oral Daily     methocarbamol  750 mg Oral Q6H     pantoprazole  40 mg Oral or NG Tube Daily    Or     pantoprazole  40 mg Oral Daily     polyethylene glycol  17 g Oral BID     senna-docusate  1 tablet Oral BID     @MEDSPRN-@          Physical Exam:   Vitals were reviewed  Blood pressure 111/66, pulse 79, temperature 98.3  F (36.8  C), temperature source Oral, resp. rate 17, weight 119.2 kg (262 lb 12.6 oz), SpO2 98 %.  Rhythm: NSR    Lungs: course    Cardiovascular: s1/s2, no m/r/g    Abdomen: s/nt/nd    Extremeties: trace LE edema    Incision: cdi    CT: na    Weight:   Vitals:    04/04/22 0600 04/04/22 1739 04/05/22 0553 04/06/22 0600   Weight: 117.1 kg (258 lb 2.5 oz) 119.1 kg (262 lb 8 oz) 118.4 kg (261 lb 1.6 oz) 120.5 kg (265 lb 10.5 oz)    04/07/22 0400   Weight: 119.2 kg (262 lb 12.6 oz)            Data:   Labs:   Lab Results   Component Value Date    WBC 11.7 04/07/2022    WBC 6.8 11/24/2017     Lab Results   Component Value Date    RBC 2.79 04/07/2022    RBC 5.16 11/24/2017     Lab Results   Component Value Date    HGB 7.9 04/07/2022    HGB 15.2 11/24/2017     Lab Results   Component Value Date    HCT 26.8 04/07/2022    HCT 43.7 11/24/2017     No components found for: MCT  Lab Results   Component Value Date    MCV 96 04/07/2022    MCV 85 11/24/2017     Lab Results   Component Value Date    MCH 28.3 04/07/2022    MCH 29.5 11/24/2017     Lab Results   Component Value Date    MCHC 29.5 04/07/2022    MCHC 34.8 11/24/2017     Lab Results   Component Value Date    RDW 15.4 04/07/2022    RDW 12.9 11/24/2017     Lab Results   Component Value Date     04/07/2022     11/24/2017       Last Basic Metabolic Panel:  Lab Results   Component Value Date     04/07/2022     11/24/2017      Lab Results   Component Value Date    POTASSIUM 4.0 04/07/2022    POTASSIUM 3.8 11/24/2017     Lab Results   Component Value  Date    CHLORIDE 104 04/07/2022    CHLORIDE 104 11/24/2017     Lab Results   Component Value Date    IMAN 8.9 04/07/2022    IMAN 8.3 11/24/2017     Lab Results   Component Value Date    CO2 27 04/07/2022    CO2 24 11/24/2017     Lab Results   Component Value Date    BUN 17 04/07/2022    BUN 18 11/24/2017     Lab Results   Component Value Date    CR 0.93 04/07/2022    CR 1.07 11/24/2017     Lab Results   Component Value Date     04/07/2022     04/07/2022     11/24/2017       Seen and discussed with Dr. Linh Perez PATrinyC  Pager #: [phone]

## 2022-04-08 ENCOUNTER — APPOINTMENT (OUTPATIENT)
Dept: OCCUPATIONAL THERAPY | Facility: CLINIC | Age: 37
DRG: 853 | End: 2022-04-08
Attending: HOSPITALIST
Payer: COMMERCIAL

## 2022-04-08 LAB
ANION GAP SERPL CALCULATED.3IONS-SCNC: 8 MMOL/L (ref 3–14)
BACTERIA BLD CULT: NO GROWTH
BACTERIA TISS BX CULT: NORMAL
BACTERIA TISS BX CULT: NORMAL
BUN SERPL-MCNC: 21 MG/DL (ref 7–30)
CALCIUM SERPL-MCNC: 8.6 MG/DL (ref 8.5–10.1)
CHLORIDE BLD-SCNC: 103 MMOL/L (ref 94–109)
CO2 SERPL-SCNC: 26 MMOL/L (ref 20–32)
CREAT SERPL-MCNC: 0.99 MG/DL (ref 0.66–1.25)
ERYTHROCYTE [DISTWIDTH] IN BLOOD BY AUTOMATED COUNT: 15.9 % (ref 10–15)
GENTAMICIN SERPL-MCNC: 0.4 MG/L
GENTAMICIN SERPL-MCNC: 3.3 MG/L
GFR SERPL CREATININE-BSD FRML MDRD: >90 ML/MIN/1.73M2
GLUCOSE BLD-MCNC: 174 MG/DL (ref 70–99)
GLUCOSE BLDC GLUCOMTR-MCNC: 115 MG/DL (ref 70–99)
GLUCOSE BLDC GLUCOMTR-MCNC: 159 MG/DL (ref 70–99)
GLUCOSE BLDC GLUCOMTR-MCNC: 160 MG/DL (ref 70–99)
HCT VFR BLD AUTO: 25.8 % (ref 40–53)
HGB BLD-MCNC: 7.9 G/DL (ref 13.3–17.7)
MAGNESIUM SERPL-MCNC: 2.1 MG/DL (ref 1.6–2.3)
MCH RBC QN AUTO: 28.9 PG (ref 26.5–33)
MCHC RBC AUTO-ENTMCNC: 30.6 G/DL (ref 31.5–36.5)
MCV RBC AUTO: 95 FL (ref 78–100)
PHOSPHATE SERPL-MCNC: 5.1 MG/DL (ref 2.5–4.5)
PLATELET # BLD AUTO: 609 10E3/UL (ref 150–450)
POTASSIUM BLD-SCNC: 4.3 MMOL/L (ref 3.4–5.3)
RBC # BLD AUTO: 2.73 10E6/UL (ref 4.4–5.9)
SODIUM SERPL-SCNC: 137 MMOL/L (ref 133–144)
WBC # BLD AUTO: 10.1 10E3/UL (ref 4–11)

## 2022-04-08 PROCEDURE — 84100 ASSAY OF PHOSPHORUS: CPT | Performed by: NURSE PRACTITIONER

## 2022-04-08 PROCEDURE — G0463 HOSPITAL OUTPT CLINIC VISIT: HCPCS | Performed by: PHYSICIAN ASSISTANT

## 2022-04-08 PROCEDURE — 85027 COMPLETE CBC AUTOMATED: CPT | Performed by: NURSE PRACTITIONER

## 2022-04-08 PROCEDURE — 250N000013 HC RX MED GY IP 250 OP 250 PS 637: Performed by: NURSE PRACTITIONER

## 2022-04-08 PROCEDURE — 87040 BLOOD CULTURE FOR BACTERIA: CPT | Performed by: NURSE PRACTITIONER

## 2022-04-08 PROCEDURE — 83010 ASSAY OF HAPTOGLOBIN QUANT: CPT | Performed by: HOSPITALIST

## 2022-04-08 PROCEDURE — 36415 COLL VENOUS BLD VENIPUNCTURE: CPT | Performed by: HOSPITALIST

## 2022-04-08 PROCEDURE — 97110 THERAPEUTIC EXERCISES: CPT | Mod: GO

## 2022-04-08 PROCEDURE — 250N000011 HC RX IP 250 OP 636: Performed by: NURSE PRACTITIONER

## 2022-04-08 PROCEDURE — 93010 ELECTROCARDIOGRAM REPORT: CPT | Performed by: INTERNAL MEDICINE

## 2022-04-08 PROCEDURE — 99233 SBSQ HOSP IP/OBS HIGH 50: CPT | Mod: 24 | Performed by: INTERNAL MEDICINE

## 2022-04-08 PROCEDURE — 200N000001 HC R&B ICU

## 2022-04-08 PROCEDURE — 250N000013 HC RX MED GY IP 250 OP 250 PS 637: Performed by: PHYSICIAN ASSISTANT

## 2022-04-08 PROCEDURE — 93005 ELECTROCARDIOGRAM TRACING: CPT

## 2022-04-08 PROCEDURE — 250N000011 HC RX IP 250 OP 636: Performed by: HOSPITALIST

## 2022-04-08 PROCEDURE — 80170 ASSAY OF GENTAMICIN: CPT | Performed by: HOSPITALIST

## 2022-04-08 PROCEDURE — 36415 COLL VENOUS BLD VENIPUNCTURE: CPT | Performed by: NURSE PRACTITIONER

## 2022-04-08 PROCEDURE — 83735 ASSAY OF MAGNESIUM: CPT | Performed by: NURSE PRACTITIONER

## 2022-04-08 PROCEDURE — 250N000013 HC RX MED GY IP 250 OP 250 PS 637: Performed by: HOSPITALIST

## 2022-04-08 PROCEDURE — 80048 BASIC METABOLIC PNL TOTAL CA: CPT | Performed by: PHYSICIAN ASSISTANT

## 2022-04-08 PROCEDURE — 250N000009 HC RX 250: Performed by: INTERNAL MEDICINE

## 2022-04-08 PROCEDURE — 99233 SBSQ HOSP IP/OBS HIGH 50: CPT | Performed by: HOSPITALIST

## 2022-04-08 PROCEDURE — 36415 COLL VENOUS BLD VENIPUNCTURE: CPT | Performed by: PHYSICIAN ASSISTANT

## 2022-04-08 RX ORDER — METHOCARBAMOL 500 MG/1
1000 TABLET, FILM COATED ORAL EVERY 6 HOURS
Status: DISCONTINUED | OUTPATIENT
Start: 2022-04-08 | End: 2022-04-18 | Stop reason: HOSPADM

## 2022-04-08 RX ADMIN — NAFCILLIN 2 G: 2 POWDER, FOR SOLUTION INTRAMUSCULAR; INTRAVENOUS at 14:26

## 2022-04-08 RX ADMIN — NAFCILLIN 2 G: 2 POWDER, FOR SOLUTION INTRAMUSCULAR; INTRAVENOUS at 10:11

## 2022-04-08 RX ADMIN — METHOCARBAMOL 1000 MG: 500 TABLET, FILM COATED ORAL at 16:10

## 2022-04-08 RX ADMIN — BUPROPION HYDROCHLORIDE 300 MG: 300 TABLET, EXTENDED RELEASE ORAL at 07:57

## 2022-04-08 RX ADMIN — ACETAMINOPHEN 650 MG: 325 TABLET, FILM COATED ORAL at 04:04

## 2022-04-08 RX ADMIN — GENTAMICIN SULFATE 100 MG: 100 INJECTION, SOLUTION INTRAVENOUS at 08:13

## 2022-04-08 RX ADMIN — ACETAMINOPHEN 650 MG: 325 TABLET, FILM COATED ORAL at 18:26

## 2022-04-08 RX ADMIN — GENTAMICIN SULFATE 100 MG: 100 INJECTION, SOLUTION INTRAVENOUS at 20:28

## 2022-04-08 RX ADMIN — METHOCARBAMOL 750 MG: 750 TABLET ORAL at 04:04

## 2022-04-08 RX ADMIN — SENNOSIDES AND DOCUSATE SODIUM 1 TABLET: 50; 8.6 TABLET ORAL at 19:50

## 2022-04-08 RX ADMIN — NAFCILLIN 2 G: 2 POWDER, FOR SOLUTION INTRAMUSCULAR; INTRAVENOUS at 01:32

## 2022-04-08 RX ADMIN — OXYCODONE HYDROCHLORIDE 10 MG: 5 TABLET ORAL at 20:28

## 2022-04-08 RX ADMIN — ACETAMINOPHEN 650 MG: 325 TABLET, FILM COATED ORAL at 12:16

## 2022-04-08 RX ADMIN — LIDOCAINE 2 PATCH: 560 PATCH PERCUTANEOUS; TOPICAL; TRANSDERMAL at 07:56

## 2022-04-08 RX ADMIN — OXYCODONE HYDROCHLORIDE 10 MG: 5 TABLET ORAL at 07:57

## 2022-04-08 RX ADMIN — HEPARIN SODIUM 5000 UNITS: 5000 INJECTION, SOLUTION INTRAVENOUS; SUBCUTANEOUS at 06:10

## 2022-04-08 RX ADMIN — NAFCILLIN 2 G: 2 POWDER, FOR SOLUTION INTRAMUSCULAR; INTRAVENOUS at 06:10

## 2022-04-08 RX ADMIN — NAFCILLIN 2 G: 2 POWDER, FOR SOLUTION INTRAMUSCULAR; INTRAVENOUS at 21:08

## 2022-04-08 RX ADMIN — LOSARTAN POTASSIUM 25 MG: 25 TABLET, FILM COATED ORAL at 08:10

## 2022-04-08 RX ADMIN — ASPIRIN 81 MG CHEWABLE TABLET 81 MG: 81 TABLET CHEWABLE at 07:57

## 2022-04-08 RX ADMIN — PANTOPRAZOLE SODIUM 40 MG: 40 TABLET, DELAYED RELEASE ORAL at 07:57

## 2022-04-08 RX ADMIN — HEPARIN SODIUM 5000 UNITS: 5000 INJECTION, SOLUTION INTRAVENOUS; SUBCUTANEOUS at 14:26

## 2022-04-08 RX ADMIN — GABAPENTIN 100 MG: 100 CAPSULE ORAL at 14:26

## 2022-04-08 RX ADMIN — OXYCODONE HYDROCHLORIDE 10 MG: 5 TABLET ORAL at 16:10

## 2022-04-08 RX ADMIN — GABAPENTIN 100 MG: 100 CAPSULE ORAL at 19:50

## 2022-04-08 RX ADMIN — ACETAMINOPHEN 650 MG: 325 TABLET, FILM COATED ORAL at 07:57

## 2022-04-08 RX ADMIN — OXYCODONE HYDROCHLORIDE 5 MG: 5 TABLET ORAL at 04:04

## 2022-04-08 RX ADMIN — LEVOTHYROXINE SODIUM 125 MCG: 125 TABLET ORAL at 06:10

## 2022-04-08 RX ADMIN — GABAPENTIN 100 MG: 100 CAPSULE ORAL at 07:56

## 2022-04-08 RX ADMIN — OXYCODONE HYDROCHLORIDE 10 MG: 5 TABLET ORAL at 12:16

## 2022-04-08 RX ADMIN — NAFCILLIN 2 G: 2 POWDER, FOR SOLUTION INTRAMUSCULAR; INTRAVENOUS at 18:11

## 2022-04-08 RX ADMIN — METHOCARBAMOL 1000 MG: 500 TABLET, FILM COATED ORAL at 21:08

## 2022-04-08 RX ADMIN — METHOCARBAMOL 1000 MG: 500 TABLET, FILM COATED ORAL at 10:11

## 2022-04-08 RX ADMIN — INSULIN GLARGINE 40 UNITS: 100 INJECTION, SOLUTION SUBCUTANEOUS at 08:03

## 2022-04-08 RX ADMIN — HEPARIN SODIUM 5000 UNITS: 5000 INJECTION, SOLUTION INTRAVENOUS; SUBCUTANEOUS at 21:08

## 2022-04-08 ASSESSMENT — ACTIVITIES OF DAILY LIVING (ADL)
ADLS_ACUITY_SCORE: 9
ADLS_ACUITY_SCORE: 7
ADLS_ACUITY_SCORE: 9
ADLS_ACUITY_SCORE: 7
ADLS_ACUITY_SCORE: 9
ADLS_ACUITY_SCORE: 9
ADLS_ACUITY_SCORE: 7
ADLS_ACUITY_SCORE: 9

## 2022-04-08 NOTE — PROGRESS NOTES
"36-year-old gentleman status post tricuspid valve repair secondary to endocarditis.  He was recovering as expected V. fib, V. tach arrest resulting in 1 shock.  Patient had return of spontaneous circulation and is neurologically intact.  This morning on exam patient was sitting in chair  complaining of some positional back pain.  No further episodes of arrhythmia.  Antibiotics adjusted given concern of osteo of spine  Vital signs:  Temp: 97.5  F (36.4  C) Temp src: Oral BP: 129/78 Pulse: 78   Resp: 10 SpO2: 98 %   Oxygen Delivery: 4 LPM   Weight: 119.2 kg (262 lb 12.6 oz)  Estimated body mass index is 37.71 kg/m  as calculated from the following:    Height as of 3/22/22: 1.778 m (5' 10\").    Weight as of this encounter: 119.2 kg (262 lb 12.6 oz).  Awake alert follows commands  Chest clear to auscultation  CV in heart block  Lab Results   Component Value Date    WBC 10.1 04/08/2022    HGB 7.9 (L) 04/08/2022    HCT 25.8 (L) 04/08/2022    MCV 95 04/08/2022     (H) 04/08/2022   Assessment and plan 36-year-old gentleman status post tricuspid valve repair secondary to endocarditis.  He is awaiting AICD placement when his blood cultures have cleared.  We will continue to monitor the patient in the ICU at this time.  Agree with lidocaine for pain control.  Continue to advance diet as tolerated  Agree with plan to hold beta-blocker given heart block.  Patient does have acute blood loss anemia however there is no indication for transfusion at this time.  Appreciate ID and spine input given osteo of spine  Will continue to follow in the periphery    Shelton Resendiz MD  Critical Care Medicine        "

## 2022-04-08 NOTE — CONSULTS
Consult Date: 04/08/2022    ORTHOPEDIC SURGERY CONSULTATION    HISTORY OF PRESENT ILLNESS:  Anil Montoya is a 36-year-old gentleman whom I was asked to see in consultation relative to his spine.  He is a gentleman with uncontrolled diabetes with a history of hemoglobin A1c of greater than 12%.  He was recently hospitalized because of methicillin-sensitive Staph aureus bacteremia and noted to have tricuspid endocarditis.  He was having back pain at that time.  This was about 2 weeks ago, although evidently an MRI scan at that time was negative.  He has now undergone tricuspid valve replacement and aortic valve that exploration on 04/01/2022.  Since that time, his back pain reoccurred.  This is worse when he tries to move about and is centered all across his low back.  It does not radiate distally down his legs.  There is no associated numbness or weakness.  He has had his Schwartz catheter removed and is able to urinate on his own.  Pain is worse when he does try to move about.  He had a repeat MRI scan, which now is highly suggestive for diskitis at L5-S1 with probable sacral osteomyelitis in the right sacral ala.    EXAMINATION:  On physical exam, he was lying quietly in the ICU bed.  I did not have him get up and about, although he reports to me that he has been up in the chair for about an hour, although this was quite painful.  His sensation was intact to light touch in his lower extremities.  His motor exam is 5/5.    Review of the MRI scan demonstrates the disk degeneration, which is most pronounced at L5-S1, although is was present proximally as well.  He has Modic changes and has marked reactive changes within the right sacral ala.  He does have what appears to be soft tissue in the ventral epidural space extending from L4 caudally to the proximal sacrum, which results in some mild narrowing of the thecal sac.    ASSESSMENT:  It is likely that Mr. Montoya has L5-S1 diskitis.  At this time, he is being  treated appropriately with IV antibiotics.  This is not a surgical issue.  His spine likewise is not unstable and could be mobilized as needed.  The challenge will be that often times patients with infectious diskitis are quite difficult to control from a pain perspective.  It is quite likely that this pain will persist for a number of weeks.  The indication for surgical intervention would be if he should start to develop any type of neurologic deterioration, which I think is quite unlikely.  We will continue to follow him.    Jay Jay Chapman MD        D: 2022   T: 2022   MT: CHAO    Name:     DARION OROPEZAJulia  MRN:      [id number]        Account:      [id number]   :      1985           Consult Date: 2022     Document: [id number]

## 2022-04-08 NOTE — PROGRESS NOTES
EP Progress Note          Assessment and Plan:   Anil Montoya is a 36-year-old male with DM type II (uncontrolled, HgA1c >12), hypertension, VIVEK, ADHD, hypothyroidism who developed MSSA tricuspid valve endocarditis.  Underwent Tricuspid valve replacement (31mm Epic stented valve) with debridement on 4/1/2022. He developed complete AV block after the procedure with accelerated junctional rhythm, heart rate in the 80s.     He was transferred from the ICU to station 33 on 04/05/22.  On 4/5 he suffered cardiac arrest.  Telemetry at the time showed regular rhythm in the 80s with a PVC followed by initiation of polymorphic VT/VF.  He was successfully resuscitated (required 120 J defibrillation) and was transferred back to the ICU. No apparent culprit for QT prolongation.     Echo on 4/5 was a technically difficult study EF 55-60%, normal inferior wall motion, septal motion abnormality often seen in postthoracotomy state.    On 4/5/2022, one of his 4/3/2022 blood cultures became positive for GPC and placed on abx.    MRI of the spine suspicious for an infectious process. ID was notified. Currently on gentamycin and nafcillin.     He remains in complete AV block with accelerated junctional escape rhythm in the 80's.  No further ventricular arrhythmia is noted.     PLAN:  Avoid beta blockers r/t AV block  If b/p climbs consider increasing Losartan.  ICD next week. Hopeful  AV conduction will improve over the weekend. Pt will need an ICD before d/c.     Pt is requesting that the generator be placed lower in the left pectoral pocket since he enjoys shooting a shotgun using his left shoulder.  IF conduction is restored a subcutaneous ICD will be considered.     Will plan to proceed with ICD for secondary prevention next week if ok with ID.       Marissa Gruber, CNP        Interval History:   Pt is having back pain, but states it is better today.Low grade fever last night 100.2 face is flushed today. Denies chest pain,  sob. Up walking with assistance. Diastolic pressures are in the 90's today. Tele accelerated junctional 80's with occasional P waves.          Medications:       aspirin  81 mg Oral or NG Tube Daily     buPROPion  300 mg Oral QAM     gabapentin  100 mg Oral TID     gentamicin  100 mg Intravenous Q12H     heparin ANTICOAGULANT  5,000 Units Subcutaneous Q8H     insulin aspart  1-10 Units Subcutaneous TID AC     insulin aspart  1-7 Units Subcutaneous At Bedtime     insulin aspart   Subcutaneous Daily with breakfast     insulin aspart   Subcutaneous Daily with lunch     insulin aspart   Subcutaneous Daily with supper     insulin glargine  10 Units Subcutaneous At Bedtime     insulin glargine  40 Units Subcutaneous QAM AC     levothyroxine  125 mcg Oral Once per day on Mon Tue Wed Thu Fri Sat     levothyroxine  250 mcg Oral Weekly     lidocaine  2 patch Transdermal Q24H     lidocaine   Transdermal Q8H     losartan  25 mg Oral Daily     methocarbamol  750 mg Oral Q6H     nafcillin  2 g Intravenous Q4H     pantoprazole  40 mg Oral Daily     polyethylene glycol  17 g Oral BID     senna-docusate  1 tablet Oral BID             Review of Systems: If done, described below  The Review of Systems is negative other than noted in the HPI             Physical Exam:   Blood pressure (!) 137/97, pulse 87, temperature 99.1  F (37.3  C), temperature source Oral, resp. rate 17, weight 119.2 kg (262 lb 12.6 oz), SpO2 98 %.        Vital Sign Ranges  Temperature Temp  Av.2  F (37.3  C)  Min: 98.3  F (36.8  C)  Max: 100.2  F (37.9  C)   Blood pressure Systolic (24hrs), Av , Min:127 , Max:146        Diastolic (24hrs), Av, Min:70, Max:97      Pulse Pulse  Av.5  Min: 78  Max: 92   Respirations Resp  Av.5  Min: 0  Max: 20   Pulse oximetry SpO2  Av.5 %  Min: 94 %  Max: 99 %         Intake/Output Summary (Last 24 hours) at 2022 0741  Last data filed at 2022 0500  Gross per 24 hour   Intake 2948.92 ml   Output  3375 ml   Net -426.08 ml       Constitutional:   in no apparent distress and flushed     Lungs:   symmetric, clear to auscultation     Cardiovascular:   regular rate and rhythm, normal S1 and S2 and no murmur noted     Extremities and Back:   No edema              Data:     Lab Results   Component Value Date    WBC 10.1 04/08/2022    HGB 7.9 (L) 04/08/2022    HCT 25.8 (L) 04/08/2022     (H) 04/08/2022     04/08/2022    POTASSIUM 4.3 04/08/2022    CHLORIDE 103 04/08/2022    CO2 26 04/08/2022    BUN 21 04/08/2022    CR 0.99 04/08/2022     (H) 04/08/2022    NTBNPI 138 01/01/2013    TROPONIN 0.00 01/20/2014    TROPI <0.015 11/25/2017    AST 73 (H) 04/07/2022    ALT 72 (H) 04/07/2022    ALKPHOS 102 04/07/2022    BILITOTAL 0.3 04/07/2022    INR 1.17 (H) 04/05/2022

## 2022-04-08 NOTE — PROGRESS NOTES
[facility]    Infectious Disease Progress Note    Date of Service : 04/08/2022     Assessment:  36YM with uncontrolled diabetes and HbA1c of >12%, and prior cryptococcal pneumonia 10 years ago without HIV diagnosis, who is hospitalized with high grade prolonged MSSA bacteremia since 3/22 with tricuspid valve endocarditis. Possible secondary seeding of the lumbar spine initial MRI was negative and back pain was  Gone but then started complaining of back pain again on 4/ 6, MRI 4/7 possible osteo.    He is s/p tricuspid valve replacement surgery and aortic valve exploration for control of infection on 04/01 with positive gram stain and culture of tricuspid valve tissue for staph aureus.  Yeast also seen on stain but read corrected later to no yeast.  Micafungin discontinued as Micro results  updated by lab. Initial read of 1+ yeast on valve tissue on 4/1 was corrected and updated. Only staph aureus on valve tissue cx, no yeast seen on stain.    On 4/5, he suffered a ventricular tachycardia/fibrillation arrest.  ROSC achieved after one round of CPR and one shock.  Alert and neurologically intact following.   On 4/ 6 back pain again,   MRI lumbar spine done on 4/ 7 :   Interval development of en bloc abnormal T2 signal hyperintensity  and abnormal contrast enhancement involving the lower L5 vertebral  body, S1 vertebral body and right sacral ala and anterior epidural  space from mid L4 down to mid S2 worrisome for an infectious process  such as osteomyelitis with epidural extension of infection     Recommendations:  1. Follow blood cxs . 4/5 1 blood cx +, follow up since then have been negative.   2. PICC/pacemaker being considered given arrythmia event. Hold if non urgent until blood cxs are persistently negative. .   3. Given new prosthetic valve and positive blood cultures gentamicin added ( synergy dosing ) till he clears the blood cultures  4. Given new findings of the MRI on nafcillin.    5. Follow clinical status and labs    Sreedhar Bell MD    Interval History   Resting, overall feels discouraged after cardiac event. Complains of pain at chest tube sites and back pain    Physical Exam   Temp: 97.5  F (36.4  C) Temp src: Oral BP: 129/78 Pulse: 78   Resp: 10 SpO2: 98 %      Vitals:    04/05/22 0553 04/06/22 0600 04/07/22 0400   Weight: 118.4 kg (261 lb 1.6 oz) 120.5 kg (265 lb 10.5 oz) 119.2 kg (262 lb 12.6 oz)     Vital Signs with Ranges  Temp:  [97.5  F (36.4  C)-100.2  F (37.9  C)] 97.5  F (36.4  C)  Pulse:  [77-92] 78  Resp:  [0-29] 10  BP: (127-146)/(70-97) 129/78  Cuff Mean (mmHg):  [] 89  SpO2:  [94 %-99 %] 98 %    Constitutional: Awake, alert, cooperative, no apparent distress  Lungs: Clear to auscultation bilaterally, no crackles or wheezing  Cardiovascular: S1S2, sternal surgical site intact, mediastinal chest tubes in place  Abdomen:  soft,non-tender  Skin: No rash    Other:    Medications     dextrose       BETA BLOCKER NOT PRESCRIBED       sodium chloride 10 mL/hr at 04/08/22 0800       aspirin  81 mg Oral or NG Tube Daily     buPROPion  300 mg Oral QAM     gabapentin  100 mg Oral TID     gentamicin  100 mg Intravenous Q12H     heparin ANTICOAGULANT  5,000 Units Subcutaneous Q8H     insulin aspart  1-10 Units Subcutaneous TID AC     insulin aspart  1-7 Units Subcutaneous At Bedtime     insulin aspart   Subcutaneous Daily with breakfast     insulin aspart   Subcutaneous Daily with lunch     insulin aspart   Subcutaneous Daily with supper     insulin glargine  10 Units Subcutaneous At Bedtime     insulin glargine  40 Units Subcutaneous QAM AC     levothyroxine  125 mcg Oral Once per day on Mon Tue Wed Thu Fri Sat     levothyroxine  250 mcg Oral Weekly     lidocaine  2 patch Transdermal Q24H     lidocaine   Transdermal Q8H     losartan  25 mg Oral Daily     methocarbamol  1,000 mg Oral Q6H     nafcillin  2 g Intravenous Q4H     pantoprazole  40 mg Oral Daily     polyethylene glycol   17 g Oral BID     senna-docusate  1 tablet Oral BID       Data   All microbiology laboratory data reviewed.  Recent Labs   Lab Test 04/08/22  0604 04/07/22  0532 04/06/22  0417   WBC 10.1 11.7* 10.0   HGB 7.9* 7.9* 7.3*   HCT 25.8* 26.8* 24.2*   MCV 95 96 96   * 633* 605*     Recent Labs   Lab Test 04/08/22  0626 04/07/22  0532 04/06/22  0417   CR 0.99 0.93 0.94

## 2022-04-08 NOTE — PROGRESS NOTES
[facility]  Cardiovascular and Thoracic Surgery Daily Note          Assessment and Plan:   Anil Montoya is a 36 year old gentleman who presented to [facility] ED on 3/22/22 with weakness, recent hematuria and shortness of breath. Workup demonstrated MSSA bacteremia likely secondary to right groin skin source. TEJINDER on 3/27/22 demonstrated tricuspid valve endocarditis. In completing full work up, there was concern for discitis with seeding however MRI negative. He was transferred on 3/28/22 to [facility] for consideration of surgical intervention.      PMH includes: Poorly controlled diabetic, HTN, hypothyroidism, obesity, WARNER, anxiety, cryptococcal pneumonia in 2012 with previous history of staph aureus bacteremia.     Most recent echo demonstrates LVEF 55-60%, normal RV function, mildly thickened cusps on aortic valve and thickened tricuspid valve leaflets, small mobile filamentous elements on the septal leaflet of TV and larged fixed heterogeneous mass in the RV attached to the interventricular septum.  Coronary angiogram 3/31/22 with no e/o CAD.     Course c/b CHB with accelerated junctional rhythm and polymorphic VT arrest on 4/5 with immediate ROSC after defib x1 and transient CPR.        POD #7 s/p:  1. Tricuspid valve replacement (31mm Epic stented valve)  2. Endoscopic aortic valve exploration  3. Transesophageal echocardiogram on 4/1/22 with Dr. 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 Did not need intubation and neurologically intact. Electrolytes stable: K+ 3.7, Mg 2. Appreciate EP's involvement. Troponin elevated as expected after event.  Echocardiogram on 4/5 stable but poor images to evaluate tricuspid gradient  [PTA meds on hold: losartan 50 mg daily]  HD stable  overnight in accelerated junctional rhythm  - ASA 81 mg daily  - Defer BB given heart block  - Losartan 25 mg daily and up titrate PRN  - No statin indicated  - ID as below  - Consulted EP, appreciate recommendations, plan for ICD once blood is sterile  - CTs/wires removed 4/7, discussed with EP  -ORTHO: Having new lower back pain, MRI 4/7 showing concern for osteomyelitis involving epidural extension     Resp:   WARNER  History of cryptococcal pneumonia  Extubated on POD 0, now saturating well on room air  - Continue pulm hygiene efforts: IS/flutter valve/mobility  - Titrate O2 to maintain sats >92%  - CPAP at HS     Neuro/MSK:    Acute postoperative pain  MDD/VIVEK  - Better controlled with current regimen this AM, received toradol x1  - Sore after a few compressions with CPR but overall tolerable  - Robaxin scheduled, lidocaine patches added  - PTA Wellbutrin resumed  - Sternal incision and area intact and feels stable, wires intact on CXR, continue sternal precautions     Renal/Electrolyte:   Creat stable, below preoperative weight, has less edema  Autodiuresing  - Strict I/O, daily weights  - Avoid/limit nephrotoxins as able  - Replete lytes per protocol  - Defer on diuresis today     GI/Nutrition:    Mildly elevated LFTs  - Tolerating current diet:  Orders Placed This Encounter      Moderate Consistent Carb (60 g CHO per Meal) Diet   - Continue bowel regimen, + BM, + flatus  - LFTs had normalized, AST slightly elevated after arrest, trend  - PPI    :    - Voiding well on own     Endo:  Stress induced hyperglycemia   Poorly controlled DM2  Hypothyroidism  Preop A1c             Lab Results   Component Value Date     A1C 12.6 03/27/2022    [PTA meds on hold: Victoza and metformin]  - Was on insulin infusion, transitioned to sliding scale insulin and lantus 4/2, hyperglycemic, resumed insulin infusion POD 2 with prandial coverage. Continue close monitoring of blood sugars to allow for healing  - Hospitalists managing  diabetic regimen, appreciate great management, currently lantus 40 q am, 10 at bedtime with high sliding scale  - Goal BG <180 for optimal healing  - PTA levothyroxine resumed     ID:  Stress induced leukocytosis, resolved  Tricuspid valve endocarditis  MSSA bacteremia  History cryptococcal pneumonia  WBC 10.1<11.7<10, Temp (24hrs), Av  F (37.2  C), Min:97.5  F (36.4  C), Max:100.2  F (37.9  C); no s/sx infection having new fevers, MRI  concerning for osteomyelitis, consult to ortho/neuro for any further recs-trying to exhaust all resources.   Prelim intraop gram stain positive for yeast from tricuspid valve, culture with 1+ staph aureus, no yeast, continue to follow  BC 4/3 positive for staph aureus, continue to follow  BC  with NGTD thus far  Fungal BC sent on  with NGTD  Full skin inspection  with no new wounds  - Completing perioperative ABX  - Continue Cefazolin per ID, Gentamycin added  for new positive culture  - Micafungin started  per ID for positive yeast on gram stain, discontinued with negative cx on 4/3  - HIV antigen/antibody test negative  - Per ID: repeat blood cultures x 2 sets daily until clear  - Continue to follow fever curve, WBC and inflammatory markers as appropriate     Heme:  Acute blood loss anemia  Acute blood loss thrombocytopenia  Hgb 7.9, Plts 609; no s/sx active bleeding  - CBC in AM  - Goal Hgb >7     -Proph: PPI, subcutaneous heparin, SCDs  -Dispo: Transferred back to ICU given code blue, continue therapies, pulm hygiene, discussed with EP will remove wires/tubes and scan back today          Interval History:   Lying in bed, breathing stable, tolerating diet, believes back pain to be worsening, feeling better with CTs out         Medications:       aspirin  81 mg Oral or NG Tube Daily     buPROPion  300 mg Oral QAM     gabapentin  100 mg Oral TID     gentamicin  100 mg Intravenous Q12H     heparin ANTICOAGULANT  5,000 Units Subcutaneous Q8H     insulin aspart   1-10 Units Subcutaneous TID AC     insulin aspart  1-7 Units Subcutaneous At Bedtime     insulin aspart   Subcutaneous Daily with breakfast     insulin aspart   Subcutaneous Daily with lunch     insulin aspart   Subcutaneous Daily with supper     insulin glargine  10 Units Subcutaneous At Bedtime     insulin glargine  40 Units Subcutaneous QAM AC     levothyroxine  125 mcg Oral Once per day on Mon Tue Wed Thu Fri Sat     levothyroxine  250 mcg Oral Weekly     lidocaine  2 patch Transdermal Q24H     lidocaine   Transdermal Q8H     losartan  25 mg Oral Daily     methocarbamol  750 mg Oral Q6H     nafcillin  2 g Intravenous Q4H     pantoprazole  40 mg Oral Daily     polyethylene glycol  17 g Oral BID     senna-docusate  1 tablet Oral BID     @MEDSPRN-@          Physical Exam:   Vitals were reviewed  Blood pressure (!) 137/97, pulse 87, temperature 99.1  F (37.3  C), temperature source Oral, resp. rate 17, weight 119.2 kg (262 lb 12.6 oz), SpO2 98 %.  Rhythm: junctional escape    Lungs: course    Cardiovascular: s1/s2, no m/r/g    Abdomen: s/nt/nd    Extremeties: trace LE edema    Incision: cdi    CT: na    Weight:   Vitals:    04/04/22 0600 04/04/22 1739 04/05/22 0553 04/06/22 0600   Weight: 117.1 kg (258 lb 2.5 oz) 119.1 kg (262 lb 8 oz) 118.4 kg (261 lb 1.6 oz) 120.5 kg (265 lb 10.5 oz)    04/07/22 0400   Weight: 119.2 kg (262 lb 12.6 oz)            Data:   Labs:   Lab Results   Component Value Date    WBC 10.1 04/08/2022    WBC 6.8 11/24/2017     Lab Results   Component Value Date    RBC 2.73 04/08/2022    RBC 5.16 11/24/2017     Lab Results   Component Value Date    HGB 7.9 04/08/2022    HGB 15.2 11/24/2017     Lab Results   Component Value Date    HCT 25.8 04/08/2022    HCT 43.7 11/24/2017     No components found for: MCT  Lab Results   Component Value Date    MCV 95 04/08/2022    MCV 85 11/24/2017     Lab Results   Component Value Date    MCH 28.9 04/08/2022    MCH 29.5 11/24/2017     Lab Results   Component  Value Date    MCHC 30.6 04/08/2022    MCHC 34.8 11/24/2017     Lab Results   Component Value Date    RDW 15.9 04/08/2022    RDW 12.9 11/24/2017     Lab Results   Component Value Date     04/08/2022     11/24/2017       Last Basic Metabolic Panel:  Lab Results   Component Value Date     04/08/2022     11/24/2017      Lab Results   Component Value Date    POTASSIUM 4.3 04/08/2022    POTASSIUM 3.8 11/24/2017     Lab Results   Component Value Date    CHLORIDE 103 04/08/2022    CHLORIDE 104 11/24/2017     Lab Results   Component Value Date    IMAN 8.6 04/08/2022    IMAN 8.3 11/24/2017     Lab Results   Component Value Date    CO2 26 04/08/2022    CO2 24 11/24/2017     Lab Results   Component Value Date    BUN 21 04/08/2022    BUN 18 11/24/2017     Lab Results   Component Value Date    CR 0.99 04/08/2022    CR 1.07 11/24/2017     Lab Results   Component Value Date     04/08/2022     04/08/2022     11/24/2017       CXR: MRI Lumbar IMPRESSION:  1. Interval development of en bloc abnormal T2 signal hyperintensity  and abnormal contrast enhancement involving the lower L5 vertebral  body, S1 vertebral body and right sacral ala and anterior epidural  space from mid L4 down to mid S2 worrisome for an infectious process  such as osteomyelitis with epidural extension of infection. No  definite evidence for discitis at the L5-S1 level although given  presumed infection of surrounding tissues, L5-S1 discitis is not  excluded and continued surveillance is recommended.  2. Mild, diffuse degenerative change throughout the lumbar spine. No  significant degenerative spinal canal or neural foraminal stenosis of  the lumbar spine.       Seen and discussed with Dr. Linh Perez, PA-C  Pager #: [phone]

## 2022-04-08 NOTE — PLAN OF CARE
Goal Outcome Evaluation:      No major changes today. Patient states pain is tolerable with current regimen of PRN oxy Q4 and scheduled robaxin. Walked with PT this afternoon, up to chair for meals. VSS on room air. Remains in an accelerated junctional rhythm in the 80's with occasional sinus beats. Wife updated over the phone this afternoon.

## 2022-04-08 NOTE — PROGRESS NOTES
[facility]    Hospitalist Progress Note    Assessment & Plan      Assessment & Plan:   Anil Montoya is a 36 year old male with hx of DM2, HTN, WARNER, and anxiety who was initially hospitalized at [facility] on 3/22/2022 for severe sepsis initially attributed to pneumonia vs viral infection. He was found to have MSSA bacteremia with ongoing positive cultures. He underwent a TEJINDER on 3/27/2022 which revealed tricuspid endocarditis, and he was transferred to [facility] on 3/28/2022 for CV Surgery consultation.     Sepsis with MSSA bacteremia due to tricuspid valve endocarditis.  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 He was initially treated with ceftriaxone and azithromycin for possible community-acquired pneumonia, and ID was consulted. Blood cultures returned positive for MSSA, and serial blood cultures continued to return positive. Broad work-up including CT abd/pelvis, MR lumbar spine, CT dental, and TTE were unrevealing  * Initially treated with IV ceftriaxone and azithromycin; switched to IV vanco on 3/23 when blood cultures returned with Staph; switched to IV cefazolin on 3/24 upon sensitivities  * Underwent TEJINDER on 3/27 which showed thickened tricuspid valve leaflets with atrial aspect of the septal leaflet with an irregular border and small mobile filamentous elements, large fixed heterogeneous mass in the right ventricle attached to the interventricular septum (2.5 x 2 cm) appearing connected to the tricuspid subvalvular annular apparatus with a pedunculated round mobile element (1.8 x 1 cm).  No significant tricuspid regurgitation reported. LVEF 55 to 60%. Negative bubble study.  * Transferred to [facility] on 3/28 for CV Surgery consult  - Patient underwent tricuspid valve replacement with bioprosthetic valve (31 mm epic stented valve)  on 4/1/2022 and endoscopic aortic valve exploration.  - Initially micafungin added on 4/1 as Gram stain from tissue from heart valve from 4/ 1 initially was reported as yeast, which was subsequently corrected as not to be present.  Micafungin now discontinued.  -  given new prosthetic valve and positive blood cultures gentamicin added ( synergy dosing ) until he clears the BCs  - had worsening lower back pain and underwent repeat lumbar mri yesterday(4/7/22) which showed possible osteomyelitis and discitis   MRI read- S1 vertebral body and right sacral ala and anterior epidural space from mid L4 down to mid S2 worrisome for an infectious process such as osteomyelitis with epidural extension of infection. Also L5-S1 discitis is not excluded and continued surveillance is recommended.  - cefazolin was changed to nafcillin for better cns penetration. ID/CT surgery aware.   - Daily blood cultures, last positive was from 4/3  - ID, Cardiology, CV Surgery following  - Patient was initially transferred out of the intensive care unit on 4/4/2022 after he was stable however patient had an episode of V. fib arrest and was transferred back to the ICU.     V. fib arrest(4/5/2022)  -Patient was noted to have V. fib on telemetry earlier this morning  -Required brief CPR and 1 shock of 120 J with successful restoration of regular rhythm  -Electrolytes appear to be within normal limits  -Patient transferred back to the intensive care unit for close monitoring  -EP following -> plan on implanting a dual-chamber ICD for secondary prevention, probably early next week to be absolutely certain blood cultures have cleared.  If AV conduction improves, may consider a subcutaneous ICD sooner   -ECHO -> LVEF was normal by echocardiography (technically difficult study)     Post-op complete heart block  -Patient initially was in second-degree AV block, and now appears to have complete heart block.  -EP following, given good junctional escape no  plans for immediate permanent pacemaker implantation, continue to monitor on telemetry closely. Removed pacer wires. Plan on possible icd placement next week.      DM2 without complications, long-term insulin use, uncontrolled  * A1c 12.6. Had not been taking his diabetes medications for some time  -transitioned to subcut regimen yesterday with lantus 40 am and 10 pm with sliding scale aspart and 1:10 carb count . Goal for bs<180 to facilitate infection control   -Prior to surgery he was requiring Lantus 34 units twice a day and mealtime coverage of 1: 10.     Acute postop blood loss anemia  -Hemoglobin preop was around 12 which had slowly trended down.  Hemoglobin dropped to 7.3 on 4/4, most likely acute blood loss from surgery.    -Hemoglobin is at 7.9 this morning   -Continue to monitor and transfuse as needed.     Acute low back pain  * Noted while at [facility]  * MR lumbar spine (3/24) showed mild multilevel lumbar spondylosis, but no evidence of discitis but mri 4/7 showed possible osteomyelitis with discitis.  -Continue pain control as needed with dilaudid , robaxin and oxycodone. 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 RUQ US (3/22) showed fatty liver, but normal gallbladder and normal bile ducts  - LFTs improved.      Essential hypertension  - on losartan 25mg daily. Hold b-blocker for his complete heart block.      WARNER  * CPAP per home settings     Hypothyroidism  * Chronic and stable on levothyroxine     Generalized anxiety disorder  ADHD  * Chronic and stable on bupropion       DVT Prophylaxis: Heparin SQ  Code Status: Full Code    Disposition: Expected discharge when medically stable     Esther Damon MD  [phone](p)  [phone]( c)    Interval History   Patient awake and alert.  Continues to endorse severe lower back pain.  Underwent MRI yesterday that showed possible  interval development of osteomyelitis with discitis at L5-S1.  Denies any numbness in lower extremities or loss of bowel or bladder control.     -Data reviewed today: I reviewed all new labs and imaging results over the last 24 hours. I personally reviewed the mri lumbar spine image(s) showing possible ostemyelitis and discitis.    Physical Exam   Temp: 97.5  F (36.4  C) Temp src: Oral BP: 134/75 Pulse: 86   Resp: 29 SpO2: 97 %      Vitals:    04/05/22 0553 04/06/22 0600 04/07/22 0400   Weight: 118.4 kg (261 lb 1.6 oz) 120.5 kg (265 lb 10.5 oz) 119.2 kg (262 lb 12.6 oz)     Vital Signs with Ranges  Temp:  [97.5  F (36.4  C)-100.2  F (37.9  C)] 97.5  F (36.4  C)  Pulse:  [77-92] 86  Resp:  [0-29] 29  BP: (127-146)/(70-97) 134/75  Cuff Mean (mmHg):  [] 89  SpO2:  [94 %-99 %] 97 %  I/O last 3 completed shifts:  In: 2948.92 [P.O.:2280; I.V.:668.92]  Out: 3375 [Urine:3375]    Physical Exam  Constitutional:       General: He is not in acute distress.     Appearance: He is well-developed. He is obese. He is ill-appearing.      Comments: Appears flushed   HENT:      Right Ear: Tympanic membrane and external ear normal.      Left Ear: Tympanic membrane and external ear normal.      Nose: Nose normal.      Mouth/Throat:      Mouth: No oral lesions.      Pharynx: No oropharyngeal exudate.   Eyes:      General:         Right eye: No discharge.         Left eye: No discharge.      Conjunctiva/sclera: Conjunctivae normal.      Pupils: Pupils are equal, round, and reactive to light.   Neck:      Thyroid: No thyromegaly.      Trachea: No tracheal deviation.   Cardiovascular:      Rate and Rhythm: Normal rate and regular rhythm.      Pulses: Normal pulses.      Heart sounds: Normal heart sounds, S1 normal and S2 normal. No murmur heard.    No S3 or S4 sounds.   Pulmonary:      Effort: Pulmonary effort is normal. No respiratory distress.      Breath sounds: Normal breath sounds. No wheezing or rales.   Abdominal:      General:  Bowel sounds are normal.      Palpations: Abdomen is soft. There is no mass.      Tenderness: There is no abdominal tenderness.   Musculoskeletal:         General: No deformity. Normal range of motion.      Cervical back: Neck supple.      Comments: Severe low back pain     Lymphadenopathy:      Cervical: No cervical adenopathy.   Skin:     General: Skin is warm and dry.      Findings: No lesion or rash.   Neurological:      Mental Status: He is alert and oriented to person, place, and time.      Motor: No abnormal muscle tone.      Deep Tendon Reflexes: Reflexes are normal and symmetric.   Psychiatric:         Speech: Speech normal.         Thought Content: Thought content normal.         Judgment: Judgment normal.           Medications     dextrose       BETA BLOCKER NOT PRESCRIBED       sodium chloride 10 mL/hr at 04/08/22 0800       aspirin  81 mg Oral or NG Tube Daily     buPROPion  300 mg Oral QAM     gabapentin  100 mg Oral TID     gentamicin  100 mg Intravenous Q12H     heparin ANTICOAGULANT  5,000 Units Subcutaneous Q8H     insulin aspart  1-10 Units Subcutaneous TID AC     insulin aspart  1-7 Units Subcutaneous At Bedtime     insulin aspart   Subcutaneous Daily with breakfast     insulin aspart   Subcutaneous Daily with lunch     insulin aspart   Subcutaneous Daily with supper     insulin glargine  10 Units Subcutaneous At Bedtime     insulin glargine  40 Units Subcutaneous QAM AC     levothyroxine  125 mcg Oral Once per day on Mon Tue Wed Thu Fri Sat     levothyroxine  250 mcg Oral Weekly     lidocaine  2 patch Transdermal Q24H     lidocaine   Transdermal Q8H     losartan  25 mg Oral Daily     methocarbamol  1,000 mg Oral Q6H     nafcillin  2 g Intravenous Q4H     pantoprazole  40 mg Oral Daily     polyethylene glycol  17 g Oral BID     senna-docusate  1 tablet Oral BID       Data   Recent Labs   Lab 04/08/22  0713 04/08/22  0626 04/08/22  0604 04/07/22  2139 04/07/22  0550 04/07/22  0532 04/06/22  0608  04/06/22  0417 04/05/22  0647 04/05/22  0645 04/01/22  1440 04/01/22  1300 04/01/22  1143 04/01/22  1141   WBC  --   --  10.1  --   --  11.7*  --  10.0   < > 16.2*   < > 38.3*  --  30.4*   HGB  --   --  7.9*  --   --  7.9*  --  7.3*   < > 8.0*   < > 9.0*   < > 8.5*   MCV  --   --  95  --   --  96  --  96   < > 94   < > 94  --  94   PLT  --   --  609*  --   --  633*  --  605*   < > 883*   < > 566*  --  442   INR  --   --   --   --   --   --   --   --   --  1.17*  --  1.55*  --  1.64*   NA  --  137  --   --   --  138  --  136   < >  --    < > 135   < > 134   POTASSIUM  --  4.3  --   --   --  4.0  --  4.0   < >  --    < > 5.1  5.1   < > 5.1   CHLORIDE  --  103  --   --   --  104  --  104   < >  --    < > 107  --  105   CO2  --  26  --   --   --  27  --  27   < >  --    < > 24  --  25   BUN  --  21  --   --   --  17  --  18   < >  --    < > 27  --  24   CR  --  0.99  --   --   --  0.93  --  0.94   < >  --    < > 1.04  --  1.05   ANIONGAP  --  8  --   --   --  7  --  5   < >  --    < > 4  --  4   IMAN  --  8.6  --   --   --  8.9  --  8.3*   < >  --    < > 8.4*  --  8.9   * 174*  --  247*   < > 113*   < > 105*   < >  --    < > 250*   < > 259*   ALBUMIN  --   --   --   --   --  1.8*  --  1.6*   < >  --    < > 1.7*  --   --    PROTTOTAL  --   --   --   --   --  7.2  --  6.8   < >  --    < > 6.4*  --   --    BILITOTAL  --   --   --   --   --  0.3  --  0.2   < >  --    < > 0.5  --   --    ALKPHOS  --   --   --   --   --  102  --  105   < >  --    < > 106  --   --    ALT  --   --   --   --   --  72*  --  67   < >  --    < > 81*  --   --    AST  --   --   --   --   --  73*  --  72*   < >  --    < > 63*  --   --     < > = values in this interval not displayed.       Recent Results (from the past 24 hour(s))   MR Lumbar Spine w/o & w Contrast    Narrative    MRI OF THE LUMBAR SPINE WITHOUT AND WITH CONTRAST 4/7/2022 4:39 PM     HISTORY: Progressive severe low back pain, infection suspected,  history of infective  endocarditis, positive blood cultures for MSSA,  diabetic patient.    TECHNIQUE: Multiplanar, multisequence MRI images of the lumbar spine  were acquired without and with 12mL Gadavist IV contrast.    COMPARISON: None.    FINDINGS: 5 lumbar type vertebrae. Normal alignment. Vertebral body  heights normal. No evidence for fracture. There is new abnormal T2  signal hyperintensity and contrast enhancement in the opposing  endplates at the L5-S1 level as well as extensive abnormal T2 signal  hyperintensity and enhancement in the right sacral ala. Given the  patient's history of infective endocarditis, osteomyelitis must be  considered. Marrow signal throughout the lumbar vertebrae is otherwise  normal.    There are posterior disc bulges at L4-5 and L5-S1. The upper three  lumbar intervertebral discs are normal in height, contour and signal  intensity. There is no definite abnormal contrast enhancement in the  L5-S1 intervertebral disc to suggest discitis.    The tip of the conus medullaris is at the mid L1 level which is within  normal limits. There is new abnormal T2 signal hyperintensity and  abnormal contrast enhancement in the anterior epidural space extending  from the mid L4 level down to the mid S2 level worrisome for epidural  extension of possible osteomyelitis. No definite evidence for  paraspinous fluid collection or abscess.    No significant degenerative spinal canal or degenerative neural  foraminal stenosis at any level of the lumbar spine.      Impression    IMPRESSION:  1. Interval development of en bloc abnormal T2 signal hyperintensity  and abnormal contrast enhancement involving the lower L5 vertebral  body, S1 vertebral body and right sacral ala and anterior epidural  space from mid L4 down to mid S2 worrisome for an infectious process  such as osteomyelitis with epidural extension of infection. No  definite evidence for discitis at the L5-S1 level although given  presumed infection of surrounding  tissues, L5-S1 discitis is not  excluded and continued surveillance is recommended.  2. Mild, diffuse degenerative change throughout the lumbar spine. No  significant degenerative spinal canal or neural foraminal stenosis of  the lumbar spine.    NANCIE MIRZA MD         SYSTEM ID:  [id number]

## 2022-04-08 NOTE — CONSULTS
[facility]    Neurosurgery Consultation     Date of Admission:  3/28/2022  Date of Consult (When I saw the patient): 04/08/22    Assessment & Plan   Anil Montoya is a 36 year old male who was admitted on 3/28/2022. Anil Montoya is a 36 year old male with uncontrolled diabetes, prior cryptococcal pneumonia 10 years ago without HIV diagnosis, who is hospitalized with high grade prolonged MSSA bacteremia since 3/22 with tricuspid valve endocarditis with radiographic evidence as stated below.     Patient denies radiation of pain into lower extremities, paresthesias, weakness, numbness, saddle anesthesia, bowel/bladder changes.     ID remains actively involved.     MRI OF THE LUMBAR SPINE WITHOUT AND WITH CONTRAST 4/7/2022 4:39 PM      HISTORY: Progressive severe low back pain, infection suspected,  history of infective endocarditis, positive blood cultures for MSSA,  diabetic patient.     TECHNIQUE: Multiplanar, multisequence MRI images of the lumbar spine  were acquired without and with 12mL Gadavist IV contrast.     COMPARISON: None.     FINDINGS: 5 lumbar type vertebrae. Normal alignment. Vertebral body  heights normal. No evidence for fracture. There is new abnormal T2  signal hyperintensity and contrast enhancement in the opposing  endplates at the L5-S1 level as well as extensive abnormal T2 signal  hyperintensity and enhancement in the right sacral ala. Given the  patient's history of infective endocarditis, osteomyelitis must be  considered. Marrow signal throughout the lumbar vertebrae is otherwise  normal.     There are posterior disc bulges at L4-5 and L5-S1. The upper three  lumbar intervertebral discs are normal in height, contour and signal  intensity. There is no definite abnormal contrast enhancement in the  L5-S1 intervertebral disc to suggest discitis.     The tip of the conus medullaris is at the mid L1 level which is within  normal  limits. There is new abnormal T2 signal hyperintensity and  abnormal contrast enhancement in the anterior epidural space extending  from the mid L4 level down to the mid S2 level worrisome for epidural  extension of possible osteomyelitis. No definite evidence for  paraspinous fluid collection or abscess.     No significant degenerative spinal canal or degenerative neural  foraminal stenosis at any level of the lumbar spine.                                                                      IMPRESSION:  1. Interval development of en bloc abnormal T2 signal hyperintensity  and abnormal contrast enhancement involving the lower L5 vertebral  body, S1 vertebral body and right sacral ala and anterior epidural  space from mid L4 down to mid S2 worrisome for an infectious process  such as osteomyelitis with epidural extension of infection. No  definite evidence for discitis at the L5-S1 level although given  presumed infection of surrounding tissues, L5-S1 discitis is not  excluded and continued surveillance is recommended.  2. Mild, diffuse degenerative change throughout the lumbar spine. No  significant degenerative spinal canal or neural foraminal stenosis of  the lumbar spine.     NANCIE MIRZA MD     Clinical history, imaging and plans reviewed myself as well as with Dr. Patel and patient. No surgical intervention indicated at this time. Recommend ID involvement for antibiotics and duration. Recommend lumbar spine MRI prior to discontinuation of antibiotics. Continue to monitor neurologic status and reimage sooner should concerns arise. Dr. Patel and patient in agreement with plans.    I have discussed the following assessment and plan with Dr. Patel who is in agreement with the initial plan and will follow up with further consultation recommendations.    Ina Saravia PA-C  [facility] Neurosurgery  [address] [address]    Tel [phone]  Pager  [phone]        Code Status    Full Code    Reason for Consult   Reason for consult: I was asked by Dr Perez to evaluate this patient for imaging findings.    Primary Care Physician   Amanda Dawkins    Chief Complaint   Low back pain    History is obtained from the patient and electronic health record    History of Present Illness    History obtained from patient and provider notes    Anil Montoya is a 36 year old male with uncontrolled diabetes, prior cryptococcal pneumonia 10 years ago without HIV diagnosis, who is hospitalized with high grade prolonged MSSA bacteremia since 3/22 with tricuspid valve endocarditis with radiographic evidence as stated below.     Patient admits he started noting increased back pain a few days ago that worsened. Pain is primarily low back and radiates bilaterally to paraspinous muscles. Pain is dull, aching and sharp with certain movements. Pain aggravated when getting himself out of bed and alleviated with sitting in recliner. He denies radiation of pain into lower extremities, paresthesias, weakness, numbness, saddle anesthesia, bowel/bladder changes.     ID remains actively involved.     MRI OF THE LUMBAR SPINE WITHOUT AND WITH CONTRAST 4/7/2022 4:39 PM      HISTORY: Progressive severe low back pain, infection suspected,  history of infective endocarditis, positive blood cultures for MSSA,  diabetic patient.     TECHNIQUE: Multiplanar, multisequence MRI images of the lumbar spine  were acquired without and with 12mL Gadavist IV contrast.     COMPARISON: None.     FINDINGS: 5 lumbar type vertebrae. Normal alignment. Vertebral body  heights normal. No evidence for fracture. There is new abnormal T2  signal hyperintensity and contrast enhancement in the opposing  endplates at the L5-S1 level as well as extensive abnormal T2 signal  hyperintensity and enhancement in the right sacral ala. Given the  patient's history of infective endocarditis, osteomyelitis must  be  considered. Marrow signal throughout the lumbar vertebrae is otherwise  normal.     There are posterior disc bulges at L4-5 and L5-S1. The upper three  lumbar intervertebral discs are normal in height, contour and signal  intensity. There is no definite abnormal contrast enhancement in the  L5-S1 intervertebral disc to suggest discitis.     The tip of the conus medullaris is at the mid L1 level which is within  normal limits. There is new abnormal T2 signal hyperintensity and  abnormal contrast enhancement in the anterior epidural space extending  from the mid L4 level down to the mid S2 level worrisome for epidural  extension of possible osteomyelitis. No definite evidence for  paraspinous fluid collection or abscess.     No significant degenerative spinal canal or degenerative neural  foraminal stenosis at any level of the lumbar spine.                                                                      IMPRESSION:  1. Interval development of en bloc abnormal T2 signal hyperintensity  and abnormal contrast enhancement involving the lower L5 vertebral  body, S1 vertebral body and right sacral ala and anterior epidural  space from mid L4 down to mid S2 worrisome for an infectious process  such as osteomyelitis with epidural extension of infection. No  definite evidence for discitis at the L5-S1 level although given  presumed infection of surrounding tissues, L5-S1 discitis is not  excluded and continued surveillance is recommended.  2. Mild, diffuse degenerative change throughout the lumbar spine. No  significant degenerative spinal canal or neural foraminal stenosis of  the lumbar spine.     NANCIE MIRZA MD       Past Medical History   I have reviewed this patient's medical history and updated it with pertinent information if needed.   Past Medical History:   Diagnosis Date     Pneumonia        Past Surgical History   I have reviewed this patient's surgical history and updated it with pertinent information  if needed.  Past Surgical History:   Procedure Laterality Date     CV CORONARY ANGIOGRAM N/A 3/31/2022    Procedure: Coronary Angiogram;  Surgeon: Lidia Quintanilla MD;  Location:  [facility]     REPLACE VALVE AORTIC N/A 4/1/2022    Procedure: AORTIC VALVE exploration;  Surgeon: Marti Melton MD;  Location:  OR     REPLACE VALVE TRICUSPID N/A 4/1/2022    Procedure: TRICUSPID VALVE REPLACEMENT;  Surgeon: Marti Melton MD;  Location:  OR       Prior to Admission Medications   Prior to Admission Medications   Prescriptions Last Dose Informant Patient Reported? Taking?   acetaminophen (TYLENOL) 500 MG tablet 3/21/2022 at pm Pharmacy Yes Yes   Sig: Take 1,000 mg by mouth every 6 hours as needed for mild pain   buPROPion (WELLBUTRIN XL) 300 MG 24 hr tablet 3/21/2022 at am Pharmacy Yes No   Sig: Take 300 mg by mouth every morning   ciprofloxacin (CIPRO) 750 MG tablet 3/22/2022 at [facility] Yes No   Sig: Take 750 mg by mouth 2 times daily For 10 days (3/21-3/31).   dextromethorphan (TUSSIN COUGH) 15 MG/5ML syrup 3/18/2022 at am Pharmacy Yes No   Sig: Take 10 mLs by mouth 4 times daily as needed for cough   ibuprofen (ADVIL/MOTRIN) 200 MG tablet 3/21/2022 at pm Pharmacy Yes No   Sig: Take 600 mg by mouth every 6 hours as needed for mild pain   levothyroxine (SYNTHROID/LEVOTHROID) 125 MCG tablet 3/21/2022 at am Pharmacy Yes No   Sig: Take 125 mcg by mouth six times a week Monday thru Saturday   levothyroxine (SYNTHROID/LEVOTHROID) 125 MCG tablet Unknown at Unknown time Pharmacy Yes Yes   Sig: Take 250 mcg by mouth once a week On Sunday   liraglutide (VICTOZA) 18 MG/3ML solution 3/21/2022 at am Pharmacy Yes No   Sig: Inject 1.8 mg Subcutaneous daily   losartan (COZAAR) 50 MG tablet 3/21/2022 at am Pharmacy Yes No   Sig: Take 50 mg by mouth daily   metFORMIN (GLUCOPHAGE) 1000 MG tablet 3/21/2022 at pm Pharmacy Yes No   Sig: Take 1,000 mg by mouth 2 times daily (with meals)     "  Facility-Administered Medications: None     Allergies   Allergies   Allergen Reactions     Clindamycin      Vancomycin      \"Red Sonia Syndrome\"       Social History   I have reviewed this patient's social history and updated it with pertinent information if needed. Anil Montoya  reports that he has never smoked. He has never used smokeless tobacco. He reports previous alcohol use. He reports that he does not use drugs.    Family History   I have reviewed this patient's family history and updated it with pertinent information if needed.   History reviewed. No pertinent family history.    Review of Systems    ROS: 10 point ROS neg other than the symptoms noted above in the HPI.    Physical Exam   Temp: 97.7  F (36.5  C) Temp src: Oral BP: 105/73 Pulse: 92   Resp: 21 SpO2: 98 %      Vital Signs with Ranges  Temp:  [97.5  F (36.4  C)-100.2  F (37.9  C)] 97.7  F (36.5  C)  Pulse:  [77-92] 92  Resp:  [0-29] 21  BP: (105-146)/(70-97) 105/73  Cuff Mean (mmHg):  [89] 89  SpO2:  [94 %-99 %] 98 %  262 lbs 12.61 oz     , Blood pressure 105/73, pulse 92, temperature 97.7  F (36.5  C), temperature source Oral, resp. rate 21, weight 262 lb 12.6 oz (119.2 kg), SpO2 98 %.  262 lbs 12.61 oz    NEUROLOGICAL EXAMINATION:   Mental status:  Alert and Oriented x 3, speech is fluent.  Cranial nerves:  II-XII grossly intact.   Motor:  Strength is 5/5 throughout the upper and lower extremities  Shoulder Abduction:  Right:  5/5   Left:  5/5  Biceps:                      Right:  5/5   Left:  5/5  Triceps:                     Right:  5/5   Left:  5/5  Wrist Extensors:       Right:  5/5   Left:  5/5  Wrist Flexors:           Right:  5/5   Left:  5/5  interosseus :            Right:  5/5   Left:  5/5   Hip Flexor:                Right: 5/5  Left:  5/5  Hip Adductor:             Right:  5/5  Left:  5/5  Hip Abductor:             Right:  5/5  Left:  5/5  Gastroc Soleus:        Right:  5/5  Left:  5/5  Tib/Ant:                   "    Right:  5/5  Left:  5/5  EHL:                     Right:  5/5  Left:  5/5  Sensation:  Intact to light touch throughout   Reflexes:   Negative Babinski.  Negative Clonus.      +straight leg test bilaterally     Data   All new lab and imaging data was personally reviewed by me  MRI:  MRI OF THE LUMBAR SPINE WITHOUT AND WITH CONTRAST 4/7/2022 4:39 PM      HISTORY: Progressive severe low back pain, infection suspected,  history of infective endocarditis, positive blood cultures for MSSA,  diabetic patient.     TECHNIQUE: Multiplanar, multisequence MRI images of the lumbar spine  were acquired without and with 12mL Gadavist IV contrast.     COMPARISON: None.     FINDINGS: 5 lumbar type vertebrae. Normal alignment. Vertebral body  heights normal. No evidence for fracture. There is new abnormal T2  signal hyperintensity and contrast enhancement in the opposing  endplates at the L5-S1 level as well as extensive abnormal T2 signal  hyperintensity and enhancement in the right sacral ala. Given the  patient's history of infective endocarditis, osteomyelitis must be  considered. Marrow signal throughout the lumbar vertebrae is otherwise  normal.     There are posterior disc bulges at L4-5 and L5-S1. The upper three  lumbar intervertebral discs are normal in height, contour and signal  intensity. There is no definite abnormal contrast enhancement in the  L5-S1 intervertebral disc to suggest discitis.     The tip of the conus medullaris is at the mid L1 level which is within  normal limits. There is new abnormal T2 signal hyperintensity and  abnormal contrast enhancement in the anterior epidural space extending  from the mid L4 level down to the mid S2 level worrisome for epidural  extension of possible osteomyelitis. No definite evidence for  paraspinous fluid collection or abscess.     No significant degenerative spinal canal or degenerative neural  foraminal stenosis at any level of the lumbar spine.                                                                       IMPRESSION:  1. Interval development of en bloc abnormal T2 signal hyperintensity  and abnormal contrast enhancement involving the lower L5 vertebral  body, S1 vertebral body and right sacral ala and anterior epidural  space from mid L4 down to mid S2 worrisome for an infectious process  such as osteomyelitis with epidural extension of infection. No  definite evidence for discitis at the L5-S1 level although given  presumed infection of surrounding tissues, L5-S1 discitis is not  excluded and continued surveillance is recommended.  2. Mild, diffuse degenerative change throughout the lumbar spine. No  significant degenerative spinal canal or neural foraminal stenosis of  the lumbar spine.     NANCIE MIRZA MD   CBC RESULTS:   Recent Labs   Lab Test 04/08/22  0604   WBC 10.1   RBC 2.73*   HGB 7.9*   HCT 25.8*   MCV 95   MCH 28.9   MCHC 30.6*   RDW 15.9*   *     Basic Metabolic Panel:  Lab Results   Component Value Date     04/08/2022     11/24/2017      Lab Results   Component Value Date    POTASSIUM 4.3 04/08/2022    POTASSIUM 3.8 11/24/2017     Lab Results   Component Value Date    CHLORIDE 103 04/08/2022    CHLORIDE 104 11/24/2017     Lab Results   Component Value Date    IMAN 8.6 04/08/2022    IMAN 8.3 11/24/2017     Lab Results   Component Value Date    CO2 26 04/08/2022    CO2 24 11/24/2017     Lab Results   Component Value Date    BUN 21 04/08/2022    BUN 18 11/24/2017     Lab Results   Component Value Date    CR 0.99 04/08/2022    CR 1.07 11/24/2017     Lab Results   Component Value Date     04/08/2022     04/08/2022     11/24/2017     INR:  Lab Results   Component Value Date    INR 1.17 04/05/2022    INR 1.55 04/01/2022    INR 1.64 04/01/2022

## 2022-04-09 ENCOUNTER — APPOINTMENT (OUTPATIENT)
Dept: OCCUPATIONAL THERAPY | Facility: CLINIC | Age: 37
DRG: 853 | End: 2022-04-09
Attending: HOSPITALIST
Payer: COMMERCIAL

## 2022-04-09 LAB
ERYTHROCYTE [DISTWIDTH] IN BLOOD BY AUTOMATED COUNT: 15.9 % (ref 10–15)
GLUCOSE BLDC GLUCOMTR-MCNC: 231 MG/DL (ref 70–99)
HCT VFR BLD AUTO: 25.6 % (ref 40–53)
HGB BLD-MCNC: 7.8 G/DL (ref 13.3–17.7)
MAGNESIUM SERPL-MCNC: 2 MG/DL (ref 1.6–2.3)
MCH RBC QN AUTO: 28.3 PG (ref 26.5–33)
MCHC RBC AUTO-ENTMCNC: 30.5 G/DL (ref 31.5–36.5)
MCV RBC AUTO: 93 FL (ref 78–100)
PHOSPHATE SERPL-MCNC: 5.3 MG/DL (ref 2.5–4.5)
PLATELET # BLD AUTO: 568 10E3/UL (ref 150–450)
POTASSIUM BLD-SCNC: 4.1 MMOL/L (ref 3.4–5.3)
RBC # BLD AUTO: 2.76 10E6/UL (ref 4.4–5.9)
WBC # BLD AUTO: 9.5 10E3/UL (ref 4–11)

## 2022-04-09 PROCEDURE — 83735 ASSAY OF MAGNESIUM: CPT | Performed by: NURSE PRACTITIONER

## 2022-04-09 PROCEDURE — 87040 BLOOD CULTURE FOR BACTERIA: CPT | Performed by: NURSE PRACTITIONER

## 2022-04-09 PROCEDURE — 250N000011 HC RX IP 250 OP 636: Performed by: HOSPITALIST

## 2022-04-09 PROCEDURE — 250N000013 HC RX MED GY IP 250 OP 250 PS 637: Performed by: NURSE PRACTITIONER

## 2022-04-09 PROCEDURE — 85018 HEMOGLOBIN: CPT | Performed by: NURSE PRACTITIONER

## 2022-04-09 PROCEDURE — 99233 SBSQ HOSP IP/OBS HIGH 50: CPT | Performed by: HOSPITALIST

## 2022-04-09 PROCEDURE — 97110 THERAPEUTIC EXERCISES: CPT | Mod: GO | Performed by: OCCUPATIONAL THERAPIST

## 2022-04-09 PROCEDURE — 200N000001 HC R&B ICU

## 2022-04-09 PROCEDURE — 250N000011 HC RX IP 250 OP 636: Performed by: PHYSICIAN ASSISTANT

## 2022-04-09 PROCEDURE — 84132 ASSAY OF SERUM POTASSIUM: CPT | Performed by: ANESTHESIOLOGY

## 2022-04-09 PROCEDURE — 97530 THERAPEUTIC ACTIVITIES: CPT | Mod: GO | Performed by: OCCUPATIONAL THERAPIST

## 2022-04-09 PROCEDURE — 84100 ASSAY OF PHOSPHORUS: CPT | Performed by: NURSE PRACTITIONER

## 2022-04-09 PROCEDURE — 99232 SBSQ HOSP IP/OBS MODERATE 35: CPT | Mod: 24 | Performed by: INTERNAL MEDICINE

## 2022-04-09 PROCEDURE — 250N000011 HC RX IP 250 OP 636: Performed by: NURSE PRACTITIONER

## 2022-04-09 PROCEDURE — 93005 ELECTROCARDIOGRAM TRACING: CPT

## 2022-04-09 PROCEDURE — 36415 COLL VENOUS BLD VENIPUNCTURE: CPT | Performed by: NURSE PRACTITIONER

## 2022-04-09 PROCEDURE — 250N000009 HC RX 250: Performed by: INTERNAL MEDICINE

## 2022-04-09 PROCEDURE — 93010 ELECTROCARDIOGRAM REPORT: CPT | Performed by: INTERNAL MEDICINE

## 2022-04-09 PROCEDURE — 250N000013 HC RX MED GY IP 250 OP 250 PS 637: Performed by: HOSPITALIST

## 2022-04-09 PROCEDURE — 99231 SBSQ HOSP IP/OBS SF/LOW 25: CPT | Mod: 24 | Performed by: ANESTHESIOLOGY

## 2022-04-09 PROCEDURE — 250N000013 HC RX MED GY IP 250 OP 250 PS 637: Performed by: PHYSICIAN ASSISTANT

## 2022-04-09 RX ORDER — FUROSEMIDE 10 MG/ML
40 INJECTION INTRAMUSCULAR; INTRAVENOUS ONCE
Status: COMPLETED | OUTPATIENT
Start: 2022-04-09 | End: 2022-04-09

## 2022-04-09 RX ADMIN — OXYCODONE HYDROCHLORIDE 10 MG: 5 TABLET ORAL at 08:16

## 2022-04-09 RX ADMIN — METHOCARBAMOL 1000 MG: 500 TABLET, FILM COATED ORAL at 15:54

## 2022-04-09 RX ADMIN — GABAPENTIN 100 MG: 100 CAPSULE ORAL at 08:16

## 2022-04-09 RX ADMIN — ACETAMINOPHEN 650 MG: 325 TABLET, FILM COATED ORAL at 15:54

## 2022-04-09 RX ADMIN — GENTAMICIN SULFATE 100 MG: 100 INJECTION, SOLUTION INTRAVENOUS at 09:12

## 2022-04-09 RX ADMIN — OXYCODONE HYDROCHLORIDE 5 MG: 5 TABLET ORAL at 03:58

## 2022-04-09 RX ADMIN — FUROSEMIDE 40 MG: 10 INJECTION, SOLUTION INTRAVENOUS at 10:08

## 2022-04-09 RX ADMIN — GENTAMICIN SULFATE 100 MG: 100 INJECTION, SOLUTION INTRAVENOUS at 20:45

## 2022-04-09 RX ADMIN — OXYCODONE HYDROCHLORIDE 10 MG: 5 TABLET ORAL at 15:54

## 2022-04-09 RX ADMIN — OXYCODONE HYDROCHLORIDE 10 MG: 5 TABLET ORAL at 11:58

## 2022-04-09 RX ADMIN — INSULIN GLARGINE 40 UNITS: 100 INJECTION, SOLUTION SUBCUTANEOUS at 08:21

## 2022-04-09 RX ADMIN — LOSARTAN POTASSIUM 25 MG: 25 TABLET, FILM COATED ORAL at 08:16

## 2022-04-09 RX ADMIN — ACETAMINOPHEN 650 MG: 325 TABLET, FILM COATED ORAL at 11:57

## 2022-04-09 RX ADMIN — METHOCARBAMOL 1000 MG: 500 TABLET, FILM COATED ORAL at 03:58

## 2022-04-09 RX ADMIN — LEVOTHYROXINE SODIUM 125 MCG: 125 TABLET ORAL at 06:23

## 2022-04-09 RX ADMIN — GABAPENTIN 100 MG: 100 CAPSULE ORAL at 13:58

## 2022-04-09 RX ADMIN — GABAPENTIN 100 MG: 100 CAPSULE ORAL at 20:45

## 2022-04-09 RX ADMIN — METHOCARBAMOL 1000 MG: 500 TABLET, FILM COATED ORAL at 21:07

## 2022-04-09 RX ADMIN — NAFCILLIN 2 G: 2 POWDER, FOR SOLUTION INTRAMUSCULAR; INTRAVENOUS at 10:08

## 2022-04-09 RX ADMIN — NAFCILLIN 2 G: 2 POWDER, FOR SOLUTION INTRAMUSCULAR; INTRAVENOUS at 21:07

## 2022-04-09 RX ADMIN — NAFCILLIN 2 G: 2 POWDER, FOR SOLUTION INTRAMUSCULAR; INTRAVENOUS at 01:30

## 2022-04-09 RX ADMIN — NAFCILLIN 2 G: 2 POWDER, FOR SOLUTION INTRAMUSCULAR; INTRAVENOUS at 17:35

## 2022-04-09 RX ADMIN — NAFCILLIN 2 G: 2 POWDER, FOR SOLUTION INTRAMUSCULAR; INTRAVENOUS at 06:22

## 2022-04-09 RX ADMIN — NAFCILLIN 2 G: 2 POWDER, FOR SOLUTION INTRAMUSCULAR; INTRAVENOUS at 13:58

## 2022-04-09 RX ADMIN — LIDOCAINE 2 PATCH: 560 PATCH PERCUTANEOUS; TOPICAL; TRANSDERMAL at 07:05

## 2022-04-09 RX ADMIN — DICLOFENAC SODIUM 4 G: 10 GEL TOPICAL at 17:43

## 2022-04-09 RX ADMIN — BUPROPION HYDROCHLORIDE 300 MG: 300 TABLET, EXTENDED RELEASE ORAL at 08:16

## 2022-04-09 RX ADMIN — PANTOPRAZOLE SODIUM 40 MG: 40 TABLET, DELAYED RELEASE ORAL at 08:16

## 2022-04-09 RX ADMIN — ASPIRIN 81 MG CHEWABLE TABLET 81 MG: 81 TABLET CHEWABLE at 08:16

## 2022-04-09 RX ADMIN — ACETAMINOPHEN 650 MG: 325 TABLET, FILM COATED ORAL at 08:16

## 2022-04-09 RX ADMIN — SENNOSIDES AND DOCUSATE SODIUM 1 TABLET: 50; 8.6 TABLET ORAL at 20:45

## 2022-04-09 RX ADMIN — HEPARIN SODIUM 5000 UNITS: 5000 INJECTION, SOLUTION INTRAVENOUS; SUBCUTANEOUS at 13:58

## 2022-04-09 RX ADMIN — METHOCARBAMOL 1000 MG: 500 TABLET, FILM COATED ORAL at 10:08

## 2022-04-09 RX ADMIN — DICLOFENAC SODIUM 4 G: 10 GEL TOPICAL at 11:58

## 2022-04-09 RX ADMIN — HEPARIN SODIUM 5000 UNITS: 5000 INJECTION, SOLUTION INTRAVENOUS; SUBCUTANEOUS at 06:22

## 2022-04-09 RX ADMIN — HEPARIN SODIUM 5000 UNITS: 5000 INJECTION, SOLUTION INTRAVENOUS; SUBCUTANEOUS at 21:07

## 2022-04-09 ASSESSMENT — ACTIVITIES OF DAILY LIVING (ADL)
ADLS_ACUITY_SCORE: 7

## 2022-04-09 NOTE — PROGRESS NOTES
[facility]  Cardiovascular and Thoracic Surgery Daily Note          Assessment and Plan:   Anil Montoya is a 36 year old gentleman who presented to [facility] ED on 3/22/22 with weakness, recent hematuria and shortness of breath. Workup demonstrated MSSA bacteremia likely secondary to right groin skin source. TEJINDER on 3/27/22 demonstrated tricuspid valve endocarditis. In completing full work up, there was concern for discitis with seeding however MRI negative. He was transferred on 3/28/22 to [facility] for consideration of surgical intervention.      PMH includes: Poorly controlled diabetic, HTN, hypothyroidism, obesity, WARNER, anxiety, cryptococcal pneumonia in 2012 with previous history of staph aureus bacteremia.     Most recent echo demonstrates LVEF 55-60%, normal RV function, mildly thickened cusps on aortic valve and thickened tricuspid valve leaflets, small mobile filamentous elements on the septal leaflet of TV and larged fixed heterogeneous mass in the RV attached to the interventricular septum.  Coronary angiogram 3/31/22 with no e/o CAD.     Course c/b CHB with accelerated junctional rhythm and polymorphic VT arrest on 4/5 with immediate ROSC after defib x1 and transient CPR.        POD #8 s/p:  1. Tricuspid valve replacement (31mm Epic stented valve)  2. Endoscopic aortic valve exploration  3. Transesophageal echocardiogram on 4/1/22 with Dr. 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 Did not need intubation and neurologically intact. Electrolytes stable: K+ 3.7, Mg 2. Appreciate EP's involvement. Troponin elevated as expected after event.  Echocardiogram on 4/5 stable but poor images to evaluate tricuspid gradient  [PTA meds on hold: losartan 50 mg daily]  HD stable  overnight in accelerated junctional rhythm  - ASA 81 mg daily  - Defer BB given heart block  - Losartan 25 mg daily and up titrate PRN  - No statin indicated  - ID as below  - Consulted EP, appreciate recommendations, plan for ICD/+/- pacer once blood is sterile  - CTs/wires removed 4/7, discussed with EP  -ORTHO/Neuro: Having new lower back pain, MRI 4/7 showing concern for osteomyelitis involving epidural extension, no intervention at this time, pain control may be bigger issue, repeat MRI spine prior to discontinuation of abx, voltaren ordered for back     Resp:   WARNER  History of cryptococcal pneumonia  Extubated on POD 0, now saturating well on room air  - Continue pulm hygiene efforts: IS/flutter valve/mobility  - Titrate O2 to maintain sats >92%, on RA  - CPAP at HS     Neuro/MSK:    Acute postoperative pain  MDD/VIVEK  - Better controlled with current regimen this AM, received toradol x1  - Sore after a few compressions with CPR but overall tolerable  - Robaxin scheduled, lidocaine patches added  - PTA Wellbutrin resumed  - Sternal incision and area intact and feels stable, wires intact on CXR, continue sternal precautions     Renal/Electrolyte:   Creat stable, below preoperative weight, has less edema  Autodiuresing  - Strict I/O, daily weights  - Avoid/limit nephrotoxins as able  - Replete lytes per protocol  - 1 dose IV lasix this am,  diuresis today     GI/Nutrition:    Mildly elevated LFTs  - Tolerating current diet:  Orders Placed This Encounter      Moderate Consistent Carb (60 g CHO per Meal) Diet   - Continue bowel regimen, + BM, + flatus  - LFTs had normalized, AST slightly elevated after arrest, trend  - PPI     :    - Voiding well on own     Endo:  Stress induced hyperglycemia   Poorly controlled DM2  Hypothyroidism  Preop A1c             Lab Results   Component Value Date     A1C 12.6 03/27/2022    [PTA meds on hold: Victoza and metformin]  - Was on insulin infusion, transitioned to sliding scale  insulin and lantus 4/2, hyperglycemic, resumed insulin infusion POD 2 with prandial coverage. Continue close monitoring of blood sugars to allow for healing  - Hospitalists managing diabetic regimen, appreciate great management, currently lantus 40 q am, 10 at bedtime with high sliding scale  - Goal BG <180 for optimal healing  - PTA levothyroxine resumed     ID:  Stress induced leukocytosis, resolved  Tricuspid valve endocarditis  MSSA bacteremia  History cryptococcal pneumonia  WBC 9.5<10.1<11.7<10, Temp (24hrs), Av.9  F (37.2  C), Min:97.7  F (36.5  C), Max:99.9  F (37.7  C); no s/sx infection having new fevers, MRI  concerning for osteomyelitis, consult to ortho/neuro for any further recs-trying to exhaust all resources.   Prelim intraop gram stain positive for yeast from tricuspid valve, culture with 1+ staph aureus, no yeast, continue to follow  BC 4/3 positive for staph aureus, continue to follow  BC /5 with NGTD thus far  Fungal BC sent on  with NGTD  Full skin inspection  with no new wounds  - Completing perioperative ABX  - Continue Cefazolin per ID, Gentamycin added  for new positive culture  - Micafungin started  per ID for positive yeast on gram stain, discontinued with negative cx on 4/3  - HIV antigen/antibody test negative  - Per ID: repeat blood cultures x 2 sets daily until clear  - Continue to follow fever curve, WBC and inflammatory markers as appropriate     Heme:  Acute blood loss anemia  Acute blood loss thrombocytopenia  Hgb 7.8, Plts 568; no s/sx active bleeding  - CBC in AM  - Goal Hgb >7   -Proph: PPI, subcutaneous heparin, SCDs  -Dispo: ICU-->[address], if EP is ok, continue therapies, pulm hygiene     Seen and discussed with Dr. Jolly          Interval History:   Sitting up in chair, eating breakfast, breathing stable, pain mostly controlled, tolerating diet, working with rehab         Medications:       aspirin  81 mg Oral or NG Tube Daily     buPROPion  300 mg Oral  QAM     diclofenac  4 g Topical 4x Daily     furosemide  40 mg Intravenous Once     gabapentin  100 mg Oral TID     gentamicin  100 mg Intravenous Q12H     heparin ANTICOAGULANT  5,000 Units Subcutaneous Q8H     insulin aspart  1-10 Units Subcutaneous TID AC     insulin aspart  1-7 Units Subcutaneous At Bedtime     insulin aspart   Subcutaneous Daily with breakfast     insulin aspart   Subcutaneous Daily with lunch     insulin aspart   Subcutaneous Daily with supper     insulin glargine  10 Units Subcutaneous At Bedtime     insulin glargine  40 Units Subcutaneous QAM AC     levothyroxine  125 mcg Oral Once per day on Mon Tue Wed Thu Fri Sat     levothyroxine  250 mcg Oral Weekly     lidocaine  2 patch Transdermal Q24H     lidocaine   Transdermal Q8H     losartan  25 mg Oral Daily     methocarbamol  1,000 mg Oral Q6H     nafcillin  2 g Intravenous Q4H     pantoprazole  40 mg Oral Daily     polyethylene glycol  17 g Oral BID     senna-docusate  1 tablet Oral BID     @[email or web address]-@          Physical Exam:   Vitals were reviewed  Blood pressure (!) 144/79, pulse 80, temperature 98.6  F (37  C), temperature source Oral, resp. rate 19, weight 122.1 kg (269 lb 2.9 oz), SpO2 98 %.  Rhythm: NSR    Lungs: course    Cardiovascular: s1/s2, no m/r/g    Abdomen: s/nt/nd    Extremeties: no LE edema    Incision: cdi    CT: na    Weight:   Vitals:    04/04/22 1739 04/05/22 0553 04/06/22 0600 04/07/22 0400   Weight: 119.1 kg (262 lb 8 oz) 118.4 kg (261 lb 1.6 oz) 120.5 kg (265 lb 10.5 oz) 119.2 kg (262 lb 12.6 oz)    04/09/22 0600   Weight: 122.1 kg (269 lb 2.9 oz)            Data:   Labs:   Lab Results   Component Value Date    WBC 9.5 04/09/2022    WBC 6.8 11/24/2017     Lab Results   Component Value Date    RBC 2.76 04/09/2022    RBC 5.16 11/24/2017     Lab Results   Component Value Date    HGB 7.8 04/09/2022    HGB 15.2 11/24/2017     Lab Results   Component Value Date    HCT 25.6 04/09/2022    HCT 43.7 11/24/2017     No components  found for: MCT  Lab Results   Component Value Date    MCV 93 04/09/2022    MCV 85 11/24/2017     Lab Results   Component Value Date    MCH 28.3 04/09/2022    MCH 29.5 11/24/2017     Lab Results   Component Value Date    MCHC 30.5 04/09/2022    MCHC 34.8 11/24/2017     Lab Results   Component Value Date    RDW 15.9 04/09/2022    RDW 12.9 11/24/2017     Lab Results   Component Value Date     04/09/2022     11/24/2017       Last Basic Metabolic Panel:  Lab Results   Component Value Date     04/08/2022     11/24/2017      Lab Results   Component Value Date    POTASSIUM 4.1 04/09/2022    POTASSIUM 3.8 11/24/2017     Lab Results   Component Value Date    CHLORIDE 103 04/08/2022    CHLORIDE 104 11/24/2017     Lab Results   Component Value Date    IMAN 8.6 04/08/2022    IMAN 8.3 11/24/2017     Lab Results   Component Value Date    CO2 26 04/08/2022    CO2 24 11/24/2017     Lab Results   Component Value Date    BUN 21 04/08/2022    BUN 18 11/24/2017     Lab Results   Component Value Date    CR 0.99 04/08/2022    CR 1.07 11/24/2017     Lab Results   Component Value Date     04/08/2022     04/08/2022     11/24/2017       Lety Perez PA-C  Pager #: [phone]

## 2022-04-09 NOTE — PROGRESS NOTES
Cardiology Progress Note          Assessment and Plan:   Pt reports continued back pain. Suspected L5-S1 diskitis. Orthopedics consulted for management.  Afebrile, WBC normal. Blood culture negative so far.    Admitted for endocarditis of the tricuspid valve around 3/22/2022.   Tricuspid valve replacement on 4/1/2022.   Post op complete AV block.   VF on 4/5/2022. Normal EF.  ECG telemetry suggest sinus rhythm with intact AV conduction. Will have a 12-lead ECG for confirmation.  May consider subcutaneous ICD if he does not need pacing.    Nitin Matias MD  Cardiology   [phone]                  Physical Exam:   Blood pressure (!) 144/79, pulse 80, temperature 98.6  F (37  C), temperature source Oral, resp. rate 19, weight 122.1 kg (269 lb 2.9 oz), SpO2 98 %.  Wt Readings from Last 4 Encounters:   04/09/22 122.1 kg (269 lb 2.9 oz)   03/28/22 121.1 kg (267 lb)   01/01/13 113.4 kg (250 lb)      I/O last 3 completed shifts:  In: 2120 [P.O.:1200; I.V.:920]  Out: 3450 [Urine:3450]    Constitutional: Alert, no apparent distress,      Lungs: No crackles or wheezing,    Cardiovascular: Regular rhythm, normal S1 and S2, and no murmur,    Lymphm node  Neck  ENT  Neurologic  Abdomen: No enlargement  No jugular vein extension or carotid bruit  No apparent abnormality  No focal deficit  Normal bowel sounds, soft, no distension, no tender   Skin: No rashes, no cyanosis   Extremity: No edema          Medications:     Current Facility-Administered Medications:      acetaminophen (TYLENOL) tablet 650 mg, 650 mg, Oral, Q4H PRN, Alejandra Silverio NP, 650 mg at 04/09/22 0816     aspirin (ASA) chewable tablet 81 mg, 81 mg, Oral or NG Tube, Daily, Alejandra Silverio NP, 81 mg at 04/09/22 0816     bisacodyl (DULCOLAX) Suppository 10 mg, 10 mg, Rectal, Daily PRN, Alejandra Silverio NP     buPROPion (WELLBUTRIN XL) 24 hr tablet 300 mg, 300 mg, Oral, QAM, Alejandra Silverio NP, 300 mg at 04/09/22 0816     dextrose 10% infusion, , Intravenous,  Continuous PRN, Alejandra Silverio NP     glucose gel 15-30 g, 15-30 g, Oral, Q15 Min PRN **OR** dextrose 50 % injection 25-50 mL, 25-50 mL, Intravenous, Q15 Min PRN **OR** glucagon injection 1 mg, 1 mg, Subcutaneous, Q15 Min PRN, Alejandra Silverio NP     diclofenac (VOLTAREN) 1 % topical gel 4 g, 4 g, Topical, 4x Daily, Lety Perez PA-C     gabapentin (NEURONTIN) capsule 100 mg, 100 mg, Oral, TID, Alejandra Silverio NP, 100 mg at 04/09/22 0816     gentamicin (GARAMYCIN) infusion 100 mg, 100 mg, Intravenous, Q12H, Ashley Orozco DO, 100 mg at 04/09/22 0912     heparin ANTICOAGULANT injection 5,000 Units, 5,000 Units, Subcutaneous, Q8H, Alejandra Silverio NP, 5,000 Units at 04/09/22 0622     hydrALAZINE (APRESOLINE) injection 10 mg, 10 mg, Intravenous, Q4H PRN, Alejandra Silverio NP     HYDROmorphone (DILAUDID) injection 0.2 mg, 0.2 mg, Intravenous, Q2H PRN, 0.2 mg at 04/05/22 1316 **OR** HYDROmorphone (DILAUDID) injection 0.4 mg, 0.4 mg, Intravenous, Q2H PRN, Alejandra Silverio NP, 0.4 mg at 04/07/22 1703     insulin aspart (NovoLOG) injection (RAPID ACTING), 1-10 Units, Subcutaneous, TID AC, Edgar Huff MD, 1 Units at 04/09/22 0822     insulin aspart (NovoLOG) injection (RAPID ACTING), 1-7 Units, Subcutaneous, At Bedtime, Edgar Huff MD, 2 Units at 04/07/22 2141     insulin aspart (NovoLOG) injection (RAPID ACTING), , Subcutaneous, Daily with breakfast, Edgar Huff MD, 5 Units at 04/09/22 0823     insulin aspart (NovoLOG) injection (RAPID ACTING), , Subcutaneous, Daily with lunch, Edgar Huff MD, 6 Units at 04/08/22 1216     insulin aspart (NovoLOG) injection (RAPID ACTING), , Subcutaneous, Daily with supper, Edgar Huff MD, 5 Units at 04/08/22 1812     insulin glargine (LANTUS PEN) injection 10 Units, 10 Units, Subcutaneous, At Bedtime, Edgar Huff MD, 10 Units at 04/08/22 2111     insulin glargine (LANTUS PEN) injection 40 Units, 40 Units, Subcutaneous, QAM AC, Edgar Huff MD, 40 Units at 04/09/22  0821     levothyroxine (SYNTHROID/LEVOTHROID) tablet 125 mcg, 125 mcg, Oral, Once per day on Mon Tue Wed Thu Fri Sat, Ashley Orozco DO, 125 mcg at 04/09/22 0623     levothyroxine (SYNTHROID/LEVOTHROID) tablet 250 mcg, 250 mcg, Oral, Weekly, Alejandra Silverio NP, 250 mcg at 04/03/22 0831     Lidocaine (LIDOCARE) 4 % Patch 2 patch, 2 patch, Transdermal, Q24H, Alejandra Silverio NP, 2 patch at 04/09/22 0705     lidocaine (LMX4) cream, , Topical, Q1H PRN, Alejandra Silverio NP     lidocaine 1 % 0.1-1 mL, 0.1-1 mL, Other, Q1H PRN, Alejandra Silverio NP     lidocaine patch in PLACE, , Transdermal, Q8H, Alejandra Silverio NP     losartan (COZAAR) tablet 25 mg, 25 mg, Oral, Daily, Alejandra Silverio NP, 25 mg at 04/09/22 0816     magnesium hydroxide (MILK OF MAGNESIA) suspension 30 mL, 30 mL, Oral, Daily PRN, Alejandra Silverio NP, 30 mL at 04/06/22 1934     melatonin tablet 3 mg, 3 mg, Oral, At Bedtime PRN, Alejandra Silverio NP     methocarbamol (ROBAXIN) tablet 1,000 mg, 1,000 mg, Oral, Q6H, Lety Perez PA-C, 1,000 mg at 04/09/22 1008     nafcillin IV 2 g vial to attach to  ml bag, 2 g, Intravenous, Q4H, Erik Wood MD, 2 g at 04/09/22 1008     naloxone (NARCAN) injection 0.2 mg, 0.2 mg, Intravenous, Q2 Min PRN **OR** naloxone (NARCAN) injection 0.4 mg, 0.4 mg, Intravenous, Q2 Min PRN **OR** naloxone (NARCAN) injection 0.2 mg, 0.2 mg, Intramuscular, Q2 Min PRN **OR** naloxone (NARCAN) injection 0.4 mg, 0.4 mg, Intramuscular, Q2 Min PRN, Alejandra Silverio NP     ondansetron (ZOFRAN-ODT) ODT tab 4 mg, 4 mg, Oral, Q6H PRN **OR** ondansetron (ZOFRAN) injection 4 mg, 4 mg, Intravenous, Q6H PRN, Alejandra Silverio, NP     oxyCODONE (ROXICODONE) tablet 5 mg, 5 mg, Oral, Q4H PRN, 5 mg at 04/09/22 0358 **OR** oxyCODONE (ROXICODONE) tablet 10 mg, 10 mg, Oral, Q4H PRN, Alejandra Silverio NP, 10 mg at 04/09/22 0816     [DISCONTINUED] pantoprazole (PROTONIX) 2 mg/mL suspension 40 mg, 40 mg, Oral or NG Tube, Daily, 40 mg at 04/01/22  1514 **OR** pantoprazole (PROTONIX) EC tablet 40 mg, 40 mg, Oral, Daily, Alejandra Silverio NP, 40 mg at 04/09/22 0816     polyethylene glycol (MIRALAX) Packet 17 g, 17 g, Oral, BID, Alejandra Silverio NP, 17 g at 04/05/22 1223     Reason beta blocker order not selected, , Does not apply, DOES NOT GO TO Jean Claude SWEENEY Leah J, NP     senna-docusate (SENOKOT-S/PERICOLACE) 8.6-50 MG per tablet 1 tablet, 1 tablet, Oral, BID, Alejandra Silverio NP, 1 tablet at 04/08/22 1950     sodium chloride (PF) 0.9% PF flush 3 mL, 3 mL, Intracatheter, q1 min prn, Alejandra Silverio NP     sodium chloride 0.9% infusion, , Intravenous, Continuous, Alejandra Silverio NP, Last Rate: 10 mL/hr at 04/08/22 0800, Rate Verify at 04/08/22 0800           Lab results:        Recent Labs   Lab Test 04/09/22  0450 04/08/22  1807 04/08/22  1214 04/08/22  0713 04/08/22  0626 04/07/22  0550 04/07/22  0532 04/06/22  0608 04/06/22  0417   NA  --   --   --   --  137  --  138  --  136   POTASSIUM 4.1  --   --   --  4.3  --  4.0  --  4.0   CHLORIDE  --   --   --   --  103  --  104  --  104   IMAN  --   --   --   --  8.6  --  8.9  --  8.3*   CO2  --   --   --   --  26  --  27  --  27   BUN  --   --   --   --  21  --  17  --  18   CR  --   --   --   --  0.99  --  0.93  --  0.94   GLC  --  115* 159* 160* 174*   < > 113*   < > 105*    < > = values in this interval not displayed.     Recent Labs   Lab Test 04/09/22  0450 04/08/22  0604 04/07/22  0532   HGB 7.8* 7.9* 7.9*   WBC 9.5 10.1 11.7*   * 609* 633*     Recent Labs   Lab Test 04/05/22  0645 04/01/22  1300 04/01/22  1141   INR 1.17* 1.55* 1.64*     Recent Labs   Lab Test 11/25/17  0250 11/24/17  2350   TROPI <0.015 <0.015

## 2022-04-09 NOTE — PROGRESS NOTES
"SPIRITUAL HEALTH SERVICES Progress Note  [facility]  ICU    Saw pt Anil Montoya per routine consult request for emotional support.      Illness Narrative - Heart Valve replacement and infection in his spine      Distress - Anil reflected on the stress he's experiencing in his marriage and the relationship with his 4 children due to his hospitalization and ongoing health issues. \"I focused too much on my work and didn't take care of myself.\" Anil named the stress that this has created on his marriage and the plan that he and his wife have to move forward with counseling.      Coping - Anil Confucianist neeraj and his connection to the pastors and community at [facility] in [place] is a primary source of support. His  has visited him during this hospitalization at both [facility] and [facility]. Prayer central to his neeraj practice. He welcomed a prayer, which I offered.      Meaning-Making - \"God is always with me.\"      Plan - [facility] remains available for support.    Maribel White MDiv  Associate   [phone] (pager)      "

## 2022-04-09 NOTE — PROGRESS NOTES
Neurosurgery Progress     TODAY'S PLAN:     Mr. Montoya has been consulted and evaluated by both our group, [facility], as well as [facility] for the same concern of osteomyelitis of the lumbar spine. For continuity of care to to avoid any conflict in treatment, we will respectfully sign off at this time and defer treatment to [facility]. This has been discussed with Dr. Patel.     /74   Pulse 72   Temp 99.3  F (37.4  C) (Oral)   Resp 24   Wt 269 lb 2.9 oz (122.1 kg)   SpO2 96%   BMI 38.62 kg/m       Edgar Randhawa PA-C   Neurosurgery   [phone] (P)

## 2022-04-09 NOTE — PLAN OF CARE
Goal Outcome Evaluation:        No changes today. Waiting for EP to get ok to tx pt out of ICU. States pain in controlled with current regimen. Remains accelerated junctional with heart block with some sinus beats. Up with assist of one. 40 lasix given today. Sister at bedside this morning

## 2022-04-09 NOTE — PROGRESS NOTES
[facility]    Hospitalist Progress Note    Assessment & Plan      Assessment & Plan:   Anil Montoya is a 36 year old male with hx of DM2, HTN, WARNER, and anxiety who was initially hospitalized at [facility] on 3/22/2022 for severe sepsis initially attributed to pneumonia vs viral infection. He was found to have MSSA bacteremia with ongoing positive cultures. He underwent a TEJINDER on 3/27/2022 which revealed tricuspid endocarditis, and he was transferred to [facility] on 3/28/2022 for CV Surgery consultation.     Sepsis with MSSA bacteremia due to tricuspid valve endocarditis.  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 He was initially treated with ceftriaxone and azithromycin for possible community-acquired pneumonia, and ID was consulted. Blood cultures returned positive for MSSA, and serial blood cultures continued to return positive. Broad work-up including CT abd/pelvis, MR lumbar spine, CT dental, and TTE were unrevealing  * Initially treated with IV ceftriaxone and azithromycin; switched to IV vanco on 3/23 when blood cultures returned with Staph; switched to IV cefazolin on 3/24 upon sensitivities  * Underwent TEJINDER on 3/27 which showed thickened tricuspid valve leaflets with atrial aspect of the septal leaflet with an irregular border and small mobile filamentous elements, large fixed heterogeneous mass in the right ventricle attached to the interventricular septum (2.5 x 2 cm) appearing connected to the tricuspid subvalvular annular apparatus with a pedunculated round mobile element (1.8 x 1 cm).  No significant tricuspid regurgitation reported. LVEF 55 to 60%. Negative bubble study.  * Transferred to [facility] on 3/28 for CV Surgery consult  - Patient underwent tricuspid valve replacement with bioprosthetic valve (31 mm epic stented valve)  on 4/1/2022 and endoscopic aortic valve exploration.  - Initially micafungin added on 4/1 as Gram stain from tissue from heart valve from 4/ 1 initially was reported as yeast, which was subsequently corrected as not to be present.  Micafungin now discontinued.  -  given new prosthetic valve and positive blood cultures gentamicin added ( synergy dosing ) until he clears the BCs  - had worsening lower back pain and underwent repeat lumbar mri yesterday(4/7/22) which showed possible osteomyelitis and discitis   MRI read- S1 vertebral body and right sacral ala and anterior epidural space from mid L4 down to mid S2 worrisome for an infectious process such as osteomyelitis with epidural extension of infection. Also L5-S1 discitis is not excluded and continued surveillance is recommended.  - cefazolin was changed to nafcillin for better cns penetration. ID/CT surgery aware.   - Daily blood cultures, last positive was from 4/3  -Continue IV gentamicin 100 mg twice daily IV daily-started on 4/5/2022 and nafcillin 2 g every 4 hours-started on 4/7/2022  - ID, Cardiology, CV Surgery following  - Patient was initially transferred out of the intensive care unit on 4/4/2022 after he was stable however patient had an episode of V. fib arrest and was transferred back to the ICU.     V. fib arrest(4/5/2022)  -Patient was noted to have V. fib on telemetry earlier this morning  -Required brief CPR and 1 shock of 120 J with successful restoration of regular rhythm  -Electrolytes appear to be within normal limits  -Patient transferred back to the intensive care unit for close monitoring  -EP following -> plan on implanting a dual-chamber ICD for secondary prevention, probably early next week to be absolutely certain blood cultures have cleared.  If AV conduction improves, may consider a subcutaneous ICD sooner   -ECHO -> LVEF was normal by echocardiography (technically difficult study)     Post-op complete heart block  -Patient initially  was in second-degree AV block, and now appears to have complete heart block post tricuspid valve replacement  -EP following, given good junctional escape no plans for immediate permanent pacemaker implantation, continue to monitor on telemetry closely. Removed pacer wires. Plan on possible icd placement next week.  If sinus rhythm returns then he would not need a pacer placement.     DM2 without complications, long-term insulin use, uncontrolled  * A1c 12.6. Had not been taking his diabetes medications for some time  -transitioned to subcut regimen yesterday with lantus 40 am and 10 pm with sliding scale aspart and 1:10 carb count . Goal for bs<180 to facilitate infection control   -Prior to surgery he was requiring Lantus 34 units twice a day and mealtime coverage of 1: 10.     Acute postop blood loss anemia  -Hemoglobin preop was around 12 which had slowly trended down.  Hemoglobin dropped to 7.3 on 4/4, most likely acute blood loss from surgery.    -Hemoglobin is at 7.8 this morning   -Continue to monitor and transfuse as needed.  -Has thrombocytosis likely secondary to infection and acute phase reactant     Acute low back pain  * Noted while at [facility]  * MR lumbar spine (3/24) showed mild multilevel lumbar spondylosis, but no evidence of discitis but mri 4/7 showed possible osteomyelitis with discitis.  -Continue pain control as needed with dilaudid , robaxin and oxycodone. 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 RUQ US (3/22) showed fatty liver, but normal gallbladder and normal bile ducts  - LFTs improved.      Essential hypertension  - on losartan 25mg daily. Hold b-blocker for his complete heart block.      WARNER  * CPAP per home settings     Hypothyroidism  * Chronic and stable on levothyroxine     Generalized anxiety disorder  ADHD  * Chronic and stable on  bupropion     Hyperphosphatemia  -Phosphate has been slowly trending upwards.  His last level was at 5.3.  Unclear etiology.  Sometimes hemolysis can do this.  We will continue to monitor.  Renal function within normal limits.    DVT Prophylaxis: Heparin SQ  Code Status: Full Code    Disposition: Expected discharge when medically stable     Esther Damon MD  [phone](p)  [phone]( c)    Interval History   Patient awake and alert.  No acute events overnight.  Continues to endorse significant lower back pain.  Has a couple of lidocaine patches over his back.    -Data reviewed today: I reviewed all new labs and imaging results over the last 24 hours. I personally reviewed the mri lumbar spine image(s) showing possible ostemyelitis and discitis.    Physical Exam   Temp: 97.5  F (36.4  C) Temp src: Oral BP: 116/74 Pulse: 86   Resp: 18 SpO2: 98 %      Vitals:    04/06/22 0600 04/07/22 0400 04/09/22 0600   Weight: 120.5 kg (265 lb 10.5 oz) 119.2 kg (262 lb 12.6 oz) 122.1 kg (269 lb 2.9 oz)     Vital Signs with Ranges  Temp:  [97.5  F (36.4  C)-99.9  F (37.7  C)] 97.5  F (36.4  C)  Pulse:  [72-99] 86  Resp:  [11-28] 18  BP: (116-157)/(64-90) 116/74  SpO2:  [96 %-99 %] 98 %  I/O last 3 completed shifts:  In: 2120 [P.O.:1200; I.V.:920]  Out: 3450 [Urine:3450]    Physical Exam  Constitutional:       General: He is not in acute distress.     Appearance: He is well-developed. He is obese. He is ill-appearing.      Comments: Appears flushed   HENT:      Right Ear: Tympanic membrane and external ear normal.      Left Ear: Tympanic membrane and external ear normal.      Nose: Nose normal.      Mouth/Throat:      Mouth: No oral lesions.      Pharynx: No oropharyngeal exudate.   Eyes:      General:         Right eye: No discharge.         Left eye: No discharge.      Conjunctiva/sclera: Conjunctivae normal.      Pupils: Pupils are equal, round, and reactive to light.   Neck:      Thyroid: No thyromegaly.      Trachea: No  tracheal deviation.   Cardiovascular:      Rate and Rhythm: Normal rate and regular rhythm.      Pulses: Normal pulses.      Heart sounds: Normal heart sounds, S1 normal and S2 normal. No murmur heard.    No S3 or S4 sounds.   Pulmonary:      Effort: Pulmonary effort is normal. No respiratory distress.      Breath sounds: Normal breath sounds. No wheezing or rales.   Abdominal:      General: Bowel sounds are normal.      Palpations: Abdomen is soft. There is no mass.      Tenderness: There is no abdominal tenderness.   Musculoskeletal:         General: No deformity. Normal range of motion.      Cervical back: Neck supple.      Comments: Severe low back pain     Lymphadenopathy:      Cervical: No cervical adenopathy.   Skin:     General: Skin is warm and dry.      Findings: No lesion or rash.   Neurological:      Mental Status: He is alert and oriented to person, place, and time.      Motor: No abnormal muscle tone.      Deep Tendon Reflexes: Reflexes are normal and symmetric.   Psychiatric:         Speech: Speech normal.         Thought Content: Thought content normal.         Judgment: Judgment normal.           Medications     dextrose       BETA BLOCKER NOT PRESCRIBED       sodium chloride 10 mL/hr at 04/08/22 0800       aspirin  81 mg Oral or NG Tube Daily     buPROPion  300 mg Oral QAM     diclofenac  4 g Topical 4x Daily     gabapentin  100 mg Oral TID     gentamicin  100 mg Intravenous Q12H     heparin ANTICOAGULANT  5,000 Units Subcutaneous Q8H     insulin aspart  1-10 Units Subcutaneous TID AC     insulin aspart  1-7 Units Subcutaneous At Bedtime     insulin aspart   Subcutaneous Daily with breakfast     insulin aspart   Subcutaneous Daily with lunch     insulin aspart   Subcutaneous Daily with supper     insulin glargine  10 Units Subcutaneous At Bedtime     insulin glargine  40 Units Subcutaneous QAM AC     levothyroxine  125 mcg Oral Once per day on Mon Tue Wed Thu Fri Sat     levothyroxine  250 mcg Oral  Weekly     lidocaine  2 patch Transdermal Q24H     lidocaine   Transdermal Q8H     losartan  25 mg Oral Daily     methocarbamol  1,000 mg Oral Q6H     nafcillin  2 g Intravenous Q4H     pantoprazole  40 mg Oral Daily     polyethylene glycol  17 g Oral BID     senna-docusate  1 tablet Oral BID       Data   Recent Labs   Lab 04/09/22  0450 04/08/22  1807 04/08/22  1214 04/08/22  0713 04/08/22  0626 04/08/22  0604 04/07/22  0550 04/07/22  0532 04/06/22  0608 04/06/22  0417 04/05/22  0647 04/05/22  0645   WBC 9.5  --   --   --   --  10.1  --  11.7*  --  10.0   < > 16.2*   HGB 7.8*  --   --   --   --  7.9*  --  7.9*  --  7.3*   < > 8.0*   MCV 93  --   --   --   --  95  --  96  --  96   < > 94   *  --   --   --   --  609*  --  633*  --  605*   < > 883*   INR  --   --   --   --   --   --   --   --   --   --   --  1.17*   NA  --   --   --   --  137  --   --  138  --  136   < >  --    POTASSIUM 4.1  --   --   --  4.3  --   --  4.0  --  4.0   < >  --    CHLORIDE  --   --   --   --  103  --   --  104  --  104   < >  --    CO2  --   --   --   --  26  --   --  27  --  27   < >  --    BUN  --   --   --   --  21  --   --  17  --  18   < >  --    CR  --   --   --   --  0.99  --   --  0.93  --  0.94   < >  --    ANIONGAP  --   --   --   --  8  --   --  7  --  5   < >  --    IMAN  --   --   --   --  8.6  --   --  8.9  --  8.3*   < >  --    GLC  --  115* 159* 160* 174*  --    < > 113*   < > 105*   < >  --    ALBUMIN  --   --   --   --   --   --   --  1.8*  --  1.6*   < >  --    PROTTOTAL  --   --   --   --   --   --   --  7.2  --  6.8   < >  --    BILITOTAL  --   --   --   --   --   --   --  0.3  --  0.2   < >  --    ALKPHOS  --   --   --   --   --   --   --  102  --  105   < >  --    ALT  --   --   --   --   --   --   --  72*  --  67   < >  --    AST  --   --   --   --   --   --   --  73*  --  72*   < >  --     < > = values in this interval not displayed.       No results found for this or any previous visit (from the past 24  hour(s)).

## 2022-04-09 NOTE — PROGRESS NOTES
"36-year-old gentleman status post tricuspid valve repair secondary to endocarditis.  He was recovering as expected V. fib, V. tach arrest resulting in 1 shock.  Patient had return of spontaneous circulation and is neurologically intact.  This morning on exam patient was sitting in chair  complaining of some positional back pain.  No further episodes of arrhythmia.  Antibiotics adjusted given concern of osteo of spine appreciate orthospine input.  Vital signs:  Temp: 99.3  F (37.4  C) Temp src: Oral BP: 133/74 Pulse: 72   Resp: 24 SpO2: 96 %   Oxygen Delivery: 4 LPM   Weight: 122.1 kg (269 lb 2.9 oz)  Estimated body mass index is 38.62 kg/m  as calculated from the following:    Height as of 3/22/22: 1.778 m (5' 10\").    Weight as of this encounter: 122.1 kg (269 lb 2.9 oz).  Awake alert follows commands  Chest clear to auscultation  CV in heart block  Lab Results   Component Value Date    WBC 9.5 04/09/2022    HGB 7.8 (L) 04/09/2022    HCT 25.6 (L) 04/09/2022    MCV 93 04/09/2022     (H) 04/09/2022   Assessment and plan 36-year-old gentleman status post tricuspid valve repair secondary to endocarditis.  He is awaiting AICD placement when his blood cultures have cleared.  We will continue to monitor the patient in the ICU at this time.  Agree with lidocaine for pain control.  Continue to advance diet as tolerated  Agree with plan to hold beta-blocker given heart block.  Patient does have acute blood loss anemia however there is no indication for transfusion at this time.  Appreciate ID and spine input given osteo of spine  Will continue to follow in the periphery, may need pain consult for management of back pain.  Would consider lidocaine patches to back to help with pain and discomfort.    Shelton Resendiz MD  Critical Care Medicine        "

## 2022-04-10 ENCOUNTER — APPOINTMENT (OUTPATIENT)
Dept: OCCUPATIONAL THERAPY | Facility: CLINIC | Age: 37
DRG: 853 | End: 2022-04-10
Attending: HOSPITALIST
Payer: COMMERCIAL

## 2022-04-10 LAB
ALBUMIN SERPL-MCNC: 2 G/DL (ref 3.4–5)
ALBUMIN UR-MCNC: NEGATIVE MG/DL
ALP SERPL-CCNC: 90 U/L (ref 40–150)
ALT SERPL W P-5'-P-CCNC: 41 U/L (ref 0–70)
ANION GAP SERPL CALCULATED.3IONS-SCNC: 6 MMOL/L (ref 3–14)
APPEARANCE UR: CLEAR
AST SERPL W P-5'-P-CCNC: 21 U/L (ref 0–45)
BACTERIA BLD CULT: NO GROWTH
BACTERIA BLD CULT: NO GROWTH
BILIRUB SERPL-MCNC: 0.4 MG/DL (ref 0.2–1.3)
BILIRUB UR QL STRIP: NEGATIVE
BUN SERPL-MCNC: 23 MG/DL (ref 7–30)
CALCIUM SERPL-MCNC: 8.6 MG/DL (ref 8.5–10.1)
CHLORIDE BLD-SCNC: 104 MMOL/L (ref 94–109)
CK SERPL-CCNC: 45 U/L (ref 30–300)
CO2 SERPL-SCNC: 28 MMOL/L (ref 20–32)
COLOR UR AUTO: ABNORMAL
CREAT SERPL-MCNC: 1.31 MG/DL (ref 0.66–1.25)
ERYTHROCYTE [DISTWIDTH] IN BLOOD BY AUTOMATED COUNT: 15.9 % (ref 10–15)
GENTAMICIN SERPL-MCNC: 0.9 MG/L
GENTAMICIN SERPL-MCNC: 4.8 MG/L
GFR SERPL CREATININE-BSD FRML MDRD: 73 ML/MIN/1.73M2
GLUCOSE BLD-MCNC: 101 MG/DL (ref 70–99)
GLUCOSE BLDC GLUCOMTR-MCNC: 117 MG/DL (ref 70–99)
GLUCOSE BLDC GLUCOMTR-MCNC: 125 MG/DL (ref 70–99)
GLUCOSE BLDC GLUCOMTR-MCNC: 128 MG/DL (ref 70–99)
GLUCOSE BLDC GLUCOMTR-MCNC: 128 MG/DL (ref 70–99)
GLUCOSE BLDC GLUCOMTR-MCNC: 141 MG/DL (ref 70–99)
GLUCOSE BLDC GLUCOMTR-MCNC: 145 MG/DL (ref 70–99)
GLUCOSE BLDC GLUCOMTR-MCNC: 160 MG/DL (ref 70–99)
GLUCOSE BLDC GLUCOMTR-MCNC: 165 MG/DL (ref 70–99)
GLUCOSE UR STRIP-MCNC: NEGATIVE MG/DL
HCT VFR BLD AUTO: 27.5 % (ref 40–53)
HGB BLD-MCNC: 8.3 G/DL (ref 13.3–17.7)
HGB UR QL STRIP: NEGATIVE
KETONES UR STRIP-MCNC: NEGATIVE MG/DL
LDH SERPL L TO P-CCNC: 219 U/L (ref 85–227)
LEUKOCYTE ESTERASE UR QL STRIP: NEGATIVE
MAGNESIUM SERPL-MCNC: 2.2 MG/DL (ref 1.6–2.3)
MCH RBC QN AUTO: 28.7 PG (ref 26.5–33)
MCHC RBC AUTO-ENTMCNC: 30.2 G/DL (ref 31.5–36.5)
MCV RBC AUTO: 95 FL (ref 78–100)
MUCOUS THREADS #/AREA URNS LPF: PRESENT /LPF
NITRATE UR QL: NEGATIVE
PH UR STRIP: 5.5 [PH] (ref 5–7)
PHOSPHATE SERPL-MCNC: 6 MG/DL (ref 2.5–4.5)
PLATELET # BLD AUTO: 571 10E3/UL (ref 150–450)
POTASSIUM BLD-SCNC: 3.9 MMOL/L (ref 3.4–5.3)
PROT SERPL-MCNC: 7.6 G/DL (ref 6.8–8.8)
PTH-INTACT SERPL-MCNC: 12 PG/ML
RBC # BLD AUTO: 2.89 10E6/UL (ref 4.4–5.9)
RBC URINE: <1 /HPF
SODIUM SERPL-SCNC: 138 MMOL/L (ref 133–144)
SODIUM UR-SCNC: 59 MMOL/L
SP GR UR STRIP: 1.02 (ref 1–1.03)
UROBILINOGEN UR STRIP-MCNC: NORMAL MG/DL
WBC # BLD AUTO: 8.5 10E3/UL (ref 4–11)
WBC URINE: 2 /HPF

## 2022-04-10 PROCEDURE — 80069 RENAL FUNCTION PANEL: CPT | Performed by: HOSPITALIST

## 2022-04-10 PROCEDURE — 36415 COLL VENOUS BLD VENIPUNCTURE: CPT | Performed by: HOSPITALIST

## 2022-04-10 PROCEDURE — 83970 ASSAY OF PARATHORMONE: CPT | Performed by: HOSPITALIST

## 2022-04-10 PROCEDURE — 250N000009 HC RX 250: Performed by: PHYSICIAN ASSISTANT

## 2022-04-10 PROCEDURE — 80053 COMPREHEN METABOLIC PANEL: CPT | Performed by: HOSPITALIST

## 2022-04-10 PROCEDURE — 84300 ASSAY OF URINE SODIUM: CPT | Performed by: HOSPITALIST

## 2022-04-10 PROCEDURE — 36415 COLL VENOUS BLD VENIPUNCTURE: CPT | Performed by: NURSE PRACTITIONER

## 2022-04-10 PROCEDURE — 87040 BLOOD CULTURE FOR BACTERIA: CPT | Performed by: NURSE PRACTITIONER

## 2022-04-10 PROCEDURE — 250N000013 HC RX MED GY IP 250 OP 250 PS 637: Performed by: NURSE PRACTITIONER

## 2022-04-10 PROCEDURE — 250N000011 HC RX IP 250 OP 636: Performed by: HOSPITALIST

## 2022-04-10 PROCEDURE — 83735 ASSAY OF MAGNESIUM: CPT | Performed by: NURSE PRACTITIONER

## 2022-04-10 PROCEDURE — 81001 URINALYSIS AUTO W/SCOPE: CPT | Performed by: HOSPITALIST

## 2022-04-10 PROCEDURE — 85027 COMPLETE CBC AUTOMATED: CPT | Performed by: NURSE PRACTITIONER

## 2022-04-10 PROCEDURE — 80170 ASSAY OF GENTAMICIN: CPT | Performed by: HOSPITALIST

## 2022-04-10 PROCEDURE — 82550 ASSAY OF CK (CPK): CPT | Performed by: HOSPITALIST

## 2022-04-10 PROCEDURE — 250N000013 HC RX MED GY IP 250 OP 250 PS 637: Performed by: PHYSICIAN ASSISTANT

## 2022-04-10 PROCEDURE — 250N000011 HC RX IP 250 OP 636: Performed by: NURSE PRACTITIONER

## 2022-04-10 PROCEDURE — 250N000011 HC RX IP 250 OP 636: Performed by: PHYSICIAN ASSISTANT

## 2022-04-10 PROCEDURE — 83615 LACTATE (LD) (LDH) ENZYME: CPT | Performed by: HOSPITALIST

## 2022-04-10 PROCEDURE — 97110 THERAPEUTIC EXERCISES: CPT | Mod: GO | Performed by: OCCUPATIONAL THERAPIST

## 2022-04-10 PROCEDURE — 99233 SBSQ HOSP IP/OBS HIGH 50: CPT | Mod: 24 | Performed by: INTERNAL MEDICINE

## 2022-04-10 PROCEDURE — 99232 SBSQ HOSP IP/OBS MODERATE 35: CPT | Performed by: HOSPITALIST

## 2022-04-10 PROCEDURE — 120N000001 HC R&B MED SURG/OB

## 2022-04-10 PROCEDURE — 84100 ASSAY OF PHOSPHORUS: CPT | Performed by: NURSE PRACTITIONER

## 2022-04-10 PROCEDURE — 250N000009 HC RX 250: Performed by: INTERNAL MEDICINE

## 2022-04-10 RX ADMIN — LOSARTAN POTASSIUM 25 MG: 25 TABLET, FILM COATED ORAL at 08:04

## 2022-04-10 RX ADMIN — INSULIN GLARGINE 40 UNITS: 100 INJECTION, SOLUTION SUBCUTANEOUS at 08:42

## 2022-04-10 RX ADMIN — METHOCARBAMOL 1000 MG: 500 TABLET, FILM COATED ORAL at 04:24

## 2022-04-10 RX ADMIN — BUPROPION HYDROCHLORIDE 300 MG: 300 TABLET, EXTENDED RELEASE ORAL at 08:04

## 2022-04-10 RX ADMIN — NAFCILLIN 2 G: 2 POWDER, FOR SOLUTION INTRAMUSCULAR; INTRAVENOUS at 10:20

## 2022-04-10 RX ADMIN — OXYCODONE HYDROCHLORIDE 5 MG: 5 TABLET ORAL at 17:20

## 2022-04-10 RX ADMIN — PANTOPRAZOLE SODIUM 40 MG: 40 TABLET, DELAYED RELEASE ORAL at 08:04

## 2022-04-10 RX ADMIN — ACETAMINOPHEN 650 MG: 325 TABLET, FILM COATED ORAL at 18:47

## 2022-04-10 RX ADMIN — NAFCILLIN 2 G: 2 POWDER, FOR SOLUTION INTRAMUSCULAR; INTRAVENOUS at 21:41

## 2022-04-10 RX ADMIN — NAFCILLIN 2 G: 2 POWDER, FOR SOLUTION INTRAMUSCULAR; INTRAVENOUS at 17:39

## 2022-04-10 RX ADMIN — METHOCARBAMOL 1000 MG: 500 TABLET, FILM COATED ORAL at 10:20

## 2022-04-10 RX ADMIN — OXYCODONE HYDROCHLORIDE 5 MG: 5 TABLET ORAL at 00:26

## 2022-04-10 RX ADMIN — OXYCODONE HYDROCHLORIDE 5 MG: 5 TABLET ORAL at 22:10

## 2022-04-10 RX ADMIN — METHOCARBAMOL 1000 MG: 500 TABLET, FILM COATED ORAL at 21:40

## 2022-04-10 RX ADMIN — NAFCILLIN 2 G: 2 POWDER, FOR SOLUTION INTRAMUSCULAR; INTRAVENOUS at 14:08

## 2022-04-10 RX ADMIN — DICLOFENAC SODIUM 4 G: 10 GEL TOPICAL at 12:00

## 2022-04-10 RX ADMIN — ACETAMINOPHEN 650 MG: 325 TABLET, FILM COATED ORAL at 14:08

## 2022-04-10 RX ADMIN — OXYCODONE HYDROCHLORIDE 5 MG: 5 TABLET ORAL at 13:17

## 2022-04-10 RX ADMIN — NAFCILLIN 2 G: 2 POWDER, FOR SOLUTION INTRAMUSCULAR; INTRAVENOUS at 01:28

## 2022-04-10 RX ADMIN — SENNOSIDES AND DOCUSATE SODIUM 1 TABLET: 50; 8.6 TABLET ORAL at 08:04

## 2022-04-10 RX ADMIN — GABAPENTIN 100 MG: 100 CAPSULE ORAL at 21:41

## 2022-04-10 RX ADMIN — HEPARIN SODIUM 5000 UNITS: 5000 INJECTION, SOLUTION INTRAVENOUS; SUBCUTANEOUS at 21:41

## 2022-04-10 RX ADMIN — GENTAMICIN SULFATE 100 MG: 100 INJECTION, SOLUTION INTRAVENOUS at 08:38

## 2022-04-10 RX ADMIN — GABAPENTIN 100 MG: 100 CAPSULE ORAL at 14:08

## 2022-04-10 RX ADMIN — ASPIRIN 81 MG CHEWABLE TABLET 81 MG: 81 TABLET CHEWABLE at 08:04

## 2022-04-10 RX ADMIN — HEPARIN SODIUM 5000 UNITS: 5000 INJECTION, SOLUTION INTRAVENOUS; SUBCUTANEOUS at 14:08

## 2022-04-10 RX ADMIN — SENNOSIDES AND DOCUSATE SODIUM 1 TABLET: 50; 8.6 TABLET ORAL at 21:41

## 2022-04-10 RX ADMIN — NAFCILLIN 2 G: 2 POWDER, FOR SOLUTION INTRAMUSCULAR; INTRAVENOUS at 05:36

## 2022-04-10 RX ADMIN — HEPARIN SODIUM 5000 UNITS: 5000 INJECTION, SOLUTION INTRAVENOUS; SUBCUTANEOUS at 05:36

## 2022-04-10 RX ADMIN — LIDOCAINE 2 PATCH: 560 PATCH PERCUTANEOUS; TOPICAL; TRANSDERMAL at 08:05

## 2022-04-10 RX ADMIN — DICLOFENAC SODIUM 4 G: 10 GEL TOPICAL at 17:20

## 2022-04-10 RX ADMIN — GABAPENTIN 100 MG: 100 CAPSULE ORAL at 08:04

## 2022-04-10 RX ADMIN — ACETAMINOPHEN 325 MG: 325 TABLET, FILM COATED ORAL at 00:26

## 2022-04-10 RX ADMIN — LEVOTHYROXINE SODIUM 250 MCG: 125 TABLET ORAL at 08:04

## 2022-04-10 RX ADMIN — METHOCARBAMOL 1000 MG: 500 TABLET, FILM COATED ORAL at 16:13

## 2022-04-10 RX ADMIN — OXYCODONE HYDROCHLORIDE 5 MG: 5 TABLET ORAL at 08:05

## 2022-04-10 ASSESSMENT — ACTIVITIES OF DAILY LIVING (ADL)
ADLS_ACUITY_SCORE: 7
ADLS_ACUITY_SCORE: 3
ADLS_ACUITY_SCORE: 7
ADLS_ACUITY_SCORE: 3
ADLS_ACUITY_SCORE: 7
ADLS_ACUITY_SCORE: 3
ADLS_ACUITY_SCORE: 7
ADLS_ACUITY_SCORE: 3
ADLS_ACUITY_SCORE: 7
ADLS_ACUITY_SCORE: 3
ADLS_ACUITY_SCORE: 5
ADLS_ACUITY_SCORE: 3

## 2022-04-10 NOTE — PROGRESS NOTES
[facility]    Hospitalist Progress Note    Assessment & Plan      Assessment & Plan:   Anil Montoya is a 36 year old male with hx of DM2, HTN, WARNER, and anxiety who was initially hospitalized at [facility] on 3/22/2022 for severe sepsis initially attributed to pneumonia vs viral infection. He was found to have MSSA bacteremia with ongoing positive cultures. He underwent a TEJINDER on 3/27/2022 which revealed tricuspid endocarditis, and he was transferred to [facility] on 3/28/2022 for CV Surgery consultation.     Sepsis with MSSA bacteremia due to tricuspid valve endocarditis.  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 He was initially treated with ceftriaxone and azithromycin for possible community-acquired pneumonia, and ID was consulted. Blood cultures returned positive for MSSA, and serial blood cultures continued to return positive. Broad work-up including CT abd/pelvis, MR lumbar spine, CT dental, and TTE were unrevealing  * Initially treated with IV ceftriaxone and azithromycin; switched to IV vanco on 3/23 when blood cultures returned with Staph; switched to IV cefazolin on 3/24 upon sensitivities  * Underwent TEJINDER on 3/27 which showed thickened tricuspid valve leaflets with atrial aspect of the septal leaflet with an irregular border and small mobile filamentous elements, large fixed heterogeneous mass in the right ventricle attached to the interventricular septum (2.5 x 2 cm) appearing connected to the tricuspid subvalvular annular apparatus with a pedunculated round mobile element (1.8 x 1 cm).  No significant tricuspid regurgitation reported. LVEF 55 to 60%. Negative bubble study.  * Transferred to [facility] on 3/28 for CV Surgery consult  - Patient underwent tricuspid valve replacement with bioprosthetic valve (31 mm epic stented valve)  on 4/1/2022 and endoscopic aortic valve exploration.  - Initially micafungin added on 4/1 as Gram stain from tissue from heart valve from 4/ 1 initially was reported as yeast, which was subsequently corrected as not to be present.  Micafungin now discontinued.  -  given new prosthetic valve and positive blood cultures gentamicin added ( synergy dosing ) until he clears the BCs  - had worsening lower back pain and underwent repeat lumbar mri yesterday(4/7/22) which showed possible osteomyelitis and discitis   MRI read- S1 vertebral body and right sacral ala and anterior epidural space from mid L4 down to mid S2 worrisome for an infectious process such as osteomyelitis with epidural extension of infection. Also L5-S1 discitis is not excluded and continued surveillance is recommended.  - cefazolin was changed to nafcillin for better cns penetration. ID/CT surgery aware.   - Daily blood cultures, last positive was from 4/3  -Continue IV gentamicin 100 mg twice daily IV daily-started on 4/5/2022 and nafcillin 2 g every 4 hours-started on 4/7/2022  - ID, Cardiology, CV Surgery following  - Patient was initially transferred out of the intensive care unit on 4/4/2022 after he was stable however patient had an episode of V. fib arrest and was transferred back to the ICU.     V. fib arrest(4/5/2022)  -Patient was noted to have V. fib on telemetry earlier this morning  -Required brief CPR and 1 shock of 120 J with successful restoration of regular rhythm  -Electrolytes appear to be within normal limits  -Patient transferred back to the intensive care unit for close monitoring  -EP following -> plan on implanting a dual-chamber ICD for secondary prevention, probably early next week to be absolutely certain blood cultures have cleared.  If AV conduction improves, may consider a subcutaneous ICD sooner   -ECHO -> LVEF was normal by echocardiography (technically difficult study)     Post-op complete heart block  -Patient initially  was in second-degree AV block, and now appears to have complete heart block post tricuspid valve replacement  -EP following, given good junctional escape no plans for immediate permanent pacemaker implantation, continue to monitor on telemetry closely. Removed pacer wires. Plan on possible icd placement next week.  If sinus rhythm returns then he would not need a pacer placement.  -He received one-time dose of IV Lasix 40 mg yesterday with good diuresis.     DM2 without complications, long-term insulin use, uncontrolled  * A1c 12.6. Had not been taking his diabetes medications for some time  -transitioned to subcut regimen yesterday with lantus 40 am and 10 pm with sliding scale aspart and 1:10 carb count . Goal for bs<180 to facilitate infection control   -Prior to surgery he was requiring Lantus 34 units twice a day and mealtime coverage of 1: 10.     Acute postop blood loss anemia  -Hemoglobin preop was around 12 which had slowly trended down.  Hemoglobin dropped to 7.3 on 4/4, most likely acute blood loss from surgery.    -Hemoglobin is at 8.3 this morning   -Continue to monitor and transfuse as needed.  -Has thrombocytosis likely secondary to infection and acute phase reactant     Acute low back pain  * Noted while at [facility]  * MR lumbar spine (3/24) showed mild multilevel lumbar spondylosis, but no evidence of discitis but mri 4/7 showed possible osteomyelitis with discitis.  -Continue pain control as needed with dilaudid , robaxin and oxycodone.  His back pain improved since yesterday.     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 RUQ US (3/22) showed fatty liver, but normal gallbladder and normal bile ducts  - LFTs improved.      Essential hypertension  - on losartan 25mg daily. Hold b-blocker for his complete heart block.      WARNER  * CPAP per home settings     Hypothyroidism  * Chronic and stable on  levothyroxine     Generalized anxiety disorder  ADHD  * Chronic and stable on bupropion     Hyperphosphatemia  -Phosphate has been slowly trending upwards.  His last level was at 6.  Unclear etiology.  Sometimes hemolysis can do this.  We will continue to monitor.  Renal function within normal limits.  LDH and haptoglobin are pending.    Acute renal failure  -Patient's creatinine did go up to 1.31 from 0.8 yesterday.  Could be from a combination of IV Lasix and gentamicin and vancomycin.    Gentamicin has been discontinued today since blood cultures have been clear for a while and since his kidney functions getting worse.     DVT Prophylaxis: Heparin SQ  Code Status: Full Code    Disposition: Expected discharge when medically stable     Esther Damon MD  [phone](p)  [phone]( c)    Interval History   Patient awake and alert.  No acute events overnight.  Continues to endorse significant lower back pain.  Has a couple of lidocaine patches over his back.    -Data reviewed today: I reviewed all new labs and imaging results over the last 24 hours. I personally reviewed the mri lumbar spine image(s) showing possible ostemyelitis and discitis.    Physical Exam   Temp: 98.2  F (36.8  C) Temp src: Oral BP: 131/58 Pulse: 69   Resp: 23 SpO2: 98 % O2 Device: None (Room air)    Vitals:    04/07/22 0400 04/09/22 0600 04/10/22 0400   Weight: 119.2 kg (262 lb 12.6 oz) 122.1 kg (269 lb 2.9 oz) 114.9 kg (253 lb 4.9 oz)     Vital Signs with Ranges  Temp:  [97.8  F (36.6  C)-99.5  F (37.5  C)] 98.2  F (36.8  C)  Pulse:  [69-92] 69  Resp:  [7-28] 23  BP: ()/(58-83) 131/58  SpO2:  [96 %-100 %] 98 %  I/O last 3 completed shifts:  In: 1940 [P.O.:1200; I.V.:740]  Out: 3850 [Urine:3850]    Physical Exam  Constitutional:       General: He is not in acute distress.     Appearance: He is well-developed. He is obese. He is ill-appearing.      Comments: Appears flushed   HENT:      Right Ear: Tympanic membrane and external ear  normal.      Left Ear: Tympanic membrane and external ear normal.      Nose: Nose normal.      Mouth/Throat:      Mouth: No oral lesions.      Pharynx: No oropharyngeal exudate.   Eyes:      General:         Right eye: No discharge.         Left eye: No discharge.      Conjunctiva/sclera: Conjunctivae normal.      Pupils: Pupils are equal, round, and reactive to light.   Neck:      Thyroid: No thyromegaly.      Trachea: No tracheal deviation.   Cardiovascular:      Rate and Rhythm: Normal rate and regular rhythm.      Pulses: Normal pulses.      Heart sounds: Normal heart sounds, S1 normal and S2 normal. No murmur heard.    No S3 or S4 sounds.   Pulmonary:      Effort: Pulmonary effort is normal. No respiratory distress.      Breath sounds: Normal breath sounds. No wheezing or rales.   Abdominal:      General: Bowel sounds are normal.      Palpations: Abdomen is soft. There is no mass.      Tenderness: There is no abdominal tenderness.   Musculoskeletal:         General: No deformity. Normal range of motion.      Cervical back: Neck supple.      Comments: Severe low back pain     Lymphadenopathy:      Cervical: No cervical adenopathy.   Skin:     General: Skin is warm and dry.      Findings: No lesion or rash.   Neurological:      Mental Status: He is alert and oriented to person, place, and time.      Motor: No abnormal muscle tone.      Deep Tendon Reflexes: Reflexes are normal and symmetric.   Psychiatric:         Speech: Speech normal.         Thought Content: Thought content normal.         Judgment: Judgment normal.           Medications     dextrose       BETA BLOCKER NOT PRESCRIBED       sodium chloride Stopped (04/10/22 0800)       aspirin  81 mg Oral or NG Tube Daily     buPROPion  300 mg Oral QAM     diclofenac  4 g Topical 4x Daily     gabapentin  100 mg Oral TID     heparin ANTICOAGULANT  5,000 Units Subcutaneous Q8H     insulin aspart  1-10 Units Subcutaneous TID AC     insulin aspart  1-7 Units  Subcutaneous At Bedtime     insulin aspart   Subcutaneous Daily with breakfast     insulin aspart   Subcutaneous Daily with lunch     insulin aspart   Subcutaneous Daily with supper     insulin glargine  10 Units Subcutaneous At Bedtime     insulin glargine  40 Units Subcutaneous QAM AC     levothyroxine  125 mcg Oral Once per day on Mon Tue Wed Thu Fri Sat     levothyroxine  250 mcg Oral Weekly     lidocaine  2 patch Transdermal Q24H     lidocaine   Transdermal Q8H     [Held by provider] losartan  25 mg Oral Daily     methocarbamol  1,000 mg Oral Q6H     nafcillin  2 g Intravenous Q4H     pantoprazole  40 mg Oral Daily     polyethylene glycol  17 g Oral BID     senna-docusate  1 tablet Oral BID       Data   Recent Labs   Lab 04/10/22  1227 04/10/22  0809 04/10/22  0530 04/09/22  0820 04/09/22  0450 04/08/22  0713 04/08/22  0626 04/08/22  0604 04/07/22  0550 04/07/22  0532 04/05/22  0647 04/05/22  0645   WBC  --   --  8.5  --  9.5  --   --  10.1  --  11.7*   < > 16.2*   HGB  --   --  8.3*  --  7.8*  --   --  7.9*  --  7.9*   < > 8.0*   MCV  --   --  95  --  93  --   --  95  --  96   < > 94   PLT  --   --  571*  --  568*  --   --  609*  --  633*   < > 883*   INR  --   --   --   --   --   --   --   --   --   --   --  1.17*   NA  --   --  138  --   --   --  137  --   --  138   < >  --    POTASSIUM  --   --  3.9  --  4.1  --  4.3  --   --  4.0   < >  --    CHLORIDE  --   --  104  --   --   --  103  --   --  104   < >  --    CO2  --   --  28  --   --   --  26  --   --  27   < >  --    BUN  --   --  23  --   --   --  21  --   --  17   < >  --    CR  --   --  1.31*  --   --   --  0.99  --   --  0.93   < >  --    ANIONGAP  --   --  6  --   --   --  8  --   --  7   < >  --    IMAN  --   --  8.6  --   --   --  8.6  --   --  8.9   < >  --    * 117* 101*   < >  --    < > 174*  --    < > 113*   < >  --    ALBUMIN  --   --  2.0*  --   --   --   --   --   --  1.8*   < >  --    PROTTOTAL  --   --  7.6  --   --   --   --   --    --  7.2   < >  --    BILITOTAL  --   --  0.4  --   --   --   --   --   --  0.3   < >  --    ALKPHOS  --   --  90  --   --   --   --   --   --  102   < >  --    ALT  --   --  41  --   --   --   --   --   --  72*   < >  --    AST  --   --  21  --   --   --   --   --   --  73*   < >  --     < > = values in this interval not displayed.       No results found for this or any previous visit (from the past 24 hour(s)).

## 2022-04-10 NOTE — PROGRESS NOTES
[facility]  Cardiovascular and Thoracic Surgery Daily Note          Assessment and Plan:   Anil Montoya is a 36 year old gentleman who presented to [facility] ED on 3/22/22 with weakness, recent hematuria and shortness of breath. Workup demonstrated MSSA bacteremia likely secondary to right groin skin source. TEJINDER on 3/27/22 demonstrated tricuspid valve endocarditis. In completing full work up, there was concern for discitis with seeding however MRI negative. He was transferred on 3/28/22 to [facility] for consideration of surgical intervention.      PMH includes: Poorly controlled diabetic, HTN, hypothyroidism, obesity, WARNER, anxiety, cryptococcal pneumonia in 2012 with previous history of staph aureus bacteremia.     Most recent echo demonstrates LVEF 55-60%, normal RV function, mildly thickened cusps on aortic valve and thickened tricuspid valve leaflets, small mobile filamentous elements on the septal leaflet of TV and larged fixed heterogeneous mass in the RV attached to the interventricular septum.  Coronary angiogram 3/31/22 with no e/o CAD.     Course c/b CHB with accelerated junctional rhythm and polymorphic VT arrest on 4/5 with immediate ROSC after defib x1 and transient CPR.        POD #9 s/p:  1. Tricuspid valve replacement (31mm Epic stented valve)  2. Endoscopic aortic valve exploration  3. Transesophageal echocardiogram on 4/1/22 with Dr. 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 Did not need intubation and neurologically intact. Electrolytes stable: K+ 3.7, Mg 2. Appreciate EP's involvement. Troponin elevated as expected after event.  Echocardiogram on 4/5 stable but poor images to evaluate tricuspid gradient  [PTA meds on hold: losartan 50 mg daily]  HD stable  overnight in accelerated junctional rhythm  - ASA 81 mg daily  - Defer BB given heart block  - Losartan 25 mg daily and up titrate PRN  - No statin indicated  - ID as below  - Consulted EP, appreciate recommendations, plan for subcutaneous ICD/+/- pacer once blood is sterile  - CTs/wires removed 4/7, discussed with EP  -ORTHO/Neuro: Having new lower back pain, MRI 4/7 showing concern for osteomyelitis involving epidural extension, no intervention at this time, pain control may be bigger issue, repeat MRI spine prior to discontinuation of abx, voltaren ordered for back     Resp:   WARNER  History of cryptococcal pneumonia  Extubated on POD 0, now saturating well on room air  - Continue pulm hygiene efforts: IS/flutter valve/mobility  - Titrate O2 to maintain sats >92%, on RA  - CPAP at HS     Neuro/MSK:    Acute postoperative pain  MDD/VIVEK  - Better controlled with current regimen this AM, received toradol x1  - Sore after a few compressions with CPR but overall tolerable  - Robaxin scheduled, lidocaine patches added  - PTA Wellbutrin resumed  - Sternal incision and area intact and feels stable, wires intact on CXR, continue sternal precautions     Renal/Electrolyte:   Creat stable, below preoperative weight, has less edema  Autodiuresing  - Strict I/O, daily weights  - Avoid/limit nephrotoxins as able  - Replete lytes per protocol  - 1 dose IV lasix this am, diuresed well 3.8L UOP no further diuretics     GI/Nutrition:    Mildly elevated LFTs  - Tolerating current diet:  Orders Placed This Encounter      Moderate Consistent Carb (60 g CHO per Meal) Diet   - Continue bowel regimen, + BM, + flatus  - LFTs had normalized, AST slightly elevated after arrest, trend  - PPI     :    - Voiding well on own     Endo:  Stress induced hyperglycemia   Poorly controlled DM2  Hypothyroidism  Preop A1c             Lab Results   Component Value Date     A1C 12.6 03/27/2022    [PTA meds on hold: Victoza and metformin]  - Was on  insulin infusion, transitioned to sliding scale insulin and lantus 4/2, hyperglycemic, resumed insulin infusion POD 2 with prandial coverage. Continue close monitoring of blood sugars to allow for healing  - Hospitalists managing diabetic regimen, appreciate great management, currently lantus 40 q am, 10 at bedtime with high sliding scale  - Goal BG <180 for optimal healing  - PTA levothyroxine resumed     ID:  Stress induced leukocytosis, resolved  Tricuspid valve endocarditis  MSSA bacteremia  History cryptococcal pneumonia  WBC 8.5<9.5<10.1<11.7<10, Temp (24hrs), Av.2  F (36.8  C), Min:97.5  F (36.4  C), Max:99.5  F (37.5  C); no s/sx infection having new fevers, MRI  concerning for osteomyelitis, consult to ortho/neuro for any further recs-trying to exhaust all resources.   Prelim intraop gram stain positive for yeast from tricuspid valve, culture with 1+ staph aureus, no yeast, continue to follow  BC 4/3 positive for staph aureus, continue to follow  BC /5 with NGTD thus far  Fungal BC sent on  with NGTD  Full skin inspection  with no new wounds  - Completing perioperative ABX  - Continue Cefazolin per ID, Gentamycin added  for new positive culture  - Micafungin started  per ID for positive yeast on gram stain, discontinued with negative cx on 4/3  - HIV antigen/antibody test negative  - Per ID: repeat blood cultures x 2 sets daily until clear  - Continue to follow fever curve, WBC and inflammatory markers as appropriate     Heme:  Acute blood loss anemia  Acute blood loss thrombocytopenia  Hgb 8.3<7.8, Plts 571; no s/sx active bleeding  - CBC in AM  - Goal Hgb >7   -Proph: PPI, subcutaneous heparin, SCDs  -Dispo: ICU-->St [address], if EP is ok, continue therapies, pulm hygiene      Seen and discussed with Dr. Jolly            Interval History:   Lying in bed, breathing stable, tolerating diet, working with rehab, pain mostly controlled         Medications:       aspirin  81 mg Oral or NG Tube  Daily     buPROPion  300 mg Oral QAM     diclofenac  4 g Topical 4x Daily     gabapentin  100 mg Oral TID     gentamicin  100 mg Intravenous Q12H     heparin ANTICOAGULANT  5,000 Units Subcutaneous Q8H     insulin aspart  1-10 Units Subcutaneous TID AC     insulin aspart  1-7 Units Subcutaneous At Bedtime     insulin aspart   Subcutaneous Daily with breakfast     insulin aspart   Subcutaneous Daily with lunch     insulin aspart   Subcutaneous Daily with supper     insulin glargine  10 Units Subcutaneous At Bedtime     insulin glargine  40 Units Subcutaneous QAM AC     levothyroxine  125 mcg Oral Once per day on Mon Tue Wed Thu Fri Sat     levothyroxine  250 mcg Oral Weekly     lidocaine  2 patch Transdermal Q24H     lidocaine   Transdermal Q8H     losartan  25 mg Oral Daily     methocarbamol  1,000 mg Oral Q6H     nafcillin  2 g Intravenous Q4H     pantoprazole  40 mg Oral Daily     polyethylene glycol  17 g Oral BID     senna-docusate  1 tablet Oral BID     @MEDSPRN-@          Physical Exam:   Vitals were reviewed  Blood pressure 128/81, pulse 80, temperature 97.8  F (36.6  C), temperature source Oral, resp. rate 13, weight 114.9 kg (253 lb 4.9 oz), SpO2 100 %.  Rhythm: escape junctional    Lungs: course    Cardiovascular: s1/s2, no m/r/g    Abdomen: s/nt/nd    Extremeties: no LE edema    Incision: cdi    CT: na    Weight:   Vitals:    04/05/22 0553 04/06/22 0600 04/07/22 0400 04/09/22 0600   Weight: 118.4 kg (261 lb 1.6 oz) 120.5 kg (265 lb 10.5 oz) 119.2 kg (262 lb 12.6 oz) 122.1 kg (269 lb 2.9 oz)    04/10/22 0400   Weight: 114.9 kg (253 lb 4.9 oz)            Data:   Labs:   Lab Results   Component Value Date    WBC 8.5 04/10/2022    WBC 6.8 11/24/2017     Lab Results   Component Value Date    RBC 2.89 04/10/2022    RBC 5.16 11/24/2017     Lab Results   Component Value Date    HGB 8.3 04/10/2022    HGB 15.2 11/24/2017     Lab Results   Component Value Date    HCT 27.5 04/10/2022    HCT 43.7 11/24/2017     No  components found for: MCT  Lab Results   Component Value Date    MCV 95 04/10/2022    MCV 85 11/24/2017     Lab Results   Component Value Date    MCH 28.7 04/10/2022    MCH 29.5 11/24/2017     Lab Results   Component Value Date    MCHC 30.2 04/10/2022    MCHC 34.8 11/24/2017     Lab Results   Component Value Date    RDW 15.9 04/10/2022    RDW 12.9 11/24/2017     Lab Results   Component Value Date     04/10/2022     11/24/2017       Last Basic Metabolic Panel:  Lab Results   Component Value Date     04/10/2022     11/24/2017      Lab Results   Component Value Date    POTASSIUM 3.9 04/10/2022    POTASSIUM 3.8 11/24/2017     Lab Results   Component Value Date    CHLORIDE 104 04/10/2022    CHLORIDE 104 11/24/2017     Lab Results   Component Value Date    IMAN 8.6 04/10/2022    IMAN 8.3 11/24/2017     Lab Results   Component Value Date    CO2 28 04/10/2022    CO2 24 11/24/2017     Lab Results   Component Value Date    BUN 23 04/10/2022    BUN 18 11/24/2017     Lab Results   Component Value Date    CR 1.31 04/10/2022    CR 1.07 11/24/2017     Lab Results   Component Value Date     04/10/2022     04/10/2022     11/24/2017       Lety Perez PA-C  Pager #: [phone]

## 2022-04-10 NOTE — CONSULTS
Patient with mild hyperphosphatemia, mild OCTAVIO. OCTAVIO likely due to gent, vanco.   Chart reviewed and labs ordered for am. Will stop losartan given rise in Cr.  Will be seen in consultation in am.

## 2022-04-10 NOTE — PLAN OF CARE
DATE & TIME: 4/10/2022 1625 to 1930 transferred from ICU   Cognitive Concerns/ Orientation : A&O x4   BEHAVIOR & AGGRESSION TOOL COLOR: green   CIWA SCORE: na    ABNL VS/O2: vss, on RA, exp wheezes on upper lobes. Denied SOB.   MOBILITY: IND in room, calls appropriately. Follows sternal precaution.   PAIN MANAGMENT: back pain, managed well with current regiment.   DIET: low phos.   BOWEL/BLADDER: continent.   ABNL LAB/BG: Phos 6.0, cr. 1.31  DRAIN/DEVICES: PIV SL.   TELEMETRY RHYTHM: accelerated junctional rhythm.   SKIN: Old CT sites scab in place, sternal incision, CDI.   TESTS/PROCEDURES: na   D/C DATE:   Discharge Barriers:   OTHER IMPORTANT INFO: nephrology, cardiology, ID following. Possible ICD placement next week. Will need PICC for long term IV abx. So far, 4 days neg BC.

## 2022-04-10 NOTE — PROGRESS NOTES
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 Possible secondary seeding of the lumbar spine initial MRI was negative and back pain was  Gone but then started complaining of back pain again on 4/ 6, MRI 4/7 possible osteo.    He is s/p tricuspid valve replacement surgery and aortic valve exploration for control of infection on 04/01 with positive gram stain and culture of tricuspid valve tissue for staph aureus.  Yeast also seen on stain but read corrected later to no yeast.  Micafungin discontinued as Micro results  updated by lab. Initial read of 1+ yeast on valve tissue on 4/1 was corrected and updated. Only staph aureus on valve tissue cx, no yeast seen on stain.    On 4/5, he suffered a ventricular tachycardia/fibrillation arrest.  ROSC achieved after one round of CPR and one shock.  Alert and neurologically intact following.   On 4/ 6 back pain again,   MRI lumbar spine done on 4/ 7 :   Interval development of en bloc abnormal T2 signal hyperintensity  and abnormal contrast enhancement involving the lower L5 vertebral  body, S1 vertebral body and right sacral ala and anterior epidural  space from mid L4 down to mid S2 worrisome for an infectious process  such as osteomyelitis with epidural extension of infection     Recommendations:  1. Follow blood cxs . 4/3 1 blood cx + on 4/5 , follow up since then have been negative.   2. PICC/pacemaker being considered given arrythmia event. Hold if non urgent until blood cxs are persistently negative. . Blood cultures are now negative for 4 days   3. Given new prosthetic valve and positive blood cultures gentamicin added ( synergy dosing ) till he clears the blood  cultures, will stop this today, creat jumped from 0.9 to 1.31 and now blood cultures are clear.   4. Given new findings of the MRI on nafcillin.   5. Follow clinical status and labs    Sreedhar Bell MD    Interval History   Resting, overall feels discouraged after cardiac event. Complains of pain at chest tube sites and back pain    Physical Exam   Temp: 97.8  F (36.6  C) Temp src: Oral BP: (!) 146/78 (Post CR exercise) Pulse: 91   Resp: 21 SpO2: 98 % O2 Device: None (Room air)    Vitals:    04/07/22 0400 04/09/22 0600 04/10/22 0400   Weight: 119.2 kg (262 lb 12.6 oz) 122.1 kg (269 lb 2.9 oz) 114.9 kg (253 lb 4.9 oz)     Vital Signs with Ranges  Temp:  [97.5  F (36.4  C)-99.5  F (37.5  C)] 97.8  F (36.6  C)  Pulse:  [70-92] 91  Resp:  [7-28] 21  BP: ()/(58-83) 146/78  SpO2:  [96 %-100 %] 98 %    Constitutional: Awake, alert, cooperative, no apparent distress  Lungs: Clear to auscultation bilaterally, no crackles or wheezing  Cardiovascular: S1S2, sternal surgical site intact, mediastinal chest tubes in place  Abdomen:  soft,non-tender  Skin: No rash    Other:    Medications     dextrose       BETA BLOCKER NOT PRESCRIBED       sodium chloride Stopped (04/10/22 0800)       aspirin  81 mg Oral or NG Tube Daily     buPROPion  300 mg Oral QAM     diclofenac  4 g Topical 4x Daily     gabapentin  100 mg Oral TID     heparin ANTICOAGULANT  5,000 Units Subcutaneous Q8H     insulin aspart  1-10 Units Subcutaneous TID AC     insulin aspart  1-7 Units Subcutaneous At Bedtime     insulin aspart   Subcutaneous Daily with breakfast     insulin aspart   Subcutaneous Daily with lunch     insulin aspart   Subcutaneous Daily with supper     insulin glargine  10 Units Subcutaneous At Bedtime     insulin glargine  40 Units Subcutaneous QAM AC     levothyroxine  125 mcg Oral Once per day on Mon Tue Wed Thu Fri Sat     levothyroxine  250 mcg Oral Weekly     lidocaine  2 patch Transdermal Q24H     lidocaine   Transdermal Q8H     [Held  by provider] losartan  25 mg Oral Daily     methocarbamol  1,000 mg Oral Q6H     nafcillin  2 g Intravenous Q4H     pantoprazole  40 mg Oral Daily     polyethylene glycol  17 g Oral BID     senna-docusate  1 tablet Oral BID       Data   All microbiology laboratory data reviewed.  Recent Labs   Lab Test 04/10/22  0530 04/09/22  0450 04/08/22  0604   WBC 8.5 9.5 10.1   HGB 8.3* 7.8* 7.9*   HCT 27.5* 25.6* 25.8*   MCV 95 93 95   * 568* 609*     Recent Labs   Lab Test 04/10/22  0530 04/08/22  0626 04/07/22  0532   CR 1.31* 0.99 0.93

## 2022-04-10 NOTE — PLAN OF CARE
Neuro: A/O x4, MONREAL, PERRLA intact.    Cardiac: Accelerated Junctional, HR 60s-80s, SBP 100s-120s, MAP >65.    Resp: RA, CPAP at night. LS- clear, no c/o SOB    GI: Voids in urinal. UOP- WDL    : Abdomen- active, soft, nontender    Skin: Incision- Midline Chest & CT puncture sites WDL    Pain: Ongoing back pain at times but more controlled now with PRN Pain meds.

## 2022-04-10 NOTE — PROGRESS NOTES
Patient transferred to [facility] at 1615 with all his belongings, including cell phone, , cpap machine and clothing.     Neuro: Intact. Calm and pleasant.  Pulm: Expiratory wheezy. Clear LS. On RA.   CV: Accelerated junctional rhythm with 3rd AVB  : Voiding  GI: On mod carb diet. Good appetite. Formed BMs this shift  Extremities: SBA for mobility.   Skin: Mid-sternal incision and chest tube scabs NGUYEN  Lines: PIVx1 on RUE.   Family: No family present.

## 2022-04-10 NOTE — PROGRESS NOTES
Cardiology Progress Note          Assessment and Plan:   Pt reports back pain better today than yesterday.  ECG shows accelerated junctional rhythm. No bradycardia.  Suspected diagnosis of  Osteomyelitis (MRI scan: S1 vertebral body and right sacral ala and anterior epidural space from mid L4 down to mid S2 worrisome for an infectious process such as osteomyelitis with epidural extension of infection). ID is consulted. On antibiotics. Afebrile. WBC normal.    Admitted for endocarditis of the tricuspid valve around 3/22/2022.   Tricuspid valve replacement on 4/1/2022.   Post op persistent accelerated junctional rhythm.  VF on 4/5/2022. Normal EF.    May consider subcutaneous ICD if he does not need pacing.    Nitin Matias MD  Cardiology   [phone]                  Physical Exam:   Blood pressure (!) 146/78, pulse 91, temperature 97.8  F (36.6  C), temperature source Oral, resp. rate 21, weight 114.9 kg (253 lb 4.9 oz), SpO2 98 %.  Wt Readings from Last 4 Encounters:   04/10/22 114.9 kg (253 lb 4.9 oz)   03/28/22 121.1 kg (267 lb)   01/01/13 113.4 kg (250 lb)      I/O last 3 completed shifts:  In: 1940 [P.O.:1200; I.V.:740]  Out: 3850 [Urine:3850]    Constitutional: Alert, no apparent distress,      Lungs: No crackles or wheezing,    Cardiovascular: Regular rhythm, normal S1 and S2, and no murmur,    Lymphm node  Neck  ENT  Neurologic  Abdomen: No enlargement  No jugular vein extension or carotid bruit  No apparent abnormality  No focal deficit  Normal bowel sounds, soft, no distension, no tender   Skin: No rashes, no cyanosis   Extremity: No edema          Medications:     Current Facility-Administered Medications:      acetaminophen (TYLENOL) tablet 650 mg, 650 mg, Oral, Q4H PRN, Alejandra Silverio NP, 325 mg at 04/10/22 0026     aspirin (ASA) chewable tablet 81 mg, 81 mg, Oral or NG Tube, Daily, Alejandra Silverio NP, 81 mg at 04/10/22 0804     bisacodyl (DULCOLAX) Suppository 10 mg, 10 mg, Rectal, Daily PRN, Jean Claude  Alejandra JOHNSON NP     buPROPion (WELLBUTRIN XL) 24 hr tablet 300 mg, 300 mg, Oral, QAM, Alejandra Silverio NP, 300 mg at 04/10/22 0804     dextrose 10% infusion, , Intravenous, Continuous PRN, Alejandra Silverio NP     glucose gel 15-30 g, 15-30 g, Oral, Q15 Min PRN **OR** dextrose 50 % injection 25-50 mL, 25-50 mL, Intravenous, Q15 Min PRN **OR** glucagon injection 1 mg, 1 mg, Subcutaneous, Q15 Min PRN, Alejandra Silverio NP     diclofenac (VOLTAREN) 1 % topical gel 4 g, 4 g, Topical, 4x Daily, Lety Perez PA-C, 4 g at 04/09/22 1743     gabapentin (NEURONTIN) capsule 100 mg, 100 mg, Oral, TID, Alejandra Silverio NP, 100 mg at 04/10/22 0804     heparin ANTICOAGULANT injection 5,000 Units, 5,000 Units, Subcutaneous, Q8H, Alejandra Silverio NP, 5,000 Units at 04/10/22 0536     hydrALAZINE (APRESOLINE) injection 10 mg, 10 mg, Intravenous, Q4H PRN, Alejandra Silverio NP     HYDROmorphone (DILAUDID) injection 0.2 mg, 0.2 mg, Intravenous, Q2H PRN, 0.2 mg at 04/05/22 1316 **OR** HYDROmorphone (DILAUDID) injection 0.4 mg, 0.4 mg, Intravenous, Q2H PRN, Alejandra Silverio NP, 0.4 mg at 04/07/22 1703     insulin aspart (NovoLOG) injection (RAPID ACTING), 1-10 Units, Subcutaneous, TID AC, Edgar Huff MD, 1 Units at 04/09/22 1724     insulin aspart (NovoLOG) injection (RAPID ACTING), 1-7 Units, Subcutaneous, At Bedtime, Edgar Huff MD, 2 Units at 04/09/22 2108     insulin aspart (NovoLOG) injection (RAPID ACTING), , Subcutaneous, Daily with breakfast, Edgar Huff MD, 6 Units at 04/10/22 0842     insulin aspart (NovoLOG) injection (RAPID ACTING), , Subcutaneous, Daily with lunch, Edgar Huff MD, 2 Units at 04/09/22 1239     insulin aspart (NovoLOG) injection (RAPID ACTING), , Subcutaneous, Daily with supper, Edgar Huff MD, 5 Units at 04/09/22 1848     insulin glargine (LANTUS PEN) injection 10 Units, 10 Units, Subcutaneous, At Bedtime, Edgar Huff MD, 10 Units at 04/09/22 2108     insulin glargine (LANTUS PEN) injection 40 Units, 40 Units,  Subcutaneous, QAM AC, Edgar Huff MD, 40 Units at 04/10/22 0842     levothyroxine (SYNTHROID/LEVOTHROID) tablet 125 mcg, 125 mcg, Oral, Once per day on Mon Tue Wed Thu Fri Sat, Ashley Orozco, , 125 mcg at 04/09/22 0623     levothyroxine (SYNTHROID/LEVOTHROID) tablet 250 mcg, 250 mcg, Oral, Weekly, Alejandra Silverio NP, 250 mcg at 04/10/22 0804     Lidocaine (LIDOCARE) 4 % Patch 2 patch, 2 patch, Transdermal, Q24H, Alejandra Silverio NP, 2 patch at 04/10/22 0805     lidocaine (LMX4) cream, , Topical, Q1H PRN, Alejandra Silverio NP     lidocaine 1 % 0.1-1 mL, 0.1-1 mL, Other, Q1H PRN, Alejandra Silverio NP     lidocaine patch in PLACE, , Transdermal, Q8H, Alejandra Silverio NP     [Held by provider] losartan (COZAAR) tablet 25 mg, 25 mg, Oral, Daily, Alejandra Silverio NP, 25 mg at 04/10/22 0804     magnesium hydroxide (MILK OF MAGNESIA) suspension 30 mL, 30 mL, Oral, Daily PRN, Alejandra Silverio NP, 30 mL at 04/06/22 1934     melatonin tablet 3 mg, 3 mg, Oral, At Bedtime PRN, Alejandra Silverio NP     methocarbamol (ROBAXIN) tablet 1,000 mg, 1,000 mg, Oral, Q6H, Lety Perez PA-C, 1,000 mg at 04/10/22 1020     nafcillin IV 2 g vial to attach to  ml bag, 2 g, Intravenous, Q4H, Erik Wood MD, 2 g at 04/10/22 1020     naloxone (NARCAN) injection 0.2 mg, 0.2 mg, Intravenous, Q2 Min PRN **OR** naloxone (NARCAN) injection 0.4 mg, 0.4 mg, Intravenous, Q2 Min PRN **OR** naloxone (NARCAN) injection 0.2 mg, 0.2 mg, Intramuscular, Q2 Min PRN **OR** naloxone (NARCAN) injection 0.4 mg, 0.4 mg, Intramuscular, Q2 Min PRN, Alejandra Silverio, NP     ondansetron (ZOFRAN-ODT) ODT tab 4 mg, 4 mg, Oral, Q6H PRN **OR** ondansetron (ZOFRAN) injection 4 mg, 4 mg, Intravenous, Q6H PRN, Alejandra Silverio, NP     oxyCODONE (ROXICODONE) tablet 5 mg, 5 mg, Oral, Q4H PRN, 5 mg at 04/10/22 0805 **OR** oxyCODONE (ROXICODONE) tablet 10 mg, 10 mg, Oral, Q4H PRN, Alejandra Silverio NP, 10 mg at 04/09/22 1554     [DISCONTINUED] pantoprazole  (PROTONIX) 2 mg/mL suspension 40 mg, 40 mg, Oral or NG Tube, Daily, 40 mg at 04/01/22 1514 **OR** pantoprazole (PROTONIX) EC tablet 40 mg, 40 mg, Oral, Daily, Alejandra Silverio NP, 40 mg at 04/10/22 0804     polyethylene glycol (MIRALAX) Packet 17 g, 17 g, Oral, BID, Alejandra Silverio NP, 17 g at 04/05/22 1223     Reason beta blocker order not selected, , Does not apply, DOES NOT GO TO Jean Claude SWEENEY Leah J, NP     senna-docusate (SENOKOT-S/PERICOLACE) 8.6-50 MG per tablet 1 tablet, 1 tablet, Oral, BID, Alejandra Silverio NP, 1 tablet at 04/10/22 0804     sodium chloride (PF) 0.9% PF flush 3 mL, 3 mL, Intracatheter, q1 min prn, Alejandra Silverio NP     sodium chloride 0.9% infusion, , Intravenous, Continuous, Alejandra Silverio NP, Stopped at 04/10/22 0800           Lab results:        Recent Labs   Lab Test 04/10/22  0809 04/10/22  0530 04/09/22 2051 04/09/22  0820 04/09/22  0450 04/08/22  0713 04/08/22  0626 04/07/22  0550 04/07/22  0532   NA  --  138  --   --   --   --  137  --  138   POTASSIUM  --  3.9  --   --  4.1  --  4.3  --  4.0   CHLORIDE  --  104  --   --   --   --  103  --  104   IMAN  --  8.6  --   --   --   --  8.6  --  8.9   CO2  --  28  --   --   --   --  26  --  27   BUN  --  23  --   --   --   --  21  --  17   CR  --  1.31*  --   --   --   --  0.99  --  0.93   * 101* 231*   < >  --    < > 174*   < > 113*    < > = values in this interval not displayed.     Recent Labs   Lab Test 04/10/22  0530 04/09/22  0450 04/08/22  0604   HGB 8.3* 7.8* 7.9*   WBC 8.5 9.5 10.1   * 568* 609*     Recent Labs   Lab Test 04/05/22  0645 04/01/22  1300 04/01/22  1141   INR 1.17* 1.55* 1.64*     Recent Labs   Lab Test 11/25/17  0250 11/24/17  2350   TROPI <0.015 <0.015

## 2022-04-11 LAB
ALBUMIN SERPL-MCNC: 2.1 G/DL (ref 3.4–5)
ALP SERPL-CCNC: 88 U/L (ref 40–150)
ALT SERPL W P-5'-P-CCNC: 34 U/L (ref 0–70)
ANION GAP SERPL CALCULATED.3IONS-SCNC: 5 MMOL/L (ref 3–14)
AST SERPL W P-5'-P-CCNC: 18 U/L (ref 0–45)
BACTERIA BLD CULT: NO GROWTH
BACTERIA BLD CULT: NO GROWTH
BILIRUB SERPL-MCNC: 0.4 MG/DL (ref 0.2–1.3)
BUN SERPL-MCNC: 22 MG/DL (ref 7–30)
CALCIUM SERPL-MCNC: 8.5 MG/DL (ref 8.5–10.1)
CHLORIDE BLD-SCNC: 105 MMOL/L (ref 94–109)
CK SERPL-CCNC: 49 U/L (ref 30–300)
CO2 SERPL-SCNC: 27 MMOL/L (ref 20–32)
CREAT SERPL-MCNC: 1.29 MG/DL (ref 0.66–1.25)
DEPRECATED CALCIDIOL+CALCIFEROL SERPL-MC: 22 UG/L (ref 20–75)
ERYTHROCYTE [DISTWIDTH] IN BLOOD BY AUTOMATED COUNT: 16 % (ref 10–15)
GFR SERPL CREATININE-BSD FRML MDRD: 75 ML/MIN/1.73M2
GLUCOSE BLD-MCNC: 104 MG/DL (ref 70–99)
GLUCOSE BLDC GLUCOMTR-MCNC: 133 MG/DL (ref 70–99)
GLUCOSE BLDC GLUCOMTR-MCNC: 164 MG/DL (ref 70–99)
GLUCOSE BLDC GLUCOMTR-MCNC: 187 MG/DL (ref 70–99)
GLUCOSE BLDC GLUCOMTR-MCNC: 86 MG/DL (ref 70–99)
GLUCOSE BLDC GLUCOMTR-MCNC: 99 MG/DL (ref 70–99)
HAPTOGLOB SERPL-MCNC: 382 MG/DL (ref 32–197)
HCT VFR BLD AUTO: 27.4 % (ref 40–53)
HGB BLD-MCNC: 8.4 G/DL (ref 13.3–17.7)
LDH SERPL L TO P-CCNC: 209 U/L (ref 85–227)
MAGNESIUM SERPL-MCNC: 2.4 MG/DL (ref 1.6–2.3)
MCH RBC QN AUTO: 29.3 PG (ref 26.5–33)
MCHC RBC AUTO-ENTMCNC: 30.7 G/DL (ref 31.5–36.5)
MCV RBC AUTO: 96 FL (ref 78–100)
PHOSPHATE SERPL-MCNC: 4.8 MG/DL (ref 2.5–4.5)
PLATELET # BLD AUTO: 540 10E3/UL (ref 150–450)
POTASSIUM BLD-SCNC: 3.7 MMOL/L (ref 3.4–5.3)
PROT SERPL-MCNC: 7.6 G/DL (ref 6.8–8.8)
PTH-INTACT SERPL-MCNC: 15 PG/ML
RBC # BLD AUTO: 2.87 10E6/UL (ref 4.4–5.9)
SODIUM SERPL-SCNC: 137 MMOL/L (ref 133–144)
WBC # BLD AUTO: 8.9 10E3/UL (ref 4–11)

## 2022-04-11 PROCEDURE — 250N000013 HC RX MED GY IP 250 OP 250 PS 637: Performed by: PHYSICIAN ASSISTANT

## 2022-04-11 PROCEDURE — 85027 COMPLETE CBC AUTOMATED: CPT | Performed by: PHYSICIAN ASSISTANT

## 2022-04-11 PROCEDURE — 93010 ELECTROCARDIOGRAM REPORT: CPT | Performed by: INTERNAL MEDICINE

## 2022-04-11 PROCEDURE — 83735 ASSAY OF MAGNESIUM: CPT | Performed by: PHYSICIAN ASSISTANT

## 2022-04-11 PROCEDURE — 99233 SBSQ HOSP IP/OBS HIGH 50: CPT | Mod: 24 | Performed by: INTERNAL MEDICINE

## 2022-04-11 PROCEDURE — 250N000013 HC RX MED GY IP 250 OP 250 PS 637: Performed by: HOSPITALIST

## 2022-04-11 PROCEDURE — 82306 VITAMIN D 25 HYDROXY: CPT | Performed by: INTERNAL MEDICINE

## 2022-04-11 PROCEDURE — 250N000009 HC RX 250: Performed by: PHYSICIAN ASSISTANT

## 2022-04-11 PROCEDURE — 36415 COLL VENOUS BLD VENIPUNCTURE: CPT | Performed by: PHYSICIAN ASSISTANT

## 2022-04-11 PROCEDURE — 93005 ELECTROCARDIOGRAM TRACING: CPT

## 2022-04-11 PROCEDURE — 83615 LACTATE (LD) (LDH) ENZYME: CPT | Performed by: HOSPITALIST

## 2022-04-11 PROCEDURE — 82550 ASSAY OF CK (CPK): CPT | Performed by: INTERNAL MEDICINE

## 2022-04-11 PROCEDURE — 250N000011 HC RX IP 250 OP 636: Performed by: PHYSICIAN ASSISTANT

## 2022-04-11 PROCEDURE — 84100 ASSAY OF PHOSPHORUS: CPT | Performed by: PHYSICIAN ASSISTANT

## 2022-04-11 PROCEDURE — 99233 SBSQ HOSP IP/OBS HIGH 50: CPT | Performed by: HOSPITALIST

## 2022-04-11 PROCEDURE — 83970 ASSAY OF PARATHORMONE: CPT | Performed by: INTERNAL MEDICINE

## 2022-04-11 PROCEDURE — 87040 BLOOD CULTURE FOR BACTERIA: CPT | Performed by: PHYSICIAN ASSISTANT

## 2022-04-11 PROCEDURE — 210N000001 HC R&B IMCU HEART CARE

## 2022-04-11 PROCEDURE — 210N000002 HC R&B HEART CARE

## 2022-04-11 PROCEDURE — 99207 PR NO BILLABLE SERVICE THIS VISIT: CPT | Performed by: INTERNAL MEDICINE

## 2022-04-11 PROCEDURE — 80053 COMPREHEN METABOLIC PANEL: CPT | Performed by: INTERNAL MEDICINE

## 2022-04-11 RX ORDER — HEPARIN SODIUM 5000 [USP'U]/.5ML
5000 INJECTION, SOLUTION INTRAVENOUS; SUBCUTANEOUS EVERY 8 HOURS
Status: DISCONTINUED | OUTPATIENT
Start: 2022-04-12 | End: 2022-04-12

## 2022-04-11 RX ORDER — POTASSIUM CHLORIDE 1500 MG/1
20 TABLET, EXTENDED RELEASE ORAL ONCE
Status: COMPLETED | OUTPATIENT
Start: 2022-04-11 | End: 2022-04-11

## 2022-04-11 RX ADMIN — NAFCILLIN 2 G: 2 POWDER, FOR SOLUTION INTRAMUSCULAR; INTRAVENOUS at 21:13

## 2022-04-11 RX ADMIN — GABAPENTIN 100 MG: 100 CAPSULE ORAL at 21:12

## 2022-04-11 RX ADMIN — METHOCARBAMOL 1000 MG: 500 TABLET, FILM COATED ORAL at 21:12

## 2022-04-11 RX ADMIN — BUPROPION HYDROCHLORIDE 300 MG: 300 TABLET, EXTENDED RELEASE ORAL at 11:05

## 2022-04-11 RX ADMIN — DICLOFENAC SODIUM 4 G: 10 GEL TOPICAL at 21:13

## 2022-04-11 RX ADMIN — PANTOPRAZOLE SODIUM 40 MG: 40 TABLET, DELAYED RELEASE ORAL at 11:05

## 2022-04-11 RX ADMIN — ASPIRIN 81 MG CHEWABLE TABLET 81 MG: 81 TABLET CHEWABLE at 11:04

## 2022-04-11 RX ADMIN — SENNOSIDES AND DOCUSATE SODIUM 1 TABLET: 50; 8.6 TABLET ORAL at 11:04

## 2022-04-11 RX ADMIN — GABAPENTIN 100 MG: 100 CAPSULE ORAL at 11:04

## 2022-04-11 RX ADMIN — INSULIN GLARGINE 40 UNITS: 100 INJECTION, SOLUTION SUBCUTANEOUS at 11:06

## 2022-04-11 RX ADMIN — HEPARIN SODIUM 5000 UNITS: 5000 INJECTION, SOLUTION INTRAVENOUS; SUBCUTANEOUS at 06:25

## 2022-04-11 RX ADMIN — LIDOCAINE 2 PATCH: 560 PATCH PERCUTANEOUS; TOPICAL; TRANSDERMAL at 10:55

## 2022-04-11 RX ADMIN — HEPARIN SODIUM 5000 UNITS: 5000 INJECTION, SOLUTION INTRAVENOUS; SUBCUTANEOUS at 14:05

## 2022-04-11 RX ADMIN — POTASSIUM CHLORIDE 20 MEQ: 1500 TABLET, EXTENDED RELEASE ORAL at 06:42

## 2022-04-11 RX ADMIN — NAFCILLIN 2 G: 2 POWDER, FOR SOLUTION INTRAMUSCULAR; INTRAVENOUS at 17:51

## 2022-04-11 RX ADMIN — METHOCARBAMOL 1000 MG: 500 TABLET, FILM COATED ORAL at 04:03

## 2022-04-11 RX ADMIN — MELATONIN TAB 3 MG 3 MG: 3 TAB at 21:12

## 2022-04-11 RX ADMIN — ACETAMINOPHEN 650 MG: 325 TABLET, FILM COATED ORAL at 02:15

## 2022-04-11 RX ADMIN — ACETAMINOPHEN 650 MG: 325 TABLET, FILM COATED ORAL at 21:18

## 2022-04-11 RX ADMIN — METHOCARBAMOL 1000 MG: 500 TABLET, FILM COATED ORAL at 16:57

## 2022-04-11 RX ADMIN — DICLOFENAC SODIUM 4 G: 10 GEL TOPICAL at 17:52

## 2022-04-11 RX ADMIN — NAFCILLIN 2 G: 2 POWDER, FOR SOLUTION INTRAMUSCULAR; INTRAVENOUS at 14:05

## 2022-04-11 RX ADMIN — OXYCODONE HYDROCHLORIDE 5 MG: 5 TABLET ORAL at 02:15

## 2022-04-11 RX ADMIN — METHOCARBAMOL 1000 MG: 500 TABLET, FILM COATED ORAL at 11:05

## 2022-04-11 RX ADMIN — NAFCILLIN 2 G: 2 POWDER, FOR SOLUTION INTRAMUSCULAR; INTRAVENOUS at 06:25

## 2022-04-11 RX ADMIN — DICLOFENAC SODIUM 4 G: 10 GEL TOPICAL at 11:05

## 2022-04-11 RX ADMIN — ACETAMINOPHEN 650 MG: 325 TABLET, FILM COATED ORAL at 14:14

## 2022-04-11 RX ADMIN — NAFCILLIN 2 G: 2 POWDER, FOR SOLUTION INTRAMUSCULAR; INTRAVENOUS at 02:15

## 2022-04-11 RX ADMIN — NAFCILLIN 2 G: 2 POWDER, FOR SOLUTION INTRAMUSCULAR; INTRAVENOUS at 10:59

## 2022-04-11 RX ADMIN — LEVOTHYROXINE SODIUM 125 MCG: 125 TABLET ORAL at 06:32

## 2022-04-11 ASSESSMENT — ACTIVITIES OF DAILY LIVING (ADL)
ADLS_ACUITY_SCORE: 3

## 2022-04-11 NOTE — PROGRESS NOTES
Transferred to ICU at 0615 for third degree heart block with accelerated junctional rhythm, pt is not symptomatic and has no complaints on transfer. Potassium replaced, repositioning self independantly, on room air, voiding in urinal.

## 2022-04-11 NOTE — PROVIDER NOTIFICATION
MD Notification    Notified Person: MD    Notified Person Name: Julia Ramirez    Notification Date/Time: 0533 4/11/22    Notification Interaction: phone    Purpose of Notification: Patient converted to complete heart block and accelerated junctional rhythm with occ. Ventricular complexes.     Orders Received: Transfer to ICU and notify EP    Comments: 0625 Spoke with Cardiology, Dr. Jazzy Carreon, she will let EP know and they will she patient this AM.

## 2022-04-11 NOTE — PLAN OF CARE
Goal Outcome Evaluation:    Pt remains in 3rd degree AVB/junctional rhythm. Pt had short epidose of SB with HR 49. Plan for pt to have S-ICD placement tomorrow. Pain controlled with tylenol and robaxin. Pt transferring to CCU at this time. Family updated on plan of care.

## 2022-04-11 NOTE — PROGRESS NOTES
[facility]    Hospitalist Progress Note    Assessment & Plan      Assessment & Plan:   Anil Montoya is a 36 year old male with hx of DM2, HTN, WARNER, and anxiety who was initially hospitalized at [facility] on 3/22/2022 for severe sepsis initially attributed to pneumonia vs viral infection. He was found to have MSSA bacteremia with ongoing positive cultures. He underwent a TEJINDER on 3/27/2022 which revealed tricuspid endocarditis, and he was transferred to [facility] on 3/28/2022 for CV Surgery consultation.     Sepsis with MSSA bacteremia due to tricuspid valve endocarditis.  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 He was initially treated with ceftriaxone and azithromycin for possible community-acquired pneumonia, and ID was consulted. Blood cultures returned positive for MSSA, and serial blood cultures continued to return positive. Broad work-up including CT abd/pelvis, MR lumbar spine, CT dental, and TTE were unrevealing  * Initially treated with IV ceftriaxone and azithromycin; switched to IV vanco on 3/23 when blood cultures returned with Staph; switched to IV cefazolin on 3/24 upon sensitivities  * Underwent TEJINDER on 3/27 which showed thickened tricuspid valve leaflets with atrial aspect of the septal leaflet with an irregular border and small mobile filamentous elements, large fixed heterogeneous mass in the right ventricle attached to the interventricular septum (2.5 x 2 cm) appearing connected to the tricuspid subvalvular annular apparatus with a pedunculated round mobile element (1.8 x 1 cm).  No significant tricuspid regurgitation reported. LVEF 55 to 60%. Negative bubble study.  * Transferred to [facility] on 3/28 for CV Surgery consult  - Patient underwent tricuspid valve replacement with bioprosthetic valve (31 mm epic stented valve)  on 4/1/2022 and endoscopic aortic valve exploration.  - Initially micafungin added on 4/1 as Gram stain from tissue from heart valve from 4/ 1 initially was reported as yeast, which was subsequently corrected as not to be present.  Micafungin now discontinued.  -  given new prosthetic valve and positive blood cultures gentamicin added ( synergy dosing ) until he clears the BCs  - had worsening lower back pain and underwent repeat lumbar mri yesterday(4/7/22) which showed possible osteomyelitis and discitis   MRI read- S1 vertebral body and right sacral ala and anterior epidural space from mid L4 down to mid S2 worrisome for an infectious process such as osteomyelitis with epidural extension of infection. Also L5-S1 discitis is not excluded and continued surveillance is recommended.  - cefazolin was changed to nafcillin for better cns penetration. ID/CT surgery aware.   - Daily blood cultures, last positive was from 4/3  -stopped IV gentamicin 100 mg twice daily IV daily-started on 4/5/2022 and continue nafcillin 2 g every 4 hours-started on 4/7/2022  - ID, Cardiology, CV Surgery following  - Patient was initially transferred out of the intensive care unit on 4/4/2022 after he was stable however patient had an episode of V. fib arrest and was transferred back to the ICU.     V. fib arrest(4/5/2022)  -Patient was noted to have V. fib on telemetry earlier this morning  -Required brief CPR and 1 shock of 120 J with successful restoration of regular rhythm  -Electrolytes appear to be within normal limits  -Patient transferred back to the intensive care unit for close monitoring  -EP following -> plan on implanting a dual-chamber ICD for secondary prevention, plan for this tomorrow.   Patient continues to remain and third-degree heart block.  He was initially transferred out to the floor yesterday and had to be transferred back to ICU due to persistent third-degree heart block.  However he continues to have accelerated  junctional rhythm and for now cardiology feels that he does not need a pacemaker.  -ECHO -> LVEF was normal by echocardiography (technically difficult study)     Post-op complete heart block  -Patient initially was in second-degree AV block, and now appears to have complete heart block post tricuspid valve replacement  -EP following, given good junctional escape no plans for immediate permanent pacemaker implantation, continue to monitor on telemetry closely. Removed pacer wires. Plan on possible icd placement tomorrow if sinus rhythm returns then he would not need a pacer placement.  -Not needing diuretics currently.     DM2 without complications, long-term insulin use, uncontrolled  * A1c 12.6. Had not been taking his diabetes medications for some time  -transitioned to subcut regimen yesterday with lantus 40 am and 10 pm with sliding scale aspart and 1:10 carb count . Goal for bs<180 to facilitate infection control   -Prior to surgery he was requiring Lantus 34 units twice a day and mealtime coverage of 1: 10.     Acute postop blood loss anemia  -Hemoglobin preop was around 12 which had slowly trended down.  Hemoglobin dropped to 7.3 on 4/4, most likely acute blood loss from surgery.    -Hemoglobin is at 8.3 this morning   -Continue to monitor and transfuse as needed.  -Has thrombocytosis likely secondary to infection and acute phase reactant     Acute low back pain  * Noted while at [facility]  * MR lumbar spine (3/24) showed mild multilevel lumbar spondylosis, but no evidence of discitis but mri 4/7 showed possible osteomyelitis with discitis.  -Continue pain control as needed with dilaudid , robaxin and oxycodone.  His back pain improved since yesterday.     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 RUQ US (3/22) showed fatty liver, but normal gallbladder and normal bile ducts  - LFTs improved.      Essential hypertension  - on  losartan 25mg daily. Hold b-blocker for his complete heart block.      WARNER  * CPAP per home settings     Hypothyroidism  * Chronic and stable on levothyroxine     Generalized anxiety disorder  ADHD  * Chronic and stable on bupropion     Hyperphosphatemia  -Improved after gentamicin has been discontinued.  For now we will continue to monitor.  No evidence of hemolysis or rhabdomyolysis.    Acute renal failure  Creatinine continues to improve today.  Likely injury from gentamicin.  We will continue to monitor.  No IV fluids at this time.  ACE inhibitor held.    DVT Prophylaxis: Heparin SQ  Code Status: Full Code    Disposition: Expected discharge when medically stable     Esther Damon MD  [phone](p)  [phone]( c)    Interval History   Patient awake and alert.  No acute events overnight.  Has mild back pain that comes on with activity.  At rest it is very well controlled.  Chest pain improved significantly.  No fever or chills.  No other joint swelling.    -Data reviewed today: I reviewed all new labs and imaging results over the last 24 hours. I personally reviewed the mri lumbar spine image(s) showing possible ostemyelitis and discitis.    Physical Exam   Temp: 98.1  F (36.7  C) Temp src: Oral BP: (!) 153/77 Pulse: 70   Resp: 13 SpO2: 98 % O2 Device: None (Room air)    Vitals:    04/10/22 0400 04/10/22 1627 04/11/22 0500   Weight: 114.9 kg (253 lb 4.9 oz) 115.8 kg (255 lb 6.4 oz) 114.9 kg (253 lb 3.2 oz)     Vital Signs with Ranges  Temp:  [97.9  F (36.6  C)-99.2  F (37.3  C)] 98.1  F (36.7  C)  Pulse:  [62-93] 70  Resp:  [8-22] 13  BP: (114-153)/(56-99) 153/77  SpO2:  [95 %-100 %] 98 %  I/O last 3 completed shifts:  In: 1500 [P.O.:1200; I.V.:300]  Out: 1645 [Urine:1645]    Physical Exam  Constitutional:       General: He is not in acute distress.     Appearance: He is well-developed. He is obese. He is ill-appearing.      Comments: Appears flushed   HENT:      Right Ear: Tympanic membrane and external  ear normal.      Left Ear: Tympanic membrane and external ear normal.      Nose: Nose normal.      Mouth/Throat:      Mouth: No oral lesions.      Pharynx: No oropharyngeal exudate.   Eyes:      General:         Right eye: No discharge.         Left eye: No discharge.      Conjunctiva/sclera: Conjunctivae normal.      Pupils: Pupils are equal, round, and reactive to light.   Neck:      Thyroid: No thyromegaly.      Trachea: No tracheal deviation.   Cardiovascular:      Rate and Rhythm: Normal rate and regular rhythm.      Pulses: Normal pulses.      Heart sounds: Normal heart sounds, S1 normal and S2 normal. No murmur heard.    No S3 or S4 sounds.   Pulmonary:      Effort: Pulmonary effort is normal. No respiratory distress.      Breath sounds: Normal breath sounds. No wheezing or rales.   Abdominal:      General: Bowel sounds are normal.      Palpations: Abdomen is soft. There is no mass.      Tenderness: There is no abdominal tenderness.   Musculoskeletal:         General: No deformity. Normal range of motion.      Cervical back: Neck supple.      Comments: Severe low back pain     Lymphadenopathy:      Cervical: No cervical adenopathy.   Skin:     General: Skin is warm and dry.      Findings: No lesion or rash.   Neurological:      Mental Status: He is alert and oriented to person, place, and time.      Motor: No abnormal muscle tone.      Deep Tendon Reflexes: Reflexes are normal and symmetric.   Psychiatric:         Speech: Speech normal.         Thought Content: Thought content normal.         Judgment: Judgment normal.           Medications     dextrose       BETA BLOCKER NOT PRESCRIBED       sodium chloride Stopped (04/10/22 0800)       aspirin  81 mg Oral or NG Tube Daily     buPROPion  300 mg Oral QAM     diclofenac  4 g Topical 4x Daily     gabapentin  100 mg Oral TID     heparin ANTICOAGULANT  5,000 Units Subcutaneous Q8H     insulin aspart  1-10 Units Subcutaneous TID AC     insulin aspart  1-7 Units  Subcutaneous At Bedtime     insulin aspart   Subcutaneous Daily with breakfast     insulin aspart   Subcutaneous Daily with lunch     insulin aspart   Subcutaneous Daily with supper     insulin glargine  10 Units Subcutaneous At Bedtime     insulin glargine  40 Units Subcutaneous QAM AC     levothyroxine  125 mcg Oral Once per day on Mon Tue Wed Thu Fri Sat     levothyroxine  250 mcg Oral Weekly     lidocaine  2 patch Transdermal Q24H     lidocaine   Transdermal Q8H     methocarbamol  1,000 mg Oral Q6H     nafcillin  2 g Intravenous Q4H     pantoprazole  40 mg Oral Daily     polyethylene glycol  17 g Oral BID     senna-docusate  1 tablet Oral BID       Data   Recent Labs   Lab 04/11/22  1138 04/11/22  0929 04/11/22  0540 04/10/22  0809 04/10/22  0530 04/09/22  0820 04/09/22  0450 04/08/22  0713 04/08/22  0626 04/05/22  0647 04/05/22  0645   WBC  --   --  8.9  --  8.5  --  9.5  --   --    < > 16.2*   HGB  --   --  8.4*  --  8.3*  --  7.8*  --   --    < > 8.0*   MCV  --   --  96  --  95  --  93  --   --    < > 94   PLT  --   --  540*  --  571*  --  568*  --   --    < > 883*   INR  --   --   --   --   --   --   --   --   --   --  1.17*   NA  --   --  137  --  138  --   --   --  137   < >  --    POTASSIUM  --   --  3.7  --  3.9  --  4.1  --  4.3   < >  --    CHLORIDE  --   --  105  --  104  --   --   --  103   < >  --    CO2  --   --  27  --  28  --   --   --  26   < >  --    BUN  --   --  22  --  23  --   --   --  21   < >  --    CR  --   --  1.29*  --  1.31*  --   --   --  0.99   < >  --    ANIONGAP  --   --  5  --  6  --   --   --  8   < >  --    IMAN  --   --  8.5  --  8.6  --   --   --  8.6   < >  --    * 86 104*   < > 101*   < >  --    < > 174*   < >  --    ALBUMIN  --   --  2.1*  --  2.0*  --   --   --   --    < >  --    PROTTOTAL  --   --  7.6  --  7.6  --   --   --   --    < >  --    BILITOTAL  --   --  0.4  --  0.4  --   --   --   --    < >  --    ALKPHOS  --   --  88  --  90  --   --   --   --    < >   --    ALT  --   --  34  --  41  --   --   --   --    < >  --    AST  --   --  18  --  21  --   --   --   --    < >  --     < > = values in this interval not displayed.       No results found for this or any previous visit (from the past 24 hour(s)).

## 2022-04-11 NOTE — CONSULTS
Renal Medicine       Data reviewed  Acute Kidney Injury stable to improving  Non oliguric  Presumed tubular injury from aminoglycoside  Hold ARB until creatinine normalizes     PO4 elevation improved as well  No TLS, muscle injury, acidosis  PTH OK  Follow    Discussed with Dr. Damon  No formal consult           Recent Labs   Lab 04/11/22  0929 04/11/22  0540   NA  --  137   POTASSIUM  --  3.7   CHLORIDE  --  105   CO2  --  27   ANIONGAP  --  5   GLC 86 104*   BUN  --  22   CR  --  1.29*   GFRESTIMATED  --  75   IMAN  --  8.5     Recent Labs   Lab 04/11/22  0540 04/10/22  0530 04/08/22  0626 04/07/22  0532 04/06/22  0417 04/05/22  0743 04/05/22  0647 04/05/22  0526   CR 1.29* 1.31* 0.99 0.93 0.94 1.04 1.01 0.90     Recent Labs   Lab 04/11/22  0540 04/10/22  0530 04/09/22  0450 04/08/22  0626   PHOS 4.8* 6.0* 5.3* 5.1*           VERONIQUE Meeks    [facility]  [phone]

## 2022-04-11 NOTE — PROGRESS NOTES
Cardiac Electrophysiology Progress Note          Assessment and Plan:   MSSA bacteremia with TV Endocarditis and osteomyelitis, s/p bioprosthetic TV pod# 10 complicated by CHB with junctional escape in the 60's and remains so since. In house VF arrest on 4/5. Normal QT, lytes and LVEF. No CAD.  Not pause dependent. No event since.     No bacteremia last 6 days. Unclear when or if CHB would recover. Given excellent junctional escape and chance of AV conduction recovering there is no urgency for a permanent pacemaker. As we do not know the cause of VF arrest there is a chance of recurrence. Agree for an ICD. Given young age and new bioprosthetic TV it would be best to avoid endovascular space and have ICD lead through the TV. For above reasons and that he does not need pacing at this point it would be best to implant a S-ICD. Will have [facility] rep screen pt to make sure he is suitable. If he is plan for tomorrow.     Since this was first episode of VF arrest with no identifiable risk factors ok not to start an AAD at this point. Transfer to heart center when bed is avail.  Plan to discharge home with a monitor after S-ICD. If a ppm is truly needed, we may consider biv-pacer with LV lead only to spare the need of having the lead in the RV bypassing the TV.                    Interval History:   doing well; no cp, sob, n/v/d, or abd pain.              Review of Systems:   As per subjective, otherwise 5 systems reviewed and negative.           Physical Exam:   Blood pressure (!) 151/99, pulse 78, temperature 98.5  F (36.9  C), temperature source Oral, resp. rate 22, weight 114.9 kg (253 lb 3.2 oz), SpO2 97 %.          Intake/Output Summary (Last 24 hours) at 4/11/2022 1104  Last data filed at 4/11/2022 0700  Gross per 24 hour   Intake 1280 ml   Output 2065 ml   Net -785 ml       Constitutional:   NAD   Skin:   Warm and dry   Head:   Nontraumatic   Neck:   Supple, symmetrical, trachea midline, no adenopathy, thyroid  symmetric, not enlarged and no tenderness, skin normal   Lungs:   normal   Cardiovascular:   Normal apical impulse, regular rate and rhythm, normal S1 and S2, no S3 or S4, and no murmur noted    Abdomen:   Benign   Extremities and Back:   Symmetric, no curvature, spinous processes are non-tender on palpation, paraspinous muscles are non-tender on palpation, no costal vertebral tenderness   Neurological:   Grossly nonfocal            Medications:       aspirin  81 mg Oral or NG Tube Daily     buPROPion  300 mg Oral QAM     diclofenac  4 g Topical 4x Daily     gabapentin  100 mg Oral TID     heparin ANTICOAGULANT  5,000 Units Subcutaneous Q8H     insulin aspart  1-10 Units Subcutaneous TID AC     insulin aspart  1-7 Units Subcutaneous At Bedtime     insulin aspart   Subcutaneous Daily with breakfast     insulin aspart   Subcutaneous Daily with lunch     insulin aspart   Subcutaneous Daily with supper     insulin glargine  10 Units Subcutaneous At Bedtime     insulin glargine  40 Units Subcutaneous QAM AC     levothyroxine  125 mcg Oral Once per day on Mon Tue Wed Thu Fri Sat     levothyroxine  250 mcg Oral Weekly     lidocaine  2 patch Transdermal Q24H     lidocaine   Transdermal Q8H     methocarbamol  1,000 mg Oral Q6H     nafcillin  2 g Intravenous Q4H     pantoprazole  40 mg Oral Daily     polyethylene glycol  17 g Oral BID     senna-docusate  1 tablet Oral BID     No results displayed because visit has over 200 results.                     Srikanth Monge MD

## 2022-04-11 NOTE — PROGRESS NOTES
[facility]  Cardiovascular and Thoracic Surgery Daily Note          Assessment and Plan:   Anil Montoya is a 36 year old gentleman who presented to [facility] ED on 3/22/22 with weakness, recent hematuria and shortness of breath. Workup demonstrated MSSA bacteremia likely secondary to right groin skin source. TEJINDER on 3/27/22 demonstrated tricuspid valve endocarditis. In completing full work up, there was concern for discitis with seeding however MRI negative. He was transferred on 3/28/22 to [facility] for consideration of surgical intervention.      PMH includes: Poorly controlled diabetic, HTN, hypothyroidism, obesity, WARNER, anxiety, cryptococcal pneumonia in 2012 with previous history of staph aureus bacteremia.     Most recent echo demonstrates LVEF 55-60%, normal RV function, mildly thickened cusps on aortic valve and thickened tricuspid valve leaflets, small mobile filamentous elements on the septal leaflet of TV and larged fixed heterogeneous mass in the RV attached to the interventricular septum.  Coronary angiogram 3/31/22 with no e/o CAD.     Course c/b CHB with accelerated junctional rhythm and polymorphic VT arrest on 4/5 with immediate ROSC after defib x1 and transient CPR.        POD #10 s/p:  1. Tricuspid valve replacement (31mm Epic stented valve)  2. Endoscopic aortic valve exploration  3. Transesophageal echocardiogram on 4/1/22 with Dr. 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 Did not need intubation and neurologically intact. Electrolytes stable: K+ 3.7, Mg 2. Appreciate EP's involvement. Troponin elevated as expected after event.  Echocardiogram on 4/5 stable but poor images to evaluate tricuspid gradient  [PTA meds on hold: losartan 50 mg daily]  HD stable  overnight in accelerated junctional rhythm  - ASA 81 mg daily  - Defer BB given heart block  - Losartan 25 mg daily and up titrate PRN  - No statin indicated  - ID as below  - Consulted EP, appreciate recommendations, plan for subcutaneous ICD/+/- pacer once blood is sterile  - CTs/wires removed 4/7, discussed with EP  - EP approved pt transferring to [facility], went into CHB last night, back in ICU, EP to see and consider PPM, cultures clear for 4 days now  -ORTHO/Neuro: Having new lower back pain, MRI 4/7 showing concern for osteomyelitis involving epidural extension, no intervention at this time, pain control may be bigger issue, repeat MRI spine prior to discontinuation of abx, voltaren ordered for back     Resp:   WARNER  History of cryptococcal pneumonia  Extubated on POD 0, now saturating well on room air  - Continue pulm hygiene efforts: IS/flutter valve/mobility  - Titrate O2 to maintain sats >92%, on RA  - CPAP at HS     Neuro/MSK:    Acute postoperative pain  MDD/VIVEK  - Better controlled with current regimen this AM, received toradol x1  - Sore after a few compressions with CPR but overall tolerable  - Robaxin scheduled, lidocaine patches added  - PTA Wellbutrin resumed  - Sternal incision and area intact and feels stable, wires intact on CXR, continue sternal precautions     Renal/Electrolyte:   Creat stable, below preoperative weight, has less edema  Autodiuresing  - Strict I/O, daily weights  - Avoid/limit nephrotoxins as able  - Replete lytes per protocol  - 7 kg below pre-op weight, holding diuretics     GI/Nutrition:    Mildly elevated LFTs  - Tolerating current diet:  Orders Placed This Encounter      Moderate Consistent Carb (60 g CHO per Meal) Diet   - Continue bowel regimen, + BM, + flatus  - LFTs had normalized, AST slightly elevated after arrest, trend  - PPI     :    - Voiding well on own     Endo:  Stress induced hyperglycemia   Poorly controlled DM2  Hypothyroidism  Preop A1c             Lab  Results   Component Value Date     A1C 12.6 2022    [PTA meds on hold: Victoza and metformin]  - Was on insulin infusion, transitioned to sliding scale insulin and lantus 4/2, hyperglycemic, resumed insulin infusion POD 2 with prandial coverage. Continue close monitoring of blood sugars to allow for healing  - Hospitalists managing diabetic regimen, appreciate great management, currently lantus 40 q am, 10 at bedtime with high sliding scale  - Goal BG <180 for optimal healing  - PTA levothyroxine resumed     ID:  Stress induced leukocytosis, resolved  Tricuspid valve endocarditis  MSSA bacteremia  History cryptococcal pneumonia  WBC 8.9<8.5<9.5<10.1<11.7<10, Temp (24hrs), Av.5  F (36.9  C), Min:97.9  F (36.6  C), Max:99.2  F (37.3  C); no s/sx infection having new fevers, MRI  concerning for osteomyelitis, consult to ortho/neuro for any further recs-trying to exhaust all resources.   Prelim intraop gram stain positive for yeast from tricuspid valve, culture with 1+ staph aureus, no yeast, continue to follow  BC 4/3 positive for staph aureus, continue to follow  BC / with NGTD thus far  Fungal BC sent on  with NGTD  Full skin inspection  with no new wounds  - Completing perioperative ABX  - Continue Cefazolin per ID, Gentamycin added  for new positive culture  - Micafungin started  per ID for positive yeast on gram stain, discontinued with negative cx on 4/3  - HIV antigen/antibody test negative  - Per ID: repeat blood cultures x 2 sets daily until clear  - Continue to follow fever curve, WBC and inflammatory markers as appropriate     Heme:  Acute blood loss anemia  Acute blood loss thrombocytopenia  Hgb 8.4<8.3<7.8, Plts 571; no s/sx active bleeding  - CBC in AM  - Goal Hgb >7   -Proph: PPI, subcutaneous heparin, SCDs  -Dispo: ICU, continue therapies, pulm hygiene      Seen and discussed with Dr. Melton/Marleni            Interval History:   Lying in bed, breathing stable, tolerating  diet, working with rehab, heart rhythm remains an issue          Medications:       aspirin  81 mg Oral or NG Tube Daily     buPROPion  300 mg Oral QAM     diclofenac  4 g Topical 4x Daily     gabapentin  100 mg Oral TID     heparin ANTICOAGULANT  5,000 Units Subcutaneous Q8H     insulin aspart  1-10 Units Subcutaneous TID AC     insulin aspart  1-7 Units Subcutaneous At Bedtime     insulin aspart   Subcutaneous Daily with breakfast     insulin aspart   Subcutaneous Daily with lunch     insulin aspart   Subcutaneous Daily with supper     insulin glargine  10 Units Subcutaneous At Bedtime     insulin glargine  40 Units Subcutaneous QAM AC     levothyroxine  125 mcg Oral Once per day on Mon Tue Wed Thu Fri Sat     levothyroxine  250 mcg Oral Weekly     lidocaine  2 patch Transdermal Q24H     lidocaine   Transdermal Q8H     methocarbamol  1,000 mg Oral Q6H     nafcillin  2 g Intravenous Q4H     pantoprazole  40 mg Oral Daily     polyethylene glycol  17 g Oral BID     senna-docusate  1 tablet Oral BID     @MEDSPRN-@          Physical Exam:   Vitals were reviewed  Blood pressure 134/83, pulse 80, temperature 97.9  F (36.6  C), temperature source Oral, resp. rate 8, weight 114.9 kg (253 lb 3.2 oz), SpO2 98 %.  Rhythm: HB    Lungs: course    Cardiovascular: s1/s2, no m/r/g    Abdomen: s.nt.nd    Extremeties: no LE edema    Incision: cdi    CT: na    Weight:   Vitals:    04/07/22 0400 04/09/22 0600 04/10/22 0400 04/10/22 1627   Weight: 119.2 kg (262 lb 12.6 oz) 122.1 kg (269 lb 2.9 oz) 114.9 kg (253 lb 4.9 oz) 115.8 kg (255 lb 6.4 oz)    04/11/22 0500   Weight: 114.9 kg (253 lb 3.2 oz)            Data:   Labs:   Lab Results   Component Value Date    WBC 8.9 04/11/2022    WBC 6.8 11/24/2017     Lab Results   Component Value Date    RBC 2.87 04/11/2022    RBC 5.16 11/24/2017     Lab Results   Component Value Date    HGB 8.4 04/11/2022    HGB 15.2 11/24/2017     Lab Results   Component Value Date    HCT 27.4 04/11/2022    HCT  43.7 11/24/2017     No components found for: MCT  Lab Results   Component Value Date    MCV 96 04/11/2022    MCV 85 11/24/2017     Lab Results   Component Value Date    MCH 29.3 04/11/2022    MCH 29.5 11/24/2017     Lab Results   Component Value Date    MCHC 30.7 04/11/2022    MCHC 34.8 11/24/2017     Lab Results   Component Value Date    RDW 16.0 04/11/2022    RDW 12.9 11/24/2017     Lab Results   Component Value Date     04/11/2022     11/24/2017       Last Basic Metabolic Panel:  Lab Results   Component Value Date     04/11/2022     11/24/2017      Lab Results   Component Value Date    POTASSIUM 3.7 04/11/2022    POTASSIUM 3.8 11/24/2017     Lab Results   Component Value Date    CHLORIDE 105 04/11/2022    CHLORIDE 104 11/24/2017     Lab Results   Component Value Date    IMAN 8.5 04/11/2022    IMAN 8.3 11/24/2017     Lab Results   Component Value Date    CO2 27 04/11/2022    CO2 24 11/24/2017     Lab Results   Component Value Date    BUN 22 04/11/2022    BUN 18 11/24/2017     Lab Results   Component Value Date    CR 1.29 04/11/2022    CR 1.07 11/24/2017     Lab Results   Component Value Date     04/11/2022    GLC 99 04/11/2022     11/24/2017       Lety Perez PA-C  Pager #: [phone]

## 2022-04-11 NOTE — PROGRESS NOTES
[facility]    Infectious Disease Progress Note    Date of Service : 04/11/2022     Assessment:  36YM with uncontrolled diabetes and HbA1c of >12%, and prior cryptococcal pneumonia 10 years ago without HIV diagnosis, who is hospitalized with high grade prolonged MSSA bacteremia since 3/22 with tricuspid valve endocarditis. Possible secondary seeding of the lumbar spine initial MRI was negative and back pain was  Gone but then started complaining of back pain again on 4/ 6, MRI 4/7 possible osteo.    He is s/p tricuspid valve replacement surgery and aortic valve exploration for control of infection on 04/01 with positive gram stain and culture of tricuspid valve tissue for staph aureus.  Yeast also seen on stain but read corrected later to no yeast.  Micafungin discontinued as Micro results  updated by lab. Initial read of 1+ yeast on valve tissue on 4/1 was corrected and updated. Only staph aureus on valve tissue cx, no yeast seen on stain.    On 4/5, he suffered a ventricular tachycardia/fibrillation arrest.  ROSC achieved after one round of CPR and one shock.  Alert and neurologically intact following.   On 4/ 6 back pain again,   MRI lumbar spine done on 4/ 7 :   Interval development of en bloc abnormal T2 signal hyperintensity  and abnormal contrast enhancement involving the lower L5 vertebral  body, S1 vertebral body and right sacral ala and anterior epidural  space from mid L4 down to mid S2 worrisome for an infectious process  such as osteomyelitis with epidural extension of infection     Recommendations:  1. Follow blood cxs . 4/3 1 blood cx + on 4/5 , follow up since then have been negative.   2. PICC/pacemaker being considered given arrythmia event. Hold if non urgent until blood cxs are persistently negative. . Blood cultures are now negative for 5days   3. Given new prosthetic valve and positive blood cultures gentamicin added ( synergy dosing ) till he clears the blood cultures,  this was stopped on 4/ 10, creat jumped from 0.9 to 1.31 and now blood cultures are clear.   4. Given findings of the spine MRI on nafcillin.   5. Follow clinical status and labs    Sreedhar Bell MD    Interval History   Resting, overall feels discouraged after cardiac event. Complains of pain at chest tube sites and back pain    Physical Exam   Temp: 98.5  F (36.9  C) Temp src: Oral BP: (!) 151/99 Pulse: 78   Resp: 22 SpO2: 97 % O2 Device: None (Room air)    Vitals:    04/10/22 0400 04/10/22 1627 04/11/22 0500   Weight: 114.9 kg (253 lb 4.9 oz) 115.8 kg (255 lb 6.4 oz) 114.9 kg (253 lb 3.2 oz)     Vital Signs with Ranges  Temp:  [97.9  F (36.6  C)-99.2  F (37.3  C)] 98.5  F (36.9  C)  Pulse:  [67-93] 78  Resp:  [8-28] 22  BP: (114-165)/(50-99) 151/99  SpO2:  [97 %-100 %] 97 %    Constitutional: Awake, alert, cooperative, no apparent distress  Lungs: Clear to auscultation bilaterally, no crackles or wheezing  Cardiovascular: S1S2, sternal surgical site intact, mediastinal chest tubes in place  Abdomen:  soft,non-tender  Skin: No rash    Other:    Medications     dextrose       BETA BLOCKER NOT PRESCRIBED       sodium chloride Stopped (04/10/22 0800)       aspirin  81 mg Oral or NG Tube Daily     buPROPion  300 mg Oral QAM     diclofenac  4 g Topical 4x Daily     gabapentin  100 mg Oral TID     heparin ANTICOAGULANT  5,000 Units Subcutaneous Q8H     insulin aspart  1-10 Units Subcutaneous TID AC     insulin aspart  1-7 Units Subcutaneous At Bedtime     insulin aspart   Subcutaneous Daily with breakfast     insulin aspart   Subcutaneous Daily with lunch     insulin aspart   Subcutaneous Daily with supper     insulin glargine  10 Units Subcutaneous At Bedtime     insulin glargine  40 Units Subcutaneous QAM AC     levothyroxine  125 mcg Oral Once per day on Mon Tue Wed Thu Fri Sat     levothyroxine  250 mcg Oral Weekly     lidocaine  2 patch Transdermal Q24H     lidocaine   Transdermal Q8H     methocarbamol  1,000 mg  Oral Q6H     nafcillin  2 g Intravenous Q4H     pantoprazole  40 mg Oral Daily     polyethylene glycol  17 g Oral BID     senna-docusate  1 tablet Oral BID       Data   All microbiology laboratory data reviewed.  Recent Labs   Lab Test 04/11/22  0540 04/10/22  0530 04/09/22  0450   WBC 8.9 8.5 9.5   HGB 8.4* 8.3* 7.8*   HCT 27.4* 27.5* 25.6*   MCV 96 95 93   * 571* 568*     Recent Labs   Lab Test 04/11/22  0540 04/10/22  0530 04/08/22  0626   CR 1.29* 1.31* 0.99

## 2022-04-11 NOTE — PLAN OF CARE
Alert and oriented x4. Vital signs stable on RA. Up independent in room. Tolerating diet. Lungs sounds diminished. BS+. BM-. Voiding adequately. Sternal incision with liquid bandage and old CT site, NGUYEN. Pain controlled with Oxycodone, Tylenol and Robaxin. Denies nausea. Patient was in 2nd degree AVB earlier this shift. EKG was done this AM showed complete heart block. CV surgery was notified, advised transfer to ICU.   0600 Patient was transferred to ICU. Report given to MARNIE Preciado.

## 2022-04-12 ENCOUNTER — APPOINTMENT (OUTPATIENT)
Dept: OCCUPATIONAL THERAPY | Facility: CLINIC | Age: 37
DRG: 853 | End: 2022-04-12
Attending: HOSPITALIST
Payer: COMMERCIAL

## 2022-04-12 LAB
ANION GAP SERPL CALCULATED.3IONS-SCNC: 3 MMOL/L (ref 3–14)
BACTERIA BLD CULT: NO GROWTH
BACTERIA BLD CULT: NO GROWTH
BUN SERPL-MCNC: 20 MG/DL (ref 7–30)
CALCIUM SERPL-MCNC: 8.6 MG/DL (ref 8.5–10.1)
CHLORIDE BLD-SCNC: 108 MMOL/L (ref 94–109)
CO2 SERPL-SCNC: 28 MMOL/L (ref 20–32)
CREAT SERPL-MCNC: 1.41 MG/DL (ref 0.66–1.25)
CREAT SERPL-MCNC: 1.54 MG/DL (ref 0.66–1.25)
ERYTHROCYTE [DISTWIDTH] IN BLOOD BY AUTOMATED COUNT: 16.5 % (ref 10–15)
GFR SERPL CREATININE-BSD FRML MDRD: 60 ML/MIN/1.73M2
GFR SERPL CREATININE-BSD FRML MDRD: 67 ML/MIN/1.73M2
GLUCOSE BLD-MCNC: 113 MG/DL (ref 70–99)
GLUCOSE BLDC GLUCOMTR-MCNC: 104 MG/DL (ref 70–99)
GLUCOSE BLDC GLUCOMTR-MCNC: 107 MG/DL (ref 70–99)
GLUCOSE BLDC GLUCOMTR-MCNC: 127 MG/DL (ref 70–99)
GLUCOSE BLDC GLUCOMTR-MCNC: 174 MG/DL (ref 70–99)
GLUCOSE BLDC GLUCOMTR-MCNC: 219 MG/DL (ref 70–99)
GLUCOSE BLDC GLUCOMTR-MCNC: 82 MG/DL (ref 70–99)
HCT VFR BLD AUTO: 25.1 % (ref 40–53)
HGB BLD-MCNC: 7.4 G/DL (ref 13.3–17.7)
MAGNESIUM SERPL-MCNC: 2.5 MG/DL (ref 1.6–2.3)
MCH RBC QN AUTO: 28.8 PG (ref 26.5–33)
MCHC RBC AUTO-ENTMCNC: 29.5 G/DL (ref 31.5–36.5)
MCV RBC AUTO: 98 FL (ref 78–100)
PHOSPHATE SERPL-MCNC: 5.8 MG/DL (ref 2.5–4.5)
PLATELET # BLD AUTO: 480 10E3/UL (ref 150–450)
POTASSIUM BLD-SCNC: 4 MMOL/L (ref 3.4–5.3)
POTASSIUM BLD-SCNC: 4.1 MMOL/L (ref 3.4–5.3)
RBC # BLD AUTO: 2.57 10E6/UL (ref 4.4–5.9)
SODIUM SERPL-SCNC: 139 MMOL/L (ref 133–144)
WBC # BLD AUTO: 9.8 10E3/UL (ref 4–11)

## 2022-04-12 PROCEDURE — 80048 BASIC METABOLIC PNL TOTAL CA: CPT | Performed by: PHYSICIAN ASSISTANT

## 2022-04-12 PROCEDURE — 272N000001 HC OR GENERAL SUPPLY STERILE: Performed by: INTERNAL MEDICINE

## 2022-04-12 PROCEDURE — 0JH608Z INSERTION OF DEFIBRILLATOR GENERATOR INTO CHEST SUBCUTANEOUS TISSUE AND FASCIA, OPEN APPROACH: ICD-10-PCS | Performed by: INTERNAL MEDICINE

## 2022-04-12 PROCEDURE — 99153 MOD SED SAME PHYS/QHP EA: CPT | Performed by: INTERNAL MEDICINE

## 2022-04-12 PROCEDURE — 99233 SBSQ HOSP IP/OBS HIGH 50: CPT | Performed by: HOSPITALIST

## 2022-04-12 PROCEDURE — C1894 INTRO/SHEATH, NON-LASER: HCPCS | Performed by: INTERNAL MEDICINE

## 2022-04-12 PROCEDURE — 85027 COMPLETE CBC AUTOMATED: CPT | Performed by: PHYSICIAN ASSISTANT

## 2022-04-12 PROCEDURE — 250N000013 HC RX MED GY IP 250 OP 250 PS 637: Performed by: PHYSICIAN ASSISTANT

## 2022-04-12 PROCEDURE — 84100 ASSAY OF PHOSPHORUS: CPT | Performed by: PHYSICIAN ASSISTANT

## 2022-04-12 PROCEDURE — 250N000009 HC RX 250: Performed by: PHYSICIAN ASSISTANT

## 2022-04-12 PROCEDURE — C1721 AICD, DUAL CHAMBER: HCPCS | Performed by: INTERNAL MEDICINE

## 2022-04-12 PROCEDURE — 250N000011 HC RX IP 250 OP 636: Performed by: INTERNAL MEDICINE

## 2022-04-12 PROCEDURE — 36415 COLL VENOUS BLD VENIPUNCTURE: CPT | Performed by: PHYSICIAN ASSISTANT

## 2022-04-12 PROCEDURE — C1898 LEAD, PMKR, OTHER THAN TRANS: HCPCS | Performed by: INTERNAL MEDICINE

## 2022-04-12 PROCEDURE — 258N000002 HC RX IP 258 OP 250: Performed by: INTERNAL MEDICINE

## 2022-04-12 PROCEDURE — 210N000001 HC R&B IMCU HEART CARE

## 2022-04-12 PROCEDURE — 82565 ASSAY OF CREATININE: CPT | Performed by: PHYSICIAN ASSISTANT

## 2022-04-12 PROCEDURE — C1895 LEAD, AICD, ENDO DUAL COIL: HCPCS | Performed by: INTERNAL MEDICINE

## 2022-04-12 PROCEDURE — 80048 BASIC METABOLIC PNL TOTAL CA: CPT | Performed by: HOSPITALIST

## 2022-04-12 PROCEDURE — 99152 MOD SED SAME PHYS/QHP 5/>YRS: CPT | Performed by: INTERNAL MEDICINE

## 2022-04-12 PROCEDURE — 99233 SBSQ HOSP IP/OBS HIGH 50: CPT | Mod: 24 | Performed by: INTERNAL MEDICINE

## 2022-04-12 PROCEDURE — 250N000009 HC RX 250: Performed by: INTERNAL MEDICINE

## 2022-04-12 PROCEDURE — 0JH63FZ INSERTION OF SUBCUTANEOUS DEFIBRILLATOR LEAD INTO CHEST SUBCUTANEOUS TISSUE AND FASCIA, PERCUTANEOUS APPROACH: ICD-10-PCS | Performed by: INTERNAL MEDICINE

## 2022-04-12 PROCEDURE — 97110 THERAPEUTIC EXERCISES: CPT | Mod: GO | Performed by: OCCUPATIONAL THERAPIST

## 2022-04-12 PROCEDURE — 33249 INSJ/RPLCMT DEFIB W/LEAD(S): CPT | Performed by: INTERNAL MEDICINE

## 2022-04-12 PROCEDURE — 83735 ASSAY OF MAGNESIUM: CPT | Performed by: PHYSICIAN ASSISTANT

## 2022-04-12 PROCEDURE — 250N000013 HC RX MED GY IP 250 OP 250 PS 637: Performed by: INTERNAL MEDICINE

## 2022-04-12 DEVICE — ICD RESONATE EL DR DF4 D433: Type: IMPLANTABLE DEVICE | Status: FUNCTIONAL

## 2022-04-12 DEVICE — LEAD INGEVITY+ AF IS1 7841 52CM: Type: IMPLANTABLE DEVICE | Status: FUNCTIONAL

## 2022-04-12 DEVICE — LEAD RELIANCE ENDOTAK DF4 AF 59CM 0272: Type: IMPLANTABLE DEVICE | Status: FUNCTIONAL

## 2022-04-12 RX ORDER — OXYCODONE HYDROCHLORIDE 5 MG/1
10 TABLET ORAL EVERY 4 HOURS PRN
Status: DISCONTINUED | OUTPATIENT
Start: 2022-04-12 | End: 2022-04-18 | Stop reason: HOSPADM

## 2022-04-12 RX ORDER — SODIUM CHLORIDE 450 MG/100ML
INJECTION, SOLUTION INTRAVENOUS CONTINUOUS
Status: DISCONTINUED | OUTPATIENT
Start: 2022-04-12 | End: 2022-04-12 | Stop reason: HOSPADM

## 2022-04-12 RX ORDER — LOSARTAN POTASSIUM 50 MG/1
50 TABLET ORAL DAILY
Status: DISCONTINUED | OUTPATIENT
Start: 2022-04-13 | End: 2022-04-12

## 2022-04-12 RX ORDER — FENTANYL CITRATE 50 UG/ML
INJECTION, SOLUTION INTRAMUSCULAR; INTRAVENOUS
Status: DISCONTINUED | OUTPATIENT
Start: 2022-04-12 | End: 2022-04-12 | Stop reason: HOSPADM

## 2022-04-12 RX ORDER — AMLODIPINE BESYLATE 10 MG/1
10 TABLET ORAL DAILY
Status: DISCONTINUED | OUTPATIENT
Start: 2022-04-12 | End: 2022-04-18 | Stop reason: HOSPADM

## 2022-04-12 RX ORDER — OXYCODONE HYDROCHLORIDE 5 MG/1
5 TABLET ORAL EVERY 4 HOURS PRN
Status: DISCONTINUED | OUTPATIENT
Start: 2022-04-12 | End: 2022-04-18 | Stop reason: HOSPADM

## 2022-04-12 RX ORDER — LIDOCAINE 40 MG/G
CREAM TOPICAL
Status: DISCONTINUED | OUTPATIENT
Start: 2022-04-12 | End: 2022-04-12

## 2022-04-12 RX ORDER — HEPARIN SODIUM 5000 [USP'U]/.5ML
5000 INJECTION, SOLUTION INTRAVENOUS; SUBCUTANEOUS
Status: DISCONTINUED | OUTPATIENT
Start: 2022-04-12 | End: 2022-04-17

## 2022-04-12 RX ORDER — LIDOCAINE 40 MG/G
CREAM TOPICAL
Status: DISCONTINUED | OUTPATIENT
Start: 2022-04-12 | End: 2022-04-12 | Stop reason: HOSPADM

## 2022-04-12 RX ORDER — HEPARIN SODIUM 200 [USP'U]/100ML
100-600 INJECTION, SOLUTION INTRAVENOUS CONTINUOUS PRN
Status: DISCONTINUED | OUTPATIENT
Start: 2022-04-12 | End: 2022-04-12 | Stop reason: HOSPADM

## 2022-04-12 RX ORDER — FERROUS GLUCONATE 324(38)MG
324 TABLET ORAL
Status: DISCONTINUED | OUTPATIENT
Start: 2022-04-12 | End: 2022-04-18 | Stop reason: HOSPADM

## 2022-04-12 RX ORDER — ACETAMINOPHEN 325 MG/1
650 TABLET ORAL EVERY 4 HOURS PRN
Status: DISCONTINUED | OUTPATIENT
Start: 2022-04-12 | End: 2022-04-18 | Stop reason: HOSPADM

## 2022-04-12 RX ORDER — MIDAZOLAM HCL IN 0.9 % NACL/PF 1 MG/ML
.5-6 PLASTIC BAG, INJECTION (ML) INTRAVENOUS CONTINUOUS PRN
Status: DISCONTINUED | OUTPATIENT
Start: 2022-04-12 | End: 2022-04-12 | Stop reason: HOSPADM

## 2022-04-12 RX ORDER — LOSARTAN POTASSIUM 25 MG/1
25 TABLET ORAL ONCE
Status: DISCONTINUED | OUTPATIENT
Start: 2022-04-12 | End: 2022-04-12

## 2022-04-12 RX ORDER — SODIUM CHLORIDE 450 MG/100ML
INJECTION, SOLUTION INTRAVENOUS CONTINUOUS
Status: DISCONTINUED | OUTPATIENT
Start: 2022-04-12 | End: 2022-04-12

## 2022-04-12 RX ORDER — LOSARTAN POTASSIUM 25 MG/1
25 TABLET ORAL DAILY
Status: DISCONTINUED | OUTPATIENT
Start: 2022-04-12 | End: 2022-04-12

## 2022-04-12 RX ORDER — OXYCODONE AND ACETAMINOPHEN 5; 325 MG/1; MG/1
1 TABLET ORAL EVERY 4 HOURS PRN
Status: DISCONTINUED | OUTPATIENT
Start: 2022-04-12 | End: 2022-04-12

## 2022-04-12 RX ORDER — HEPARIN SODIUM 200 [USP'U]/100ML
100-500 INJECTION, SOLUTION INTRAVENOUS CONTINUOUS PRN
Status: DISCONTINUED | OUTPATIENT
Start: 2022-04-12 | End: 2022-04-12 | Stop reason: HOSPADM

## 2022-04-12 RX ORDER — BUPIVACAINE HYDROCHLORIDE 2.5 MG/ML
INJECTION, SOLUTION EPIDURAL; INFILTRATION; INTRACAUDAL
Status: DISCONTINUED | OUTPATIENT
Start: 2022-04-12 | End: 2022-04-12 | Stop reason: HOSPADM

## 2022-04-12 RX ORDER — CEFAZOLIN SODIUM 1 G/3ML
1 INJECTION, POWDER, FOR SOLUTION INTRAMUSCULAR; INTRAVENOUS
Status: DISCONTINUED | OUTPATIENT
Start: 2022-04-12 | End: 2022-04-12 | Stop reason: HOSPADM

## 2022-04-12 RX ADMIN — DICLOFENAC SODIUM 4 G: 10 GEL TOPICAL at 15:07

## 2022-04-12 RX ADMIN — OXYCODONE HYDROCHLORIDE 10 MG: 5 TABLET ORAL at 15:05

## 2022-04-12 RX ADMIN — FERROUS GLUCONATE 324 MG: 324 TABLET ORAL at 11:01

## 2022-04-12 RX ADMIN — NAFCILLIN 2 G: 2 POWDER, FOR SOLUTION INTRAMUSCULAR; INTRAVENOUS at 23:25

## 2022-04-12 RX ADMIN — PANTOPRAZOLE SODIUM 40 MG: 40 TABLET, DELAYED RELEASE ORAL at 08:10

## 2022-04-12 RX ADMIN — LEVOTHYROXINE SODIUM 125 MCG: 125 TABLET ORAL at 06:11

## 2022-04-12 RX ADMIN — NAFCILLIN 2 G: 2 POWDER, FOR SOLUTION INTRAMUSCULAR; INTRAVENOUS at 15:08

## 2022-04-12 RX ADMIN — DICLOFENAC SODIUM 4 G: 10 GEL TOPICAL at 19:00

## 2022-04-12 RX ADMIN — LIDOCAINE 2 PATCH: 560 PATCH PERCUTANEOUS; TOPICAL; TRANSDERMAL at 08:09

## 2022-04-12 RX ADMIN — ACETAMINOPHEN 650 MG: 325 TABLET, FILM COATED ORAL at 15:04

## 2022-04-12 RX ADMIN — NAFCILLIN 2 G: 2 POWDER, FOR SOLUTION INTRAMUSCULAR; INTRAVENOUS at 20:05

## 2022-04-12 RX ADMIN — ASPIRIN 81 MG CHEWABLE TABLET 81 MG: 81 TABLET CHEWABLE at 08:10

## 2022-04-12 RX ADMIN — SENNOSIDES AND DOCUSATE SODIUM 1 TABLET: 50; 8.6 TABLET ORAL at 21:14

## 2022-04-12 RX ADMIN — ACETAMINOPHEN 650 MG: 325 TABLET ORAL at 19:20

## 2022-04-12 RX ADMIN — MELATONIN TAB 3 MG 3 MG: 3 TAB at 21:14

## 2022-04-12 RX ADMIN — LOSARTAN POTASSIUM 25 MG: 25 TABLET, FILM COATED ORAL at 08:10

## 2022-04-12 RX ADMIN — SODIUM CHLORIDE: 4.5 INJECTION, SOLUTION INTRAVENOUS at 08:17

## 2022-04-12 RX ADMIN — METHOCARBAMOL 1000 MG: 500 TABLET, FILM COATED ORAL at 15:05

## 2022-04-12 RX ADMIN — GABAPENTIN 100 MG: 100 CAPSULE ORAL at 21:14

## 2022-04-12 RX ADMIN — METHOCARBAMOL 1000 MG: 500 TABLET, FILM COATED ORAL at 11:01

## 2022-04-12 RX ADMIN — METHOCARBAMOL 1000 MG: 500 TABLET, FILM COATED ORAL at 21:14

## 2022-04-12 RX ADMIN — GABAPENTIN 100 MG: 100 CAPSULE ORAL at 08:10

## 2022-04-12 RX ADMIN — GABAPENTIN 100 MG: 100 CAPSULE ORAL at 15:05

## 2022-04-12 RX ADMIN — AMLODIPINE BESYLATE 10 MG: 10 TABLET ORAL at 11:01

## 2022-04-12 RX ADMIN — ACETAMINOPHEN 650 MG: 325 TABLET ORAL at 23:26

## 2022-04-12 RX ADMIN — ACETAMINOPHEN 650 MG: 325 TABLET, FILM COATED ORAL at 04:08

## 2022-04-12 RX ADMIN — SENNOSIDES AND DOCUSATE SODIUM 1 TABLET: 50; 8.6 TABLET ORAL at 08:10

## 2022-04-12 RX ADMIN — BUPROPION HYDROCHLORIDE 300 MG: 300 TABLET, EXTENDED RELEASE ORAL at 08:10

## 2022-04-12 RX ADMIN — NAFCILLIN 2 G: 2 POWDER, FOR SOLUTION INTRAMUSCULAR; INTRAVENOUS at 06:12

## 2022-04-12 RX ADMIN — DICLOFENAC SODIUM 4 G: 10 GEL TOPICAL at 08:17

## 2022-04-12 RX ADMIN — NAFCILLIN 2 G: 2 POWDER, FOR SOLUTION INTRAMUSCULAR; INTRAVENOUS at 01:58

## 2022-04-12 RX ADMIN — NAFCILLIN 2 G: 2 POWDER, FOR SOLUTION INTRAMUSCULAR; INTRAVENOUS at 11:01

## 2022-04-12 RX ADMIN — METHOCARBAMOL 1000 MG: 500 TABLET, FILM COATED ORAL at 04:08

## 2022-04-12 ASSESSMENT — ACTIVITIES OF DAILY LIVING (ADL)
ADLS_ACUITY_SCORE: 3

## 2022-04-12 NOTE — PROGRESS NOTES
[facility]    Hospitalist Progress Note    Assessment & Plan      Assessment & Plan:   Anil Montoya is a 36 year old male with hx of DM2, HTN, WARNER, and anxiety who was initially hospitalized at [facility] on 3/22/2022 for severe sepsis initially attributed to pneumonia vs viral infection. He was found to have MSSA bacteremia with ongoing positive cultures. He underwent a TEJINDER on 3/27/2022 which revealed tricuspid endocarditis, and he was transferred to [facility] on 3/28/2022 for CV Surgery consultation.     Sepsis with MSSA bacteremia due to tricuspid valve endocarditis.  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 He was initially treated with ceftriaxone and azithromycin for possible community-acquired pneumonia, and ID was consulted. Blood cultures returned positive for MSSA, and serial blood cultures continued to return positive. Broad work-up including CT abd/pelvis, MR lumbar spine, CT dental, and TTE were unrevealing  * Initially treated with IV ceftriaxone and azithromycin; switched to IV vanco on 3/23 when blood cultures returned with Staph; switched to IV cefazolin on 3/24 upon sensitivities  * Underwent TEJINDER on 3/27 which showed thickened tricuspid valve leaflets with atrial aspect of the septal leaflet with an irregular border and small mobile filamentous elements, large fixed heterogeneous mass in the right ventricle attached to the interventricular septum (2.5 x 2 cm) appearing connected to the tricuspid subvalvular annular apparatus with a pedunculated round mobile element (1.8 x 1 cm).  No significant tricuspid regurgitation reported. LVEF 55 to 60%. Negative bubble study.  * Transferred to [facility] on 3/28 for CV Surgery consult  - Patient underwent tricuspid valve replacement with bioprosthetic valve (31 mm epic stented valve)  on 4/1/2022 and endoscopic aortic valve exploration.  - Initially micafungin added on 4/1 as Gram stain from tissue from heart valve from 4/ 1 initially was reported as yeast, which was subsequently corrected as not to be present.  Micafungin now discontinued.  -  given new prosthetic valve and positive blood cultures gentamicin added ( synergy dosing ) until he clears the BCs  - had worsening lower back pain and underwent repeat lumbar mri yesterday(4/7/22) which showed possible osteomyelitis and discitis   MRI read- S1 vertebral body and right sacral ala and anterior epidural space from mid L4 down to mid S2 worrisome for an infectious process such as osteomyelitis with epidural extension of infection. Also L5-S1 discitis is not excluded and continued surveillance is recommended.  - cefazolin was changed to nafcillin for better cns penetration. ID/CT surgery aware.   - Daily blood cultures, last positive was from 4/3  -stopped IV gentamicin 100 mg twice daily IV daily-started on 4/5/2022 and continue nafcillin 2 g every 4 hours-started on 4/7/2022  - ID, Cardiology, CV Surgery following  -Plan on proceeding with ICD with pacemaker placement today afternoon.  Patient is not having resolution of his complete heart block at this time.     V. fib arrest(4/5/2022)  -Patient was noted to have V. fib on telemetry on 4/5/22  -Required brief CPR and 1 shock of 120 J with successful restoration of regular rhythm  -Electrolytes appear to be within normal limits  -Patient transferred back to the intensive care unit for close monitoring  -EP following -> plan on implanting a dual-chamber ICD for secondary prevention, plan for this tomorrow.   Patient continues to remain and third-degree heart bloc with accelerated junctional rhythm . pacemaker with ICD placement today.  -ECHO -> LVEF was normal by echocardiography (technically difficult study)     Post-op complete heart block  -Patient initially was in second-degree AV block,  and now appears to have complete heart block post tricuspid valve replacement  -EP following, despite a good junctional rhythm his complete heart block is not resolving.  Proceeding with the PPM with ICD placement.    -Not needing diuretics currently.     DM2 without complications, long-term insulin use, uncontrolled  * A1c 12.6. Had not been taking his diabetes medications for some time  -transitioned to subcut regimen yesterday with lantus 40 am and 10 pm with sliding scale aspart and 1:10 carb count . Goal for bs<180 to facilitate infection control   -Prior to surgery he was requiring Lantus 34 units twice a day and mealtime coverage of 1: 10.     Acute postop blood loss anemia  -Hemoglobin preop was around 12 which had slowly trended down.  Hemoglobin dropped to 7.3 on 4/4, most likely acute blood loss from surgery.    -Hemoglobin is at 7.4 this morning   -Continue to monitor and transfuse as needed.  -Has thrombocytosis likely secondary to infection and acute phase reactant   -Iron studies are suggestive of iron deficiency.  Started on oral ferrous gluconate.    Acute low back pain  * Noted while at [facility]  * MR lumbar spine (3/24) showed mild multilevel lumbar spondylosis, but no evidence of discitis but mri 4/7 showed possible osteomyelitis with discitis.  -Continue pain control as needed with dilaudid , robaxin and oxycodone.  His back pain has significantly improved.  He is only using Tylenol and Robaxin for now.  Is able to get up and walk around with no difficulty today.    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 RUQ US (3/22) showed fatty liver, but normal gallbladder and normal bile ducts  - LFTs improved.      Essential hypertension  Discontinued his ACE inhibitor due to acute renal failure.  No beta-blocker due to his complete heart block.  Started him on amlodipine 10 mg daily.     WARNER  * CPAP per home  settings     Hypothyroidism  * Chronic and stable on levothyroxine     Generalized anxiety disorder  ADHD  * Chronic and stable on bupropion     Hyperphosphatemia and hypermagnesemia with acute renal failure  -Creatinine did worsen today.  Patient had been n.p.o. since yesterday and was not on any IV fluids.  This could have contributed to this.  So far no underlying etiology other than renal failure for hyperphosphatemia.  He did get gentamicin which could have contributed to his renal injury.  He is on gentle fluids right now.  We will continue to monitor closely.  He is on low phosphate diet.  Holding his ACE inhibitor.    DVT Prophylaxis: Heparin SQ  Code Status: Full Code    Disposition: Expected discharge when medically stable     Esther Damon MD  [phone](p)  [phone]( c)    Interval History   Patient awake and alert.  Feeling much better today from a pain standpoint.  Is much more active and is up and walking around.  Plan on proceeding with ICD with PPM placement today.  Continue with cardiac monitoring..    -Data reviewed today: I reviewed all new labs and imaging results over the last 24 hours. I personally reviewed the mri lumbar spine image(s) showing possible ostemyelitis and discitis.    Physical Exam   Temp: 98.3  F (36.8  C) Temp src: Oral BP: (!) 157/88 Pulse: 90   Resp: 10 SpO2: 98 % O2 Device: None (Room air)    Vitals:    04/10/22 1627 04/11/22 0500 04/12/22 0609   Weight: 115.8 kg (255 lb 6.4 oz) 114.9 kg (253 lb 3.2 oz) 114.1 kg (251 lb 9.6 oz)     Vital Signs with Ranges  Temp:  [98.1  F (36.7  C)-98.8  F (37.1  C)] 98.3  F (36.8  C)  Pulse:  [63-90] 90  Resp:  [10-29] 10  BP: (114-160)/(59-95) 157/88  SpO2:  [95 %-100 %] 98 %  I/O last 3 completed shifts:  In: 1380 [P.O.:1080; I.V.:300]  Out: 1125 [Urine:1125]    Physical Exam  Constitutional:       General: He is not in acute distress.     Appearance: He is well-developed. He is obese.      Comments: Appears flushed   HENT:       Right Ear: Tympanic membrane and external ear normal.      Left Ear: Tympanic membrane and external ear normal.      Nose: Nose normal.      Mouth/Throat:      Mouth: No oral lesions.      Pharynx: No oropharyngeal exudate.   Eyes:      General:         Right eye: No discharge.         Left eye: No discharge.      Conjunctiva/sclera: Conjunctivae normal.      Pupils: Pupils are equal, round, and reactive to light.   Neck:      Thyroid: No thyromegaly.      Trachea: No tracheal deviation.   Cardiovascular:      Rate and Rhythm: Normal rate and regular rhythm.      Pulses: Normal pulses.      Heart sounds: Normal heart sounds, S1 normal and S2 normal. No murmur heard.    No S3 or S4 sounds.   Pulmonary:      Effort: Pulmonary effort is normal. No respiratory distress.      Breath sounds: Normal breath sounds. No wheezing or rales.   Abdominal:      General: Bowel sounds are normal.      Palpations: Abdomen is soft. There is no mass.      Tenderness: There is no abdominal tenderness.   Musculoskeletal:         General: No deformity. Normal range of motion.      Cervical back: Neck supple.      Comments: Back pain improved.     Lymphadenopathy:      Cervical: No cervical adenopathy.   Skin:     General: Skin is warm and dry.      Findings: No lesion or rash.   Neurological:      Mental Status: He is alert and oriented to person, place, and time.      Motor: No abnormal muscle tone.      Deep Tendon Reflexes: Reflexes are normal and symmetric.   Psychiatric:         Speech: Speech normal.         Thought Content: Thought content normal.         Judgment: Judgment normal.           Medications     NaCl 30 mL/hr at 04/12/22 0817     dextrose       - MEDICATION INSTRUCTIONS -       - MEDICATION INSTRUCTIONS -       BETA BLOCKER NOT PRESCRIBED       sodium chloride Stopped (04/10/22 0800)       amLODIPine  10 mg Oral Daily     aspirin  81 mg Oral or NG Tube Daily     buPROPion  300 mg Oral QAM     diclofenac  4 g Topical 4x  Daily     ferrous gluconate  324 mg Oral Daily with breakfast     gabapentin  100 mg Oral TID     heparin ANTICOAGULANT  5,000 Units Subcutaneous Q8H     insulin aspart   Subcutaneous TID w/meals     insulin aspart  1-10 Units Subcutaneous TID AC     insulin aspart  1-7 Units Subcutaneous At Bedtime     insulin glargine  10 Units Subcutaneous At Bedtime     [Held by provider] insulin glargine  40 Units Subcutaneous QAM AC     levothyroxine  125 mcg Oral Once per day on Mon Tue Wed Thu Fri Sat     levothyroxine  250 mcg Oral Weekly     lidocaine  2 patch Transdermal Q24H     lidocaine   Transdermal Q8H     methocarbamol  1,000 mg Oral Q6H     nafcillin  2 g Intravenous Q4H     pantoprazole  40 mg Oral Daily     polyethylene glycol  17 g Oral BID     senna-docusate  1 tablet Oral BID     sodium chloride (PF)  3 mL Intracatheter Q8H       Data   Recent Labs   Lab 04/12/22  0814 04/12/22  0559 04/12/22  0209 04/11/22  0929 04/11/22  0540 04/10/22  0809 04/10/22  0530   WBC  --  9.8  --   --  8.9  --  8.5   HGB  --  7.4*  --   --  8.4*  --  8.3*   MCV  --  98  --   --  96  --  95   PLT  --  480*  --   --  540*  --  571*   NA  --  139  --   --  137  --  138   POTASSIUM  --  4.1  4.0  --   --  3.7  --  3.9   CHLORIDE  --  108  --   --  105  --  104   CO2  --  28  --   --  27  --  28   BUN  --  20  --   --  22  --  23   CR  --  1.41*  1.54*  --   --  1.29*  --  1.31*   ANIONGAP  --  3  --   --  5  --  6   IMAN  --  8.6  --   --  8.5  --  8.6   * 113* 127*   < > 104*   < > 101*   ALBUMIN  --   --   --   --  2.1*  --  2.0*   PROTTOTAL  --   --   --   --  7.6  --  7.6   BILITOTAL  --   --   --   --  0.4  --  0.4   ALKPHOS  --   --   --   --  88  --  90   ALT  --   --   --   --  34  --  41   AST  --   --   --   --  18  --  21    < > = values in this interval not displayed.       No results found for this or any previous visit (from the past 24 hour(s)).

## 2022-04-12 NOTE — PRE-PROCEDURE
GENERAL PRE-PROCEDURE:   Procedure:  ICD  Date/Time:  4/12/2022 1:21 PM    Written consent obtained?: Yes    Risks and benefits: Risks, benefits and alternatives were discussed    Consent given by:  Patient  Patient states understanding of procedure being performed: Yes    Patient's understanding of procedure matches consent: Yes    Procedure consent matches procedure scheduled: Yes    Expected level of sedation:  Moderate  Appropriately NPO:  Yes  ASA Class:  3  Mallampati  :  Grade 3- soft palate visible, posterior pharyngeal wall not visible  Lungs:  Lungs clear with good breath sounds bilaterally  Heart:  Normal heart sounds and rate  History & Physical reviewed:  History and physical reviewed and no updates needed  Statement of review:  I have reviewed the lab findings, diagnostic data, medications, and the plan for sedation

## 2022-04-12 NOTE — PROGRESS NOTES
Cardiac Electrophysiology Progress Note          Assessment and Plan:   TV endocarditis with MSSA bacteremia, s/p bioprosthetic TV pod#11 complicated by not unexpected CHB with junctional escape in the 60's. Bacteremia free past 7 days    VF arrest, idiopathic w/o neurological sequelae    Osteomyelitis -     After discussion with [facility] technical team regarding interaction of S-ICD and highly probable pacemaker implantation in a patient who will be pacer-dependent I think it would be best to implant a conventional dual chamber ICD due high risk of inappropriate ICD shock from double counting especially at higher pacing rate which is likely given his young age. This risk is likely to be higher than risk of compromise new TV with ICD lead.    Discussed risks of the procedure including but not limited to vascular injury and infection.                  Interval History:   doing well; no cp, sob, n/v/d, or abd pain.              Review of Systems:   As per subjective, otherwise 5 systems reviewed and negative.           Physical Exam:   Blood pressure (!) 160/95, pulse 71, temperature 98.3  F (36.8  C), temperature source Oral, resp. rate 10, weight 114.1 kg (251 lb 9.6 oz), SpO2 98 %.          Intake/Output Summary (Last 24 hours) at 4/12/2022 0921  Last data filed at 4/11/2022 1800  Gross per 24 hour   Intake 1380 ml   Output 600 ml   Net 780 ml       Constitutional:   NAD   Skin:   Warm and dry   Head:   Nontraumatic   Neck:   Supple, symmetrical, trachea midline, no adenopathy, thyroid symmetric, not enlarged and no tenderness, skin normal   Lungs:   normal   Cardiovascular:   Normal apical impulse, regular rate and rhythm, normal S1 and S2, no S3 or S4, and no murmur noted    Abdomen:   Benign   Extremities and Back:   Symmetric, no curvature, spinous processes are non-tender on palpation, paraspinous muscles are non-tender on palpation, no costal vertebral tenderness   Neurological:   Grossly nonfocal             Medications:       aspirin  81 mg Oral or NG Tube Daily     buPROPion  300 mg Oral QAM     diclofenac  4 g Topical 4x Daily     gabapentin  100 mg Oral TID     heparin ANTICOAGULANT  5,000 Units Subcutaneous Q8H     insulin aspart   Subcutaneous TID w/meals     insulin aspart  1-10 Units Subcutaneous TID AC     insulin aspart  1-7 Units Subcutaneous At Bedtime     insulin glargine  10 Units Subcutaneous At Bedtime     insulin glargine  40 Units Subcutaneous QAM AC     levothyroxine  125 mcg Oral Once per day on Mon Tue Wed Thu Fri Sat     levothyroxine  250 mcg Oral Weekly     lidocaine  2 patch Transdermal Q24H     lidocaine   Transdermal Q8H     losartan  25 mg Oral Daily     methocarbamol  1,000 mg Oral Q6H     nafcillin  2 g Intravenous Q4H     pantoprazole  40 mg Oral Daily     polyethylene glycol  17 g Oral BID     senna-docusate  1 tablet Oral BID     sodium chloride (PF)  3 mL Intracatheter Q8H     No results displayed because visit has over 200 results.                     Srikanth Monge MD

## 2022-04-12 NOTE — PLAN OF CARE
Vitals: HTN - started on amlodipine today.   Lungs: WNL  CV: Complete heart block v acc junctional with occasional V pacing and atrial pacing at times.   GI: WNL, obese   Uro: WNL  CMS: WNL trace edema v body habitus   Neuro: no deficits noted   Skin: Chest scar NGUYEN and scabbing, WNL. Pressure dressing to pacer site dry and intact.   Psych:  WNL  MSK: SBA post procedure, otherwise Indp  Pain: Constant back pain but improving. Mild discomfort to pacer site. PRN Tylenol and Oxycodone with scheduled cream and robaxin given.   Labs: Crt 1.54- was NPO most of yesterday without fluids. Monitoring. Hb 7.4- stable, monitoring, started on ferrous gluconate.   Tests/Procedures: PPM placed. DDI 55   Other: CV Surgery, Spine, EP, ID, and Nephrology following.

## 2022-04-12 NOTE — PROGRESS NOTES
Dictated.    Successful dual-chamber ICD implantation ([employer]).  A single coil lead crosses the bioprosthetic tricuspid valve.    The device generator was placed relatively medially to allow the patient to shoot a rifle left-handed, as per his request.    The patient's native accelerated junctional rhythm at 80 bpm slowed down significantly with sedation.  Pacing mode currently programmed DDI 55 ppm.      EBL 15 mL.    No apparent complication.      Plan:  -Routine post device care  -Chest x-ray and device interrogation in the a.m.

## 2022-04-12 NOTE — PROGRESS NOTES
[facility]  Cardiovascular and Thoracic Surgery Daily Note          Assessment and Plan:   Anil Montoya is a 36 year old gentleman who presented to [facility] ED on 3/22/22 with weakness, recent hematuria and shortness of breath. Workup demonstrated MSSA bacteremia likely secondary to right groin skin source. TEJINDER on 3/27/22 demonstrated tricuspid valve endocarditis. In completing full work up, there was concern for discitis with seeding however MRI negative. He was transferred on 3/28/22 to [facility] for consideration of surgical intervention.      PMH includes: Poorly controlled diabetic, HTN, hypothyroidism, obesity, WARNER, anxiety, cryptococcal pneumonia in 2012 with previous history of staph aureus bacteremia.     Most recent echo demonstrates LVEF 55-60%, normal RV function, mildly thickened cusps on aortic valve and thickened tricuspid valve leaflets, small mobile filamentous elements on the septal leaflet of TV and larged fixed heterogeneous mass in the RV attached to the interventricular septum.  Coronary angiogram 3/31/22 with no e/o CAD.     Course c/b CHB with accelerated junctional rhythm and polymorphic VT arrest on 4/5 with immediate ROSC after defib x1 and transient CPR.        POD #11 s/p:  1. Tricuspid valve replacement (31mm Epic stented valve)  2. Endoscopic aortic valve exploration  3. Transesophageal echocardiogram on 4/1/22 with Dr. 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 Did not need intubation and neurologically intact. Electrolytes stable Appreciate EP's involvement. Troponin elevated as expected after event.  Echocardiogram on 4/5 stable but poor images to evaluate tricuspid gradient  [PTA meds on hold: losartan 50 mg daily]  HD stable overnight in  accelerated junctional rhythm  - ASA 81 mg daily  - Defer BB given heart block  - Losartan 25 mg daily and up titrate PRN  - No statin indicated  - EP, appreciate recommendations, planning for traditional ICD/PPM due to ongoing CHB/escape junctional rhythm.   - CTs/wires removed 4/7, discussed with EP     -ORTHO/Neuro: Having new lower back pain-resolved, MRI 4/7 showing concern for osteomyelitis involving epidural extension, no intervention at this time, pain control may be bigger issue, repeat MRI spine prior to discontinuation of abx, voltaren ordered for back     Resp:   WARNER  History of cryptococcal pneumonia  Extubated on POD 0, now saturating well on room air  - Continue pulm hygiene efforts: IS/flutter valve/mobility  - Titrate O2 to maintain sats >92%, on RA  - CPAP at HS     Neuro/MSK:    Acute postoperative pain  MDD/VIVEK  - Better controlled with current regimen this AM, received toradol x1  - Sore after a few compressions with CPR but overall tolerable  - Robaxin scheduled, lidocaine patches added  - PTA Wellbutrin resumed  - Sternal incision and area intact and feels stable, wires intact on CXR, continue sternal precautions     Renal/Electrolyte:   Creat stable, below preoperative weight, has less edema  Autodiuresing  - Strict I/O, daily weights  - Avoid/limit nephrotoxins as able, Crea rising appreciate Dr. Meeks seeing patient, Losartan stopped, amlodipine started  - Replete lytes per protocol  - 7 kg below pre-op weight, holding diuretics     GI/Nutrition:    Mildly elevated LFTs  - Tolerating current diet:  Orders Placed This Encounter      Moderate Consistent Carb (60 g CHO per Meal) Diet   - Continue bowel regimen, + BM, + flatus  - LFTs had normalized, AST slightly elevated after arrest, trend  - PPI     :    - Voiding well on own     Endo:  Stress induced hyperglycemia   Poorly controlled DM2  Hypothyroidism  Preop A1c             Lab Results   Component Value Date     A1C 12.6 03/27/2022     [PTA meds on hold: Victoza and metformin]  - Was on insulin infusion, transitioned to sliding scale insulin and lantus 4/2, hyperglycemic, resumed insulin infusion POD 2 with prandial coverage. Continue close monitoring of blood sugars to allow for healing  - Hospitalists managing diabetic regimen, appreciate great management, currently lantus 40 q am, 10 at bedtime with high sliding scale  - Goal BG <180 for optimal healing  - PTA levothyroxine resumed     ID:  Stress induced leukocytosis, resolved  Tricuspid valve endocarditis  MSSA bacteremia  History cryptococcal pneumonia  WBC 9.8<8.9<8.5<9.5<10.1<11.7<10, Temp (24hrs), Av.4  F (36.9  C), Min:98.1  F (36.7  C), Max:98.8  F (37.1  C); no s/sx infection having new fevers, MRI  concerning for osteomyelitis, consult to ortho/neuro for any further recs-trying to exhaust all resources.   Prelim intraop gram stain positive for yeast from tricuspid valve, culture with 1+ staph aureus, no yeast, continue to follow  BC 4/3 positive for staph aureus, continue to follow  BC / with NGTD thus far  Fungal BC sent on  with NGTD  Full skin inspection  with no new wounds  - Completing perioperative ABX  - Continue Cefazolin per ID, Gentamycin added  for new positive culture  - Micafungin started  per ID for positive yeast on gram stain, discontinued with negative cx on 4/3  - HIV antigen/antibody test negative  - Per ID: repeat blood cultures x 2 sets daily until clear  - Continue to follow fever curve, WBC and inflammatory markers as appropriate     Heme:  Acute blood loss anemia  Acute blood loss thrombocytopenia  Hgb 7.4<8.4<8.3<7.8, Plts 480<571; no s/sx active bleeding  - CBC in AM  - Goal Hgb >7   -Proph: PPI, subcutaneous heparin, SCDs  -Dispo: CCU, continue therapies, pulm hygiene      Seen and discussed with Dr. Melton             Interval History:   Sitting up in bed, had shower yesterday!, breathing stable, NPO for ICD this am, no fevers,  feeling better         Medications:       amLODIPine  10 mg Oral Daily     aspirin  81 mg Oral or NG Tube Daily     buPROPion  300 mg Oral QAM     diclofenac  4 g Topical 4x Daily     ferrous gluconate  324 mg Oral Daily with breakfast     gabapentin  100 mg Oral TID     heparin ANTICOAGULANT  5,000 Units Subcutaneous Q8H     insulin aspart   Subcutaneous TID w/meals     insulin aspart  1-10 Units Subcutaneous TID AC     insulin aspart  1-7 Units Subcutaneous At Bedtime     insulin glargine  10 Units Subcutaneous At Bedtime     [Held by provider] insulin glargine  40 Units Subcutaneous QAM AC     levothyroxine  125 mcg Oral Once per day on Mon Tue Wed Thu Fri Sat     levothyroxine  250 mcg Oral Weekly     lidocaine  2 patch Transdermal Q24H     lidocaine   Transdermal Q8H     methocarbamol  1,000 mg Oral Q6H     nafcillin  2 g Intravenous Q4H     pantoprazole  40 mg Oral Daily     polyethylene glycol  17 g Oral BID     senna-docusate  1 tablet Oral BID     sodium chloride (PF)  3 mL Intracatheter Q8H     @MEDSPRN-@          Physical Exam:   Vitals were reviewed  Blood pressure (!) 160/95, pulse 71, temperature 98.3  F (36.8  C), temperature source Oral, resp. rate 10, weight 114.1 kg (251 lb 9.6 oz), SpO2 98 %.  Rhythm: CHB/Escape junctional    Lungs: course    Cardiovascular: s1/s2, no m/r/g    Abdomen: s/nt/nd    Extremeties: no LE edema    Incision: cdi    CT: na    Weight:   Vitals:    04/09/22 0600 04/10/22 0400 04/10/22 1627 04/11/22 0500   Weight: 122.1 kg (269 lb 2.9 oz) 114.9 kg (253 lb 4.9 oz) 115.8 kg (255 lb 6.4 oz) 114.9 kg (253 lb 3.2 oz)    04/12/22 0609   Weight: 114.1 kg (251 lb 9.6 oz)            Data:   Labs:   Lab Results   Component Value Date    WBC 9.8 04/12/2022    WBC 6.8 11/24/2017     Lab Results   Component Value Date    RBC 2.57 04/12/2022    RBC 5.16 11/24/2017     Lab Results   Component Value Date    HGB 7.4 04/12/2022    HGB 15.2 11/24/2017     Lab Results   Component Value Date     HCT 25.1 04/12/2022    HCT 43.7 11/24/2017     No components found for: MCT  Lab Results   Component Value Date    MCV 98 04/12/2022    MCV 85 11/24/2017     Lab Results   Component Value Date    MCH 28.8 04/12/2022    MCH 29.5 11/24/2017     Lab Results   Component Value Date    MCHC 29.5 04/12/2022    MCHC 34.8 11/24/2017     Lab Results   Component Value Date    RDW 16.5 04/12/2022    RDW 12.9 11/24/2017     Lab Results   Component Value Date     04/12/2022     11/24/2017       Last Basic Metabolic Panel:  Lab Results   Component Value Date     04/11/2022     11/24/2017      Lab Results   Component Value Date    POTASSIUM 4.0 04/12/2022    POTASSIUM 3.8 11/24/2017     Lab Results   Component Value Date    CHLORIDE 105 04/11/2022    CHLORIDE 104 11/24/2017     Lab Results   Component Value Date    IMAN 8.5 04/11/2022    IMAN 8.3 11/24/2017     Lab Results   Component Value Date    CO2 27 04/11/2022    CO2 24 11/24/2017     Lab Results   Component Value Date    BUN 22 04/11/2022    BUN 18 11/24/2017     Lab Results   Component Value Date    CR 1.54 04/12/2022    CR 1.07 11/24/2017     Lab Results   Component Value Date     04/12/2022     04/11/2022     11/24/2017         Lety Perez PA-C  Pager #: [phone]

## 2022-04-12 NOTE — PROGRESS NOTES
[facility]    Infectious Disease Progress Note    Date of Service : 04/12/2022     Assessment:  36YM with uncontrolled diabetes and HbA1c of >12%, and prior cryptococcal pneumonia 10 years ago without HIV diagnosis, who is hospitalized with high grade prolonged MSSA bacteremia since 3/22 with tricuspid valve endocarditis. Possible secondary seeding of the lumbar spine initial MRI was negative and back pain was  Gone but then started complaining of back pain again on 4/ 6, MRI 4/7 possible osteo.    He is s/p tricuspid valve replacement surgery and aortic valve exploration for control of infection on 04/01 with positive gram stain and culture of tricuspid valve tissue for staph aureus.  Yeast also seen on stain but read corrected later to no yeast.  Micafungin discontinued as Micro results  updated by lab. Initial read of 1+ yeast on valve tissue on 4/1 was corrected and updated. Only staph aureus on valve tissue cx, no yeast seen on stain.    On 4/5, he suffered a ventricular tachycardia/fibrillation arrest.  ROSC achieved after one round of CPR and one shock.  Alert and neurologically intact following.   On 4/ 6 back pain again,   MRI lumbar spine done on 4/ 7 :   Interval development of en bloc abnormal T2 signal hyperintensity  and abnormal contrast enhancement involving the lower L5 vertebral  body, S1 vertebral body and right sacral ala and anterior epidural  space from mid L4 down to mid S2 worrisome for an infectious process  such as osteomyelitis with epidural extension of infection     Recommendations:  1. Follow blood cxs . 4/3 1 blood cx + on 4/5 , follow up since then have been negative.   2. PICC/pacemaker being considered given arrythmia event, Blood cultures are now negative for 5days   3. Given new prosthetic valve and positive blood cultures gentamicin added ( synergy dosing ), this was stopped on 4/ 10, creat jumped from 0.9 to 1.31 to 1.54 and now blood cultures are  clear.   4. Given findings of the spine MRI on nafcillin. LOT 5/ 17   5. Follow clinical status and labs    Sreedhar Bell MD    Interval History   Resting, tolerating antibiotics ok   Afebrile     Physical Exam   Temp: 98.3  F (36.8  C) Temp src: Oral BP: (!) 160/95 Pulse: 71   Resp: 10 SpO2: 98 % O2 Device: None (Room air)    Vitals:    04/10/22 1627 04/11/22 0500 04/12/22 0609   Weight: 115.8 kg (255 lb 6.4 oz) 114.9 kg (253 lb 3.2 oz) 114.1 kg (251 lb 9.6 oz)     Vital Signs with Ranges  Temp:  [98.1  F (36.7  C)-98.8  F (37.1  C)] 98.3  F (36.8  C)  Pulse:  [62-81] 71  Resp:  [10-29] 10  BP: (114-160)/(59-95) 160/95  SpO2:  [95 %-100 %] 98 %    Constitutional: Awake, alert, cooperative, no apparent distress  Lungs: Clear to auscultation bilaterally, no crackles or wheezing  Cardiovascular: S1S2, sternal surgical site intact  Abdomen:  soft,non-tender  Skin: No rash    Other:    Medications     NaCl 30 mL/hr at 04/12/22 0817     dextrose       - MEDICATION INSTRUCTIONS -       - MEDICATION INSTRUCTIONS -       BETA BLOCKER NOT PRESCRIBED       sodium chloride Stopped (04/10/22 0800)       amLODIPine  10 mg Oral Daily     aspirin  81 mg Oral or NG Tube Daily     buPROPion  300 mg Oral QAM     diclofenac  4 g Topical 4x Daily     ferrous gluconate  324 mg Oral Daily with breakfast     gabapentin  100 mg Oral TID     heparin ANTICOAGULANT  5,000 Units Subcutaneous Q8H     insulin aspart   Subcutaneous TID w/meals     insulin aspart  1-10 Units Subcutaneous TID AC     insulin aspart  1-7 Units Subcutaneous At Bedtime     insulin glargine  10 Units Subcutaneous At Bedtime     [Held by provider] insulin glargine  40 Units Subcutaneous QAM AC     levothyroxine  125 mcg Oral Once per day on Mon Tue Wed Thu Fri Sat     levothyroxine  250 mcg Oral Weekly     lidocaine  2 patch Transdermal Q24H     lidocaine   Transdermal Q8H     methocarbamol  1,000 mg Oral Q6H     nafcillin  2 g Intravenous Q4H     pantoprazole  40 mg Oral  Daily     polyethylene glycol  17 g Oral BID     senna-docusate  1 tablet Oral BID     sodium chloride (PF)  3 mL Intracatheter Q8H       Data   All microbiology laboratory data reviewed.  Recent Labs   Lab Test 04/12/22  0559 04/11/22  0540 04/10/22  0530   WBC 9.8 8.9 8.5   HGB 7.4* 8.4* 8.3*   HCT 25.1* 27.4* 27.5*   MCV 98 96 95   * 540* 571*     Recent Labs   Lab Test 04/12/22  0559 04/11/22 0540 04/10/22  0530   CR 1.54* 1.29* 1.31*

## 2022-04-12 NOTE — PROGRESS NOTES
MD Notification    Notified Person: MD    Notified Person Name: MD Sincere     Notification Date/Time: around 8:45am     Notification Interaction: paged    Purpose of Notification: . 40 lantus scheduled. NPO for procedure today. Do you want 40u given?     Orders Received: hold lantus    Comments:

## 2022-04-12 NOTE — PROGRESS NOTES
"SPIRITUAL HEALTH SERVICES  SPIRITUAL ASSESSMENT Progress Note  [facility]     REFERRAL SOURCE: Follow up visit    Reflective conversation shared with Christiano which integrated elements of illness and family narratives.       Christiano reflected on the stress he's experiencing with family relationships and the frustration of \"not being there for my kids.\"    Christiano shared that \"yesterday was really hard\" but today he's seeing his improvement and grateful that \"I'm able to walk around the floor\" and feeling stronger.    I offered spiritual and emotional support through reflective listening that affirmed emotions, experience, and meaning. Offered assurance through prayer which incorporated conversational themes.    PLAN: [facility] remains available for support.    Maribel White  Associate   Pager [phone]  Phone [phone]      "

## 2022-04-12 NOTE — PLAN OF CARE
Goal Outcome Evaluation:    Pt A x O x 4. Received robaxin and tylenol for lower back pain. Oxygen saturation >90% on RA/CPAP. Accelerated junctional/CHB on tele. Received iv abx. Blood glucose checks. NPO, plan for ICD placement in AM. Pt educated on poc, cares, medications and safety. Will continue to monitor.

## 2022-04-13 ENCOUNTER — APPOINTMENT (OUTPATIENT)
Dept: OCCUPATIONAL THERAPY | Facility: CLINIC | Age: 37
DRG: 853 | End: 2022-04-13
Attending: HOSPITALIST
Payer: COMMERCIAL

## 2022-04-13 ENCOUNTER — DOCUMENTATION ONLY (OUTPATIENT)
Dept: CARDIOLOGY | Facility: CLINIC | Age: 37
End: 2022-04-13
Payer: COMMERCIAL

## 2022-04-13 ENCOUNTER — APPOINTMENT (OUTPATIENT)
Dept: GENERAL RADIOLOGY | Facility: CLINIC | Age: 37
DRG: 853 | End: 2022-04-13
Attending: INTERNAL MEDICINE
Payer: COMMERCIAL

## 2022-04-13 DIAGNOSIS — I44.2 ATRIOVENTRICULAR BLOCK, COMPLETE (H): ICD-10-CM

## 2022-04-13 DIAGNOSIS — Z95.810 ICD (IMPLANTABLE CARDIOVERTER-DEFIBRILLATOR) IN PLACE: Primary | ICD-10-CM

## 2022-04-13 LAB
ANION GAP SERPL CALCULATED.3IONS-SCNC: 5 MMOL/L (ref 3–14)
ATRIAL RATE - MUSE: 101 BPM
BACTERIA BLD CULT: NO GROWTH
BACTERIA BLD CULT: NO GROWTH
BUN SERPL-MCNC: 22 MG/DL (ref 7–30)
CALCIUM SERPL-MCNC: 8.7 MG/DL (ref 8.5–10.1)
CHLORIDE BLD-SCNC: 109 MMOL/L (ref 94–109)
CO2 SERPL-SCNC: 26 MMOL/L (ref 20–32)
CREAT SERPL-MCNC: 1.49 MG/DL (ref 0.66–1.25)
DIASTOLIC BLOOD PRESSURE - MUSE: NORMAL MMHG
ERYTHROCYTE [DISTWIDTH] IN BLOOD BY AUTOMATED COUNT: 17 % (ref 10–15)
GFR SERPL CREATININE-BSD FRML MDRD: 63 ML/MIN/1.73M2
GLUCOSE BLD-MCNC: 111 MG/DL (ref 70–99)
GLUCOSE BLDC GLUCOMTR-MCNC: 101 MG/DL (ref 70–99)
GLUCOSE BLDC GLUCOMTR-MCNC: 103 MG/DL (ref 70–99)
GLUCOSE BLDC GLUCOMTR-MCNC: 105 MG/DL (ref 70–99)
GLUCOSE BLDC GLUCOMTR-MCNC: 132 MG/DL (ref 70–99)
GLUCOSE BLDC GLUCOMTR-MCNC: 143 MG/DL (ref 70–99)
GLUCOSE BLDC GLUCOMTR-MCNC: 64 MG/DL (ref 70–99)
HCT VFR BLD AUTO: 27.1 % (ref 40–53)
HGB BLD-MCNC: 8 G/DL (ref 13.3–17.7)
INTERPRETATION ECG - MUSE: NORMAL
MAGNESIUM SERPL-MCNC: 2.5 MG/DL (ref 1.6–2.3)
MCH RBC QN AUTO: 28.5 PG (ref 26.5–33)
MCHC RBC AUTO-ENTMCNC: 29.5 G/DL (ref 31.5–36.5)
MCV RBC AUTO: 96 FL (ref 78–100)
P AXIS - MUSE: 39 DEGREES
PHOSPHATE SERPL-MCNC: 6.3 MG/DL (ref 2.5–4.5)
PLATELET # BLD AUTO: 436 10E3/UL (ref 150–450)
POTASSIUM BLD-SCNC: 3.9 MMOL/L (ref 3.4–5.3)
PR INTERVAL - MUSE: NORMAL MS
QRS DURATION - MUSE: 100 MS
QT - MUSE: 440 MS
QTC - MUSE: 519 MS
R AXIS - MUSE: 91 DEGREES
RBC # BLD AUTO: 2.81 10E6/UL (ref 4.4–5.9)
SARS-COV-2 RNA RESP QL NAA+PROBE: NEGATIVE
SODIUM SERPL-SCNC: 140 MMOL/L (ref 133–144)
SYSTOLIC BLOOD PRESSURE - MUSE: NORMAL MMHG
T AXIS - MUSE: 91 DEGREES
VENTRICULAR RATE- MUSE: 84 BPM
WBC # BLD AUTO: 8.8 10E3/UL (ref 4–11)

## 2022-04-13 PROCEDURE — 85027 COMPLETE CBC AUTOMATED: CPT | Performed by: PHYSICIAN ASSISTANT

## 2022-04-13 PROCEDURE — U0005 INFEC AGEN DETEC AMPLI PROBE: HCPCS | Performed by: HOSPITALIST

## 2022-04-13 PROCEDURE — 250N000013 HC RX MED GY IP 250 OP 250 PS 637: Performed by: PHYSICIAN ASSISTANT

## 2022-04-13 PROCEDURE — 999N000065 XR CHEST 2 VW

## 2022-04-13 PROCEDURE — 250N000013 HC RX MED GY IP 250 OP 250 PS 637: Performed by: NURSE PRACTITIONER

## 2022-04-13 PROCEDURE — 97110 THERAPEUTIC EXERCISES: CPT | Mod: GO

## 2022-04-13 PROCEDURE — 84100 ASSAY OF PHOSPHORUS: CPT | Performed by: PHYSICIAN ASSISTANT

## 2022-04-13 PROCEDURE — 80048 BASIC METABOLIC PNL TOTAL CA: CPT | Performed by: PHYSICIAN ASSISTANT

## 2022-04-13 PROCEDURE — 83735 ASSAY OF MAGNESIUM: CPT | Performed by: PHYSICIAN ASSISTANT

## 2022-04-13 PROCEDURE — 97535 SELF CARE MNGMENT TRAINING: CPT | Mod: GO

## 2022-04-13 PROCEDURE — 99233 SBSQ HOSP IP/OBS HIGH 50: CPT | Performed by: HOSPITALIST

## 2022-04-13 PROCEDURE — 250N000009 HC RX 250: Performed by: PHYSICIAN ASSISTANT

## 2022-04-13 PROCEDURE — 250N000013 HC RX MED GY IP 250 OP 250 PS 637: Performed by: INTERNAL MEDICINE

## 2022-04-13 PROCEDURE — 210N000001 HC R&B IMCU HEART CARE

## 2022-04-13 PROCEDURE — 36415 COLL VENOUS BLD VENIPUNCTURE: CPT | Performed by: PHYSICIAN ASSISTANT

## 2022-04-13 RX ORDER — METOPROLOL SUCCINATE 25 MG/1
25 TABLET, EXTENDED RELEASE ORAL DAILY
Status: DISCONTINUED | OUTPATIENT
Start: 2022-04-13 | End: 2022-04-14

## 2022-04-13 RX ADMIN — METHOCARBAMOL 1000 MG: 500 TABLET, FILM COATED ORAL at 08:27

## 2022-04-13 RX ADMIN — METHOCARBAMOL 1000 MG: 500 TABLET, FILM COATED ORAL at 21:20

## 2022-04-13 RX ADMIN — METHOCARBAMOL 1000 MG: 500 TABLET, FILM COATED ORAL at 03:40

## 2022-04-13 RX ADMIN — ASPIRIN 81 MG CHEWABLE TABLET 81 MG: 81 TABLET CHEWABLE at 08:28

## 2022-04-13 RX ADMIN — MELATONIN TAB 3 MG 3 MG: 3 TAB at 21:19

## 2022-04-13 RX ADMIN — NAFCILLIN 2 G: 2 POWDER, FOR SOLUTION INTRAMUSCULAR; INTRAVENOUS at 08:26

## 2022-04-13 RX ADMIN — ACETAMINOPHEN 650 MG: 325 TABLET ORAL at 14:25

## 2022-04-13 RX ADMIN — AMLODIPINE BESYLATE 10 MG: 10 TABLET ORAL at 08:27

## 2022-04-13 RX ADMIN — BUPROPION HYDROCHLORIDE 300 MG: 300 TABLET, EXTENDED RELEASE ORAL at 08:28

## 2022-04-13 RX ADMIN — ACETAMINOPHEN 650 MG: 325 TABLET ORAL at 09:36

## 2022-04-13 RX ADMIN — DICLOFENAC SODIUM 4 G: 10 GEL TOPICAL at 13:18

## 2022-04-13 RX ADMIN — DICLOFENAC SODIUM 4 G: 10 GEL TOPICAL at 08:26

## 2022-04-13 RX ADMIN — ACETAMINOPHEN 650 MG: 325 TABLET ORAL at 21:19

## 2022-04-13 RX ADMIN — NAFCILLIN 2 G: 2 POWDER, FOR SOLUTION INTRAMUSCULAR; INTRAVENOUS at 15:59

## 2022-04-13 RX ADMIN — DICLOFENAC SODIUM 4 G: 10 GEL TOPICAL at 21:19

## 2022-04-13 RX ADMIN — NAFCILLIN 2 G: 2 POWDER, FOR SOLUTION INTRAMUSCULAR; INTRAVENOUS at 11:50

## 2022-04-13 RX ADMIN — INSULIN GLARGINE 40 UNITS: 100 INJECTION, SOLUTION SUBCUTANEOUS at 10:05

## 2022-04-13 RX ADMIN — LEVOTHYROXINE SODIUM 125 MCG: 125 TABLET ORAL at 06:09

## 2022-04-13 RX ADMIN — GABAPENTIN 100 MG: 100 CAPSULE ORAL at 08:28

## 2022-04-13 RX ADMIN — NAFCILLIN 2 G: 2 POWDER, FOR SOLUTION INTRAMUSCULAR; INTRAVENOUS at 21:27

## 2022-04-13 RX ADMIN — METOPROLOL SUCCINATE 25 MG: 25 TABLET, EXTENDED RELEASE ORAL at 09:33

## 2022-04-13 RX ADMIN — ACETAMINOPHEN 650 MG: 325 TABLET ORAL at 03:40

## 2022-04-13 RX ADMIN — FERROUS GLUCONATE 324 MG: 324 TABLET ORAL at 08:27

## 2022-04-13 RX ADMIN — SENNOSIDES AND DOCUSATE SODIUM 1 TABLET: 50; 8.6 TABLET ORAL at 08:27

## 2022-04-13 RX ADMIN — DICLOFENAC SODIUM 4 G: 10 GEL TOPICAL at 18:26

## 2022-04-13 RX ADMIN — NAFCILLIN 2 G: 2 POWDER, FOR SOLUTION INTRAMUSCULAR; INTRAVENOUS at 03:37

## 2022-04-13 RX ADMIN — GABAPENTIN 100 MG: 100 CAPSULE ORAL at 21:20

## 2022-04-13 RX ADMIN — LIDOCAINE 2 PATCH: 560 PATCH PERCUTANEOUS; TOPICAL; TRANSDERMAL at 08:29

## 2022-04-13 RX ADMIN — PANTOPRAZOLE SODIUM 40 MG: 40 TABLET, DELAYED RELEASE ORAL at 08:27

## 2022-04-13 RX ADMIN — SENNOSIDES AND DOCUSATE SODIUM 1 TABLET: 50; 8.6 TABLET ORAL at 21:19

## 2022-04-13 RX ADMIN — GABAPENTIN 100 MG: 100 CAPSULE ORAL at 14:25

## 2022-04-13 RX ADMIN — METHOCARBAMOL 1000 MG: 500 TABLET, FILM COATED ORAL at 14:25

## 2022-04-13 ASSESSMENT — ACTIVITIES OF DAILY LIVING (ADL)
ADLS_ACUITY_SCORE: 3

## 2022-04-13 NOTE — PROGRESS NOTES
[facility]    Hospitalist Progress Note    Assessment & Plan      Assessment & Plan:   Anil Montoya is a 36 year old male with hx of DM2, HTN, WARNER, and anxiety who was initially hospitalized at [facility] on 3/22/2022 for severe sepsis initially attributed to pneumonia vs viral infection. He was found to have MSSA bacteremia with ongoing positive cultures. He underwent a TEJINDER on 3/27/2022 which revealed tricuspid endocarditis, and he was transferred to [facility] on 3/28/2022 for CV Surgery consultation.     Sepsis with MSSA bacteremia due to tricuspid valve endocarditis.  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 He was initially treated with ceftriaxone and azithromycin for possible community-acquired pneumonia, and ID was consulted. Blood cultures returned positive for MSSA, and serial blood cultures continued to return positive. Broad work-up including CT abd/pelvis, MR lumbar spine, CT dental, and TTE were unrevealing  * Initially treated with IV ceftriaxone and azithromycin; switched to IV vanco on 3/23 when blood cultures returned with Staph; switched to IV cefazolin on 3/24 upon sensitivities  * Underwent TEJINDER on 3/27 which showed thickened tricuspid valve leaflets with atrial aspect of the septal leaflet with an irregular border and small mobile filamentous elements, large fixed heterogeneous mass in the right ventricle attached to the interventricular septum (2.5 x 2 cm) appearing connected to the tricuspid subvalvular annular apparatus with a pedunculated round mobile element (1.8 x 1 cm).  No significant tricuspid regurgitation reported. LVEF 55 to 60%. Negative bubble study.  * Transferred to [facility] on 3/28 for CV Surgery consult  - Patient underwent tricuspid valve replacement with bioprosthetic valve (31 mm epic stented valve)  on 4/1/2022 and endoscopic aortic valve exploration.  - Initially micafungin added on 4/1 as Gram stain from tissue from heart valve from 4/ 1 initially was reported as yeast, which was subsequently corrected as not to be present.  Micafungin now discontinued.  -  given new prosthetic valve and positive blood cultures gentamicin added ( synergy dosing ) until he clears the BCs  - had worsening lower back pain and underwent repeat lumbar mri yesterday(4/7/22) which showed possible osteomyelitis and discitis   MRI read- S1 vertebral body and right sacral ala and anterior epidural space from mid L4 down to mid S2 worrisome for an infectious process such as osteomyelitis with epidural extension of infection. Also L5-S1 discitis is not excluded and continued surveillance is recommended.  - cefazolin was changed to nafcillin for better cns penetration. ID/CT surgery aware.   - Daily blood cultures, last positive was from 4/3.  Has been negative since then.  -stopped IV gentamicin 100 mg twice daily once BC cleared and continue nafcillin 2 g every 4 hours-started on 4/7/2022  - ID, Cardiology, CV Surgery following  -Underwent ICD with pacemaker placement yesterday and tolerated the procedure well.  -Plan is for PICC line placement and possibly going home on IV antibiotics.  Awaiting ID clearance before PICC line placement.  -Patient would like to go home to his father's house to recover and receive IV antibiotics.  He is doing really well with therapy and is up and walking around.     V. fib arrest(4/5/2022)  -Patient was noted to have V. fib on telemetry on 4/5/22  -Required brief CPR and 1 shock of 120 J with successful restoration of regular rhythm  -Electrolytes appear to be within normal limits  -Patient transferred back to the intensive care unit for close monitoring  -EP following -> plan on implanting a dual-chamber ICD for secondary prevention, plan for this tomorrow.   Patient continues to remain and third-degree  heart bloc with accelerated junctional rhythm . pacemaker with ICD placement today.  -ECHO -> LVEF was normal by echocardiography (technically difficult study)     Post-op complete heart block  -Patient initially was in second-degree AV block, and now appears to have complete heart block post tricuspid valve replacement  -EP following, despite a good junctional rhythm his complete heart block is not resolving.  Received pacemaker with ICD yesterday's.  Started on low-dose beta-blocker after this.    -Not needing diuretics currently.     DM2 without complications, long-term insulin use, uncontrolled  * A1c 12.6. Had not been taking his diabetes medications for some time  -transitioned to subcut regimen yesterday with lantus 40 am and 10 pm with sliding scale aspart and 1:10 carb count . Goal for bs<180 to facilitate infection control   -Prior to surgery he was requiring Lantus 34 units twice a day and mealtime coverage of 1: 10.     Acute postop blood loss anemia  -Hemoglobin preop was around 12 which had slowly trended down.  Hemoglobin dropped to 7.3 on 4/4, most likely acute blood loss from surgery.    -Hemoglobin is at 7.4 this morning   -Continue to monitor and transfuse as needed.  -Has thrombocytosis likely secondary to infection and acute phase reactant   -Iron studies are suggestive of iron deficiency.  Started on oral ferrous gluconate.    Acute low back pain  * Noted while at [facility]  * MR lumbar spine (3/24) showed mild multilevel lumbar spondylosis, but no evidence of discitis but mri 4/7 showed possible osteomyelitis with discitis.  -Continue pain control as needed with dilaudid , robaxin and oxycodone.  His back pain has significantly improved.  He is only using Tylenol and Robaxin for now.  Is able to get up and walk around with no difficulty today.    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 RUQ US (3/22) showed fatty  liver, but normal gallbladder and normal bile ducts  - LFTs improved.      Essential hypertension  Discontinued his ACE inhibitor due to acute renal failure.  Started on metoprolol 25 today..  Continue amlodipine 10 mg daily.     WARNER  * CPAP per home settings     Hypothyroidism  * Chronic and stable on levothyroxine     Generalized anxiety disorder  ADHD  * Chronic and stable on bupropion     Hyperphosphatemia and hypermagnesemia with acute renal failure  -Continues to slowly worsen.  Will start very gentle hydration at 50 cc an hour and recheck tomorrow.  Urinalysis on 4/10 was completely sterile other than some mucus.  Low phosphate diet.  Avoid any nephrotoxic medications including ACE or ARB at this time.    DVT Prophylaxis: Heparin SQ  Code Status: Full Code    Disposition: Expected discharge when medically stable     Esther Damon MD  [phone](p)  [phone]( c)    Interval History   Patient awake and alert.  Feeling much better today from a pain standpoint.  Is much more active and is up and walking around.  Plan on proceeding with ICD with PPM placement today.  Continue with cardiac monitoring..    -Data reviewed today: I reviewed all new labs and imaging results over the last 24 hours. I personally reviewed the mri lumbar spine image(s) showing possible ostemyelitis and discitis.    Physical Exam   Temp: 98.6  F (37  C) Temp src: Oral BP: (!) 144/84 Pulse: 64   Resp: 14 SpO2: 98 % O2 Device: None (Room air) Oxygen Delivery: 2 LPM  Vitals:    04/11/22 0500 04/12/22 0609 04/13/22 0557   Weight: 114.9 kg (253 lb 3.2 oz) 114.1 kg (251 lb 9.6 oz) 115.7 kg (255 lb 1.6 oz)     Vital Signs with Ranges  Temp:  [97.4  F (36.3  C)-98.6  F (37  C)] 98.6  F (37  C)  Pulse:  [55-75] 64  Resp:  [0-34] 14  BP: (121-167)/(67-93) 144/84  SpO2:  [95 %-100 %] 98 %  I/O last 3 completed shifts:  In: 1370 [P.O.:920; I.V.:450]  Out: -     Physical Exam  Constitutional:       General: He is not in acute distress.      Appearance: He is well-developed. He is obese.      Comments: Appears flushed   HENT:      Right Ear: Tympanic membrane and external ear normal.      Left Ear: Tympanic membrane and external ear normal.      Nose: Nose normal.      Mouth/Throat:      Mouth: No oral lesions.      Pharynx: No oropharyngeal exudate.   Eyes:      General:         Right eye: No discharge.         Left eye: No discharge.      Conjunctiva/sclera: Conjunctivae normal.      Pupils: Pupils are equal, round, and reactive to light.   Neck:      Thyroid: No thyromegaly.      Trachea: No tracheal deviation.   Cardiovascular:      Rate and Rhythm: Normal rate and regular rhythm.      Pulses: Normal pulses.      Heart sounds: Normal heart sounds, S1 normal and S2 normal. No murmur heard.    No S3 or S4 sounds.   Pulmonary:      Effort: Pulmonary effort is normal. No respiratory distress.      Breath sounds: Normal breath sounds. No wheezing or rales.   Abdominal:      General: Bowel sounds are normal.      Palpations: Abdomen is soft. There is no mass.      Tenderness: There is no abdominal tenderness.   Musculoskeletal:         General: No deformity. Normal range of motion.      Cervical back: Neck supple.      Comments: Back pain improved.     Lymphadenopathy:      Cervical: No cervical adenopathy.   Skin:     General: Skin is warm and dry.      Findings: No lesion or rash.   Neurological:      Mental Status: He is alert and oriented to person, place, and time.      Motor: No abnormal muscle tone.      Deep Tendon Reflexes: Reflexes are normal and symmetric.   Psychiatric:         Speech: Speech normal.         Thought Content: Thought content normal.         Judgment: Judgment normal.           Medications     dextrose       BETA BLOCKER NOT PRESCRIBED       sodium chloride Stopped (04/10/22 0800)       amLODIPine  10 mg Oral Daily     aspirin  81 mg Oral or NG Tube Daily     buPROPion  300 mg Oral QAM     diclofenac  4 g Topical 4x Daily      ferrous gluconate  324 mg Oral Daily with breakfast     gabapentin  100 mg Oral TID     insulin aspart   Subcutaneous TID w/meals     insulin aspart  1-10 Units Subcutaneous TID AC     insulin aspart  1-7 Units Subcutaneous At Bedtime     insulin glargine  10 Units Subcutaneous At Bedtime     insulin glargine  40 Units Subcutaneous QAM AC     levothyroxine  125 mcg Oral Once per day on Mon Tue Wed Thu Fri Sat     levothyroxine  250 mcg Oral Weekly     lidocaine  2 patch Transdermal Q24H     lidocaine   Transdermal Q8H     methocarbamol  1,000 mg Oral Q6H     metoprolol succinate ER  25 mg Oral Daily     nafcillin  2 g Intravenous Q4H     pantoprazole  40 mg Oral Daily     polyethylene glycol  17 g Oral BID     senna-docusate  1 tablet Oral BID       Data   Recent Labs   Lab 04/13/22  0825 04/13/22  0613 04/13/22  0157 04/12/22  0814 04/12/22  0559 04/11/22  0929 04/11/22  0540 04/10/22  0809 04/10/22  0530   WBC  --  8.8  --   --  9.8  --  8.9  --  8.5   HGB  --  8.0*  --   --  7.4*  --  8.4*  --  8.3*   MCV  --  96  --   --  98  --  96  --  95   PLT  --  436  --   --  480*  --  540*  --  571*   NA  --  140  --   --  139  --  137  --  138   POTASSIUM  --  3.9  --   --  4.1  4.0  --  3.7  --  3.9   CHLORIDE  --  109  --   --  108  --  105  --  104   CO2  --  26  --   --  28  --  27  --  28   BUN  --  22  --   --  20  --  22  --  23   CR  --  1.49*  --   --  1.41*  1.54*  --  1.29*  --  1.31*   ANIONGAP  --  5  --   --  3  --  5  --  6   IMAN  --  8.7  --   --  8.6  --  8.5  --  8.6   * 111* 103*   < > 113*   < > 104*   < > 101*   ALBUMIN  --   --   --   --   --   --  2.1*  --  2.0*   PROTTOTAL  --   --   --   --   --   --  7.6  --  7.6   BILITOTAL  --   --   --   --   --   --  0.4  --  0.4   ALKPHOS  --   --   --   --   --   --  88  --  90   ALT  --   --   --   --   --   --  34  --  41   AST  --   --   --   --   --   --  18  --  21    < > = values in this interval not displayed.       Recent Results (from the  past 24 hour(s))   EP Device    Narrative    PRE-OPERATIVE DIAGNOSIS:  Cardiac arrest due to ventricular fibrillation without reversible cause.  Complete AV block    POST-OPERATIVE DIAGNOSIS:    Successful implantation of dual-chamber ICD for secondary prevention.      PROCEDURES PERFORMED:  1.  Implantation of dual-chamber ICD.  2.  Cardiac fluoroscopy (5.5 minutes).  3.  Conscious sedation, moderate level.      CLINICAL HISTORY:  36-year-old diabetic male with MSSA tricuspid valve endocarditis who   underwent bioprosthetic TVR on 4/1/2022.  The procedure was complicated by   complete AV block with accelerated junctional escape rhythm around 80 bpm.    Normal LV function.  On 4/4/2022 the patient suffered ventricular   fibrillation cardiac arrest that was unexpected and not preceded by   bradycardia or other reversible cause.  On that day, his blood cultures   from 4/2/2022 came back positive for GPC.  The patient later developed an   epidural abscess on his lower back.  After several days of continuous   intravenous antibiotics and negative blood cultures, the decision was made   to proceed with ICD implantation.  A subcutaneous ICD was considered given   the patient's young age, recent endocarditis and the fact that a   transvenous lead would have to cross the bioprosthetic tricuspid valve.    Unfortunately, complete AV block has persisted 11 days after surgery.    Contemporary subcutaneous ICDs lack pacing capability.    Therefore, the recommendation was made for 'traditional' transvenous   dual-chamber ICD implantation.  The risks and benefits of the procedure   were discussed in detail; the patient expressed understanding and provided   consent.  The patient's shoots rifles left-handed and has requested the   ICD generator to be placed relatively medially in the left chest.        PROCEDURE:  The patient was brought to the cardiac electrophysiology laboratory at   [facility] on 4/12/2022.  I  determined this patient to be   an appropriate candidate for the planned sedation and procedure and have   reassessed the patient immediately prior to sedation and procedure. The   patient was in the fasting nonsedated state.  Informed consent had been   obtained.  The patient was placed on the procedure table and the anterior   chest was prepped and draped in the usual sterile fashion.  We   continuously monitored vital signs, oxygenation and level of sedation.    Antibiotic prophylaxis was administered.  Intravenous sedation was given   to achieve patient comfort.     Using a fluoroscopically guided technique the left subclavian vein was   cannulated twice.  Two separate guidewires were introduced and retained.    Under local anesthesia an incision was created in the left pectoral   region.  The incision was taken down to the pectoralis muscle and fascia   and a pocket was created for the ICD generator.  The pocket was created   relatively medially and at least 6 cm away from the deltopectoral groove.    Using a peel-away introducer sheath the right ventricular lead was   initially introduced.  The bioprosthetic tricuspid valve proved somewhat   difficult to cross.  The tip of the lead was ultimately brought -through   the TVR- to the septal low RV. Good sensing and pacing parameters were   confirmed. 10 V pacing did not stimulate the diaphragm. The lead was   secured using interrupted Ethibond sutures.    Using another peel-away introducer sheath the right atrial lead was   introduced. The tip of the lead was brought at the superior anterior RA.   Good sensing and pacing parameters were confirmed. 10 V pacing did not   stimulate the diaphragm. The lead was secured using interrupted Ethibond   sutures.    The device pocket was then irrigated with antibiotic solution. The leads   were then connected to the ICD generator which was placed in the pocket.   The generator was secured using silk.  The wound was closed in  2 layers   using 2.0 and 4.0 Vicryl.  Steri-Strips, sterile gauze, and a pressure   dressing were placed over the wound.     There were no apparent complications. The patient was brought back to the   hospital room in stable condition.    Estimated blood loss was 15 cc.        RESULTS:  Implant Name Type Inv. Item Serial No.  Lot No. LRB No. Used   Action   ICD RESONATE EL  DF4 D433 - [id number] ICD ICD RESONATE EL DR DF4 D433   [id number] [employer] [id number]  1 Implanted   LEAD RELIANCE ENDOTAK DF4 AF 59CM 0272 - [id number] Leads LEAD RELIANCE   ENDOTAK DF4 AF 59CM 0272 [id number] [employer] [id number]  1 Implanted   LEAD INGEVITY+ AF IS1 7841 52CM - [id number] Leads LEAD INGEVITY+ AF IS1   7841 52CM [id number] [employer] [id number]  1 Implanted     A.  Implanted leads:  (i) RV lead: [employer] lead model 0272 (single coil lead, 59 cm),   serial [id number].  R-wave sensing 18.2 mV.  Pacing threshold 1.0 V at 0.5   msec.  Pacing impedance 572 ohms.  High voltage impedance 39 ohms.   (ii) RA lead: [employer] lead model 7841, serial [id number].  P-wave   sensing 5.1 mV.  Pacing threshold 0.5 V at 0.5 msec.  Pacing impedance 473   ohms.      B.  Pulse generator.  [employer] Resonate EL ICD, model D433,   serial [id number].    C.  Programming:  Bradycardia pacing mode DDI 55 ppm.    Ventricular tachycardia monitor zone starts at 170 bpm.  Ventricular fibrillation detection starts at 210 bpm.  For a detailed list of programmed therapies, please refer to the   's printout.        CONCLUSIONS:  1.  Successful placement of dual-chamber implantable   cardioverter-defibrillator for secondary prevention.  2.  No apparent in-lab complication.     X-ray Chest 2 vws*    Narrative    CHEST TWO VIEWS 4/13/2022 9:11 AM     HISTORY: Implantable cardiac device placement, evaluate for  pneumothorax.    COMPARISON: None.    FINDINGS: Cardiac leads noted that project over the  cardiac  silhouette. No pneumothorax. There are no acute infiltrates. The  cardiac silhouette is stable. Pulmonary vasculature is unremarkable.      Impression    IMPRESSION: No acute disease.

## 2022-04-13 NOTE — PROGRESS NOTES
EP Progress Note          Assessment and Plan:   Anil Montoya is a 35 yo male with hx of poorly controlled DM2, HTN, WARNER, and anxiety and later diagnosed with MSSA tricuspid valve endocarditis who underwent bioprosthetic TVR on 4/1/2022. The procedure was complicated by complete AV block with accelerated junctional escape rhythm around 80 bpm. On 4/4/2022 the patient suffered ventricular fibrillation cardiac arrest that was unexpected and not preceded by bradycardia or other reversible cause.  EF is normal. On 4/4 his blood cultures from 4/2/2022 came back positive for GPC.  The patient later developed an epidural abscess on his lower back and was c/o significant pain.  After several days of continuous intravenous antibiotics and negative blood cultures x 5 days, the decision was made to proceed with dual chamber ICD implantation.    1. VF arrest (neurologically intact) with normal EF, S/P dual chamber ICD (BS) implant  The device generator was placed relatively medially to allow the patient to shoot a rifle left-handed per his request.  CXR not completed yet  Interrogation completed, A threshold up slightly, but stable  Device RN education needs to be completed before discharge home  Will start low dose Metoprolol ER 25 mg once daily    2. Osteomyelitis  Remains on abx    3. HTN  Pressures are slightly elevated  Losartan on hold due to elevated creatinine  Amlodipine started yesterday      Marissa Gruber CNP        Interval History:   Feeling better. Back pain under control and pt did 3 loops in around the halls yesterday with assist.  Intermittent AP noted. Pt sinus rhythm       Medications:       amLODIPine  10 mg Oral Daily     aspirin  81 mg Oral or NG Tube Daily     buPROPion  300 mg Oral QAM     diclofenac  4 g Topical 4x Daily     ferrous gluconate  324 mg Oral Daily with breakfast     gabapentin  100 mg Oral TID     insulin aspart   Subcutaneous TID w/meals     insulin aspart  1-10 Units  Subcutaneous TID AC     insulin aspart  1-7 Units Subcutaneous At Bedtime     insulin glargine  10 Units Subcutaneous At Bedtime     [Held by provider] insulin glargine  40 Units Subcutaneous QAM AC     levothyroxine  125 mcg Oral Once per day on Mon Tue Wed Thu Fri Sat     levothyroxine  250 mcg Oral Weekly     lidocaine  2 patch Transdermal Q24H     lidocaine   Transdermal Q8H     methocarbamol  1,000 mg Oral Q6H     metoprolol succinate ER  25 mg Oral Daily     nafcillin  2 g Intravenous Q4H     pantoprazole  40 mg Oral Daily     polyethylene glycol  17 g Oral BID     senna-docusate  1 tablet Oral BID             Review of Systems: If done, described below  The Review of Systems is negative other than noted in the HPI             Physical Exam:   Blood pressure (!) 164/87, pulse 73, temperature 97.4  F (36.3  C), temperature source Oral, resp. rate 12, weight 115.7 kg (255 lb 1.6 oz), SpO2 97 %.        Vital Sign Ranges  Temperature Temp  Av.9  F (36.6  C)  Min: 97.4  F (36.3  C)  Max: 98.3  F (36.8  C)   Blood pressure Systolic (24hrs), Av , Min:121 , Max:167        Diastolic (24hrs), Av, Min:67, Max:95      Pulse Pulse  Av.1  Min: 55  Max: 90   Respirations Resp  Av.5  Min: 0  Max: 34   Pulse oximetry SpO2  Av.2 %  Min: 95 %  Max: 100 %         Intake/Output Summary (Last 24 hours) at 2022 0752  Last data filed at 2022 1901  Gross per 24 hour   Intake 1370 ml   Output --   Net 1370 ml       Constitutional:   in no apparent distress     Chest:   Left pectoral pressure dressing in place. No erythema noted     Lungs:   symmetric, clear to auscultation     Cardiovascular:   regular rate and rhythm, normal S1 and S2 and no murmur noted     Extremities and Back:   No edema              Data:     Lab Results   Component Value Date    WBC 8.8 2022    HGB 8.0 (L) 2022    HCT 27.1 (L) 2022     2022     2022    POTASSIUM 3.9 2022     CHLORIDE 109 04/13/2022    CO2 26 04/13/2022    BUN 22 04/13/2022    CR 1.49 (H) 04/13/2022     (H) 04/13/2022    NTBNPI 138 01/01/2013    TROPONIN 0.00 01/20/2014    TROPI <0.015 11/25/2017    AST 18 04/11/2022    ALT 34 04/11/2022    ALKPHOS 88 04/11/2022    BILITOTAL 0.4 04/11/2022    INR 1.17 (H) 04/05/2022

## 2022-04-13 NOTE — PROGRESS NOTES
Patient seen and care plan discussed with Dr. Melton    [facility]  Cardiovascular and Thoracic Surgery Daily Note          Assessment and Plan:   Anil Montoya is a 36 year old gentleman who presented to [facility] ED on 3/22/22 with weakness, recent hematuria and shortness of breath. Workup demonstrated MSSA bacteremia likely secondary to right groin skin source. TEJINDER on 3/27/22 demonstrated tricuspid valve endocarditis. In completing full work up, there was concern for discitis with seeding however MRI negative. He was transferred on 3/28/22 to [facility] for consideration of surgical intervention.      PMH includes: Poorly controlled diabetic, HTN, hypothyroidism, obesity, WARNER, anxiety, cryptococcal pneumonia in 2012 with previous history of staph aureus bacteremia.     Most recent echo demonstrates LVEF 55-60%, normal RV function, mildly thickened cusps on aortic valve and thickened tricuspid valve leaflets, small mobile filamentous elements on the septal leaflet of TV and larged fixed heterogeneous mass in the RV attached to the interventricular septum.  Coronary angiogram 3/31/22 with no e/o CAD.     Course c/b CHB with accelerated junctional rhythm and polymorphic VT arrest on 4/5 with immediate ROSC after defib x1 and transient CPR.        POD #12 s/p:  1. Tricuspid valve replacement (31mm Epic stented valve)  2. Endoscopic aortic valve exploration  3. Transesophageal echocardiogram on 4/1/22 with Dr. 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 Did not need intubation and neurologically intact. Electrolytes stable Appreciate EP's involvement. Troponin elevated as expected after event.  Echocardiogram on 4/5 stable but poor images to evaluate tricuspid gradient  [PTA meds  on hold: losartan 50 mg daily]  HD stable overnight in accelerated junctional rhythm  - ASA 81 mg daily  - Defer BB given heart block  - Losartan 25 mg daily discontinued d/t OCTAVIO  - No statin indicated  - EP, appreciate recommendations, planning for traditional ICD/PPM due to ongoing CHB/escape junctional rhythm.   - CTs/wires removed 4/7, discussed with EP  -Amlodipine 10mg for HTN, metoprolol started today per EP     -ORTHO/Neuro: Having new lower back pain-resolved, MRI 4/7 showing concern for osteomyelitis involving epidural extension, no intervention at this time, pain control may be bigger issue, repeat MRI spine prior to discontinuation of abx, voltaren ordered for back     Resp:   WARNER  History of cryptococcal pneumonia  Extubated on POD 0, now saturating well on room air  - Continue pulm hygiene efforts: IS/flutter valve/mobility  - Titrate O2 to maintain sats >92%, on RA  - CPAP at HS     Neuro/MSK:    Acute postoperative pain  MDD/VIVEK  - Better controlled with current regimen this AM, received toradol x1  - Sore after a few compressions with CPR but overall tolerable  - Robaxin scheduled, lidocaine patches added  - PTA Wellbutrin resumed  - Sternal incision and area intact and feels stable, wires intact on CXR, continue sternal precautions     Renal/Electrolyte:   Creat stable, below preoperative weight, has less edema  Autodiuresing  - Strict I/O, daily weights  - Avoid/limit nephrotoxins as able, Crea rising appreciate Dr. Meeks seeing patient, Losartan stopped, amlodipine started  - Replete lytes per protocol  - 7 kg below pre-op weight, holding diuretics     GI/Nutrition:    Mildly elevated LFTs  - Tolerating current diet:  Orders Placed This Encounter      Moderate Consistent Carb (60 g CHO per Meal) Diet   - Continue bowel regimen, + BM, + flatus  - LFTs had normalized, AST slightly elevated after arrest, trend  - PPI     :    - Voiding well on own     Endo:  Stress induced hyperglycemia   Poorly  controlled DM2  Hypothyroidism  Preop A1c             Lab Results   Component Value Date     A1C 12.6 2022    [PTA meds on hold: Victoza and metformin]  - Was on insulin infusion, transitioned to sliding scale insulin and lantus 4/2, hyperglycemic, resumed insulin infusion POD 2 with prandial coverage. Continue close monitoring of blood sugars to allow for healing  - Hospitalists managing diabetic regimen, appreciate great management, currently lantus 40 q am, 10 at bedtime with high sliding scale  - Goal BG <180 for optimal healing  - PTA levothyroxine resumed     ID:  Stress induced leukocytosis, resolved  Tricuspid valve endocarditis  MSSA bacteremia  History cryptococcal pneumonia  WBC 8.8<9.8<8.9<8.5<9.5<10.1<11.7<10, Temp (24hrs), Av.4  F (36.9  C), Min:98.1  F (36.7  C), Max:98.8  F (37.1  C); no s/sx infection having new fevers, MRI  concerning for osteomyelitis, consult to ortho/neuro for any further recs-trying to exhaust all resources.   Prelim intraop gram stain positive for yeast from tricuspid valve, culture with 1+ staph aureus, no yeast, continue to follow  BC 4/3 positive for staph aureus, continue to follow  BC / with NGTD thus far  Fungal BC sent on  with NGTD  Full skin inspection  with no new wounds  - Completing perioperative ABX  - Continue Cefazolin per ID, Gentamycin added  for new positive culture  - Micafungin started  per ID for positive yeast on gram stain, discontinued with negative cx on 4/3  - HIV antigen/antibody test negative  - Per ID: repeat blood cultures x 2 sets daily until clear  - Continue to follow fever curve, WBC and inflammatory markers as appropriate     Heme:  Acute blood loss anemia  Acute blood loss thrombocytopenia  Hgb 8.0<7.4<8.4<8.3<7.8, Plts 480<571; no s/sx active bleeding  - CBC in AM  - Goal Hgb >7   -Proph: PPI, subcutaneous heparin, SCDs  -Dispo: CCU, continue therapies, pulm hygiene      Seen and discussed with Dr. Melton              Interval History:   Tolerated ICD placement. Saturating well on room air. Pain controlled. Toelrating diet.          Medications:       amLODIPine  10 mg Oral Daily     aspirin  81 mg Oral or NG Tube Daily     buPROPion  300 mg Oral QAM     diclofenac  4 g Topical 4x Daily     ferrous gluconate  324 mg Oral Daily with breakfast     gabapentin  100 mg Oral TID     insulin aspart   Subcutaneous TID w/meals     insulin aspart  1-10 Units Subcutaneous TID AC     insulin aspart  1-7 Units Subcutaneous At Bedtime     insulin glargine  10 Units Subcutaneous At Bedtime     insulin glargine  40 Units Subcutaneous QAM AC     levothyroxine  125 mcg Oral Once per day on Mon Tue Wed Thu Fri Sat     levothyroxine  250 mcg Oral Weekly     lidocaine  2 patch Transdermal Q24H     lidocaine   Transdermal Q8H     methocarbamol  1,000 mg Oral Q6H     metoprolol succinate ER  25 mg Oral Daily     nafcillin  2 g Intravenous Q4H     pantoprazole  40 mg Oral Daily     polyethylene glycol  17 g Oral BID     senna-docusate  1 tablet Oral BID     @MEDSPRN-@          Physical Exam:   Vitals were reviewed  Blood pressure 128/71, pulse 64, temperature 98.6  F (37  C), temperature source Oral, resp. rate 14, weight 115.7 kg (255 lb 1.6 oz), SpO2 98 %.  Rhythm: CHB/Escape junctional    Lungs: course    Cardiovascular: s1/s2, no m/r/g    Abdomen: s/nt/nd    Extremeties: no LE edema    Incision: cdi    CT: na    Weight:   Vitals:    04/10/22 0400 04/10/22 1627 04/11/22 0500 04/12/22 0609   Weight: 114.9 kg (253 lb 4.9 oz) 115.8 kg (255 lb 6.4 oz) 114.9 kg (253 lb 3.2 oz) 114.1 kg (251 lb 9.6 oz)    04/13/22 0557   Weight: 115.7 kg (255 lb 1.6 oz)            Data:   Labs:   Lab Results   Component Value Date    WBC 9.8 04/12/2022    WBC 6.8 11/24/2017     Lab Results   Component Value Date    RBC 2.57 04/12/2022    RBC 5.16 11/24/2017     Lab Results   Component Value Date    HGB 7.4 04/12/2022    HGB 15.2 11/24/2017     Lab Results   Component  Value Date    HCT 25.1 04/12/2022    HCT 43.7 11/24/2017     No components found for: MCT  Lab Results   Component Value Date    MCV 98 04/12/2022    MCV 85 11/24/2017     Lab Results   Component Value Date    MCH 28.8 04/12/2022    MCH 29.5 11/24/2017     Lab Results   Component Value Date    MCHC 29.5 04/12/2022    MCHC 34.8 11/24/2017     Lab Results   Component Value Date    RDW 16.5 04/12/2022    RDW 12.9 11/24/2017     Lab Results   Component Value Date     04/12/2022     11/24/2017       Last Basic Metabolic Panel:  Lab Results   Component Value Date     04/11/2022     11/24/2017      Lab Results   Component Value Date    POTASSIUM 4.0 04/12/2022    POTASSIUM 3.8 11/24/2017     Lab Results   Component Value Date    CHLORIDE 105 04/11/2022    CHLORIDE 104 11/24/2017     Lab Results   Component Value Date    IMAN 8.5 04/11/2022    IMAN 8.3 11/24/2017     Lab Results   Component Value Date    CO2 27 04/11/2022    CO2 24 11/24/2017     Lab Results   Component Value Date    BUN 22 04/11/2022    BUN 18 11/24/2017     Lab Results   Component Value Date    CR 1.54 04/12/2022    CR 1.07 11/24/2017     Lab Results   Component Value Date     04/12/2022     04/11/2022     11/24/2017         Ofelia Hong PA-C  Pager #: [phone]

## 2022-04-13 NOTE — PLAN OF CARE
Vitals: HTN - started on metoprolol. Hypoglycemic before dinner at 64. Asymptomatic. Recovered with meal.   Lungs: WNL  CV: 3rd degree block CVR, helio after PO metoprolol - V pacing when in 50s.   GI: WNL, obese. Struggling with diet restrictions, room service ordered to assist.    Uro: WNL  CMS: WNL  Neuro: WNL  Skin: Chest incision scabbing and NGUYEN. Pacer dressing dry and intact.   Psych: WNL  MSK: Indp  Pain: Improving back pain. No Oxycodone needed. PRN Tylenol.   Labs: Crt 1.49 - improved. Ph remains high. Hgb 8 - improved   Tests/Procedures: Pending PICC line placement. Order placed.   Other:  CV Surgery, Cardiology, ID, nephrology following.

## 2022-04-13 NOTE — CONSULTS
"CLINICAL NUTRITION SERVICES - REASSESSMENT NOTE    Recommendations Ordered by Registered Dietitian (RD):   - Education provided  - Added room service assistance - associate to check in every couple days.    Malnutrition:  % Weight Loss:  None noted  % Intake:  No decreased intake noted  Subcutaneous Fat Loss:  None observed (4/2)  Muscle Loss:  None observed (4/2)  Fluid Retention:  Mild (trace per RN flowsheets -- likely not nutritionally significant)     Malnutrition Diagnosis: Patient does not meet two of the above criteria necessary for diagnosing malnutrition     RN consult - \"new to diet restrictions for heart and DM. Assist with ordering meals\"    EVALUATION OF PROGRESS TOWARD GOALS   Diet: Moderate CHO + Low Sat Fat + Na <2400 mg  Intake/Tolerance:   - Appetite is great. Has some snacks - nuts - at bedside. Having difficulty ordering with diet limitations.     - Weight is 115.7 kg - stable.   - Stooling: BM x1 yesterday.     Previous Goals:   Patient will continue to consume >/= 75% meals   Evaluation: Met    Previous Nutrition Diagnosis:   No nutrition diagnosis identified at this time  Evaluation: No change - updated below     CURRENT NUTRITION DIAGNOSIS  Food and nutrition-related knowledge deficit related to exposure to heart healthy and diabetic diet as evidenced by patient asks appropriate questions, reported lack of knowledge.     INTERVENTIONS  Recommendations / Nutrition Prescription  Moderate CHO, Low Sat Fat, Na <2400 mg     Implementation  Nutrition Education    Assessed learning needs, learning preferences, and willingness to learn    Nutrition Education (Content):  a) Provided handout   a. Heart healthy guidelines  b. Carbohydrate counting  b) Discussed   a. Carbs  b. Sat Fats  c. Sodium  d. Foods to limit  e. Foods to eat more of  f. Rationale     Nutrition Education (Application):  a) Discussed eating habits and recommended alternative food choices    Patient verbalizes understanding of diet "     Anticipate good compliance    Diet Education - refer to Education Flowsheet    Goals  Intake of >75% meals  Able to list 3 foods high in sat fat, 3 high in CHO, 3 high in sodium.     MONITORING AND EVALUATION:  Progress towards goals will be monitored and evaluated per protocol and Practice Guidelines    Sully Navarrete RD, LD  [facility] [place], 68, Ortho, Ortho Spine  Pager: [phone]  Weekend Pager: [phone]

## 2022-04-13 NOTE — DISCHARGE INSTRUCTIONS
Discharge Instructions for Defibrillator Implantation  You have had a procedure to insert a defibrillator. Once inside your body, this small electronic device helps keep your heart from beating too slowly and will pace or shock you out of dangerous fast heart rhythms. A defibrillator can t fix existing heart problems. But it can help you feel better and have more energy. As you recover, follow all of the instructions you are given, including those below.  Activity  Follow the instructions you are given about limiting your activity.  Do not raise your arm on the incision side above shoulder level for 3 weeks. This gives the device lead wires time to attach securely inside your heart.  Ask your doctor when you can expect to return to work.  You can still exercise. It s good for your body and your heart. Talk with your doctor about an exercise plan.  Other Precautions  Follow your doctor's directions carefully for wound care. You may remove the outer dressing in 3 - 4 days. Leave the steri-strips in place; these will be removed at your 1 week follow-up. Never put any creams, lotions, or products like peroxide on an incision unless your doctor tells you to.   You may shower once the outer dressing has been removed.   Before you receive any treatment, tell all health care providers (including your dentist) that you have a defibrillator.  You will be given an ID card that contains information about your defibrillator. Always carry this card with you. You can show this card if your defibrillator sets off a metal detector. You should also show it to avoid screening with a hand-held security wand.  Keep your cell phone away from your defibrillator. Don t carry the phone in your shirt pocket, even when it s turned off.  Avoid strong magnets. Examples are those used in MRIs or in hand-held security wands.  Avoid strong electrical fields. Examples are those made by radio transmitting towers,  ham  radios, and heavy-duty  electrical equipment.  Avoid leaning over the open pierce of a running car. A running engine creates an electrical field. Most household and yard appliances will not cause any problems. If you use any large power tools, such as an industrial , talk with your doctor.   Follow-Up  Follow up in the device clinic as scheduled. A device nurse will contact you to set up a follow appointment 7-10 days following ICD placement.   Make regular follow-up appointments with your device clinic. They will check the defibrillator to make sure it s working properly.  When to Call Your Device Clinic [phone]  Call your doctor immediately if you have any of the following:  Dizziness  Chest pain  Lack of energy  Fainting spells  Rapid pulse or pounding heartbeat  Shortness of breath  Pain around your defibrillator  Fever above 100.4 F (38 C) or other signs of infection (redness, swelling, drainage, or warmth at the incision site)     [phone] [employer]. [address] [place] [address]. All rights reserved. This information is not intended as a substitute for professional medical care. Always follow your healthcare professional's instructions.    [facility] in [place]: [phone]  Device Clinic (Monday to Friday, 8am-4pm): [phone]  *The device clinic is closed on weekends and holidays.  Any calls received during this time will be answered on the next business  day. For any urgent questions after hours, please call the main clinic number and you will be put in contact with the cardiologist on call.

## 2022-04-13 NOTE — PROGRESS NOTES
Device Discharge  Dressing:  Clean, dry, and intact  Chest XR reviewed:  Yes  Pneumo present?:  No  Lead Measurements per Device Rep:  WNL    Education Provided:  Avoid raising left arm above the level of the shoulder for 3 weeks.  Do not shower until outer occlusive dressing has been removed.  Remove outer dressing in 3 - 4 days.  Leave steri-strips in place, these will be removed at the 1 week check.   Call Device Clinic with increased swelling, drainage, or fever > 101 degrees.   Follow-up with the Device Clinic, appointment scheduled for Wednesday April 20th at 3PM following appointment with CV surgery at 2PM. Will reschedule if patient remains inpatient.     Patient stated he would likely not be discharged until Monday 4/18/2022. Discussed with patient what to if he received a shock from ICD. Patient verbalized understanding.

## 2022-04-14 ENCOUNTER — APPOINTMENT (OUTPATIENT)
Dept: OCCUPATIONAL THERAPY | Facility: CLINIC | Age: 37
DRG: 853 | End: 2022-04-14
Attending: HOSPITALIST
Payer: COMMERCIAL

## 2022-04-14 ENCOUNTER — APPOINTMENT (OUTPATIENT)
Dept: GENERAL RADIOLOGY | Facility: CLINIC | Age: 37
DRG: 853 | End: 2022-04-14
Attending: HOSPITALIST
Payer: COMMERCIAL

## 2022-04-14 LAB
ANION GAP SERPL CALCULATED.3IONS-SCNC: 4 MMOL/L (ref 3–14)
ATRIAL RATE - MUSE: 105 BPM
ATRIAL RATE - MUSE: 98 BPM
ATRIAL RATE - MUSE: 98 BPM
BACTERIA BLD CULT: NO GROWTH
BACTERIA BLD CULT: NO GROWTH
BUN SERPL-MCNC: 18 MG/DL (ref 7–30)
CALCIUM SERPL-MCNC: 8.7 MG/DL (ref 8.5–10.1)
CHLORIDE BLD-SCNC: 109 MMOL/L (ref 94–109)
CO2 SERPL-SCNC: 26 MMOL/L (ref 20–32)
CREAT SERPL-MCNC: 1.45 MG/DL (ref 0.66–1.25)
DIASTOLIC BLOOD PRESSURE - MUSE: NORMAL MMHG
ERYTHROCYTE [DISTWIDTH] IN BLOOD BY AUTOMATED COUNT: 17.2 % (ref 10–15)
GFR SERPL CREATININE-BSD FRML MDRD: 65 ML/MIN/1.73M2
GLUCOSE BLD-MCNC: 93 MG/DL (ref 70–99)
GLUCOSE BLDC GLUCOMTR-MCNC: 144 MG/DL (ref 70–99)
GLUCOSE BLDC GLUCOMTR-MCNC: 71 MG/DL (ref 70–99)
GLUCOSE BLDC GLUCOMTR-MCNC: 80 MG/DL (ref 70–99)
GLUCOSE BLDC GLUCOMTR-MCNC: 82 MG/DL (ref 70–99)
GLUCOSE BLDC GLUCOMTR-MCNC: 89 MG/DL (ref 70–99)
GLUCOSE BLDC GLUCOMTR-MCNC: 95 MG/DL (ref 70–99)
HCT VFR BLD AUTO: 27.5 % (ref 40–53)
HGB BLD-MCNC: 8.1 G/DL (ref 13.3–17.7)
INTERPRETATION ECG - MUSE: NORMAL
MAGNESIUM SERPL-MCNC: 2.5 MG/DL (ref 1.6–2.3)
MCH RBC QN AUTO: 29 PG (ref 26.5–33)
MCHC RBC AUTO-ENTMCNC: 29.5 G/DL (ref 31.5–36.5)
MCV RBC AUTO: 99 FL (ref 78–100)
P AXIS - MUSE: 40 DEGREES
P AXIS - MUSE: 55 DEGREES
P AXIS - MUSE: NORMAL DEGREES
PHOSPHATE SERPL-MCNC: 6.5 MG/DL (ref 2.5–4.5)
PLATELET # BLD AUTO: 434 10E3/UL (ref 150–450)
POTASSIUM BLD-SCNC: 3.7 MMOL/L (ref 3.4–5.3)
POTASSIUM BLD-SCNC: 3.7 MMOL/L (ref 3.4–5.3)
PR INTERVAL - MUSE: NORMAL MS
QRS DURATION - MUSE: 100 MS
QRS DURATION - MUSE: 100 MS
QRS DURATION - MUSE: 96 MS
QT - MUSE: 394 MS
QT - MUSE: 424 MS
QT - MUSE: 436 MS
QTC - MUSE: 465 MS
QTC - MUSE: 521 MS
QTC - MUSE: 524 MS
R AXIS - MUSE: 64 DEGREES
R AXIS - MUSE: 65 DEGREES
R AXIS - MUSE: 66 DEGREES
RBC # BLD AUTO: 2.79 10E6/UL (ref 4.4–5.9)
SODIUM SERPL-SCNC: 139 MMOL/L (ref 133–144)
SYSTOLIC BLOOD PRESSURE - MUSE: NORMAL MMHG
T AXIS - MUSE: 70 DEGREES
T AXIS - MUSE: 87 DEGREES
T AXIS - MUSE: 90 DEGREES
VENTRICULAR RATE- MUSE: 84 BPM
VENTRICULAR RATE- MUSE: 86 BPM
VENTRICULAR RATE- MUSE: 92 BPM
WBC # BLD AUTO: 7.7 10E3/UL (ref 4–11)

## 2022-04-14 PROCEDURE — 97535 SELF CARE MNGMENT TRAINING: CPT | Mod: GO

## 2022-04-14 PROCEDURE — 250N000012 HC RX MED GY IP 250 OP 636 PS 637: Performed by: PHYSICIAN ASSISTANT

## 2022-04-14 PROCEDURE — 36569 INSJ PICC 5 YR+ W/O IMAGING: CPT

## 2022-04-14 PROCEDURE — 999N000040 HC STATISTIC CONSULT NO CHARGE VASC ACCESS

## 2022-04-14 PROCEDURE — 97110 THERAPEUTIC EXERCISES: CPT | Mod: GO

## 2022-04-14 PROCEDURE — 250N000013 HC RX MED GY IP 250 OP 250 PS 637: Performed by: PHYSICIAN ASSISTANT

## 2022-04-14 PROCEDURE — 36415 COLL VENOUS BLD VENIPUNCTURE: CPT | Performed by: PHYSICIAN ASSISTANT

## 2022-04-14 PROCEDURE — 210N000001 HC R&B IMCU HEART CARE

## 2022-04-14 PROCEDURE — 99233 SBSQ HOSP IP/OBS HIGH 50: CPT | Performed by: INTERNAL MEDICINE

## 2022-04-14 PROCEDURE — 85027 COMPLETE CBC AUTOMATED: CPT | Performed by: PHYSICIAN ASSISTANT

## 2022-04-14 PROCEDURE — 83735 ASSAY OF MAGNESIUM: CPT | Performed by: INTERNAL MEDICINE

## 2022-04-14 PROCEDURE — 250N000013 HC RX MED GY IP 250 OP 250 PS 637: Performed by: INTERNAL MEDICINE

## 2022-04-14 PROCEDURE — 999N000065 XR CHEST 1 VIEW

## 2022-04-14 PROCEDURE — 84100 ASSAY OF PHOSPHORUS: CPT | Performed by: INTERNAL MEDICINE

## 2022-04-14 PROCEDURE — 272N000450 HC KIT 4FR POWER PICC SINGLE LUMEN

## 2022-04-14 PROCEDURE — 250N000009 HC RX 250: Performed by: PHYSICIAN ASSISTANT

## 2022-04-14 PROCEDURE — 83036 HEMOGLOBIN GLYCOSYLATED A1C: CPT | Performed by: INTERNAL MEDICINE

## 2022-04-14 PROCEDURE — 84132 ASSAY OF SERUM POTASSIUM: CPT | Performed by: INTERNAL MEDICINE

## 2022-04-14 PROCEDURE — 80048 BASIC METABOLIC PNL TOTAL CA: CPT | Performed by: PHYSICIAN ASSISTANT

## 2022-04-14 PROCEDURE — 250N000013 HC RX MED GY IP 250 OP 250 PS 637: Performed by: NURSE PRACTITIONER

## 2022-04-14 RX ORDER — NAFCILLIN SODIUM 2 G/1
2 INJECTION, POWDER, FOR SOLUTION INTRAVENOUS EVERY 4 HOURS
DISCHARGE
Start: 2022-04-14 | End: 2022-04-18

## 2022-04-14 RX ORDER — LORATADINE 10 MG/1
10 TABLET ORAL DAILY
Status: DISCONTINUED | OUTPATIENT
Start: 2022-04-14 | End: 2022-04-18 | Stop reason: HOSPADM

## 2022-04-14 RX ORDER — CARVEDILOL 3.12 MG/1
3.12 TABLET ORAL 2 TIMES DAILY WITH MEALS
Status: DISCONTINUED | OUTPATIENT
Start: 2022-04-14 | End: 2022-04-18 | Stop reason: HOSPADM

## 2022-04-14 RX ADMIN — NAFCILLIN 2 G: 2 POWDER, FOR SOLUTION INTRAMUSCULAR; INTRAVENOUS at 21:34

## 2022-04-14 RX ADMIN — NAFCILLIN 2 G: 2 POWDER, FOR SOLUTION INTRAMUSCULAR; INTRAVENOUS at 00:30

## 2022-04-14 RX ADMIN — METHOCARBAMOL 1000 MG: 500 TABLET, FILM COATED ORAL at 16:45

## 2022-04-14 RX ADMIN — SENNOSIDES AND DOCUSATE SODIUM 1 TABLET: 50; 8.6 TABLET ORAL at 08:01

## 2022-04-14 RX ADMIN — ASPIRIN 81 MG CHEWABLE TABLET 81 MG: 81 TABLET CHEWABLE at 08:01

## 2022-04-14 RX ADMIN — OXYCODONE HYDROCHLORIDE 5 MG: 5 TABLET ORAL at 13:49

## 2022-04-14 RX ADMIN — ACETAMINOPHEN 650 MG: 325 TABLET ORAL at 20:50

## 2022-04-14 RX ADMIN — METOPROLOL SUCCINATE 25 MG: 25 TABLET, EXTENDED RELEASE ORAL at 09:01

## 2022-04-14 RX ADMIN — ACETAMINOPHEN 650 MG: 325 TABLET ORAL at 12:43

## 2022-04-14 RX ADMIN — LEVOTHYROXINE SODIUM 125 MCG: 125 TABLET ORAL at 05:09

## 2022-04-14 RX ADMIN — NAFCILLIN 2 G: 2 POWDER, FOR SOLUTION INTRAMUSCULAR; INTRAVENOUS at 09:00

## 2022-04-14 RX ADMIN — GABAPENTIN 100 MG: 100 CAPSULE ORAL at 08:01

## 2022-04-14 RX ADMIN — LIDOCAINE 2 PATCH: 560 PATCH PERCUTANEOUS; TOPICAL; TRANSDERMAL at 08:04

## 2022-04-14 RX ADMIN — AMLODIPINE BESYLATE 10 MG: 10 TABLET ORAL at 09:01

## 2022-04-14 RX ADMIN — FERROUS GLUCONATE 324 MG: 324 TABLET ORAL at 09:01

## 2022-04-14 RX ADMIN — METHOCARBAMOL 1000 MG: 500 TABLET, FILM COATED ORAL at 09:01

## 2022-04-14 RX ADMIN — LORATADINE 10 MG: 10 TABLET ORAL at 16:45

## 2022-04-14 RX ADMIN — INSULIN GLARGINE 40 UNITS: 100 INJECTION, SOLUTION SUBCUTANEOUS at 08:05

## 2022-04-14 RX ADMIN — ACETAMINOPHEN 650 MG: 325 TABLET ORAL at 08:02

## 2022-04-14 RX ADMIN — METHOCARBAMOL 1000 MG: 500 TABLET, FILM COATED ORAL at 02:07

## 2022-04-14 RX ADMIN — NAFCILLIN 2 G: 2 POWDER, FOR SOLUTION INTRAMUSCULAR; INTRAVENOUS at 13:49

## 2022-04-14 RX ADMIN — ACETAMINOPHEN 650 MG: 325 TABLET ORAL at 02:07

## 2022-04-14 RX ADMIN — NAFCILLIN 2 G: 2 POWDER, FOR SOLUTION INTRAMUSCULAR; INTRAVENOUS at 09:09

## 2022-04-14 RX ADMIN — DICLOFENAC SODIUM 4 G: 10 GEL TOPICAL at 12:43

## 2022-04-14 RX ADMIN — BUPROPION HYDROCHLORIDE 300 MG: 300 TABLET, EXTENDED RELEASE ORAL at 09:01

## 2022-04-14 RX ADMIN — METHOCARBAMOL 1000 MG: 500 TABLET, FILM COATED ORAL at 22:49

## 2022-04-14 RX ADMIN — DICLOFENAC SODIUM 4 G: 10 GEL TOPICAL at 08:16

## 2022-04-14 RX ADMIN — CARVEDILOL 3.12 MG: 3.12 TABLET, FILM COATED ORAL at 17:29

## 2022-04-14 RX ADMIN — NAFCILLIN 2 G: 2 POWDER, FOR SOLUTION INTRAMUSCULAR; INTRAVENOUS at 05:08

## 2022-04-14 RX ADMIN — GABAPENTIN 100 MG: 100 CAPSULE ORAL at 13:49

## 2022-04-14 RX ADMIN — NAFCILLIN 2 G: 2 POWDER, FOR SOLUTION INTRAMUSCULAR; INTRAVENOUS at 17:29

## 2022-04-14 RX ADMIN — GABAPENTIN 100 MG: 100 CAPSULE ORAL at 20:50

## 2022-04-14 RX ADMIN — PANTOPRAZOLE SODIUM 40 MG: 40 TABLET, DELAYED RELEASE ORAL at 08:01

## 2022-04-14 RX ADMIN — MELATONIN TAB 3 MG 3 MG: 3 TAB at 22:49

## 2022-04-14 RX ADMIN — ACETAMINOPHEN 650 MG: 325 TABLET ORAL at 16:45

## 2022-04-14 ASSESSMENT — ACTIVITIES OF DAILY LIVING (ADL)
ADLS_ACUITY_SCORE: 3

## 2022-04-14 NOTE — CONSULTS
Met with patient regarding discharge planning.  Patient did meet with queenie Shell for [facility] regarding plan for IV antibiotics upon discharge.   Pt is independent at baseline. Patient requesting that lab results and any other relevant info be faxed to his endocrinologist: Dr. Gan.  Writer will contact clinic and get fax number to fax info upon discharge and continue to follow for any other needs at discharge.    Maylin Ha RN  Care Coordinator  [facility]  [phone] (text or call)

## 2022-04-14 NOTE — PROGRESS NOTES
[facility]     Received referral for IV abx yesterday from [facility]. Anil has coverage for IV Abx in the home. Deductible: $2350 (Met: $241.77), Co-Ins: 70/30, OOP max: $4850 (Met: $241.77). Once annual out of pocket max is met, Anil will have 100% coverage for home IV abx.     I met with Anil at bedside to introduce home infusion, review benefits, and offer choice of agency. Pt would like to proceed with [facility] for home IV abx needs. If pt discharges on nafcillin q4, the pt would prefer to dose his nafcillin continuously vs q4 hours for ease of home dosing regime. I will plan to do bedside education with Anil on day of discharge.      Thank you for the referral     Sumaya Powers RN  [facility] Liaison  [phone] (Mon thru Fri 8am - 5pm)  [phone] Office

## 2022-04-14 NOTE — PROGRESS NOTES
Patient seen and care plan discussed with Dr. Yepez    [facility]  Cardiovascular and Thoracic Surgery Daily Note          Assessment and Plan:   Anil Montoya is a 36 year old gentleman who presented to [facility] ED on 3/22/22 with weakness, recent hematuria and shortness of breath. Workup demonstrated MSSA bacteremia likely secondary to right groin skin source. TEJINDER on 3/27/22 demonstrated tricuspid valve endocarditis. In completing full work up, there was concern for discitis with seeding however MRI negative. He was transferred on 3/28/22 to [facility] for consideration of surgical intervention.      PMH includes: Poorly controlled diabetic, HTN, hypothyroidism, obesity, WARNER, anxiety, cryptococcal pneumonia in 2012 with previous history of staph aureus bacteremia.     Most recent echo demonstrates LVEF 55-60%, normal RV function, mildly thickened cusps on aortic valve and thickened tricuspid valve leaflets, small mobile filamentous elements on the septal leaflet of TV and larged fixed heterogeneous mass in the RV attached to the interventricular septum.  Coronary angiogram 3/31/22 with no e/o CAD.     Course c/b CHB with accelerated junctional rhythm and polymorphic VT arrest on 4/5 with immediate ROSC after defib x1 and transient CPR.        POD #13 s/p:  1. Tricuspid valve replacement (31mm Epic stented valve)  2. Endoscopic aortic valve exploration  3. Transesophageal echocardiogram on 4/1/22 with Dr. 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 Did not need intubation and neurologically intact. Electrolytes stable Appreciate EP's involvement. Troponin elevated as expected after event.  Echocardiogram on 4/5 stable but poor images to evaluate tricuspid gradient  [PTA meds  on hold: losartan 50 mg daily]  HD stable overnight in accelerated junctional rhythm  - ASA 81 mg daily  - Defer BB given heart block  - Losartan 25 mg daily discontinued d/t OCTAVIO  - No statin indicated  - EP, appreciate recommendations, planning for traditional ICD/PPM due to ongoing CHB/escape junctional rhythm.   - CTs/wires removed 4/7, discussed with EP  -Amlodipine 10mg for HTN, metoprolol started 4/13 per EP  -Metoprolol transitioned to carvedilol 4/14 for more blood pressure control.      -ORTHO/Neuro: Having new lower back pain-resolved, MRI 4/7 showing concern for osteomyelitis involving epidural extension, no intervention at this time, pain control may be bigger issue, repeat MRI spine prior to discontinuation of abx, voltaren ordered for back     Resp:   WARNER  History of cryptococcal pneumonia  Extubated on POD 0, now saturating well on room air  - Continue pulm hygiene efforts: IS/flutter valve/mobility  - Titrate O2 to maintain sats >92%, on RA  - CPAP at HS     Neuro/MSK:    Acute postoperative pain  MDD/VIVEK  - Better controlled with current regimen this AM, received toradol x1  - Sore after a few compressions with CPR but overall tolerable  - Robaxin scheduled, lidocaine patches added  - PTA Wellbutrin resumed  - Sternal incision and area intact and feels stable, wires intact on CXR, continue sternal precautions     Renal/Electrolyte:   Creat stable, below preoperative weight, has less edema  Autodiuresing  - Strict I/O, daily weights  - Avoid/limit nephrotoxins as able,   -OCTAVIO Nephrology curbsided and thought to be from aminoglycoside. Losartan stopped, amlodipine started  - Replete lytes per protocol  - 5 kg below pre-op weight, holding diuretics     GI/Nutrition:    Mildly elevated LFTs  - Tolerating current diet:  Orders Placed This Encounter      Moderate Consistent Carb (60 g CHO per Meal) Diet   - Continue bowel regimen, + BM, + flatus  - LFTs had normalized, AST slightly elevated after arrest,  trend  - PPI     :    - Voiding well on own     Endo:  Stress induced hyperglycemia   Poorly controlled DM2  Hypothyroidism  Preop A1c             Lab Results   Component Value Date     A1C 12.6 2022    [PTA meds on hold: Victoza and metformin]  - Was on insulin infusion, transitioned to sliding scale insulin and lantus 4/, hyperglycemic, resumed insulin infusion POD 2 with prandial coverage. Continue close monitoring of blood sugars to allow for healing  - Hospitalists managing diabetic regimen, appreciate great management, currently lantus 40 q am, 10 at bedtime with high sliding scale  - Goal BG <180 for optimal healing  - PTA levothyroxine resumed     ID:  Stress induced leukocytosis, resolved  Tricuspid valve endocarditis  MSSA bacteremia  History cryptococcal pneumonia  WBC 7.7<8.8<9.8<8.9<8.5<9.5<10.1<11.7<10, Temp (24hrs), Av.4  F (36.9  C), Min:98.1  F (36.7  C), Max:98.8  F (37.1  C); no s/sx infection having new fevers, MRI  concerning for osteomyelitis, consult to ortho/neuro for any further recs-trying to exhaust all resources.   Prelim intraop gram stain positive for yeast from tricuspid valve, culture with 1+ staph aureus, no yeast, continue to follow  BC 4/3 positive for staph aureus, continue to follow  BC  with NGTD thus far  Fungal BC sent on  with NGTD  Full skin inspection  with no new wounds  - Completed perioperative ABX  - Continue Cefazolin per ID, Gentamycin added  for new positive culture --discontinued  - Micafungin started  per ID for positive yeast on gram stain, discontinued with negative cx on 4/3  - HIV antigen/antibody test negative  - Per ID: repeat blood cultures x 2 sets daily until clear  - Continue to follow fever curve, WBC and inflammatory markers as appropriate  - PICC line placed   -Currently on nafcillin until      Heme:  Acute blood loss anemia  Acute blood loss thrombocytopenia  Hgb 8.1< 8.0<7.4<8.4<8.3<7.8, Plts 480<571; no s/sx  active bleeding  - CBC in AM  - Goal Hgb >7   -Proph: PPI, subcutaneous heparin, SCDs  -Dispo: CCU, continue therapies, pulm hygiene-- called wife for update on 4/14-- wife would like to talk with someone in social work to discuss the possibility of TCU options for him.            Interval History:   No acute events overnight. Saturating well on room air. Pain controlled. Tolerating diet. +BM. Getting PICC line today.          Medications:       amLODIPine  10 mg Oral Daily     aspirin  81 mg Oral or NG Tube Daily     buPROPion  300 mg Oral QAM     carvedilol  3.125 mg Oral BID w/meals     ferrous gluconate  324 mg Oral Daily with breakfast     gabapentin  100 mg Oral TID     insulin aspart   Subcutaneous TID w/meals     insulin aspart  1-10 Units Subcutaneous TID AC     insulin aspart  1-7 Units Subcutaneous At Bedtime     [START ON 4/15/2022] insulin glargine  10 Units Subcutaneous QAM AC     levothyroxine  125 mcg Oral Once per day on Mon Tue Wed Thu Fri Sat     levothyroxine  250 mcg Oral Weekly     lidocaine  2 patch Transdermal Q24H     lidocaine   Transdermal Q8H     loratadine  10 mg Oral Daily     methocarbamol  1,000 mg Oral Q6H     nafcillin  2 g Intravenous Q4H     pantoprazole  40 mg Oral Daily     polyethylene glycol  17 g Oral BID     senna-docusate  1 tablet Oral BID     sodium chloride (PF)  10-40 mL Intracatheter Q8H     @MEDSPRN-@          Physical Exam:   Vitals were reviewed  Blood pressure 132/73, pulse 60, temperature 97.7  F (36.5  C), temperature source Oral, resp. rate 18, weight 116.7 kg (257 lb 3.2 oz), SpO2 95 %.  Rhythm: CHB/Escape junctional    Lungs: course    Cardiovascular: s1/s2, no m/r/g    Abdomen: s/nt/nd    Extremeties: no LE edema    Incision: cdi    CT: na    Weight:   Vitals:    04/10/22 1627 04/11/22 0500 04/12/22 0609 04/13/22 0557   Weight: 115.8 kg (255 lb 6.4 oz) 114.9 kg (253 lb 3.2 oz) 114.1 kg (251 lb 9.6 oz) 115.7 kg (255 lb 1.6 oz)    04/14/22 0648   Weight: 116.7  kg (257 lb 3.2 oz)     Lab Results   Component Value Date    WBC 7.7 04/14/2022    WBC 6.8 11/24/2017     Lab Results   Component Value Date    RBC 2.79 04/14/2022    RBC 5.16 11/24/2017     Lab Results   Component Value Date    HGB 8.1 04/14/2022    HGB 15.2 11/24/2017     Lab Results   Component Value Date    HCT 27.5 04/14/2022    HCT 43.7 11/24/2017     No components found for: MCT  Lab Results   Component Value Date    MCV 99 04/14/2022    MCV 85 11/24/2017     Lab Results   Component Value Date    MCH 29.0 04/14/2022    MCH 29.5 11/24/2017     Lab Results   Component Value Date    MCHC 29.5 04/14/2022    MCHC 34.8 11/24/2017     Lab Results   Component Value Date    RDW 17.2 04/14/2022    RDW 12.9 11/24/2017     Lab Results   Component Value Date     04/14/2022     11/24/2017     Last Comprehensive Metabolic Panel:  Sodium   Date Value Ref Range Status   04/14/2022 139 133 - 144 mmol/L Final   11/24/2017 137 133 - 144 mmol/L Final     Potassium   Date Value Ref Range Status   04/14/2022 3.7 3.4 - 5.3 mmol/L Final   04/14/2022 3.7 3.4 - 5.3 mmol/L Final   11/24/2017 3.8 3.4 - 5.3 mmol/L Final     Chloride   Date Value Ref Range Status   04/14/2022 109 94 - 109 mmol/L Final   11/24/2017 104 94 - 109 mmol/L Final     Carbon Dioxide   Date Value Ref Range Status   11/24/2017 24 20 - 32 mmol/L Final     Carbon Dioxide (CO2)   Date Value Ref Range Status   04/14/2022 26 20 - 32 mmol/L Final     Anion Gap   Date Value Ref Range Status   04/14/2022 4 3 - 14 mmol/L Final   11/24/2017 9 3 - 14 mmol/L Final     Glucose   Date Value Ref Range Status   04/14/2022 93 70 - 99 mg/dL Final   11/24/2017 270 (H) 70 - 99 mg/dL Final     GLUCOSE BY METER POCT   Date Value Ref Range Status   04/14/2022 95 70 - 99 mg/dL Final     Urea Nitrogen   Date Value Ref Range Status   04/14/2022 18 7 - 30 mg/dL Final   11/24/2017 18 7 - 30 mg/dL Final     Creatinine   Date Value Ref Range Status   04/14/2022 1.45 (H) 0.66 - 1.25  mg/dL Final   11/24/2017 1.07 0.66 - 1.25 mg/dL Final     GFR Estimate   Date Value Ref Range Status   04/14/2022 65 >60 mL/min/1.73m2 Final     Comment:     Effective December 21, 2021 eGFRcr in adults is calculated using the 2021 CKD-EPI creatinine equation which includes age and gender (Boogie et al., NEJ, DOI: 10.1056/JCETpv8728905)   11/24/2017 81 >60 mL/min/1.7m2 Final     Comment:     Non  GFR Calc     Calcium   Date Value Ref Range Status   04/14/2022 8.7 8.5 - 10.1 mg/dL Final   11/24/2017 8.3 (L) 8.5 - 10.1 mg/dL Final       Ofelia Hong PA-C  Pager #: [phone]

## 2022-04-14 NOTE — PLAN OF CARE
Goal Outcome Evaluation:    Pt A x O x 4. Received tylenol and robaxin for lower back pain. Oxygen saturation >90% on RA/CPAP. SR/ V paced on tele. Received iv abx. ACHS blood glucose checks. Pacer dressing CDI. Ambulating independently in room. Plan to monitor labs and  PICC placement in AM. Pt educated on poc, cares, medications and safety. Will continue to monitor.

## 2022-04-14 NOTE — PROCEDURES
[facility]    Single Lumen PICC Placement    Date/Time: 4/14/2022 1:36 PM  Performed by: Ganesh Cook RN  Authorized by: Esther Damon MD   Indications: vascular access      UNIVERSAL PROTOCOL   Site Marked: Yes  Prior Images Obtained and Reviewed:  Yes  Required items: Required blood products, implants, devices and special equipment available    Patient identity confirmed:  Verbally with patient, arm band, provided demographic data and hospital-assigned identification number  NA - No sedation, light sedation, or local anesthesia  Confirmation Checklist:  Patient's identity using two indicators, correct equipment/implants were available, relevant allergies and procedure was appropriate and matched the consent or emergent situation  Time out: Immediately prior to the procedure a time out was called    Universal Protocol: the Joint Commission Universal Protocol was followed    Preparation: Patient was prepped and draped in usual sterile fashion    ESBL (mL):  2     ANESTHESIA    Local Anesthetic: Lidocaine 1% without epinephrine  Anesthetic Total (mL):  2      SEDATION    Patient Sedated: No        Preparation: skin prepped with ChloraPrep  Skin prep agent: skin prep agent completely dried prior to procedure  Sterile barriers: maximum sterile barriers were used: cap, mask, sterile gown, sterile gloves, and large sterile sheet  Hand hygiene: hand hygiene performed prior to central venous catheter insertion  Type of line used: Power PICC  Catheter type: single lumen  Catheter size: 4 Fr  Brand: Bard  Placement method: ultrasound  Number of attempts: 1  Difficulty threading catheter: no  Successful placement: yes  Orientation: right    Location: basilic vein  Arm circumference: adults 10 cm  Extremity circumference: 37  Visible catheter length: 4  Total catheter length: 50  Dressing and securement: chlorhexidine disc applied, securement device and sterile dressing applied  Post procedure  assessment: blood return through all ports and placement verified by x-ray  PROCEDURE   Patient Tolerance:  Patient tolerated the procedure well with no immediate complications   picc placed without difficulty. X-ray complete. PICC located in Mid SVC. Okay to use, and bedside RN notified.

## 2022-04-14 NOTE — PLAN OF CARE
[id number] A&O x 4. Patient occasionally c/o lower back pain - relieved with tylenol, valsartan cream. Oxycodone given x1 for pain exacerbated from lying flat.. VSS, on RA. Up independently, ambulated in medeiros x2 this shift. Sternum incision and PPM site, WDL. Tele: SR/occasionally V paced. Intermittent IV abx. PICC placed today, cxray confirmed placement. Continue to Monitor.

## 2022-04-14 NOTE — PROGRESS NOTES
[facility]    Infectious Disease Progress Note    Date of Service : 04/14/2022     Assessment:  36YM with uncontrolled diabetes and HbA1c of >12%, and prior cryptococcal pneumonia 10 years ago without HIV diagnosis, who is hospitalized with high grade prolonged MSSA bacteremia since 3/22 with tricuspid valve endocarditis. Possible secondary seeding of the lumbar spine initial MRI was negative and back pain was  Gone but then started complaining of back pain again on 4/ 6, MRI 4/7 possible osteo.    He is s/p tricuspid valve replacement surgery and aortic valve exploration for control of infection on 04/01 with positive gram stain and culture of tricuspid valve tissue for staph aureus.  Yeast also seen on stain but read corrected later to no yeast.  Micafungin discontinued as Micro results  updated by lab. Initial read of 1+ yeast on valve tissue on 4/1 was corrected and updated. Only staph aureus on valve tissue cx, no yeast seen on stain.    On 4/5, he suffered a ventricular tachycardia/fibrillation arrest.  ROSC achieved after one round of CPR and one shock.  Alert and neurologically intact following.   On 4/ 6 back pain again,   MRI lumbar spine done on 4/ 7 :   Interval development of en bloc abnormal T2 signal hyperintensity  and abnormal contrast enhancement involving the lower L5 vertebral  body, S1 vertebral body and right sacral ala and anterior epidural  space from mid L4 down to mid S2 worrisome for an infectious process  such as osteomyelitis with epidural extension of infection     Recommendations:  1. Last positive blood cultures on 4/3 1 blood cx + on 4/5 , follow up since then have been negative.   2. On nafcillin. LOT 5/ 17 ( OPIV antibiotics orders are in the chart)   3. S/P ICD placement, PICC ok to place   Follow clinical status and labs    Sreedhar Bell MD    Interval History   Resting, tolerating antibiotics ok   Afebrile     Physical Exam   Temp: 97.8  F (36.6  C) Temp  src: Oral BP: 139/85 Pulse: 73   Resp: 16 SpO2: 100 % O2 Device: None (Room air)    Vitals:    04/12/22 0609 04/13/22 0557 04/14/22 0648   Weight: 114.1 kg (251 lb 9.6 oz) 115.7 kg (255 lb 1.6 oz) 116.7 kg (257 lb 3.2 oz)     Vital Signs with Ranges  Temp:  [97.8  F (36.6  C)-98.9  F (37.2  C)] 97.8  F (36.6  C)  Pulse:  [55-73] 73  Resp:  [14-19] 16  BP: (128-160)/(66-85) 139/85  SpO2:  [97 %-100 %] 100 %    Constitutional: Awake, alert, cooperative, no apparent distress  Lungs: Clear to auscultation bilaterally, no crackles or wheezing  Cardiovascular: S1S2, sternal surgical site intact  Abdomen:  soft,non-tender  Skin: No rash    Other:    Medications     dextrose       BETA BLOCKER NOT PRESCRIBED       sodium chloride Stopped (04/10/22 0800)       amLODIPine  10 mg Oral Daily     aspirin  81 mg Oral or NG Tube Daily     buPROPion  300 mg Oral QAM     diclofenac  4 g Topical 4x Daily     ferrous gluconate  324 mg Oral Daily with breakfast     gabapentin  100 mg Oral TID     insulin aspart   Subcutaneous TID w/meals     insulin aspart  1-10 Units Subcutaneous TID AC     insulin aspart  1-7 Units Subcutaneous At Bedtime     insulin glargine  10 Units Subcutaneous At Bedtime     insulin glargine  40 Units Subcutaneous QAM AC     levothyroxine  125 mcg Oral Once per day on Mon Tue Wed Thu Fri Sat     levothyroxine  250 mcg Oral Weekly     lidocaine  2 patch Transdermal Q24H     lidocaine   Transdermal Q8H     methocarbamol  1,000 mg Oral Q6H     metoprolol succinate ER  25 mg Oral Daily     nafcillin  2 g Intravenous Q4H     pantoprazole  40 mg Oral Daily     polyethylene glycol  17 g Oral BID     senna-docusate  1 tablet Oral BID       Data   All microbiology laboratory data reviewed.  Recent Labs   Lab Test 04/14/22  0610 04/13/22  0613 04/12/22  0559   WBC 7.7 8.8 9.8   HGB 8.1* 8.0* 7.4*   HCT 27.5* 27.1* 25.1*   MCV 99 96 98    436 480*     Recent Labs   Lab Test 04/14/22  0610 04/13/22  0613  04/12/22  0559   CR 1.45* 1.49* 1.41*  1.54*

## 2022-04-14 NOTE — PROGRESS NOTES
[facility]    Internal Medicine Hospitalist Progress Note  04/14/2022  I evaluated patient on the above date.    Juan Alberto Valdovinos Jr., MD  [phone] (p)  Text Page  Vocera        Assessment & Plan New actions/orders today (04/14/2022) are underlined.    Anil Montoya is a 36 year old male with hx including obesity, DM2, HTN, WARNER, depression/anxiety and h/o cryptococcal pneumonia (2012); who initially presented to [facility] 3/22/2022 with generalized weakness and found with evidence of sepsis, initially felt due to pneumonia. Started on antibiotics and admitted. Subsequently found with MSSA bacteremia with multiple positive cultures. 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 Subsequently transferred to [facility] 3/28/2022 for management.      MSSA bacteremia with sepsis.  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  Lumbar spine infection involving L5 vertebral body, S1 vertebral body, right sacral ala and anterior epidural space (from mid L4 down to mid S2), suspect due to above.  * Presented to [facility] on 3/22 with generalized weakness, malaise, and myalgias. Signs of sepsis. CT abdominal and pelvis 3/22 showed patchy consolidation at the left lung base suggestive for pneumonia. Initially treated with ceftriaxone and azithromycin for possible community-acquired pneumonia. Subsequently, multiple BC's positive for MSSA. ID was consulted. Antibiotics changed to cefazolin 3/24. MR lumbar spine 3/24 and TTE 3/26 were unrevealing as to source. However, TEJINDER 3/27 showed thickened tricuspid valve leaflets with atrial aspect of the septal leaflet with an irregular border and small mobile filamentous elements; large fixed heterogeneous mass in the right ventricle attached to the interventricular septum (2.5 x 2 cm) appearing connected to the tricuspid subvalvular annular apparatus with a pedunculated round mobile  element (1.8 x 1 cm); no significant tricuspid regurgitation reported; LVEF 55 to 60%; negative bubble study. Transferred to [facility] 3/28 for management.  * Continued on cefazolin on admit. CVS and ID consulted on admit.  * 3/29: CT dental 3/29 negative.  * 4/1: Underwent bioprosthetic tricuspid valve replacement and endoscopic aortic valve exploration on 4/1. Initially micafungin added on 4/1 as gram stain from tissue from heart valve from 4/1 initially was reported as yeast, which was subsequently corrected as not to be present.  * 4/3: Micafungin stopped.    * 4/4: EP consulted for complete heart block (see below).  * 4/5: Started on gentamicin. Suffered vfib arrest, see below.  * 4/7: MRI lumbar spine showed interval development of en bloc abnormal T2 signal hyperintensity and abnormal contrast enhancement involving the lower L5 vertebral body, S1 vertebral body and right sacral ala and anterior epidural  space from mid L4 down to mid S2 worrisome for an infectious process such as osteomyelitis with epidural extension of infection, no definite evidence for discitis at the L5-S1 level although given presumed infection of surrounding tissues, L5-S1 discitis is not excluded and continued surveillance recommended. Cefazolin changed to nafcillin.  * 4/8: Neurosurgery and Ortho Spine consulted.  * 4/10: Gentamicin stopped.  * 4/14: PICC placed.  - Post-op management per CVS.  - Continue nafcillin (started 4/7) - plan stop after 4/26.  - Continue ASA.  - Monitor cultures (negative since 4/3).  - Appreciate CVS and ID help.  - Pain management as below.    Ventricular fibrillation arrest (4/5/2022), s/p ICD placement 4/12/2022.  Post-op complete heart block.  * Noted with second degree heart block and subsequently complete heart block postop with subsequently accelerated rhythm; this required management with an epicardial AV pacer. EP consulted.  * 4/5: Patient was noted to have V. fib on telemetry. Required brief CPR  and 1 shock of 120 J with successful restoration of regular rhythm. Electrolytes were within normal limits. Echo 4/5 showed normal LVEF, no significant TR seen, technically difficult study.  * 4/12: Underwent PPM-ICD placement.  - Follow-up with Cardiology.      DM2 without complications, uncontrolled.  Hypoglycemic episodes.  [PTA: liraglutide 1.8 mg daily; metformin 1000 mg BID.]  * Noted that pt had not been taking liraglutide since early 2022 as he was working out and trying to lose weight; he did this on his own.   * Hgb A1c 12.6 3/27/2022.  * On insulin gtt periop, subsequently transitioned to subcutaneous insulin 4/7.  * 4/13: Low glucose level late afternoon.  * 4/14: Pt expressed desire to not be on insulin as he works as a  and they do not allow this.  Recent Labs   Lab 04/14/22  1121 04/14/22  0746 04/14/22  0610 04/14/22  0514 04/14/22  0150 04/13/22  2125   GLC 95 80 93 82 71 143*   - Change moderate --> low CHO diet.  - Decrease glargine 40U --> 10U qam.  - Stop glargine 10U at bedtime.  - Continue aspart 1U/10 gm CHO with meals.  - Continue aspart ISS (high).  - Continue PRN hypoglycemia protocol.  - Plan restart metformin when renal function normalized.  - Plan resume liraglutide at discharge.     Acute kidney injury, question from medications.  Hyperphosphatemia and hypermagnesemia suspect related to acute kidney injury.  * Cr normal on admit to [facility].  * Phos increased starting 4/5.  * Cr normal this hospitalization until 4/10 at which time, cr increased to 1.31. Nephrology consulted 4/10 and suspected from gentamicin (was started 4/5); gent subsequently stopped 4/10. Losartan stopped 4/10.  * Mg increased starting 4/11.  Recent Labs   Lab 04/14/22  0610 04/13/22  0613 04/12/22  0559 04/11/22  0540 04/10/22  0530 04/08/22  0626   CR 1.45* 1.49* 1.41*  1.54* 1.29* 1.31* 0.99     Recent Labs   Lab 04/14/22  0610 04/13/22  0613 04/12/22  0559 04/11/22  0540 04/10/22  0530 04/09/22  0450  04/08/22  0626   POTASSIUM 3.7  3.7 3.9 4.1  4.0 3.7 3.9 4.1 4.3   MAG 2.5* 2.5* 2.5* 2.4* 2.2 2.0 2.1   PHOS 6.5* 6.3* 5.8* 4.8* 6.0* 5.3* 5.1*   - Stop diclofenac gel (started 4/9).  - Continue low phosphorus diet.  - Monitor BMP, magnesium, phosphorus.  - Avoid nephrotoxic medications.  - Appreciate Nephrology help.    Acute blood loss anemia from surgery.  * Hemoglobin preop was around 12 which had slowly trended down.   * Hemoglobin dropped to 7.3 on 4/4, most likely acute blood loss from surgery.    * Started PO iron 4/12.  Recent Labs   Lab 04/14/22  0610 04/13/22  0613 04/12/22  0559 04/11/22  0540 04/10/22  0530 04/09/22  0450   HGB 8.1* 8.0* 7.4* 8.4* 8.3* 7.8*   - Continue ferrous gluconate.  - Monitor CBC periodically as needed.  - Consider prbc transfusion if hgb </= 7.0 or if significant bleeding with hemodynamic instability or if symptomatic.     Acute low back pain due to spine infection.  * Back pain noted at [facility].  * Workup and imaging with findings of spine infection as above.  * Pain subsequently improved with treatment of infection.  * Started on gabapentin, diclofenac gel, lidocaine patch, methocarbamol during hospitalization,   - Stop diclofenac gel due to OCTAVIO.  - Continue gabapentin, lidocaine patch, methocarbamol; PRN acetaminophen, PRN oxycodone, PRN IV hydromorphone; minimize opioids as able.    Essential hypertension.  [PTA: losartan 50 mg daily.]  * Losartan stopped 4/10 due to OCTAVIO.  * Started on amlodipine 4/12.  * Metoprolol started 4/13, stopped 4/14. Started carvedilol 4/14.  - Continue amlodipine 10 mg daily; carvedilol 3.125 mg BID.  - Continue PRN IV hydralazine.    Allergic rhinitis.  * Pt c/o itchy, runny nose 4/14.  - Start loratadine 10 mg daily.    Elevated transaminases, suspect multifactorial due to sepsis/infection as well as underlying non-alcohol fatty liver disease.  * CT abdomen 3/22 showed fatty liver and enlarged liver. RUQ US 3/22 showed fatty liver, but normal  gallbladder and normal bile ducts.  * LFT's at [facility] 3/23 showed elevated AST and ALP.   * LFT's 4/11 were normal except for low albumin.  - Monitor outpatient.    Indeterminant pulmonary nodule, right lung base.  * Noted on CT abdomen with contrast 3/22.  - Follow-up per Radiology guidelines (see report 3/22).    WARNER.  * Chronic and stable.  - Continue CPAP.    Hypothyroidism.  * Chronic and stable.  - Continue levothyroxine.     Generalized anxiety disorder  ADHD.  * Chronic and stable.  - Continue bupropion.    Obesity.  Body mass index is 36.9 kg/m .  - Needs to pursue aggressive dietary and lifestyle modifications.        Clinically Significant Risk Factors Present on Admission                      COVID-19 testing.  COVID-19 PCR Results    COVID-19 PCR Results 3/22/22 4/5/22 4/13/22   SARS CoV2 PCR Negative Negative Negative      Comments are available for some flowsheets but are not being displayed.         COVID-19 Antibody Results, Testing for Immunity    COVID-19 Antibody Results, Testing for Immunity   No data to display.             Diet: Combination Diet Moderate Consistent Carb (60 g CHO per Meal) Diet; Low Saturated Fat Na <2400mg Diet  Room Service    Prophylaxis: PCD's, ambulation. Heparin SQ.  Schwartz Catheter: Not present  Central Lines: PRESENT  PICC Single Lumen 04/14/22 Right Basilic-Site Assessment: WDL  Code Status: Full Code    Disposition Plan   Expected discharge: 2-3d recommended to prior living arrangement pending above.  Entered: Juan Alberto Valdovinos MD 04/14/2022, 2:26 PM     - Discontinue IMC.    I spent approximately 45 minutes bedside and on the inpatient unit today managing the care of patient. Over 50% of my time on the unit was spent in extensive chart review, counseling the patient/family and/or coordinating care regarding services listed in this note.      Interval History   Doing better overall.  Tolerating more activity.  Back pain a bit worse today with increased  activity.    -Data reviewed today: I reviewed all new labs and imaging over the last 24 hours. I personally reviewed no images or EKG's today.    Physical Exam    , Blood pressure 139/85, pulse 73, temperature 97.8  F (36.6  C), temperature source Oral, resp. rate 16, weight 116.7 kg (257 lb 3.2 oz), SpO2 100 %.      Vitals:    04/12/22 0609 04/13/22 0557 04/14/22 0648   Weight: 114.1 kg (251 lb 9.6 oz) 115.7 kg (255 lb 1.6 oz) 116.7 kg (257 lb 3.2 oz)     Vital Signs with Ranges  Temp:  [97.8  F (36.6  C)-98.9  F (37.2  C)] 97.8  F (36.6  C)  Pulse:  [55-73] 73  Resp:  [16-19] 16  BP: (132-160)/(66-85) 139/85  SpO2:  [97 %-100 %] 100 %  Patient Vitals for the past 24 hrs:   BP Temp Temp src Pulse Resp SpO2 Weight   04/14/22 0812 139/85 -- -- 73 16 -- --   04/14/22 0648 -- -- -- -- -- -- 116.7 kg (257 lb 3.2 oz)   04/14/22 0511 (!) 142/80 97.8  F (36.6  C) Oral 64 18 100 % --   04/14/22 0030 132/66 -- -- 55 17 97 % --   04/13/22 2117 (!) 160/78 98.9  F (37.2  C) Oral 55 19 99 % --     I/O's Last 24 hours  I/O last 3 completed shifts:  In: 1300 [P.O.:1300]  Out: -     Constitutional: Awake, alert, pleasant.  Respiratory: Diminished in bases. No crackles or wheezes.  Cardiovascular: RRR, no m/r/g.  GI: Soft, nt, nd, few BS.  Skin/Integumen: Trace leg edema.  Other:        Data   Recent Labs   Lab 04/14/22  1121 04/14/22  0746 04/14/22  0610 04/13/22  0825 04/13/22  0613 04/12/22  0814 04/12/22  0559 04/11/22  0929 04/11/22  0540 04/10/22  0809 04/10/22  0530   WBC  --   --  7.7  --  8.8  --  9.8  --  8.9  --  8.5   HGB  --   --  8.1*  --  8.0*  --  7.4*  --  8.4*  --  8.3*   MCV  --   --  99  --  96  --  98  --  96  --  95   PLT  --   --  434  --  436  --  480*  --  540*  --  571*   NA  --   --  139  --  140  --  139  --  137  --  138   POTASSIUM  --   --  3.7  3.7  --  3.9  --  4.1  4.0  --  3.7  --  3.9   CHLORIDE  --   --  109  --  109  --  108  --  105  --  104   CO2  --   --  26  --  26  --  28  --  27  --  28    BUN  --   --  18  --  22  --  20  --  22  --  23   CR  --   --  1.45*  --  1.49*  --  1.41*  1.54*  --  1.29*  --  1.31*   ANIONGAP  --   --  4  --  5  --  3  --  5  --  6   IMAN  --   --  8.7  --  8.7  --  8.6  --  8.5  --  8.6   GLC 95 80 93   < > 111*   < > 113*   < > 104*   < > 101*   ALBUMIN  --   --   --   --   --   --   --   --  2.1*  --  2.0*   PROTTOTAL  --   --   --   --   --   --   --   --  7.6  --  7.6   BILITOTAL  --   --   --   --   --   --   --   --  0.4  --  0.4   ALKPHOS  --   --   --   --   --   --   --   --  88  --  90   ALT  --   --   --   --   --   --   --   --  34  --  41   AST  --   --   --   --   --   --   --   --  18  --  21    < > = values in this interval not displayed.     Recent Labs   Lab Test 04/14/22  1121 04/14/22  0746 04/14/22  0610 04/14/22  0514 04/14/22  0150   GLC 95 80 93 82 71     Recent Labs   Lab 04/14/22  0610 04/13/22  0613 04/12/22  0559 04/11/22  0540 04/10/22  0530 04/09/22  0450   WBC 7.7 8.8 9.8 8.9 8.5 9.5   LDH  --   --   --  209 219  --          No results found for this or any previous visit (from the past 24 hour(s)).    Medications   All medications were reviewed.    dextrose       BETA BLOCKER NOT PRESCRIBED       sodium chloride Stopped (04/10/22 0800)       amLODIPine  10 mg Oral Daily     aspirin  81 mg Oral or NG Tube Daily     buPROPion  300 mg Oral QAM     carvedilol  3.125 mg Oral BID w/meals     diclofenac  4 g Topical 4x Daily     ferrous gluconate  324 mg Oral Daily with breakfast     gabapentin  100 mg Oral TID     insulin aspart   Subcutaneous TID w/meals     insulin aspart  1-10 Units Subcutaneous TID AC     insulin aspart  1-7 Units Subcutaneous At Bedtime     insulin glargine  10 Units Subcutaneous At Bedtime     insulin glargine  40 Units Subcutaneous QAM AC     levothyroxine  125 mcg Oral Once per day on Mon Tue Wed Thu Fri Sat     levothyroxine  250 mcg Oral Weekly     lidocaine  2 patch Transdermal Q24H     lidocaine   Transdermal Q8H      methocarbamol  1,000 mg Oral Q6H     nafcillin  2 g Intravenous Q4H     pantoprazole  40 mg Oral Daily     polyethylene glycol  17 g Oral BID     senna-docusate  1 tablet Oral BID     sodium chloride (PF)  10-40 mL Intracatheter Q8H     acetaminophen, bisacodyl, dextrose, glucose **OR** dextrose **OR** glucagon, heparin ANTICOAGULANT, HOLD MEDICATION, HOLD MEDICATION, HOLD MEDICATION, hydrALAZINE, HYDROmorphone **OR** HYDROmorphone, lidocaine 4%, lidocaine (buffered or not buffered), lidocaine (buffered or not buffered), magnesium hydroxide, melatonin, naloxone **OR** naloxone **OR** naloxone **OR** naloxone, ondansetron **OR** ondansetron, oxyCODONE **OR** oxyCODONE, BETA BLOCKER NOT PRESCRIBED, sodium chloride (PF), sodium chloride (PF), sodium chloride (PF)

## 2022-04-15 ENCOUNTER — APPOINTMENT (OUTPATIENT)
Dept: OCCUPATIONAL THERAPY | Facility: CLINIC | Age: 37
DRG: 853 | End: 2022-04-15
Attending: HOSPITALIST
Payer: COMMERCIAL

## 2022-04-15 LAB
ANION GAP SERPL CALCULATED.3IONS-SCNC: 6 MMOL/L (ref 3–14)
BACTERIA BLD CULT: NO GROWTH
BACTERIA BLD CULT: NO GROWTH
BUN SERPL-MCNC: 16 MG/DL (ref 7–30)
CALCIUM SERPL-MCNC: 8.3 MG/DL (ref 8.5–10.1)
CHLORIDE BLD-SCNC: 111 MMOL/L (ref 94–109)
CO2 SERPL-SCNC: 25 MMOL/L (ref 20–32)
CREAT SERPL-MCNC: 1.41 MG/DL (ref 0.66–1.25)
GFR SERPL CREATININE-BSD FRML MDRD: 67 ML/MIN/1.73M2
GLUCOSE BLD-MCNC: 79 MG/DL (ref 70–99)
GLUCOSE BLDC GLUCOMTR-MCNC: 104 MG/DL (ref 70–99)
GLUCOSE BLDC GLUCOMTR-MCNC: 108 MG/DL (ref 70–99)
GLUCOSE BLDC GLUCOMTR-MCNC: 144 MG/DL (ref 70–99)
GLUCOSE BLDC GLUCOMTR-MCNC: 145 MG/DL (ref 70–99)
GLUCOSE BLDC GLUCOMTR-MCNC: 147 MG/DL (ref 70–99)
GLUCOSE BLDC GLUCOMTR-MCNC: 69 MG/DL (ref 70–99)
GLUCOSE BLDC GLUCOMTR-MCNC: 76 MG/DL (ref 70–99)
HBA1C MFR BLD: 9.6 % (ref 0–5.6)
POTASSIUM BLD-SCNC: 3.8 MMOL/L (ref 3.4–5.3)
SODIUM SERPL-SCNC: 142 MMOL/L (ref 133–144)

## 2022-04-15 PROCEDURE — 210N000001 HC R&B IMCU HEART CARE

## 2022-04-15 PROCEDURE — 250N000013 HC RX MED GY IP 250 OP 250 PS 637: Performed by: PHYSICIAN ASSISTANT

## 2022-04-15 PROCEDURE — 250N000009 HC RX 250: Performed by: PHYSICIAN ASSISTANT

## 2022-04-15 PROCEDURE — 999N000190 HC STATISTIC VAT ROUNDS

## 2022-04-15 PROCEDURE — 250N000013 HC RX MED GY IP 250 OP 250 PS 637: Performed by: INTERNAL MEDICINE

## 2022-04-15 PROCEDURE — 210N000002 HC R&B HEART CARE

## 2022-04-15 PROCEDURE — 99232 SBSQ HOSP IP/OBS MODERATE 35: CPT | Performed by: INTERNAL MEDICINE

## 2022-04-15 PROCEDURE — 97110 THERAPEUTIC EXERCISES: CPT | Mod: GO

## 2022-04-15 PROCEDURE — 80048 BASIC METABOLIC PNL TOTAL CA: CPT | Performed by: INTERNAL MEDICINE

## 2022-04-15 RX ORDER — POTASSIUM CHLORIDE 1500 MG/1
20 TABLET, EXTENDED RELEASE ORAL ONCE
Status: COMPLETED | OUTPATIENT
Start: 2022-04-15 | End: 2022-04-15

## 2022-04-15 RX ADMIN — AMLODIPINE BESYLATE 10 MG: 10 TABLET ORAL at 09:03

## 2022-04-15 RX ADMIN — ASPIRIN 81 MG CHEWABLE TABLET 81 MG: 81 TABLET CHEWABLE at 09:03

## 2022-04-15 RX ADMIN — MELATONIN TAB 3 MG 3 MG: 3 TAB at 22:00

## 2022-04-15 RX ADMIN — POTASSIUM CHLORIDE 20 MEQ: 1500 TABLET, EXTENDED RELEASE ORAL at 09:09

## 2022-04-15 RX ADMIN — NAFCILLIN 2 G: 2 POWDER, FOR SOLUTION INTRAMUSCULAR; INTRAVENOUS at 05:10

## 2022-04-15 RX ADMIN — ACETAMINOPHEN 650 MG: 325 TABLET ORAL at 00:54

## 2022-04-15 RX ADMIN — GABAPENTIN 100 MG: 100 CAPSULE ORAL at 14:00

## 2022-04-15 RX ADMIN — NAFCILLIN 2 G: 2 POWDER, FOR SOLUTION INTRAMUSCULAR; INTRAVENOUS at 14:00

## 2022-04-15 RX ADMIN — ACETAMINOPHEN 650 MG: 325 TABLET ORAL at 14:05

## 2022-04-15 RX ADMIN — LEVOTHYROXINE SODIUM 125 MCG: 125 TABLET ORAL at 06:01

## 2022-04-15 RX ADMIN — METHOCARBAMOL 1000 MG: 500 TABLET, FILM COATED ORAL at 17:58

## 2022-04-15 RX ADMIN — OXYCODONE HYDROCHLORIDE 5 MG: 5 TABLET ORAL at 11:31

## 2022-04-15 RX ADMIN — SENNOSIDES AND DOCUSATE SODIUM 1 TABLET: 50; 8.6 TABLET ORAL at 09:08

## 2022-04-15 RX ADMIN — ACETAMINOPHEN 650 MG: 325 TABLET ORAL at 05:09

## 2022-04-15 RX ADMIN — ACETAMINOPHEN 650 MG: 325 TABLET ORAL at 23:13

## 2022-04-15 RX ADMIN — NAFCILLIN 2 G: 2 POWDER, FOR SOLUTION INTRAMUSCULAR; INTRAVENOUS at 00:54

## 2022-04-15 RX ADMIN — ACETAMINOPHEN 650 MG: 325 TABLET ORAL at 09:12

## 2022-04-15 RX ADMIN — GABAPENTIN 100 MG: 100 CAPSULE ORAL at 09:07

## 2022-04-15 RX ADMIN — ACETAMINOPHEN 650 MG: 325 TABLET ORAL at 18:05

## 2022-04-15 RX ADMIN — FERROUS GLUCONATE 324 MG: 324 TABLET ORAL at 09:06

## 2022-04-15 RX ADMIN — METHOCARBAMOL 1000 MG: 500 TABLET, FILM COATED ORAL at 11:32

## 2022-04-15 RX ADMIN — OXYCODONE HYDROCHLORIDE 10 MG: 5 TABLET ORAL at 18:03

## 2022-04-15 RX ADMIN — LORATADINE 10 MG: 10 TABLET ORAL at 09:09

## 2022-04-15 RX ADMIN — METHOCARBAMOL 1000 MG: 500 TABLET, FILM COATED ORAL at 23:13

## 2022-04-15 RX ADMIN — BUPROPION HYDROCHLORIDE 300 MG: 300 TABLET, EXTENDED RELEASE ORAL at 09:05

## 2022-04-15 RX ADMIN — OXYCODONE HYDROCHLORIDE 10 MG: 5 TABLET ORAL at 00:56

## 2022-04-15 RX ADMIN — PANTOPRAZOLE SODIUM 40 MG: 40 TABLET, DELAYED RELEASE ORAL at 09:07

## 2022-04-15 RX ADMIN — NAFCILLIN 2 G: 2 POWDER, FOR SOLUTION INTRAMUSCULAR; INTRAVENOUS at 09:52

## 2022-04-15 RX ADMIN — NAFCILLIN 2 G: 2 POWDER, FOR SOLUTION INTRAMUSCULAR; INTRAVENOUS at 21:57

## 2022-04-15 RX ADMIN — NAFCILLIN 2 G: 2 POWDER, FOR SOLUTION INTRAMUSCULAR; INTRAVENOUS at 18:01

## 2022-04-15 RX ADMIN — METHOCARBAMOL 1000 MG: 500 TABLET, FILM COATED ORAL at 05:09

## 2022-04-15 RX ADMIN — CARVEDILOL 3.12 MG: 3.12 TABLET, FILM COATED ORAL at 09:05

## 2022-04-15 RX ADMIN — GABAPENTIN 100 MG: 100 CAPSULE ORAL at 22:00

## 2022-04-15 ASSESSMENT — ACTIVITIES OF DAILY LIVING (ADL)
ADLS_ACUITY_SCORE: 3

## 2022-04-15 NOTE — PROGRESS NOTES
[facility]    Internal Medicine Hospitalist Progress Note  04/15/2022  I evaluated patient on the above date.    Juan Alberto Valdovinos Jr., MD  [phone] (p)  Text Page  Vocera        Assessment & Plan New actions/orders today (04/15/2022) are underlined.    Anil Montoya is a 36 year old male with hx including obesity, DM2, HTN, WARNER, depression/anxiety and h/o cryptococcal pneumonia (2012); who initially presented to [facility] 3/22/2022 with generalized weakness and found with evidence of sepsis, initially felt due to pneumonia. Started on antibiotics and admitted. Subsequently found with MSSA bacteremia with multiple positive cultures. 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 Subsequently transferred to [facility] 3/28/2022 for management.      MSSA bacteremia with sepsis.  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  Lumbar spine infection involving L5 vertebral body, S1 vertebral body, right sacral ala and anterior epidural space (from mid L4 down to mid S2), suspect due to above.  * Presented to [facility] on 3/22 with generalized weakness, malaise, and myalgias. Signs of sepsis. CT abdominal and pelvis 3/22 showed patchy consolidation at the left lung base suggestive for pneumonia. Initially treated with ceftriaxone and azithromycin for possible community-acquired pneumonia. Subsequently, multiple BC's positive for MSSA. ID was consulted. Antibiotics changed to cefazolin 3/24. MR lumbar spine 3/24 and TTE 3/26 were unrevealing as to source. However, TEJINDER 3/27 showed thickened tricuspid valve leaflets with atrial aspect of the septal leaflet with an irregular border and small mobile filamentous elements; large fixed heterogeneous mass in the right ventricle attached to the interventricular septum (2.5 x 2 cm) appearing connected to the tricuspid subvalvular annular apparatus with a pedunculated round mobile  element (1.8 x 1 cm); no significant tricuspid regurgitation reported; LVEF 55 to 60%; negative bubble study. Transferred to [facility] 3/28 for management.  * Continued on cefazolin on admit. CVS and ID consulted on admit.  * 3/29: CT dental 3/29 negative.  * 4/1: Underwent bioprosthetic tricuspid valve replacement and endoscopic aortic valve exploration on 4/1. Initially micafungin added on 4/1 as gram stain from tissue from heart valve from 4/1 initially was reported as yeast, which was subsequently corrected as not to be present.  * 4/3: Micafungin stopped.    * 4/4: EP consulted for complete heart block (see below).  * 4/5: Started on gentamicin. Suffered vfib arrest, see below.  * 4/7: MRI lumbar spine showed interval development of en bloc abnormal T2 signal hyperintensity and abnormal contrast enhancement involving the lower L5 vertebral body, S1 vertebral body and right sacral ala and anterior epidural space from mid L4 down to mid S2 worrisome for an infectious process such as osteomyelitis with epidural extension of infection, no definite evidence for discitis at the L5-S1 level although given presumed infection of surrounding tissues, L5-S1 discitis is not excluded and continued surveillance recommended. Cefazolin changed to nafcillin.  * 4/8: Neurosurgery and Ortho Spine consulted.  * 4/10: Gentamicin stopped.  * 4/14: PICC placed.  - Post-op management per CVS.  - Continue nafcillin (started 4/7) - plan stop after 4/26.  - Continue ASA.  - Monitor cultures (negative since 4/3).  - Appreciate CVS and ID help.  - Pain management as below.    Ventricular fibrillation arrest (4/5/2022), s/p ICD placement 4/12/2022.  Post-op complete heart block.  * Noted with second degree heart block and subsequently complete heart block postop with subsequently accelerated rhythm; this required management with an epicardial AV pacer. EP consulted.  * 4/5: Patient was noted to have V. fib on telemetry. Required brief CPR  and 1 shock of 120 J with successful restoration of regular rhythm. Electrolytes were within normal limits. Echo 4/5 showed normal LVEF, no significant TR seen, technically difficult study.  * 4/12: Underwent PPM-ICD placement.  - Follow-up with Cardiology.      DM2 without complications, uncontrolled.  Hypoglycemic episodes.  [PTA: liraglutide 1.8 mg daily; metformin 1000 mg BID.]  * Noted that pt had not been taking liraglutide since early 2022 as he was working out and trying to lose weight; he did this on his own.   * Hgb A1c 12.6 3/27/2022.  * On insulin gtt periop, subsequently transitioned to subcutaneous insulin 4/7.  * 4/13: Low glucose level late afternoon.  * 4/14: Pt expressed desire to not be on insulin as he works as a  and they do not allow this. Glargine decreased. Changed to low CHO diet.  * 4/15: Glucose low again in am.  Recent Labs   Lab 04/15/22  1222 04/15/22  0855 04/15/22  0818 04/15/22  0611 04/15/22  0225 04/14/22  2107   * 108* 69* 79 76 144*   - Continue low CHO diet.  - Try stopping glargine and prandial aspart.  - Change from high to medium aspart ISS.  - Continue PRN hypoglycemia protocol.  - Plan restart metformin when renal function normalized.  - Plan resume liraglutide at discharge.     Acute kidney injury, question from medications.  Hyperphosphatemia and hypermagnesemia suspect related to acute kidney injury.  * Cr normal on admit to [facility].  * Phos increased starting 4/5.  * Cr normal this hospitalization until 4/10 at which time, cr increased to 1.31. Nephrology consulted 4/10 and suspected from gentamicin (was started 4/5); gent subsequently stopped 4/10. Losartan stopped 4/10.  * Mg increased starting 4/11.  * Stopped diclofenac 4/14 (was started 4/9).  Recent Labs   Lab 04/15/22  0611 04/14/22  0610 04/13/22  0613 04/12/22  0559 04/11/22  0540 04/10/22  0530   CR 1.41* 1.45* 1.49* 1.41*  1.54* 1.29* 1.31*     Recent Labs   Lab 04/15/22  0611 04/14/22  0610  04/13/22  0613 04/12/22  0559 04/11/22  0540 04/10/22  0530 04/09/22  0450   POTASSIUM 3.8 3.7  3.7 3.9 4.1  4.0 3.7 3.9 4.1   MAG  --  2.5* 2.5* 2.5* 2.4* 2.2 2.0   PHOS  --  6.5* 6.3* 5.8* 4.8* 6.0* 5.3*   - Continue low phosphorus diet.  - Monitor BMP, magnesium, phosphorus.  - Avoid nephrotoxic medications.  - Appreciate Nephrology help.    Acute blood loss anemia from surgery.  * Hemoglobin preop was around 12 which had slowly trended down.   * Hemoglobin dropped to 7.3 on 4/4, most likely acute blood loss from surgery.    * Started PO iron 4/12.  Recent Labs   Lab 04/14/22  0610 04/13/22  0613 04/12/22  0559 04/11/22  0540 04/10/22  0530 04/09/22  0450   HGB 8.1* 8.0* 7.4* 8.4* 8.3* 7.8*   - Continue ferrous gluconate.  - Monitor CBC periodically as needed.  - Consider prbc transfusion if hgb </= 7.0 or if significant bleeding with hemodynamic instability or if symptomatic.     Acute low back pain due to spine infection.  * Back pain noted at [place].  * Workup and imaging with findings of spine infection as above.  * Pain subsequently improved with treatment of infection.  * Started on gabapentin, diclofenac gel, lidocaine patch, methocarbamol during hospitalization.  * Stopped diclofenac due to OCTAVIO on 4/14.  - Continue gabapentin, lidocaine patch, methocarbamol; PRN acetaminophen, PRN oxycodone, PRN IV hydromorphone; minimize opioids as able.    Essential hypertension.  [PTA: losartan 50 mg daily.]  * Losartan stopped 4/10 due to OCTAVIO.  * Started on amlodipine 4/12.  * Metoprolol started 4/13, stopped 4/14. Started carvedilol 4/14.  - Continue amlodipine 10 mg daily; carvedilol 3.125 mg BID.  - Continue PRN IV hydralazine.    Allergic rhinitis.  * Pt c/o itchy, runny nose 4/14. Started loratadine.  - Continue loratadine 10 mg daily.    Elevated transaminases, suspect multifactorial due to sepsis/infection as well as underlying non-alcohol fatty liver disease.  * CT abdomen 3/22 showed fatty liver and  enlarged liver. RUQ US 3/22 showed fatty liver, but normal gallbladder and normal bile ducts.  * LFT's at [facility] 3/23 showed elevated AST and ALP.   * LFT's 4/11 were normal except for low albumin.  - Monitor outpatient.    Indeterminant pulmonary nodule, right lung base.  * Noted on CT abdomen with contrast 3/22.  - Follow-up per Radiology guidelines (see report 3/22).    WARNER.  * Chronic and stable.  - Continue CPAP.    Hypothyroidism.  * Chronic and stable.  - Continue levothyroxine.     Generalized anxiety disorder  ADHD.  * Chronic and stable.  - Continue bupropion.    Obesity.  Body mass index is 37.02 kg/m .  - Needs to pursue aggressive dietary and lifestyle modifications.        Clinically Significant Risk Factors Present on Admission                      COVID-19 testing.  COVID-19 PCR Results    COVID-19 PCR Results 3/22/22 4/5/22 4/13/22   SARS CoV2 PCR Negative Negative Negative      Comments are available for some flowsheets but are not being displayed.         COVID-19 Antibody Results, Testing for Immunity    COVID-19 Antibody Results, Testing for Immunity   No data to display.             Diet: Room Service  Low Consistent Carb (45 g CHO per Meal) Diet    Prophylaxis: PCD's, ambulation. Heparin SQ.  Schwartz Catheter: Not present  Central Lines: PRESENT  PICC Single Lumen 04/14/22 Right Cephalic-Site Assessment: WDL  Code Status: Full Code    Disposition Plan   Expected discharge: 2-3d recommended to prior living arrangement pending above.  Entered: Juan Alberto Valdovinos MD 04/15/2022, 1:23 PM         Interval History   Doing OK.  More back pain today, but able to tolerate.    -Data reviewed today: I reviewed all new labs and imaging over the last 24 hours. I personally reviewed no images or EKG's today.    Physical Exam    , Blood pressure 127/81, pulse 67, temperature 97.5  F (36.4  C), temperature source Oral, resp. rate 18, weight 117 kg (258 lb), SpO2 100 %. O2 Device: None (Room air)    Vitals:     04/13/22 0557 04/14/22 0648 04/15/22 0554   Weight: 115.7 kg (255 lb 1.6 oz) 116.7 kg (257 lb 3.2 oz) 117 kg (258 lb)     Vital Signs with Ranges  Temp:  [97.5  F (36.4  C)-98.5  F (36.9  C)] 97.5  F (36.4  C)  Pulse:  [55-74] 67  Resp:  [16-18] 18  BP: (126-144)/(54-81) 127/81  SpO2:  [95 %-100 %] 100 %  Patient Vitals for the past 24 hrs:   BP Temp Temp src Pulse Resp SpO2 Weight   04/15/22 1130 127/81 97.5  F (36.4  C) Oral 67 18 100 % --   04/15/22 0940 (!) 140/68 -- -- 67 -- -- --   04/15/22 0824 (!) 143/74 97.6  F (36.4  C) Temporal 71 18 99 % --   04/15/22 0600 (!) 144/68 -- -- 66 18 99 % --   04/15/22 0554 -- -- -- -- -- -- 117 kg (258 lb)   04/14/22 2032 134/76 98.5  F (36.9  C) Oral 55 16 100 % --   04/14/22 1920 126/77 97.6  F (36.4  C) Oral 60 -- 99 % --   04/14/22 1729 133/71 -- -- 65 -- -- --   04/14/22 1600 132/73 97.7  F (36.5  C) Oral 60 18 95 % --   04/14/22 1400 130/54 -- -- 74 18 -- --     I/O's Last 24 hours  I/O last 3 completed shifts:  In: 240 [P.O.:240]  Out: 550 [Urine:550]    Constitutional: Awake, alert, pleasant, conversant.  Respiratory: Diminished in bases. No crackles or wheezes.  Cardiovascular: RRR, no m/r/g.  GI: Soft, nt, nd, few BS.  Skin/Integumen:   Other:        Data   Recent Labs   Lab 04/15/22  1222 04/15/22  0855 04/15/22  0818 04/15/22  0611 04/14/22  0746 04/14/22  0610 04/13/22  0825 04/13/22  0613 04/12/22  0814 04/12/22  0559 04/11/22  0929 04/11/22  0540 04/10/22  0809 04/10/22  0530   WBC  --   --   --   --   --  7.7  --  8.8  --  9.8  --  8.9  --  8.5   HGB  --   --   --   --   --  8.1*  --  8.0*  --  7.4*  --  8.4*  --  8.3*   MCV  --   --   --   --   --  99  --  96  --  98  --  96  --  95   PLT  --   --   --   --   --  434  --  436  --  480*  --  540*  --  571*   NA  --   --   --  142  --  139  --  140  --  139  --  137  --  138   POTASSIUM  --   --   --  3.8  --  3.7  3.7  --  3.9  --  4.1  4.0  --  3.7  --  3.9   CHLORIDE  --   --   --  111*  --  109  --  109   --  108  --  105  --  104   CO2  --   --   --  25  --  26  --  26  --  28  --  27  --  28   BUN  --   --   --  16  --  18  --  22  --  20  --  22  --  23   CR  --   --   --  1.41*  --  1.45*  --  1.49*  --  1.41*  1.54*  --  1.29*  --  1.31*   ANIONGAP  --   --   --  6  --  4  --  5  --  3  --  5  --  6   IMAN  --   --   --  8.3*  --  8.7  --  8.7  --  8.6  --  8.5  --  8.6   * 108* 69* 79   < > 93   < > 111*   < > 113*   < > 104*   < > 101*   ALBUMIN  --   --   --   --   --   --   --   --   --   --   --  2.1*  --  2.0*   PROTTOTAL  --   --   --   --   --   --   --   --   --   --   --  7.6  --  7.6   BILITOTAL  --   --   --   --   --   --   --   --   --   --   --  0.4  --  0.4   ALKPHOS  --   --   --   --   --   --   --   --   --   --   --  88  --  90   ALT  --   --   --   --   --   --   --   --   --   --   --  34  --  41   AST  --   --   --   --   --   --   --   --   --   --   --  18  --  21    < > = values in this interval not displayed.     Recent Labs   Lab Test 04/15/22  1222 04/15/22  0855 04/15/22  0818 04/15/22  0611 04/15/22  0225   * 108* 69* 79 76     Recent Labs   Lab 04/14/22  0610 04/13/22  0613 04/12/22  0559 04/11/22  0540 04/10/22  0530 04/09/22  0450   WBC 7.7 8.8 9.8 8.9 8.5 9.5   LDH  --   --   --  209 219  --          Recent Results (from the past 24 hour(s))   XR Chest 1 View    Narrative    CHEST ONE VIEW  4/14/2022 2:15 PM     HISTORY: RN placed PICC - verify tip placement.    COMPARISON: April 13, 2022      Impression    IMPRESSION: Right PICC tip in the mid SVC. No infiltrates.    MORRIS REDMOND MD         SYSTEM ID:  [id number]       Medications   All medications were reviewed.    dextrose       BETA BLOCKER NOT PRESCRIBED       sodium chloride Stopped (04/10/22 0800)       amLODIPine  10 mg Oral Daily     aspirin  81 mg Oral or NG Tube Daily     buPROPion  300 mg Oral QAM     carvedilol  3.125 mg Oral BID w/meals     ferrous gluconate  324 mg Oral Daily with breakfast      gabapentin  100 mg Oral TID     insulin aspart  1-7 Units Subcutaneous TID AC     insulin aspart  1-5 Units Subcutaneous At Bedtime     levothyroxine  125 mcg Oral Once per day on Mon Tue Wed Thu Fri Sat     levothyroxine  250 mcg Oral Weekly     lidocaine  2 patch Transdermal Q24H     lidocaine   Transdermal Q8H     loratadine  10 mg Oral Daily     methocarbamol  1,000 mg Oral Q6H     nafcillin  2 g Intravenous Q4H     pantoprazole  40 mg Oral Daily     polyethylene glycol  17 g Oral BID     senna-docusate  1 tablet Oral BID     sodium chloride (PF)  10-40 mL Intracatheter Q8H     acetaminophen, bisacodyl, dextrose, glucose **OR** dextrose **OR** glucagon, heparin ANTICOAGULANT, HOLD MEDICATION, HOLD MEDICATION, HOLD MEDICATION, hydrALAZINE, HYDROmorphone **OR** HYDROmorphone, lidocaine 4%, lidocaine (buffered or not buffered), lidocaine (buffered or not buffered), magnesium hydroxide, melatonin, naloxone **OR** naloxone **OR** naloxone **OR** naloxone, ondansetron **OR** ondansetron, oxyCODONE **OR** oxyCODONE, BETA BLOCKER NOT PRESCRIBED, sodium chloride (PF), sodium chloride (PF)

## 2022-04-15 NOTE — PLAN OF CARE
Pt stable from 4AM to 7AM with no change in his current condition.  Tylenol given for back pain.  Pt is alert and oriented and able to make his needs known.

## 2022-04-15 NOTE — PROGRESS NOTES
Patient seen and care plan discussed with Dr. Yepez    [facility]  Cardiovascular and Thoracic Surgery Daily Note          Assessment and Plan:   Anil Montoya is a 36 year old gentleman who presented to [facility] ED on 3/22/22 with weakness, recent hematuria and shortness of breath. Workup demonstrated MSSA bacteremia likely secondary to right groin skin source. TEJINDER on 3/27/22 demonstrated tricuspid valve endocarditis. In completing full work up, there was concern for discitis with seeding however MRI negative. He was transferred on 3/28/22 to [facility] for consideration of surgical intervention.      PMH includes: Poorly controlled diabetic, HTN, hypothyroidism, obesity, WARNER, anxiety, cryptococcal pneumonia in 2012 with previous history of staph aureus bacteremia.     Most recent echo demonstrates LVEF 55-60%, normal RV function, mildly thickened cusps on aortic valve and thickened tricuspid valve leaflets, small mobile filamentous elements on the septal leaflet of TV and larged fixed heterogeneous mass in the RV attached to the interventricular septum.  Coronary angiogram 3/31/22 with no e/o CAD.     Course c/b CHB with accelerated junctional rhythm and polymorphic VT arrest on 4/5 with immediate ROSC after defib x1 and transient CPR.        POD #14 s/p:  1. Tricuspid valve replacement (31mm Epic stented valve)  2. Endoscopic aortic valve exploration  3. Transesophageal echocardiogram on 4/1/22 with Dr. 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 Did not need intubation and neurologically intact. Electrolytes stable Appreciate EP's involvement. Troponin elevated as expected after event.  Echocardiogram on 4/5 stable but poor images to evaluate tricuspid gradient  [PTA meds  on hold: losartan 50 mg daily]  HD stable overnight in accelerated junctional rhythm  - ASA 81 mg daily  - Losartan 25 mg daily discontinued d/t OCTAVIO  - No statin indicated  - EP, appreciate recommendations, planning for traditional ICD/PPM due to ongoing CHB/escape junctional rhythm.   - CTs/wires removed 4/7, discussed with EP  -Amlodipine 10mg for HTN, metoprolol started 4/13 per EP  -Metoprolol transitioned to carvedilol 4/14 for more blood pressure control.      -ORTHO/Neuro: Having new lower back pain-resolved, MRI 4/7 showing concern for osteomyelitis involving epidural extension, no intervention at this time, pain control may be bigger issue, repeat MRI spine prior to discontinuation of abx, voltaren ordered for back     Resp:   WARNER  History of cryptococcal pneumonia  Extubated on POD 0, now saturating well on room air  - Continue pulm hygiene efforts: IS/flutter valve/mobility  - Titrate O2 to maintain sats >92%, on RA  - CPAP at HS     Neuro/MSK:    Acute postoperative pain  MDD/VIVEK  - Better controlled with current regimen this AM, received toradol x1  - Sore after a few compressions with CPR but overall tolerable  - Robaxin scheduled, lidocaine patches added  - PTA Wellbutrin resumed  - Sternal incision and area intact and feels stable, wires intact on CXR, continue sternal precautions     Renal/Electrolyte:   below preoperative weight, has less edema  Autodiuresing  - Strict I/O, daily weights  - Avoid/limit nephrotoxins as able,   -OCTAVIO Nephrology curbsided and thought to be from aminoglycoside. Losartan stopped, amlodipine started  -OCTAVIO improving, Creatinine 1.41<1.45<1.49<1.54  - Replete lytes per protocol  - 4 kg below pre-op weight, holding diuretics     GI/Nutrition:    Mildly elevated LFTs  - Tolerating current diet:  Orders Placed This Encounter      Moderate Consistent Carb (60 g CHO per Meal) Diet   - Continue bowel regimen, + BM, + flatus  - LFTs had normalized, AST slightly elevated after  arrest, trend  - PPI     :    - Voiding well on own     Endo:  Stress induced hyperglycemia   Poorly controlled DM2  Hypothyroidism  Preop A1c             Lab Results   Component Value Date     A1C 12.6 2022    [PTA meds on hold: Victoza and metformin]  - Was on insulin infusion, transitioned to sliding scale insulin and lantus 4/, hyperglycemic, resumed insulin infusion POD 2 with prandial coverage. Continue close monitoring of blood sugars to allow for healing  - Hospitalists managing diabetic regimen, appreciate great management, discontinued lantus  - Goal BG <180 for optimal healing  - PTA levothyroxine resumed     ID:  Stress induced leukocytosis, resolved  Tricuspid valve endocarditis  MSSA bacteremia  History cryptococcal pneumonia  WBC 7.7<8.8<9.8<8.9<8.5<9.5<10.1<11.7<10, Temp (24hrs), Av.4  F (36.9  C), Min:98.1  F (36.7  C), Max:98.8  F (37.1  C); no s/sx infection having new fevers, MRI  concerning for osteomyelitis, consult to ortho/neuro for any further recs-trying to exhaust all resources.   Prelim intraop gram stain positive for yeast from tricuspid valve, culture with 1+ staph aureus, no yeast, continue to follow  BC 4/3 positive for staph aureus, continue to follow  BC / with NGTD thus far  Fungal BC sent on  with NGTD  Full skin inspection  with no new wounds  - Completed perioperative ABX  - Continue Cefazolin per ID, Gentamycin added  for new positive culture --discontinued  - Micafungin started  per ID for positive yeast on gram stain, discontinued with negative cx on 4/3  - HIV antigen/antibody test negative  - Per ID: repeat blood cultures x 2 sets daily until clear  - Continue to follow fever curve, WBC and inflammatory markers as appropriate  - PICC line placed   -Currently on nafcillin until      Heme:  Acute blood loss anemia  Acute blood loss thrombocytopenia  Hgb 8.1< 8.0<7.4<8.4<8.3<7.8, Plts 480<571; no s/sx active bleeding  - CBC in AM  - Goal  Hgb >7   -Proph: PPI, subcutaneous heparin, SCDs  -Dispo: CCU, continue therapies, pulm hygiene-- called wife for update on 4/14-- wife would like patient to be considered for TCU-- discussed that this is unlikely he will be approved for TCU, discussed with care coordinator today and agrees that TCU will not likely be approved by patients insurance. Discussed with patient and he plans to discharge to his [facility] house initially. Tentatively planning discharge Monday if appropriate.             Interval History:   No acute events overnight. Saturating well on room air. Pain controlled. Tolerating diet. +BM. Patient is worried he maybe over did it with activity yesterday.          Medications:       amLODIPine  10 mg Oral Daily     aspirin  81 mg Oral or NG Tube Daily     buPROPion  300 mg Oral QAM     carvedilol  3.125 mg Oral BID w/meals     ferrous gluconate  324 mg Oral Daily with breakfast     gabapentin  100 mg Oral TID     insulin aspart  1-7 Units Subcutaneous TID AC     insulin aspart  1-5 Units Subcutaneous At Bedtime     levothyroxine  125 mcg Oral Once per day on Mon Tue Wed Thu Fri Sat     levothyroxine  250 mcg Oral Weekly     lidocaine  2 patch Transdermal Q24H     lidocaine   Transdermal Q8H     loratadine  10 mg Oral Daily     methocarbamol  1,000 mg Oral Q6H     nafcillin  2 g Intravenous Q4H     pantoprazole  40 mg Oral Daily     polyethylene glycol  17 g Oral BID     senna-docusate  1 tablet Oral BID     sodium chloride (PF)  10-40 mL Intracatheter Q8H     @MEDSPRN-@          Physical Exam:   Vitals were reviewed  Blood pressure (!) 140/68, pulse 67, temperature 97.6  F (36.4  C), temperature source Temporal, resp. rate 18, weight 117 kg (258 lb), SpO2 99 %.  Rhythm: CHB/Escape junctional    Lungs: course    Cardiovascular: s1/s2, no m/r/g    Abdomen: s/nt/nd    Extremeties: no LE edema    Incision: cdi    CT: na    Weight:   Vitals:    04/11/22 0500 04/12/22 0609 04/13/22 0557 04/14/22 0648    Weight: 114.9 kg (253 lb 3.2 oz) 114.1 kg (251 lb 9.6 oz) 115.7 kg (255 lb 1.6 oz) 116.7 kg (257 lb 3.2 oz)    04/15/22 0554   Weight: 117 kg (258 lb)     Lab Results   Component Value Date    WBC 7.7 04/14/2022    WBC 6.8 11/24/2017     Lab Results   Component Value Date    RBC 2.79 04/14/2022    RBC 5.16 11/24/2017     Lab Results   Component Value Date    HGB 8.1 04/14/2022    HGB 15.2 11/24/2017     Lab Results   Component Value Date    HCT 27.5 04/14/2022    HCT 43.7 11/24/2017     No components found for: MCT  Lab Results   Component Value Date    MCV 99 04/14/2022    MCV 85 11/24/2017     Lab Results   Component Value Date    MCH 29.0 04/14/2022    MCH 29.5 11/24/2017     Lab Results   Component Value Date    MCHC 29.5 04/14/2022    MCHC 34.8 11/24/2017     Lab Results   Component Value Date    RDW 17.2 04/14/2022    RDW 12.9 11/24/2017     Lab Results   Component Value Date     04/14/2022     11/24/2017     Last Comprehensive Metabolic Panel:  Sodium   Date Value Ref Range Status   04/15/2022 142 133 - 144 mmol/L Final   11/24/2017 137 133 - 144 mmol/L Final     Potassium   Date Value Ref Range Status   04/15/2022 3.8 3.4 - 5.3 mmol/L Final   11/24/2017 3.8 3.4 - 5.3 mmol/L Final     Chloride   Date Value Ref Range Status   04/15/2022 111 (H) 94 - 109 mmol/L Final   11/24/2017 104 94 - 109 mmol/L Final     Carbon Dioxide   Date Value Ref Range Status   11/24/2017 24 20 - 32 mmol/L Final     Carbon Dioxide (CO2)   Date Value Ref Range Status   04/15/2022 25 20 - 32 mmol/L Final     Anion Gap   Date Value Ref Range Status   04/15/2022 6 3 - 14 mmol/L Final   11/24/2017 9 3 - 14 mmol/L Final     Glucose   Date Value Ref Range Status   04/15/2022 79 70 - 99 mg/dL Final   11/24/2017 270 (H) 70 - 99 mg/dL Final     GLUCOSE BY METER POCT   Date Value Ref Range Status   04/15/2022 108 (H) 70 - 99 mg/dL Final     Urea Nitrogen   Date Value Ref Range Status   04/15/2022 16 7 - 30 mg/dL Final    11/24/2017 18 7 - 30 mg/dL Final     Creatinine   Date Value Ref Range Status   04/15/2022 1.41 (H) 0.66 - 1.25 mg/dL Final   11/24/2017 1.07 0.66 - 1.25 mg/dL Final     GFR Estimate   Date Value Ref Range Status   04/15/2022 67 >60 mL/min/1.73m2 Final     Comment:     Effective December 21, 2021 eGFRcr in adults is calculated using the 2021 CKD-EPI creatinine equation which includes age and gender (Boogie et al., NEJM, DOI: 10.1056/GZNIjk8766551)   11/24/2017 81 >60 mL/min/1.7m2 Final     Comment:     Non  GFR Calc     Calcium   Date Value Ref Range Status   04/15/2022 8.3 (L) 8.5 - 10.1 mg/dL Final   11/24/2017 8.3 (L) 8.5 - 10.1 mg/dL Final       Ofelia Hong PA-C  Pager #: [phone]

## 2022-04-15 NOTE — PLAN OF CARE
Goal Outcome Evaluation:    VSS. Monitor shows V/AV paced rhythm. Tylenol given for lower back discomfort. Warm pack applied to right upper arm with PICC. Left chest PPM-ICD dressing CDI. Melatonin given for sleep. Continue to monitor.

## 2022-04-16 LAB
ANION GAP SERPL CALCULATED.3IONS-SCNC: 4 MMOL/L (ref 3–14)
BACTERIA BLD CULT: NO GROWTH
BACTERIA BLD CULT: NO GROWTH
BUN SERPL-MCNC: 16 MG/DL (ref 7–30)
CALCIUM SERPL-MCNC: 8.1 MG/DL (ref 8.5–10.1)
CHLORIDE BLD-SCNC: 111 MMOL/L (ref 94–109)
CO2 SERPL-SCNC: 25 MMOL/L (ref 20–32)
CREAT SERPL-MCNC: 1.28 MG/DL (ref 0.66–1.25)
GFR SERPL CREATININE-BSD FRML MDRD: 75 ML/MIN/1.73M2
GLUCOSE BLD-MCNC: 120 MG/DL (ref 70–99)
GLUCOSE BLDC GLUCOMTR-MCNC: 100 MG/DL (ref 70–99)
GLUCOSE BLDC GLUCOMTR-MCNC: 127 MG/DL (ref 70–99)
GLUCOSE BLDC GLUCOMTR-MCNC: 138 MG/DL (ref 70–99)
GLUCOSE BLDC GLUCOMTR-MCNC: 140 MG/DL (ref 70–99)
GLUCOSE BLDC GLUCOMTR-MCNC: 146 MG/DL (ref 70–99)
MAGNESIUM SERPL-MCNC: 2.3 MG/DL (ref 1.6–2.3)
PHOSPHATE SERPL-MCNC: 5.2 MG/DL (ref 2.5–4.5)
POTASSIUM BLD-SCNC: 4.1 MMOL/L (ref 3.4–5.3)
SODIUM SERPL-SCNC: 140 MMOL/L (ref 133–144)

## 2022-04-16 PROCEDURE — 250N000013 HC RX MED GY IP 250 OP 250 PS 637: Performed by: PHYSICIAN ASSISTANT

## 2022-04-16 PROCEDURE — 210N000002 HC R&B HEART CARE

## 2022-04-16 PROCEDURE — 80048 BASIC METABOLIC PNL TOTAL CA: CPT | Performed by: INTERNAL MEDICINE

## 2022-04-16 PROCEDURE — 250N000009 HC RX 250: Performed by: PHYSICIAN ASSISTANT

## 2022-04-16 PROCEDURE — 250N000013 HC RX MED GY IP 250 OP 250 PS 637: Performed by: INTERNAL MEDICINE

## 2022-04-16 PROCEDURE — 210N000001 HC R&B IMCU HEART CARE

## 2022-04-16 PROCEDURE — 99232 SBSQ HOSP IP/OBS MODERATE 35: CPT | Performed by: INTERNAL MEDICINE

## 2022-04-16 PROCEDURE — 84100 ASSAY OF PHOSPHORUS: CPT | Performed by: INTERNAL MEDICINE

## 2022-04-16 PROCEDURE — 83735 ASSAY OF MAGNESIUM: CPT | Performed by: INTERNAL MEDICINE

## 2022-04-16 RX ORDER — CYCLOBENZAPRINE HCL 5 MG
5-10 TABLET ORAL EVERY 8 HOURS PRN
Status: DISCONTINUED | OUTPATIENT
Start: 2022-04-16 | End: 2022-04-18 | Stop reason: HOSPADM

## 2022-04-16 RX ADMIN — ASPIRIN 81 MG CHEWABLE TABLET 81 MG: 81 TABLET CHEWABLE at 08:30

## 2022-04-16 RX ADMIN — CARVEDILOL 3.12 MG: 3.12 TABLET, FILM COATED ORAL at 08:30

## 2022-04-16 RX ADMIN — LORATADINE 10 MG: 10 TABLET ORAL at 08:31

## 2022-04-16 RX ADMIN — BUPROPION HYDROCHLORIDE 300 MG: 300 TABLET, EXTENDED RELEASE ORAL at 08:31

## 2022-04-16 RX ADMIN — PANTOPRAZOLE SODIUM 40 MG: 40 TABLET, DELAYED RELEASE ORAL at 08:30

## 2022-04-16 RX ADMIN — CYCLOBENZAPRINE HYDROCHLORIDE 5 MG: 5 TABLET, FILM COATED ORAL at 17:03

## 2022-04-16 RX ADMIN — NAFCILLIN 2 G: 2 POWDER, FOR SOLUTION INTRAMUSCULAR; INTRAVENOUS at 09:34

## 2022-04-16 RX ADMIN — GABAPENTIN 100 MG: 100 CAPSULE ORAL at 13:40

## 2022-04-16 RX ADMIN — OXYCODONE HYDROCHLORIDE 10 MG: 5 TABLET ORAL at 18:55

## 2022-04-16 RX ADMIN — NAFCILLIN 2 G: 2 POWDER, FOR SOLUTION INTRAMUSCULAR; INTRAVENOUS at 21:39

## 2022-04-16 RX ADMIN — LEVOTHYROXINE SODIUM 125 MCG: 125 TABLET ORAL at 05:09

## 2022-04-16 RX ADMIN — ACETAMINOPHEN 650 MG: 325 TABLET ORAL at 05:09

## 2022-04-16 RX ADMIN — METHOCARBAMOL 1000 MG: 500 TABLET, FILM COATED ORAL at 10:34

## 2022-04-16 RX ADMIN — MELATONIN TAB 3 MG 3 MG: 3 TAB at 23:40

## 2022-04-16 RX ADMIN — METHOCARBAMOL 1000 MG: 500 TABLET, FILM COATED ORAL at 05:09

## 2022-04-16 RX ADMIN — AMLODIPINE BESYLATE 10 MG: 10 TABLET ORAL at 08:31

## 2022-04-16 RX ADMIN — ACETAMINOPHEN 650 MG: 325 TABLET ORAL at 23:40

## 2022-04-16 RX ADMIN — OXYCODONE HYDROCHLORIDE 5 MG: 5 TABLET ORAL at 10:35

## 2022-04-16 RX ADMIN — METHOCARBAMOL 1000 MG: 500 TABLET, FILM COATED ORAL at 23:40

## 2022-04-16 RX ADMIN — NAFCILLIN 2 G: 2 POWDER, FOR SOLUTION INTRAMUSCULAR; INTRAVENOUS at 01:31

## 2022-04-16 RX ADMIN — NAFCILLIN 2 G: 2 POWDER, FOR SOLUTION INTRAMUSCULAR; INTRAVENOUS at 13:41

## 2022-04-16 RX ADMIN — GABAPENTIN 100 MG: 100 CAPSULE ORAL at 21:39

## 2022-04-16 RX ADMIN — GABAPENTIN 100 MG: 100 CAPSULE ORAL at 08:31

## 2022-04-16 RX ADMIN — CARVEDILOL 3.12 MG: 3.12 TABLET, FILM COATED ORAL at 17:02

## 2022-04-16 RX ADMIN — NAFCILLIN 2 G: 2 POWDER, FOR SOLUTION INTRAMUSCULAR; INTRAVENOUS at 05:10

## 2022-04-16 RX ADMIN — NAFCILLIN 2 G: 2 POWDER, FOR SOLUTION INTRAMUSCULAR; INTRAVENOUS at 17:03

## 2022-04-16 RX ADMIN — METHOCARBAMOL 1000 MG: 500 TABLET, FILM COATED ORAL at 17:03

## 2022-04-16 RX ADMIN — ACETAMINOPHEN 650 MG: 325 TABLET ORAL at 10:35

## 2022-04-16 RX ADMIN — FERROUS GLUCONATE 324 MG: 324 TABLET ORAL at 08:30

## 2022-04-16 ASSESSMENT — ACTIVITIES OF DAILY LIVING (ADL)
ADLS_ACUITY_SCORE: 5

## 2022-04-16 NOTE — PLAN OF CARE
Occupational Therapy Discharge Summary    Reason for therapy discharge:    All goals and outcomes met, no further needs identified.  Inpatient cardiac rehab goals met - see below    Progress towards therapy goal(s). See goals on Care Plan in [facility] electronic health record for goal details.  Goals met    Therapy recommendation(s):    Continued therapy is recommended.  Rationale/Recommendations:  OPCR planned for progressive monitored exercise and further education in CAD/life style management.  Continue home exercise program. Pt to continue independent ambulation on floor throughout weekend with discharge planned Monday.

## 2022-04-16 NOTE — PLAN OF CARE
Neuro: A/O x4  CV/Rhythm: V paced  Resp/02: RA, LS clear  GI/Diet: BM today, on CHO diet  : voids independently  Skin/Incisions/Sites: Chest midline incision is c/d/I, pacer site with steri strips c/d/i  Pulses/CMS: +2 radial / pedal  Edema: na  Activity/Falls Risk: independent in room, ambulating in halls  Lines/Drains/IVs: PICC in rt arm  Labs/BGM: /138, Cr 1.28  VS/Pain: VSS, pt c/o of back pain, improved with oxycodone, tylenol and T pump  DC Plan: Plan to discharge to his fathers house on Monday with home IV infusions for abx

## 2022-04-16 NOTE — PROGRESS NOTES
[facility]    Internal Medicine Hospitalist Progress Note  04/16/2022  I evaluated patient on the above date.    Juan Alberto Valdovinos Jr., MD  [phone] (p)  Text Page  Vocera        Assessment & Plan New actions/orders today (04/16/2022) are underlined.    Anil Montoya is a 36 year old male with hx including obesity, DM2, HTN, WARNER, depression/anxiety and h/o cryptococcal pneumonia (2012); who initially presented to [facility] 3/22/2022 with generalized weakness and found with evidence of sepsis, initially felt due to pneumonia. Started on antibiotics and admitted. Subsequently found with MSSA bacteremia with multiple positive cultures. 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 Subsequently transferred to [facility] 3/28/2022 for management.      MSSA bacteremia with sepsis.  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  Lumbar spine infection involving L5 vertebral body, S1 vertebral body, right sacral ala and anterior epidural space (from mid L4 down to mid S2), suspect due to above.  * Presented to [facility] on 3/22 with generalized weakness, malaise, and myalgias. Signs of sepsis. CT abdominal and pelvis 3/22 showed patchy consolidation at the left lung base suggestive for pneumonia. Initially treated with ceftriaxone and azithromycin for possible community-acquired pneumonia. Subsequently, multiple BC's positive for MSSA. ID was consulted. Antibiotics changed to cefazolin 3/24. MR lumbar spine 3/24 and TTE 3/26 were unrevealing as to source. However, TEJINDER 3/27 showed thickened tricuspid valve leaflets with atrial aspect of the septal leaflet with an irregular border and small mobile filamentous elements; large fixed heterogeneous mass in the right ventricle attached to the interventricular septum (2.5 x 2 cm) appearing connected to the tricuspid subvalvular annular apparatus with a pedunculated round mobile  element (1.8 x 1 cm); no significant tricuspid regurgitation reported; LVEF 55 to 60%; negative bubble study. Transferred to [facility] 3/28 for management.  * Continued on cefazolin on admit. CVS and ID consulted on admit.  * 3/29: CT dental 3/29 negative.  * 4/1: Underwent bioprosthetic tricuspid valve replacement and endoscopic aortic valve exploration on 4/1. Initially micafungin added on 4/1 as gram stain from tissue from heart valve from 4/1 initially was reported as yeast, which was subsequently corrected as not to be present.  * 4/3: Micafungin stopped.    * 4/4: EP consulted for complete heart block (see below).  * 4/5: Started on gentamicin. Suffered vfib arrest, see below.  * 4/7: MRI lumbar spine showed interval development of en bloc abnormal T2 signal hyperintensity and abnormal contrast enhancement involving the lower L5 vertebral body, S1 vertebral body and right sacral ala and anterior epidural space from mid L4 down to mid S2 worrisome for an infectious process such as osteomyelitis with epidural extension of infection, no definite evidence for discitis at the L5-S1 level although given presumed infection of surrounding tissues, L5-S1 discitis is not excluded and continued surveillance recommended. Cefazolin changed to nafcillin.  * 4/8: Neurosurgery and Ortho Spine consulted.  * 4/10: Gentamicin stopped.  * 4/14: PICC placed.  - Post-op management per CVS.  - Continue nafcillin (started 4/7) - plan stop after 4/26.  - Continue ASA.  - Monitor cultures (negative since 4/3).  - Appreciate CVS and ID help.  - Pain management as below.    Ventricular fibrillation arrest (4/5/2022), s/p ICD placement 4/12/2022.  Post-op complete heart block.  * Noted with second degree heart block and subsequently complete heart block postop with subsequently accelerated rhythm; this required management with an epicardial AV pacer. EP consulted.  * 4/5: Patient was noted to have V. fib on telemetry. Required brief CPR  and 1 shock of 120 J with successful restoration of regular rhythm. Electrolytes were within normal limits. Echo 4/5 showed normal LVEF, no significant TR seen, technically difficult study.  * 4/12: Underwent PPM-ICD placement.  - Follow-up with Cardiology.      DM2 without complications, uncontrolled.  Hypoglycemic episodes.  [PTA: liraglutide 1.8 mg daily; metformin 1000 mg BID.]  * Noted that pt had not been taking liraglutide since early 2022 as he was working out and trying to lose weight; he did this on his own.   * Hgb A1c 12.6 3/27/2022.  * On insulin gtt periop, subsequently transitioned to subcutaneous insulin 4/7.  * 4/13: Low glucose level late afternoon.  * 4/14: Pt expressed desire to not be on insulin as he works as a  and they do not allow this. Glargine decreased. Changed to low CHO diet.  * 4/15: Glucose low again in am. Glargine and prandial insulin stopped. Changed from high --> medium ISS.  * 4/16: Hgb A1C improved to 9.6, but suspect partly underestimated due to blood loss anemia.  Recent Labs   Lab 04/16/22  0728 04/16/22  0608 04/16/22  0155 04/15/22  2239 04/15/22  2058 04/15/22  1747 04/14/22  0746 04/14/22  0610   * 120* 146* 145* 144* 147*   < > 93   A1C  --   --   --   --   --   --   --  9.6*    < > = values in this interval not displayed.   - Continue low CHO diet.  - Continue medium aspart ISS.  - Continue PRN hypoglycemia protocol.  - Plan restart metformin when renal function normalized.  - Plan resume liraglutide at discharge.     Acute kidney injury, question from medications.  Hyperphosphatemia and hypermagnesemia suspect related to acute kidney injury.  * Cr normal on admit to [facility].  * Phos increased starting 4/5.  * Cr normal this hospitalization until 4/10 at which time, cr increased to 1.31. Discussed with Nephrology informally 4/10 and suspected from gentamicin (was started 4/5); gentamicin subsequently stopped 4/10. Losartan stopped 4/10.  * Mg increased  starting 4/11.  * Stopped diclofenac 4/14 (was started 4/9).  * Cr subsequently improved.  * Mg normalized 4/16.  Recent Labs   Lab 04/16/22  0608 04/15/22  0611 04/14/22  0610 04/13/22  0613 04/12/22  0559 04/11/22  0540   CR 1.28* 1.41* 1.45* 1.49* 1.41*  1.54* 1.29*     Recent Labs   Lab 04/16/22  0608 04/15/22  0611 04/14/22  0610 04/13/22  0613 04/12/22  0559 04/11/22  0540 04/10/22  0530   POTASSIUM 4.1 3.8 3.7  3.7 3.9 4.1  4.0 3.7 3.9   MAG 2.3  --  2.5* 2.5* 2.5* 2.4* 2.2   PHOS 5.2*  --  6.5* 6.3* 5.8* 4.8* 6.0*   - Continue low phosphorus diet.  - Monitor BMP, magnesium, phosphorus.  - Avoid nephrotoxic medications.    Acute blood loss anemia from surgery.  * Hemoglobin preop was around 12 which had slowly trended down.   * Hemoglobin dropped to 7.3 on 4/4, most likely acute blood loss from surgery.    * Started PO iron 4/12.  Recent Labs   Lab 04/14/22  0610 04/13/22  0613 04/12/22  0559 04/11/22  0540 04/10/22  0530   HGB 8.1* 8.0* 7.4* 8.4* 8.3*   - Continue ferrous gluconate.  - Monitor CBC periodically as needed.  - Consider prbc transfusion if hgb </= 7.0 or if significant bleeding with hemodynamic instability or if symptomatic.     Acute low back pain due to spine infection.  * Back pain noted at [place].  * Workup and imaging with findings of spine infection as above.  * Pain subsequently improved with treatment of infection.  * Started on gabapentin, diclofenac gel, lidocaine patch, methocarbamol during hospitalization.  * Stopped diclofenac due to OCTAVIO on 4/14.  - Continue gabapentin, lidocaine patch, methocarbamol; PRN acetaminophen, PRN oxycodone, PRN IV hydromorphone; minimize opioids as able.    Essential hypertension.  [PTA: losartan 50 mg daily.]  * Losartan stopped 4/10 due to OCTAVIO.  * Started on amlodipine 4/12.  * Metoprolol started 4/13, stopped 4/14. Started carvedilol 4/14.  - Continue amlodipine 10 mg daily; carvedilol 3.125 mg BID.  - Continue PRN IV hydralazine.    Allergic  rhinitis.  * Pt c/o itchy, runny nose 4/14. Started loratadine.  - Continue loratadine 10 mg daily.    Elevated transaminases, suspect multifactorial due to sepsis/infection as well as underlying non-alcohol fatty liver disease.  * CT abdomen 3/22 showed fatty liver and enlarged liver. RUQ US 3/22 showed fatty liver, but normal gallbladder and normal bile ducts.  * LFT's at [facility] 3/23 showed elevated AST and ALP.   * LFT's 4/11 were normal except for low albumin.  - Monitor outpatient.    Indeterminant pulmonary nodule, right lung base.  * Noted on CT abdomen with contrast 3/22.  - Follow-up per Radiology guidelines (see report 3/22).    WARNER.  * Chronic and stable.  - Continue CPAP.    Hypothyroidism.  * Chronic and stable.  - Continue levothyroxine.     Generalized anxiety disorder  ADHD.  * Chronic and stable.  - Continue bupropion.    Obesity.  Body mass index is 36.2 kg/m .  - Needs to pursue aggressive dietary and lifestyle modifications.        Clinically Significant Risk Factors Present on Admission                      COVID-19 testing.  COVID-19 PCR Results    COVID-19 PCR Results 3/22/22 4/5/22 4/13/22   SARS CoV2 PCR Negative Negative Negative      Comments are available for some flowsheets but are not being displayed.         COVID-19 Antibody Results, Testing for Immunity    COVID-19 Antibody Results, Testing for Immunity   No data to display.             Diet: Room Service  Low Consistent Carb (45 g CHO per Meal) Diet    Prophylaxis: PCD's, ambulation. Heparin SQ.  Schwartz Catheter: Not present  Central Lines: PRESENT  PICC Single Lumen 04/14/22 Right Cephalic-Site Assessment: WDL  Code Status: Full Code    Disposition Plan   Expected discharge: 2-3d recommended to prior living arrangement pending above.  Entered: Juan Alberto Valdovinos MD 04/16/2022, 9:36 AM         Interval History   Doing OK.  Back pain OK now; usually gets worse as the day progresses.  Tolerating diet.    -Data reviewed today: I  reviewed all new labs and imaging over the last 24 hours. I personally reviewed no images or EKG's today.    Physical Exam    , Blood pressure (!) 162/88, pulse 56, temperature 97.7  F (36.5  C), temperature source Oral, resp. rate 18, weight 114.4 kg (252 lb 4.8 oz), SpO2 100 %. O2 Device: None (Room air)    Vitals:    04/14/22 0648 04/15/22 0554 04/16/22 0650   Weight: 116.7 kg (257 lb 3.2 oz) 117 kg (258 lb) 114.4 kg (252 lb 4.8 oz)     Vital Signs with Ranges  Temp:  [97.5  F (36.4  C)-98.6  F (37  C)] 97.7  F (36.5  C)  Pulse:  [55-67] 56  Resp:  [18-20] 18  BP: (126-162)/(59-88) 162/88  SpO2:  [99 %-100 %] 100 %  Patient Vitals for the past 24 hrs:   BP Temp Temp src Pulse Resp SpO2 Weight   04/16/22 0730 (!) 162/88 97.7  F (36.5  C) Oral 56 18 100 % --   04/16/22 0650 -- -- -- -- -- -- 114.4 kg (252 lb 4.8 oz)   04/15/22 2200 (!) 145/59 -- -- -- -- -- --   04/15/22 2153 (!) 145/59 98.6  F (37  C) Oral 56 18 -- --   04/15/22 1756 (!) 144/79 -- -- 55 -- -- --   04/15/22 1544 126/63 98.5  F (36.9  C) Oral 55 20 99 % --   04/15/22 1130 127/81 97.5  F (36.4  C) Oral 67 18 100 % --   04/15/22 0940 (!) 140/68 -- -- 67 -- -- --     I/O's Last 24 hours  I/O last 3 completed shifts:  In: 1860 [P.O.:1520; I.V.:340]  Out: -     Constitutional: Awake, alert, pleasant, conversant.  Respiratory: Diminished in bases. No crackles or wheezes.  Cardiovascular: RRR, no m/r/g.  GI: Soft, nt, nd, few BS.  Skin/Integumen: Trace leg edema.  Other:        Data   Recent Labs   Lab 04/16/22  0728 04/16/22  0608 04/16/22  0155 04/15/22  0818 04/15/22  0611 04/14/22  0746 04/14/22  0610 04/13/22  0825 04/13/22  0613 04/12/22  0814 04/12/22  0559 04/11/22  0929 04/11/22  0540 04/10/22  0809 04/10/22  0530   WBC  --   --   --   --   --   --  7.7  --  8.8  --  9.8  --  8.9  --  8.5   HGB  --   --   --   --   --   --  8.1*  --  8.0*  --  7.4*  --  8.4*  --  8.3*   MCV  --   --   --   --   --   --  99  --  96  --  98  --  96  --  95   PLT  --    --   --   --   --   --  434  --  436  --  480*  --  540*  --  571*   NA  --  140  --   --  142  --  139  --  140  --  139  --  137  --  138   POTASSIUM  --  4.1  --   --  3.8  --  3.7  3.7  --  3.9  --  4.1  4.0  --  3.7  --  3.9   CHLORIDE  --  111*  --   --  111*  --  109  --  109  --  108  --  105  --  104   CO2  --  25  --   --  25  --  26  --  26  --  28  --  27  --  28   BUN  --  16  --   --  16  --  18  --  22  --  20  --  22  --  23   CR  --  1.28*  --   --  1.41*  --  1.45*  --  1.49*  --  1.41*  1.54*  --  1.29*  --  1.31*   ANIONGAP  --  4  --   --  6  --  4  --  5  --  3  --  5  --  6   IMAN  --  8.1*  --   --  8.3*  --  8.7  --  8.7  --  8.6  --  8.5  --  8.6   * 120* 146*   < > 79   < > 93   < > 111*   < > 113*   < > 104*   < > 101*   ALBUMIN  --   --   --   --   --   --   --   --   --   --   --   --  2.1*  --  2.0*   PROTTOTAL  --   --   --   --   --   --   --   --   --   --   --   --  7.6  --  7.6   BILITOTAL  --   --   --   --   --   --   --   --   --   --   --   --  0.4  --  0.4   ALKPHOS  --   --   --   --   --   --   --   --   --   --   --   --  88  --  90   ALT  --   --   --   --   --   --   --   --   --   --   --   --  34  --  41   AST  --   --   --   --   --   --   --   --   --   --   --   --  18  --  21    < > = values in this interval not displayed.     Recent Labs   Lab Test 04/16/22  0728 04/16/22  0608 04/16/22  0155 04/15/22  2239 04/15/22  2058   * 120* 146* 145* 144*     Recent Labs   Lab 04/14/22  0610 04/13/22  0613 04/12/22  0559 04/11/22  0540 04/10/22  0530   WBC 7.7 8.8 9.8 8.9 8.5   LDH  --   --   --  209 219         No results found for this or any previous visit (from the past 24 hour(s)).    Medications   All medications were reviewed.    dextrose       BETA BLOCKER NOT PRESCRIBED       sodium chloride Stopped (04/10/22 0800)       amLODIPine  10 mg Oral Daily     aspirin  81 mg Oral or NG Tube Daily     buPROPion  300 mg Oral QAM     carvedilol  3.125 mg  Oral BID w/meals     ferrous gluconate  324 mg Oral Daily with breakfast     gabapentin  100 mg Oral TID     insulin aspart  1-7 Units Subcutaneous TID AC     insulin aspart  1-5 Units Subcutaneous At Bedtime     levothyroxine  125 mcg Oral Once per day on Mon Tue Wed Thu Fri Sat     levothyroxine  250 mcg Oral Weekly     lidocaine  2 patch Transdermal Q24H     lidocaine   Transdermal Q8H     loratadine  10 mg Oral Daily     methocarbamol  1,000 mg Oral Q6H     nafcillin  2 g Intravenous Q4H     pantoprazole  40 mg Oral Daily     polyethylene glycol  17 g Oral BID     senna-docusate  1 tablet Oral BID     sodium chloride (PF)  10-40 mL Intracatheter Q8H     acetaminophen, bisacodyl, dextrose, glucose **OR** dextrose **OR** glucagon, heparin ANTICOAGULANT, HOLD MEDICATION, HOLD MEDICATION, HOLD MEDICATION, hydrALAZINE, HYDROmorphone **OR** HYDROmorphone, lidocaine 4%, lidocaine (buffered or not buffered), lidocaine (buffered or not buffered), magnesium hydroxide, melatonin, naloxone **OR** naloxone **OR** naloxone **OR** naloxone, ondansetron **OR** ondansetron, oxyCODONE **OR** oxyCODONE, BETA BLOCKER NOT PRESCRIBED, sodium chloride (PF), sodium chloride (PF)

## 2022-04-16 NOTE — PLAN OF CARE
A&O, VSS on room air. Tele: SR with occasional paced rhythm. Independent. Denies CP and SOB. PPM site C/D/I, good pulses. KCl replaced today. Continue IV ABX through PICC line. Plan for discharge Monday with IV ABX.

## 2022-04-16 NOTE — PROGRESS NOTES
Patient seen and care plan discussed with Dr. Mery Heller [facility]  Cardiovascular and Thoracic Surgery Daily Note          Assessment and Plan:   Anil Montoya is a 36 year old gentleman who presented to [facility] ED on 3/22/22 with weakness, recent hematuria and shortness of breath. Workup demonstrated MSSA bacteremia likely secondary to right groin skin source. TEJINDER on 3/27/22 demonstrated tricuspid valve endocarditis. In completing full work up, there was concern for discitis with seeding however MRI negative. He was transferred on 3/28/22 to [facility] for consideration of surgical intervention.      PMH includes: Poorly controlled diabetic, HTN, hypothyroidism, obesity, WARNER, anxiety, cryptococcal pneumonia in 2012 with previous history of staph aureus bacteremia.     Most recent echo demonstrates LVEF 55-60%, normal RV function, mildly thickened cusps on aortic valve and thickened tricuspid valve leaflets, small mobile filamentous elements on the septal leaflet of TV and larged fixed heterogeneous mass in the RV attached to the interventricular septum.  Coronary angiogram 3/31/22 with no e/o CAD.     Course c/b CHB with accelerated junctional rhythm and polymorphic VT arrest on 4/5 with immediate ROSC after defib x1 and transient CPR.        POD #15 s/p:  1. Tricuspid valve replacement (31mm Epic stented valve)  2. Endoscopic aortic valve exploration  3. Transesophageal echocardiogram on 4/1/22 with Dr. 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 Did not need intubation and neurologically intact. Electrolytes stable Appreciate EP's involvement. Troponin elevated as expected after event.  Echocardiogram on 4/5 stable but poor images to evaluate tricuspid gradient  [PTA meds on  hold: losartan 50 mg daily]  HD stable overnight in accelerated junctional rhythm  - ASA 81 mg daily  - Losartan 25 mg daily discontinued d/t OCTAVIO  - No statin indicated  - EP, appreciate recommendations, planning for traditional ICD/PPM due to ongoing CHB/escape junctional rhythm.   - CTs/wires removed 4/7, discussed with EP  -Amlodipine 10mg for HTN, metoprolol started 4/13 per EP  -Metoprolol transitioned to carvedilol 4/14 for better blood pressure control.      -ORTHO/Neuro: Having new lower back pain-resolved, MRI 4/7 showing concern for osteomyelitis involving epidural extension, no intervention at this time, pain control may be bigger issue, repeat MRI spine prior to discontinuation of abx, voltaren ordered for back     Resp:   WARNER  History of cryptococcal pneumonia  Extubated on POD 0, now saturating well on room air  - Continue pulm hygiene efforts: IS/flutter valve/mobility  - Titrate O2 to maintain sats >92%, on RA  - CPAP at HS     Neuro/MSK:    Acute postoperative pain  MDD/VIVEK  - Better controlled with current regimen this AM, received toradol x1  - Sore after a few compressions with CPR but overall tolerable  - Robaxin scheduled, lidocaine patches added  - PTA Wellbutrin resumed  - Sternal incision and area intact and feels stable, wires intact on CXR, continue sternal precautions     Renal/Electrolyte:   below preoperative weight, has less edema  Autodiuresing  - Strict I/O, daily weights  - Avoid/limit nephrotoxins as able,   -OCTAVIO Nephrology curbsided and thought to be from aminoglycoside. Losartan stopped, amlodipine started  -OCTAVIO improving, Creatinine 1.28<1.41<1.45<1.49<1.54  - Replete lytes per protocol  - 7 kg below pre-op weight, holding diuretics     GI/Nutrition:    Mildly elevated LFTs  - Tolerating current diet:  Orders Placed This Encounter      Moderate Consistent Carb (60 g CHO per Meal) Diet   - Continue bowel regimen, + BM, + flatus  - LFTs had normalized, AST slightly elevated after  arrest, trend  - PPI     :    - Voiding well on own     Endo:  Stress induced hyperglycemia   Poorly controlled DM2  Hypothyroidism  Preop A1c             Lab Results   Component Value Date     A1C 12.6 2022    [PTA meds on hold: Victoza and metformin]  - Was on insulin infusion, transitioned to sliding scale insulin and lantus 4/, hyperglycemic, resumed insulin infusion POD 2 with prandial coverage. Continue close monitoring of blood sugars to allow for healing  - Hospitalists managing diabetic regimen, appreciate great management, discontinued lantus  - Goal BG <180 for optimal healing  - PTA levothyroxine resumed     ID:  Stress induced leukocytosis, resolved  Tricuspid valve endocarditis  MSSA bacteremia  History cryptococcal pneumonia  WBC 7.7<8.8<9.8<8.9<8.5<9.5<10.1<11.7<10, Temp (24hrs), Av.4  F (36.9  C), Min:98.1  F (36.7  C), Max:98.8  F (37.1  C); no s/sx infection having new fevers, MRI  concerning for osteomyelitis, consult to ortho/neuro for any further recs-trying to exhaust all resources.   Prelim intraop gram stain positive for yeast from tricuspid valve, culture with 1+ staph aureus, no yeast, continue to follow  BC 4/3 positive for staph aureus, continue to follow  BC / with NGTD thus far  Fungal BC sent on  with NGTD  Full skin inspection  with no new wounds  - Completed perioperative ABX  - Continue Cefazolin per ID, Gentamycin added  for new positive culture --discontinued  - Micafungin started  per ID for positive yeast on gram stain, discontinued with negative cx on 4/3  - HIV antigen/antibody test negative  - Per ID: repeat blood cultures x 2 sets daily until clear  - Continue to follow fever curve, WBC and inflammatory markers as appropriate  - PICC line placed   -Currently on nafcillin until      Heme:  Acute blood loss anemia  Acute blood loss thrombocytopenia  Hgb 8.1< 8.0<7.4<8.4<8.3<7.8, Plts 480<571; no s/sx active bleeding  - CBC in AM  - Goal  Hgb >7   -Proph: PPI, subcutaneous heparin, SCDs  -Dispo: CCU, continue therapies, pulm hygiene-- called wife for update on 4/14-- wife would like patient to be considered for TCU-- discussed that this is unlikely he will be approved for TCU, discussed with care coordinator today and agrees that TCU will not likely be approved by patients insurance. Discussed with patient and he plans to discharge to his [facility] house initially. Tentatively planning discharge Monday if appropriate.             Interval History:   No acute events overnight. Saturating well on room air. Pain controlled. Tolerating diet. +BM. Feels good today. Showered and shaved today.          Medications:       amLODIPine  10 mg Oral Daily     aspirin  81 mg Oral or NG Tube Daily     buPROPion  300 mg Oral QAM     carvedilol  3.125 mg Oral BID w/meals     ferrous gluconate  324 mg Oral Daily with breakfast     gabapentin  100 mg Oral TID     insulin aspart  1-7 Units Subcutaneous TID AC     insulin aspart  1-5 Units Subcutaneous At Bedtime     levothyroxine  125 mcg Oral Once per day on Mon Tue Wed Thu Fri Sat     levothyroxine  250 mcg Oral Weekly     lidocaine  2 patch Transdermal Q24H     lidocaine   Transdermal Q8H     loratadine  10 mg Oral Daily     methocarbamol  1,000 mg Oral Q6H     nafcillin  2 g Intravenous Q4H     pantoprazole  40 mg Oral Daily     polyethylene glycol  17 g Oral BID     senna-docusate  1 tablet Oral BID     sodium chloride (PF)  10-40 mL Intracatheter Q8H     @MEDRN-@          Physical Exam:   Vitals were reviewed  Blood pressure 130/65, pulse 54, temperature 99.2  F (37.3  C), temperature source Oral, resp. rate 18, weight 114.4 kg (252 lb 4.8 oz), SpO2 98 %.  Rhythm: NSR    Lungs: course    Cardiovascular: s1/s2, no m/r/g    Abdomen: s/nt/nd    Extremeties: no LE edema    Incision: cdi    CT: na    Weight:   Vitals:    04/12/22 0609 04/13/22 0557 04/14/22 0648 04/15/22 0554   Weight: 114.1 kg (251 lb 9.6 oz) 115.7 kg  (255 lb 1.6 oz) 116.7 kg (257 lb 3.2 oz) 117 kg (258 lb)    04/16/22 0650   Weight: 114.4 kg (252 lb 4.8 oz)     Lab Results   Component Value Date    WBC 7.7 04/14/2022    WBC 6.8 11/24/2017     Lab Results   Component Value Date    RBC 2.79 04/14/2022    RBC 5.16 11/24/2017     Lab Results   Component Value Date    HGB 8.1 04/14/2022    HGB 15.2 11/24/2017     Lab Results   Component Value Date    HCT 27.5 04/14/2022    HCT 43.7 11/24/2017     No components found for: MCT  Lab Results   Component Value Date    MCV 99 04/14/2022    MCV 85 11/24/2017     Lab Results   Component Value Date    MCH 29.0 04/14/2022    MCH 29.5 11/24/2017     Lab Results   Component Value Date    MCHC 29.5 04/14/2022    MCHC 34.8 11/24/2017     Lab Results   Component Value Date    RDW 17.2 04/14/2022    RDW 12.9 11/24/2017     Lab Results   Component Value Date     04/14/2022     11/24/2017     Last Comprehensive Metabolic Panel:  Sodium   Date Value Ref Range Status   04/16/2022 140 133 - 144 mmol/L Final   11/24/2017 137 133 - 144 mmol/L Final     Potassium   Date Value Ref Range Status   04/16/2022 4.1 3.4 - 5.3 mmol/L Final   11/24/2017 3.8 3.4 - 5.3 mmol/L Final     Chloride   Date Value Ref Range Status   04/16/2022 111 (H) 94 - 109 mmol/L Final   11/24/2017 104 94 - 109 mmol/L Final     Carbon Dioxide   Date Value Ref Range Status   11/24/2017 24 20 - 32 mmol/L Final     Carbon Dioxide (CO2)   Date Value Ref Range Status   04/16/2022 25 20 - 32 mmol/L Final     Anion Gap   Date Value Ref Range Status   04/16/2022 4 3 - 14 mmol/L Final   11/24/2017 9 3 - 14 mmol/L Final     Glucose   Date Value Ref Range Status   04/16/2022 120 (H) 70 - 99 mg/dL Final   11/24/2017 270 (H) 70 - 99 mg/dL Final     GLUCOSE BY METER POCT   Date Value Ref Range Status   04/16/2022 138 (H) 70 - 99 mg/dL Final     Urea Nitrogen   Date Value Ref Range Status   04/16/2022 16 7 - 30 mg/dL Final   11/24/2017 18 7 - 30 mg/dL Final     Creatinine    Date Value Ref Range Status   04/16/2022 1.28 (H) 0.66 - 1.25 mg/dL Final   11/24/2017 1.07 0.66 - 1.25 mg/dL Final     GFR Estimate   Date Value Ref Range Status   04/16/2022 75 >60 mL/min/1.73m2 Final     Comment:     Effective December 21, 2021 eGFRcr in adults is calculated using the 2021 CKD-EPI creatinine equation which includes age and gender (Boogie et al., NE, DOI: 10.1056/TQDSfy4360721)   11/24/2017 81 >60 mL/min/1.7m2 Final     Comment:     Non  GFR Calc     Calcium   Date Value Ref Range Status   04/16/2022 8.1 (L) 8.5 - 10.1 mg/dL Final   11/24/2017 8.3 (L) 8.5 - 10.1 mg/dL Final       Ofelia Hong PA-C  Pager #: [phone]

## 2022-04-17 LAB
ANION GAP SERPL CALCULATED.3IONS-SCNC: 4 MMOL/L (ref 3–14)
BUN SERPL-MCNC: 16 MG/DL (ref 7–30)
CALCIUM SERPL-MCNC: 8 MG/DL (ref 8.5–10.1)
CHLORIDE BLD-SCNC: 112 MMOL/L (ref 94–109)
CO2 SERPL-SCNC: 25 MMOL/L (ref 20–32)
CREAT SERPL-MCNC: 1.27 MG/DL (ref 0.66–1.25)
GFR SERPL CREATININE-BSD FRML MDRD: 76 ML/MIN/1.73M2
GLUCOSE BLD-MCNC: 126 MG/DL (ref 70–99)
GLUCOSE BLDC GLUCOMTR-MCNC: 116 MG/DL (ref 70–99)
GLUCOSE BLDC GLUCOMTR-MCNC: 137 MG/DL (ref 70–99)
GLUCOSE BLDC GLUCOMTR-MCNC: 141 MG/DL (ref 70–99)
GLUCOSE BLDC GLUCOMTR-MCNC: 146 MG/DL (ref 70–99)
GLUCOSE BLDC GLUCOMTR-MCNC: 159 MG/DL (ref 70–99)
PLATELET # BLD AUTO: 399 10E3/UL (ref 150–450)
POTASSIUM BLD-SCNC: 4.1 MMOL/L (ref 3.4–5.3)
SODIUM SERPL-SCNC: 141 MMOL/L (ref 133–144)

## 2022-04-17 PROCEDURE — 250N000013 HC RX MED GY IP 250 OP 250 PS 637: Performed by: INTERNAL MEDICINE

## 2022-04-17 PROCEDURE — 250N000009 HC RX 250: Performed by: PHYSICIAN ASSISTANT

## 2022-04-17 PROCEDURE — 85049 AUTOMATED PLATELET COUNT: CPT | Performed by: PHYSICIAN ASSISTANT

## 2022-04-17 PROCEDURE — 250N000013 HC RX MED GY IP 250 OP 250 PS 637: Performed by: PHYSICIAN ASSISTANT

## 2022-04-17 PROCEDURE — 80048 BASIC METABOLIC PNL TOTAL CA: CPT | Performed by: INTERNAL MEDICINE

## 2022-04-17 PROCEDURE — 99232 SBSQ HOSP IP/OBS MODERATE 35: CPT | Performed by: INTERNAL MEDICINE

## 2022-04-17 PROCEDURE — 999N000190 HC STATISTIC VAT ROUNDS

## 2022-04-17 PROCEDURE — 210N000002 HC R&B HEART CARE

## 2022-04-17 RX ORDER — HEPARIN SODIUM 5000 [USP'U]/.5ML
5000 INJECTION, SOLUTION INTRAVENOUS; SUBCUTANEOUS EVERY 8 HOURS
Status: DISCONTINUED | OUTPATIENT
Start: 2022-04-17 | End: 2022-04-17

## 2022-04-17 RX ADMIN — MELATONIN TAB 3 MG 3 MG: 3 TAB at 23:03

## 2022-04-17 RX ADMIN — METHOCARBAMOL 1000 MG: 500 TABLET, FILM COATED ORAL at 12:20

## 2022-04-17 RX ADMIN — NAFCILLIN 2 G: 2 POWDER, FOR SOLUTION INTRAMUSCULAR; INTRAVENOUS at 13:58

## 2022-04-17 RX ADMIN — ASPIRIN 81 MG CHEWABLE TABLET 81 MG: 81 TABLET CHEWABLE at 08:53

## 2022-04-17 RX ADMIN — GABAPENTIN 100 MG: 100 CAPSULE ORAL at 08:53

## 2022-04-17 RX ADMIN — METHOCARBAMOL 1000 MG: 500 TABLET, FILM COATED ORAL at 16:33

## 2022-04-17 RX ADMIN — NAFCILLIN 2 G: 2 POWDER, FOR SOLUTION INTRAMUSCULAR; INTRAVENOUS at 01:35

## 2022-04-17 RX ADMIN — METFORMIN HYDROCHLORIDE 500 MG: 500 TABLET, FILM COATED ORAL at 17:43

## 2022-04-17 RX ADMIN — BUPROPION HYDROCHLORIDE 300 MG: 300 TABLET, EXTENDED RELEASE ORAL at 08:54

## 2022-04-17 RX ADMIN — METHOCARBAMOL 1000 MG: 500 TABLET, FILM COATED ORAL at 23:03

## 2022-04-17 RX ADMIN — PANTOPRAZOLE SODIUM 40 MG: 40 TABLET, DELAYED RELEASE ORAL at 08:53

## 2022-04-17 RX ADMIN — ACETAMINOPHEN 650 MG: 325 TABLET ORAL at 23:03

## 2022-04-17 RX ADMIN — FERROUS GLUCONATE 324 MG: 324 TABLET ORAL at 08:54

## 2022-04-17 RX ADMIN — ACETAMINOPHEN 650 MG: 325 TABLET ORAL at 05:51

## 2022-04-17 RX ADMIN — GABAPENTIN 100 MG: 100 CAPSULE ORAL at 13:59

## 2022-04-17 RX ADMIN — ACETAMINOPHEN 650 MG: 325 TABLET ORAL at 16:33

## 2022-04-17 RX ADMIN — METHOCARBAMOL 1000 MG: 500 TABLET, FILM COATED ORAL at 05:51

## 2022-04-17 RX ADMIN — AMLODIPINE BESYLATE 10 MG: 10 TABLET ORAL at 08:54

## 2022-04-17 RX ADMIN — LEVOTHYROXINE SODIUM 125 MCG: 125 TABLET ORAL at 05:51

## 2022-04-17 RX ADMIN — CARVEDILOL 3.12 MG: 3.12 TABLET, FILM COATED ORAL at 08:54

## 2022-04-17 RX ADMIN — NAFCILLIN 2 G: 2 POWDER, FOR SOLUTION INTRAMUSCULAR; INTRAVENOUS at 08:53

## 2022-04-17 RX ADMIN — LORATADINE 10 MG: 10 TABLET ORAL at 08:54

## 2022-04-17 RX ADMIN — NAFCILLIN 2 G: 2 POWDER, FOR SOLUTION INTRAMUSCULAR; INTRAVENOUS at 05:47

## 2022-04-17 RX ADMIN — GABAPENTIN 100 MG: 100 CAPSULE ORAL at 21:31

## 2022-04-17 RX ADMIN — NAFCILLIN 2 G: 2 POWDER, FOR SOLUTION INTRAMUSCULAR; INTRAVENOUS at 17:43

## 2022-04-17 RX ADMIN — CYCLOBENZAPRINE HYDROCHLORIDE 10 MG: 5 TABLET, FILM COATED ORAL at 17:52

## 2022-04-17 RX ADMIN — NAFCILLIN 2 G: 2 POWDER, FOR SOLUTION INTRAMUSCULAR; INTRAVENOUS at 21:25

## 2022-04-17 RX ADMIN — CARVEDILOL 3.12 MG: 3.12 TABLET, FILM COATED ORAL at 17:43

## 2022-04-17 RX ADMIN — CYCLOBENZAPRINE HYDROCHLORIDE 10 MG: 5 TABLET, FILM COATED ORAL at 09:58

## 2022-04-17 ASSESSMENT — ACTIVITIES OF DAILY LIVING (ADL)
ADLS_ACUITY_SCORE: 3

## 2022-04-17 NOTE — PROGRESS NOTES
Patient seen and care plan discussed with Dr. Mery Heller [facility]  Cardiovascular and Thoracic Surgery Daily Note          Assessment and Plan:   Anil Montoya is a 36 year old gentleman who presented to [facility] ED on 3/22/22 with weakness, recent hematuria and shortness of breath. Workup demonstrated MSSA bacteremia likely secondary to right groin skin source. TEJINDER on 3/27/22 demonstrated tricuspid valve endocarditis. In completing full work up, there was concern for discitis with seeding however MRI negative. He was transferred on 3/28/22 to [facility] for consideration of surgical intervention.      PMH includes: Poorly controlled diabetic, HTN, hypothyroidism, obesity, WARNER, anxiety, cryptococcal pneumonia in 2012 with previous history of staph aureus bacteremia.     Most recent echo demonstrates LVEF 55-60%, normal RV function, mildly thickened cusps on aortic valve and thickened tricuspid valve leaflets, small mobile filamentous elements on the septal leaflet of TV and larged fixed heterogeneous mass in the RV attached to the interventricular septum.  Coronary angiogram 3/31/22 with no e/o CAD.     Course c/b CHB with accelerated junctional rhythm and polymorphic VT arrest on 4/5 with immediate ROSC after defib x1 and transient CPR.        POD #16 s/p:  1. Tricuspid valve replacement (31mm Epic stented valve)  2. Endoscopic aortic valve exploration  3. Transesophageal echocardiogram on 4/1/22 with Dr. 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 Did not need intubation and neurologically intact. Electrolytes stable Appreciate EP's involvement. Troponin elevated as expected after event.  Echocardiogram on 4/5 stable but poor images to evaluate tricuspid gradient  [PTA meds on  hold: losartan 50 mg daily]  HD stable overnight in accelerated junctional rhythm  - ASA 81 mg daily  - Losartan 25 mg daily discontinued d/t OCTAVIO  - No statin indicated  - EP, appreciate recommendations, planning for traditional ICD/PPM due to ongoing CHB/escape junctional rhythm.   - CTs/wires removed 4/7, discussed with EP  -Amlodipine 10mg for HTN, metoprolol started 4/13 per EP  -Metoprolol transitioned to carvedilol 4/14 for better blood pressure control.      -ORTHO/Neuro: Having new lower back pain-resolved, MRI 4/7 showing concern for osteomyelitis involving epidural extension, no intervention at this time, pain control may be bigger issue, repeat MRI spine prior to discontinuation of abx, voltaren ordered for back     Resp:   WARNER  History of cryptococcal pneumonia  Extubated on POD 0, now saturating well on room air  - Continue pulm hygiene efforts: IS/flutter valve/mobility  - Titrate O2 to maintain sats >92%, on RA  - CPAP at HS     Neuro/MSK:    Acute postoperative pain  MDD/VIVEK  - Better controlled with current regimen this AM, received toradol x1  - Sore after a few compressions with CPR but overall tolerable  - Robaxin scheduled, lidocaine patches added  - PTA Wellbutrin resumed  - Sternal incision and area intact and feels stable, wires intact on CXR, continue sternal precautions     Renal/Electrolyte:   below preoperative weight, has less edema  Autodiuresing  - Strict I/O, daily weights  - Avoid/limit nephrotoxins as able,   -OCTAVIO Nephrology curbsided and thought to be from aminoglycoside. Losartan stopped, amlodipine started  -OCTAVIO improving, Creatinine 1.28<1.41<1.45<1.49<1.54  - Replete lytes per protocol  - 7 kg below pre-op weight, holding diuretics     GI/Nutrition:    Mildly elevated LFTs  - Tolerating current diet:  Orders Placed This Encounter      Moderate Consistent Carb (60 g CHO per Meal) Diet   - Continue bowel regimen, + BM, + flatus  - LFTs had normalized, AST slightly elevated after  arrest, trend  - PPI     :    - Voiding well on own     Endo:  Stress induced hyperglycemia   Poorly controlled DM2  Hypothyroidism  Preop A1c             Lab Results   Component Value Date     A1C 12.6 2022    [PTA meds on hold: Victoza and metformin]  - Was on insulin infusion, transitioned to sliding scale insulin and lantus /, hyperglycemic, resumed insulin infusion POD 2 with prandial coverage. Continue close monitoring of blood sugars to allow for healing  - Hospitalists managing diabetic regimen, appreciate great management, discontinued lantus-- planning to restart metformin today  - Goal BG <180 for optimal healing  - PTA levothyroxine resumed     ID:  Stress induced leukocytosis, resolved  Tricuspid valve endocarditis  MSSA bacteremia  History cryptococcal pneumonia  WBC 7.7<8.8<9.8<8.9<8.5<9.5<10.1<11.7<10, Temp (24hrs), Av.4  F (36.9  C), Min:98.1  F (36.7  C), Max:98.8  F (37.1  C); no s/sx infection having new fevers, MRI  concerning for osteomyelitis, consult to ortho/neuro for any further recs-trying to exhaust all resources.   Prelim intraop gram stain positive for yeast from tricuspid valve, culture with 1+ staph aureus, no yeast, continue to follow  BC 4/3 positive for staph aureus, continue to follow  BC  with NGTD thus far  Fungal BC sent on  with NGTD  Full skin inspection  with no new wounds  - Completed perioperative ABX  - Continue Cefazolin per ID, Gentamycin added  for new positive culture --discontinued  - Micafungin started  per ID for positive yeast on gram stain, discontinued with negative cx on 4/3  - HIV antigen/antibody test negative  - Per ID: repeat blood cultures x 2 sets daily until clear  - Continue to follow fever curve, WBC and inflammatory markers as appropriate  - PICC line placed   -Currently on nafcillin until      Heme:  Acute blood loss anemia  Acute blood loss thrombocytopenia  Hgb 8.1< 8.0<7.4<8.4<8.3<7.8, Plts 480<571; no s/sx  active bleeding  - CBC in AM  - Goal Hgb >7   -Proph: PPI, subcutaneous heparin, SCDs  -Dispo: CCU, continue therapies, pulm hygiene-- called wife for update on 4/14-- wife would like patient to be considered for TCU-- discussed that this is unlikely he will be approved for TCU, discussed with care coordinator today and agrees that TCU will not likely be approved by patients insurance. Discussed with patient and he plans to discharge to his [facility] initially. Tentatively planning discharge Monday if appropriate.             Interval History:   No acute events overnight. Saturating well on room air. Pain controlled. Tolerating diet. +BM. Feels good today. Ready to discharge to [facility] tomorrow.          Medications:       amLODIPine  10 mg Oral Daily     aspirin  81 mg Oral or NG Tube Daily     buPROPion  300 mg Oral QAM     carvedilol  3.125 mg Oral BID w/meals     ferrous gluconate  324 mg Oral Daily with breakfast     gabapentin  100 mg Oral TID     heparin ANTICOAGULANT  5,000 Units Subcutaneous Q8H     insulin aspart  1-7 Units Subcutaneous TID AC     insulin aspart  1-5 Units Subcutaneous At Bedtime     levothyroxine  125 mcg Oral Once per day on Mon Tue Wed Thu Fri Sat     levothyroxine  250 mcg Oral Weekly     lidocaine  2 patch Transdermal Q24H     lidocaine   Transdermal Q8H     loratadine  10 mg Oral Daily     methocarbamol  1,000 mg Oral Q6H     nafcillin  2 g Intravenous Q4H     pantoprazole  40 mg Oral Daily     polyethylene glycol  17 g Oral BID     senna-docusate  1 tablet Oral BID     sodium chloride (PF)  10-40 mL Intracatheter Q8H     @MEDSPRN-@          Physical Exam:   Vitals were reviewed  Blood pressure (!) 144/61, pulse 55, temperature 98.7  F (37.1  C), temperature source Oral, resp. rate 16, weight 117.1 kg (258 lb 3.2 oz), SpO2 97 %.  Rhythm: NSR    Lungs: diminished bases    Cardiovascular: s1/s2, no m/r/g    Abdomen: s/nt/nd    Extremeties: no LE edema    Incision: cdi    CT:  na    Weight:   Vitals:    04/13/22 0557 04/14/22 0648 04/15/22 0554 04/16/22 0650   Weight: 115.7 kg (255 lb 1.6 oz) 116.7 kg (257 lb 3.2 oz) 117 kg (258 lb) 114.4 kg (252 lb 4.8 oz)    04/17/22 0600   Weight: 117.1 kg (258 lb 3.2 oz)     Lab Results   Component Value Date    WBC 7.7 04/14/2022    WBC 6.8 11/24/2017     Lab Results   Component Value Date    RBC 2.79 04/14/2022    RBC 5.16 11/24/2017     Lab Results   Component Value Date    HGB 8.1 04/14/2022    HGB 15.2 11/24/2017     Lab Results   Component Value Date    HCT 27.5 04/14/2022    HCT 43.7 11/24/2017     No components found for: MCT  Lab Results   Component Value Date    MCV 99 04/14/2022    MCV 85 11/24/2017     Lab Results   Component Value Date    MCH 29.0 04/14/2022    MCH 29.5 11/24/2017     Lab Results   Component Value Date    MCHC 29.5 04/14/2022    MCHC 34.8 11/24/2017     Lab Results   Component Value Date    RDW 17.2 04/14/2022    RDW 12.9 11/24/2017     Lab Results   Component Value Date     04/14/2022     11/24/2017     Last Comprehensive Metabolic Panel:  Sodium   Date Value Ref Range Status   04/17/2022 141 133 - 144 mmol/L Final   11/24/2017 137 133 - 144 mmol/L Final     Potassium   Date Value Ref Range Status   04/17/2022 4.1 3.4 - 5.3 mmol/L Final   11/24/2017 3.8 3.4 - 5.3 mmol/L Final     Chloride   Date Value Ref Range Status   04/17/2022 112 (H) 94 - 109 mmol/L Final   11/24/2017 104 94 - 109 mmol/L Final     Carbon Dioxide   Date Value Ref Range Status   11/24/2017 24 20 - 32 mmol/L Final     Carbon Dioxide (CO2)   Date Value Ref Range Status   04/17/2022 25 20 - 32 mmol/L Final     Anion Gap   Date Value Ref Range Status   04/17/2022 4 3 - 14 mmol/L Final   11/24/2017 9 3 - 14 mmol/L Final     Glucose   Date Value Ref Range Status   04/17/2022 126 (H) 70 - 99 mg/dL Final   11/24/2017 270 (H) 70 - 99 mg/dL Final     GLUCOSE BY METER POCT   Date Value Ref Range Status   04/17/2022 116 (H) 70 - 99 mg/dL Final      Urea Nitrogen   Date Value Ref Range Status   04/17/2022 16 7 - 30 mg/dL Final   11/24/2017 18 7 - 30 mg/dL Final     Creatinine   Date Value Ref Range Status   04/17/2022 1.27 (H) 0.66 - 1.25 mg/dL Final   11/24/2017 1.07 0.66 - 1.25 mg/dL Final     GFR Estimate   Date Value Ref Range Status   04/17/2022 76 >60 mL/min/1.73m2 Final     Comment:     Effective December 21, 2021 eGFRcr in adults is calculated using the 2021 CKD-EPI creatinine equation which includes age and gender (Boogie et al., NEJM, DOI: 10.1056/GNPKnh0038593)   11/24/2017 81 >60 mL/min/1.7m2 Final     Comment:     Non  GFR Calc     Calcium   Date Value Ref Range Status   04/17/2022 8.0 (L) 8.5 - 10.1 mg/dL Final   11/24/2017 8.3 (L) 8.5 - 10.1 mg/dL Final       Ofelia Hong PA-C  Pager #: [phone]

## 2022-04-17 NOTE — PROGRESS NOTES
[facility]    Internal Medicine Hospitalist Progress Note  04/17/2022  I evaluated patient on the above date.    Juan Alberto Valdovinos Jr., MD  [phone] (p)  Text Page  Vocera        Assessment & Plan New actions/orders today (04/17/2022) are underlined.    Anil Montoya is a 36 year old male with hx including obesity, DM2, HTN, WARNER, depression/anxiety and h/o cryptococcal pneumonia (2012); who initially presented to [facility] 3/22/2022 with generalized weakness and found with evidence of sepsis, initially felt due to pneumonia. Started on antibiotics and admitted. Subsequently found with MSSA bacteremia with multiple positive cultures. 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 Subsequently transferred to [facility] 3/28/2022 for management.      MSSA bacteremia with sepsis.  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  Lumbar spine infection involving L5 vertebral body, S1 vertebral body, right sacral ala and anterior epidural space (from mid L4 down to mid S2), suspect due to above.  * Presented to [facility] on 3/22 with generalized weakness, malaise, and myalgias. Signs of sepsis. CT abdominal and pelvis 3/22 showed patchy consolidation at the left lung base suggestive for pneumonia. Initially treated with ceftriaxone and azithromycin for possible community-acquired pneumonia. Subsequently, multiple BC's positive for MSSA. ID was consulted. Antibiotics changed to cefazolin 3/24. MR lumbar spine 3/24 and TTE 3/26 were unrevealing as to source. However, TEJINDER 3/27 showed thickened tricuspid valve leaflets with atrial aspect of the septal leaflet with an irregular border and small mobile filamentous elements; large fixed heterogeneous mass in the right ventricle attached to the interventricular septum (2.5 x 2 cm) appearing connected to the tricuspid subvalvular annular apparatus with a pedunculated round mobile  element (1.8 x 1 cm); no significant tricuspid regurgitation reported; LVEF 55 to 60%; negative bubble study. Transferred to [facility] 3/28 for management.  * Continued on cefazolin on admit. CVS and ID consulted on admit.  * 3/29: CT dental 3/29 negative.  * 4/1: Underwent bioprosthetic tricuspid valve replacement and endoscopic aortic valve exploration on 4/1. Initially micafungin added on 4/1 as gram stain from tissue from heart valve from 4/1 initially was reported as yeast, which was subsequently corrected as not to be present.  * 4/3: Micafungin stopped.    * 4/4: EP consulted for complete heart block (see below).  * 4/5: Started on gentamicin. Suffered vfib arrest, see below.  * 4/7: MRI lumbar spine showed interval development of en bloc abnormal T2 signal hyperintensity and abnormal contrast enhancement involving the lower L5 vertebral body, S1 vertebral body and right sacral ala and anterior epidural space from mid L4 down to mid S2 worrisome for an infectious process such as osteomyelitis with epidural extension of infection, no definite evidence for discitis at the L5-S1 level although given presumed infection of surrounding tissues, L5-S1 discitis is not excluded and continued surveillance recommended. Cefazolin changed to nafcillin.  * 4/8: Neurosurgery and Ortho Spine consulted.  * 4/10: Gentamicin stopped.  * 4/14: PICC placed.  - Post-op management per CVS.  - Continue nafcillin (started 4/7) - plan stop after 4/26.  - Continue ASA.  - Monitor cultures (negative since 4/3).  - Appreciate CVS and ID help.  - Pain management as below.    Ventricular fibrillation arrest (4/5/2022), s/p ICD placement 4/12/2022.  Post-op complete heart block.  * Noted with second degree heart block and subsequently complete heart block postop with subsequently accelerated rhythm; this required management with an epicardial AV pacer. EP consulted.  * 4/5: Patient was noted to have V. fib on telemetry. Required brief CPR  and 1 shock of 120 J with successful restoration of regular rhythm. Electrolytes were within normal limits. Echo 4/5 showed normal LVEF, no significant TR seen, technically difficult study.  * 4/12: Underwent PPM-ICD placement.  - Follow-up with Cardiology.      DM2 without complications, uncontrolled.  Hypoglycemic episodes.  [PTA: liraglutide 1.8 mg daily; metformin 1000 mg BID.]  * Noted that pt had not been taking liraglutide since early 2022 as he was working out and trying to lose weight; he did this on his own.   * Hgb A1c 12.6 3/27/2022.  * On insulin gtt periop, subsequently transitioned to subcutaneous insulin 4/7.  * 4/13: Low glucose level late afternoon.  * 4/14: Pt expressed desire to not be on insulin as he works as a  and they do not allow this. Glargine decreased. Changed to low CHO diet.  * 4/15: Glucose low again in am. Glargine and prandial insulin stopped. Changed from high --> medium ISS.  * 4/16: Hgb A1C improved to 9.6, but suspect partly underestimated due to blood loss anemia.  Recent Labs   Lab 04/17/22  1231 04/17/22  0831 04/17/22  0546 04/17/22  0145 04/16/22  2156 04/16/22  1815 04/14/22  0746 04/14/22  0610   * 116* 126* 146* 140* 127*   < > 93   A1C  --   --   --   --   --   --   --  9.6*    < > = values in this interval not displayed.   - Continue low CHO diet.  - Restart metformin at 500 mg BID (cr still up but cr clearance normal).  - Continue medium aspart ISS.  - Continue PRN hypoglycemia protocol.  - Plan resume liraglutide at discharge.     Acute kidney injury, question from medications.  Hyperphosphatemia and hypermagnesemia suspect related to acute kidney injury.  * Cr normal on admit to [facility].  * Phos increased starting 4/5.  * Cr normal this hospitalization until 4/10 at which time, cr increased to 1.31. Discussed with Nephrology informally 4/10 and suspected from gentamicin (was started 4/5); gentamicin subsequently stopped 4/10. Losartan stopped 4/10.  *  Mg increased starting 4/11.  * Stopped diclofenac 4/14 (was started 4/9).  * Cr subsequently improved.  * Mg normalized 4/16.  Recent Labs   Lab 04/17/22  0546 04/16/22  0608 04/15/22  0611 04/14/22  0610 04/13/22  0613 04/12/22  0559   CR 1.27* 1.28* 1.41* 1.45* 1.49* 1.41*  1.54*     Recent Labs   Lab 04/17/22  0546 04/16/22  0608 04/15/22  0611 04/14/22  0610 04/13/22  0613 04/12/22  0559 04/11/22  0540   POTASSIUM 4.1 4.1 3.8 3.7  3.7 3.9 4.1  4.0 3.7   MAG  --  2.3  --  2.5* 2.5* 2.5* 2.4*   PHOS  --  5.2*  --  6.5* 6.3* 5.8* 4.8*   - Continue low phosphorus diet.  - Monitor BMP, magnesium, phosphorus.  - Avoid nephrotoxic medications.    Acute blood loss anemia from surgery.  * Hemoglobin preop was around 12 which had slowly trended down.   * Hemoglobin dropped to 7.3 on 4/4, most likely acute blood loss from surgery.    * Started PO iron 4/12.  Recent Labs   Lab 04/14/22  0610 04/13/22  0613 04/12/22  0559 04/11/22  0540   HGB 8.1* 8.0* 7.4* 8.4*   - Continue ferrous gluconate.  - Monitor CBC periodically as needed.  - Consider prbc transfusion if hgb </= 7.0 or if significant bleeding with hemodynamic instability or if symptomatic.     Acute low back pain due to spine infection.  * Back pain noted at [facility].  * Workup and imaging with findings of spine infection as above.  * Pain subsequently improved with treatment of infection.  * Started on gabapentin, diclofenac gel, lidocaine patch, methocarbamol during hospitalization.  * Stopped diclofenac due to OCTAVIO on 4/14.  * Added PRN cyclobenzaprine added 4/16 with good effect.  - Continue gabapentin, lidocaine patch, methocarbamol; PRN acetaminophen, PRN cyclobenzaprine, PRN oxycodone, PRN IV hydromorphone; minimize opioids as able.    Essential hypertension.  [PTA: losartan 50 mg daily.]  * Losartan stopped 4/10 due to OCTAVIO.  * Started on amlodipine 4/12.  * Metoprolol started 4/13, stopped 4/14. Started carvedilol 4/14.  - Continue amlodipine 10 mg daily;  carvedilol 3.125 mg BID.  - Continue PRN IV hydralazine.    Allergic rhinitis.  * Pt c/o itchy, runny nose 4/14. Started loratadine.  - Continue loratadine 10 mg daily.    Elevated transaminases, suspect multifactorial due to sepsis/infection as well as underlying non-alcohol fatty liver disease.  * CT abdomen 3/22 showed fatty liver and enlarged liver. RUQ US 3/22 showed fatty liver, but normal gallbladder and normal bile ducts.  * LFT's at [facility] 3/23 showed elevated AST and ALP.   * LFT's 4/11 were normal except for low albumin.  - Monitor outpatient.    Indeterminant pulmonary nodule, right lung base.  * Noted on CT abdomen with contrast 3/22.  - Follow-up per Radiology guidelines (see report 3/22).    WARNER.  * Chronic and stable.  - Continue CPAP.    Hypothyroidism.  * Chronic and stable.  - Continue levothyroxine.     Generalized anxiety disorder  ADHD.  * Chronic and stable.  - Continue bupropion.    Obesity.  Body mass index is 37.05 kg/m .  - Needs to pursue aggressive dietary and lifestyle modifications.        Clinically Significant Risk Factors Present on Admission                      COVID-19 testing.  COVID-19 PCR Results    COVID-19 PCR Results 3/22/22 4/5/22 4/13/22   SARS CoV2 PCR Negative Negative Negative      Comments are available for some flowsheets but are not being displayed.         COVID-19 Antibody Results, Testing for Immunity    COVID-19 Antibody Results, Testing for Immunity   No data to display.             Diet: Room Service  Low Consistent Carb (45 g CHO per Meal) Diet    Prophylaxis: PCD's, ambulation. Heparin SQ.  Schwartz Catheter: Not present  Central Lines: PRESENT  PICC Single Lumen 04/14/22 Right Cephalic-Site Assessment: WDL  Code Status: Full Code    Disposition Plan   Expected discharge: possibly tomorrow 4/18 recommended to prior living arrangement pending above.  Entered: Juan Alberto Valdovinos MD 04/17/2022, 1:00 PM         Interval History   Doing well.  Cyclobenzaprine  helped with back pain.  Tolerating diet.    -Data reviewed today: I reviewed all new labs and imaging over the last 24 hours. I personally reviewed no images or EKG's today.    Physical Exam    , Blood pressure 122/77, pulse 60, temperature 98.7  F (37.1  C), temperature source Oral, resp. rate 16, weight 117.1 kg (258 lb 3.2 oz), SpO2 98 %. O2 Device: None (Room air)    Vitals:    04/15/22 0554 04/16/22 0650 04/17/22 0600   Weight: 117 kg (258 lb) 114.4 kg (252 lb 4.8 oz) 117.1 kg (258 lb 3.2 oz)     Vital Signs with Ranges  Temp:  [98.7  F (37.1  C)] 98.7  F (37.1  C)  Pulse:  [54-60] 60  Resp:  [16] 16  BP: (102-144)/(61-77) 122/77  SpO2:  [96 %-98 %] 98 %  Patient Vitals for the past 24 hrs:   BP Temp Temp src Pulse Resp SpO2 Weight   04/17/22 1224 122/77 -- -- 60 -- 98 % --   04/17/22 1146 -- 98.7  F (37.1  C) Oral -- -- -- --   04/17/22 0900 -- -- -- 55 16 97 % --   04/17/22 0600 -- -- -- -- -- -- 117.1 kg (258 lb 3.2 oz)   04/16/22 2029 (!) 144/61 98.7  F (37.1  C) Oral 54 16 98 % --   04/16/22 1700 102/77 -- -- 55 16 96 % --     I/O's Last 24 hours  I/O last 3 completed shifts:  In: 840 [P.O.:540; I.V.:300]  Out: -     Constitutional: Awake, alert, pleasant, conversant.  Respiratory: Diminished in bases. No crackles or wheezes.  Cardiovascular: RRR, no m/r/g.  GI: Soft, nt, nd, +BS.  Skin/Integumen: Trace leg edema.  Other:        Data   Recent Labs   Lab 04/17/22  1231 04/17/22  0831 04/17/22  0546 04/16/22  0728 04/16/22  0608 04/15/22  0818 04/15/22  0611 04/14/22  0746 04/14/22  0610 04/13/22  0825 04/13/22  0613 04/12/22  0814 04/12/22  0559 04/11/22  0929 04/11/22  0540   WBC  --   --   --   --   --   --   --   --  7.7  --  8.8  --  9.8  --  8.9   HGB  --   --   --   --   --   --   --   --  8.1*  --  8.0*  --  7.4*  --  8.4*   MCV  --   --   --   --   --   --   --   --  99  --  96  --  98  --  96   PLT  --   --  399  --   --   --   --   --  434  --  436  --  480*  --  540*   NA  --   --  141  --  140   --  142  --  139  --  140  --  139  --  137   POTASSIUM  --   --  4.1  --  4.1  --  3.8  --  3.7  3.7  --  3.9  --  4.1  4.0  --  3.7   CHLORIDE  --   --  112*  --  111*  --  111*  --  109  --  109  --  108  --  105   CO2  --   --  25  --  25  --  25  --  26  --  26  --  28  --  27   BUN  --   --  16  --  16  --  16  --  18  --  22  --  20  --  22   CR  --   --  1.27*  --  1.28*  --  1.41*  --  1.45*  --  1.49*  --  1.41*  1.54*  --  1.29*   ANIONGAP  --   --  4  --  4  --  6  --  4  --  5  --  3  --  5   IMAN  --   --  8.0*  --  8.1*  --  8.3*  --  8.7  --  8.7  --  8.6  --  8.5   * 116* 126*   < > 120*   < > 79   < > 93   < > 111*   < > 113*   < > 104*   ALBUMIN  --   --   --   --   --   --   --   --   --   --   --   --   --   --  2.1*   PROTTOTAL  --   --   --   --   --   --   --   --   --   --   --   --   --   --  7.6   BILITOTAL  --   --   --   --   --   --   --   --   --   --   --   --   --   --  0.4   ALKPHOS  --   --   --   --   --   --   --   --   --   --   --   --   --   --  88   ALT  --   --   --   --   --   --   --   --   --   --   --   --   --   --  34   AST  --   --   --   --   --   --   --   --   --   --   --   --   --   --  18    < > = values in this interval not displayed.     Recent Labs   Lab Test 04/17/22  1231 04/17/22  0831 04/17/22  0546 04/17/22  0145 04/16/22  2156   * 116* 126* 146* 140*     Recent Labs   Lab 04/14/22  0610 04/13/22  0613 04/12/22  0559 04/11/22  0540   WBC 7.7 8.8 9.8 8.9   LDH  --   --   --  209         No results found for this or any previous visit (from the past 24 hour(s)).    Medications   All medications were reviewed.    dextrose       BETA BLOCKER NOT PRESCRIBED       sodium chloride Stopped (04/10/22 0800)       amLODIPine  10 mg Oral Daily     aspirin  81 mg Oral or NG Tube Daily     buPROPion  300 mg Oral QAM     carvedilol  3.125 mg Oral BID w/meals     ferrous gluconate  324 mg Oral Daily with breakfast     gabapentin  100 mg Oral TID      heparin ANTICOAGULANT  5,000 Units Subcutaneous Q8H     insulin aspart  1-7 Units Subcutaneous TID AC     insulin aspart  1-5 Units Subcutaneous At Bedtime     levothyroxine  125 mcg Oral Once per day on Mon Tue Wed Thu Fri Sat     levothyroxine  250 mcg Oral Weekly     lidocaine  2 patch Transdermal Q24H     lidocaine   Transdermal Q8H     loratadine  10 mg Oral Daily     metFORMIN  500 mg Oral BID w/meals     methocarbamol  1,000 mg Oral Q6H     nafcillin  2 g Intravenous Q4H     pantoprazole  40 mg Oral Daily     polyethylene glycol  17 g Oral BID     senna-docusate  1 tablet Oral BID     sodium chloride (PF)  10-40 mL Intracatheter Q8H     acetaminophen, bisacodyl, cyclobenzaprine, dextrose, glucose **OR** dextrose **OR** glucagon, HOLD MEDICATION, HOLD MEDICATION, HOLD MEDICATION, hydrALAZINE, HYDROmorphone **OR** HYDROmorphone, lidocaine 4%, lidocaine (buffered or not buffered), magnesium hydroxide, melatonin, naloxone **OR** naloxone **OR** naloxone **OR** naloxone, ondansetron **OR** ondansetron, oxyCODONE **OR** oxyCODONE, BETA BLOCKER NOT PRESCRIBED, sodium chloride (PF)

## 2022-04-17 NOTE — PLAN OF CARE
Neuro: A/O x4  CV/Rhythm: V paced  Resp/02: LS clear on RA  GI/Diet: on CHO diet  : voids independently  Skin/Incisions/Sites: chest incision c/d/I with small scabs, ICD site is c/d/I with steri strips  Pulses/CMS: +2 pedal/radial  Edema: na  Activity/Falls Risk: independent in room, ambulating frequently in halls  Lines/Drains/IVs: PICC in rt arm  Labs/BGM: /141, Cr 1.27  Test/Procedures:   VS/Pain: VSS, pt c/o of back pain given flexeril x2 and tylenol x1 with improvement in mobility and comfort level  DC Plan: plan to discharge to his father's house tomorrow with out pt IV home abx treatment

## 2022-04-17 NOTE — PLAN OF CARE
Pt here with generalized weakness and found with evidence of sepsis d/t pneumonia. A&Ox4. Neuros and CMS intact. VSS. Tele 100% V-paced. Low consistent carb diet. Up independently. Back pain managed with T pump, tylenol, and robaxin. Pt scoring green on the Aggression Stop Light Tool. Plan for possible discharge on Monday 4/18.

## 2022-04-18 ENCOUNTER — HOME INFUSION (PRE-WILLOW HOME INFUSION) (OUTPATIENT)
Dept: PHARMACY | Facility: CLINIC | Age: 37
End: 2022-04-18
Payer: COMMERCIAL

## 2022-04-18 VITALS
SYSTOLIC BLOOD PRESSURE: 143 MMHG | OXYGEN SATURATION: 100 % | BODY MASS INDEX: 22.87 KG/M2 | WEIGHT: 159.4 LBS | TEMPERATURE: 98.5 F | RESPIRATION RATE: 16 BRPM | HEART RATE: 60 BPM | DIASTOLIC BLOOD PRESSURE: 81 MMHG

## 2022-04-18 PROBLEM — A41.9 SEPSIS (H): Status: ACTIVE | Noted: 2022-04-18

## 2022-04-18 PROBLEM — I49.01 VENTRICULAR FIBRILLATION (H): Status: ACTIVE | Noted: 2022-04-18

## 2022-04-18 PROBLEM — M46.20 OSTEOMYELITIS OF SPINE (H): Status: ACTIVE | Noted: 2022-04-18

## 2022-04-18 PROBLEM — I46.9 CARDIAC ARREST (H): Status: ACTIVE | Noted: 2022-04-18

## 2022-04-18 PROBLEM — N17.9 ACUTE KIDNEY INJURY (H): Status: ACTIVE | Noted: 2022-04-18

## 2022-04-18 PROBLEM — G47.33 OSA (OBSTRUCTIVE SLEEP APNEA): Status: ACTIVE | Noted: 2022-04-18

## 2022-04-18 PROBLEM — E87.70 FLUID OVERLOAD: Status: ACTIVE | Noted: 2022-04-18

## 2022-04-18 PROBLEM — Z95.4 S/P TVR (TRICUSPID VALVE REPLACEMENT): Status: ACTIVE | Noted: 2022-04-18

## 2022-04-18 PROBLEM — R73.9 TRANSIENT HYPERGLYCEMIA POST PROCEDURE: Status: ACTIVE | Noted: 2022-04-18

## 2022-04-18 PROBLEM — Z95.810 ICD (IMPLANTABLE CARDIOVERTER-DEFIBRILLATOR) IN PLACE: Status: ACTIVE | Noted: 2022-04-18

## 2022-04-18 LAB
ANION GAP SERPL CALCULATED.3IONS-SCNC: 3 MMOL/L (ref 3–14)
BUN SERPL-MCNC: 16 MG/DL (ref 7–30)
CALCIUM SERPL-MCNC: 8.2 MG/DL (ref 8.5–10.1)
CHLORIDE BLD-SCNC: 111 MMOL/L (ref 94–109)
CO2 SERPL-SCNC: 26 MMOL/L (ref 20–32)
CREAT SERPL-MCNC: 1.19 MG/DL (ref 0.66–1.25)
ERYTHROCYTE [DISTWIDTH] IN BLOOD BY AUTOMATED COUNT: 17.6 % (ref 10–15)
GFR SERPL CREATININE-BSD FRML MDRD: 82 ML/MIN/1.73M2
GLUCOSE BLD-MCNC: 125 MG/DL (ref 70–99)
GLUCOSE BLDC GLUCOMTR-MCNC: 116 MG/DL (ref 70–99)
GLUCOSE BLDC GLUCOMTR-MCNC: 156 MG/DL (ref 70–99)
HCT VFR BLD AUTO: 28.4 % (ref 40–53)
HGB BLD-MCNC: 8.5 G/DL (ref 13.3–17.7)
MCH RBC QN AUTO: 29 PG (ref 26.5–33)
MCHC RBC AUTO-ENTMCNC: 29.9 G/DL (ref 31.5–36.5)
MCV RBC AUTO: 97 FL (ref 78–100)
PLATELET # BLD AUTO: 469 10E3/UL (ref 150–450)
POTASSIUM BLD-SCNC: 3.9 MMOL/L (ref 3.4–5.3)
RBC # BLD AUTO: 2.93 10E6/UL (ref 4.4–5.9)
SODIUM SERPL-SCNC: 140 MMOL/L (ref 133–144)
WBC # BLD AUTO: 8.3 10E3/UL (ref 4–11)

## 2022-04-18 PROCEDURE — 250N000013 HC RX MED GY IP 250 OP 250 PS 637: Performed by: PHYSICIAN ASSISTANT

## 2022-04-18 PROCEDURE — 250N000013 HC RX MED GY IP 250 OP 250 PS 637: Performed by: INTERNAL MEDICINE

## 2022-04-18 PROCEDURE — 99232 SBSQ HOSP IP/OBS MODERATE 35: CPT | Performed by: INTERNAL MEDICINE

## 2022-04-18 PROCEDURE — 250N000009 HC RX 250: Performed by: PHYSICIAN ASSISTANT

## 2022-04-18 PROCEDURE — 999N000190 HC STATISTIC VAT ROUNDS

## 2022-04-18 PROCEDURE — 80048 BASIC METABOLIC PNL TOTAL CA: CPT | Performed by: INTERNAL MEDICINE

## 2022-04-18 PROCEDURE — 85027 COMPLETE CBC AUTOMATED: CPT | Performed by: PHYSICIAN ASSISTANT

## 2022-04-18 RX ORDER — METHOCARBAMOL 500 MG/1
1000 TABLET, FILM COATED ORAL EVERY 6 HOURS
Qty: 60 TABLET | Refills: 0 | Status: SHIPPED | OUTPATIENT
Start: 2022-04-18 | End: 2024-09-14

## 2022-04-18 RX ORDER — LORATADINE 10 MG/1
10 TABLET ORAL DAILY
Qty: 30 TABLET | Refills: 0 | Status: SHIPPED | OUTPATIENT
Start: 2022-04-18 | End: 2024-09-14

## 2022-04-18 RX ORDER — CYCLOBENZAPRINE HCL 5 MG
5-10 TABLET ORAL EVERY 8 HOURS PRN
Qty: 40 TABLET | Refills: 0 | Status: SHIPPED | OUTPATIENT
Start: 2022-04-18 | End: 2024-09-14

## 2022-04-18 RX ORDER — AMOXICILLIN 250 MG
1 CAPSULE ORAL DAILY PRN
Qty: 30 TABLET | Refills: 0 | Status: SHIPPED | OUTPATIENT
Start: 2022-04-18 | End: 2022-05-03

## 2022-04-18 RX ORDER — POLYETHYLENE GLYCOL 3350 17 G/17G
17 POWDER, FOR SOLUTION ORAL 2 TIMES DAILY PRN
Qty: 510 G | Refills: 0 | Status: SHIPPED | OUTPATIENT
Start: 2022-04-18 | End: 2022-05-03

## 2022-04-18 RX ORDER — FERROUS GLUCONATE 324(38)MG
324 TABLET ORAL EVERY OTHER DAY
Qty: 30 TABLET | Refills: 0 | Status: SHIPPED | OUTPATIENT
Start: 2022-04-18 | End: 2024-01-11

## 2022-04-18 RX ORDER — OXYCODONE HYDROCHLORIDE 5 MG/1
5 TABLET ORAL EVERY 6 HOURS PRN
Qty: 30 TABLET | Refills: 0 | Status: SHIPPED | OUTPATIENT
Start: 2022-04-18 | End: 2024-01-11

## 2022-04-18 RX ORDER — OXYCODONE HYDROCHLORIDE 5 MG/1
5 TABLET ORAL EVERY 6 HOURS PRN
Qty: 40 TABLET | Refills: 0 | Status: SHIPPED | OUTPATIENT
Start: 2022-04-18 | End: 2022-04-18

## 2022-04-18 RX ORDER — AMLODIPINE BESYLATE 10 MG/1
10 TABLET ORAL DAILY
Qty: 30 TABLET | Refills: 3 | Status: SHIPPED | OUTPATIENT
Start: 2022-04-18 | End: 2024-01-11

## 2022-04-18 RX ORDER — NAFCILLIN SODIUM 2 G/1
12 INJECTION, POWDER, FOR SOLUTION INTRAVENOUS DAILY
Qty: 1 EACH | Refills: 1 | Status: SHIPPED | OUTPATIENT
Start: 2022-04-18 | End: 2022-05-18

## 2022-04-18 RX ORDER — ACETAMINOPHEN 325 MG/1
650 TABLET ORAL EVERY 4 HOURS PRN
Qty: 30 TABLET | Refills: 0 | Status: SHIPPED | OUTPATIENT
Start: 2022-04-18

## 2022-04-18 RX ORDER — ASPIRIN 81 MG/1
81 TABLET, CHEWABLE ORAL DAILY
Qty: 30 TABLET | Refills: 0 | Status: SHIPPED | OUTPATIENT
Start: 2022-04-19 | End: 2024-01-11

## 2022-04-18 RX ORDER — GABAPENTIN 100 MG/1
100 CAPSULE ORAL 3 TIMES DAILY PRN
Qty: 60 CAPSULE | Refills: 0 | Status: SHIPPED | OUTPATIENT
Start: 2022-04-18 | End: 2024-09-14

## 2022-04-18 RX ORDER — CARVEDILOL 3.12 MG/1
3.12 TABLET ORAL 2 TIMES DAILY WITH MEALS
Qty: 60 TABLET | Refills: 3 | Status: SHIPPED | OUTPATIENT
Start: 2022-04-18 | End: 2024-01-11

## 2022-04-18 RX ADMIN — LORATADINE 10 MG: 10 TABLET ORAL at 07:50

## 2022-04-18 RX ADMIN — ACETAMINOPHEN 650 MG: 325 TABLET ORAL at 06:03

## 2022-04-18 RX ADMIN — NAFCILLIN 2 G: 2 POWDER, FOR SOLUTION INTRAMUSCULAR; INTRAVENOUS at 02:04

## 2022-04-18 RX ADMIN — GABAPENTIN 100 MG: 100 CAPSULE ORAL at 07:46

## 2022-04-18 RX ADMIN — ACETAMINOPHEN 650 MG: 325 TABLET ORAL at 12:07

## 2022-04-18 RX ADMIN — METFORMIN HYDROCHLORIDE 500 MG: 500 TABLET, FILM COATED ORAL at 07:50

## 2022-04-18 RX ADMIN — PANTOPRAZOLE SODIUM 40 MG: 40 TABLET, DELAYED RELEASE ORAL at 07:44

## 2022-04-18 RX ADMIN — AMLODIPINE BESYLATE 10 MG: 10 TABLET ORAL at 07:50

## 2022-04-18 RX ADMIN — CYCLOBENZAPRINE HYDROCHLORIDE 10 MG: 5 TABLET, FILM COATED ORAL at 14:25

## 2022-04-18 RX ADMIN — GABAPENTIN 100 MG: 100 CAPSULE ORAL at 13:22

## 2022-04-18 RX ADMIN — CARVEDILOL 3.12 MG: 3.12 TABLET, FILM COATED ORAL at 07:50

## 2022-04-18 RX ADMIN — ASPIRIN 81 MG CHEWABLE TABLET 81 MG: 81 TABLET CHEWABLE at 07:45

## 2022-04-18 RX ADMIN — METHOCARBAMOL 1000 MG: 500 TABLET, FILM COATED ORAL at 06:02

## 2022-04-18 RX ADMIN — NAFCILLIN 2 G: 2 POWDER, FOR SOLUTION INTRAMUSCULAR; INTRAVENOUS at 13:23

## 2022-04-18 RX ADMIN — FERROUS GLUCONATE 324 MG: 324 TABLET ORAL at 07:45

## 2022-04-18 RX ADMIN — BUPROPION HYDROCHLORIDE 300 MG: 300 TABLET, EXTENDED RELEASE ORAL at 07:45

## 2022-04-18 RX ADMIN — NAFCILLIN 2 G: 2 POWDER, FOR SOLUTION INTRAMUSCULAR; INTRAVENOUS at 09:25

## 2022-04-18 RX ADMIN — LEVOTHYROXINE SODIUM 125 MCG: 125 TABLET ORAL at 06:03

## 2022-04-18 RX ADMIN — METHOCARBAMOL 1000 MG: 500 TABLET, FILM COATED ORAL at 10:40

## 2022-04-18 RX ADMIN — NAFCILLIN 2 G: 2 POWDER, FOR SOLUTION INTRAMUSCULAR; INTRAVENOUS at 06:01

## 2022-04-18 ASSESSMENT — ACTIVITIES OF DAILY LIVING (ADL)
ADLS_ACUITY_SCORE: 3

## 2022-04-18 NOTE — PROGRESS NOTES
[facility]    Internal Medicine Hospitalist Progress Note  04/18/2022  I evaluated patient on the above date.    Juan Alberto Valdovinos Jr., MD  [phone] (p)  Text Page  Vocera        Assessment & Plan New actions/orders today (04/18/2022) are underlined.    Anil Montoya is a 36 year old male with hx including DM2, HTN, WARNER, depression/anxiety and h/o cryptococcal pneumonia (2012); who initially presented to [facility] 3/22/2022 with generalized weakness and found with evidence of sepsis, initially felt due to pneumonia. Started on antibiotics and admitted. Subsequently found with MSSA bacteremia with multiple positive cultures. 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 Subsequently transferred to [facility] 3/28/2022 for management.      MSSA bacteremia with sepsis.  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  Lumbar spine infection involving L5 vertebral body, S1 vertebral body, right sacral ala and anterior epidural space (from mid L4 down to mid S2), suspect due to above.  * Presented to [facility] on 3/22 with generalized weakness, malaise, and myalgias. Signs of sepsis. CT abdominal and pelvis 3/22 showed patchy consolidation at the left lung base suggestive for pneumonia. Initially treated with ceftriaxone and azithromycin for possible community-acquired pneumonia. Subsequently, multiple BC's positive for MSSA. ID was consulted. Antibiotics changed to cefazolin 3/24. MR lumbar spine 3/24 and TTE 3/26 were unrevealing as to source. However, TEJINDER 3/27 showed thickened tricuspid valve leaflets with atrial aspect of the septal leaflet with an irregular border and small mobile filamentous elements; large fixed heterogeneous mass in the right ventricle attached to the interventricular septum (2.5 x 2 cm) appearing connected to the tricuspid subvalvular annular apparatus with a pedunculated round mobile element  (1.8 x 1 cm); no significant tricuspid regurgitation reported; LVEF 55 to 60%; negative bubble study. Transferred to [facility] 3/28 for management.  * Continued on cefazolin on admit. CVS and ID consulted on admit.  * 3/29: CT dental 3/29 negative.  * 4/1: Underwent bioprosthetic tricuspid valve replacement and endoscopic aortic valve exploration on 4/1. Initially micafungin added on 4/1 as gram stain from tissue from heart valve from 4/1 initially was reported as yeast, which was subsequently corrected as not to be present.  * 4/3: Micafungin stopped.    * 4/4: EP consulted for complete heart block (see below).  * 4/5: Started on gentamicin. Suffered vfib arrest, see below.  * 4/7: MRI lumbar spine showed interval development of en bloc abnormal T2 signal hyperintensity and abnormal contrast enhancement involving the lower L5 vertebral body, S1 vertebral body and right sacral ala and anterior epidural space from mid L4 down to mid S2 worrisome for an infectious process such as osteomyelitis with epidural extension of infection, no definite evidence for discitis at the L5-S1 level although given presumed infection of surrounding tissues, L5-S1 discitis is not excluded and continued surveillance recommended. Cefazolin changed to nafcillin.  * 4/8: Neurosurgery and Ortho Spine consulted.  * 4/10: Gentamicin stopped.  * 4/14: PICC placed.  - Post-op management per CVS.  - Continue nafcillin (started 4/7) - plan stop after 4/26.  - Continue ASA.  - Monitor cultures (negative since 4/3).  - Appreciate CVS and ID help.  - Pain management as below.    Ventricular fibrillation arrest (4/5/2022), s/p ICD placement 4/12/2022.  Post-op complete heart block.  * Noted with second degree heart block and subsequently complete heart block postop with subsequently accelerated rhythm; this required management with an epicardial AV pacer. EP consulted.  * 4/5: Patient was noted to have V. fib on telemetry. Required brief CPR and 1  shock of 120 J with successful restoration of regular rhythm. Electrolytes were within normal limits. Echo 4/5 showed normal LVEF, no significant TR seen, technically difficult study.  * 4/12: Underwent PPM-ICD placement.  - Follow-up with Cardiology.      DM2 without complications, uncontrolled.  Hypoglycemic episodes.  [PTA: liraglutide 1.8 mg daily; metformin 1000 mg BID.]  * Noted that pt had not been taking liraglutide since early 2022 as he was working out and trying to lose weight; he did this on his own.   * Hgb A1c 12.6 3/27/2022.  * On insulin gtt periop, subsequently transitioned to subcutaneous insulin 4/7.  * 4/13: Low glucose level late afternoon.  * 4/14: Pt expressed desire to not be on insulin as he works as a  and they do not allow this. Glargine decreased. Changed to low CHO diet.  * 4/15: Glucose low again in am. Glargine and prandial insulin stopped. Changed from high --> medium ISS.  * 4/16: Hgb A1C improved to 9.6, but suspect partly underestimated due to blood loss anemia.  * 4/17: Restarted metformin.  Recent Labs   Lab 04/18/22  1125 04/18/22  0811 04/18/22  0611 04/17/22  2214 04/17/22  1747 04/17/22  1231 04/14/22  0746 04/14/22  0610   * 116* 125* 137* 159* 141*   < > 93   A1C  --   --   --   --   --   --   --  9.6*    < > = values in this interval not displayed.   - Continue low CHO diet.  - Continue metformin at 500 mg BID.  - Continue medium aspart ISS.  - Continue PRN hypoglycemia protocol.  - Discharge on PTA liraglutide and metformin.     Acute kidney injury, question from medications.  Hyperphosphatemia and hypermagnesemia suspect related to acute kidney injury.  * Cr normal on admit to [facility].  * Phos increased starting 4/5.  * Cr normal this hospitalization until 4/10 at which time, cr increased to 1.31. Discussed with Nephrology informally 4/10 and suspected from gentamicin (was started 4/5); gentamicin subsequently stopped 4/10. Losartan stopped 4/10.  * Mg  increased starting 4/11.  * Stopped diclofenac 4/14 (was started 4/9).  * Cr subsequently improved.  * Mg normalized 4/16.  * Cr normalized 4/18.  Recent Labs   Lab 04/18/22  0611 04/17/22  0546 04/16/22  0608 04/15/22  0611 04/14/22  0610 04/13/22  0613   CR 1.19 1.27* 1.28* 1.41* 1.45* 1.49*     Recent Labs   Lab 04/18/22  0611 04/17/22  0546 04/16/22  0608 04/15/22  0611 04/14/22  0610 04/13/22  0613 04/12/22  0559   POTASSIUM 3.9 4.1 4.1 3.8 3.7  3.7 3.9 4.1  4.0   MAG  --   --  2.3  --  2.5* 2.5* 2.5*   PHOS  --   --  5.2*  --  6.5* 6.3* 5.8*   - Monitor BMP outpatient.  - Avoid nephrotoxic medications.    Acute blood loss anemia from surgery.  * Hemoglobin preop was around 12 which had slowly trended down.   * Hemoglobin dropped to 7.3 on 4/4, most likely acute blood loss from surgery.    * Started PO iron 4/12.  Recent Labs   Lab 04/18/22  0932 04/14/22  0610 04/13/22  0613 04/12/22  0559   HGB 8.5* 8.1* 8.0* 7.4*   - Continue ferrous gluconate.  - Monitor CBC periodically as needed.  - Consider prbc transfusion if hgb </= 7.0 or if significant bleeding with hemodynamic instability or if symptomatic.     Acute low back pain due to spine infection.  * Back pain noted at [facility].  * Workup and imaging with findings of spine infection as above.  * Pain subsequently improved with treatment of infection.  * Started on gabapentin, diclofenac gel, lidocaine patch, methocarbamol during hospitalization.  * Stopped diclofenac due to OCTAVIO on 4/14.  * Added PRN cyclobenzaprine added 4/16 with good effect.  - Continue gabapentin, lidocaine patch, methocarbamol; PRN acetaminophen, PRN cyclobenzaprine, PRN oxycodone, PRN IV hydromorphone; minimize opioids as able.    Essential hypertension.  [PTA: losartan 50 mg daily.]  * Losartan stopped 4/10 due to OCTAVIO.  * Started on amlodipine 4/12.  * Metoprolol started 4/13, stopped 4/14. Started carvedilol 4/14.  - Continue amlodipine 10 mg daily; carvedilol 3.125 mg BID.  - Continue  PRN IV hydralazine.    Allergic rhinitis.  * Pt c/o itchy, runny nose 4/14. Started loratadine.  - Continue loratadine 10 mg daily.    Elevated transaminases, suspect multifactorial due to sepsis/infection as well as underlying non-alcohol fatty liver disease.  * CT abdomen 3/22 showed fatty liver and enlarged liver. RUQ US 3/22 showed fatty liver, but normal gallbladder and normal bile ducts.  * LFT's at [facility] 3/23 showed elevated AST and ALP.   * LFT's 4/11 were normal except for low albumin.  - Monitor outpatient.    Indeterminant pulmonary nodule, right lung base.  * Noted on CT abdomen with contrast 3/22.  - Follow-up per Radiology guidelines (see report 3/22).    WARNER.  * Chronic and stable.  - Continue CPAP.    Hypothyroidism.  * Chronic and stable.  - Continue levothyroxine.     Generalized anxiety disorder  ADHD.  * Chronic and stable.  - Continue bupropion.    H/o obesity.  Body mass index is 22.87 kg/m .  - Continue to pursue aggressive dietary and lifestyle modifications.        Clinically Significant Risk Factors Present on Admission                      COVID-19 testing.  COVID-19 PCR Results    COVID-19 PCR Results 3/22/22 4/5/22 4/13/22   SARS CoV2 PCR Negative Negative Negative      Comments are available for some flowsheets but are not being displayed.         COVID-19 Antibody Results, Testing for Immunity    COVID-19 Antibody Results, Testing for Immunity   No data to display.             Diet: Room Service  Low Consistent Carb (45 g CHO per Meal) Diet  Diet    Prophylaxis: PCD's, ambulation. Heparin SQ.  Schwartz Catheter: Not present  Central Lines: PRESENT  PICC Single Lumen 04/14/22 Right Cephalic-Site Assessment: WDL  Code Status: Full Code    Disposition Plan   Expected discharge: Today 4/18 recommended to prior living arrangement.  Entered: Juan Alberto Valdovinos MD 04/18/2022, 12:54 PM         Interval History   Doing OK.  Ready for discharge.    -Data reviewed today: I reviewed all new labs  and imaging over the last 24 hours. I personally reviewed no images or EKG's today.    Physical Exam    , Blood pressure (!) 143/81, pulse 60, temperature 98.5  F (36.9  C), temperature source Oral, resp. rate 16, weight 72.3 kg (159 lb 6.4 oz), SpO2 100 %. O2 Device: None (Room air)    Vitals:    04/16/22 0650 04/17/22 0600 04/18/22 0646   Weight: 114.4 kg (252 lb 4.8 oz) 117.1 kg (258 lb 3.2 oz) 72.3 kg (159 lb 6.4 oz)     Vital Signs with Ranges  Temp:  [98.5  F (36.9  C)-98.6  F (37  C)] 98.5  F (36.9  C)  Pulse:  [55-60] 60  Resp:  [16] 16  BP: (132-143)/(69-81) 143/81  SpO2:  [99 %-100 %] 100 %  Patient Vitals for the past 24 hrs:   BP Temp Temp src Pulse Resp SpO2 Weight   04/18/22 0815 (!) 143/81 98.5  F (36.9  C) Oral 60 16 100 % --   04/18/22 0800 -- -- -- -- -- 99 % --   04/18/22 0646 -- -- -- -- -- -- 72.3 kg (159 lb 6.4 oz)   04/17/22 2121 137/69 98.6  F (37  C) Oral 55 -- -- --   04/17/22 1630 132/74 -- Oral 55 16 99 % --     I/O's Last 24 hours  I/O last 3 completed shifts:  In: 600 [P.O.:600]  Out: -     Constitutional: Awake, alert, pleasant, conversant.  Respiratory:   Cardiovascular:   GI:   Skin/Integumen:   Other:        Data   Recent Labs   Lab 04/18/22  1125 04/18/22  0932 04/18/22  0811 04/18/22  0611 04/17/22  0831 04/17/22  0546 04/16/22  0728 04/16/22  0608 04/14/22  0746 04/14/22  0610 04/13/22  0825 04/13/22  0613   WBC  --  8.3  --   --   --   --   --   --   --  7.7  --  8.8   HGB  --  8.5*  --   --   --   --   --   --   --  8.1*  --  8.0*   MCV  --  97  --   --   --   --   --   --   --  99  --  96   PLT  --  469*  --   --   --  399  --   --   --  434  --  436   NA  --   --   --  140  --  141  --  140   < > 139  --  140   POTASSIUM  --   --   --  3.9  --  4.1  --  4.1   < > 3.7  3.7  --  3.9   CHLORIDE  --   --   --  111*  --  112*  --  111*   < > 109  --  109   CO2  --   --   --  26  --  25  --  25   < > 26  --  26   BUN  --   --   --  16  --  16  --  16   < > 18  --  22   CR  --   --    --  1.19  --  1.27*  --  1.28*   < > 1.45*  --  1.49*   ANIONGAP  --   --   --  3  --  4  --  4   < > 4  --  5   IMAN  --   --   --  8.2*  --  8.0*  --  8.1*   < > 8.7  --  8.7   *  --  116* 125*   < > 126*   < > 120*   < > 93   < > 111*    < > = values in this interval not displayed.     Recent Labs   Lab Test 04/18/22  1125 04/18/22  0811 04/18/22  0611 04/17/22  2214 04/17/22  1747   * 116* 125* 137* 159*     Recent Labs   Lab 04/18/22  0932 04/14/22  0610 04/13/22  0613 04/12/22  0559   WBC 8.3 7.7 8.8 9.8         No results found for this or any previous visit (from the past 24 hour(s)).    Medications   All medications were reviewed.    dextrose       BETA BLOCKER NOT PRESCRIBED       sodium chloride Stopped (04/10/22 0800)       amLODIPine  10 mg Oral Daily     aspirin  81 mg Oral or NG Tube Daily     buPROPion  300 mg Oral QAM     carvedilol  3.125 mg Oral BID w/meals     ferrous gluconate  324 mg Oral Daily with breakfast     gabapentin  100 mg Oral TID     insulin aspart  1-7 Units Subcutaneous TID AC     insulin aspart  1-5 Units Subcutaneous At Bedtime     levothyroxine  125 mcg Oral Once per day on Mon Tue Wed Thu Fri Sat     levothyroxine  250 mcg Oral Weekly     lidocaine  2 patch Transdermal Q24H     lidocaine   Transdermal Q8H     loratadine  10 mg Oral Daily     metFORMIN  500 mg Oral BID w/meals     methocarbamol  1,000 mg Oral Q6H     nafcillin  2 g Intravenous Q4H     pantoprazole  40 mg Oral Daily     polyethylene glycol  17 g Oral BID     senna-docusate  1 tablet Oral BID     sodium chloride (PF)  10-40 mL Intracatheter Q8H     acetaminophen, bisacodyl, cyclobenzaprine, dextrose, glucose **OR** dextrose **OR** glucagon, HOLD MEDICATION, HOLD MEDICATION, HOLD MEDICATION, hydrALAZINE, HYDROmorphone **OR** HYDROmorphone, lidocaine 4%, lidocaine (buffered or not buffered), magnesium hydroxide, melatonin, naloxone **OR** naloxone **OR** naloxone **OR** naloxone, ondansetron **OR**  ondansetron, oxyCODONE **OR** oxyCODONE, BETA BLOCKER NOT PRESCRIBED, sodium chloride (PF)

## 2022-04-18 NOTE — PLAN OF CARE
Pt here with generalized weakness and found with evidence of sepsis d/t pneumonia. A&Ox4. Neuros and CMS intact. VSS. Tele 100% V-paced. Low consistent carb diet. Up independently. Back pain managed with T pump, tylenol, and robaxin. Pt scoring green on the Aggression Stop Light Tool. Plan for discharge on Monday 4/18.

## 2022-04-18 NOTE — PLAN OF CARE
Shift [id number]: VSS. Neuro: A/O x 4, pleasant man. Pulm: Lungs: diminished throughout, on RA. GI/: BS present, BM; Void: WDL, via toilet. Activity: Up independently. Diet: low carb. Access: PICC. See MAR for pain management.    AM Shift:  - Discharge instructions reviewed, pt was given discharge papers.   Plan:  - Discharge home today

## 2022-04-18 NOTE — PROGRESS NOTES
CV Surgery    S: Walking around room, breathing stable, tolerating diet, working with rehab, ready to discharge home-headed to dad's home.     O: B/P: 143/81, T: 98.5, P: 60, R: 16  General: NAD  Heart: s1/s2, no m/r/g  Lungs: course  Abd: s/nt/nd  Sternum: cdi  Extremities: no LE edema    Lab Results   Component Value Date    WBC 7.7 04/14/2022    WBC 6.8 11/24/2017     Lab Results   Component Value Date    RBC 2.79 04/14/2022    RBC 5.16 11/24/2017     Lab Results   Component Value Date    HGB 8.1 04/14/2022    HGB 15.2 11/24/2017     Lab Results   Component Value Date    HCT 27.5 04/14/2022    HCT 43.7 11/24/2017     No components found for: MCT  Lab Results   Component Value Date    MCV 99 04/14/2022    MCV 85 11/24/2017     Lab Results   Component Value Date    MCH 29.0 04/14/2022    MCH 29.5 11/24/2017     Lab Results   Component Value Date    MCHC 29.5 04/14/2022    MCHC 34.8 11/24/2017     Lab Results   Component Value Date    RDW 17.2 04/14/2022    RDW 12.9 11/24/2017     Lab Results   Component Value Date     04/17/2022     11/24/2017       Last Basic Metabolic Panel:  Lab Results   Component Value Date     04/18/2022     11/24/2017      Lab Results   Component Value Date    POTASSIUM 3.9 04/18/2022    POTASSIUM 3.8 11/24/2017     Lab Results   Component Value Date    CHLORIDE 111 04/18/2022    CHLORIDE 104 11/24/2017     Lab Results   Component Value Date    IMAN 8.2 04/18/2022    IMAN 8.3 11/24/2017     Lab Results   Component Value Date    CO2 26 04/18/2022    CO2 24 11/24/2017     Lab Results   Component Value Date    BUN 16 04/18/2022    BUN 18 11/24/2017     Lab Results   Component Value Date    CR 1.19 04/18/2022    CR 1.07 11/24/2017     Lab Results   Component Value Date     04/18/2022     04/18/2022     11/24/2017       A/P: POD#17 s/p: Tricuspid valve replacement (31mm Epic stented valve), Endoscopic aortic valve exploration, Transesophageal  echocardiogram on 4/1/22 with Dr. Marti Perez PA-C  Pager [phone]

## 2022-04-18 NOTE — PROGRESS NOTES
[facility]    Infectious Disease Progress Note    Date of Service : 04/18/2022     Assessment:  36YM with uncontrolled diabetes and HbA1c of >12%, and prior cryptococcal pneumonia 10 years ago without HIV diagnosis, who is hospitalized with high grade prolonged MSSA bacteremia since 3/22 with tricuspid valve endocarditis. Possible secondary seeding of the lumbar spine initial MRI was negative and back pain was  Gone but then started complaining of back pain again on 4/ 6, MRI 4/7 possible osteo.    He is s/p tricuspid valve replacement surgery and aortic valve exploration for control of infection on 04/01 with positive gram stain and culture of tricuspid valve tissue for staph aureus.  Yeast also seen on stain but read corrected later to no yeast.  Micafungin discontinued as Micro results  updated by lab. Initial read of 1+ yeast on valve tissue on 4/1 was corrected and updated. Only staph aureus on valve tissue cx, no yeast seen on stain.    On 4/5, he suffered a ventricular tachycardia/fibrillation arrest.  ROSC achieved after one round of CPR and one shock.  Alert and neurologically intact following.   On 4/ 6 back pain again,   MRI lumbar spine done on 4/ 7 :   Interval development of en bloc abnormal T2 signal hyperintensity  and abnormal contrast enhancement involving the lower L5 vertebral  body, S1 vertebral body and right sacral ala and anterior epidural  space from mid L4 down to mid S2 worrisome for an infectious process  such as osteomyelitis with epidural extension of infection     Recommendations:  1. Last positive blood cultures on 4/3 1 blood cx + on 4/5 , follow up since then have been negative.   2. On nafcillin. LOT 5/ 17 ( OPIV antibiotics orders are in the chart) looks like plan is home if so likely switch order to 12 gr daily continuous infusion by pump, order switched to that now  3. S/P ICD placement, PICC in  Stable looks like Mon disposition  Orders altered and in   Will need eventual Follow-up Bc  Follow pain see me mid May    Eliu Eng MD    Interval History   Resting, tolerating antibiotics ok   Afebrile     Physical Exam   Temp: 98.5  F (36.9  C) Temp src: Oral BP: (!) 143/81 Pulse: 60   Resp: 16 SpO2: 100 % O2 Device: None (Room air)    Vitals:    04/16/22 0650 04/17/22 0600 04/18/22 0646   Weight: 114.4 kg (252 lb 4.8 oz) 117.1 kg (258 lb 3.2 oz) 72.3 kg (159 lb 6.4 oz)     Vital Signs with Ranges  Temp:  [98.5  F (36.9  C)-98.7  F (37.1  C)] 98.5  F (36.9  C)  Pulse:  [55-60] 60  Resp:  [16] 16  BP: (122-143)/(69-81) 143/81  SpO2:  [98 %-100 %] 100 %    Constitutional: Awake, alert, cooperative, no apparent distress  Lungs: Clear to auscultation bilaterally, no crackles or wheezing  Cardiovascular: S1S2, sternal surgical site intact  Abdomen:  soft,non-tender  Skin: No rash    Other:    Medications     dextrose       BETA BLOCKER NOT PRESCRIBED       sodium chloride Stopped (04/10/22 0800)       amLODIPine  10 mg Oral Daily     aspirin  81 mg Oral or NG Tube Daily     buPROPion  300 mg Oral QAM     carvedilol  3.125 mg Oral BID w/meals     ferrous gluconate  324 mg Oral Daily with breakfast     gabapentin  100 mg Oral TID     insulin aspart  1-7 Units Subcutaneous TID AC     insulin aspart  1-5 Units Subcutaneous At Bedtime     levothyroxine  125 mcg Oral Once per day on Mon Tue Wed Thu Fri Sat     levothyroxine  250 mcg Oral Weekly     lidocaine  2 patch Transdermal Q24H     lidocaine   Transdermal Q8H     loratadine  10 mg Oral Daily     metFORMIN  500 mg Oral BID w/meals     methocarbamol  1,000 mg Oral Q6H     nafcillin  2 g Intravenous Q4H     pantoprazole  40 mg Oral Daily     polyethylene glycol  17 g Oral BID     senna-docusate  1 tablet Oral BID     sodium chloride (PF)  10-40 mL Intracatheter Q8H       Data   All microbiology laboratory data reviewed.  Recent Labs   Lab Test 04/18/22  0932 04/17/22  0546 04/14/22  0610 04/13/22  0613   WBC 8.3  --  7.7 8.8    HGB 8.5*  --  8.1* 8.0*   HCT 28.4*  --  27.5* 27.1*   MCV 97  --  99 96   * [phone]     Recent Labs   Lab Test 04/18/22  0611 04/17/22  0546 04/16/22  0608   CR 1.19 1.27* 1.28*

## 2022-04-18 NOTE — DISCHARGE SUMMARY
Discharge Summary    Anil Montoya MRN# [id number]   YOB: 1985 Age: 36 year old     Date of Admission:  3/28/2022  Date of Discharge:  4/18/2022  Admitting Physician:  Marti Melton MD  Discharge Physician:  Dr. Marti Melton  Discharging Service:  Cardiovascular and Thoracic Surgery     Home clinic: [facility]   Primary Provider: Amanda Dawkins          Admission Diagnoses:   MSSA bacteremia [R78.81, B95.61]  Endocarditis determined by echocardiography [I38]          Discharge Diagnosis:   Patient Active Problem List   Diagnosis     Long QT interval     Pneumonia of left lower lobe due to infectious organism     Hyperglycemia due to diabetes mellitus (H)     MSSA bacteremia     Endocarditis determined by echocardiography     S/P TVR (tricuspid valve replacement)-31 mm Epic      Fluid overload     Transient hyperglycemia post procedure     Ventricular fibrillation (H)     Cardiac arrest (H)     ICD (implantable cardioverter-defibrillator) in place     Acute kidney injury (H)     WARNER (obstructive sleep apnea)     Osteomyelitis of spine (H)     Sepsis (H)                Discharge Disposition:   Discharged to home           Condition on Discharge:   Discharge condition: Stable   Discharge vitals: Blood pressure (!) 143/81, pulse 60, temperature 98.5  F (36.9  C), temperature source Oral, resp. rate 16, weight 72.3 kg (159 lb 6.4 oz), SpO2 100 %.     Code status on discharge: Full Code           Procedures:   On 4/1/22 Mr. Santana successfully underwent Tricuspid valve replacement (31mm Epic stented valve), Endoscopic aortic valve exploration, Transesophageal echocardiogram by Dr. Marti Melton.          Medications Prior to Admission:     Medications Prior to Admission   Medication Sig Dispense Refill Last Dose     levothyroxine (SYNTHROID/LEVOTHROID) 125 MCG tablet Take 250 mcg by mouth once a week On Sunday   Unknown at Unknown time     buPROPion (WELLBUTRIN  XL) 300 MG 24 hr tablet Take 300 mg by mouth every morning   3/21/2022 at am     dextromethorphan (TUSSIN COUGH) 15 MG/5ML syrup Take 10 mLs by mouth 4 times daily as needed for cough   3/18/2022 at am     levothyroxine (SYNTHROID/LEVOTHROID) 125 MCG tablet Take 125 mcg by mouth six times a week Monday thru Saturday   3/21/2022 at am     liraglutide (VICTOZA) 18 MG/3ML solution Inject 1.8 mg Subcutaneous daily   3/21/2022 at am     metFORMIN (GLUCOPHAGE) 1000 MG tablet Take 1,000 mg by mouth 2 times daily (with meals)   3/21/2022 at pm     [DISCONTINUED] acetaminophen (TYLENOL) 500 MG tablet Take 1,000 mg by mouth every 6 hours as needed for mild pain   3/21/2022 at pm     [DISCONTINUED] ciprofloxacin (CIPRO) 750 MG tablet Take 750 mg by mouth 2 times daily For 10 days (3/21-3/31).   3/22/2022 at 1000     [DISCONTINUED] ibuprofen (ADVIL/MOTRIN) 200 MG tablet Take 600 mg by mouth every 6 hours as needed for mild pain   3/21/2022 at pm     [DISCONTINUED] losartan (COZAAR) 50 MG tablet Take 50 mg by mouth daily   3/21/2022 at am             Discharge Medications:     Current Discharge Medication List      START taking these medications    Details   amLODIPine (NORVASC) 10 MG tablet Take 1 tablet (10 mg) by mouth daily  Qty: 30 tablet, Refills: 3    Associated Diagnoses: S/P TVR (tricuspid valve replacement)      aspirin (ASA) 81 MG chewable tablet 1 tablet (81 mg) by Oral or NG Tube route daily  Qty: 30 tablet, Refills: 0    Associated Diagnoses: S/P TVR (tricuspid valve replacement)      carvedilol (COREG) 3.125 MG tablet Take 1 tablet (3.125 mg) by mouth 2 times daily (with meals)  Qty: 60 tablet, Refills: 3    Associated Diagnoses: S/P TVR (tricuspid valve replacement)      cyclobenzaprine (FLEXERIL) 5 MG tablet Take 1-2 tablets (5-10 mg) by mouth every 8 hours as needed for muscle spasms  Qty: 40 tablet, Refills: 0    Associated Diagnoses: S/P TVR (tricuspid valve replacement)      ferrous gluconate (FERGON) 324  (38 Fe) MG tablet Take 1 tablet (324 mg) by mouth every other day  Qty: 30 tablet, Refills: 0    Associated Diagnoses: S/P TVR (tricuspid valve replacement)      gabapentin (NEURONTIN) 100 MG capsule Take 1 capsule (100 mg) by mouth 3 times daily as needed for neuropathic pain  Qty: 60 capsule, Refills: 0    Associated Diagnoses: S/P TVR (tricuspid valve replacement)      loratadine (CLARITIN) 10 MG tablet Take 1 tablet (10 mg) by mouth daily  Qty: 30 tablet, Refills: 0    Associated Diagnoses: S/P TVR (tricuspid valve replacement)      methocarbamol (ROBAXIN) 500 MG tablet Take 2 tablets (1,000 mg) by mouth every 6 hours  Qty: 60 tablet, Refills: 0    Associated Diagnoses: S/P TVR (tricuspid valve replacement)      Nafcillin Sodium 2 g SOLR Inject 2 g into the vein every 4 hours weekly CBC, creatinine and ALT faxed to [phone]    Associated Diagnoses: Endocarditis determined by echocardiography; MSSA bacteremia      oxyCODONE (ROXICODONE) 5 MG tablet Take 1 tablet (5 mg) by mouth every 6 hours as needed for breakthrough pain  Qty: 40 tablet, Refills: 0    Associated Diagnoses: S/P TVR (tricuspid valve replacement)      polyethylene glycol (MIRALAX) 17 GM/Dose powder Take 17 g by mouth 2 times daily as needed for constipation  Qty: 510 g, Refills: 0    Associated Diagnoses: S/P TVR (tricuspid valve replacement)      senna-docusate (SENOKOT-S/PERICOLACE) 8.6-50 MG tablet Take 1 tablet by mouth daily as needed for constipation  Qty: 30 tablet, Refills: 0    Associated Diagnoses: S/P TVR (tricuspid valve replacement)         CONTINUE these medications which have CHANGED    Details   acetaminophen (TYLENOL) 325 MG tablet Take 2 tablets (650 mg) by mouth every 4 hours as needed for mild pain or fever  Qty: 30 tablet, Refills: 0    Associated Diagnoses: Cardiac arrest (H)         CONTINUE these medications which have NOT CHANGED    Details   !! levothyroxine (SYNTHROID/LEVOTHROID) 125 MCG tablet Take 250 mcg by mouth  once a week On Sunday      buPROPion (WELLBUTRIN XL) 300 MG 24 hr tablet Take 300 mg by mouth every morning      dextromethorphan (TUSSIN COUGH) 15 MG/5ML syrup Take 10 mLs by mouth 4 times daily as needed for cough      !! levothyroxine (SYNTHROID/LEVOTHROID) 125 MCG tablet Take 125 mcg by mouth six times a week Monday thru Saturday      liraglutide (VICTOZA) 18 MG/3ML solution Inject 1.8 mg Subcutaneous daily      metFORMIN (GLUCOPHAGE) 1000 MG tablet Take 1,000 mg by mouth 2 times daily (with meals)       !! - Potential duplicate medications found. Please discuss with provider.      STOP taking these medications       ciprofloxacin (CIPRO) 750 MG tablet Comments:   Reason for Stopping:         ibuprofen (ADVIL/MOTRIN) 200 MG tablet Comments:   Reason for Stopping:         losartan (COZAAR) 50 MG tablet Comments:   Reason for Stopping:                     Consultations:   Nutrition, Intensivist, Care Coordinator, Nephrology, Ortho, Neurosurgery, Cards EP, cardiology             Brief History of Illness:   Mr. Casas is a 36 year-old man with uncontrolled diabetes who presented with sepsis found to have tricuspid valve vegetations and some abnormal aortic valve thickening on CT. He is obese with uncontrolled diabetes. He likely had a groin abscess as the source. He does have a history of cryptococcal infection in 2012 which he recovered from. Coronary angiography demonstrates no significant coronary artery disease. We discussed that tricuspid valve replacement would be necessary due to the large size of vegetations and possible aortic valve if infected on visual examination in the OR. I discussed the high risk of pacemaker need due to the infection near AV node as well. The patient understands the risks and benefits of the procedure and wishes to undergo the operation.           Hospital Course:   On 4/1/22 Mr. Santana successfully underwent Tricuspid valve replacement (31mm Epic stented valve), Endoscopic  aortic valve exploration, Transesophageal echocardiogram by Dr. Marti Melton.  Was extubated within 24 hours of surgery. Once he was weaned off hemodynamic gtt's, transferred to the surgical floor.   He was a very poorly controlled, non compliant diabetic (A1C 12.6) prior to surgery. He had transient hyperglycemia treated with an insulin gtt, transitioned to sliding scale insulin and the hospitalist service was consulted to help manage his blood sugars. They had made recommendations at discharge.  Had some fluid overload treated with diuretic medication. At discharge he is below his pre-op weight and is not sent with any diuretics.   On 4/7/22 Mr. Montoya started having severe back pain. A repeat MRI (initial one performed in ED and was negative) showed concern for osteomyelitis involving epidural extension. Ortho/Neuro surg were consulted with no interventions other than IV abx per ID and a repeat MRI of spine to be performed before stopping IV abx in future. Also they recommended to pay attention to pain management as can be quite difficult to control pain with spinal osteo. Pain controlled for past week prior to discharge today.   He has WARNER which is controlled with home CPAP.   Sustained and OCTAVIO after aggressive diuresis. Aminoglycosides/offending medications stopped with resolution and normal creatinine at discharge. He understands that he will need to watch his weight carefully moving forward.  Cardiology EP was consulted to help manage his heart rhythm as on 4/4 he suffered a ventricular fibrillation cardiac arrest that was unexpected and not preceded by bradycardia or other reversible causes. He fluctuated between CHB and junctional escape rhythm. On 4/12/22 he underwent successful dual chamber ICD implantation. He intermittently paces from the ICD (Threshold 50). He has follow up with Reina Griffiths NP with cards EP on 5/3/22 at 2 PM. He progressed well with cardiac rehab. He is discharged to his dad's  home on [address] with the help of his family.              Significant Results:   Lab Results   Component Value Date    WBC 7.7 04/14/2022    WBC 6.8 11/24/2017     Lab Results   Component Value Date    RBC 2.79 04/14/2022    RBC 5.16 11/24/2017     Lab Results   Component Value Date    HGB 8.1 04/14/2022    HGB 15.2 11/24/2017     Lab Results   Component Value Date    HCT 27.5 04/14/2022    HCT 43.7 11/24/2017     No components found for: MCT  Lab Results   Component Value Date    MCV 99 04/14/2022    MCV 85 11/24/2017     Lab Results   Component Value Date    MCH 29.0 04/14/2022    MCH 29.5 11/24/2017     Lab Results   Component Value Date    MCHC 29.5 04/14/2022    MCHC 34.8 11/24/2017     Lab Results   Component Value Date    RDW 17.2 04/14/2022    RDW 12.9 11/24/2017     Lab Results   Component Value Date     04/17/2022     11/24/2017       Last Basic Metabolic Panel:  Lab Results   Component Value Date     04/18/2022     11/24/2017      Lab Results   Component Value Date    POTASSIUM 3.9 04/18/2022    POTASSIUM 3.8 11/24/2017     Lab Results   Component Value Date    CHLORIDE 111 04/18/2022    CHLORIDE 104 11/24/2017     Lab Results   Component Value Date    IMAN 8.2 04/18/2022    IMAN 8.3 11/24/2017     Lab Results   Component Value Date    CO2 26 04/18/2022    CO2 24 11/24/2017     Lab Results   Component Value Date    BUN 16 04/18/2022    BUN 18 11/24/2017     Lab Results   Component Value Date    CR 1.19 04/18/2022    CR 1.07 11/24/2017     Lab Results   Component Value Date     04/18/2022     04/18/2022     11/24/2017                  Pending Results:   None           Discharge Instructions and Follow-Up:   Discharge diet: Regular   Discharge activity: Daily weights: Call if weight gain 2-3 lbs over 24 hours or if greater than 5 lbs in 1 week.  No lifting more than 10 lbs for 8-12 weeks.  No driving for 1 month.  Call for pain medication refill.  Call for  temperature greater than 101.0  Daily shower with antibacterial soap.   Discharge follow-up: *Follow up primary care provider in 7-10 days after discharge in order to review your medication, vital signs, obtain any necessary lab work your doctor may want, and to update them on your hospitalization and medical issues.   *Follow up with Lety/Ofelia/Mikala Estevez with Dr Yepez/Linh, heart surgeon, at [facility] at [facility] [address] on 5/3/22 at 2:30 pm. If any questions or concerns call [phone].  You will see us once at this visit and then if everything is going well you will not need to see us again.  You will follow long term with your cardiologist.   *Follow up with Reina Griffiths NP with cardiology EP, on 5/3/22 at 2:00 PM. This is who you will follow with long term about your heart issues. [phone].   * Dr. Dorsey on 6/6/2022 at 1:00PM at [facility]    Outpatient therapy: Cardiac rehab   Home Care agency: None    Supplies and equipment: None   Lines and drains: None    Wound care: Wash incision daily with antibacterial soap   Other instructions: None

## 2022-04-18 NOTE — PROGRESS NOTES
[facility]    Anil will discharge to his dad's house today ([address] [place] [address]). I spoke with Anil and we scheduled IV education for around 1200h. Please ensure Anil receives his 1330h dose prior to discharge. [facility] will deliver the medication and supplies to his dad's home today where he will hookup to home pump and begin continuous dosing schedule.     Thank you for the referral.    Sumaya Powers RN  [facility] Liaison  [phone] (Mon thru Fri 8am - 5pm)  [phone] Office

## 2022-04-18 NOTE — PROGRESS NOTES
[facility]  Anil will be discharging today and going home on IV nafcillin.  I met with Anil at bedside and instructed in IV administration via  PICC.  Had Anil perform hands on with practice equipment and teaching sheets. Provided information about supplies and supply delivery, storage of medication, checking of label, dosing times, plan for SNV and 24/7 availability of [facility] staff. [facility] will follow for home RN.   Anil verbalized understanding of information given. He demonstrated very good technique with practice and verbalized good understanding of process.  Stated he feels comfortable with administering abx later today.   Dosing schedule: Anil will hookup to continuous home pump [facility]. His preferred pump change time is 0600 which he will begin tomorrow.   Delivery: Medication and supplies will arrive at pt's dad's house today prior to next dose.   Anil is ready for discharge from [facility] perspective.    Sumaya Powers RN  [facility] Liaison  [phone] (M-F 8a-5p) [phone] Office

## 2022-04-18 NOTE — PROGRESS NOTES
Care Management Discharge Note    Discharge Date: 04/18/2022       Discharge Disposition:  Home (fathers house)    Discharge Services:  [facility]     Discharge DME:      Discharge Transportation:  family    Private pay costs discussed: Not applicable    Education Provided on the Discharge Plan: yes   Persons Notified of Discharge Plans:patient  Patient/Family in Agreement with the Plan: yes     Handoff Referral Completed: Yes    Additional Information:  [facility] liaison will do bedside teach 1200 today. He will receive 1330 dose then can discharge    Hgb A1C, Discharge summary and H&P faxed per request to his Endocrinologist Dr Gan @ [phone]

## 2022-04-19 ENCOUNTER — HOME INFUSION (PRE-WILLOW HOME INFUSION) (OUTPATIENT)
Dept: PHARMACY | Facility: CLINIC | Age: 37
End: 2022-04-19
Payer: COMMERCIAL

## 2022-04-19 ENCOUNTER — PATIENT OUTREACH (OUTPATIENT)
Dept: CARE COORDINATION | Facility: CLINIC | Age: 37
End: 2022-04-19
Payer: COMMERCIAL

## 2022-04-19 ENCOUNTER — TELEPHONE (OUTPATIENT)
Dept: CARDIOLOGY | Facility: CLINIC | Age: 37
End: 2022-04-19

## 2022-04-19 DIAGNOSIS — Z71.89 OTHER SPECIFIED COUNSELING: ICD-10-CM

## 2022-04-19 NOTE — PROGRESS NOTES
This is a recent snapshot of the patient's [facility] medical record.  For current drug dose and complete information and questions, call [phone] or In Basket pool, [facility] ([id number])  CSN Number:  [id number]

## 2022-04-19 NOTE — PROGRESS NOTES
Clinic Care Coordination Contact    Background: Care Coordination referral placed from [facility] discharge report for reason of patient meeting criteria for a TCM outreach call by [facility] team.    Assessment: Upon chart review, [facility] Team member will cancel/close the referral for TCM outreach due to reason below:    Patient declined completing post hospital discharge questions    Plan: Care Coordination referral for TCM outreach canceled.    Sol Chen MA  [facility] Resource [facility]

## 2022-04-19 NOTE — TELEPHONE ENCOUNTER
[facility] Call Center    Phone Message    May a detailed message be left on voicemail: yes     Reason for Call: Form or Letter   Type or form/letter needing completion: Work note with restrictions as to what he can and cannot do after surgery.   Provider: Linh Richter form needed: ASAP    Once completed: Patient will  at . or Please enter into [employer] and he can send to his employer.    Action Taken: Other: cardio    Travel Screening: Not Applicable                            [facility] Call Center    Phone Message    May a detailed message be left on voicemail: yes     Reason for Call: Other: patient is calling asking for a letter to return back to work. patient states patient will pick it up once the letter to ready rico be picked ip      Action Taken: Message routed to:  [facility]): cardio    Travel Screening: Not Applicable

## 2022-04-20 ENCOUNTER — LAB REQUISITION (OUTPATIENT)
Dept: LAB | Facility: CLINIC | Age: 37
End: 2022-04-20
Payer: COMMERCIAL

## 2022-04-20 ENCOUNTER — HOME INFUSION (PRE-WILLOW HOME INFUSION) (OUTPATIENT)
Dept: PHARMACY | Facility: CLINIC | Age: 37
End: 2022-04-20

## 2022-04-20 DIAGNOSIS — J18.9 PNEUMONIA, UNSPECIFIED ORGANISM: ICD-10-CM

## 2022-04-20 LAB
ALT SERPL W P-5'-P-CCNC: 23 U/L (ref 0–70)
BASOPHILS # BLD AUTO: 0.1 10E3/UL (ref 0–0.2)
BASOPHILS NFR BLD AUTO: 1 %
BUN SERPL-MCNC: 16 MG/DL (ref 7–30)
CREAT SERPL-MCNC: 1.26 MG/DL (ref 0.66–1.25)
CRP SERPL-MCNC: 70.9 MG/L (ref 0–8)
EOSINOPHIL # BLD AUTO: 0.2 10E3/UL (ref 0–0.7)
EOSINOPHIL NFR BLD AUTO: 3 %
ERYTHROCYTE [DISTWIDTH] IN BLOOD BY AUTOMATED COUNT: 17.3 % (ref 10–15)
ERYTHROCYTE [SEDIMENTATION RATE] IN BLOOD BY WESTERGREN METHOD: 78 MM/HR (ref 0–15)
GFR SERPL CREATININE-BSD FRML MDRD: 77 ML/MIN/1.73M2
HCT VFR BLD AUTO: 28.9 % (ref 40–53)
HGB BLD-MCNC: 8.5 G/DL (ref 13.3–17.7)
IMM GRANULOCYTES # BLD: 0 10E3/UL
IMM GRANULOCYTES NFR BLD: 0 %
LYMPHOCYTES # BLD AUTO: 1.1 10E3/UL (ref 0.8–5.3)
LYMPHOCYTES NFR BLD AUTO: 14 %
MCH RBC QN AUTO: 29 PG (ref 26.5–33)
MCHC RBC AUTO-ENTMCNC: 29.4 G/DL (ref 31.5–36.5)
MCV RBC AUTO: 99 FL (ref 78–100)
MONOCYTES # BLD AUTO: 0.7 10E3/UL (ref 0–1.3)
MONOCYTES NFR BLD AUTO: 10 %
NEUTROPHILS # BLD AUTO: 5.3 10E3/UL (ref 1.6–8.3)
NEUTROPHILS NFR BLD AUTO: 72 %
NRBC # BLD AUTO: 0 10E3/UL
NRBC BLD AUTO-RTO: 0 /100
PLATELET # BLD AUTO: 444 10E3/UL (ref 150–450)
RBC # BLD AUTO: 2.93 10E6/UL (ref 4.4–5.9)
WBC # BLD AUTO: 7.3 10E3/UL (ref 4–11)

## 2022-04-20 PROCEDURE — 82565 ASSAY OF CREATININE: CPT | Performed by: INTERNAL MEDICINE

## 2022-04-20 PROCEDURE — 85652 RBC SED RATE AUTOMATED: CPT | Performed by: INTERNAL MEDICINE

## 2022-04-20 PROCEDURE — 86140 C-REACTIVE PROTEIN: CPT | Performed by: INTERNAL MEDICINE

## 2022-04-20 PROCEDURE — 84520 ASSAY OF UREA NITROGEN: CPT | Performed by: INTERNAL MEDICINE

## 2022-04-20 PROCEDURE — 84460 ALANINE AMINO (ALT) (SGPT): CPT | Performed by: INTERNAL MEDICINE

## 2022-04-20 PROCEDURE — 85025 COMPLETE CBC W/AUTO DIFF WBC: CPT | Performed by: INTERNAL MEDICINE

## 2022-04-20 NOTE — PROGRESS NOTES
This is a recent snapshot of the patient's [facility] medical record.  For current drug dose and complete information and questions, call [phone] or In [facility] pool, [facility] home infusion ([id number])  CSN Number:  [id number]

## 2022-04-21 ENCOUNTER — TELEPHONE (OUTPATIENT)
Dept: OTHER | Facility: CLINIC | Age: 37
End: 2022-04-21
Payer: COMMERCIAL

## 2022-04-21 NOTE — PROGRESS NOTES
This is a recent snapshot of the patient's [facility] medical record.  For current drug dose and complete information and questions, call [phone] or In Basket pool, [facility] home infusion ([id number])  CSN Number:  [id number]

## 2022-04-21 NOTE — TELEPHONE ENCOUNTER
48 hour post op call    ACTIVITY  How are you doing with activity? I am doing well, I did 5000 steps yesterday so pretty sore today.   Are you continuing to use your IS 3-4 times a day and do you have any shortness of breath.Good 1500 ml , no shortness of breath     Are you weighing yourself daily and has it been stable?. Stable     PAIN  How is your pain, what are you doing for your pain? Back pain, Taking tylenol and Advil only. Encouraged Robaxin and lidocaine patches and or seated massage.     Are you still doing sternal precautions? Yes   Do you hear any clicking when you are moving or taking a deep breath? No     INCISION:   It looks good, glue starting to peal.     ASSISTANCE  Do you have someone at home to help you with your daily activities and transportation needs? My wife      MEDICATIONS  I would like to review your discharge medications and answer any questions you may have.   Reviewed     Are you on a blood thinner/Coumadin  No    Who is managing your Coumadin Na dosing/INR? Na       FOLLOW UP       Scheduled for cardiac rehab? Referral sent   Scheduled to see our PA or Surgeon? 5/3   Scheduled to see your cardiologist ? Dr Dorsey   Scheduled to see CARTER/Core layne 5/3 post ICD  Scheduled to see your primary care physician? Not yet   Do you have our contact information? Yes

## 2022-04-26 ENCOUNTER — HOSPITAL ENCOUNTER (OUTPATIENT)
Dept: CARDIAC REHAB | Facility: CLINIC | Age: 37
Setting detail: THERAPIES SERIES
Discharge: HOME | End: 2022-04-26
Attending: SURGERY
Payer: COMMERCIAL

## 2022-04-26 ENCOUNTER — TELEPHONE (OUTPATIENT)
Dept: CARDIAC REHAB | Facility: CLINIC | Age: 37
End: 2022-04-26
Payer: COMMERCIAL

## 2022-04-26 DIAGNOSIS — Z95.4 S/P TVR (TRICUSPID VALVE REPLACEMENT): Primary | ICD-10-CM

## 2022-04-26 PROCEDURE — 93798 PHYS/QHP OP CAR RHAB W/ECG: CPT | Performed by: OCCUPATIONAL THERAPIST

## 2022-04-26 PROCEDURE — 93797 PHYS/QHP OP CAR RHAB WO ECG: CPT | Performed by: OCCUPATIONAL THERAPIST

## 2022-04-26 NOTE — TELEPHONE ENCOUNTER
Reviewed note from Jasvir Michele regarding patient's heart rhythm during cardiac rehab.   Patient has a dual-chamber ICD which is programmed DDI 55. This is a non-tracking mode and the rhythm described at cardiac rehab demonstrates appropriate pacemaker function. His last in clinic device check on 4/20/22 showed an underlying rhythm of AT in the 110s with CHB and a junctional escape in the 60s.     Discussed with Reina Linn who is scheduled to see patient 5/3/22.

## 2022-04-26 NOTE — TELEPHONE ENCOUNTER
PT seen for initial cardiac rehab session which included 6 MWT. He was noted to be V-paced at rest with HR of 55 BPM, however during walk test he appears to go into CHB with HR of 60 BPM. He returned to pacing during recovery. PT denied all sx today and states that he has been feeling generally well in recovery. BP elevated overall. See session note for EKG strips and CV responses.

## 2022-04-27 ENCOUNTER — HOME INFUSION (PRE-WILLOW HOME INFUSION) (OUTPATIENT)
Dept: PHARMACY | Facility: CLINIC | Age: 37
End: 2022-04-27

## 2022-04-27 ENCOUNTER — LAB REQUISITION (OUTPATIENT)
Dept: LAB | Facility: CLINIC | Age: 37
End: 2022-04-27
Payer: COMMERCIAL

## 2022-04-27 ENCOUNTER — HOSPITAL ENCOUNTER (OUTPATIENT)
Dept: CARDIAC REHAB | Facility: CLINIC | Age: 37
Discharge: HOME | End: 2022-04-27
Attending: SURGERY
Payer: COMMERCIAL

## 2022-04-27 DIAGNOSIS — J18.9 PNEUMONIA, UNSPECIFIED ORGANISM: ICD-10-CM

## 2022-04-27 DIAGNOSIS — Z95.4 S/P TVR (TRICUSPID VALVE REPLACEMENT): ICD-10-CM

## 2022-04-27 LAB
ALT SERPL W P-5'-P-CCNC: 16 U/L (ref 0–70)
BASOPHILS # BLD AUTO: 0.1 10E3/UL (ref 0–0.2)
BASOPHILS NFR BLD AUTO: 2 %
BUN SERPL-MCNC: 17 MG/DL (ref 7–30)
CREAT SERPL-MCNC: 1.36 MG/DL (ref 0.66–1.25)
CRP SERPL-MCNC: 16.2 MG/L (ref 0–8)
EOSINOPHIL # BLD AUTO: 0.1 10E3/UL (ref 0–0.7)
EOSINOPHIL NFR BLD AUTO: 3 %
ERYTHROCYTE [DISTWIDTH] IN BLOOD BY AUTOMATED COUNT: 17.4 % (ref 10–15)
ERYTHROCYTE [SEDIMENTATION RATE] IN BLOOD BY WESTERGREN METHOD: 53 MM/HR (ref 0–15)
GFR SERPL CREATININE-BSD FRML MDRD: 70 ML/MIN/1.73M2
HCT VFR BLD AUTO: 32.1 % (ref 40–53)
HGB BLD-MCNC: 9.3 G/DL (ref 13.3–17.7)
IMM GRANULOCYTES # BLD: 0 10E3/UL
IMM GRANULOCYTES NFR BLD: 0 %
LYMPHOCYTES # BLD AUTO: 0.8 10E3/UL (ref 0.8–5.3)
LYMPHOCYTES NFR BLD AUTO: 19 %
MCH RBC QN AUTO: 29.2 PG (ref 26.5–33)
MCHC RBC AUTO-ENTMCNC: 29 G/DL (ref 31.5–36.5)
MCV RBC AUTO: 101 FL (ref 78–100)
MONOCYTES # BLD AUTO: 0.6 10E3/UL (ref 0–1.3)
MONOCYTES NFR BLD AUTO: 14 %
NEUTROPHILS # BLD AUTO: 2.8 10E3/UL (ref 1.6–8.3)
NEUTROPHILS NFR BLD AUTO: 62 %
NRBC # BLD AUTO: 0 10E3/UL
NRBC BLD AUTO-RTO: 0 /100
PLATELET # BLD AUTO: 424 10E3/UL (ref 150–450)
RBC # BLD AUTO: 3.18 10E6/UL (ref 4.4–5.9)
WBC # BLD AUTO: 4.5 10E3/UL (ref 4–11)

## 2022-04-27 PROCEDURE — 36592 COLLECT BLOOD FROM PICC: CPT | Performed by: INTERNAL MEDICINE

## 2022-04-27 PROCEDURE — 82565 ASSAY OF CREATININE: CPT | Performed by: INTERNAL MEDICINE

## 2022-04-27 PROCEDURE — 85025 COMPLETE CBC W/AUTO DIFF WBC: CPT | Performed by: INTERNAL MEDICINE

## 2022-04-27 PROCEDURE — 84520 ASSAY OF UREA NITROGEN: CPT | Performed by: INTERNAL MEDICINE

## 2022-04-27 PROCEDURE — 85652 RBC SED RATE AUTOMATED: CPT | Performed by: INTERNAL MEDICINE

## 2022-04-27 PROCEDURE — 93798 PHYS/QHP OP CAR RHAB W/ECG: CPT | Performed by: REHABILITATION PRACTITIONER

## 2022-04-27 PROCEDURE — 84460 ALANINE AMINO (ALT) (SGPT): CPT | Performed by: INTERNAL MEDICINE

## 2022-04-27 PROCEDURE — 86140 C-REACTIVE PROTEIN: CPT | Performed by: INTERNAL MEDICINE

## 2022-04-28 ENCOUNTER — HOME INFUSION (PRE-WILLOW HOME INFUSION) (OUTPATIENT)
Dept: PHARMACY | Facility: CLINIC | Age: 37
End: 2022-04-28
Payer: COMMERCIAL

## 2022-04-29 ENCOUNTER — HOSPITAL ENCOUNTER (OUTPATIENT)
Dept: CARDIAC REHAB | Facility: CLINIC | Age: 37
Discharge: HOME | End: 2022-04-29
Attending: SURGERY
Payer: COMMERCIAL

## 2022-04-29 LAB
BACTERIA BLD CULT: NO GROWTH
BACTERIA TISS BX CULT: NO GROWTH
BACTERIA TISS BX CULT: NO GROWTH

## 2022-04-29 PROCEDURE — 93798 PHYS/QHP OP CAR RHAB W/ECG: CPT | Performed by: CLINICAL EXERCISE PHYSIOLOGIST

## 2022-05-01 ENCOUNTER — HOSPITAL ENCOUNTER (EMERGENCY)
Facility: CLINIC | Age: 37
Discharge: HOME | End: 2022-05-01
Attending: EMERGENCY MEDICINE | Admitting: EMERGENCY MEDICINE
Payer: COMMERCIAL

## 2022-05-01 VITALS
RESPIRATION RATE: 18 BRPM | HEIGHT: 70 IN | BODY MASS INDEX: 38.08 KG/M2 | TEMPERATURE: 97.8 F | HEART RATE: 66 BPM | SYSTOLIC BLOOD PRESSURE: 141 MMHG | DIASTOLIC BLOOD PRESSURE: 77 MMHG | WEIGHT: 266 LBS | OXYGEN SATURATION: 100 %

## 2022-05-01 DIAGNOSIS — T82.838A BLEEDING FROM PERIPHERALLY INSERTED CENTRAL CATHETER (PICC), INITIAL ENCOUNTER (H): ICD-10-CM

## 2022-05-01 DIAGNOSIS — I10 HYPERTENSION, UNSPECIFIED TYPE: ICD-10-CM

## 2022-05-01 PROCEDURE — 99282 EMERGENCY DEPT VISIT SF MDM: CPT

## 2022-05-01 NOTE — ED TRIAGE NOTES
PICC line placed about 2 weeks ago at [facility] after heart surgery and pacemaker placement, noticed blood leaking around the insertion site about 5-10 minutes PTA. No fevers.

## 2022-05-01 NOTE — DISCHARGE INSTRUCTIONS
Return for increased bleeding, redness around PICC site, new concerns. Check in with your doctor regarding further blood pressure control.

## 2022-05-01 NOTE — ED PROVIDER NOTES
History     Chief Complaint:  Vascular Access Problem       HPI   Anil Montoya is a 36 year old male who presents with concern for bleeding from right upper extremity PICC site.  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  During recent hospital course, patient did suffer V. fib cardiac arrest and is now status post dual-chamber ICD.  Patient reports ASA use.  Just prior to arrival, while driving, he noticed a wet sensation about the right upper extremity PICC site.  When he checked the area there was blood beneath the Tegaderm dressing, which was also peeling back.  Last dressing change was on 4/27/2022.  Pt has not noticed issues with incomplete deflation of abx bulb at end of each day and no reflux of blood in PICC tubing. Patient denies fevers or chest pain or shortness of breath or other concerns at this time.    ROS:  Review of Systems  A 10 point ROS was obtained and negative except as noted here and in HPI      Allergies:  Clindamycin  Vancomycin  Vancomycin  Ceftriaxone  Corn-Related Products     Medications:    acetaminophen (TYLENOL) 325 MG tablet  amLODIPine (NORVASC) 10 MG tablet  aspirin (ASA) 81 MG chewable tablet  buPROPion (WELLBUTRIN XL) 300 MG 24 hr tablet  carvedilol (COREG) 3.125 MG tablet  cyclobenzaprine (FLEXERIL) 5 MG tablet  dextromethorphan (TUSSIN COUGH) 15 MG/5ML syrup  ferrous gluconate (FERGON) 324 (38 Fe) MG tablet  gabapentin (NEURONTIN) 100 MG capsule  levothyroxine (SYNTHROID/LEVOTHROID) 125 MCG tablet  levothyroxine (SYNTHROID/LEVOTHROID) 125 MCG tablet  liraglutide (VICTOZA) 18 MG/3ML solution  loratadine (CLARITIN) 10 MG tablet  metFORMIN (GLUCOPHAGE) 1000 MG tablet  methocarbamol (ROBAXIN) 500 MG  tablet  Nafcillin Sodium 2 g SOLR  oxyCODONE (ROXICODONE) 5 MG tablet  polyethylene glycol (MIRALAX) 17 GM/Dose powder  senna-docusate (SENOKOT-S/PERICOLACE) 8.6-50 MG tablet        Past Medical History:    Past Medical History:   Diagnosis Date     Pneumonia      Patient Active Problem List   Diagnosis     Long QT interval     Pneumonia of left lower lobe due to infectious organism     Hyperglycemia due to diabetes mellitus (H)     MSSA bacteremia     Endocarditis determined by echocardiography     S/P TVR (tricuspid valve replacement)-31 mm Epic      Fluid overload     Transient hyperglycemia post procedure     Ventricular fibrillation (H)     Cardiac arrest (H)     ICD (implantable cardioverter-defibrillator) in place     Acute kidney injury (H)     WARNER (obstructive sleep apnea)     Osteomyelitis of spine (H)     Sepsis (H)        Past Surgical History:    Past Surgical History:   Procedure Laterality Date     CV CORONARY ANGIOGRAM N/A 3/31/2022    Procedure: Coronary Angiogram;  Surgeon: Lidia Quintanilla MD;  Location:  [facility]     EP ICD INSERT SINGLE N/A 4/12/2022    Procedure: Implantable Cardioverter Defibrillator Device & Lead Implant Single or Dual;  Surgeon: Suleman Higgins MD;  Location:  [facility]     REPLACE VALVE AORTIC N/A 4/1/2022    Procedure: AORTIC VALVE exploration;  Surgeon: Marti Melton MD;  Location:  OR     REPLACE VALVE TRICUSPID N/A 4/1/2022    Procedure: TRICUSPID VALVE REPLACEMENT;  Surgeon: Marti Melton MD;  Location:  OR        Family History:    family history is not on file.    Social History:   reports that he has never smoked. He has never used smokeless tobacco. He reports previous alcohol use. He reports that he does not use drugs.  PCP: Amanda Dawkins     Physical Exam     Patient Vitals for the past 24 hrs:   BP Temp Pulse Resp SpO2 Height Weight   05/01/22 1715 (!) 141/77 -- 66 18 100 % -- --   05/01/22  "1714 (!) 141/77 -- -- -- -- -- --   05/01/22 1620 (!) 198/83 97.8  F (36.6  C) 62 16 100 % 1.778 m (5' 10\") 120.7 kg (266 lb)        Physical Exam  VS: Reviewed per above  HENT: normal speech  EYES: sclera anicteric  CV: Rate as noted, regular rhythm.   RESP: Effort normal. Breath sounds are normal bilaterally.  NEURO: Alert, moving all extremities  MSK: No deformity of the extremities, right upper extremity without objective asymmetry or edema compared to the left.  SKIN: Warm and dry, blood beneath the right upper extremity PICC Tegaderm dressing.  No evidence of active bleeding on examination.  No obvious redness or warmth around the PICC IV insertion site.  Normal color to right upper extremity.      Emergency Department Course     Emergency Department Course:             Reviewed:  I reviewed nursing notes, vitals and past medical history    Assessments:   I obtained history and examined the patient as noted above.    I rechecked the patient and explained findings.       Consults:   PICC team RN      Disposition:  The patient was discharged to home.     Impression & Plan      Medical Decision Making:  Patient presents to the ER for evaluation of blood oozing around right upper extremity PICC insertion site.  Initial vital signs are notable elevated blood pressure.  After patient relaxed on the gurney, blood pressure normalized.  Based on history and exam, low suspicion for hypertensive emergency. On assessment, there is blood underneath the Tegaderm PICC dressing but no evidence of ongoing bleeding.  ED RN with experience with PICC dressing changes, exchanged dressing at bedside.  No signs of underlying skin or soft tissue infection.  PICC line seems to be functioning well with continuous antibiotic infusions per patient report.  I did speak with PICC team RN over the phone, with no further recommendations aside from dressing change.  Patient has home health PICC line checkup in 3 days.  Return precautions " discussed prior to discharge.    Diagnosis:    ICD-10-CM    1. Bleeding from peripherally inserted central catheter (PICC), initial encounter (H)  T82.838A    2. Hypertension, unspecified type  I10         Discharge Medications:  Discharge Medication List as of 5/1/2022  5:13 PM           5/1/2022   Paul Smart MD Lindenbaum, Elan, MD  05/01/22 1746

## 2022-05-02 ENCOUNTER — HOME INFUSION (PRE-WILLOW HOME INFUSION) (OUTPATIENT)
Dept: PHARMACY | Facility: CLINIC | Age: 37
End: 2022-05-02
Payer: COMMERCIAL

## 2022-05-02 NOTE — PROGRESS NOTES
Cardiology Clinic Progress Note    Service Date: 05/03/22    Primary Cardiologist: Dr. Dorsey and Dr. Higgins      Reason for Visit:  Follow up     HPI:   I had the pleasure of seeing Mr. Anil Montoya in the clinic today, he works at [employer], and he is a very pleasant 36 year old male with a past medical history notable for    1. Poorly controlled diabetes  2. 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 s/p a tricuspid valve replacement with a 31 mm epic stented valve, endoscopic aortic valve exploration, and a TEJINDER with Dr. Marti Melton on 4/1/2022  3. Polymorphic VT/V. fib arrest.  S/p dual-chamber [employer] ICD (3/2022)  4. Obesity  5. WARNER   6. anxiety  7. Hypothyroidism    Diagnostics  I have personally reviewed the following reports    Device check (4/2022) revealed  A-paced 9% .   : 35 % .     Stable leads.  Battery life 13 years.   Atrial Arrhythmia: none.   Ventricular Arrhythmia:  None.       In March 22, 2022, patient presented to the [facility] ED with weakness, hematuria and shortness of breath.  His work-up included MSSA bacteremia likely secondary to right groin skin source.  TEJINDER on 3/27/2022 demonstrated tricuspid endocarditis with concern of discitis with seeding however MRI was negative.  His echo demonstrates LVEF 55-60%, normal RV function, mildly thickened cusps on aortic valve and thickened tricuspid valve leaflets, small mobile filamentous elements on the septal leaflet of TV and larged fixed heterogeneous mass in the RV attached to the interventricular septum.  Coronary angiogram 3/31/22 with no evidence of CAD.   On 4/1/2022 he subsequently underwent a tricuspid valve replacement with a 31 mm epic stented valve, endoscopic aortic valve exploration, and a TEJINDER with Dr. Marti Melton.   Postoperative and really he had complete AV block with accelerated junctional escape of 80 bpm, postop day 4 he had a polymorphic VT needed arrest and  was successfully resuscitated with 120 J.  Unfortunately recurrent bacteremia Gentamicin was added to cefazolin by ID.  He then developed a possible paraspinal infection causing significant pain and orthopedic/neurosurgery were consulted with recommendation of IV antibiotics.   He eventually underwent implantation of a dual-chamber [employer] ICD.  Due to antibiotics his creatinine increased and nephrology was consulted and ultimately his creatinine improved to normal at discharge.  He was discharged on postop day 17 to his father's home..    Blood pressure at cardiac rehab is slightly     Today, he reports LE edema but denies abdominal edema. He denies chest pain, shortness of breath, dyspnea on exertion, orthopnea, PND, palpitations, dizziness.   Reports taking medications as prescribed and will check on if he is taking losartan    ASSESSMENT AND PLAN:    Tricuspid endocarditis secondary to MSSA bacteremia.       s/p a tricuspid valve replacement with a 31 mm epic stented valve, endoscopic aortic valve exploration, and a TEJINDER with Dr. Marti Melton on 4/1/2022    Will need a ECHO prior to seeing Dr. Dorsey    Incision healing nicely    He is attending cardiac rehab    Vfib arrest     Complication during hospitalization after surgery  S/p [employer] dual chamber ICD  (3/2022)  I have personally reviewed the last device check dated (4/2022) which revealed normal device function and stable leads.     Patient will follow with the device clinic on a quarterly basis.     No noted arrhythmias on device check.    WARNER.    Uses CPAP    Hypertension    Elevated during visit.  Unsure if patient is taking losartan 25 mg daily.  He is currently taking amlodipine 10 mg daily, carvedilol 3.125 mg twice daily,    Will add furosemide due to lower extremity edema    Follow up BMP in 7-10 days after starting furosemide      Plan:    Start lasix 40 mg daily    BMP 7-10 days after starting lasix    Follow up with Dr. Dorsey with  ECHO prior.     Follow up with device clinic as ordered    Follow up with [facility]      Please call the clinic if questions or problems arise      Thank you for the opportunity to participate in this pleasant patient's care. We would be happy to see him sooner if needed for any concerns in the meantime.          MACARIO Pugh [facility]  Text Page  (M-F 8:00 am - 4:30 pm)    Orders this Visit:  Orders Placed This Encounter   Procedures     Basic metabolic panel     Basic metabolic panel     Orders Placed This Encounter   Medications     DISCONTD: furosemide (LASIX) 20 MG tablet     Sig: Take 2 tablets (40 mg) by mouth daily     Dispense:  60 tablet     Refill:  0     Medications Discontinued During This Encounter   Medication Reason     dextromethorphan (TUSSIN COUGH) 15 MG/5ML syrup Medication Reconciliation Clean Up     polyethylene glycol (MIRALAX) 17 GM/Dose powder Medication Reconciliation Clean Up     senna-docusate (SENOKOT-S/PERICOLACE) 8.6-50 MG tablet Medication Reconciliation Clean Up     Encounter Diagnoses   Name Primary?     Bilateral leg edema Yes     Morbid obesity (H)        CURRENT MEDICATIONS:  Current Outpatient Medications   Medication Sig Dispense Refill     acetaminophen (TYLENOL) 325 MG tablet Take 2 tablets (650 mg) by mouth every 4 hours as needed for mild pain or fever 30 tablet 0     amLODIPine (NORVASC) 10 MG tablet Take 1 tablet (10 mg) by mouth daily 30 tablet 3     aspirin (ASA) 81 MG chewable tablet 1 tablet (81 mg) by Oral or NG Tube route daily 30 tablet 0     buPROPion (WELLBUTRIN XL) 300 MG 24 hr tablet Take 300 mg by mouth every morning       carvedilol (COREG) 3.125 MG tablet Take 1 tablet (3.125 mg) by mouth 2 times daily (with meals) 60 tablet 3     cyclobenzaprine (FLEXERIL) 5 MG tablet Take 1-2 tablets (5-10 mg) by mouth every 8 hours as needed for muscle spasms 40 tablet 0     ferrous gluconate (FERGON) 324 (38 Fe) MG tablet Take 1 tablet (324 mg) by mouth every  "other day 30 tablet 0     gabapentin (NEURONTIN) 100 MG capsule Take 1 capsule (100 mg) by mouth 3 times daily as needed for neuropathic pain 60 capsule 0     levothyroxine (SYNTHROID/LEVOTHROID) 125 MCG tablet Take 125 mcg by mouth six times a week Monday thru Saturday       levothyroxine (SYNTHROID/LEVOTHROID) 125 MCG tablet Take 250 mcg by mouth once a week On Sunday       liraglutide (VICTOZA) 18 MG/3ML solution Inject 1.8 mg Subcutaneous daily       loratadine (CLARITIN) 10 MG tablet Take 1 tablet (10 mg) by mouth daily 30 tablet 0     metFORMIN (GLUCOPHAGE) 1000 MG tablet Take 1,000 mg by mouth 2 times daily (with meals)       methocarbamol (ROBAXIN) 500 MG tablet Take 2 tablets (1,000 mg) by mouth every 6 hours 60 tablet 0     Nafcillin Sodium 2 g SOLR Inject 12 g into the vein daily Via CAD pump ,weekly CBC,ESR,CRP, creatinine and ALT faxed to [phone] 1 each 1     furosemide (LASIX) 20 MG tablet Take 2 tablets (40 mg) by mouth daily 180 tablet 0     oxyCODONE (ROXICODONE) 5 MG tablet Take 1 tablet (5 mg) by mouth every 6 hours as needed for breakthrough pain 30 tablet 0       ALLERGIES  Allergies   Allergen Reactions     Clindamycin      Vancomycin      \"Red Sonia Syndrome\"     Vancomycin      \"Red Sonia Syndrome\"     Ceftriaxone Rash     Corn-Related Products GI Disturbance       PAST MEDICAL, SURGICAL, SOCIAL FAMILY HISTORY:  History was reviewed and updated as needed, see medical record.    Review of Systems:  Skin:  Negative     Eyes:  Negative    ENT:  Negative    Respiratory:  Positive for dyspnea on exertion  Cardiovascular:       Gastroenterology: Negative    Genitourinary:  Negative    Musculoskeletal:  Negative    Neurologic:  Negative    Psychiatric:  Negative    Heme/Lymph/Imm:  Negative    Endocrine:  Positive for diabetes     Physical Exam:  Vitals: BP (!) 154/90   Pulse 57   Ht 1.778 m (5' 10\")   Wt 124.3 kg (274 lb)   BMI 39.31 kg/m     Wt Readings from Last 4 Encounters:   05/03/22 " 124.3 kg (274 lb)   05/03/22 124.3 kg (274 lb)   05/01/22 120.7 kg (266 lb)   04/18/22 72.3 kg (159 lb 6.4 oz)     CONSTITUTIONAL: Appears his stated age, well nourished, and in no acute distress.  HEENT: Pupils equal, round. Sclerae nonicteric.    NECK: Supple, no masses appreciated.   C/V:  Regular rate and rhythm, normal S1 and S2, no S3 or S4, no murmur, rub or gallop.   RESP: Respirations are unlabored. Lungs are clear to auscultation bilaterally without wheezing, rales, or rhonchi.  GI: Abdomen soft, non-tender, non-distended.  EXTREM:  No clubbing, cyanosis, or lower extremity edema bilaterally.   NEURO: Alert and oriented, cooperative. Gait steady. No gross focal deficits.   PSYCH: Affect appropriate. Mentation normal. Responds to questions appropriately.  SKIN: Warm and dry. No apparent rashes or bruising. Healing incision    Recent Lab Results:  LIVER ENZYME RESULTS:  Lab Results   Component Value Date    AST 18 04/11/2022    AST 28 11/24/2017    ALT 19 05/04/2022    ALT 51 11/24/2017     CBC RESULTS:  Lab Results   Component Value Date    WBC 3.7 (L) 05/04/2022    WBC 6.8 11/24/2017    RBC 3.39 (L) 05/04/2022    RBC 5.16 11/24/2017    HGB 9.9 (L) 05/04/2022    HGB 15.2 11/24/2017    HCT 34.1 (L) 05/04/2022    HCT 43.7 11/24/2017     (H) 05/04/2022    MCV 85 11/24/2017    MCH 29.2 05/04/2022    MCH 29.5 11/24/2017    MCHC 29.0 (L) 05/04/2022    MCHC 34.8 11/24/2017    RDW 16.9 (H) 05/04/2022    RDW 12.9 11/24/2017     05/04/2022     11/24/2017     BMP RESULTS:  Lab Results   Component Value Date     04/18/2022     11/24/2017    POTASSIUM 3.9 04/18/2022    POTASSIUM 3.8 11/24/2017    CHLORIDE 111 (H) 04/18/2022    CHLORIDE 104 11/24/2017    CO2 26 04/18/2022    CO2 24 11/24/2017    ANIONGAP 3 04/18/2022    ANIONGAP 9 11/24/2017     (H) 04/18/2022     (H) 04/18/2022     (H) 11/24/2017    BUN 18 05/04/2022    BUN 18 11/24/2017    CR 1.09 05/04/2022    CR  1.07 11/24/2017    GFRESTIMATED >90 05/04/2022    GFRESTIMATED 81 11/24/2017    GFRESTBLACK >90 11/24/2017    IMAN 8.2 (L) 04/18/2022    IMAN 8.3 (L) 11/24/2017      A1C RESULTS:  Lab Results   Component Value Date    A1C 9.6 (H) 04/14/2022     INR RESULTS:  Lab Results   Component Value Date    INR 1.17 (H) 04/05/2022    INR 1.55 (H) 04/01/2022       CC  No referring provider defined for this encounter.    This note was completed in part using Dragon voice recognition software. Although reviewed after completion, some word and grammatical errors may occur.

## 2022-05-03 ENCOUNTER — HOSPITAL ENCOUNTER (OUTPATIENT)
Dept: CARDIAC REHAB | Facility: CLINIC | Age: 37
Discharge: HOME | End: 2022-05-03
Attending: SURGERY
Payer: COMMERCIAL

## 2022-05-03 ENCOUNTER — OFFICE VISIT (OUTPATIENT)
Dept: CARDIOLOGY | Facility: CLINIC | Age: 37
End: 2022-05-03
Payer: COMMERCIAL

## 2022-05-03 VITALS
OXYGEN SATURATION: 99 % | DIASTOLIC BLOOD PRESSURE: 90 MMHG | HEIGHT: 70 IN | HEART RATE: 56 BPM | SYSTOLIC BLOOD PRESSURE: 155 MMHG | WEIGHT: 274 LBS | BODY MASS INDEX: 39.22 KG/M2

## 2022-05-03 VITALS
SYSTOLIC BLOOD PRESSURE: 154 MMHG | HEIGHT: 70 IN | DIASTOLIC BLOOD PRESSURE: 90 MMHG | BODY MASS INDEX: 39.22 KG/M2 | WEIGHT: 274 LBS | HEART RATE: 57 BPM

## 2022-05-03 DIAGNOSIS — E66.01 MORBID OBESITY (H): ICD-10-CM

## 2022-05-03 DIAGNOSIS — Z95.4 S/P TVR (TRICUSPID VALVE REPLACEMENT): ICD-10-CM

## 2022-05-03 DIAGNOSIS — G47.33 OSA (OBSTRUCTIVE SLEEP APNEA): ICD-10-CM

## 2022-05-03 DIAGNOSIS — Z95.810 ICD (IMPLANTABLE CARDIOVERTER-DEFIBRILLATOR) IN PLACE: ICD-10-CM

## 2022-05-03 DIAGNOSIS — I46.9 CARDIAC ARREST (H): ICD-10-CM

## 2022-05-03 DIAGNOSIS — I38 ENDOCARDITIS DETERMINED BY ECHOCARDIOGRAPHY: ICD-10-CM

## 2022-05-03 DIAGNOSIS — I49.01 VENTRICULAR FIBRILLATION (H): ICD-10-CM

## 2022-05-03 DIAGNOSIS — Z95.4 S/P TVR (TRICUSPID VALVE REPLACEMENT): Primary | ICD-10-CM

## 2022-05-03 DIAGNOSIS — R60.0 BILATERAL LEG EDEMA: Primary | ICD-10-CM

## 2022-05-03 DIAGNOSIS — E87.70 HYPERVOLEMIA, UNSPECIFIED HYPERVOLEMIA TYPE: ICD-10-CM

## 2022-05-03 PROCEDURE — 93798 PHYS/QHP OP CAR RHAB W/ECG: CPT | Performed by: REHABILITATION PRACTITIONER

## 2022-05-03 PROCEDURE — 99214 OFFICE O/P EST MOD 30 MIN: CPT | Mod: 24 | Performed by: NURSE PRACTITIONER

## 2022-05-03 PROCEDURE — 99024 POSTOP FOLLOW-UP VISIT: CPT | Performed by: PHYSICIAN ASSISTANT

## 2022-05-03 RX ORDER — FUROSEMIDE 20 MG
40 TABLET ORAL DAILY
Qty: 60 TABLET | Refills: 0 | Status: SHIPPED | OUTPATIENT
Start: 2022-05-03 | End: 2022-05-06

## 2022-05-03 NOTE — PATIENT INSTRUCTIONS
Check if you are taking losartan     Take your blood pressure at home    Start lasix 40 mg daily.  Consumes banana daily.

## 2022-05-03 NOTE — PROGRESS NOTES
CV Surgery Post Op Follow Up Note:     S:  From discharge summary: On 4/1/22 Mr. Santana successfully underwent Tricuspid valve replacement (31mm Epic stented valve), Endoscopic aortic valve exploration, Transesophageal echocardiogram by Dr. Marti Melton.  Was extubated within 24 hours of surgery. Once he was weaned off hemodynamic gtt's, transferred to the surgical floor.   He was a very poorly controlled, non compliant diabetic (A1C 12.6) prior to surgery. He had transient hyperglycemia treated with an insulin gtt, transitioned to sliding scale insulin and the hospitalist service was consulted to help manage his blood sugars. They had made recommendations at discharge.  Had some fluid overload treated with diuretic medication. At discharge he is below his pre-op weight and is not sent with any diuretics.   On 4/7/22 Mr. Monotya started having severe back pain. A repeat MRI (initial one performed in ED and was negative) showed concern for osteomyelitis involving epidural extension. Ortho/Neuro surg were consulted with no interventions other than IV abx per ID and a repeat MRI of spine to be performed before stopping IV abx in future. Also they recommended to pay attention to pain management as can be quite difficult to control pain with spinal osteo. Pain controlled for past week prior to discharge today.   He has WARNER which is controlled with home CPAP.   Sustained and OCTAVIO after aggressive diuresis. Aminoglycosides/offending medications stopped with resolution and normal creatinine at discharge. He understands that he will need to watch his weight carefully moving forward.  Cardiology EP was consulted to help manage his heart rhythm as on 4/4 he suffered a ventricular fibrillation cardiac arrest that was unexpected and not preceded by bradycardia or other reversible causes. He fluctuated between CHB and junctional escape rhythm. On 4/12/22 he underwent successful dual chamber ICD implantation. He  "intermittently paces from the ICD (Threshold 50). He has follow up with Reina Griffiths NP with cards EP on 5/3/22 at 2 PM. He progressed well with cardiac rehab. He is discharged to his dad's home on pod# [id number] with the help of his family    Since being discharged from hospital, patient is recovering at home.     Allergies:   Allergies   Allergen Reactions     Clindamycin      Vancomycin      \"Red Sonia Syndrome\"     Vancomycin      \"Red Sonia Syndrome\"     Ceftriaxone Rash     Corn-Related Products GI Disturbance       Medications:   Current Outpatient Medications:      acetaminophen (TYLENOL) 325 MG tablet, Take 2 tablets (650 mg) by mouth every 4 hours as needed for mild pain or fever, Disp: 30 tablet, Rfl: 0     amLODIPine (NORVASC) 10 MG tablet, Take 1 tablet (10 mg) by mouth daily, Disp: 30 tablet, Rfl: 3     aspirin (ASA) 81 MG chewable tablet, 1 tablet (81 mg) by Oral or NG Tube route daily, Disp: 30 tablet, Rfl: 0     buPROPion (WELLBUTRIN XL) 300 MG 24 hr tablet, Take 300 mg by mouth every morning, Disp: , Rfl:      carvedilol (COREG) 3.125 MG tablet, Take 1 tablet (3.125 mg) by mouth 2 times daily (with meals), Disp: 60 tablet, Rfl: 3     cyclobenzaprine (FLEXERIL) 5 MG tablet, Take 1-2 tablets (5-10 mg) by mouth every 8 hours as needed for muscle spasms, Disp: 40 tablet, Rfl: 0     ferrous gluconate (FERGON) 324 (38 Fe) MG tablet, Take 1 tablet (324 mg) by mouth every other day, Disp: 30 tablet, Rfl: 0     furosemide (LASIX) 20 MG tablet, Take 2 tablets (40 mg) by mouth daily, Disp: 60 tablet, Rfl: 0     gabapentin (NEURONTIN) 100 MG capsule, Take 1 capsule (100 mg) by mouth 3 times daily as needed for neuropathic pain, Disp: 60 capsule, Rfl: 0     levothyroxine (SYNTHROID/LEVOTHROID) 125 MCG tablet, Take 125 mcg by mouth six times a week Monday thru Saturday, Disp: , Rfl:      levothyroxine (SYNTHROID/LEVOTHROID) 125 MCG tablet, Take 250 mcg by mouth once a week On Sunday, Disp: , Rfl:      liraglutide " (VICTOZA) 18 MG/3ML solution, Inject 1.8 mg Subcutaneous daily, Disp: , Rfl:      loratadine (CLARITIN) 10 MG tablet, Take 1 tablet (10 mg) by mouth daily, Disp: 30 tablet, Rfl: 0     metFORMIN (GLUCOPHAGE) 1000 MG tablet, Take 1,000 mg by mouth 2 times daily (with meals), Disp: , Rfl:      methocarbamol (ROBAXIN) 500 MG tablet, Take 2 tablets (1,000 mg) by mouth every 6 hours, Disp: 60 tablet, Rfl: 0     Nafcillin Sodium 2 g SOLR, Inject 12 g into the vein daily Via CAD pump ,weekly CBC,ESR,CRP, creatinine and ALT faxed to [phone], Disp: 1 each, Rfl: 1     oxyCODONE (ROXICODONE) 5 MG tablet, Take 1 tablet (5 mg) by mouth every 6 hours as needed for breakthrough pain, Disp: 30 tablet, Rfl: 0    Physical Exam:   Gen: NAD  CV: s1/s2, no m/r/g  Lungs: course  Sternum: cdi  Extremities: 2+ LE edema    Assessment/Plan:     Mr. Montoya is doing well in their post operative period. He is back at home. He is back to cooking and is eating much better. His blood sugars have been below 170. He is out walking around and attending cardiac rehab.   SBP/HR at home: stable 140s  Resp: breathing, using IS a few times/day  Incisions: healing, washing with antibacterial soap  Extremities/weight: has 1-2+ LE edema, just saw cards EP prior to this apt and they started lasix 40 mg PO to which I strongly agree with.   Rehab plans: walking daily and is attending Cardiac rehab  He continues on his IV abx for his osteomyelitis/endocarditis.     Follow up apts: 6/1 device check, Dr. Dorsey 6/6    All of the patient's questions were answered. Our contact information was given to him/her if they should have any further questions/concerns. No further follow-up is needed with us.      Lety Perez PA-C  CV Surgery  [facility]  Pager: [phone]

## 2022-05-03 NOTE — LETTER
5/3/2022    [facility]  [facility] [address]   [place] [address]    RE: Anil Montoya       Dear Colleague,     I had the pleasure of seeing Anil Montoya in the [facility].      Cardiology Clinic Progress Note    Service Date: 05/03/22    Primary Cardiologist: Dr. Dorsey and Dr. Higgins      Reason for Visit:  Follow up     HPI:   I had the pleasure of seeing Mr. Anil Montoya in the clinic today, he works at [employer], and he is a very pleasant 36 year old male with a past medical history notable for    1. Poorly controlled diabetes  2. 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 s/p a tricuspid valve replacement with a 31 mm epic stented valve, endoscopic aortic valve exploration, and a TEJINDER with Dr. Marti Melton on 4/1/2022  3. Polymorphic VT/V. fib arrest.  S/p dual-chamber [employer] ICD (3/2022)  4. Obesity  5. WARNER   6. anxiety  7. Hypothyroidism    Diagnostics  I have personally reviewed the following reports    Device check (4/2022) revealed  A-paced 9% .   : 35 % .     Stable leads.  Battery life 13 years.   Atrial Arrhythmia: none.   Ventricular Arrhythmia:  None.       In March 22, 2022, patient presented to the [place] ED with weakness, hematuria and shortness of breath.  His work-up included MSSA bacteremia likely secondary to right groin skin source.  TEJINDER on 3/27/2022 demonstrated tricuspid endocarditis with concern of discitis with seeding however MRI was negative.  His echo demonstrates LVEF 55-60%, normal RV function, mildly thickened cusps on aortic valve and thickened tricuspid valve leaflets, small mobile filamentous elements on the septal leaflet of TV and larged fixed heterogeneous mass in the RV attached to the interventricular septum.  Coronary angiogram 3/31/22 with no evidence of CAD.   On 4/1/2022 he subsequently underwent a tricuspid valve replacement with a 31  mm epic stented valve, endoscopic aortic valve exploration, and a TEJINDER with Dr. Marti Melton.   Postoperative and really he had complete AV block with accelerated junctional escape of 80 bpm, postop day 4 he had a polymorphic VT needed arrest and was successfully resuscitated with 120 J.  Unfortunately recurrent bacteremia Gentamicin was added to cefazolin by ID.  He then developed a possible paraspinal infection causing significant pain and orthopedic/neurosurgery were consulted with recommendation of IV antibiotics.   He eventually underwent implantation of a dual-chamber [employer] ICD.  Due to antibiotics his creatinine increased and nephrology was consulted and ultimately his creatinine improved to normal at discharge.  He was discharged on postop day 17 to his father's home..    Blood pressure at cardiac rehab is slightly     Today, he reports LE edema but denies abdominal edema. He denies chest pain, shortness of breath, dyspnea on exertion, orthopnea, PND, palpitations, dizziness.   Reports taking medications as prescribed and will check on if he is taking losartan    ASSESSMENT AND PLAN:    Tricuspid endocarditis secondary to MSSA bacteremia.       s/p a tricuspid valve replacement with a 31 mm epic stented valve, endoscopic aortic valve exploration, and a TEJINDER with Dr. Marti Melton on 4/1/2022    Will need a ECHO prior to seeing Dr. Dorsey    Incision healing nicely    He is attending cardiac rehab    Vfib arrest     Complication during hospitalization after surgery  S/p [employer] dual chamber ICD  (3/2022)  I have personally reviewed the last device check dated (4/2022) which revealed normal device function and stable leads.     Patient will follow with the device clinic on a quarterly basis.     No noted arrhythmias on device check.    WARNER.    Uses CPAP    Hypertension    Elevated during visit.  Unsure if patient is taking losartan 25 mg daily.  He is currently taking amlodipine 10 mg  daily, carvedilol 3.125 mg twice daily,    Will add furosemide due to lower extremity edema    Follow up BMP in 7-10 days after starting furosemide      Plan:    Start lasix 40 mg daily    BMP 7-10 days after starting lasix    Follow up with Dr. Dorsey with ECHO prior.     Follow up with device clinic as ordered    Follow up with [facility]      Please call the clinic if questions or problems arise      Thank you for the opportunity to participate in this pleasant patient's care. We would be happy to see him sooner if needed for any concerns in the meantime.          MACARIO Pugh CNP  [facility]  Text Page  (M-F 8:00 am - 4:30 pm)    Orders this Visit:  Orders Placed This Encounter   Procedures     Basic metabolic panel     Basic metabolic panel     Orders Placed This Encounter   Medications     DISCONTD: furosemide (LASIX) 20 MG tablet     Sig: Take 2 tablets (40 mg) by mouth daily     Dispense:  60 tablet     Refill:  0     Medications Discontinued During This Encounter   Medication Reason     dextromethorphan (TUSSIN COUGH) 15 MG/5ML syrup Medication Reconciliation Clean Up     polyethylene glycol (MIRALAX) 17 GM/Dose powder Medication Reconciliation Clean Up     senna-docusate (SENOKOT-S/PERICOLACE) 8.6-50 MG tablet Medication Reconciliation Clean Up     Encounter Diagnoses   Name Primary?     Bilateral leg edema Yes     Morbid obesity (H)        CURRENT MEDICATIONS:  Current Outpatient Medications   Medication Sig Dispense Refill     acetaminophen (TYLENOL) 325 MG tablet Take 2 tablets (650 mg) by mouth every 4 hours as needed for mild pain or fever 30 tablet 0     amLODIPine (NORVASC) 10 MG tablet Take 1 tablet (10 mg) by mouth daily 30 tablet 3     aspirin (ASA) 81 MG chewable tablet 1 tablet (81 mg) by Oral or NG Tube route daily 30 tablet 0     buPROPion (WELLBUTRIN XL) 300 MG 24 hr tablet Take 300 mg by mouth every morning       carvedilol (COREG) 3.125 MG tablet Take 1 tablet (3.125 mg) by mouth 2 times  "daily (with meals) 60 tablet 3     cyclobenzaprine (FLEXERIL) 5 MG tablet Take 1-2 tablets (5-10 mg) by mouth every 8 hours as needed for muscle spasms 40 tablet 0     ferrous gluconate (FERGON) 324 (38 Fe) MG tablet Take 1 tablet (324 mg) by mouth every other day 30 tablet 0     gabapentin (NEURONTIN) 100 MG capsule Take 1 capsule (100 mg) by mouth 3 times daily as needed for neuropathic pain 60 capsule 0     levothyroxine (SYNTHROID/LEVOTHROID) 125 MCG tablet Take 125 mcg by mouth six times a week Monday thru Saturday       levothyroxine (SYNTHROID/LEVOTHROID) 125 MCG tablet Take 250 mcg by mouth once a week On Sunday       liraglutide (VICTOZA) 18 MG/3ML solution Inject 1.8 mg Subcutaneous daily       loratadine (CLARITIN) 10 MG tablet Take 1 tablet (10 mg) by mouth daily 30 tablet 0     metFORMIN (GLUCOPHAGE) 1000 MG tablet Take 1,000 mg by mouth 2 times daily (with meals)       methocarbamol (ROBAXIN) 500 MG tablet Take 2 tablets (1,000 mg) by mouth every 6 hours 60 tablet 0     Nafcillin Sodium 2 g SOLR Inject 12 g into the vein daily Via CAD pump ,weekly CBC,ESR,CRP, creatinine and ALT faxed to [phone] 1 each 1     furosemide (LASIX) 20 MG tablet Take 2 tablets (40 mg) by mouth daily 180 tablet 0     oxyCODONE (ROXICODONE) 5 MG tablet Take 1 tablet (5 mg) by mouth every 6 hours as needed for breakthrough pain 30 tablet 0       ALLERGIES  Allergies   Allergen Reactions     Clindamycin      Vancomycin      \"Red Sonia Syndrome\"     Vancomycin      \"Red Sonia Syndrome\"     Ceftriaxone Rash     Corn-Related Products GI Disturbance       PAST MEDICAL, SURGICAL, SOCIAL FAMILY HISTORY:  History was reviewed and updated as needed, see medical record.    Review of Systems:  Skin:  Negative     Eyes:  Negative    ENT:  Negative    Respiratory:  Positive for dyspnea on exertion  Cardiovascular:       Gastroenterology: Negative    Genitourinary:  Negative    Musculoskeletal:  Negative    Neurologic:  Negative  " "  Psychiatric:  Negative    Heme/Lymph/Imm:  Negative    Endocrine:  Positive for diabetes     Physical Exam:  Vitals: BP (!) 154/90   Pulse 57   Ht 1.778 m (5' 10\")   Wt 124.3 kg (274 lb)   BMI 39.31 kg/m     Wt Readings from Last 4 Encounters:   05/03/22 124.3 kg (274 lb)   05/03/22 124.3 kg (274 lb)   05/01/22 120.7 kg (266 lb)   04/18/22 72.3 kg (159 lb 6.4 oz)     CONSTITUTIONAL: Appears his stated age, well nourished, and in no acute distress.  HEENT: Pupils equal, round. Sclerae nonicteric.    NECK: Supple, no masses appreciated.   C/V:  Regular rate and rhythm, normal S1 and S2, no S3 or S4, no murmur, rub or gallop.   RESP: Respirations are unlabored. Lungs are clear to auscultation bilaterally without wheezing, rales, or rhonchi.  GI: Abdomen soft, non-tender, non-distended.  EXTREM:  No clubbing, cyanosis, or lower extremity edema bilaterally.   NEURO: Alert and oriented, cooperative. Gait steady. No gross focal deficits.   PSYCH: Affect appropriate. Mentation normal. Responds to questions appropriately.  SKIN: Warm and dry. No apparent rashes or bruising. Healing incision    Recent Lab Results:  LIVER ENZYME RESULTS:  Lab Results   Component Value Date    AST 18 04/11/2022    AST 28 11/24/2017    ALT 19 05/04/2022    ALT 51 11/24/2017     CBC RESULTS:  Lab Results   Component Value Date    WBC 3.7 (L) 05/04/2022    WBC 6.8 11/24/2017    RBC 3.39 (L) 05/04/2022    RBC 5.16 11/24/2017    HGB 9.9 (L) 05/04/2022    HGB 15.2 11/24/2017    HCT 34.1 (L) 05/04/2022    HCT 43.7 11/24/2017     (H) 05/04/2022    MCV 85 11/24/2017    MCH 29.2 05/04/2022    MCH 29.5 11/24/2017    MCHC 29.0 (L) 05/04/2022    MCHC 34.8 11/24/2017    RDW 16.9 (H) 05/04/2022    RDW 12.9 11/24/2017     05/04/2022     11/24/2017     BMP RESULTS:  Lab Results   Component Value Date     04/18/2022     11/24/2017    POTASSIUM 3.9 04/18/2022    POTASSIUM 3.8 11/24/2017    CHLORIDE 111 (H) 04/18/2022    " CHLORIDE 104 11/24/2017    CO2 26 04/18/2022    CO2 24 11/24/2017    ANIONGAP 3 04/18/2022    ANIONGAP 9 11/24/2017     (H) 04/18/2022     (H) 04/18/2022     (H) 11/24/2017    BUN 18 05/04/2022    BUN 18 11/24/2017    CR 1.09 05/04/2022    CR 1.07 11/24/2017    GFRESTIMATED >90 05/04/2022    GFRESTIMATED 81 11/24/2017    GFRESTBLACK >90 11/24/2017    IMAN 8.2 (L) 04/18/2022    IMAN 8.3 (L) 11/24/2017      A1C RESULTS:  Lab Results   Component Value Date    A1C 9.6 (H) 04/14/2022     INR RESULTS:  Lab Results   Component Value Date    INR 1.17 (H) 04/05/2022    INR 1.55 (H) 04/01/2022       CC  No referring provider defined for this encounter.    This note was completed in part using Dragon voice recognition software. Although reviewed after completion, some word and grammatical errors may occur.    Thank you for allowing me to participate in the care of your patient.      Sincerely,     MACARIO Pugh [facility]  cc:   No referring provider defined for this encounter.

## 2022-05-03 NOTE — PROGRESS NOTES
This is a recent snapshot of the patient's [facility] medical record.  For current drug dose and complete information and questions, call [phone] or In Basket pool, fv home infusion ([id number])  CSN Number:  [id number]

## 2022-05-04 ENCOUNTER — LAB REQUISITION (OUTPATIENT)
Dept: LAB | Facility: CLINIC | Age: 37
End: 2022-05-04
Payer: COMMERCIAL

## 2022-05-04 ENCOUNTER — HOME INFUSION (PRE-WILLOW HOME INFUSION) (OUTPATIENT)
Dept: PHARMACY | Facility: CLINIC | Age: 37
End: 2022-05-04

## 2022-05-04 ENCOUNTER — HOSPITAL ENCOUNTER (OUTPATIENT)
Dept: CARDIAC REHAB | Facility: CLINIC | Age: 37
Discharge: HOME | End: 2022-05-04
Attending: SURGERY
Payer: COMMERCIAL

## 2022-05-04 DIAGNOSIS — J18.9 PNEUMONIA, UNSPECIFIED ORGANISM: ICD-10-CM

## 2022-05-04 LAB
ALT SERPL W P-5'-P-CCNC: 19 U/L (ref 0–70)
BASOPHILS # BLD AUTO: 0.1 10E3/UL (ref 0–0.2)
BASOPHILS NFR BLD AUTO: 2 %
BUN SERPL-MCNC: 18 MG/DL (ref 7–30)
CREAT SERPL-MCNC: 1.09 MG/DL (ref 0.66–1.25)
CRP SERPL-MCNC: 17.7 MG/L (ref 0–8)
EOSINOPHIL # BLD AUTO: 0.3 10E3/UL (ref 0–0.7)
EOSINOPHIL NFR BLD AUTO: 7 %
ERYTHROCYTE [DISTWIDTH] IN BLOOD BY AUTOMATED COUNT: 16.9 % (ref 10–15)
ERYTHROCYTE [SEDIMENTATION RATE] IN BLOOD BY WESTERGREN METHOD: 42 MM/HR (ref 0–15)
GFR SERPL CREATININE-BSD FRML MDRD: >90 ML/MIN/1.73M2
HCT VFR BLD AUTO: 34.1 % (ref 40–53)
HGB BLD-MCNC: 9.9 G/DL (ref 13.3–17.7)
IMM GRANULOCYTES # BLD: 0 10E3/UL
IMM GRANULOCYTES NFR BLD: 0 %
LYMPHOCYTES # BLD AUTO: 1 10E3/UL (ref 0.8–5.3)
LYMPHOCYTES NFR BLD AUTO: 26 %
MCH RBC QN AUTO: 29.2 PG (ref 26.5–33)
MCHC RBC AUTO-ENTMCNC: 29 G/DL (ref 31.5–36.5)
MCV RBC AUTO: 101 FL (ref 78–100)
MONOCYTES # BLD AUTO: 0.7 10E3/UL (ref 0–1.3)
MONOCYTES NFR BLD AUTO: 20 %
NEUTROPHILS # BLD AUTO: 1.7 10E3/UL (ref 1.6–8.3)
NEUTROPHILS NFR BLD AUTO: 45 %
NRBC # BLD AUTO: 0 10E3/UL
NRBC BLD AUTO-RTO: 0 /100
PLATELET # BLD AUTO: 389 10E3/UL (ref 150–450)
RBC # BLD AUTO: 3.39 10E6/UL (ref 4.4–5.9)
WBC # BLD AUTO: 3.7 10E3/UL (ref 4–11)

## 2022-05-04 PROCEDURE — 93798 PHYS/QHP OP CAR RHAB W/ECG: CPT

## 2022-05-04 PROCEDURE — 84520 ASSAY OF UREA NITROGEN: CPT | Performed by: INTERNAL MEDICINE

## 2022-05-04 PROCEDURE — 85025 COMPLETE CBC W/AUTO DIFF WBC: CPT | Performed by: INTERNAL MEDICINE

## 2022-05-04 PROCEDURE — 85652 RBC SED RATE AUTOMATED: CPT | Performed by: INTERNAL MEDICINE

## 2022-05-04 PROCEDURE — 86140 C-REACTIVE PROTEIN: CPT | Performed by: INTERNAL MEDICINE

## 2022-05-04 PROCEDURE — 82565 ASSAY OF CREATININE: CPT | Performed by: INTERNAL MEDICINE

## 2022-05-04 PROCEDURE — 84460 ALANINE AMINO (ALT) (SGPT): CPT | Performed by: INTERNAL MEDICINE

## 2022-05-05 ENCOUNTER — HOME INFUSION (PRE-WILLOW HOME INFUSION) (OUTPATIENT)
Dept: PHARMACY | Facility: CLINIC | Age: 37
End: 2022-05-05

## 2022-05-06 ENCOUNTER — TELEPHONE (OUTPATIENT)
Dept: CARDIOLOGY | Facility: CLINIC | Age: 37
End: 2022-05-06
Payer: COMMERCIAL

## 2022-05-06 DIAGNOSIS — R60.0 BILATERAL LEG EDEMA: ICD-10-CM

## 2022-05-06 RX ORDER — FUROSEMIDE 20 MG
40 TABLET ORAL DAILY
Qty: 180 TABLET | Refills: 0 | Status: SHIPPED | OUTPATIENT
Start: 2022-05-06 | End: 2022-08-15

## 2022-05-06 NOTE — TELEPHONE ENCOUNTER
5/6/22 Msg recd from refill line    Reina ordered Lasix 40 mg daily- 60 tabs, zero refills on 5/3 Pharmacy is requesting a 90 day supply.     90 day supply w 0 refills sent to [facility]12 am

## 2022-05-10 ENCOUNTER — HOSPITAL ENCOUNTER (OUTPATIENT)
Dept: CARDIAC REHAB | Facility: CLINIC | Age: 37
Discharge: HOME | End: 2022-05-10
Attending: SURGERY
Payer: COMMERCIAL

## 2022-05-10 PROCEDURE — 93798 PHYS/QHP OP CAR RHAB W/ECG: CPT | Performed by: REHABILITATION PRACTITIONER

## 2022-05-11 ENCOUNTER — LAB REQUISITION (OUTPATIENT)
Dept: LAB | Facility: CLINIC | Age: 37
End: 2022-05-11
Payer: COMMERCIAL

## 2022-05-11 ENCOUNTER — HOME INFUSION (PRE-WILLOW HOME INFUSION) (OUTPATIENT)
Dept: PHARMACY | Facility: CLINIC | Age: 37
End: 2022-05-11

## 2022-05-11 DIAGNOSIS — J18.9 PNEUMONIA, UNSPECIFIED ORGANISM: ICD-10-CM

## 2022-05-11 LAB
ALT SERPL W P-5'-P-CCNC: 17 U/L (ref 0–70)
BASOPHILS # BLD AUTO: 0.1 10E3/UL (ref 0–0.2)
BASOPHILS NFR BLD AUTO: 2 %
BUN SERPL-MCNC: 18 MG/DL (ref 7–30)
CREAT SERPL-MCNC: 0.92 MG/DL (ref 0.66–1.25)
CRP SERPL-MCNC: 17.5 MG/L (ref 0–8)
EOSINOPHIL # BLD AUTO: 0.2 10E3/UL (ref 0–0.7)
EOSINOPHIL NFR BLD AUTO: 4 %
ERYTHROCYTE [DISTWIDTH] IN BLOOD BY AUTOMATED COUNT: 15.8 % (ref 10–15)
ERYTHROCYTE [SEDIMENTATION RATE] IN BLOOD BY WESTERGREN METHOD: 22 MM/HR (ref 0–15)
GFR SERPL CREATININE-BSD FRML MDRD: >90 ML/MIN/1.73M2
HCT VFR BLD AUTO: 36.9 % (ref 40–53)
HGB BLD-MCNC: 11 G/DL (ref 13.3–17.7)
IMM GRANULOCYTES # BLD: 0 10E3/UL
IMM GRANULOCYTES NFR BLD: 0 %
LYMPHOCYTES # BLD AUTO: 0.7 10E3/UL (ref 0.8–5.3)
LYMPHOCYTES NFR BLD AUTO: 15 %
MCH RBC QN AUTO: 28.9 PG (ref 26.5–33)
MCHC RBC AUTO-ENTMCNC: 29.8 G/DL (ref 31.5–36.5)
MCV RBC AUTO: 97 FL (ref 78–100)
MONOCYTES # BLD AUTO: 0.8 10E3/UL (ref 0–1.3)
MONOCYTES NFR BLD AUTO: 17 %
NEUTROPHILS # BLD AUTO: 2.9 10E3/UL (ref 1.6–8.3)
NEUTROPHILS NFR BLD AUTO: 62 %
NRBC # BLD AUTO: 0 10E3/UL
NRBC BLD AUTO-RTO: 0 /100
PLATELET # BLD AUTO: 323 10E3/UL (ref 150–450)
RBC # BLD AUTO: 3.81 10E6/UL (ref 4.4–5.9)
WBC # BLD AUTO: 4.7 10E3/UL (ref 4–11)

## 2022-05-11 PROCEDURE — 82565 ASSAY OF CREATININE: CPT | Performed by: INTERNAL MEDICINE

## 2022-05-11 PROCEDURE — 84460 ALANINE AMINO (ALT) (SGPT): CPT | Performed by: INTERNAL MEDICINE

## 2022-05-11 PROCEDURE — 84520 ASSAY OF UREA NITROGEN: CPT | Performed by: INTERNAL MEDICINE

## 2022-05-11 PROCEDURE — 85025 COMPLETE CBC W/AUTO DIFF WBC: CPT | Performed by: INTERNAL MEDICINE

## 2022-05-11 PROCEDURE — 36592 COLLECT BLOOD FROM PICC: CPT | Performed by: INTERNAL MEDICINE

## 2022-05-11 PROCEDURE — 86140 C-REACTIVE PROTEIN: CPT | Performed by: INTERNAL MEDICINE

## 2022-05-11 PROCEDURE — 85652 RBC SED RATE AUTOMATED: CPT | Performed by: INTERNAL MEDICINE

## 2022-05-12 ENCOUNTER — HOSPITAL ENCOUNTER (OUTPATIENT)
Dept: CARDIAC REHAB | Facility: CLINIC | Age: 37
Discharge: HOME | End: 2022-05-12
Attending: SURGERY
Payer: COMMERCIAL

## 2022-05-12 ENCOUNTER — HOME INFUSION (PRE-WILLOW HOME INFUSION) (OUTPATIENT)
Dept: PHARMACY | Facility: CLINIC | Age: 37
End: 2022-05-12

## 2022-05-12 ENCOUNTER — HOSPITAL ENCOUNTER (OUTPATIENT)
Dept: CARDIOLOGY | Facility: CLINIC | Age: 37
Discharge: HOME | End: 2022-05-12
Attending: NURSE PRACTITIONER | Admitting: NURSE PRACTITIONER
Payer: COMMERCIAL

## 2022-05-12 DIAGNOSIS — I38 ENDOCARDITIS DETERMINED BY ECHOCARDIOGRAPHY: ICD-10-CM

## 2022-05-12 LAB — LVEF ECHO: NORMAL

## 2022-05-12 PROCEDURE — 93325 DOPPLER ECHO COLOR FLOW MAPG: CPT | Mod: 26 | Performed by: INTERNAL MEDICINE

## 2022-05-12 PROCEDURE — 93321 DOPPLER ECHO F-UP/LMTD STD: CPT | Mod: 26 | Performed by: INTERNAL MEDICINE

## 2022-05-12 PROCEDURE — 93325 DOPPLER ECHO COLOR FLOW MAPG: CPT

## 2022-05-12 PROCEDURE — 93308 TTE F-UP OR LMTD: CPT | Mod: 26 | Performed by: INTERNAL MEDICINE

## 2022-05-12 PROCEDURE — 93798 PHYS/QHP OP CAR RHAB W/ECG: CPT

## 2022-05-12 PROCEDURE — 255N000002 HC RX 255 OP 636: Performed by: NURSE PRACTITIONER

## 2022-05-12 RX ADMIN — HUMAN ALBUMIN MICROSPHERES AND PERFLUTREN 3 ML: 10; .22 INJECTION, SOLUTION INTRAVENOUS at 11:13

## 2022-05-17 ENCOUNTER — TELEPHONE (OUTPATIENT)
Dept: CARDIOLOGY | Facility: CLINIC | Age: 37
End: 2022-05-17
Payer: COMMERCIAL

## 2022-05-17 NOTE — PROGRESS NOTES
This is a recent snapshot of the patient's [facility] medical record.  For current drug dose and complete information and questions, call [phone] or In Basket pool, fv home infusion ([id number])  CSN Number:  [id number]

## 2022-05-17 NOTE — TELEPHONE ENCOUNTER
Patient sent [employer] message regarding short term disability paperwork and return to work. Discussed with patient that only remaining restriction regarding the ICD is no driving three months after implant. Patient had ICD placed on 4/12/2022.     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     Told patient that [employer] message was also forwarded to CV surgery team for recommendations regarding return to work. Patient stated he will either send short term disability paperwork via [employer] or drop off at the clinic tomorrow. Asked if patient needs a letter for work regarding driving restrictions, patient states [employer] only need short term disability paper work to be filled out. Encouraged patient to call with any further questions or concerns.

## 2022-05-17 NOTE — PROGRESS NOTES
This is a recent snapshot of the patient's [facility] medical record.  For current drug dose and complete information and questions, call [phone] or In Basket pool, fv home infusion ([id number])  CSN Number:  [id number]

## 2022-05-17 NOTE — PROGRESS NOTES
This is a recent snapshot of the patient's [facility] medical record.  For current drug dose and complete information and questions, call [phone] or In Basket pool, fv home infusion ([id number])  CSN Number:  [id number]

## 2022-05-18 ENCOUNTER — HOSPITAL ENCOUNTER (OUTPATIENT)
Dept: CARDIAC REHAB | Facility: CLINIC | Age: 37
Discharge: HOME | End: 2022-05-18
Attending: SURGERY
Payer: COMMERCIAL

## 2022-05-18 ENCOUNTER — LAB REQUISITION (OUTPATIENT)
Dept: LAB | Facility: CLINIC | Age: 37
End: 2022-05-18
Payer: COMMERCIAL

## 2022-05-18 ENCOUNTER — MYC MEDICAL ADVICE (OUTPATIENT)
Dept: CARDIOLOGY | Facility: CLINIC | Age: 37
End: 2022-05-18

## 2022-05-18 ENCOUNTER — LAB (OUTPATIENT)
Dept: LAB | Facility: CLINIC | Age: 37
End: 2022-05-18
Payer: COMMERCIAL

## 2022-05-18 ENCOUNTER — HOME INFUSION (PRE-WILLOW HOME INFUSION) (OUTPATIENT)
Dept: PHARMACY | Facility: CLINIC | Age: 37
End: 2022-05-18

## 2022-05-18 DIAGNOSIS — R60.0 BILATERAL LEG EDEMA: ICD-10-CM

## 2022-05-18 DIAGNOSIS — J18.9 PNEUMONIA, UNSPECIFIED ORGANISM: ICD-10-CM

## 2022-05-18 LAB
ALT SERPL W P-5'-P-CCNC: 11 U/L (ref 0–70)
ANION GAP SERPL CALCULATED.3IONS-SCNC: 7 MMOL/L (ref 3–14)
BASOPHILS # BLD AUTO: 0.1 10E3/UL (ref 0–0.2)
BASOPHILS NFR BLD AUTO: 2 %
BUN SERPL-MCNC: 15 MG/DL (ref 7–30)
BUN SERPL-MCNC: 15 MG/DL (ref 7–30)
CALCIUM SERPL-MCNC: 8.6 MG/DL (ref 8.5–10.1)
CHLORIDE BLD-SCNC: 104 MMOL/L (ref 94–109)
CO2 SERPL-SCNC: 26 MMOL/L (ref 20–32)
CREAT SERPL-MCNC: 0.96 MG/DL (ref 0.66–1.25)
CREAT SERPL-MCNC: 0.98 MG/DL (ref 0.66–1.25)
CRP SERPL-MCNC: 27 MG/L (ref 0–8)
EOSINOPHIL # BLD AUTO: 0.2 10E3/UL (ref 0–0.7)
EOSINOPHIL NFR BLD AUTO: 5 %
ERYTHROCYTE [DISTWIDTH] IN BLOOD BY AUTOMATED COUNT: 14.7 % (ref 10–15)
ERYTHROCYTE [SEDIMENTATION RATE] IN BLOOD BY WESTERGREN METHOD: 26 MM/HR (ref 0–15)
GFR SERPL CREATININE-BSD FRML MDRD: >90 ML/MIN/1.73M2
GFR SERPL CREATININE-BSD FRML MDRD: >90 ML/MIN/1.73M2
GLUCOSE BLD-MCNC: 185 MG/DL (ref 70–99)
HCT VFR BLD AUTO: 40.9 % (ref 40–53)
HGB BLD-MCNC: 12.8 G/DL (ref 13.3–17.7)
IMM GRANULOCYTES # BLD: 0 10E3/UL
IMM GRANULOCYTES NFR BLD: 1 %
LYMPHOCYTES # BLD AUTO: 0.8 10E3/UL (ref 0.8–5.3)
LYMPHOCYTES NFR BLD AUTO: 23 %
MCH RBC QN AUTO: 29 PG (ref 26.5–33)
MCHC RBC AUTO-ENTMCNC: 31.3 G/DL (ref 31.5–36.5)
MCV RBC AUTO: 93 FL (ref 78–100)
MONOCYTES # BLD AUTO: 0.7 10E3/UL (ref 0–1.3)
MONOCYTES NFR BLD AUTO: 21 %
NEUTROPHILS # BLD AUTO: 1.6 10E3/UL (ref 1.6–8.3)
NEUTROPHILS NFR BLD AUTO: 48 %
NRBC # BLD AUTO: 0 10E3/UL
NRBC BLD AUTO-RTO: 0 /100
PLAT MORPH BLD: NORMAL
PLATELET # BLD AUTO: 334 10E3/UL (ref 150–450)
POTASSIUM BLD-SCNC: 4.1 MMOL/L (ref 3.4–5.3)
RBC # BLD AUTO: 4.41 10E6/UL (ref 4.4–5.9)
RBC MORPH BLD: NORMAL
SODIUM SERPL-SCNC: 137 MMOL/L (ref 133–144)
WBC # BLD AUTO: 3.4 10E3/UL (ref 4–11)

## 2022-05-18 PROCEDURE — 85025 COMPLETE CBC W/AUTO DIFF WBC: CPT | Performed by: INTERNAL MEDICINE

## 2022-05-18 PROCEDURE — 86140 C-REACTIVE PROTEIN: CPT | Performed by: INTERNAL MEDICINE

## 2022-05-18 PROCEDURE — 93798 PHYS/QHP OP CAR RHAB W/ECG: CPT

## 2022-05-18 PROCEDURE — 84520 ASSAY OF UREA NITROGEN: CPT | Performed by: INTERNAL MEDICINE

## 2022-05-18 PROCEDURE — 80048 BASIC METABOLIC PNL TOTAL CA: CPT | Performed by: NURSE PRACTITIONER

## 2022-05-18 PROCEDURE — 84460 ALANINE AMINO (ALT) (SGPT): CPT | Performed by: INTERNAL MEDICINE

## 2022-05-18 PROCEDURE — 85652 RBC SED RATE AUTOMATED: CPT | Performed by: INTERNAL MEDICINE

## 2022-05-18 PROCEDURE — 82565 ASSAY OF CREATININE: CPT | Performed by: INTERNAL MEDICINE

## 2022-05-18 PROCEDURE — 36415 COLL VENOUS BLD VENIPUNCTURE: CPT | Performed by: NURSE PRACTITIONER

## 2022-05-19 ENCOUNTER — TELEPHONE (OUTPATIENT)
Dept: OTHER | Facility: CLINIC | Age: 37
End: 2022-05-19
Payer: COMMERCIAL

## 2022-05-19 ENCOUNTER — HOME INFUSION (PRE-WILLOW HOME INFUSION) (OUTPATIENT)
Dept: PHARMACY | Facility: CLINIC | Age: 37
End: 2022-05-19

## 2022-05-19 NOTE — TELEPHONE ENCOUNTER
Patient left message questioning timing of his restrictions and return to work  date. Message left with contact information for return call if questions. 10 lb. Lifting restriction in place until 5/27, 20-30 lb. Restriction till 6/24. Return to work date 6/24 with no restrictions.

## 2022-05-19 NOTE — PROGRESS NOTES
This is a recent snapshot of the patient's [facility] medical record.  For current drug dose and complete information and questions, call [phone] or In Basket pool, fv home infusion ([id number])  CSN Number:  [id number]

## 2022-05-20 ENCOUNTER — HOME INFUSION (PRE-WILLOW HOME INFUSION) (OUTPATIENT)
Dept: PHARMACY | Facility: CLINIC | Age: 37
End: 2022-05-20

## 2022-05-23 ENCOUNTER — LAB (OUTPATIENT)
Dept: LAB | Facility: CLINIC | Age: 37
End: 2022-05-23
Payer: COMMERCIAL

## 2022-05-23 DIAGNOSIS — R60.0 BILATERAL LEG EDEMA: ICD-10-CM

## 2022-05-23 LAB
ANION GAP SERPL CALCULATED.3IONS-SCNC: 5 MMOL/L (ref 3–14)
BUN SERPL-MCNC: 20 MG/DL (ref 7–30)
CALCIUM SERPL-MCNC: 9.3 MG/DL (ref 8.5–10.1)
CHLORIDE BLD-SCNC: 105 MMOL/L (ref 94–109)
CO2 SERPL-SCNC: 30 MMOL/L (ref 20–32)
CREAT SERPL-MCNC: 0.92 MG/DL (ref 0.66–1.25)
GFR SERPL CREATININE-BSD FRML MDRD: >90 ML/MIN/1.73M2
GLUCOSE BLD-MCNC: 143 MG/DL (ref 70–99)
POTASSIUM BLD-SCNC: 4.3 MMOL/L (ref 3.4–5.3)
SODIUM SERPL-SCNC: 140 MMOL/L (ref 133–144)

## 2022-05-23 PROCEDURE — 80048 BASIC METABOLIC PNL TOTAL CA: CPT | Performed by: NURSE PRACTITIONER

## 2022-05-23 PROCEDURE — 36415 COLL VENOUS BLD VENIPUNCTURE: CPT | Performed by: NURSE PRACTITIONER

## 2022-05-24 ENCOUNTER — HOSPITAL ENCOUNTER (OUTPATIENT)
Dept: CARDIAC REHAB | Facility: CLINIC | Age: 37
Discharge: HOME | End: 2022-05-24
Attending: SURGERY
Payer: COMMERCIAL

## 2022-05-24 PROCEDURE — 93798 PHYS/QHP OP CAR RHAB W/ECG: CPT

## 2022-05-25 ENCOUNTER — HOME INFUSION (PRE-WILLOW HOME INFUSION) (OUTPATIENT)
Dept: PHARMACY | Facility: CLINIC | Age: 37
End: 2022-05-25

## 2022-05-25 ENCOUNTER — LAB REQUISITION (OUTPATIENT)
Dept: LAB | Facility: CLINIC | Age: 37
End: 2022-05-25
Payer: COMMERCIAL

## 2022-05-25 DIAGNOSIS — J18.9 PNEUMONIA, UNSPECIFIED ORGANISM: ICD-10-CM

## 2022-05-25 LAB
ALT SERPL W P-5'-P-CCNC: 13 U/L (ref 0–70)
BASOPHILS # BLD AUTO: 0.1 10E3/UL (ref 0–0.2)
BASOPHILS NFR BLD AUTO: 2 %
BUN SERPL-MCNC: 15 MG/DL (ref 7–30)
CREAT SERPL-MCNC: 0.96 MG/DL (ref 0.66–1.25)
CRP SERPL-MCNC: 19.6 MG/L (ref 0–8)
EOSINOPHIL # BLD AUTO: 0.2 10E3/UL (ref 0–0.7)
EOSINOPHIL NFR BLD AUTO: 3 %
ERYTHROCYTE [DISTWIDTH] IN BLOOD BY AUTOMATED COUNT: 13.7 % (ref 10–15)
ERYTHROCYTE [SEDIMENTATION RATE] IN BLOOD BY WESTERGREN METHOD: 19 MM/HR (ref 0–15)
GFR SERPL CREATININE-BSD FRML MDRD: >90 ML/MIN/1.73M2
HCT VFR BLD AUTO: 44.2 % (ref 40–53)
HGB BLD-MCNC: 13.7 G/DL (ref 13.3–17.7)
IMM GRANULOCYTES # BLD: 0 10E3/UL
IMM GRANULOCYTES NFR BLD: 1 %
LYMPHOCYTES # BLD AUTO: 1.2 10E3/UL (ref 0.8–5.3)
LYMPHOCYTES NFR BLD AUTO: 17 %
MCH RBC QN AUTO: 28.7 PG (ref 26.5–33)
MCHC RBC AUTO-ENTMCNC: 31 G/DL (ref 31.5–36.5)
MCV RBC AUTO: 93 FL (ref 78–100)
MONOCYTES # BLD AUTO: 0.8 10E3/UL (ref 0–1.3)
MONOCYTES NFR BLD AUTO: 12 %
NEUTROPHILS # BLD AUTO: 4.6 10E3/UL (ref 1.6–8.3)
NEUTROPHILS NFR BLD AUTO: 65 %
NRBC # BLD AUTO: 0 10E3/UL
NRBC BLD AUTO-RTO: 0 /100
PLATELET # BLD AUTO: 379 10E3/UL (ref 150–450)
RBC # BLD AUTO: 4.78 10E6/UL (ref 4.4–5.9)
WBC # BLD AUTO: 6.9 10E3/UL (ref 4–11)

## 2022-05-25 PROCEDURE — 84460 ALANINE AMINO (ALT) (SGPT): CPT | Performed by: INTERNAL MEDICINE

## 2022-05-25 PROCEDURE — 84520 ASSAY OF UREA NITROGEN: CPT | Performed by: INTERNAL MEDICINE

## 2022-05-25 PROCEDURE — 85652 RBC SED RATE AUTOMATED: CPT | Performed by: INTERNAL MEDICINE

## 2022-05-25 PROCEDURE — 86140 C-REACTIVE PROTEIN: CPT | Performed by: INTERNAL MEDICINE

## 2022-05-25 PROCEDURE — 82565 ASSAY OF CREATININE: CPT | Performed by: INTERNAL MEDICINE

## 2022-05-25 PROCEDURE — 85025 COMPLETE CBC W/AUTO DIFF WBC: CPT | Performed by: INTERNAL MEDICINE

## 2022-05-26 ENCOUNTER — HOME INFUSION (PRE-WILLOW HOME INFUSION) (OUTPATIENT)
Dept: PHARMACY | Facility: CLINIC | Age: 37
End: 2022-05-26
Payer: COMMERCIAL

## 2022-05-26 NOTE — PROGRESS NOTES
This is a recent snapshot of the patient's [facility] medical record.  For current drug dose and complete information and questions, call [phone] or In Basket pool, fv home infusion ([id number])  CSN Number:  [id number]

## 2022-05-27 ENCOUNTER — MEDICAL CORRESPONDENCE (OUTPATIENT)
Dept: LAB | Facility: CLINIC | Age: 37
End: 2022-05-27
Payer: COMMERCIAL

## 2022-05-27 NOTE — PROGRESS NOTES
This is a recent snapshot of the patient's [facility] medical record.  For current drug dose and complete information and questions, call [phone] or In Basket pool, fv home infusion ([id number])  CSN Number:  [id number]

## 2022-06-01 ENCOUNTER — LAB REQUISITION (OUTPATIENT)
Dept: LAB | Facility: CLINIC | Age: 37
End: 2022-06-01
Payer: COMMERCIAL

## 2022-06-01 ENCOUNTER — HOME INFUSION (PRE-WILLOW HOME INFUSION) (OUTPATIENT)
Dept: PHARMACY | Facility: CLINIC | Age: 37
End: 2022-06-01

## 2022-06-01 DIAGNOSIS — J18.9 PNEUMONIA, UNSPECIFIED ORGANISM: ICD-10-CM

## 2022-06-01 LAB
AST SERPL W P-5'-P-CCNC: 11 U/L (ref 0–45)
BASOPHILS # BLD AUTO: 0.1 10E3/UL (ref 0–0.2)
BASOPHILS NFR BLD AUTO: 2 %
BUN SERPL-MCNC: 15 MG/DL (ref 7–30)
CREAT SERPL-MCNC: 1.03 MG/DL (ref 0.66–1.25)
CRP SERPL-MCNC: 25.8 MG/L (ref 0–8)
EOSINOPHIL # BLD AUTO: 0.3 10E3/UL (ref 0–0.7)
EOSINOPHIL NFR BLD AUTO: 5 %
ERYTHROCYTE [DISTWIDTH] IN BLOOD BY AUTOMATED COUNT: 13.4 % (ref 10–15)
ERYTHROCYTE [SEDIMENTATION RATE] IN BLOOD BY WESTERGREN METHOD: 19 MM/HR (ref 0–15)
GFR SERPL CREATININE-BSD FRML MDRD: >90 ML/MIN/1.73M2
HCT VFR BLD AUTO: 43.6 % (ref 40–53)
HGB BLD-MCNC: 13.8 G/DL (ref 13.3–17.7)
IMM GRANULOCYTES # BLD: 0 10E3/UL
IMM GRANULOCYTES NFR BLD: 0 %
LYMPHOCYTES # BLD AUTO: 1.1 10E3/UL (ref 0.8–5.3)
LYMPHOCYTES NFR BLD AUTO: 17 %
MCH RBC QN AUTO: 28.8 PG (ref 26.5–33)
MCHC RBC AUTO-ENTMCNC: 31.7 G/DL (ref 31.5–36.5)
MCV RBC AUTO: 91 FL (ref 78–100)
MONOCYTES # BLD AUTO: 0.9 10E3/UL (ref 0–1.3)
MONOCYTES NFR BLD AUTO: 13 %
NEUTROPHILS # BLD AUTO: 4 10E3/UL (ref 1.6–8.3)
NEUTROPHILS NFR BLD AUTO: 63 %
NRBC # BLD AUTO: 0 10E3/UL
NRBC BLD AUTO-RTO: 0 /100
PLATELET # BLD AUTO: 275 10E3/UL (ref 150–450)
RBC # BLD AUTO: 4.8 10E6/UL (ref 4.4–5.9)
WBC # BLD AUTO: 6.3 10E3/UL (ref 4–11)

## 2022-06-01 PROCEDURE — 85025 COMPLETE CBC W/AUTO DIFF WBC: CPT | Performed by: INTERNAL MEDICINE

## 2022-06-01 PROCEDURE — 86140 C-REACTIVE PROTEIN: CPT | Performed by: INTERNAL MEDICINE

## 2022-06-01 PROCEDURE — 82565 ASSAY OF CREATININE: CPT | Performed by: INTERNAL MEDICINE

## 2022-06-01 PROCEDURE — 84520 ASSAY OF UREA NITROGEN: CPT | Performed by: INTERNAL MEDICINE

## 2022-06-01 PROCEDURE — 85652 RBC SED RATE AUTOMATED: CPT | Performed by: INTERNAL MEDICINE

## 2022-06-01 PROCEDURE — 84450 TRANSFERASE (AST) (SGOT): CPT | Performed by: INTERNAL MEDICINE

## 2022-06-02 ENCOUNTER — HOSPITAL ENCOUNTER (OUTPATIENT)
Dept: CARDIAC REHAB | Facility: CLINIC | Age: 37
Discharge: HOME | End: 2022-06-02
Attending: SURGERY
Payer: COMMERCIAL

## 2022-06-02 PROCEDURE — 93798 PHYS/QHP OP CAR RHAB W/ECG: CPT | Performed by: REHABILITATION PRACTITIONER

## 2022-06-04 ENCOUNTER — HEALTH MAINTENANCE LETTER (OUTPATIENT)
Age: 37
End: 2022-06-04

## 2022-06-06 ENCOUNTER — OFFICE VISIT (OUTPATIENT)
Dept: CARDIOLOGY | Facility: CLINIC | Age: 37
End: 2022-06-06
Payer: COMMERCIAL

## 2022-06-06 VITALS
SYSTOLIC BLOOD PRESSURE: 124 MMHG | OXYGEN SATURATION: 94 % | HEIGHT: 70 IN | HEART RATE: 61 BPM | BODY MASS INDEX: 35.12 KG/M2 | WEIGHT: 245.3 LBS | DIASTOLIC BLOOD PRESSURE: 80 MMHG

## 2022-06-06 DIAGNOSIS — Z95.810 ICD (IMPLANTABLE CARDIOVERTER-DEFIBRILLATOR) IN PLACE: ICD-10-CM

## 2022-06-06 DIAGNOSIS — Z95.4 S/P TVR (TRICUSPID VALVE REPLACEMENT): Primary | ICD-10-CM

## 2022-06-06 PROCEDURE — 99214 OFFICE O/P EST MOD 30 MIN: CPT | Performed by: INTERNAL MEDICINE

## 2022-06-06 RX ORDER — CEPHALEXIN 500 MG/1
500 CAPSULE ORAL
COMMUNITY
Start: 2022-06-01 | End: 2022-08-30

## 2022-06-06 NOTE — PROGRESS NOTES
HPI and Plan:   It is my pleasure to see Anil rausch for follow-up of tricuspid valve replacement, complete heart block V. fib arrest status post dual-chamber pacer ICD insertion.    This young gentleman has a history of fungemia, diabetes mellitus and obstructive sleep apnea together with morbid obesity.    I saw him in March 2022 when he was hospitalized with staph sepsis probably due to a skin infection involving his right groin.  He had L4-5 S1 discitis as well.  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    On April 1 he underwent tricuspid valve replacement with a 31 mm epic stented valve together with a reexploration and septal debridement.  Postoperatively he developed complete AV block.  Complicating his rhythm issues is a V. fib arrest, this was deemed idiopathic by my EP colleagues.  Eventually he underwent dual-chamber ICD insertion.      He had mild acute renal failure thought likely related to gentamicin and vancomycin.    I am very happy to see that he has made a very remarkable recovery.  He tells me he feels well.  He has lost about 30 pounds in weight.  He has no cardiac symptoms    He knows the importance of good diabetic control.  When he was initially admitted with staph sepsis his HbA1c was over 12 and have little doubt that the suboptimal diabetic control was a major factor leading to his severe sepsis.    Last pacer check reveals 9% atrial pacing and 35% ventricular pacing.  Unfortunately underlying rhythm is still complete heart block.    His renal function has recovered.    His surgical scars are well-healed.    His last echocardiogram in May 2022 shows a mean tricuspid valve gradient of 7.5 mmHg which I think is adequate.  I do not appreciate any significant murmurs today.    Blood pressure appears to be under good control.    He is determined to go down to 200 pounds in weight.  I  applauded him on his good efforts to modify his cardiac risk factors.    Overall he is doing well.  I will follow-up with him in 6 months time.        Orders Placed This Encounter   Procedures     Follow-Up with Cardiology       Orders Placed This Encounter   Medications     cephALEXin (KEFLEX) 500 MG capsule     Sig: Take 500 mg by mouth       Encounter Diagnoses   Name Primary?     S/P TVR (tricuspid valve replacement)-31 mm Epic  Yes     ICD (implantable cardioverter-defibrillator) in place        CURRENT MEDICATIONS:  Current Outpatient Medications   Medication Sig Dispense Refill     acetaminophen (TYLENOL) 325 MG tablet Take 2 tablets (650 mg) by mouth every 4 hours as needed for mild pain or fever 30 tablet 0     amLODIPine (NORVASC) 10 MG tablet Take 1 tablet (10 mg) by mouth daily 30 tablet 3     aspirin (ASA) 81 MG chewable tablet 1 tablet (81 mg) by Oral or NG Tube route daily 30 tablet 0     buPROPion (WELLBUTRIN XL) 300 MG 24 hr tablet Take 300 mg by mouth every morning       carvedilol (COREG) 3.125 MG tablet Take 1 tablet (3.125 mg) by mouth 2 times daily (with meals) 60 tablet 3     cephALEXin (KEFLEX) 500 MG capsule Take 500 mg by mouth       cyclobenzaprine (FLEXERIL) 5 MG tablet Take 1-2 tablets (5-10 mg) by mouth every 8 hours as needed for muscle spasms 40 tablet 0     ferrous gluconate (FERGON) 324 (38 Fe) MG tablet Take 1 tablet (324 mg) by mouth every other day 30 tablet 0     furosemide (LASIX) 20 MG tablet Take 2 tablets (40 mg) by mouth daily 180 tablet 0     gabapentin (NEURONTIN) 100 MG capsule Take 1 capsule (100 mg) by mouth 3 times daily as needed for neuropathic pain 60 capsule 0     levothyroxine (SYNTHROID/LEVOTHROID) 125 MCG tablet Take 125 mcg by mouth six times a week Monday thru Saturday       levothyroxine (SYNTHROID/LEVOTHROID) 125 MCG tablet Take 250 mcg by mouth once a week On Sunday       liraglutide (VICTOZA) 18 MG/3ML solution Inject 1.8 mg Subcutaneous daily        "loratadine (CLARITIN) 10 MG tablet Take 1 tablet (10 mg) by mouth daily 30 tablet 0     metFORMIN (GLUCOPHAGE) 1000 MG tablet Take 1,000 mg by mouth 2 times daily (with meals)       methocarbamol (ROBAXIN) 500 MG tablet Take 2 tablets (1,000 mg) by mouth every 6 hours 60 tablet 0     oxyCODONE (ROXICODONE) 5 MG tablet Take 1 tablet (5 mg) by mouth every 6 hours as needed for breakthrough pain 30 tablet 0       ALLERGIES     Allergies   Allergen Reactions     Clindamycin      Vancomycin      \"Red Sonia Syndrome\"     Vancomycin      \"Red Sonia Syndrome\"     Ceftriaxone Rash     Corn-Related Products GI Disturbance       PAST MEDICAL HISTORY:  Past Medical History:   Diagnosis Date     Pneumonia        PAST SURGICAL HISTORY:  Past Surgical History:   Procedure Laterality Date     CV CORONARY ANGIOGRAM N/A 3/31/2022    Procedure: Coronary Angiogram;  Surgeon: Lidia Quintanilla MD;  Location:  HEART CARDIAC CATH LAB     EP ICD INSERT SINGLE N/A 4/12/2022    Procedure: Implantable Cardioverter Defibrillator Device & Lead Implant Single or Dual;  Surgeon: Suleman Higgins MD;  Location:  HEART CARDIAC CATH LAB     REPLACE VALVE AORTIC N/A 4/1/2022    Procedure: AORTIC VALVE exploration;  Surgeon: Marti Melton MD;  Location:  OR     REPLACE VALVE TRICUSPID N/A 4/1/2022    Procedure: TRICUSPID VALVE REPLACEMENT;  Surgeon: Marti Melton MD;  Location:  OR       FAMILY HISTORY:  No family history on file.    SOCIAL HISTORY:  Social History     Socioeconomic History     Marital status:    Tobacco Use     Smoking status: Never Smoker     Smokeless tobacco: Never Used   Substance and Sexual Activity     Alcohol use: Not Currently     Drug use: Never       Review of Systems:  Skin:        Eyes:       ENT:       Respiratory:  Positive for dyspnea on exertion  Cardiovascular:  Negative    Gastroenterology: Negative    Genitourinary:       Musculoskeletal:       Neurologic:  Negative  " "  Psychiatric:       Heme/Lymph/Imm:       Endocrine:         Physical Exam:  Vitals: /80 (BP Location: Right arm, Patient Position: Sitting, Cuff Size: Adult Large)   Pulse 61   Ht 1.778 m (5' 10\")   Wt 111.3 kg (245 lb 4.8 oz)   SpO2 94%   BMI 35.20 kg/m      Constitutional:  cooperative, alert and oriented, well developed, well nourished, in no acute distress        Skin:  warm and dry to the touch surgical scars well-healed pacemaker incision in the left infraclavicular area was well-healed      Head:  normocephalic, no masses or lesions        Eyes:  pupils equal and round, conjunctivae and lids unremarkable, sclera white, no xanthalasma, EOMS intact, no nystagmus        Lymph:No Cervical lymphadenopathy present     ENT:  no pallor or cyanosis, dentition good        Neck:  carotid pulses are full and equal bilaterally, JVP normal, no carotid bruit        Respiratory:  normal breath sounds, clear to auscultation, normal A-P diameter, normal symmetry, normal respiratory excursion, no use of accessory muscles         Cardiac: regular rhythm, normal S1/S2, no S3 or S4, apical impulse not displaced, no murmurs, gallops or rubs                pulses full and equal, no bruits auscultated                                        GI:  abdomen soft, non-tender, BS normoactive, no mass, no HSM, no bruits        Extremities and Muscular Skeletal:  no deformities, clubbing, cyanosis, erythema observed              Neurological:  no gross motor deficits        Psych:  Alert and Oriented x 3        Recent Lab Results:  LIPID RESULTS:  No results found for: CHOL, HDL, LDL, TRIG, CHOLHDLRATIO    LIVER ENZYME RESULTS:  Lab Results   Component Value Date    AST 11 06/01/2022    AST 28 11/24/2017    ALT 13 05/25/2022    ALT 51 11/24/2017       CBC RESULTS:  Lab Results   Component Value Date    WBC 6.3 06/01/2022    WBC 6.8 11/24/2017    RBC 4.80 06/01/2022    RBC 5.16 11/24/2017    HGB 13.8 06/01/2022    HGB 15.2 " 11/24/2017    HCT 43.6 06/01/2022    HCT 43.7 11/24/2017    MCV 91 06/01/2022    MCV 85 11/24/2017    MCH 28.8 06/01/2022    MCH 29.5 11/24/2017    MCHC 31.7 06/01/2022    MCHC 34.8 11/24/2017    RDW 13.4 06/01/2022    RDW 12.9 11/24/2017     06/01/2022     11/24/2017       BMP RESULTS:  Lab Results   Component Value Date     05/23/2022     11/24/2017    POTASSIUM 4.3 05/23/2022    POTASSIUM 3.8 11/24/2017    CHLORIDE 105 05/23/2022    CHLORIDE 104 11/24/2017    CO2 30 05/23/2022    CO2 24 11/24/2017    ANIONGAP 5 05/23/2022    ANIONGAP 9 11/24/2017     (H) 05/23/2022     (H) 11/24/2017    BUN 15 06/01/2022    BUN 18 11/24/2017    CR 1.03 06/01/2022    CR 1.07 11/24/2017    GFRESTIMATED >90 06/01/2022    GFRESTIMATED 81 11/24/2017    GFRESTBLACK >90 11/24/2017    IMAN 9.3 05/23/2022    IMAN 8.3 (L) 11/24/2017        A1C RESULTS:  Lab Results   Component Value Date    A1C 9.6 (H) 04/14/2022       INR RESULTS:  Lab Results   Component Value Date    INR 1.17 (H) 04/05/2022    INR 1.55 (H) 04/01/2022           CC  No referring provider defined for this encounter.

## 2022-06-06 NOTE — LETTER
6/6/2022    Amanda Juancho  [facility] [address]   [place] [address]    RE: Anil Montoya       Dear Colleague,     I had the pleasure of seeing Anil Jarrett George Montoya in the [facility].  HPI and Plan:   It is my pleasure to see Anil rausch for follow-up of tricuspid valve replacement, complete heart block V. fib arrest status post dual-chamber pacer ICD insertion.    This young gentleman has a history of fungemia, diabetes mellitus and obstructive sleep apnea together with morbid obesity.    I saw him in March 2022 when he was hospitalized with staph sepsis probably due to a skin infection involving his right groin.  He had L4-5 S1 discitis as well.  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    On April 1 he underwent tricuspid valve replacement with a 31 mm epic stented valve together with a reexploration and septal debridement.  Postoperatively he developed complete AV block.  Complicating his rhythm issues is a V. fib arrest, this was deemed idiopathic by my EP colleagues.  Eventually he underwent dual-chamber ICD insertion.      He had mild acute renal failure thought likely related to gentamicin and vancomycin.    I am very happy to see that he has made a very remarkable recovery.  He tells me he feels well.  He has lost about 30 pounds in weight.  He has no cardiac symptoms    He knows the importance of good diabetic control.  When he was initially admitted with staph sepsis his HbA1c was over 12 and have little doubt that the suboptimal diabetic control was a major factor leading to his severe sepsis.    Last pacer check reveals 9% atrial pacing and 35% ventricular pacing.  Unfortunately underlying rhythm is still complete heart block.    His renal function has recovered.    His surgical scars are well-healed.    His last echocardiogram in  May 2022 shows a mean tricuspid valve gradient of 7.5 mmHg which I think is adequate.  I do not appreciate any significant murmurs today.    Blood pressure appears to be under good control.    He is determined to go down to 200 pounds in weight.  I applauded him on his good efforts to modify his cardiac risk factors.    Overall he is doing well.  I will follow-up with him in 6 months time.        Orders Placed This Encounter   Procedures     Follow-Up with Cardiology       Orders Placed This Encounter   Medications     cephALEXin (KEFLEX) 500 MG capsule     Sig: Take 500 mg by mouth       Encounter Diagnoses   Name Primary?     S/P TVR (tricuspid valve replacement)-31 mm Epic  Yes     ICD (implantable cardioverter-defibrillator) in place        CURRENT MEDICATIONS:  Current Outpatient Medications   Medication Sig Dispense Refill     acetaminophen (TYLENOL) 325 MG tablet Take 2 tablets (650 mg) by mouth every 4 hours as needed for mild pain or fever 30 tablet 0     amLODIPine (NORVASC) 10 MG tablet Take 1 tablet (10 mg) by mouth daily 30 tablet 3     aspirin (ASA) 81 MG chewable tablet 1 tablet (81 mg) by Oral or NG Tube route daily 30 tablet 0     buPROPion (WELLBUTRIN XL) 300 MG 24 hr tablet Take 300 mg by mouth every morning       carvedilol (COREG) 3.125 MG tablet Take 1 tablet (3.125 mg) by mouth 2 times daily (with meals) 60 tablet 3     cephALEXin (KEFLEX) 500 MG capsule Take 500 mg by mouth       cyclobenzaprine (FLEXERIL) 5 MG tablet Take 1-2 tablets (5-10 mg) by mouth every 8 hours as needed for muscle spasms 40 tablet 0     ferrous gluconate (FERGON) 324 (38 Fe) MG tablet Take 1 tablet (324 mg) by mouth every other day 30 tablet 0     furosemide (LASIX) 20 MG tablet Take 2 tablets (40 mg) by mouth daily 180 tablet 0     gabapentin (NEURONTIN) 100 MG capsule Take 1 capsule (100 mg) by mouth 3 times daily as needed for neuropathic pain 60 capsule 0     levothyroxine (SYNTHROID/LEVOTHROID) 125 MCG tablet Take  "125 mcg by mouth six times a week Monday thru Saturday       levothyroxine (SYNTHROID/LEVOTHROID) 125 MCG tablet Take 250 mcg by mouth once a week On Sunday       liraglutide (VICTOZA) 18 MG/3ML solution Inject 1.8 mg Subcutaneous daily       loratadine (CLARITIN) 10 MG tablet Take 1 tablet (10 mg) by mouth daily 30 tablet 0     metFORMIN (GLUCOPHAGE) 1000 MG tablet Take 1,000 mg by mouth 2 times daily (with meals)       methocarbamol (ROBAXIN) 500 MG tablet Take 2 tablets (1,000 mg) by mouth every 6 hours 60 tablet 0     oxyCODONE (ROXICODONE) 5 MG tablet Take 1 tablet (5 mg) by mouth every 6 hours as needed for breakthrough pain 30 tablet 0       ALLERGIES     Allergies   Allergen Reactions     Clindamycin      Vancomycin      \"Red Sonia Syndrome\"     Vancomycin      \"Red Sonia Syndrome\"     Ceftriaxone Rash     Corn-[employer] Products GI Disturbance       PAST MEDICAL HISTORY:  Past Medical History:   Diagnosis Date     Pneumonia        PAST SURGICAL HISTORY:  Past Surgical History:   Procedure Laterality Date     CV CORONARY ANGIOGRAM N/A 3/31/2022    Procedure: Coronary Angiogram;  Surgeon: Lidia Quintanilla MD;  Location: [facility] CARDIAC CATH LAB     EP ICD INSERT SINGLE N/A 4/12/2022    Procedure: Implantable Cardioverter Defibrillator Device & Lead Implant Single or Dual;  Surgeon: Suleman Higgins MD;  Location: [facility] CARDIAC CATH LAB     REPLACE VALVE AORTIC N/A 4/1/2022    Procedure: AORTIC VALVE exploration;  Surgeon: Marti Melton MD;  Location:  OR     REPLACE VALVE TRICUSPID N/A 4/1/2022    Procedure: TRICUSPID VALVE REPLACEMENT;  Surgeon: Marti Melton MD;  Location:  OR       FAMILY HISTORY:  No family history on file.    SOCIAL HISTORY:  Social History     Socioeconomic History     Marital status:    Tobacco Use     Smoking status: Never Smoker     Smokeless tobacco: Never Used   Substance and Sexual Activity     Alcohol use: Not Currently     Drug use: " "Never       Review of Systems:  Skin:        Eyes:       ENT:       Respiratory:  Positive for dyspnea on exertion  Cardiovascular:  Negative    Gastroenterology: Negative    Genitourinary:       Musculoskeletal:       Neurologic:  Negative    Psychiatric:       Heme/Lymph/Imm:       Endocrine:         Physical Exam:  Vitals: /80 (BP Location: Right arm, Patient Position: Sitting, Cuff Size: Adult Large)   Pulse 61   Ht 1.778 m (5' 10\")   Wt 111.3 kg (245 lb 4.8 oz)   SpO2 94%   BMI 35.20 kg/m      Constitutional:  cooperative, alert and oriented, well developed, well nourished, in no acute distress        Skin:  warm and dry to the touch surgical scars well-healed pacemaker incision in the left infraclavicular area was well-healed      Head:  normocephalic, no masses or lesions        Eyes:  pupils equal and round, conjunctivae and lids unremarkable, sclera white, no xanthalasma, EOMS intact, no nystagmus        Lymph:No Cervical lymphadenopathy present     ENT:  no pallor or cyanosis, dentition good        Neck:  carotid pulses are full and equal bilaterally, JVP normal, no carotid bruit        Respiratory:  normal breath sounds, clear to auscultation, normal A-P diameter, normal symmetry, normal respiratory excursion, no use of accessory muscles         Cardiac: regular rhythm, normal S1/S2, no S3 or S4, apical impulse not displaced, no murmurs, gallops or rubs                pulses full and equal, no bruits auscultated                                        GI:  abdomen soft, non-tender, BS normoactive, no mass, no HSM, no bruits        Extremities and Muscular Skeletal:  no deformities, clubbing, cyanosis, erythema observed              Neurological:  no gross motor deficits        Psych:  Alert and Oriented x 3        Recent Lab Results:  LIPID RESULTS:  No results found for: CHOL, HDL, LDL, TRIG, CHOLHDLRATIO    LIVER ENZYME RESULTS:  Lab Results   Component Value Date    AST 11 06/01/2022    AST " 28 11/24/2017    ALT 13 05/25/2022    ALT 51 11/24/2017       CBC RESULTS:  Lab Results   Component Value Date    WBC 6.3 06/01/2022    WBC 6.8 11/24/2017    RBC 4.80 06/01/2022    RBC 5.16 11/24/2017    HGB 13.8 06/01/2022    HGB 15.2 11/24/2017    HCT 43.6 06/01/2022    HCT 43.7 11/24/2017    MCV 91 06/01/2022    MCV 85 11/24/2017    MCH 28.8 06/01/2022    MCH 29.5 11/24/2017    MCHC 31.7 06/01/2022    MCHC 34.8 11/24/2017    RDW 13.4 06/01/2022    RDW 12.9 11/24/2017     06/01/2022     11/24/2017       BMP RESULTS:  Lab Results   Component Value Date     05/23/2022     11/24/2017    POTASSIUM 4.3 05/23/2022    POTASSIUM 3.8 11/24/2017    CHLORIDE 105 05/23/2022    CHLORIDE 104 11/24/2017    CO2 30 05/23/2022    CO2 24 11/24/2017    ANIONGAP 5 05/23/2022    ANIONGAP 9 11/24/2017     (H) 05/23/2022     (H) 11/24/2017    BUN 15 06/01/2022    BUN 18 11/24/2017    CR 1.03 06/01/2022    CR 1.07 11/24/2017    GFRESTIMATED >90 06/01/2022    GFRESTIMATED 81 11/24/2017    GFRESTBLACK >90 11/24/2017    IMAN 9.3 05/23/2022    IMAN 8.3 (L) 11/24/2017        A1C RESULTS:  Lab Results   Component Value Date    A1C 9.6 (H) 04/14/2022       INR RESULTS:  Lab Results   Component Value Date    INR 1.17 (H) 04/05/2022    INR 1.55 (H) 04/01/2022           CC  No referring provider defined for this encounter.    Thank you for allowing me to participate in the care of your patient.      Sincerely,     DR TREASURE MORGAN MD     [facility]

## 2022-06-07 ENCOUNTER — HOSPITAL ENCOUNTER (OUTPATIENT)
Dept: CARDIAC REHAB | Facility: CLINIC | Age: 37
Discharge: HOME | End: 2022-06-07
Attending: SURGERY
Payer: COMMERCIAL

## 2022-06-07 PROCEDURE — 93798 PHYS/QHP OP CAR RHAB W/ECG: CPT

## 2022-06-07 NOTE — PROGRESS NOTES
This is a recent snapshot of the patient's [facility] medical record.  For current drug dose and complete information and questions, call [phone] or In Basket pool, fv home infusion ([id number])  CSN Number:  [id number]

## 2022-06-09 ENCOUNTER — HOSPITAL ENCOUNTER (OUTPATIENT)
Dept: CARDIAC REHAB | Facility: CLINIC | Age: 37
Discharge: HOME | End: 2022-06-09
Attending: SURGERY
Payer: COMMERCIAL

## 2022-06-09 ENCOUNTER — ANCILLARY PROCEDURE (OUTPATIENT)
Dept: CARDIOLOGY | Facility: CLINIC | Age: 37
End: 2022-06-09
Attending: INTERNAL MEDICINE
Payer: COMMERCIAL

## 2022-06-09 DIAGNOSIS — I46.9 CARDIAC ARREST (H): Primary | ICD-10-CM

## 2022-06-09 DIAGNOSIS — Z95.810 ICD (IMPLANTABLE CARDIOVERTER-DEFIBRILLATOR) IN PLACE: ICD-10-CM

## 2022-06-09 PROCEDURE — 93798 PHYS/QHP OP CAR RHAB W/ECG: CPT | Performed by: REHABILITATION PRACTITIONER

## 2022-06-09 PROCEDURE — 93283 PRGRMG EVAL IMPLANTABLE DFB: CPT | Performed by: INTERNAL MEDICINE

## 2022-06-21 ENCOUNTER — HOSPITAL ENCOUNTER (OUTPATIENT)
Dept: CARDIAC REHAB | Facility: CLINIC | Age: 37
Discharge: HOME | End: 2022-06-21
Attending: SURGERY
Payer: COMMERCIAL

## 2022-06-21 PROCEDURE — 93798 PHYS/QHP OP CAR RHAB W/ECG: CPT | Performed by: REHABILITATION PRACTITIONER

## 2022-06-23 ENCOUNTER — HOSPITAL ENCOUNTER (OUTPATIENT)
Dept: CARDIAC REHAB | Facility: CLINIC | Age: 37
Discharge: HOME | End: 2022-06-23
Attending: SURGERY
Payer: COMMERCIAL

## 2022-06-23 PROCEDURE — 93798 PHYS/QHP OP CAR RHAB W/ECG: CPT

## 2022-06-29 ENCOUNTER — TELEPHONE (OUTPATIENT)
Dept: CARDIOLOGY | Facility: CLINIC | Age: 37
End: 2022-06-29

## 2022-06-29 NOTE — TELEPHONE ENCOUNTER
We received forms for Anil funes.    Dr. Dorsey has completed and signed.  There is no return FAX number or phone number.  Writer called Anil.  He was able to provide us with the information  FAX:  [phone]   Phone:  [phone]  Anil stated that he was provided with 3 copies of the forms, but that return information was only included on one of them.    Writer has sent the fax     Nataliia Sanders RN on 6/29/2022 at 3:10 PM

## 2022-07-01 LAB
MDC_IDC_LEAD_IMPLANT_DT: NORMAL
MDC_IDC_LEAD_IMPLANT_DT: NORMAL
MDC_IDC_LEAD_LOCATION: NORMAL
MDC_IDC_LEAD_LOCATION: NORMAL
MDC_IDC_LEAD_LOCATION_DETAIL_1: NORMAL
MDC_IDC_LEAD_LOCATION_DETAIL_1: NORMAL
MDC_IDC_LEAD_MFG: NORMAL
MDC_IDC_LEAD_MFG: NORMAL
MDC_IDC_LEAD_MODEL: NORMAL
MDC_IDC_LEAD_MODEL: NORMAL
MDC_IDC_LEAD_POLARITY_TYPE: NORMAL
MDC_IDC_LEAD_POLARITY_TYPE: NORMAL
MDC_IDC_LEAD_SERIAL: NORMAL
MDC_IDC_LEAD_SERIAL: NORMAL
MDC_IDC_MSMT_BATTERY_DTM: NORMAL
MDC_IDC_MSMT_BATTERY_REMAINING_LONGEVITY: 150 MO
MDC_IDC_MSMT_BATTERY_REMAINING_PERCENTAGE: 100 %
MDC_IDC_MSMT_BATTERY_STATUS: NORMAL
MDC_IDC_MSMT_LEADCHNL_RA_IMPEDANCE_VALUE: 657 OHM
MDC_IDC_MSMT_LEADCHNL_RA_PACING_THRESHOLD_AMPLITUDE: 0.5 V
MDC_IDC_MSMT_LEADCHNL_RA_PACING_THRESHOLD_PULSEWIDTH: 0.4 MS
MDC_IDC_MSMT_LEADCHNL_RV_IMPEDANCE_VALUE: 533 OHM
MDC_IDC_MSMT_LEADCHNL_RV_PACING_THRESHOLD_AMPLITUDE: 1.2 V
MDC_IDC_MSMT_LEADCHNL_RV_PACING_THRESHOLD_PULSEWIDTH: 0.4 MS
MDC_IDC_PG_IMPLANT_DTM: NORMAL
MDC_IDC_PG_MFG: NORMAL
MDC_IDC_PG_MODEL: NORMAL
MDC_IDC_PG_SERIAL: NORMAL
MDC_IDC_PG_TYPE: NORMAL
MDC_IDC_SESS_CLINIC_NAME: NORMAL
MDC_IDC_SESS_DTM: NORMAL
MDC_IDC_SESS_TYPE: NORMAL
MDC_IDC_SET_BRADY_LOWRATE: 55 {BEATS}/MIN
MDC_IDC_SET_BRADY_MODE: NORMAL
MDC_IDC_SET_LEADCHNL_RA_PACING_AMPLITUDE: 2 V
MDC_IDC_SET_LEADCHNL_RA_PACING_CAPTURE_MODE: NORMAL
MDC_IDC_SET_LEADCHNL_RA_PACING_POLARITY: NORMAL
MDC_IDC_SET_LEADCHNL_RA_PACING_PULSEWIDTH: 0.4 MS
MDC_IDC_SET_LEADCHNL_RA_SENSING_ADAPTATION_MODE: NORMAL
MDC_IDC_SET_LEADCHNL_RA_SENSING_POLARITY: NORMAL
MDC_IDC_SET_LEADCHNL_RA_SENSING_SENSITIVITY: 0.25 MV
MDC_IDC_SET_LEADCHNL_RV_PACING_AMPLITUDE: 2.4 V
MDC_IDC_SET_LEADCHNL_RV_PACING_CAPTURE_MODE: NORMAL
MDC_IDC_SET_LEADCHNL_RV_PACING_POLARITY: NORMAL
MDC_IDC_SET_LEADCHNL_RV_PACING_PULSEWIDTH: 0.4 MS
MDC_IDC_SET_LEADCHNL_RV_SENSING_ADAPTATION_MODE: NORMAL
MDC_IDC_SET_LEADCHNL_RV_SENSING_POLARITY: NORMAL
MDC_IDC_SET_LEADCHNL_RV_SENSING_SENSITIVITY: 0.6 MV
MDC_IDC_SET_ZONE_DETECTION_INTERVAL: 250 MS
MDC_IDC_SET_ZONE_DETECTION_INTERVAL: 286 MS
MDC_IDC_SET_ZONE_DETECTION_INTERVAL: 353 MS
MDC_IDC_SET_ZONE_TYPE: NORMAL
MDC_IDC_SET_ZONE_VENDOR_TYPE: NORMAL
MDC_IDC_STAT_BRADY_DTM_END: NORMAL
MDC_IDC_STAT_BRADY_DTM_START: NORMAL
MDC_IDC_STAT_BRADY_RA_PERCENT_PACED: 4 %
MDC_IDC_STAT_BRADY_RV_PERCENT_PACED: 39 %
MDC_IDC_STAT_EPISODE_RECENT_COUNT: 0
MDC_IDC_STAT_EPISODE_RECENT_COUNT_DTM_END: NORMAL
MDC_IDC_STAT_EPISODE_RECENT_COUNT_DTM_START: NORMAL
MDC_IDC_STAT_EPISODE_TYPE: NORMAL
MDC_IDC_STAT_EPISODE_VENDOR_TYPE: NORMAL
MDC_IDC_STAT_TACHYTHERAPY_ATP_DELIVERED_RECENT: 0
MDC_IDC_STAT_TACHYTHERAPY_ATP_DELIVERED_TOTAL: 0
MDC_IDC_STAT_TACHYTHERAPY_RECENT_DTM_END: NORMAL
MDC_IDC_STAT_TACHYTHERAPY_RECENT_DTM_START: NORMAL
MDC_IDC_STAT_TACHYTHERAPY_SHOCKS_ABORTED_RECENT: 0
MDC_IDC_STAT_TACHYTHERAPY_SHOCKS_ABORTED_TOTAL: 0
MDC_IDC_STAT_TACHYTHERAPY_SHOCKS_DELIVERED_RECENT: 0
MDC_IDC_STAT_TACHYTHERAPY_SHOCKS_DELIVERED_TOTAL: 0
MDC_IDC_STAT_TACHYTHERAPY_TOTAL_DTM_END: NORMAL
MDC_IDC_STAT_TACHYTHERAPY_TOTAL_DTM_START: NORMAL

## 2022-07-25 ENCOUNTER — CARE COORDINATION (OUTPATIENT)
Dept: CARDIOLOGY | Facility: CLINIC | Age: 37
End: 2022-07-25

## 2022-07-25 NOTE — PROGRESS NOTES
We have received fax with signed permission from Anil that we can provide all the needed paperwork for him to return to work.    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

## 2022-07-30 ENCOUNTER — HEALTH MAINTENANCE LETTER (OUTPATIENT)
Age: 37
End: 2022-07-30

## 2022-08-04 ENCOUNTER — TELEPHONE (OUTPATIENT)
Dept: CARDIOLOGY | Facility: CLINIC | Age: 37
End: 2022-08-04

## 2022-08-04 NOTE — TELEPHONE ENCOUNTER
Returned call to patient informing him that his employers paperwork has been sent to him - he now needs to complete his portion of the paperwork and turn it in to his HR department.     ELAINE FRANCO RN

## 2022-08-15 DIAGNOSIS — R60.0 BILATERAL LEG EDEMA: ICD-10-CM

## 2022-08-15 RX ORDER — FUROSEMIDE 20 MG
40 TABLET ORAL DAILY
Qty: 180 TABLET | Refills: 3 | Status: SHIPPED | OUTPATIENT
Start: 2022-08-15 | End: 2023-08-03

## 2022-09-15 ENCOUNTER — ANCILLARY PROCEDURE (OUTPATIENT)
Dept: CARDIOLOGY | Facility: CLINIC | Age: 37
End: 2022-09-15
Attending: INTERNAL MEDICINE
Payer: COMMERCIAL

## 2022-09-15 DIAGNOSIS — Z95.810 ICD (IMPLANTABLE CARDIOVERTER-DEFIBRILLATOR) IN PLACE: ICD-10-CM

## 2022-09-15 DIAGNOSIS — I44.2 ATRIOVENTRICULAR BLOCK, COMPLETE (H): ICD-10-CM

## 2022-09-15 DIAGNOSIS — I46.9 CARDIAC ARREST (H): Primary | ICD-10-CM

## 2022-09-15 PROCEDURE — 93296 REM INTERROG EVL PM/IDS: CPT | Performed by: INTERNAL MEDICINE

## 2022-09-15 PROCEDURE — 93295 DEV INTERROG REMOTE 1/2/MLT: CPT | Performed by: INTERNAL MEDICINE

## 2022-09-17 LAB
MDC_IDC_EPISODE_DTM: NORMAL
MDC_IDC_EPISODE_ID: NORMAL
MDC_IDC_EPISODE_TYPE: NORMAL
MDC_IDC_LEAD_IMPLANT_DT: NORMAL
MDC_IDC_LEAD_IMPLANT_DT: NORMAL
MDC_IDC_LEAD_LOCATION: NORMAL
MDC_IDC_LEAD_LOCATION: NORMAL
MDC_IDC_LEAD_LOCATION_DETAIL_1: NORMAL
MDC_IDC_LEAD_LOCATION_DETAIL_1: NORMAL
MDC_IDC_LEAD_MFG: NORMAL
MDC_IDC_LEAD_MFG: NORMAL
MDC_IDC_LEAD_MODEL: NORMAL
MDC_IDC_LEAD_MODEL: NORMAL
MDC_IDC_LEAD_POLARITY_TYPE: NORMAL
MDC_IDC_LEAD_POLARITY_TYPE: NORMAL
MDC_IDC_LEAD_SERIAL: NORMAL
MDC_IDC_LEAD_SERIAL: NORMAL
MDC_IDC_MSMT_BATTERY_DTM: NORMAL
MDC_IDC_MSMT_BATTERY_REMAINING_LONGEVITY: 138 MO
MDC_IDC_MSMT_BATTERY_REMAINING_PERCENTAGE: 100 %
MDC_IDC_MSMT_BATTERY_STATUS: NORMAL
MDC_IDC_MSMT_CAP_CHARGE_DTM: NORMAL
MDC_IDC_MSMT_CAP_CHARGE_TIME: 9.8 S
MDC_IDC_MSMT_CAP_CHARGE_TYPE: NORMAL
MDC_IDC_MSMT_LEADCHNL_RA_IMPEDANCE_VALUE: 643 OHM
MDC_IDC_MSMT_LEADCHNL_RV_IMPEDANCE_VALUE: 532 OHM
MDC_IDC_PG_IMPLANT_DTM: NORMAL
MDC_IDC_PG_MFG: NORMAL
MDC_IDC_PG_MODEL: NORMAL
MDC_IDC_PG_SERIAL: NORMAL
MDC_IDC_PG_TYPE: NORMAL
MDC_IDC_SESS_CLINIC_NAME: NORMAL
MDC_IDC_SESS_DTM: NORMAL
MDC_IDC_SESS_TYPE: NORMAL
MDC_IDC_SET_BRADY_AT_MODE_SWITCH_RATE: 170 {BEATS}/MIN
MDC_IDC_SET_BRADY_LOWRATE: 55 {BEATS}/MIN
MDC_IDC_SET_BRADY_MODE: NORMAL
MDC_IDC_SET_LEADCHNL_RA_PACING_AMPLITUDE: 3.5 V
MDC_IDC_SET_LEADCHNL_RA_PACING_CAPTURE_MODE: NORMAL
MDC_IDC_SET_LEADCHNL_RA_PACING_POLARITY: NORMAL
MDC_IDC_SET_LEADCHNL_RA_PACING_PULSEWIDTH: 0.4 MS
MDC_IDC_SET_LEADCHNL_RA_SENSING_ADAPTATION_MODE: NORMAL
MDC_IDC_SET_LEADCHNL_RA_SENSING_POLARITY: NORMAL
MDC_IDC_SET_LEADCHNL_RA_SENSING_SENSITIVITY: 0.25 MV
MDC_IDC_SET_LEADCHNL_RV_PACING_AMPLITUDE: 3.5 V
MDC_IDC_SET_LEADCHNL_RV_PACING_CAPTURE_MODE: NORMAL
MDC_IDC_SET_LEADCHNL_RV_PACING_POLARITY: NORMAL
MDC_IDC_SET_LEADCHNL_RV_PACING_PULSEWIDTH: 0.4 MS
MDC_IDC_SET_LEADCHNL_RV_SENSING_ADAPTATION_MODE: NORMAL
MDC_IDC_SET_LEADCHNL_RV_SENSING_POLARITY: NORMAL
MDC_IDC_SET_LEADCHNL_RV_SENSING_SENSITIVITY: 0.6 MV
MDC_IDC_SET_ZONE_DETECTION_INTERVAL: 250 MS
MDC_IDC_SET_ZONE_DETECTION_INTERVAL: 286 MS
MDC_IDC_SET_ZONE_DETECTION_INTERVAL: 353 MS
MDC_IDC_SET_ZONE_TYPE: NORMAL
MDC_IDC_SET_ZONE_VENDOR_TYPE: NORMAL
MDC_IDC_STAT_BRADY_DTM_END: NORMAL
MDC_IDC_STAT_BRADY_DTM_START: NORMAL
MDC_IDC_STAT_BRADY_RA_PERCENT_PACED: 6 %
MDC_IDC_STAT_BRADY_RV_PERCENT_PACED: 60 %
MDC_IDC_STAT_EPISODE_RECENT_COUNT: 0
MDC_IDC_STAT_EPISODE_RECENT_COUNT_DTM_END: NORMAL
MDC_IDC_STAT_EPISODE_RECENT_COUNT_DTM_START: NORMAL
MDC_IDC_STAT_EPISODE_TYPE: NORMAL
MDC_IDC_STAT_EPISODE_VENDOR_TYPE: NORMAL
MDC_IDC_STAT_TACHYTHERAPY_ATP_DELIVERED_RECENT: 0
MDC_IDC_STAT_TACHYTHERAPY_ATP_DELIVERED_TOTAL: 0
MDC_IDC_STAT_TACHYTHERAPY_RECENT_DTM_END: NORMAL
MDC_IDC_STAT_TACHYTHERAPY_RECENT_DTM_START: NORMAL
MDC_IDC_STAT_TACHYTHERAPY_SHOCKS_ABORTED_RECENT: 0
MDC_IDC_STAT_TACHYTHERAPY_SHOCKS_ABORTED_TOTAL: 0
MDC_IDC_STAT_TACHYTHERAPY_SHOCKS_DELIVERED_RECENT: 0
MDC_IDC_STAT_TACHYTHERAPY_SHOCKS_DELIVERED_TOTAL: 0
MDC_IDC_STAT_TACHYTHERAPY_TOTAL_DTM_END: NORMAL
MDC_IDC_STAT_TACHYTHERAPY_TOTAL_DTM_START: NORMAL

## 2022-10-10 ENCOUNTER — HEALTH MAINTENANCE LETTER (OUTPATIENT)
Age: 37
End: 2022-10-10

## 2022-10-17 NOTE — PROGRESS NOTES
This is a recent snapshot of the patient's [facility] medical record.  For current drug dose and complete information and questions, call [phone] or In Basket pool, fv home infusion ([id number])  CSN Number:  [id number]

## 2022-10-17 NOTE — PROGRESS NOTES
This is a recent snapshot of the patient's [facility] medical record.  For current drug dose and complete information and questions, call [phone] or In Basket pool, fv home infusion ([id number])  CSN Number:  [id number]

## 2022-11-26 ENCOUNTER — HEALTH MAINTENANCE LETTER (OUTPATIENT)
Age: 37
End: 2022-11-26

## 2022-12-15 ENCOUNTER — ANCILLARY PROCEDURE (OUTPATIENT)
Dept: CARDIOLOGY | Facility: CLINIC | Age: 37
End: 2022-12-15
Attending: INTERNAL MEDICINE
Payer: COMMERCIAL

## 2022-12-15 DIAGNOSIS — I44.2 ATRIOVENTRICULAR BLOCK, COMPLETE (H): ICD-10-CM

## 2022-12-15 DIAGNOSIS — Z95.810 ICD (IMPLANTABLE CARDIOVERTER-DEFIBRILLATOR) IN PLACE: ICD-10-CM

## 2022-12-15 DIAGNOSIS — I46.9 CARDIAC ARREST (H): ICD-10-CM

## 2022-12-15 PROCEDURE — 93296 REM INTERROG EVL PM/IDS: CPT | Performed by: INTERNAL MEDICINE

## 2022-12-15 PROCEDURE — 93295 DEV INTERROG REMOTE 1/2/MLT: CPT | Performed by: INTERNAL MEDICINE

## 2022-12-20 LAB
MDC_IDC_LEAD_IMPLANT_DT: NORMAL
MDC_IDC_LEAD_IMPLANT_DT: NORMAL
MDC_IDC_LEAD_LOCATION: NORMAL
MDC_IDC_LEAD_LOCATION: NORMAL
MDC_IDC_LEAD_LOCATION_DETAIL_1: NORMAL
MDC_IDC_LEAD_LOCATION_DETAIL_1: NORMAL
MDC_IDC_LEAD_MFG: NORMAL
MDC_IDC_LEAD_MFG: NORMAL
MDC_IDC_LEAD_MODEL: NORMAL
MDC_IDC_LEAD_MODEL: NORMAL
MDC_IDC_LEAD_POLARITY_TYPE: NORMAL
MDC_IDC_LEAD_POLARITY_TYPE: NORMAL
MDC_IDC_LEAD_SERIAL: NORMAL
MDC_IDC_LEAD_SERIAL: NORMAL
MDC_IDC_MSMT_BATTERY_DTM: NORMAL
MDC_IDC_MSMT_BATTERY_REMAINING_LONGEVITY: 138 MO
MDC_IDC_MSMT_BATTERY_REMAINING_PERCENTAGE: 100 %
MDC_IDC_MSMT_BATTERY_STATUS: NORMAL
MDC_IDC_MSMT_CAP_CHARGE_DTM: NORMAL
MDC_IDC_MSMT_CAP_CHARGE_TIME: 9.8 S
MDC_IDC_MSMT_CAP_CHARGE_TYPE: NORMAL
MDC_IDC_MSMT_LEADCHNL_RA_IMPEDANCE_VALUE: 678 OHM
MDC_IDC_MSMT_LEADCHNL_RV_IMPEDANCE_VALUE: 541 OHM
MDC_IDC_PG_IMPLANT_DTM: NORMAL
MDC_IDC_PG_MFG: NORMAL
MDC_IDC_PG_MODEL: NORMAL
MDC_IDC_PG_SERIAL: NORMAL
MDC_IDC_PG_TYPE: NORMAL
MDC_IDC_SESS_CLINIC_NAME: NORMAL
MDC_IDC_SESS_DTM: NORMAL
MDC_IDC_SESS_TYPE: NORMAL
MDC_IDC_SET_BRADY_AT_MODE_SWITCH_RATE: 170 {BEATS}/MIN
MDC_IDC_SET_BRADY_LOWRATE: 55 {BEATS}/MIN
MDC_IDC_SET_BRADY_MODE: NORMAL
MDC_IDC_SET_LEADCHNL_RA_PACING_AMPLITUDE: 3.5 V
MDC_IDC_SET_LEADCHNL_RA_PACING_CAPTURE_MODE: NORMAL
MDC_IDC_SET_LEADCHNL_RA_PACING_POLARITY: NORMAL
MDC_IDC_SET_LEADCHNL_RA_PACING_PULSEWIDTH: 0.4 MS
MDC_IDC_SET_LEADCHNL_RA_SENSING_ADAPTATION_MODE: NORMAL
MDC_IDC_SET_LEADCHNL_RA_SENSING_POLARITY: NORMAL
MDC_IDC_SET_LEADCHNL_RA_SENSING_SENSITIVITY: 0.25 MV
MDC_IDC_SET_LEADCHNL_RV_PACING_AMPLITUDE: 3.5 V
MDC_IDC_SET_LEADCHNL_RV_PACING_CAPTURE_MODE: NORMAL
MDC_IDC_SET_LEADCHNL_RV_PACING_POLARITY: NORMAL
MDC_IDC_SET_LEADCHNL_RV_PACING_PULSEWIDTH: 0.4 MS
MDC_IDC_SET_LEADCHNL_RV_SENSING_ADAPTATION_MODE: NORMAL
MDC_IDC_SET_LEADCHNL_RV_SENSING_POLARITY: NORMAL
MDC_IDC_SET_LEADCHNL_RV_SENSING_SENSITIVITY: 0.6 MV
MDC_IDC_SET_ZONE_DETECTION_INTERVAL: 250 MS
MDC_IDC_SET_ZONE_DETECTION_INTERVAL: 286 MS
MDC_IDC_SET_ZONE_DETECTION_INTERVAL: 353 MS
MDC_IDC_SET_ZONE_TYPE: NORMAL
MDC_IDC_SET_ZONE_VENDOR_TYPE: NORMAL
MDC_IDC_STAT_BRADY_DTM_END: NORMAL
MDC_IDC_STAT_BRADY_DTM_START: NORMAL
MDC_IDC_STAT_BRADY_RA_PERCENT_PACED: 7 %
MDC_IDC_STAT_BRADY_RV_PERCENT_PACED: 64 %
MDC_IDC_STAT_EPISODE_RECENT_COUNT: 0
MDC_IDC_STAT_EPISODE_RECENT_COUNT_DTM_END: NORMAL
MDC_IDC_STAT_EPISODE_RECENT_COUNT_DTM_START: NORMAL
MDC_IDC_STAT_EPISODE_TYPE: NORMAL
MDC_IDC_STAT_EPISODE_VENDOR_TYPE: NORMAL
MDC_IDC_STAT_TACHYTHERAPY_ATP_DELIVERED_RECENT: 0
MDC_IDC_STAT_TACHYTHERAPY_ATP_DELIVERED_TOTAL: 0
MDC_IDC_STAT_TACHYTHERAPY_RECENT_DTM_END: NORMAL
MDC_IDC_STAT_TACHYTHERAPY_RECENT_DTM_START: NORMAL
MDC_IDC_STAT_TACHYTHERAPY_SHOCKS_ABORTED_RECENT: 0
MDC_IDC_STAT_TACHYTHERAPY_SHOCKS_ABORTED_TOTAL: 0
MDC_IDC_STAT_TACHYTHERAPY_SHOCKS_DELIVERED_RECENT: 0
MDC_IDC_STAT_TACHYTHERAPY_SHOCKS_DELIVERED_TOTAL: 0
MDC_IDC_STAT_TACHYTHERAPY_TOTAL_DTM_END: NORMAL
MDC_IDC_STAT_TACHYTHERAPY_TOTAL_DTM_START: NORMAL

## 2023-01-28 NOTE — PROGRESS NOTES
[facility]  Infectious Disease Progress Note          Assessment and Plan:   IMPRESSION:     1.  A 36-year-old male with 1 week febrile illness, cause now immediately apparent with multiple blood cultures growing sensitive Staphylococcus aureus, he has Staph aureus bacteremia syndrome, probably right groin is source and concern for back is secondary involved site.  2.  Severe low back pain, new and acute problem, coincident with this infection raises concern about secondary diskitis.  3.  Right groin boil-like area, resolving on its own, but likely primary source for this bacteremia.  4.  Pulmonary and urine findings are likely insignificant, not the source of this nor involved areas -- nonspecific sepsis related findings.  5.  Prior history in 2012 of a very unusual severe cryptococcal lung infection with prolonged treatment needed.  This is fully resolved.  Never had any other signs of underlying immunosuppression, AIDS or other underlying conditions and has had no other opportunistic infections. Fully resolved with no chronic lung problems.  6.  In 2005, Staph aureus bacteremia and skin boil requiring prolonged treatment.  7.  Diabetes mellitus, control level poor recently.  8.  VANCOMYCIN AND CLINDAMYCIN LISTED ALLERGIES WITH VANCOMYCIN UNLIKELY TO BE REAL AND HAS BEEN RECHALLENGED.     RECOMMENDATIONS:     1.  Continue  Ancef alone.  2.  Serial daily blood cultures until clear. So far all +  3.  We will need prolonged treatment, but no long line until clear.  4.  Follow for secondary sites and already has an obvious concerning site, which is his back,  MRI  Spine neg , may still be early discitis but ABX alone likely OK.  5. With  blood cultures  not clearing, will likely need transesophageal echocardiogram, but okay Mon  6.  Follow for other secondary sites, none evident.  7.  Discussed in great detail with the patient  t, despite the 2 prior unusual infections several years apart and  "now his third episode,  nothing really to suggest immunosuppression or long-term risks above normal.        Interval History:   no new complaints but still fever and back pain no cp, sob, n/v/d, or abd pain. No new pain site or issue on exam              Medications:       buPROPion  300 mg Oral QAM     ceFAZolin  2 g Intravenous Q8H     insulin aspart  1-10 Units Subcutaneous TID AC     insulin aspart  1-7 Units Subcutaneous At Bedtime     insulin glargine  15 Units Subcutaneous At Bedtime     levothyroxine  125 mcg Oral Once per day on Mon Tue Wed Thu Fri Sat     [START ON 3/27/2022] levothyroxine  250 mcg Oral Once per day on Sun     losartan  50 mg Oral Daily     sodium chloride (PF)  3 mL Intracatheter Q8H                  Physical Exam:   Blood pressure (!) 152/83, pulse (!) 121, temperature 99.6  F (37.6  C), temperature source Oral, resp. rate 20, height 1.778 m (5' 10\"), weight 127 kg (280 lb), SpO2 92 %.  Wt Readings from Last 2 Encounters:   03/25/22 127 kg (280 lb)   01/01/13 113.4 kg (250 lb)     Vital Signs with Ranges  Temp:  [97.1  F (36.2  C)-100.9  F (38.3  C)] 99.6  F (37.6  C)  Pulse:  [] 121  Resp:  [20] 20  BP: (126-165)/(81-89) 152/83  SpO2:  [92 %-97 %] 92 %    Constitutional: Awake, alert, cooperative, no apparent distress rigors now   Lungs: Clear to auscultation bilaterally, no crackles or wheezing   Cardiovascular: Regular rate and rhythm, normal S1 and S2, and no murmur noted   Abdomen: Normal bowel sounds, soft, non-distended, non-tender   Skin: No rashes, no cyanosis, no edema   Other:           Data:   All microbiology laboratory data reviewed.  Recent Labs   Lab Test 03/25/22  0848 03/24/22  0657 03/23/22  0740   WBC 11.7* 10.4 9.4   HGB 11.3* 12.3* 12.5*   HCT 35.5* 38.2* 39.4*   MCV 89 89 91   * 97* 100*     Recent Labs   Lab Test 03/25/22  0848 03/24/22  0657 03/23/22  1218   CR 0.81 0.78 0.82     No lab results found.  No lab results found.    Invalid input(s): " UC     no back pain, no gout, no musculoskeletal pain, no neck pain, and no weakness.

## 2023-02-05 ENCOUNTER — OFFICE VISIT (OUTPATIENT)
Dept: URGENT CARE | Facility: URGENT CARE | Age: 38
End: 2023-02-05
Payer: COMMERCIAL

## 2023-02-05 VITALS
TEMPERATURE: 98.6 F | SYSTOLIC BLOOD PRESSURE: 143 MMHG | DIASTOLIC BLOOD PRESSURE: 82 MMHG | OXYGEN SATURATION: 97 % | HEART RATE: 64 BPM

## 2023-02-05 DIAGNOSIS — S61.411A LACERATION OF RIGHT HAND WITHOUT FOREIGN BODY, INITIAL ENCOUNTER: Primary | ICD-10-CM

## 2023-02-05 PROCEDURE — 12001 RPR S/N/AX/GEN/TRNK 2.5CM/<: CPT | Performed by: PHYSICIAN ASSISTANT

## 2023-02-05 NOTE — PATIENT INSTRUCTIONS
Laceration hand:    Keep wound clean and dry for the next 24-48 hours  Ok to shower and get wound wet after 48 hours, but do not soak for 2 weeks  Wound follow-up: skin glue will peel on its own within 5-7 days  May return to work/school as long as wound is kept clean and dry  Discussed the probability of scarring  Active range of motion exercises encouraged for finger lacerations    Please return immediately if you experience redness around the laceration, drainage, worsening pain, or fever.

## 2023-02-05 NOTE — PROGRESS NOTES
Assessment & Plan     Laceration of right hand without foreign body, initial encounter  Repaired as below with Dermabond  Keep wound clean and dry for the next 24-48 hours  Ok to shower and get wound wet after 48 hours, but do not soak for 2 weeks  Wound follow-up: skin glue will peel on its own within 5-7 days  May return to work/school as long as wound is kept clean and dry  Discussed the probability of scarring  Active range of motion exercises encouraged for finger lacerations    Patient to return immediately if they experience redness around the laceration, drainage, worsening pain, or fever.   Patient agrees.      - REPAIR SUPERFICIAL, WOUND BODY < =2.5CM         Return in about 1 week (around 2/12/2023) for Symptoms failing to improve.    Natividad Theodore PA-C  [facility] [place] 37 year old male who presents to clinic today for the following health issues:  Chief Complaint   Patient presents with     Urgent Care     Left hand laceration cut by sharp corner that happen today.     HPI      Laceration    Mechanism of injury: Inadvertently sustained a laceration from a sharp corner from the shed door to the right hand  History provided by: Patient  Time of injury was 1 hours(s) ago.    This is a non-work related injury.    Associated symptoms: Denies numbness, weakness, or loss of function    Last tetanus booster within 10 years: Yes    LACERATION EXAM:   Size of laceration: 2 centimeters  Characteristics of the laceration: clean and linear  Depth of laceration: deep  Tendon function intact: Yes  Sensation to light touch intact: Yes  Pulses/capillary refill intact: Yes  Foreign body: No    Picture included in patient's chart: no    PROCEDURE NOTE:  Anesthesia: None  Prepped and draped in the usual sterile fashion  Wound irrigated with sterile water  Wound was explored for any foreign bodies and evaluated for tendon, nerve, vessel or joint involvement.    Closure  was simple  Laceration was closed with Dermabond  Good skin approximation obtained  Good hemostasis obtained  Bandage was applied  Patient tolerated the procedure well      Review of Systems  Constitutional, HEENT, cardiovascular, pulmonary, GI, , musculoskeletal, neuro, skin, endocrine and psych systems are negative, except as otherwise noted.      Objective    BP (!) 143/82 (BP Location: Right arm, Patient Position: Sitting, Cuff Size: Adult Regular)   Pulse 64   Temp 98.6  F (37  C) (Tympanic)   SpO2 97%   Physical Exam   GENERAL: healthy, alert and no distress  SKIN: On the dorsum of the right hand by the fifth metacarpal area and there is a horizontal laceration that is noted, approximately 2 cm in diameter.  Bleeding is controlled.  Range of motion of the right hand fingers normal, no tendon injury suspected.

## 2023-03-21 ENCOUNTER — ANCILLARY PROCEDURE (OUTPATIENT)
Dept: CARDIOLOGY | Facility: CLINIC | Age: 38
End: 2023-03-21
Attending: INTERNAL MEDICINE
Payer: COMMERCIAL

## 2023-03-21 DIAGNOSIS — Z95.810 ICD (IMPLANTABLE CARDIOVERTER-DEFIBRILLATOR) IN PLACE: ICD-10-CM

## 2023-03-21 DIAGNOSIS — I44.2 ATRIOVENTRICULAR BLOCK, COMPLETE (H): ICD-10-CM

## 2023-03-21 DIAGNOSIS — I46.9 CARDIAC ARREST (H): ICD-10-CM

## 2023-03-21 PROCEDURE — 93295 DEV INTERROG REMOTE 1/2/MLT: CPT | Performed by: INTERNAL MEDICINE

## 2023-03-21 PROCEDURE — 93296 REM INTERROG EVL PM/IDS: CPT | Performed by: INTERNAL MEDICINE

## 2023-03-24 LAB
MDC_IDC_EPISODE_DTM: NORMAL
MDC_IDC_EPISODE_DTM: NORMAL
MDC_IDC_EPISODE_ID: NORMAL
MDC_IDC_EPISODE_ID: NORMAL
MDC_IDC_EPISODE_TYPE: NORMAL
MDC_IDC_EPISODE_TYPE: NORMAL
MDC_IDC_LEAD_IMPLANT_DT: NORMAL
MDC_IDC_LEAD_IMPLANT_DT: NORMAL
MDC_IDC_LEAD_LOCATION: NORMAL
MDC_IDC_LEAD_LOCATION: NORMAL
MDC_IDC_LEAD_LOCATION_DETAIL_1: NORMAL
MDC_IDC_LEAD_LOCATION_DETAIL_1: NORMAL
MDC_IDC_LEAD_MFG: NORMAL
MDC_IDC_LEAD_MFG: NORMAL
MDC_IDC_LEAD_MODEL: NORMAL
MDC_IDC_LEAD_MODEL: NORMAL
MDC_IDC_LEAD_POLARITY_TYPE: NORMAL
MDC_IDC_LEAD_POLARITY_TYPE: NORMAL
MDC_IDC_LEAD_SERIAL: NORMAL
MDC_IDC_LEAD_SERIAL: NORMAL
MDC_IDC_MSMT_BATTERY_DTM: NORMAL
MDC_IDC_MSMT_BATTERY_REMAINING_LONGEVITY: 138 MO
MDC_IDC_MSMT_BATTERY_REMAINING_PERCENTAGE: 100 %
MDC_IDC_MSMT_BATTERY_STATUS: NORMAL
MDC_IDC_MSMT_CAP_CHARGE_DTM: NORMAL
MDC_IDC_MSMT_CAP_CHARGE_TIME: 10 S
MDC_IDC_MSMT_CAP_CHARGE_TYPE: NORMAL
MDC_IDC_MSMT_LEADCHNL_RA_IMPEDANCE_VALUE: 644 OHM
MDC_IDC_MSMT_LEADCHNL_RV_IMPEDANCE_VALUE: 545 OHM
MDC_IDC_PG_IMPLANT_DTM: NORMAL
MDC_IDC_PG_MFG: NORMAL
MDC_IDC_PG_MODEL: NORMAL
MDC_IDC_PG_SERIAL: NORMAL
MDC_IDC_PG_TYPE: NORMAL
MDC_IDC_SESS_CLINIC_NAME: NORMAL
MDC_IDC_SESS_DTM: NORMAL
MDC_IDC_SESS_TYPE: NORMAL
MDC_IDC_SET_BRADY_AT_MODE_SWITCH_RATE: 170 {BEATS}/MIN
MDC_IDC_SET_BRADY_LOWRATE: 55 {BEATS}/MIN
MDC_IDC_SET_BRADY_MODE: NORMAL
MDC_IDC_SET_LEADCHNL_RA_PACING_AMPLITUDE: 3.5 V
MDC_IDC_SET_LEADCHNL_RA_PACING_CAPTURE_MODE: NORMAL
MDC_IDC_SET_LEADCHNL_RA_PACING_POLARITY: NORMAL
MDC_IDC_SET_LEADCHNL_RA_PACING_PULSEWIDTH: 0.4 MS
MDC_IDC_SET_LEADCHNL_RA_SENSING_ADAPTATION_MODE: NORMAL
MDC_IDC_SET_LEADCHNL_RA_SENSING_POLARITY: NORMAL
MDC_IDC_SET_LEADCHNL_RA_SENSING_SENSITIVITY: 0.25 MV
MDC_IDC_SET_LEADCHNL_RV_PACING_AMPLITUDE: 3.5 V
MDC_IDC_SET_LEADCHNL_RV_PACING_CAPTURE_MODE: NORMAL
MDC_IDC_SET_LEADCHNL_RV_PACING_POLARITY: NORMAL
MDC_IDC_SET_LEADCHNL_RV_PACING_PULSEWIDTH: 0.4 MS
MDC_IDC_SET_LEADCHNL_RV_SENSING_ADAPTATION_MODE: NORMAL
MDC_IDC_SET_LEADCHNL_RV_SENSING_POLARITY: NORMAL
MDC_IDC_SET_LEADCHNL_RV_SENSING_SENSITIVITY: 0.6 MV
MDC_IDC_SET_ZONE_DETECTION_INTERVAL: 250 MS
MDC_IDC_SET_ZONE_DETECTION_INTERVAL: 286 MS
MDC_IDC_SET_ZONE_DETECTION_INTERVAL: 353 MS
MDC_IDC_SET_ZONE_TYPE: NORMAL
MDC_IDC_SET_ZONE_VENDOR_TYPE: NORMAL
MDC_IDC_STAT_BRADY_DTM_END: NORMAL
MDC_IDC_STAT_BRADY_DTM_START: NORMAL
MDC_IDC_STAT_BRADY_RA_PERCENT_PACED: 8 %
MDC_IDC_STAT_BRADY_RV_PERCENT_PACED: 60 %
MDC_IDC_STAT_EPISODE_RECENT_COUNT: 0
MDC_IDC_STAT_EPISODE_RECENT_COUNT_DTM_END: NORMAL
MDC_IDC_STAT_EPISODE_RECENT_COUNT_DTM_START: NORMAL
MDC_IDC_STAT_EPISODE_TYPE: NORMAL
MDC_IDC_STAT_EPISODE_VENDOR_TYPE: NORMAL
MDC_IDC_STAT_TACHYTHERAPY_ATP_DELIVERED_RECENT: 0
MDC_IDC_STAT_TACHYTHERAPY_ATP_DELIVERED_TOTAL: 0
MDC_IDC_STAT_TACHYTHERAPY_RECENT_DTM_END: NORMAL
MDC_IDC_STAT_TACHYTHERAPY_RECENT_DTM_START: NORMAL
MDC_IDC_STAT_TACHYTHERAPY_SHOCKS_ABORTED_RECENT: 0
MDC_IDC_STAT_TACHYTHERAPY_SHOCKS_ABORTED_TOTAL: 0
MDC_IDC_STAT_TACHYTHERAPY_SHOCKS_DELIVERED_RECENT: 0
MDC_IDC_STAT_TACHYTHERAPY_SHOCKS_DELIVERED_TOTAL: 0
MDC_IDC_STAT_TACHYTHERAPY_TOTAL_DTM_END: NORMAL
MDC_IDC_STAT_TACHYTHERAPY_TOTAL_DTM_START: NORMAL

## 2023-03-25 ENCOUNTER — HEALTH MAINTENANCE LETTER (OUTPATIENT)
Age: 38
End: 2023-03-25

## 2023-04-27 NOTE — PROGRESS NOTES
This is a recent snapshot of the patient's [facility] medical record.  For current drug dose and complete information and questions, call [phone] or In Basket pool, fv home infusion ([id number])  CSN Number:  [id number]

## 2023-05-09 NOTE — PROGRESS NOTES
This is a recent snapshot of the patient's [facility] medical record.  For current drug dose and complete information and questions, call [phone] or In Basket pool, fv home infusion ([id number])  CSN Number:  [id number]

## 2023-06-01 NOTE — PROGRESS NOTES
This is a recent snapshot of the patient's [facility] medical record.  For current drug dose and complete information and questions, call [phone] or In Basket pool, fv home infusion ([id number])  CSN Number:  [id number]

## 2023-06-10 ENCOUNTER — HEALTH MAINTENANCE LETTER (OUTPATIENT)
Age: 38
End: 2023-06-10

## 2023-08-03 ENCOUNTER — TELEPHONE (OUTPATIENT)
Dept: CARDIOLOGY | Facility: CLINIC | Age: 38
End: 2023-08-03
Payer: COMMERCIAL

## 2023-08-03 DIAGNOSIS — R60.0 BILATERAL LEG EDEMA: ICD-10-CM

## 2023-08-03 RX ORDER — FUROSEMIDE 20 MG
40 TABLET ORAL DAILY
Qty: 180 TABLET | Refills: 0 | Status: SHIPPED | OUTPATIENT
Start: 2023-08-03 | End: 2023-11-14

## 2023-08-03 NOTE — TELEPHONE ENCOUNTER
Received refill request for Furosemide 20mg, take 2 tabs daily.    Patient has not been seen in clinic since June 2022, and he was supposed to come back for a follow up in 6 months.      Writer called to Anil.  He said he thought that he had an appointment in the fall.   Writer informed him that is a pacemaker check up with the device nurse.   Informed him he does need to see Dr. Dorsey.  We mutually agreed on September 27th at 2:45pm in [place].    Writer has sent request to  team to actually book the appointment.  I also sent Anil a MyChart reminder.    Will refill Rx for 90 days only, pending completion of this September appointment.    Patient verbalized understanding.    Nataliia Sanders RN on 8/3/2023 at 11:54 AM

## 2023-08-19 ENCOUNTER — HEALTH MAINTENANCE LETTER (OUTPATIENT)
Age: 38
End: 2023-08-19

## 2023-09-18 NOTE — CONSULTS
Called pt regarding results , na lvm    Critical Care Services Progress Note:  I have evaluated all laboratory values and imaging studies from the past 24 hours.  We are consulted by Dr. Melton of the CV surgery service for management post-procedure.  CC:  s/p tricuspid valve replacement, aortic valve exploration  HPI: Anil Montoya 36 year old male who presented on 3/22/22 to [facility] with progressive weakness, recent history of hematuria, and SOB.  No previous cardiac history. Significant medical history of poorly controlled diabetes (non-compliant with medications), HTN, hypothyroidism, obesity, WARNER, and anxiety.  Of note, he has a history of an unusual cryptococcal infection in 2012 and hx of staph aureus bacteremia, both requiring prolonged treatment with subsequent resolution.  No history of immunosuppressed state, AIDS, or other history of opportunistic infections.  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   He has been covered with ancef. Also, elevated transaminases due to sepsis vs NAFLD.  Also, with his presenting back pain, an MRI was performed due to concern of discitis and seeding, which resulted negative for acute concerns. Transferred to [facility] 3/28 for further CT surgery surgical intervention.  On 4/1, he underwent tricuspid valve replacement, aortic valve exploration.    AV wires, extra V lead -- paced at 80 -- underlying second degree block at conclusion  No blood products given, 160 ml of cell saver    The patient is intubated and sedated which precludes direct history taking.  PMH/PSH/meds/all/SH/FH reviewed and as noted below.  ROS:  Unable as pt is intubated post procedure.  Exam:  /82   Pulse 85   Temp 97.4  F (36.3  C) (Temporal)   Resp 18   Wt 121.1 kg (267 lb)   SpO2 99%   BMI 38.31 kg/m    GEN: no acute distress, intubated/sedated  HEENT: head ncat, sclera anicteric, OP patent, trachea  midline, PERRL  PULM: unlabored, synchronous with vent, clear anteriorly    CV/COR: RRR-paced, No rub, no murmur.  Midline incision dressed, c/d/i. CT: scant output, no air leak  ABD: soft, nontender  EXT:   No Edema,  warm x4  NEURO: sedated but no gross deficit apparent  SKIN: no obvious rash or lesions  LINES: clean, dry intact    Assessment/plan:  36 year old male s/p tricuspid valve replacement, aortic valve exploration in the setting of MSSA bacteremia.    Neurology/Psychiatry:   1. Analgesia -- tylenol, prn narcotics  2.  Anxiety -- wean sedative drips to extubate.  No acute anxiety needs at this time.    Cardiovascular:   1. Shock, post-procedure, vasoplegic vs unspecified -  Most likely due to vasoplegia which would be expected after a procedure of this magnitude. Continue phenylephrine and epinephrine. Cyanokit was mixed and available in OR, but not given, if vasoplegia worsens, okay to administer, but not currently necessary to be given.  2.  Staph endocarditis S/p tricuspid valve replacement -- bioprosthetic valve. AV wires present - paced at 80.    Pulmonary/Ventilator Management:   1. Airway -- intubated post-op.  Weaning to extubate appropriate  2. WARNER -- chronic, tolerates CPAP  3. Hx of cryptococcal pneumonia - patient endorses 80% lung function - no baseline issues    GI and Nutrition :   1. Diet when cleared by CV surgery   2. Acid suppression for PUD prophy  3. Elevated LFTs - sepsis-induced vs NAFLD etiology    Renal/Fluids/Electrolytes:   1. Monitor UOP/creatinine post-op.   2. Replete electrolytes prn  3. IVF and albumin administration for volume prn per protocol    Infectious Disease:   1. MSSA bacteremia and endocarditis - blood cultures positive as late as 3/31 - on scheduled ancef  2. Hx of cryptococcal pneumonia, hx of staph aureus infection -- prolonged courses required and resolved  3. Perioperative coverage with vancomycin    Endocrine:   1. DM2 - Hgb A1c >12 - poorly controlled,  intermittent compliance with PTA meds -- Monitor for stress induced hyperglycemia - ICU insulin protocol - insulin gtt - goal sugar <180   2. Hypothyroidism - continue synthroid    Hematology/Oncology:   1. Hgb/plts - pre-op ~11.  pRBC for goal hgb 8.0 or as indicated by CV surgery  2. Leukocytosis -- bacteremia/endocarditis -- see ID     IV/Access:   1. Venous access - Central access from OR  2. Arterial access - remove A-line when off vasoactives and extubated    ICU Prophylaxis:   1. DVT: Hep Subq/mechanical  2. VAP: HOB 30 degrees, chlorhexidine rinse  3. Stress Ulcer: PPI blocker  4. Restraints: Nonviolent soft two point restraints required and necessary for patient safety and continued cares and good effect as patient continues to pull at necessary lines, tubes despite education and distraction. Will readdress daily.   5. IV Access - central access required and necessary for continued patient cares  6. Disposition - ICU      Hector Holliday MD  Surgical Critical Care Fellow       PMH:  Past Medical History:   Diagnosis Date     Pneumonia        PSH:  Past Surgical History:   Procedure Laterality Date     CV CORONARY ANGIOGRAM N/A 3/31/2022    Procedure: Coronary Angiogram;  Surgeon: Lidia Quintanilla MD;  Location:  [facility]       Meds:  Current Facility-Administered Medications   Medication     [Auto Hold] acetaminophen (TYLENOL) tablet 650 mg    Or     [Auto Hold] acetaminophen (TYLENOL) Suppository 650 mg     [Auto Hold] acetaminophen (TYLENOL) tablet 1,000 mg     [Auto Hold] acetaminophen (TYLENOL) tablet 650 mg     aspirin (ASA) chewable tablet 162 mg    Or     aspirin (ASA) chewable tablet 81 mg     [Auto Hold] bisacodyl (DULCOLAX) Suppository 10 mg     [Auto Hold] buPROPion (WELLBUTRIN XL) 24 hr tablet 300 mg     [Auto Hold] ceFAZolin (ANCEF) intermittent infusion 2 g in 100 mL dextrose PRE-MIX     ceFAZolin (ANCEF) intermittent infusion 3 g (pre-mix)     [Auto Hold] glucose gel 15-30 g     Or     [Auto Hold] dextrose 50 % injection 25-50 mL    Or     [Auto Hold] glucagon injection 1 mg     [Auto Hold] fentaNYL (PF) (SUBLIMAZE) injection 25 mcg     heparin (porcine) 10,000 Units, magnesium sulfate 1 g, mannitol 25 % 12.5 g, sodium bicarbonate 50 mEq in sodium chloride 0.9 % PUMP PRIME solution     heparin (porcine) 30,000 Units in sodium chloride 0.9 % PERFUSION solution     HOLD:  All AM Medications     HOLD:  Metformin and metformin containing medications if patient received IV contrast with acute kidney injury or severe chronic kidney disease (stage IV or stage V; i.e., eGFR less than 30)     HOLD: heparin     hydroxocobalamin (CYANOKIT) in NS intermittent infusion 5 g     [Auto Hold] insulin aspart (NovoLOG) injection (RAPID ACTING)     [Auto Hold] insulin aspart (NovoLOG) injection (RAPID ACTING)     [Auto Hold] insulin aspart (NovoLOG) injection (RAPID ACTING)     [Auto Hold] insulin glargine (LANTUS PEN) injection 10 Units     [Held by provider] insulin glargine (LANTUS PEN) injection 34 Units     insulin regular (HumuLIN R,NovoLIN R) infusion (1 unit/mL) ADULT/PEDS     lactated ringers infusion     lactated ringers infusion     [Auto Hold] levothyroxine (SYNTHROID/LEVOTHROID) tablet 125 mcg     [Auto Hold] levothyroxine (SYNTHROID/LEVOTHROID) tablet 250 mcg     [Auto Hold] Lidocaine (LIDOCARE) 4 % Patch 1 patch     lidocaine (LMX4) cream     lidocaine (LMX4) cream     [Auto Hold] lidocaine (LMX4) cream     lidocaine 1 % 0.1-1 mL     lidocaine 1 % 0.1-1 mL     lidocaine 1 % 0.1-1 mL     [Auto Hold] lidocaine 1 % 0.1-1 mL     lidocaine 2 % 200 mg, magnesium sulfate 1 g, mannitol 25 % 12.5 g, sodium bicarbonate 20 mEq CARDIOPLEGIA (Hot Shot) solution     [Auto Hold] lidocaine patch in PLACE     [Auto Hold] losartan (COZAAR) tablet 50 mg     [Auto Hold] melatonin tablet 3 mg     midazolam (VERSED) injection 1 mg     [Auto Hold] morphine (PF) injection 2-4 mg     [Auto Hold] naloxone (NARCAN)  injection 0.2 mg    Or     [Auto Hold] naloxone (NARCAN) injection 0.4 mg    Or     [Auto Hold] naloxone (NARCAN) injection 0.2 mg    Or     [Auto Hold] naloxone (NARCAN) injection 0.4 mg     [Auto Hold] ondansetron (ZOFRAN-ODT) ODT tab 4 mg    Or     [Auto Hold] ondansetron (ZOFRAN) injection 4 mg     [Auto Hold] oxyCODONE (ROXICODONE) tablet 5 mg    Or     [Auto Hold] oxyCODONE (ROXICODONE) tablet 10 mg     [Auto Hold] oxyCODONE (ROXICODONE) tablet 5-10 mg     [Auto Hold] polyethylene glycol (MIRALAX) Packet 17 g     potassium chloride 11.5 mEq, mannitol 25 % 7.25 g, magnesium sulfate 1.16 g, sodium bicarbonate 14.5 mEq in sodium chloride 0.9 % CARDIOPLEGIA solution     potassium chloride 60 mEq, mannitol 25 % 7.5 g, magnesium sulfate 1.2 g, sodium bicarbonate 15 mEq in sodium chloride 0.9 % CARDIOPLEGIA solution     [Auto Hold] prochlorperazine (COMPAZINE) injection 10 mg    Or     [Auto Hold] prochlorperazine (COMPAZINE) tablet 10 mg    Or     [Auto Hold] prochlorperazine (COMPAZINE) suppository 25 mg     [Auto Hold] senna-docusate (SENOKOT-S/PERICOLACE) 8.6-50 MG per tablet 1 tablet    Or     [Auto Hold] senna-docusate (SENOKOT-S/PERICOLACE) 8.6-50 MG per tablet 2 tablet     sodium chloride (PF) 0.9% PF flush 3 mL     sodium chloride (PF) 0.9% PF flush 3 mL     sodium chloride (PF) 0.9% PF flush 3 mL     sodium chloride (PF) 0.9% PF flush 3 mL     [Auto Hold] sodium chloride (PF) 0.9% PF flush 3 mL     [Auto Hold] sodium chloride (PF) 0.9% PF flush 3 mL     sodium chloride 0.9% infusion     [Auto Hold] sodium phosphate (FLEET ENEMA) 1 enema     Facility-Administered Medications Ordered in Other Encounters   Medication     aminocaproic acid (AMICAR) 5 g in sodium chloride 0.9 % 100 mL infusion     amiodarone (NEXTERONE) bolus     dexmedetomidine (PRECEDEX) 4 mcg/mL in sodium chloride 0.9 % 100 mL infusion     EPINEPHrine drip 0.02 mg/mL (5 mg/250 mL)     heparin (porcine) injection     insulin (regular)  "(NovoLIN-R, HumuLIN-R) bolus from infusion pump     lidocaine 2% injection (MDV)     phenylephrine (MADDY-SYNEPHRINE) injection     phenylephrine 0.2 mg/mL (mcg/kg/min) drip     propofol (DIPRIVAN) injection 10 mg/mL vial     propofol (DIPRIVAN) injection 10 mg/mL vial     protamine injection     rocuronium injection       Allergies:  Allergies   Allergen Reactions     Clindamycin      Vancomycin      \"Red Sonia Syndrome\"       Social Hx:  Social History     Socioeconomic History     Marital status:      Spouse name: Not on file     Number of children: Not on file     Years of education: Not on file     Highest education level: Not on file   Occupational History     Not on file   Tobacco Use     Smoking status: Never Smoker     Smokeless tobacco: Never Used   Substance and Sexual Activity     Alcohol use: Not Currently     Drug use: Never     Sexual activity: Not on file   Other Topics Concern     Parent/sibling w/ CABG, MI or angioplasty before 65F 55M? Not Asked   Social History Narrative     Not on file     Social Determinants of Health     Financial Resource Strain: Not on file   Food Insecurity: Not on file   Transportation Needs: Not on file   Physical Activity: Not on file   Stress: Not on file   Social Connections: Not on file   Intimate Partner Violence: Not on file   Housing Stability: Not on file       Family Hx:  History reviewed. No pertinent family history.    "

## 2023-09-27 ENCOUNTER — ANCILLARY PROCEDURE (OUTPATIENT)
Dept: CARDIOLOGY | Facility: CLINIC | Age: 38
End: 2023-09-27
Attending: INTERNAL MEDICINE
Payer: COMMERCIAL

## 2023-09-27 DIAGNOSIS — I46.9 CARDIAC ARREST (H): ICD-10-CM

## 2023-09-27 DIAGNOSIS — I44.2 ATRIOVENTRICULAR BLOCK, COMPLETE (H): ICD-10-CM

## 2023-09-27 DIAGNOSIS — Z95.810 ICD (IMPLANTABLE CARDIOVERTER-DEFIBRILLATOR) IN PLACE: ICD-10-CM

## 2023-09-27 PROCEDURE — 93296 REM INTERROG EVL PM/IDS: CPT | Performed by: INTERNAL MEDICINE

## 2023-09-27 PROCEDURE — 93295 DEV INTERROG REMOTE 1/2/MLT: CPT | Performed by: INTERNAL MEDICINE

## 2023-09-30 ENCOUNTER — OFFICE VISIT (OUTPATIENT)
Dept: URGENT CARE | Facility: URGENT CARE | Age: 38
End: 2023-09-30
Payer: COMMERCIAL

## 2023-09-30 VITALS
DIASTOLIC BLOOD PRESSURE: 69 MMHG | HEART RATE: 55 BPM | TEMPERATURE: 97.6 F | SYSTOLIC BLOOD PRESSURE: 112 MMHG | OXYGEN SATURATION: 98 %

## 2023-09-30 DIAGNOSIS — L02.412 ABSCESS OF AXILLA, LEFT: Primary | ICD-10-CM

## 2023-09-30 PROCEDURE — 99213 OFFICE O/P EST LOW 20 MIN: CPT | Performed by: PHYSICIAN ASSISTANT

## 2023-09-30 RX ORDER — DOXYCYCLINE 100 MG/1
100 CAPSULE ORAL 2 TIMES DAILY
Qty: 20 CAPSULE | Refills: 0 | Status: SHIPPED | OUTPATIENT
Start: 2023-09-30 | End: 2023-10-10

## 2023-09-30 NOTE — PROGRESS NOTES
"Assessment & Plan     1. Abscess of axilla, left    - doxycycline hyclate (VIBRAMYCIN) 100 MG capsule; Take 1 capsule (100 mg) by mouth 2 times daily for 10 days  Dispense: 20 capsule; Refill: 0     Warm packs and follow up if not improving over the next 3 days.                   JORY [facility] [place]    Manuel Ma is a 38 year old male who presents to clinic today for the following health issues:  Chief Complaint   Patient presents with    Urgent Care     Mass under armpit     HPI    Here for armpit problem. No discharge with some pain over the past 5 days. Has had this before. He is diabetic with good glucoses he reports. Am fasting 130-160 \"normally\".           Review of Systems        Objective    /69 (BP Location: Right arm, Patient Position: Sitting, Cuff Size: Adult Large)   Pulse 55   Temp 97.6  F (36.4  C) (Tympanic)   SpO2 98%   Physical Exam  Chest:          Comments: 1 cm firm indurated nodule with mild erythema and tenderness. No discharge                   "

## 2023-10-03 LAB
MDC_IDC_EPISODE_DTM: NORMAL
MDC_IDC_EPISODE_ID: NORMAL
MDC_IDC_EPISODE_TYPE: NORMAL
MDC_IDC_LEAD_IMPLANT_DT: NORMAL
MDC_IDC_LEAD_IMPLANT_DT: NORMAL
MDC_IDC_LEAD_LOCATION: NORMAL
MDC_IDC_LEAD_LOCATION: NORMAL
MDC_IDC_LEAD_LOCATION_DETAIL_1: NORMAL
MDC_IDC_LEAD_LOCATION_DETAIL_1: NORMAL
MDC_IDC_LEAD_MFG: NORMAL
MDC_IDC_LEAD_MFG: NORMAL
MDC_IDC_LEAD_MODEL: NORMAL
MDC_IDC_LEAD_MODEL: NORMAL
MDC_IDC_LEAD_POLARITY_TYPE: NORMAL
MDC_IDC_LEAD_POLARITY_TYPE: NORMAL
MDC_IDC_LEAD_SERIAL: NORMAL
MDC_IDC_LEAD_SERIAL: NORMAL
MDC_IDC_MSMT_BATTERY_DTM: NORMAL
MDC_IDC_MSMT_BATTERY_REMAINING_LONGEVITY: 126 MO
MDC_IDC_MSMT_BATTERY_REMAINING_PERCENTAGE: 100 %
MDC_IDC_MSMT_BATTERY_STATUS: NORMAL
MDC_IDC_MSMT_CAP_CHARGE_DTM: NORMAL
MDC_IDC_MSMT_CAP_CHARGE_TIME: 10.1 S
MDC_IDC_MSMT_CAP_CHARGE_TYPE: NORMAL
MDC_IDC_MSMT_LEADCHNL_RA_IMPEDANCE_VALUE: 626 OHM
MDC_IDC_MSMT_LEADCHNL_RV_IMPEDANCE_VALUE: 511 OHM
MDC_IDC_PG_IMPLANT_DTM: NORMAL
MDC_IDC_PG_MFG: NORMAL
MDC_IDC_PG_MODEL: NORMAL
MDC_IDC_PG_SERIAL: NORMAL
MDC_IDC_PG_TYPE: NORMAL
MDC_IDC_SESS_CLINIC_NAME: NORMAL
MDC_IDC_SESS_DTM: NORMAL
MDC_IDC_SESS_TYPE: NORMAL
MDC_IDC_SET_BRADY_AT_MODE_SWITCH_RATE: 170 {BEATS}/MIN
MDC_IDC_SET_BRADY_LOWRATE: 55 {BEATS}/MIN
MDC_IDC_SET_BRADY_MODE: NORMAL
MDC_IDC_SET_LEADCHNL_RA_PACING_AMPLITUDE: 3.5 V
MDC_IDC_SET_LEADCHNL_RA_PACING_CAPTURE_MODE: NORMAL
MDC_IDC_SET_LEADCHNL_RA_PACING_POLARITY: NORMAL
MDC_IDC_SET_LEADCHNL_RA_PACING_PULSEWIDTH: 0.4 MS
MDC_IDC_SET_LEADCHNL_RA_SENSING_ADAPTATION_MODE: NORMAL
MDC_IDC_SET_LEADCHNL_RA_SENSING_POLARITY: NORMAL
MDC_IDC_SET_LEADCHNL_RA_SENSING_SENSITIVITY: 0.25 MV
MDC_IDC_SET_LEADCHNL_RV_PACING_AMPLITUDE: 3.5 V
MDC_IDC_SET_LEADCHNL_RV_PACING_CAPTURE_MODE: NORMAL
MDC_IDC_SET_LEADCHNL_RV_PACING_POLARITY: NORMAL
MDC_IDC_SET_LEADCHNL_RV_PACING_PULSEWIDTH: 0.4 MS
MDC_IDC_SET_LEADCHNL_RV_SENSING_ADAPTATION_MODE: NORMAL
MDC_IDC_SET_LEADCHNL_RV_SENSING_POLARITY: NORMAL
MDC_IDC_SET_LEADCHNL_RV_SENSING_SENSITIVITY: 0.6 MV
MDC_IDC_SET_ZONE_DETECTION_INTERVAL: 250 MS
MDC_IDC_SET_ZONE_DETECTION_INTERVAL: 286 MS
MDC_IDC_SET_ZONE_DETECTION_INTERVAL: 353 MS
MDC_IDC_SET_ZONE_TYPE: NORMAL
MDC_IDC_SET_ZONE_VENDOR_TYPE: NORMAL
MDC_IDC_STAT_BRADY_DTM_END: NORMAL
MDC_IDC_STAT_BRADY_DTM_START: NORMAL
MDC_IDC_STAT_BRADY_RA_PERCENT_PACED: 9 %
MDC_IDC_STAT_BRADY_RV_PERCENT_PACED: 72 %
MDC_IDC_STAT_EPISODE_RECENT_COUNT: 0
MDC_IDC_STAT_EPISODE_RECENT_COUNT_DTM_END: NORMAL
MDC_IDC_STAT_EPISODE_RECENT_COUNT_DTM_START: NORMAL
MDC_IDC_STAT_EPISODE_TYPE: NORMAL
MDC_IDC_STAT_EPISODE_VENDOR_TYPE: NORMAL
MDC_IDC_STAT_TACHYTHERAPY_ATP_DELIVERED_RECENT: 0
MDC_IDC_STAT_TACHYTHERAPY_ATP_DELIVERED_TOTAL: 0
MDC_IDC_STAT_TACHYTHERAPY_RECENT_DTM_END: NORMAL
MDC_IDC_STAT_TACHYTHERAPY_RECENT_DTM_START: NORMAL
MDC_IDC_STAT_TACHYTHERAPY_SHOCKS_ABORTED_RECENT: 0
MDC_IDC_STAT_TACHYTHERAPY_SHOCKS_ABORTED_TOTAL: 0
MDC_IDC_STAT_TACHYTHERAPY_SHOCKS_DELIVERED_RECENT: 0
MDC_IDC_STAT_TACHYTHERAPY_SHOCKS_DELIVERED_TOTAL: 0
MDC_IDC_STAT_TACHYTHERAPY_TOTAL_DTM_END: NORMAL
MDC_IDC_STAT_TACHYTHERAPY_TOTAL_DTM_START: NORMAL

## 2023-11-14 DIAGNOSIS — R60.0 BILATERAL LEG EDEMA: ICD-10-CM

## 2023-11-14 RX ORDER — FUROSEMIDE 20 MG
40 TABLET ORAL DAILY
Qty: 180 TABLET | Refills: 0 | Status: SHIPPED | OUTPATIENT
Start: 2023-11-14 | End: 2024-01-11

## 2023-11-21 NOTE — PROGRESS NOTES
This is a recent snapshot of the patient's [facility] medical record.  For current drug dose and complete information and questions, call [phone] or In Basket pool, [facility] home infusion ([id number])  CSN Number:  [id number]

## 2024-01-06 ENCOUNTER — HEALTH MAINTENANCE LETTER (OUTPATIENT)
Age: 39
End: 2024-01-06

## 2024-01-08 ENCOUNTER — ANCILLARY PROCEDURE (OUTPATIENT)
Dept: CARDIOLOGY | Facility: CLINIC | Age: 39
End: 2024-01-08
Attending: INTERNAL MEDICINE
Payer: COMMERCIAL

## 2024-01-08 DIAGNOSIS — Z95.810 ICD (IMPLANTABLE CARDIOVERTER-DEFIBRILLATOR) IN PLACE: ICD-10-CM

## 2024-01-08 DIAGNOSIS — I44.2 ATRIOVENTRICULAR BLOCK, COMPLETE (H): ICD-10-CM

## 2024-01-08 DIAGNOSIS — I46.9 CARDIAC ARREST (H): ICD-10-CM

## 2024-01-08 PROCEDURE — 93283 PRGRMG EVAL IMPLANTABLE DFB: CPT | Performed by: INTERNAL MEDICINE

## 2024-01-09 ENCOUNTER — TELEPHONE (OUTPATIENT)
Dept: CARDIOLOGY | Facility: CLINIC | Age: 39
End: 2024-01-09
Payer: COMMERCIAL

## 2024-01-09 DIAGNOSIS — I49.01 VENTRICULAR FIBRILLATION (H): Primary | ICD-10-CM

## 2024-01-09 NOTE — TELEPHONE ENCOUNTER
Spoke with patient. He was taking out the trash and feeling totally normal when all of a sudden he got really dizzy. He squatted down to try and prevent himself from passing out when all of a sudden he woke up and was lying on his driveway. He does not recall being shocked.     He did mention that he has been fasting since Sunday and did not drink a lot of water or anything with electrolytes yesterday so is wondering if this played a part.     Patient is aware to take it easy until her hears back from the clinic with further instructions. He knows to seek emergency medical attention if similar symptoms recur.     He knows not to drive for 3 months. This was difficult for him to process as he drives for a living so this means he won't be able to work for the next 3 months. Explained that we have encountered this scenario before and we will need to fill out paperwork so that his job excuses him for this amount of time. It is imperative for his safety and those on the road that he not drive until we are sure another episode won't recur imminently. Patient verbalized understanding.    CROW RN

## 2024-01-09 NOTE — TELEPHONE ENCOUNTER
Message below received from Dr. Higgins.  Orders placed for BMP and EKG.   Lisa Umanzor had an opening on 1/11 at 10am... Hold placed on that slot in the hopes that this will work for patient. (Lisa's 0730 slot is on hold for another patient currently but we CAN use that slot if that time works better for Anil). Called patient and requested a call back so we can get him booked into slot.     CROW DYE

## 2024-01-09 NOTE — TELEPHONE ENCOUNTER
"He needs BMP and 12-lead ECG (assess QT) soon.  Thereafter, see EP provider, MD or CARTER, first available.  I agree with the \"no driving\" recommendation.  DI  " Incoming Call:  10/10/2023    [facility] received call from pt informing he still has not received a call from his [facility]. [facility] reminded pt SC and supervisor were called last Friday and pt agreed to reach out again on 10/12 since both were on vacation. Pt expressed understanding and is aware [facility] will reach out again later this week. Next outreach is scheduled for 10/12/2023.

## 2024-01-09 NOTE — TELEPHONE ENCOUNTER
Alert received from patient's ICD for ATP and shock therapy delivered to convert arrhythmia.    EGM logged 1/8/24 at 2124 shows atrial tach with  rhythm. PVC initiated a fast VT rhythm (starts in the 190s but speeds up into the 260s) that lasted 11s before ATPx1 delivered and unsuccessful at terminating rhythm. Rhythm deteriorated to VF that lasted 11s before 41J shock delivered and terminated rhythm.      ICD was implanted 4/12/22 after patient followed an unexpected VF arrest following bioprosthetic TVR on 4/1/22. This is the first tachy therapy patient has required since ICD was implanted.  Only takes 3.125mg coreg BID per med list.    Echo 5/12/22:      Called patient, no answer. Left detailed VM asking that he call back to the Device RN line ASAP.    Will route to Dr Higgins for review and recommendation.    CROW DYE

## 2024-01-09 NOTE — TELEPHONE ENCOUNTER
Called pt back and encouraged him to try to find a ride up here on Thursday as there is no guarantee this wont happen again. Pt stated he would look futher for a ride.     When pt calls back ask him to contact his work for paperwork for short term disability since he can not drive for 3 months.  Brittny DYE

## 2024-01-09 NOTE — LETTER
January 12, 2024      Anil Montoya  [address] [id number]        To Whom It May Concern:    Anil Montoya was seen in our clinic. He is on strict restrictions for the next 3 months - during that time he cannot take part in any activity at work that may harm himself or others if he were to have a syncopal episode - specific restrictions include any type of driving, operating a vehicle, using lifting equipment to move large/heavy objects. He may return to work without restrictions on 4/9/2024.       Sincerely,      Dr. Higgins  [facility]   [phone]

## 2024-01-10 NOTE — TELEPHONE ENCOUNTER
Patient called and left a voicemail stating he got a BMP drawn at [facility] yesterday and is wondering if we still need to draw labs tomorrow. Nothing is available in Care Everywhere.  Instructed patient to contact [facility] and ask that they fax us a copy of his lab results. He was also instructed to provide them with our number in case they have any questions.  CROW DYE

## 2024-01-10 NOTE — PROGRESS NOTES
"[facility]    I had the pleasure of seeing Anil when he came for follow up of VF and ICD discharge.  This 38 year old sees Dr. Higgins and Dr. Dorsey for his history of:    1.DM2  2.WARNER  3.MSSA TV Endocarditis - s/p bioprosthetic 31 mm Epic stented valve TVR 4/1/2022. Complicated by CHB and then VF arrest 4/4/2022. S/p dual chamber [employer] ICD 4/2022. Noted to have R groin infection as well as L4-5/S1 discitis as well.  4.HTN      Last Visit & Interval History:  Dr. Dorsey saw him 6/2022 at which time they reviewed he'd been doing very well. 6 m follow-up recommended. He's had to cancel appts d/t work schedule, and hasn't been seen since that time except through remote Device checks.    On 1/9, ALERT was sent d/t ATP/shock therapy administered 1/8/2024 @ 2124.  It appeared a PVC initiated a fast VT rhythm (190s, then up to 260s) lasting up to 11s. ATP x 1 unsuccessful and rhythm deteriorated to VF x 11s, treated with 41J shock.  Device RN contacted him and he noted he'd been feeling 'totally normal' and got very dizzy. He squatted down to help prevent syncope and woke up in the driveway.  He reported he'd been fasting since Sunday PM 1/7/2024. Dr. Higgins recommended urgent visit with BMP and EKG to assess QT.  No driving x 3 months.    BMP happened to be done Tuesday 1/9 AM (before he was alerted he'd been shocked). K was normal 4.2.    Today's Visit:  Anil is feeling \"fine.\" He notes he had been feeling fine over the weekend, and ate dinner with his kids like normal on Sunday 1/7. Due to spiritual reasons, he then fasted all day Monday 1/8 until after his blood work was drawn Tuesday 1/9.On Monday night, when shock was delivered for VF, he was taking garbage out and crouched down d/t dizziness.  He then found himself on the driveway with lump on his head. No other trauma. No headache or c/o other sxs a/w.    Denies edema, orthopnea, PND. Reviewed his meds - he's NOT taking carvedilol 3.125 mg " "BID as is on his medication list. Reviewed BMP showed nl K with bump in Cr.      VITALS:  Vitals: /80   Pulse 55   Ht 1.778 m (5' 10\")   Wt 105.7 kg (233 lb)   BMI 33.43 kg/m      Diagnostic Testing:  EKG today, which I overread, showed  with no Atrial Sensing. HR 55 bpm  Device interrogation 1/4/2024, showed 4% AP and 76%  in DDI 55 bpm. Underlying SR/CHB. 10y battery. No atrial or ventricular arrhythmias. No ATP/shocks  Echocardiogram 5/2022 - LVEF 55-60%. Nl RV size with borderline reduced fxn. Trace/mild MR. S/p TV repair with mean gradient 7.5mmHg @ 57 bpm.   Angiogram 3/2022 - nl CA        Plan:  Add-on Magnesium  2.    Will review with Dr. Higgins   * Note pacer is not sensing Atrium - programmed DDI from time of placement as was in AT at that time and wanted to avoid tracking.    * 3 m driving restriction likely - will confirm given fasting may have played a role    3.  See Dr. Dorsey as previously arranged  4.  Updated echo    Assessment/Plan:    VF  S/p dual chamber [employer] ICD 4/2022 after unexpected VF arrest 4d following TV repair. Electrolytes and EKG were wnl at that time  On 1/8 PM, had recurrent VF. ATP unsuccessful and shock delivered  Had been fasting (spiritual reasons) since Sunday 1/7 since PM    BMP 1/9 (morning following shock) showed nl K  EKG today with CHB and  55 bpm. Not sensing A    NOT on Coreg 3.125 mg BID  Echo 5/2022 with nl LVEF    PLAN:  Will review with Dr. Higgins for PPM reprogramming, driving restriction  Will resume BB but will discuss change to Metoprolol XL  Add-on Mg  Updated echo    Tricuspid Valve Endocarditis  Had MSSA bacteremia thought d/t skin infection in R groin, but also L4-5/S1 discitis  S/p bioprosthetic 31 mm Epic stented valve TVR 4/1/2022 and aortic valve exploration    PLAN:  Lifelong SBE prophylaxis  Sees Dr. Dorsey 1/2024  Updated echo          Lisa Umanzor PA-C, MSPAS      Orders Placed This Encounter   Procedures    Follow-Up with Cardiology " CARTER    EKG 12-lead complete w/read - Clinics (performed today)     Orders Placed This Encounter   Medications    OZEMPIC, 1 MG/DOSE, 4 MG/3ML pen     Sig: INJECT 1MG SUBCUTANEOUSLY ONCE EVERY WEEK.    furosemide (LASIX) 20 MG tablet     Sig: Take 1 tablet (20 mg) by mouth daily    empagliflozin (JARDIANCE) 10 MG TABS tablet     Sig: Take 1 tablet (10 mg) by mouth daily    losartan (COZAAR) 50 MG tablet     Sig: Take 1 tablet (50 mg) by mouth daily     Medications Discontinued During This Encounter   Medication Reason    aspirin (ASA) 81 MG chewable tablet     amLODIPine (NORVASC) 10 MG tablet Therapy completed (No AVS)    ferrous gluconate (FERGON) 324 (38 Fe) MG tablet Therapy completed (No AVS)    oxyCODONE (ROXICODONE) 5 MG tablet Therapy completed (No AVS)    furosemide (LASIX) 20 MG tablet          Encounter Diagnoses   Name Primary?    Ventricular fibrillation (H)     Bilateral leg edema        CURRENT MEDICATIONS:  Current Outpatient Medications   Medication Sig Dispense Refill    acetaminophen (TYLENOL) 325 MG tablet Take 2 tablets (650 mg) by mouth every 4 hours as needed for mild pain or fever 30 tablet 0    buPROPion (WELLBUTRIN XL) 300 MG 24 hr tablet Take 300 mg by mouth every morning      cyclobenzaprine (FLEXERIL) 5 MG tablet Take 1-2 tablets (5-10 mg) by mouth every 8 hours as needed for muscle spasms 40 tablet 0    empagliflozin (JARDIANCE) 10 MG TABS tablet Take 1 tablet (10 mg) by mouth daily      furosemide (LASIX) 20 MG tablet Take 1 tablet (20 mg) by mouth daily      gabapentin (NEURONTIN) 100 MG capsule Take 1 capsule (100 mg) by mouth 3 times daily as needed for neuropathic pain 60 capsule 0    levothyroxine (SYNTHROID/LEVOTHROID) 125 MCG tablet Take 125 mcg by mouth six times a week Monday thru Saturday      levothyroxine (SYNTHROID/LEVOTHROID) 125 MCG tablet Take 250 mcg by mouth once a week On Sunday      loratadine (CLARITIN) 10 MG tablet Take 1 tablet (10 mg) by mouth daily 30 tablet 0  "   losartan (COZAAR) 50 MG tablet Take 1 tablet (50 mg) by mouth daily      metFORMIN (GLUCOPHAGE) 1000 MG tablet Take 1,000 mg by mouth 2 times daily (with meals)      methocarbamol (ROBAXIN) 500 MG tablet Take 2 tablets (1,000 mg) by mouth every 6 hours 60 tablet 0    OZEMPIC, 1 MG/DOSE, 4 MG/3ML pen INJECT 1MG SUBCUTANEOUSLY ONCE EVERY WEEK.      carvedilol (COREG) 3.125 MG tablet Take 1 tablet (3.125 mg) by mouth 2 times daily (with meals) (Patient not taking: Reported on 1/11/2024) 60 tablet 3    liraglutide (VICTOZA) 18 MG/3ML solution Inject 1.8 mg Subcutaneous daily (Patient not taking: Reported on 1/11/2024)         ALLERGIES     Allergies   Allergen Reactions    Clindamycin     Vancomycin      \"Red Sonia Syndrome\"    Vancomycin      \"Red Sonia Syndrome\"    Ceftriaxone Rash    Corn-Containing Products GI Disturbance         Review of Systems:  Skin:  Negative     Eyes:  Negative    ENT:  Negative    Respiratory:  Positive for dyspnea on exertion  Cardiovascular:  Negative for;palpitations;chest pain;edema;syncope or near-syncope;fatigue;lightheadedness;dizziness;exercise intolerance    Gastroenterology: Negative for melena;hematochezia  Genitourinary:  Negative    Musculoskeletal:  Negative    Neurologic:  Negative    Psychiatric:  Negative    Heme/Lymph/Imm:  Negative    Endocrine:  Positive for diabetes    Physical Exam:  Vitals: /80   Pulse 55   Ht 1.778 m (5' 10\")   Wt 105.7 kg (233 lb)   BMI 33.43 kg/m      Constitutional:  cooperative, alert and oriented, well developed, well nourished, in no acute distress        Skin:  warm and dry to the touch surgical scars well-healed      Head:  normocephalic, no masses or lesions        Eyes:  pupils equal and round;sclera white;conjunctivae and lids unremarkable        ENT:  no pallor or cyanosis, dentition good        Neck:  no carotid bruit;JVP normal        Chest:  normal breath sounds, clear to auscultation, normal A-P diameter, normal symmetry, " normal respiratory excursion, no use of accessory muscles        Cardiac: regular rhythm, normal S1/S2, no S3 or S4, apical impulse not displaced, no murmurs, gallops or rubs                  Abdomen:  abdomen soft        Vascular: pulses full and equal, no bruits auscultated                                      Extremities and Back:  no deformities, clubbing, cyanosis, erythema observed        Neurological:  no gross motor deficits            PAST MEDICAL HISTORY:  Past Medical History:   Diagnosis Date    Pneumonia        PAST SURGICAL HISTORY:  Past Surgical History:   Procedure Laterality Date    CV CORONARY ANGIOGRAM N/A 3/31/2022    Procedure: Coronary Angiogram;  Surgeon: Lidia Quintanilla MD;  Location:  HEART CARDIAC CATH LAB    EP ICD INSERT SINGLE N/A 4/12/2022    Procedure: Implantable Cardioverter Defibrillator Device & Lead Implant Single or Dual;  Surgeon: Suleman Higgins MD;  Location:  HEART CARDIAC CATH LAB    REPLACE VALVE AORTIC N/A 4/1/2022    Procedure: AORTIC VALVE exploration;  Surgeon: Marti Melton MD;  Location:  OR    REPLACE VALVE TRICUSPID N/A 4/1/2022    Procedure: TRICUSPID VALVE REPLACEMENT;  Surgeon: Marti Melton MD;  Location:  OR       FAMILY HISTORY:  History reviewed. No pertinent family history.    SOCIAL HISTORY:  Social History     Socioeconomic History    Marital status:      Spouse name: None    Number of children: None    Years of education: None    Highest education level: None   Tobacco Use    Smoking status: Never    Smokeless tobacco: Never   Substance and Sexual Activity    Alcohol use: Not Currently    Drug use: Never

## 2024-01-10 NOTE — TELEPHONE ENCOUNTER
Patient called back. He can make the 10am slot with Lisa kwok.  Message routed to scheduling to assist with getting this on the books alongside EKG and labs.   CROW DYE

## 2024-01-11 ENCOUNTER — DOCUMENTATION ONLY (OUTPATIENT)
Dept: CARDIOLOGY | Facility: CLINIC | Age: 39
End: 2024-01-11

## 2024-01-11 ENCOUNTER — OFFICE VISIT (OUTPATIENT)
Dept: CARDIOLOGY | Facility: CLINIC | Age: 39
End: 2024-01-11
Payer: COMMERCIAL

## 2024-01-11 VITALS
WEIGHT: 233 LBS | DIASTOLIC BLOOD PRESSURE: 80 MMHG | HEART RATE: 55 BPM | SYSTOLIC BLOOD PRESSURE: 126 MMHG | HEIGHT: 70 IN | BODY MASS INDEX: 33.36 KG/M2

## 2024-01-11 DIAGNOSIS — I49.01 VENTRICULAR FIBRILLATION (H): ICD-10-CM

## 2024-01-11 DIAGNOSIS — R60.0 BILATERAL LEG EDEMA: ICD-10-CM

## 2024-01-11 DIAGNOSIS — Z95.4 S/P TVR (TRICUSPID VALVE REPLACEMENT): Primary | ICD-10-CM

## 2024-01-11 DIAGNOSIS — I49.01 VENTRICULAR FIBRILLATION (H): Primary | ICD-10-CM

## 2024-01-11 LAB
MDC_IDC_LEAD_CONNECTION_STATUS: NORMAL
MDC_IDC_LEAD_CONNECTION_STATUS: NORMAL
MDC_IDC_LEAD_IMPLANT_DT: NORMAL
MDC_IDC_LEAD_IMPLANT_DT: NORMAL
MDC_IDC_LEAD_LOCATION: NORMAL
MDC_IDC_LEAD_LOCATION: NORMAL
MDC_IDC_LEAD_LOCATION_DETAIL_1: NORMAL
MDC_IDC_LEAD_LOCATION_DETAIL_1: NORMAL
MDC_IDC_LEAD_MFG: NORMAL
MDC_IDC_LEAD_MFG: NORMAL
MDC_IDC_LEAD_MODEL: NORMAL
MDC_IDC_LEAD_MODEL: NORMAL
MDC_IDC_LEAD_POLARITY_TYPE: NORMAL
MDC_IDC_LEAD_POLARITY_TYPE: NORMAL
MDC_IDC_LEAD_SERIAL: NORMAL
MDC_IDC_LEAD_SERIAL: NORMAL
MDC_IDC_MSMT_BATTERY_DTM: NORMAL
MDC_IDC_MSMT_BATTERY_REMAINING_LONGEVITY: 120 MO
MDC_IDC_MSMT_BATTERY_REMAINING_PERCENTAGE: 100 %
MDC_IDC_MSMT_BATTERY_STATUS: NORMAL
MDC_IDC_MSMT_CAP_CHARGE_DTM: NORMAL
MDC_IDC_MSMT_CAP_CHARGE_TIME: 10.1 S
MDC_IDC_MSMT_CAP_CHARGE_TYPE: NORMAL
MDC_IDC_MSMT_LEADCHNL_RA_IMPEDANCE_VALUE: 648 OHM
MDC_IDC_MSMT_LEADCHNL_RA_PACING_THRESHOLD_AMPLITUDE: 0.6 V
MDC_IDC_MSMT_LEADCHNL_RA_PACING_THRESHOLD_PULSEWIDTH: 0.4 MS
MDC_IDC_MSMT_LEADCHNL_RV_IMPEDANCE_VALUE: 552 OHM
MDC_IDC_MSMT_LEADCHNL_RV_PACING_THRESHOLD_AMPLITUDE: 1.1 V
MDC_IDC_MSMT_LEADCHNL_RV_PACING_THRESHOLD_PULSEWIDTH: 0.4 MS
MDC_IDC_PG_IMPLANT_DTM: NORMAL
MDC_IDC_PG_MFG: NORMAL
MDC_IDC_PG_MODEL: NORMAL
MDC_IDC_PG_SERIAL: NORMAL
MDC_IDC_PG_TYPE: NORMAL
MDC_IDC_SESS_CLINIC_NAME: NORMAL
MDC_IDC_SESS_DTM: NORMAL
MDC_IDC_SESS_TYPE: NORMAL
MDC_IDC_SET_BRADY_LOWRATE: 55 {BEATS}/MIN
MDC_IDC_SET_BRADY_MODE: NORMAL
MDC_IDC_SET_LEADCHNL_RA_PACING_AMPLITUDE: 3.5 V
MDC_IDC_SET_LEADCHNL_RA_PACING_CAPTURE_MODE: NORMAL
MDC_IDC_SET_LEADCHNL_RA_PACING_POLARITY: NORMAL
MDC_IDC_SET_LEADCHNL_RA_PACING_PULSEWIDTH: 0.4 MS
MDC_IDC_SET_LEADCHNL_RA_SENSING_ADAPTATION_MODE: NORMAL
MDC_IDC_SET_LEADCHNL_RA_SENSING_POLARITY: NORMAL
MDC_IDC_SET_LEADCHNL_RA_SENSING_SENSITIVITY: 0.25 MV
MDC_IDC_SET_LEADCHNL_RV_PACING_AMPLITUDE: 3.5 V
MDC_IDC_SET_LEADCHNL_RV_PACING_CAPTURE_MODE: NORMAL
MDC_IDC_SET_LEADCHNL_RV_PACING_POLARITY: NORMAL
MDC_IDC_SET_LEADCHNL_RV_PACING_PULSEWIDTH: 0.4 MS
MDC_IDC_SET_LEADCHNL_RV_SENSING_ADAPTATION_MODE: NORMAL
MDC_IDC_SET_LEADCHNL_RV_SENSING_POLARITY: NORMAL
MDC_IDC_SET_LEADCHNL_RV_SENSING_SENSITIVITY: 0.6 MV
MDC_IDC_SET_ZONE_DETECTION_INTERVAL: 250 MS
MDC_IDC_SET_ZONE_DETECTION_INTERVAL: 286 MS
MDC_IDC_SET_ZONE_DETECTION_INTERVAL: 353 MS
MDC_IDC_SET_ZONE_STATUS: NORMAL
MDC_IDC_SET_ZONE_TYPE: NORMAL
MDC_IDC_SET_ZONE_VENDOR_TYPE: NORMAL
MDC_IDC_STAT_BRADY_DTM_END: NORMAL
MDC_IDC_STAT_BRADY_DTM_START: NORMAL
MDC_IDC_STAT_BRADY_RA_PERCENT_PACED: 9 %
MDC_IDC_STAT_BRADY_RV_PERCENT_PACED: 76 %
MDC_IDC_STAT_EPISODE_RECENT_COUNT: 0
MDC_IDC_STAT_EPISODE_RECENT_COUNT_DTM_END: NORMAL
MDC_IDC_STAT_EPISODE_RECENT_COUNT_DTM_START: NORMAL
MDC_IDC_STAT_EPISODE_TYPE: NORMAL
MDC_IDC_STAT_EPISODE_VENDOR_TYPE: NORMAL
MDC_IDC_STAT_TACHYTHERAPY_ATP_DELIVERED_RECENT: 0
MDC_IDC_STAT_TACHYTHERAPY_ATP_DELIVERED_TOTAL: 0
MDC_IDC_STAT_TACHYTHERAPY_RECENT_DTM_END: NORMAL
MDC_IDC_STAT_TACHYTHERAPY_RECENT_DTM_START: NORMAL
MDC_IDC_STAT_TACHYTHERAPY_SHOCKS_ABORTED_RECENT: 0
MDC_IDC_STAT_TACHYTHERAPY_SHOCKS_ABORTED_TOTAL: 0
MDC_IDC_STAT_TACHYTHERAPY_SHOCKS_DELIVERED_RECENT: 0
MDC_IDC_STAT_TACHYTHERAPY_SHOCKS_DELIVERED_TOTAL: 0
MDC_IDC_STAT_TACHYTHERAPY_TOTAL_DTM_END: NORMAL
MDC_IDC_STAT_TACHYTHERAPY_TOTAL_DTM_START: NORMAL

## 2024-01-11 PROCEDURE — 93000 ELECTROCARDIOGRAM COMPLETE: CPT | Performed by: PHYSICIAN ASSISTANT

## 2024-01-11 PROCEDURE — 99214 OFFICE O/P EST MOD 30 MIN: CPT | Performed by: PHYSICIAN ASSISTANT

## 2024-01-11 RX ORDER — LOSARTAN POTASSIUM 50 MG/1
50 TABLET ORAL DAILY
COMMUNITY
Start: 2024-01-11

## 2024-01-11 RX ORDER — FUROSEMIDE 20 MG
20 TABLET ORAL DAILY
COMMUNITY
Start: 2024-01-11

## 2024-01-11 NOTE — PATIENT INSTRUCTIONS
Anil - it was good to see you today!~    Reviewed recent shock  Unsure if d/t fasting      PLAN:  FAX # [phone] (attn: BRONWYN Sanchez PA)  Will be in touch re: driving restrictions  No changes until I speak with Dr. Higgins  We'll call Rx for carvedilol if DR. Higgins wants to keep you on it    [phone]

## 2024-01-11 NOTE — PROGRESS NOTES
Pls let Anil know that I spoke with Dr. Higgins following our visit for ATP/shock for VF.    Magnesium added on to labs at PCP's 1/9 was normal 1.8    Needs reprogramming of device from DDI to DDD 50/140 (+/- rate response as you see fit)  OK to stay off Coreg. START Metoprolol XL 25 mg daily - new Rx Eddie'd up - pls confirm pharmacy and send in  Confirmed NO DRIVING x 3 months with Dr. Higgins given risk of recurrence   I gave him our fax ([phone]) - attn: me for FMLA/short-term disability, etc    5. Get updated echo - ideally before Dr. Dorsey appt I've ordered - hopefully can get when he comes for device changes as he's not driving?? Maybe [place] as closer?    6. No more fasting. It's possible this triggered his VF but we cannot be sure.           Suleman Higgins MD [place], Marielena Granado, JORY Gonzalez,    When the device was implanted he was in accelerated junctional rhythm at 80 bpm, thus the unusual DDI programming.  But now he needs to be programmed back to DDD 50/140 PPM.  No driving for three months, let's help with disability paperwork, will do what ever we can given the no driving restriction.  I agree to place him on beta-blocker. Toprol-XL probably.    D

## 2024-01-11 NOTE — LETTER
"1/11/2024    Amanda Dawkins  [address] [place] [address]    RE: Anil Jarrett George Montoya       Dear Colleague,     I had the pleasure of seeing Anil Jarrett George Montoya in the [facility].  [facility]    I had the pleasure of seeing Anil when he came for follow up of VF and ICD discharge.  This 38 year old sees Dr. Higgins and Dr. Dorsey for his history of:    1.DM2  2.WARNER  3.MSSA TV Endocarditis - s/p bioprosthetic 31 mm Epic stented valve TVR 4/1/2022. Complicated by CHB and then VF arrest 4/4/2022. S/p dual chamber [employer] ICD 4/2022. Noted to have R groin infection as well as L4-5/S1 discitis as well.  4.HTN      Last Visit & Interval History:  Dr. Dorsey saw him 6/2022 at which time they reviewed he'd been doing very well. 6 m follow-up recommended. He's had to cancel appts d/t work schedule, and hasn't been seen since that time except through remote Device checks.    On 1/9, ALERT was sent d/t ATP/shock therapy administered 1/8/2024 @ 2124.  It appeared a PVC initiated a fast VT rhythm (190s, then up to 260s) lasting up to 11s. ATP x 1 unsuccessful and rhythm deteriorated to VF x 11s, treated with 41J shock.  Device RN contacted him and he noted he'd been feeling 'totally normal' and got very dizzy. He squatted down to help prevent syncope and woke up in the driveway.  He reported he'd been fasting since Sunday PM 1/7/2024. Dr. Higgins recommended urgent visit with BMP and EKG to assess QT.  No driving x 3 months.    BMP happened to be done Tuesday 1/9 AM (before he was alerted he'd been shocked). K was normal 4.2.    Today's Visit:  Anil is feeling \"fine.\" He notes he had been feeling fine over the weekend, and ate dinner with his kids like normal on Sunday 1/7. Due to spiritual reasons, he then fasted all day Monday 1/8 until after his blood work was drawn Tuesday 1/9.On Monday night, when shock was delivered for VF, he was taking garbage out and " "crouched down d/t dizziness.  He then found himself on the driveway with lump on his head. No other trauma. No headache or c/o other sxs a/w.    Denies edema, orthopnea, PND. Reviewed his meds - he's NOT taking carvedilol 3.125 mg BID as is on his medication list. Reviewed BMP showed nl K with bump in Cr.      VITALS:  Vitals: /80   Pulse 55   Ht 1.778 m (5' 10\")   Wt 105.7 kg (233 lb)   BMI 33.43 kg/m      Diagnostic Testing:  EKG today, which I overread, showed  with no Atrial Sensing. HR 55 bpm  Device interrogation 1/4/2024, showed 4% AP and 76%  in DDI 55 bpm. Underlying SR/CHB. 10y battery. No atrial or ventricular arrhythmias. No ATP/shocks  Echocardiogram 5/2022 - LVEF 55-60%. Nl RV size with borderline reduced fxn. Trace/mild MR. S/p TV repair with mean gradient 7.5mmHg @ 57 bpm.   Angiogram 3/2022 - nl CA        Plan:  Add-on Magnesium  2.    Will review with Dr. Higgins   * Note pacer is not sensing Atrium - programmed DDI from time of placement as was in AT at that time and wanted to avoid tracking.    * 3 m driving restriction likely - will confirm given fasting may have played a role    3.  See Dr. Dorsey as previously arranged  4.  Updated echo    Assessment/Plan:    VF  S/p dual chamber [employer] ICD 4/2022 after unexpected VF arrest 4d following TV repair. Electrolytes and EKG were wnl at that time  On 1/8 PM, had recurrent VF. ATP unsuccessful and shock delivered  Had been fasting (spiritual reasons) since Sunday 1/7 since PM    BMP 1/9 (morning following shock) showed nl K  EKG today with CHB and  55 bpm. Not sensing A    NOT on Coreg 3.125 mg BID  Echo 5/2022 with nl LVEF    PLAN:  Will review with Dr. Higgins for PPM reprogramming, driving restriction  Will resume BB but will discuss change to Metoprolol XL  Add-on Mg  Updated echo    Tricuspid Valve Endocarditis  Had MSSA bacteremia thought d/t skin infection in R groin, but also L4-5/S1 discitis  S/p bioprosthetic 31 mm Epic " stented valve TVR 4/1/2022 and aortic valve exploration    PLAN:  Lifelong SBE prophylaxis  Sees Dr. Dorsey 1/2024  Updated echo          Lisa Umanzor PA-C, MSPAS      Orders Placed This Encounter   Procedures    Follow-Up with Cardiology CARTER    EKG 12-lead complete w/read - Clinics (performed today)     Orders Placed This Encounter   Medications    OZEMPIC, 1 MG/DOSE, 4 MG/3ML pen     Sig: INJECT 1MG SUBCUTANEOUSLY ONCE EVERY WEEK.    furosemide (LASIX) 20 MG tablet     Sig: Take 1 tablet (20 mg) by mouth daily    empagliflozin (JARDIANCE) 10 MG TABS tablet     Sig: Take 1 tablet (10 mg) by mouth daily    losartan (COZAAR) 50 MG tablet     Sig: Take 1 tablet (50 mg) by mouth daily     Medications Discontinued During This Encounter   Medication Reason    aspirin (ASA) 81 MG chewable tablet     amLODIPine (NORVASC) 10 MG tablet Therapy completed (No AVS)    ferrous gluconate (FERGON) 324 (38 Fe) MG tablet Therapy completed (No AVS)    oxyCODONE (ROXICODONE) 5 MG tablet Therapy completed (No AVS)    furosemide (LASIX) 20 MG tablet          Encounter Diagnoses   Name Primary?    Ventricular fibrillation (H)     Bilateral leg edema        CURRENT MEDICATIONS:  Current Outpatient Medications   Medication Sig Dispense Refill    acetaminophen (TYLENOL) 325 MG tablet Take 2 tablets (650 mg) by mouth every 4 hours as needed for mild pain or fever 30 tablet 0    buPROPion (WELLBUTRIN XL) 300 MG 24 hr tablet Take 300 mg by mouth every morning      cyclobenzaprine (FLEXERIL) 5 MG tablet Take 1-2 tablets (5-10 mg) by mouth every 8 hours as needed for muscle spasms 40 tablet 0    empagliflozin (JARDIANCE) 10 MG TABS tablet Take 1 tablet (10 mg) by mouth daily      furosemide (LASIX) 20 MG tablet Take 1 tablet (20 mg) by mouth daily      gabapentin (NEURONTIN) 100 MG capsule Take 1 capsule (100 mg) by mouth 3 times daily as needed for neuropathic pain 60 capsule 0    levothyroxine (SYNTHROID/LEVOTHROID) 125 MCG tablet Take 125 mcg by  "mouth six times a week Monday thru Saturday      levothyroxine (SYNTHROID/LEVOTHROID) 125 MCG tablet Take 250 mcg by mouth once a week On Sunday      loratadine (CLARITIN) 10 MG tablet Take 1 tablet (10 mg) by mouth daily 30 tablet 0    losartan (COZAAR) 50 MG tablet Take 1 tablet (50 mg) by mouth daily      metFORMIN (GLUCOPHAGE) 1000 MG tablet Take 1,000 mg by mouth 2 times daily (with meals)      methocarbamol (ROBAXIN) 500 MG tablet Take 2 tablets (1,000 mg) by mouth every 6 hours 60 tablet 0    OZEMPIC, 1 MG/DOSE, 4 MG/3ML pen INJECT 1MG SUBCUTANEOUSLY ONCE EVERY WEEK.      carvedilol (COREG) 3.125 MG tablet Take 1 tablet (3.125 mg) by mouth 2 times daily (with meals) (Patient not taking: Reported on 1/11/2024) 60 tablet 3    liraglutide (VICTOZA) 18 MG/3ML solution Inject 1.8 mg Subcutaneous daily (Patient not taking: Reported on 1/11/2024)         ALLERGIES     Allergies   Allergen Reactions    Clindamycin     Vancomycin      \"Red Sonia Syndrome\"    Vancomycin      \"Red Sonia Syndrome\"    Ceftriaxone Rash    Corn-Containing Products GI Disturbance         Review of Systems:  Skin:  Negative     Eyes:  Negative    ENT:  Negative    Respiratory:  Positive for dyspnea on exertion  Cardiovascular:  Negative for;palpitations;chest pain;edema;syncope or near-syncope;fatigue;lightheadedness;dizziness;exercise intolerance    Gastroenterology: Negative for melena;hematochezia  Genitourinary:  Negative    Musculoskeletal:  Negative    Neurologic:  Negative    Psychiatric:  Negative    Heme/Lymph/Imm:  Negative    Endocrine:  Positive for diabetes    Physical Exam:  Vitals: /80   Pulse 55   Ht 1.778 m (5' 10\")   Wt 105.7 kg (233 lb)   BMI 33.43 kg/m      Constitutional:  cooperative, alert and oriented, well developed, well nourished, in no acute distress        Skin:  warm and dry to the touch surgical scars well-healed      Head:  normocephalic, no masses or lesions        Eyes:  pupils equal and round;sclera " white;conjunctivae and lids unremarkable        ENT:  no pallor or cyanosis, dentition good        Neck:  no carotid bruit;JVP normal        Chest:  normal breath sounds, clear to auscultation, normal A-P diameter, normal symmetry, normal respiratory excursion, no use of accessory muscles        Cardiac: regular rhythm, normal S1/S2, no S3 or S4, apical impulse not displaced, no murmurs, gallops or rubs                  Abdomen:  abdomen soft        Vascular: pulses full and equal, no bruits auscultated                                      Extremities and Back:  no deformities, clubbing, cyanosis, erythema observed        Neurological:  no gross motor deficits            PAST MEDICAL HISTORY:  Past Medical History:   Diagnosis Date    Pneumonia        PAST SURGICAL HISTORY:  Past Surgical History:   Procedure Laterality Date    CV CORONARY ANGIOGRAM N/A 3/31/2022    Procedure: Coronary Angiogram;  Surgeon: Lidia Quintanilla MD;  Location:  HEART CARDIAC CATH LAB    EP ICD INSERT SINGLE N/A 4/12/2022    Procedure: Implantable Cardioverter Defibrillator Device & Lead Implant Single or Dual;  Surgeon: Suleman Higgins MD;  Location:  HEART CARDIAC CATH LAB    REPLACE VALVE AORTIC N/A 4/1/2022    Procedure: AORTIC VALVE exploration;  Surgeon: Marti Melton MD;  Location:  OR    REPLACE VALVE TRICUSPID N/A 4/1/2022    Procedure: TRICUSPID VALVE REPLACEMENT;  Surgeon: Marti Melton MD;  Location:  OR       FAMILY HISTORY:  History reviewed. No pertinent family history.    SOCIAL HISTORY:  Social History     Socioeconomic History    Marital status:      Spouse name: None    Number of children: None    Years of education: None    Highest education level: None   Tobacco Use    Smoking status: Never    Smokeless tobacco: Never   Substance and Sexual Activity    Alcohol use: Not Currently    Drug use: Never           Thank you for allowing me to participate in the care of your  patient.      Sincerely,     Marielena Umanzor PA-C     [facility]  cc:   Suleman Higgins MD  [address]  [place]  [place] [address]

## 2024-01-12 ENCOUNTER — ANCILLARY PROCEDURE (OUTPATIENT)
Dept: CARDIOLOGY | Facility: CLINIC | Age: 39
End: 2024-01-12
Attending: INTERNAL MEDICINE
Payer: COMMERCIAL

## 2024-01-12 DIAGNOSIS — I44.2 ATRIOVENTRICULAR BLOCK, COMPLETE (H): ICD-10-CM

## 2024-01-12 DIAGNOSIS — Z95.810 ICD (IMPLANTABLE CARDIOVERTER-DEFIBRILLATOR) IN PLACE: ICD-10-CM

## 2024-01-12 DIAGNOSIS — I46.9 CARDIAC ARREST (H): ICD-10-CM

## 2024-01-12 LAB
MDC_IDC_LEAD_CONNECTION_STATUS: NORMAL
MDC_IDC_LEAD_CONNECTION_STATUS: NORMAL
MDC_IDC_LEAD_IMPLANT_DT: NORMAL
MDC_IDC_LEAD_IMPLANT_DT: NORMAL
MDC_IDC_LEAD_LOCATION: NORMAL
MDC_IDC_LEAD_LOCATION: NORMAL
MDC_IDC_LEAD_LOCATION_DETAIL_1: NORMAL
MDC_IDC_LEAD_LOCATION_DETAIL_1: NORMAL
MDC_IDC_LEAD_MFG: NORMAL
MDC_IDC_LEAD_MFG: NORMAL
MDC_IDC_LEAD_MODEL: NORMAL
MDC_IDC_LEAD_MODEL: NORMAL
MDC_IDC_LEAD_POLARITY_TYPE: NORMAL
MDC_IDC_LEAD_POLARITY_TYPE: NORMAL
MDC_IDC_LEAD_SERIAL: NORMAL
MDC_IDC_LEAD_SERIAL: NORMAL
MDC_IDC_MSMT_BATTERY_DTM: NORMAL
MDC_IDC_MSMT_BATTERY_REMAINING_LONGEVITY: 120 MO
MDC_IDC_MSMT_BATTERY_REMAINING_PERCENTAGE: 100 %
MDC_IDC_MSMT_BATTERY_STATUS: NORMAL
MDC_IDC_MSMT_CAP_CHARGE_DTM: NORMAL
MDC_IDC_MSMT_CAP_CHARGE_DTM: NORMAL
MDC_IDC_MSMT_CAP_CHARGE_ENERGY: 41 J
MDC_IDC_MSMT_CAP_CHARGE_TIME: 10.1 S
MDC_IDC_MSMT_CAP_CHARGE_TIME: 10.1 S
MDC_IDC_MSMT_CAP_CHARGE_TYPE: NORMAL
MDC_IDC_MSMT_CAP_CHARGE_TYPE: NORMAL
MDC_IDC_MSMT_LEADCHNL_RA_IMPEDANCE_VALUE: 669 OHM
MDC_IDC_MSMT_LEADCHNL_RV_IMPEDANCE_VALUE: 557 OHM
MDC_IDC_PG_IMPLANT_DTM: NORMAL
MDC_IDC_PG_MFG: NORMAL
MDC_IDC_PG_MODEL: NORMAL
MDC_IDC_PG_SERIAL: NORMAL
MDC_IDC_PG_TYPE: NORMAL
MDC_IDC_SESS_CLINIC_NAME: NORMAL
MDC_IDC_SESS_DTM: NORMAL
MDC_IDC_SESS_TYPE: NORMAL
MDC_IDC_SET_BRADY_LOWRATE: 55 {BEATS}/MIN
MDC_IDC_SET_BRADY_MODE: NORMAL
MDC_IDC_SET_LEADCHNL_RA_PACING_AMPLITUDE: 3.5 V
MDC_IDC_SET_LEADCHNL_RA_PACING_CAPTURE_MODE: NORMAL
MDC_IDC_SET_LEADCHNL_RA_PACING_POLARITY: NORMAL
MDC_IDC_SET_LEADCHNL_RA_PACING_PULSEWIDTH: 0.4 MS
MDC_IDC_SET_LEADCHNL_RA_SENSING_ADAPTATION_MODE: NORMAL
MDC_IDC_SET_LEADCHNL_RA_SENSING_POLARITY: NORMAL
MDC_IDC_SET_LEADCHNL_RA_SENSING_SENSITIVITY: 0.25 MV
MDC_IDC_SET_LEADCHNL_RV_PACING_AMPLITUDE: 3.5 V
MDC_IDC_SET_LEADCHNL_RV_PACING_CAPTURE_MODE: NORMAL
MDC_IDC_SET_LEADCHNL_RV_PACING_POLARITY: NORMAL
MDC_IDC_SET_LEADCHNL_RV_PACING_PULSEWIDTH: 0.4 MS
MDC_IDC_SET_LEADCHNL_RV_SENSING_ADAPTATION_MODE: NORMAL
MDC_IDC_SET_LEADCHNL_RV_SENSING_POLARITY: NORMAL
MDC_IDC_SET_LEADCHNL_RV_SENSING_SENSITIVITY: 0.6 MV
MDC_IDC_SET_ZONE_DETECTION_INTERVAL: 250 MS
MDC_IDC_SET_ZONE_DETECTION_INTERVAL: 286 MS
MDC_IDC_SET_ZONE_DETECTION_INTERVAL: 353 MS
MDC_IDC_SET_ZONE_STATUS: NORMAL
MDC_IDC_SET_ZONE_TYPE: NORMAL
MDC_IDC_SET_ZONE_VENDOR_TYPE: NORMAL
MDC_IDC_STAT_BRADY_DTM_END: NORMAL
MDC_IDC_STAT_BRADY_DTM_START: NORMAL
MDC_IDC_STAT_BRADY_RA_PERCENT_PACED: 10 %
MDC_IDC_STAT_BRADY_RV_PERCENT_PACED: 82 %
MDC_IDC_STAT_EPISODE_RECENT_COUNT: 0
MDC_IDC_STAT_EPISODE_RECENT_COUNT_DTM_END: NORMAL
MDC_IDC_STAT_EPISODE_RECENT_COUNT_DTM_START: NORMAL
MDC_IDC_STAT_EPISODE_TYPE: NORMAL
MDC_IDC_STAT_EPISODE_VENDOR_TYPE: NORMAL
MDC_IDC_STAT_TACHYTHERAPY_ATP_DELIVERED_RECENT: 1
MDC_IDC_STAT_TACHYTHERAPY_ATP_DELIVERED_TOTAL: 1
MDC_IDC_STAT_TACHYTHERAPY_RECENT_DTM_END: NORMAL
MDC_IDC_STAT_TACHYTHERAPY_RECENT_DTM_START: NORMAL
MDC_IDC_STAT_TACHYTHERAPY_SHOCKS_ABORTED_RECENT: 0
MDC_IDC_STAT_TACHYTHERAPY_SHOCKS_ABORTED_TOTAL: 0
MDC_IDC_STAT_TACHYTHERAPY_SHOCKS_DELIVERED_RECENT: 1
MDC_IDC_STAT_TACHYTHERAPY_SHOCKS_DELIVERED_TOTAL: 1
MDC_IDC_STAT_TACHYTHERAPY_TOTAL_DTM_END: NORMAL
MDC_IDC_STAT_TACHYTHERAPY_TOTAL_DTM_START: NORMAL

## 2024-01-12 RX ORDER — METOPROLOL SUCCINATE 25 MG/1
25 TABLET, EXTENDED RELEASE ORAL DAILY
Qty: 90 TABLET | Refills: 3 | Status: SHIPPED | OUTPATIENT
Start: 2024-01-12 | End: 2024-09-05

## 2024-01-12 NOTE — PROGRESS NOTES
Spoke with patient in depth regarding recommendations outlined by Lisa Umanzor below.    He is aware of normal magnesium  He is coming in today for reprogramming of his device  He knows to stop coreg. Rx for metoprolol sent to patient's pharmacy of choice. He knows to monitor his BP and call the clinic with any concerns/symptoms that could indicate low BP  Patient is aware of the no driving restriction. He has spoken with his work to try and determine what roles he can fill in this interim period.... 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 He is wondering if this would be ok. Let patient know that this is likely not safe as if he were to have an episode while operating this type of equipment it could result in very unsafe situations. He verbalized understanding and is requesting very clear wording of restrictions that he can bring to work. He is hoping these restrictions can be clarified prior to his appt with device today so that he can inform his work ASAP.  Patient is aware echo needs to be done/read prior to his appt with Dr. Dorsey. Routed to scheduling to assist with set up.  Patient verbalized that he will no longer fast.    CROW DYE

## 2024-01-12 NOTE — PROGRESS NOTES
[employer] D433 RESONATE EL (D)  ICD Device Check (Courtesy check to change mode)   Patient seen in clinic for device evaluation and iterative programming.   AP: 10 %    : 82 %    Mode: ***    Underlying Rhythm: 2:1 HB with V-rates in the 40's    Heart Rate: ***    Sensing: ***     Ventricular Arrhythmia:  (Charted per previous alert) EGM logged 1/8/24 at 2124 shows atrial tach with  rhythm. PVC initiated a fast VT rhythm (starts in the 190s but speeds up into the 260s) that lasted 11s before ATPx1 delivered and unsuccessful at terminating rhythm. Rhythm deteriorated to VF that lasted 11s before 41J shock delivered and terminated rhythm.        ATP: 1    Shocks: 1     Tachy Therapy History:   - [employer] D433 Resonate (4/2022 to present):   1/8/24-ATPx1 and Shoc  Setting Change: ***    Care Plan: ***      I have reviewed and interpreted the device interrogation, settings, programming and nurse's summary. The device is functioning within normal device parameters. I agree with the current findings, assessment and plan.

## 2024-01-12 NOTE — PROGRESS NOTES
Patient in clinic requesting a letter be written up with restriction guidelines. He would like to avoid filling out short term disability paperwork for now as he is hoping to work full time in some capacity.    Spoke with Dr. Higgins. Restrictions are strict but broad in the fact that patient can not take place in any work activities that would put himself or others at risk if he were to experience another syncopal episode - including but not limited to driving and operating large lifting/mechanical equipment.     Note written and given to patient to provide to his job. Patient is aware of the restrictions. He knows to have them call or fax us if they need any additional information and we will be happy to fill it out for him.    CROW DYE

## 2024-01-25 ENCOUNTER — TELEPHONE (OUTPATIENT)
Dept: CARDIOLOGY | Facility: CLINIC | Age: 39
End: 2024-01-25
Payer: COMMERCIAL

## 2024-01-25 DIAGNOSIS — Z95.4 S/P TVR (TRICUSPID VALVE REPLACEMENT): ICD-10-CM

## 2024-01-25 DIAGNOSIS — Z95.810 ICD (IMPLANTABLE CARDIOVERTER-DEFIBRILLATOR) IN PLACE: Primary | ICD-10-CM

## 2024-01-25 DIAGNOSIS — I44.2 ATRIOVENTRICULAR BLOCK, COMPLETE (H): ICD-10-CM

## 2024-01-25 DIAGNOSIS — I49.01 VENTRICULAR FIBRILLATION (H): ICD-10-CM

## 2024-01-25 DIAGNOSIS — R60.0 BILATERAL LEG EDEMA: ICD-10-CM

## 2024-01-25 DIAGNOSIS — I46.9 CARDIAC ARREST (H): ICD-10-CM

## 2024-01-25 NOTE — TELEPHONE ENCOUNTER
Pt had questions about upcoming appointment in April  Called to discuss but went to . Message left for him to call back Device team to clarify. Phone number provided.  Brittny DYE

## 2024-01-26 ENCOUNTER — TELEPHONE (OUTPATIENT)
Dept: CARDIOLOGY | Facility: CLINIC | Age: 39
End: 2024-01-26
Payer: COMMERCIAL

## 2024-01-26 NOTE — TELEPHONE ENCOUNTER
I called patient and left him a message stating that he does not need to see Dr. Higgins-It was Dr. Higgins. that recommended that he have the echocardiogram before he sees Dr. MORGAN.    I asked that he call back to discuss..

## 2024-02-16 ENCOUNTER — DOCUMENTATION ONLY (OUTPATIENT)
Dept: CARDIOLOGY | Facility: CLINIC | Age: 39
End: 2024-02-16
Payer: COMMERCIAL

## 2024-02-16 NOTE — PROGRESS NOTES
FMLA paperwork received from patient.  Per note on 1/11/24 Lisa is expecting this.  Placed on Lisa's desk to be completed.      Called and spoke with patient to let him know that the paperwork was received and that Lisa was out of the office until Tuesday, 2/20/24.  He stated that this should not be an issue.  Per patient, he stated that the first day out of work that he told [employer] was 1/19/24.  Condition onset date of 1/8/24 (day of shock) and first day recommended out of work of 1/19/24 filled in.  Paperwork set on Lisa's desk to be completed.     MARNIE Odonnell

## 2024-02-20 NOTE — PROGRESS NOTES
Signed forms received from Lisa SLOAN. Faxed back to [employer] at [phone].    Called pt and let him know the paperwork has been completed and faxed back.

## 2024-03-02 ENCOUNTER — DOCUMENTATION ONLY (OUTPATIENT)
Dept: CARDIOLOGY | Facility: CLINIC | Age: 39
End: 2024-03-02
Payer: COMMERCIAL

## 2024-03-02 NOTE — PROGRESS NOTES
Received additional forms from [employer] re: vision and behavioral limitations    Behavioral Functional Ability  Visual Functional Evaluation    Indicated that Anil' limitation is that he cannot drive or operative heavy machinery between 1/8--4/8/2024 due to cardiac condition.  There would not be Behavioral or Vision limitations b/c of his cardiac condition.    Forms will be faxed Monday 3/4 (will give to Device)    March 2, 2024 at 10:30 AM

## 2024-03-05 ENCOUNTER — TELEPHONE (OUTPATIENT)
Dept: CARDIOLOGY | Facility: CLINIC | Age: 39
End: 2024-03-05
Payer: COMMERCIAL

## 2024-03-05 NOTE — PROGRESS NOTES
Received completed disability paperwork for [employer] from Lisa Umanzor PA-C.  Paperwork faxed to [employer] at [phone].  Called and notified patient that paperwork had been faxed.  Will send completed paperwork to HIMs to be scanned in.     MARNIE Odonnell

## 2024-03-05 NOTE — TELEPHONE ENCOUNTER
[facility] Call Center    Phone Message    May a detailed message be left on voicemail: yes     Reason for Call: Form or Letter   Type or form/letter needing completion: Disability forms were sent on 2/23/24  Provider: Lias Richter form needed: asap  Once completed: Fax form to: on paperwork   If unable to fax, patient will     Action Taken: Other: cardio    Travel Screening: Not Applicable                                                                   
See separate encounter from Lisa Umanzor PA-C on 3/2/24.  MARNIE Odonnell   
numerical 0-10

## 2024-03-22 ENCOUNTER — TELEPHONE (OUTPATIENT)
Dept: CARDIOLOGY | Facility: CLINIC | Age: 39
End: 2024-03-22
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
## 2024-03-26 ENCOUNTER — ANCILLARY PROCEDURE (OUTPATIENT)
Dept: CARDIOLOGY | Facility: CLINIC | Age: 39
End: 2024-03-26
Attending: INTERNAL MEDICINE
Payer: COMMERCIAL

## 2024-03-26 DIAGNOSIS — I46.9 CARDIAC ARREST (H): ICD-10-CM

## 2024-03-26 DIAGNOSIS — I44.2 ATRIOVENTRICULAR BLOCK, COMPLETE (H): ICD-10-CM

## 2024-03-26 DIAGNOSIS — Z95.810 ICD (IMPLANTABLE CARDIOVERTER-DEFIBRILLATOR) IN PLACE: ICD-10-CM
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
## 2024-03-28 LAB
MDC_IDC_EPISODE_DTM: NORMAL
MDC_IDC_EPISODE_ID: NORMAL
MDC_IDC_EPISODE_TYPE: NORMAL
MDC_IDC_LEAD_CONNECTION_STATUS: NORMAL
MDC_IDC_LEAD_CONNECTION_STATUS: NORMAL
MDC_IDC_LEAD_IMPLANT_DT: NORMAL
MDC_IDC_LEAD_IMPLANT_DT: NORMAL
MDC_IDC_LEAD_LOCATION: NORMAL
MDC_IDC_LEAD_LOCATION: NORMAL
MDC_IDC_LEAD_LOCATION_DETAIL_1: NORMAL
MDC_IDC_LEAD_LOCATION_DETAIL_1: NORMAL
MDC_IDC_LEAD_MFG: NORMAL
MDC_IDC_LEAD_MFG: NORMAL
MDC_IDC_LEAD_MODEL: NORMAL
MDC_IDC_LEAD_MODEL: NORMAL
MDC_IDC_LEAD_POLARITY_TYPE: NORMAL
MDC_IDC_LEAD_POLARITY_TYPE: NORMAL
MDC_IDC_LEAD_SERIAL: NORMAL
MDC_IDC_LEAD_SERIAL: NORMAL
MDC_IDC_MSMT_BATTERY_DTM: NORMAL
MDC_IDC_MSMT_BATTERY_REMAINING_LONGEVITY: 120 MO
MDC_IDC_MSMT_BATTERY_REMAINING_PERCENTAGE: 100 %
MDC_IDC_MSMT_BATTERY_STATUS: NORMAL
MDC_IDC_MSMT_CAP_CHARGE_DTM: NORMAL
MDC_IDC_MSMT_CAP_CHARGE_DTM: NORMAL
MDC_IDC_MSMT_CAP_CHARGE_ENERGY: 41 J
MDC_IDC_MSMT_CAP_CHARGE_TIME: 10.1 S
MDC_IDC_MSMT_CAP_CHARGE_TIME: 10.1 S
MDC_IDC_MSMT_CAP_CHARGE_TYPE: NORMAL
MDC_IDC_MSMT_CAP_CHARGE_TYPE: NORMAL
MDC_IDC_MSMT_LEADCHNL_RA_IMPEDANCE_VALUE: 626 OHM
MDC_IDC_MSMT_LEADCHNL_RV_IMPEDANCE_VALUE: 511 OHM
MDC_IDC_PG_IMPLANT_DTM: NORMAL
MDC_IDC_PG_MFG: NORMAL
MDC_IDC_PG_MODEL: NORMAL
MDC_IDC_PG_SERIAL: NORMAL
MDC_IDC_PG_TYPE: NORMAL
MDC_IDC_SESS_CLINIC_NAME: NORMAL
MDC_IDC_SESS_DTM: NORMAL
MDC_IDC_SESS_TYPE: NORMAL
MDC_IDC_SET_BRADY_AT_MODE_SWITCH_RATE: 170 {BEATS}/MIN
MDC_IDC_SET_BRADY_LOWRATE: 55 {BEATS}/MIN
MDC_IDC_SET_BRADY_MODE: NORMAL
MDC_IDC_SET_LEADCHNL_RA_PACING_AMPLITUDE: 3.5 V
MDC_IDC_SET_LEADCHNL_RA_PACING_CAPTURE_MODE: NORMAL
MDC_IDC_SET_LEADCHNL_RA_PACING_POLARITY: NORMAL
MDC_IDC_SET_LEADCHNL_RA_PACING_PULSEWIDTH: 0.4 MS
MDC_IDC_SET_LEADCHNL_RA_SENSING_ADAPTATION_MODE: NORMAL
MDC_IDC_SET_LEADCHNL_RA_SENSING_POLARITY: NORMAL
MDC_IDC_SET_LEADCHNL_RA_SENSING_SENSITIVITY: 0.25 MV
MDC_IDC_SET_LEADCHNL_RV_PACING_AMPLITUDE: 3.5 V
MDC_IDC_SET_LEADCHNL_RV_PACING_CAPTURE_MODE: NORMAL
MDC_IDC_SET_LEADCHNL_RV_PACING_POLARITY: NORMAL
MDC_IDC_SET_LEADCHNL_RV_PACING_PULSEWIDTH: 0.4 MS
MDC_IDC_SET_LEADCHNL_RV_SENSING_ADAPTATION_MODE: NORMAL
MDC_IDC_SET_LEADCHNL_RV_SENSING_POLARITY: NORMAL
MDC_IDC_SET_LEADCHNL_RV_SENSING_SENSITIVITY: 0.6 MV
MDC_IDC_SET_ZONE_DETECTION_INTERVAL: 250 MS
MDC_IDC_SET_ZONE_DETECTION_INTERVAL: 286 MS
MDC_IDC_SET_ZONE_DETECTION_INTERVAL: 353 MS
MDC_IDC_SET_ZONE_STATUS: NORMAL
MDC_IDC_SET_ZONE_TYPE: NORMAL
MDC_IDC_SET_ZONE_VENDOR_TYPE: NORMAL
MDC_IDC_STAT_BRADY_DTM_END: NORMAL
MDC_IDC_STAT_BRADY_DTM_START: NORMAL
MDC_IDC_STAT_BRADY_RA_PERCENT_PACED: 11 %
MDC_IDC_STAT_BRADY_RV_PERCENT_PACED: 96 %
MDC_IDC_STAT_EPISODE_RECENT_COUNT: 0
MDC_IDC_STAT_EPISODE_RECENT_COUNT_DTM_END: NORMAL
MDC_IDC_STAT_EPISODE_RECENT_COUNT_DTM_START: NORMAL
MDC_IDC_STAT_EPISODE_TYPE: NORMAL
MDC_IDC_STAT_EPISODE_VENDOR_TYPE: NORMAL
MDC_IDC_STAT_TACHYTHERAPY_ATP_DELIVERED_RECENT: 0
MDC_IDC_STAT_TACHYTHERAPY_ATP_DELIVERED_TOTAL: 1
MDC_IDC_STAT_TACHYTHERAPY_RECENT_DTM_END: NORMAL
MDC_IDC_STAT_TACHYTHERAPY_RECENT_DTM_START: NORMAL
MDC_IDC_STAT_TACHYTHERAPY_SHOCKS_ABORTED_RECENT: 0
MDC_IDC_STAT_TACHYTHERAPY_SHOCKS_ABORTED_TOTAL: 0
MDC_IDC_STAT_TACHYTHERAPY_SHOCKS_DELIVERED_RECENT: 0
MDC_IDC_STAT_TACHYTHERAPY_SHOCKS_DELIVERED_TOTAL: 1
MDC_IDC_STAT_TACHYTHERAPY_TOTAL_DTM_END: NORMAL
MDC_IDC_STAT_TACHYTHERAPY_TOTAL_DTM_START: NORMAL

## 2024-04-08 ENCOUNTER — ANCILLARY PROCEDURE (OUTPATIENT)
Dept: CARDIOLOGY | Facility: CLINIC | Age: 39
End: 2024-04-08
Attending: INTERNAL MEDICINE
Payer: COMMERCIAL

## 2024-04-08 DIAGNOSIS — Z95.810 ICD (IMPLANTABLE CARDIOVERTER-DEFIBRILLATOR) IN PLACE: ICD-10-CM

## 2024-04-08 DIAGNOSIS — I44.2 ATRIOVENTRICULAR BLOCK, COMPLETE (H): ICD-10-CM

## 2024-04-08 DIAGNOSIS — I46.9 CARDIAC ARREST (H): ICD-10-CM

## 2024-04-08 PROCEDURE — 93295 DEV INTERROG REMOTE 1/2/MLT: CPT | Performed by: INTERNAL MEDICINE

## 2024-04-08 PROCEDURE — 93296 REM INTERROG EVL PM/IDS: CPT | Performed by: INTERNAL MEDICINE

## 2024-04-09 LAB
MDC_IDC_EPISODE_DTM: NORMAL
MDC_IDC_EPISODE_ID: NORMAL
MDC_IDC_EPISODE_TYPE: NORMAL
MDC_IDC_LEAD_CONNECTION_STATUS: NORMAL
MDC_IDC_LEAD_CONNECTION_STATUS: NORMAL
MDC_IDC_LEAD_IMPLANT_DT: NORMAL
MDC_IDC_LEAD_IMPLANT_DT: NORMAL
MDC_IDC_LEAD_LOCATION: NORMAL
MDC_IDC_LEAD_LOCATION: NORMAL
MDC_IDC_LEAD_LOCATION_DETAIL_1: NORMAL
MDC_IDC_LEAD_LOCATION_DETAIL_1: NORMAL
MDC_IDC_LEAD_MFG: NORMAL
MDC_IDC_LEAD_MFG: NORMAL
MDC_IDC_LEAD_MODEL: NORMAL
MDC_IDC_LEAD_MODEL: NORMAL
MDC_IDC_LEAD_POLARITY_TYPE: NORMAL
MDC_IDC_LEAD_POLARITY_TYPE: NORMAL
MDC_IDC_LEAD_SERIAL: NORMAL
MDC_IDC_LEAD_SERIAL: NORMAL
MDC_IDC_MSMT_BATTERY_DTM: NORMAL
MDC_IDC_MSMT_BATTERY_REMAINING_LONGEVITY: 120 MO
MDC_IDC_MSMT_BATTERY_REMAINING_PERCENTAGE: 100 %
MDC_IDC_MSMT_BATTERY_STATUS: NORMAL
MDC_IDC_MSMT_CAP_CHARGE_DTM: NORMAL
MDC_IDC_MSMT_CAP_CHARGE_DTM: NORMAL
MDC_IDC_MSMT_CAP_CHARGE_ENERGY: 41 J
MDC_IDC_MSMT_CAP_CHARGE_TIME: 10.1 S
MDC_IDC_MSMT_CAP_CHARGE_TIME: 10.1 S
MDC_IDC_MSMT_CAP_CHARGE_TYPE: NORMAL
MDC_IDC_MSMT_CAP_CHARGE_TYPE: NORMAL
MDC_IDC_MSMT_LEADCHNL_RA_IMPEDANCE_VALUE: 617 OHM
MDC_IDC_MSMT_LEADCHNL_RV_IMPEDANCE_VALUE: 532 OHM
MDC_IDC_PG_IMPLANT_DTM: NORMAL
MDC_IDC_PG_MFG: NORMAL
MDC_IDC_PG_MODEL: NORMAL
MDC_IDC_PG_SERIAL: NORMAL
MDC_IDC_PG_TYPE: NORMAL
MDC_IDC_SESS_CLINIC_NAME: NORMAL
MDC_IDC_SESS_DTM: NORMAL
MDC_IDC_SESS_TYPE: NORMAL
MDC_IDC_SET_BRADY_AT_MODE_SWITCH_RATE: 170 {BEATS}/MIN
MDC_IDC_SET_BRADY_LOWRATE: 55 {BEATS}/MIN
MDC_IDC_SET_BRADY_MODE: NORMAL
MDC_IDC_SET_LEADCHNL_RA_PACING_AMPLITUDE: 3.5 V
MDC_IDC_SET_LEADCHNL_RA_PACING_CAPTURE_MODE: NORMAL
MDC_IDC_SET_LEADCHNL_RA_PACING_POLARITY: NORMAL
MDC_IDC_SET_LEADCHNL_RA_PACING_PULSEWIDTH: 0.4 MS
MDC_IDC_SET_LEADCHNL_RA_SENSING_ADAPTATION_MODE: NORMAL
MDC_IDC_SET_LEADCHNL_RA_SENSING_POLARITY: NORMAL
MDC_IDC_SET_LEADCHNL_RA_SENSING_SENSITIVITY: 0.25 MV
MDC_IDC_SET_LEADCHNL_RV_PACING_AMPLITUDE: 3.5 V
MDC_IDC_SET_LEADCHNL_RV_PACING_CAPTURE_MODE: NORMAL
MDC_IDC_SET_LEADCHNL_RV_PACING_POLARITY: NORMAL
MDC_IDC_SET_LEADCHNL_RV_PACING_PULSEWIDTH: 0.4 MS
MDC_IDC_SET_LEADCHNL_RV_SENSING_ADAPTATION_MODE: NORMAL
MDC_IDC_SET_LEADCHNL_RV_SENSING_POLARITY: NORMAL
MDC_IDC_SET_LEADCHNL_RV_SENSING_SENSITIVITY: 0.6 MV
MDC_IDC_SET_ZONE_DETECTION_INTERVAL: 250 MS
MDC_IDC_SET_ZONE_DETECTION_INTERVAL: 286 MS
MDC_IDC_SET_ZONE_DETECTION_INTERVAL: 353 MS
MDC_IDC_SET_ZONE_STATUS: NORMAL
MDC_IDC_SET_ZONE_TYPE: NORMAL
MDC_IDC_SET_ZONE_VENDOR_TYPE: NORMAL
MDC_IDC_STAT_BRADY_DTM_END: NORMAL
MDC_IDC_STAT_BRADY_DTM_START: NORMAL
MDC_IDC_STAT_BRADY_RA_PERCENT_PACED: 10 %
MDC_IDC_STAT_BRADY_RV_PERCENT_PACED: 96 %
MDC_IDC_STAT_EPISODE_RECENT_COUNT: 0
MDC_IDC_STAT_EPISODE_RECENT_COUNT_DTM_END: NORMAL
MDC_IDC_STAT_EPISODE_RECENT_COUNT_DTM_START: NORMAL
MDC_IDC_STAT_EPISODE_TYPE: NORMAL
MDC_IDC_STAT_EPISODE_VENDOR_TYPE: NORMAL
MDC_IDC_STAT_TACHYTHERAPY_ATP_DELIVERED_RECENT: 0
MDC_IDC_STAT_TACHYTHERAPY_ATP_DELIVERED_TOTAL: 1
MDC_IDC_STAT_TACHYTHERAPY_RECENT_DTM_END: NORMAL
MDC_IDC_STAT_TACHYTHERAPY_RECENT_DTM_START: NORMAL
MDC_IDC_STAT_TACHYTHERAPY_SHOCKS_ABORTED_RECENT: 0
MDC_IDC_STAT_TACHYTHERAPY_SHOCKS_ABORTED_TOTAL: 0
MDC_IDC_STAT_TACHYTHERAPY_SHOCKS_DELIVERED_RECENT: 0
MDC_IDC_STAT_TACHYTHERAPY_SHOCKS_DELIVERED_TOTAL: 1
MDC_IDC_STAT_TACHYTHERAPY_TOTAL_DTM_END: NORMAL
MDC_IDC_STAT_TACHYTHERAPY_TOTAL_DTM_START: NORMAL

## 2024-04-22 NOTE — PROGRESS NOTES
Received additional forms from [employer] requesting additional information be filed from 4/8/24 to today.     Upon review, patient's restrictions were lifted on 4/8/24. Called [phone] to determine why this paperwork is needed.    I spoke with a member of [employer]'s support team.. They are unsure why additional information is needed so will be passing this on to his claim team and have them reach out if additional information truly is needed. If we don't hear from them, we can assume nothing else is needed.  CROW RN

## 2024-05-25 ENCOUNTER — HEALTH MAINTENANCE LETTER (OUTPATIENT)
Age: 39
End: 2024-05-25

## 2024-07-11 ENCOUNTER — ANCILLARY PROCEDURE (OUTPATIENT)
Dept: CARDIOLOGY | Facility: CLINIC | Age: 39
End: 2024-07-11
Attending: INTERNAL MEDICINE
Payer: COMMERCIAL

## 2024-07-11 DIAGNOSIS — I46.9 CARDIAC ARREST (H): ICD-10-CM

## 2024-07-11 DIAGNOSIS — Z95.810 ICD (IMPLANTABLE CARDIOVERTER-DEFIBRILLATOR) IN PLACE: ICD-10-CM

## 2024-07-11 DIAGNOSIS — I44.2 ATRIOVENTRICULAR BLOCK, COMPLETE (H): ICD-10-CM

## 2024-07-11 LAB
MDC_IDC_EPISODE_DTM: NORMAL
MDC_IDC_EPISODE_ID: NORMAL
MDC_IDC_EPISODE_TYPE: NORMAL
MDC_IDC_LEAD_CONNECTION_STATUS: NORMAL
MDC_IDC_LEAD_CONNECTION_STATUS: NORMAL
MDC_IDC_LEAD_IMPLANT_DT: NORMAL
MDC_IDC_LEAD_IMPLANT_DT: NORMAL
MDC_IDC_LEAD_LOCATION: NORMAL
MDC_IDC_LEAD_LOCATION: NORMAL
MDC_IDC_LEAD_LOCATION_DETAIL_1: NORMAL
MDC_IDC_LEAD_LOCATION_DETAIL_1: NORMAL
MDC_IDC_LEAD_MFG: NORMAL
MDC_IDC_LEAD_MFG: NORMAL
MDC_IDC_LEAD_MODEL: NORMAL
MDC_IDC_LEAD_MODEL: NORMAL
MDC_IDC_LEAD_POLARITY_TYPE: NORMAL
MDC_IDC_LEAD_POLARITY_TYPE: NORMAL
MDC_IDC_LEAD_SERIAL: NORMAL
MDC_IDC_LEAD_SERIAL: NORMAL
MDC_IDC_MSMT_BATTERY_DTM: NORMAL
MDC_IDC_MSMT_BATTERY_REMAINING_LONGEVITY: 114 MO
MDC_IDC_MSMT_BATTERY_REMAINING_PERCENTAGE: 100 %
MDC_IDC_MSMT_BATTERY_STATUS: NORMAL
MDC_IDC_MSMT_CAP_CHARGE_DTM: NORMAL
MDC_IDC_MSMT_CAP_CHARGE_DTM: NORMAL
MDC_IDC_MSMT_CAP_CHARGE_ENERGY: 41 J
MDC_IDC_MSMT_CAP_CHARGE_TIME: 10.1 S
MDC_IDC_MSMT_CAP_CHARGE_TIME: 10.2 S
MDC_IDC_MSMT_CAP_CHARGE_TYPE: NORMAL
MDC_IDC_MSMT_CAP_CHARGE_TYPE: NORMAL
MDC_IDC_MSMT_LEADCHNL_RA_IMPEDANCE_VALUE: 608 OHM
MDC_IDC_MSMT_LEADCHNL_RV_IMPEDANCE_VALUE: 475 OHM
MDC_IDC_PG_IMPLANT_DTM: NORMAL
MDC_IDC_PG_MFG: NORMAL
MDC_IDC_PG_MODEL: NORMAL
MDC_IDC_PG_SERIAL: NORMAL
MDC_IDC_PG_TYPE: NORMAL
MDC_IDC_SESS_CLINIC_NAME: NORMAL
MDC_IDC_SESS_DTM: NORMAL
MDC_IDC_SESS_TYPE: NORMAL
MDC_IDC_SET_BRADY_AT_MODE_SWITCH_RATE: 170 {BEATS}/MIN
MDC_IDC_SET_BRADY_LOWRATE: 55 {BEATS}/MIN
MDC_IDC_SET_BRADY_MODE: NORMAL
MDC_IDC_SET_LEADCHNL_RA_PACING_AMPLITUDE: 3.5 V
MDC_IDC_SET_LEADCHNL_RA_PACING_CAPTURE_MODE: NORMAL
MDC_IDC_SET_LEADCHNL_RA_PACING_POLARITY: NORMAL
MDC_IDC_SET_LEADCHNL_RA_PACING_PULSEWIDTH: 0.4 MS
MDC_IDC_SET_LEADCHNL_RA_SENSING_ADAPTATION_MODE: NORMAL
MDC_IDC_SET_LEADCHNL_RA_SENSING_POLARITY: NORMAL
MDC_IDC_SET_LEADCHNL_RA_SENSING_SENSITIVITY: 0.25 MV
MDC_IDC_SET_LEADCHNL_RV_PACING_AMPLITUDE: 3.5 V
MDC_IDC_SET_LEADCHNL_RV_PACING_CAPTURE_MODE: NORMAL
MDC_IDC_SET_LEADCHNL_RV_PACING_POLARITY: NORMAL
MDC_IDC_SET_LEADCHNL_RV_PACING_PULSEWIDTH: 0.4 MS
MDC_IDC_SET_LEADCHNL_RV_SENSING_ADAPTATION_MODE: NORMAL
MDC_IDC_SET_LEADCHNL_RV_SENSING_POLARITY: NORMAL
MDC_IDC_SET_LEADCHNL_RV_SENSING_SENSITIVITY: 0.6 MV
MDC_IDC_SET_ZONE_DETECTION_INTERVAL: 250 MS
MDC_IDC_SET_ZONE_DETECTION_INTERVAL: 286 MS
MDC_IDC_SET_ZONE_DETECTION_INTERVAL: 353 MS
MDC_IDC_SET_ZONE_STATUS: NORMAL
MDC_IDC_SET_ZONE_TYPE: NORMAL
MDC_IDC_SET_ZONE_VENDOR_TYPE: NORMAL
MDC_IDC_STAT_BRADY_DTM_END: NORMAL
MDC_IDC_STAT_BRADY_DTM_START: NORMAL
MDC_IDC_STAT_BRADY_RA_PERCENT_PACED: 10 %
MDC_IDC_STAT_BRADY_RV_PERCENT_PACED: 94 %
MDC_IDC_STAT_EPISODE_RECENT_COUNT: 0
MDC_IDC_STAT_EPISODE_RECENT_COUNT_DTM_END: NORMAL
MDC_IDC_STAT_EPISODE_RECENT_COUNT_DTM_START: NORMAL
MDC_IDC_STAT_EPISODE_TYPE: NORMAL
MDC_IDC_STAT_EPISODE_VENDOR_TYPE: NORMAL
MDC_IDC_STAT_TACHYTHERAPY_ATP_DELIVERED_RECENT: 0
MDC_IDC_STAT_TACHYTHERAPY_ATP_DELIVERED_TOTAL: 1
MDC_IDC_STAT_TACHYTHERAPY_RECENT_DTM_END: NORMAL
MDC_IDC_STAT_TACHYTHERAPY_RECENT_DTM_START: NORMAL
MDC_IDC_STAT_TACHYTHERAPY_SHOCKS_ABORTED_RECENT: 0
MDC_IDC_STAT_TACHYTHERAPY_SHOCKS_ABORTED_TOTAL: 0
MDC_IDC_STAT_TACHYTHERAPY_SHOCKS_DELIVERED_RECENT: 0
MDC_IDC_STAT_TACHYTHERAPY_SHOCKS_DELIVERED_TOTAL: 1
MDC_IDC_STAT_TACHYTHERAPY_TOTAL_DTM_END: NORMAL
MDC_IDC_STAT_TACHYTHERAPY_TOTAL_DTM_START: NORMAL

## 2024-07-11 PROCEDURE — 93295 DEV INTERROG REMOTE 1/2/MLT: CPT | Performed by: INTERNAL MEDICINE

## 2024-07-11 PROCEDURE — 93296 REM INTERROG EVL PM/IDS: CPT | Performed by: INTERNAL MEDICINE

## 2024-08-03 ENCOUNTER — HEALTH MAINTENANCE LETTER (OUTPATIENT)
Age: 39
End: 2024-08-03

## 2024-08-14 NOTE — PHARMACY-VANCOMYCIN DOSING SERVICE
[facility] [place]  Hematology/Oncology  Consult      Patient Name: Luis Seo  YOB: 1948  PCP: Reginald Oneill MD   Referring Provider:      Reason for Consultation:   Chief Complaint   Patient presents with    Follow-up     Pancreatic carcinoma metastatic to liver        History of Present Illness: This is a 76-year-old male patient following with Dr. Trammell for history of adenocarcinoma of the pancreatic body status post distal pancreatectomy with splenectomy and portal lymphadenectomy at [facility] with positive margins but lymph nodes negative. Not a candidate for adjuvant therapy previously due to poor performance status. Patient with T3 N0 adenocarcinoma of the pancreas with positive margins.  He declines any adjuvant radiation plus capecitabine or adjuvant 6-month of gemcitabine and wants to be followed only with surveillance. 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 Recently admitted for acute PE with significant clot burden on Xarelto.  Patient presented to the ED for evaluation of severe abdominal pain.  CTA chest/abdomen/pelvis showed no evidence of thoracic or abdominal aortic aneurysm or dissection.  4.4 cm pancreatic head lesion with hepatic lesions consistent with metastatic pancreatic carcinoma.  General surgery following.  MRCP without evidence of cholelithiasis or choledocholithiasis, no ductal dilation or filling defect to suggest gallstone pancreatitis  Imaging concerning for possible hepatic mets. LFTs with , , total bili 1.5, and lipase 1999. CMP with K3.4, lactic acid 3.2.  proBNP 283, troponin 26.  CBC with Hgb 11.1 and WBC platelets WNL.  Per H&P treatment including chemotherapy and radiation discussed with patient however patient stated does not want therapy. Consultation on recommendations for concerning hepatic metastasis.    Diagnostic Data:     Past  Pharmacy Vancomycin Initial Note  Date of Service 2022  Patient's  1985  36 year old, male    Indication: Bacteremia    Current estimated CrCl = Estimated Creatinine Clearance: 158.6 mL/min (based on SCr of 0.87 mg/dL).    Creatinine for last 3 days  3/22/2022: 12:04 PM Creatinine 1.06 mg/dL; 11:40 PM Creatinine 0.99 mg/dL  3/23/2022:  7:40 AM Creatinine 0.87 mg/dL;  7:40 AM Creatinine 0.87 mg/dL    Recent Vancomycin Level(s) for last 3 days  No results found for requested labs within last 72 hours.      Vancomycin IV Administrations (past 72 hours)                   vancomycin (VANCOCIN) 2,500 mg in sodium chloride 0.9 % 500 mL intermittent infusion (mg) 2,500 mg New Bag 22 0603                Nephrotoxins and other renal medications (From now, onward)    Start     Dose/Rate Route Frequency Ordered Stop    22 1800  vancomycin 1250 mg in 0.9% NaCl 250 mL intermittent infusion 1,250 mg         1,250 mg  over 2 Hours Intravenous EVERY 12 HOURS 22 1126            Contrast Orders - past 72 hours (72h ago, onward)            Start     Dose/Rate Route Frequency Ordered Stop    22 1515  iopamidol (ISOVUE-370) solution 500 mL         500 mL Intravenous ONCE 22 1511 22 1512          [employer] Prediction of Planned Initial Vancomycin Regimen  Loading dose: 2500 mg  Regimen: 1250 mg IV every 12 hours.  Start time: 18:03 on 2022  Exposure target: AUC24 (range)400-600 mg/L.hr   AUC24,ss: 460 mg/L.hr  Probability of AUC24 > 400: 65 %  Ctrough,ss: 14.9 mg/L  Probability of Ctrough,ss > 20: 26 %  Probability of nephrotoxicity (Lodise PARTICIA ): 10 %        Plan:  1. Start vancomycin  1,250 mg IV q12h.   2. Vancomycin monitoring method: AUC  3. Vancomycin therapeutic monitoring goal: 400-600 mg*h/L  4. Pharmacy will check vancomycin levels as appropriate in 1-3 Days.    5. Serum creatinine levels will be ordered daily for the first week of therapy and at least twice weekly  for subsequent weeks.      Marissa Torre, [facility]

## 2024-09-01 ENCOUNTER — TRANSFERRED RECORDS (OUTPATIENT)
Dept: MULTI SPECIALTY CLINIC | Facility: CLINIC | Age: 39
End: 2024-09-01

## 2024-09-01 LAB — RETINOPATHY: NORMAL

## 2024-09-05 ENCOUNTER — ANCILLARY PROCEDURE (OUTPATIENT)
Dept: CARDIOLOGY | Facility: CLINIC | Age: 39
End: 2024-09-05
Attending: INTERNAL MEDICINE
Payer: COMMERCIAL

## 2024-09-05 ENCOUNTER — TELEPHONE (OUTPATIENT)
Dept: CARDIOLOGY | Facility: CLINIC | Age: 39
End: 2024-09-05
Payer: COMMERCIAL

## 2024-09-05 DIAGNOSIS — Z95.4 S/P TVR (TRICUSPID VALVE REPLACEMENT): ICD-10-CM

## 2024-09-05 DIAGNOSIS — I46.9 CARDIAC ARREST (H): ICD-10-CM

## 2024-09-05 DIAGNOSIS — I44.2 ATRIOVENTRICULAR BLOCK, COMPLETE (H): ICD-10-CM

## 2024-09-05 DIAGNOSIS — I49.01 VENTRICULAR FIBRILLATION (H): Primary | ICD-10-CM

## 2024-09-05 DIAGNOSIS — Z95.810 ICD (IMPLANTABLE CARDIOVERTER-DEFIBRILLATOR) IN PLACE: ICD-10-CM

## 2024-09-05 RX ORDER — METOPROLOL SUCCINATE 25 MG/1
50 TABLET, EXTENDED RELEASE ORAL DAILY
Qty: 90 TABLET | Refills: 3 | Status: ON HOLD | OUTPATIENT
Start: 2024-09-05 | End: 2024-09-18

## 2024-09-05 NOTE — TELEPHONE ENCOUNTER
[facility] Call Center    Phone Message    May a detailed message be left on voicemail: yes     Reason for Call: Other: patient is looking for a call back to schedule an appt as that is what he was told, patient also needs a letter for his with restrictions so he can work on disability, thank you.     Action Taken: Other: cardiology    Travel Screening: Not Applicable     Date of Service:

## 2024-09-05 NOTE — LETTER
September 5, 2024      Anil Montoya  [address] [id number]        To Whom It May Concern:       Anil Montoya is on strict restrictions for the next 3 months - during that time he cannot take part in any activity at work that may harm himself or others if he were to have a syncopal episode - specific restrictions include any type of driving, operating a vehicle, using lifting equipment to move large/heavy objects. He may return to work without restrictions on 12/4/2024.        Sincerely,        Dr. Oz SERRANO [facility]   [phone]

## 2024-09-05 NOTE — TELEPHONE ENCOUNTER
Agree with repeating echo.  Increase metoprolol to 50 mg daily.  I would also suggest reducing the detection duration so that he can get defibrillation sooner and hopefully prevent recurrent syncope with his episodes.  If you see an urgent/watchman slot on my schedule, please feel free to offer it to him.  Otherwise, I can look for somewhere that we can add him on.       Above response received from Dr. Clinton.  Echo is scheduled for 9/12/24. Hold placed on Dr. Clinton's soonest availability on 10/3 @ 2526. (Will route to Dr. Clinton to ensure she is ok with this appointment or if patient needs to be added on sooner).  Toprol XL Rx teed up... will contact patient and send to his pharmacy of choice once Dr. Clinton responds.    CROW DYE

## 2024-09-05 NOTE — TELEPHONE ENCOUNTER
[facility] Call Center    Phone Message    May a detailed message be left on voicemail: yes     Reason for Call: Symptoms or Concerns     If patient has red-flag symptoms, warm transfer to triage line    Current symptom or concern: Patient believes ICD went off at 630/640p on 9/4/24    Symptoms have been present for: n/a hour(s)            Action Taken: Other: cardio    Travel Screening: Not Applicable     Date of Service:

## 2024-09-05 NOTE — TELEPHONE ENCOUNTER
Dr. Clinton is ok with the appt on 10/3.    Spoke with patient. He is aware and in agreement with recommendations. He can make the appt on 10/3 work. (Staff message sent to scheduling to place him on the schedule).   Metoprolol Rx sent to patient's pharmacy of choice. He knows to monitor his BP and contact the clinic with any concerns.  Letter with work restrictions written and sent to patient via [employer] and email.  Device RN number provided in case he has any questions regarding work excuse letter or any new/concerning symptoms.  CROW RN

## 2024-09-05 NOTE — TELEPHONE ENCOUNTER
"Alert received from patient's ICD for, \"Ventricular shock therapy delivered to convert arrhythmia.\"    VF episode logged 9/4/24 @ 1727. EGM confirms VF lasting 20s before 41J shock x1 delivered which was successful at converting arrhythmia. (Refer to bottom of this encounter for screenshot of rhythm strip and tachy therapy settings).  FYI. 2 additional NSVT episodes logged on 8/28/24 at 1613 & 1652. EGMs show fast polymorphic VT in the 200s lasting 13-14 beats.    I spoke with Anil. He believes the episode was brought on by stress. The police were at his house to inform him that he cannot contact or see his children for a week. It sounds as though he is in the middle of a divorce and his partner filed a restraining order. The police were talking to him when he passed out. They called 911. His EKG in the ambulance was \"normal for him\" (his mode is DDI 55, non tracking mode as he was symptomatic when in DDD). His blood sugar was 203.    Patient received a shock for VF earlier this year on 1/8/24. He met with Lisa Umanzor on 1/11/24... coreg was changed to 25mg toprol XL daily at that time. It was recommended patient have an updated echo and follow up with Dr Dorsey. He no showed both appointments on 2/23. Since then he has canceled follow up appts with Dr. Higgins and Lisa Umanzor in 4/2024.    Patient states he has been compliant with the metoprolol and is not aware of any missed doses. He is aware that we need an updated echo ASAP seeing as this was never done. I placed a new, active order. Will have scheduling reach out to him to get this done ASAP. Will route a message to Dr. Clinton (EP MD of the day) for review and recommendation.     Patient knows not to drive. He already informed his work of the likely 3 month restriction. He will need an MD letter for his job.        Tachy Therapy History:   implanted for secondary prevention (VF arrest)   -- Resonate (4/2022 - present):        - 9/4/24: shock x1 for VF       - 1/8/24: " unsuccessful ATPx1 and successful shock x1 for VF                          Tachy Therapy Settings:

## 2024-09-12 ENCOUNTER — HOSPITAL ENCOUNTER (OUTPATIENT)
Dept: CARDIOLOGY | Facility: CLINIC | Age: 39
Discharge: HOME | End: 2024-09-12
Attending: PHYSICIAN ASSISTANT | Admitting: PHYSICIAN ASSISTANT
Payer: COMMERCIAL

## 2024-09-12 DIAGNOSIS — I49.01 VENTRICULAR FIBRILLATION (H): ICD-10-CM

## 2024-09-12 LAB — LVEF ECHO: NORMAL

## 2024-09-12 PROCEDURE — C8929 TTE W OR WO FOL WCON,DOPPLER: HCPCS

## 2024-09-12 PROCEDURE — 999N000208 ECHOCARDIOGRAM COMPLETE

## 2024-09-12 PROCEDURE — 255N000002 HC RX 255 OP 636: Performed by: PHYSICIAN ASSISTANT

## 2024-09-12 PROCEDURE — 93306 TTE W/DOPPLER COMPLETE: CPT | Mod: 26 | Performed by: INTERNAL MEDICINE

## 2024-09-12 RX ADMIN — HUMAN ALBUMIN MICROSPHERES AND PERFLUTREN 9 ML: 10; .22 INJECTION, SOLUTION INTRAVENOUS at 09:34

## 2024-09-14 ENCOUNTER — DOCUMENTATION ONLY (OUTPATIENT)
Dept: CARDIOLOGY | Facility: CLINIC | Age: 39
End: 2024-09-14

## 2024-09-14 ENCOUNTER — APPOINTMENT (OUTPATIENT)
Dept: GENERAL RADIOLOGY | Facility: CLINIC | Age: 39
End: 2024-09-14
Attending: EMERGENCY MEDICINE
Payer: COMMERCIAL

## 2024-09-14 ENCOUNTER — HOSPITAL ENCOUNTER (EMERGENCY)
Facility: CLINIC | Age: 39
Discharge: ANOTHER HOSPITAL | End: 2024-09-15
Attending: EMERGENCY MEDICINE | Admitting: EMERGENCY MEDICINE
Payer: COMMERCIAL

## 2024-09-14 DIAGNOSIS — R60.0 PERIPHERAL EDEMA: ICD-10-CM

## 2024-09-14 DIAGNOSIS — R06.09 EXERTIONAL DYSPNEA: ICD-10-CM

## 2024-09-14 DIAGNOSIS — D72.829 LEUKOCYTOSIS, UNSPECIFIED TYPE: ICD-10-CM

## 2024-09-14 DIAGNOSIS — D75.839 THROMBOCYTOSIS: ICD-10-CM

## 2024-09-14 DIAGNOSIS — D64.9 ANEMIA, UNSPECIFIED TYPE: ICD-10-CM

## 2024-09-14 LAB
ANION GAP SERPL CALCULATED.3IONS-SCNC: 15 MMOL/L (ref 7–15)
APTT PPP: 38 SECONDS (ref 22–38)
BUN SERPL-MCNC: 29.2 MG/DL (ref 6–20)
CALCIUM SERPL-MCNC: 8.9 MG/DL (ref 8.8–10.4)
CHLORIDE SERPL-SCNC: 105 MMOL/L (ref 98–107)
CREAT SERPL-MCNC: 1.53 MG/DL (ref 0.67–1.17)
EGFRCR SERPLBLD CKD-EPI 2021: 60 ML/MIN/1.73M2
FIBRINOGEN PPP-MCNC: 423 MG/DL (ref 170–510)
GLUCOSE SERPL-MCNC: 356 MG/DL (ref 70–99)
HBA1C MFR BLD: 8.2 %
HCO3 SERPL-SCNC: 16 MMOL/L (ref 22–29)
HOLD SPECIMEN: NORMAL
INR PPP: 1.83 (ref 0.85–1.15)
LDH SERPL L TO P-CCNC: 1290 U/L (ref 0–250)
MAGNESIUM SERPL-MCNC: 2.5 MG/DL (ref 1.7–2.3)
NT-PROBNP SERPL-MCNC: 2613 PG/ML (ref 0–450)
POTASSIUM SERPL-SCNC: 5.1 MMOL/L (ref 3.4–5.3)
RETICS # AUTO: 0.12 10E6/UL (ref 0.03–0.1)
RETICS/RBC NFR AUTO: 4 % (ref 0.5–2)
SODIUM SERPL-SCNC: 136 MMOL/L (ref 135–145)
TROPONIN T SERPL HS-MCNC: 16 NG/L
TROPONIN T SERPL HS-MCNC: 28 NG/L
URATE SERPL-MCNC: 10.1 MG/DL (ref 3.4–7)

## 2024-09-14 PROCEDURE — 258N000003 HC RX IP 258 OP 636: Performed by: EMERGENCY MEDICINE

## 2024-09-14 PROCEDURE — 36415 COLL VENOUS BLD VENIPUNCTURE: CPT | Performed by: EMERGENCY MEDICINE

## 2024-09-14 PROCEDURE — 85007 BL SMEAR W/DIFF WBC COUNT: CPT | Performed by: EMERGENCY MEDICINE

## 2024-09-14 PROCEDURE — 85045 AUTOMATED RETICULOCYTE COUNT: CPT | Performed by: EMERGENCY MEDICINE

## 2024-09-14 PROCEDURE — 93005 ELECTROCARDIOGRAM TRACING: CPT

## 2024-09-14 PROCEDURE — 80048 BASIC METABOLIC PNL TOTAL CA: CPT | Performed by: EMERGENCY MEDICINE

## 2024-09-14 PROCEDURE — 250N000013 HC RX MED GY IP 250 OP 250 PS 637: Performed by: EMERGENCY MEDICINE

## 2024-09-14 PROCEDURE — 85384 FIBRINOGEN ACTIVITY: CPT | Performed by: EMERGENCY MEDICINE

## 2024-09-14 PROCEDURE — 71046 X-RAY EXAM CHEST 2 VIEWS: CPT

## 2024-09-14 PROCEDURE — 83036 HEMOGLOBIN GLYCOSYLATED A1C: CPT | Performed by: EMERGENCY MEDICINE

## 2024-09-14 PROCEDURE — 84484 ASSAY OF TROPONIN QUANT: CPT | Performed by: EMERGENCY MEDICINE

## 2024-09-14 PROCEDURE — 85730 THROMBOPLASTIN TIME PARTIAL: CPT | Performed by: EMERGENCY MEDICINE

## 2024-09-14 PROCEDURE — 84550 ASSAY OF BLOOD/URIC ACID: CPT | Performed by: EMERGENCY MEDICINE

## 2024-09-14 PROCEDURE — 96360 HYDRATION IV INFUSION INIT: CPT

## 2024-09-14 PROCEDURE — 83735 ASSAY OF MAGNESIUM: CPT | Performed by: EMERGENCY MEDICINE

## 2024-09-14 PROCEDURE — 85018 HEMOGLOBIN: CPT | Performed by: EMERGENCY MEDICINE

## 2024-09-14 PROCEDURE — 83880 ASSAY OF NATRIURETIC PEPTIDE: CPT | Performed by: EMERGENCY MEDICINE

## 2024-09-14 PROCEDURE — 99285 EMERGENCY DEPT VISIT HI MDM: CPT | Mod: 25

## 2024-09-14 PROCEDURE — 85610 PROTHROMBIN TIME: CPT | Performed by: EMERGENCY MEDICINE

## 2024-09-14 PROCEDURE — 83615 LACTATE (LD) (LDH) ENZYME: CPT | Performed by: EMERGENCY MEDICINE

## 2024-09-14 RX ORDER — HYDROXYUREA 500 MG/1
2000 CAPSULE ORAL EVERY 8 HOURS
Status: DISCONTINUED | OUTPATIENT
Start: 2024-09-15 | End: 2024-09-15 | Stop reason: HOSPADM

## 2024-09-14 RX ORDER — IBUPROFEN 200 MG
800 TABLET ORAL DAILY PRN
Status: ON HOLD | COMMUNITY
End: 2024-09-18

## 2024-09-14 RX ORDER — ALLOPURINOL 300 MG/1
300 TABLET ORAL DAILY
Status: DISCONTINUED | OUTPATIENT
Start: 2024-09-14 | End: 2024-09-15 | Stop reason: HOSPADM

## 2024-09-14 RX ADMIN — Medication 5 MG: at 23:40

## 2024-09-14 RX ADMIN — SODIUM CHLORIDE 500 ML: 9 INJECTION, SOLUTION INTRAVENOUS at 23:45

## 2024-09-14 ASSESSMENT — ACTIVITIES OF DAILY LIVING (ADL)
ADLS_ACUITY_SCORE: 37

## 2024-09-14 ASSESSMENT — COLUMBIA-SUICIDE SEVERITY RATING SCALE - C-SSRS
6. HAVE YOU EVER DONE ANYTHING, STARTED TO DO ANYTHING, OR PREPARED TO DO ANYTHING TO END YOUR LIFE?: NO
2. HAVE YOU ACTUALLY HAD ANY THOUGHTS OF KILLING YOURSELF IN THE PAST MONTH?: NO
1. IN THE PAST MONTH, HAVE YOU WISHED YOU WERE DEAD OR WISHED YOU COULD GO TO SLEEP AND NOT WAKE UP?: NO

## 2024-09-14 NOTE — ED PROVIDER NOTES
"  Emergency Department Note      History of Present Illness     Chief Complaint   Dizziness      HPI     Anil Montoya is a 38 year old male with a history of type 2 diabetes, cardiac arrest (currently on Aspirin)  and S/P tricuspid valve replacement, who presents to the ED with a friend for an evaluation of dizziness. The patient reports that his symptoms started on 09/04/2024. He states that he had an episode on 09/04, when his defibrillator went off. He adds that he was talking to a  when he lost conscious and fell to the ground. Notes that he was felt okay when woke up but doesn't remember how long he was sleep. States that he had Metoprolol change from 25 mg to 50 mg due to the defibrillator shock.  His friend states that for the past 4 days she has noticed increased fatigue, dizziness, shortness of breath with exertion and insomnia. Endorses leg swelling that began 2 days ago. Denies any chest pain, fever and cough.     Independent Historian   Patient and friend, as per HPI.       Review of External Notes   I have reviewed the patient;'s office note from 01/11/2024, for a history of VF arrest and single chamber pacemaker.  I reviewed cardiology nurse notes from 9/5/2024.  There was an alert that patient had a ventricular \"shock.\"  There is a episode of V-fib logged on 9/4/2024 at 17/27.  V-fib last 20 seconds before a 41 J shock x 1 was delivered with successful conversion.    Past Medical History     Medical History and Problem List   Endocarditis   Cardiac arrest   Type 2 diabetes   Hyperlipidemia   Hypothyroidism  Insomnia   Asthma   S/P Tricuspid valve replacement       Medications   Wellbutrin XL   Flexeril   Jardiance   Gabapentin   Levothyroxine   Claritin   Cozaar   Metformin   Robaxin   Toprol XL   Aspirin   Coreg   Amlodipine       Surgical History   CV coronary angiogram   EP ICD insert single   IR lumbar puncture   Replace valve aortic   Replace valve tricuspid " "      Physical Exam     Patient Vitals for the past 24 hrs:   BP Temp Temp src Pulse Resp SpO2 Height Weight   09/14/24 1832 120/67 -- -- 54 -- 96 % -- --   09/14/24 1817 116/66 -- -- 55 -- 95 % -- --   09/14/24 1815 -- 98.8  F (37.1  C) Oral -- -- -- -- --   09/14/24 1800 118/64 -- -- 54 -- 96 % -- --   09/14/24 1732 124/70 -- -- 54 -- 96 % -- --   09/14/24 1717 120/61 -- -- 54 -- 96 % -- --   09/14/24 1645 122/65 -- -- 55 -- 96 % -- --   09/14/24 1643 126/68 -- -- 55 -- 97 % -- --   09/14/24 1600 121/70 -- -- 54 -- 97 % -- --   09/14/24 1545 127/70 -- -- 54 -- 97 % -- --   09/14/24 1516 (!) 144/86 97.8  F (36.6  C) Temporal 56 16 100 % 1.778 m (5' 10\") 96 kg (211 lb 10.3 oz)     Physical Exam      HEENT:    Oropharynx is moist  Eyes:    Conjunctiva normal  Neck:     Supple, no meningismus.     CV:     Bradycardic, regular rhythm.      No murmurs, rubs or gallops.       No unilateral leg swelling.       2+ radial pulses bilateral.       1+ bilateral lower extremity edema.  PULM:    Clear to auscultation bilateral.       No respiratory distress.      Good air exchange.     No rales or wheezing.     No stridor.  ABD:    Soft, non-tender, non-distended.       No pulsatile masses.       No rebound, guarding or rigidity.  MSK:     No gross deformity to all four extremities.   LYMPH:   No cervical lymphadenopathy.  NEURO:   Alert. Good muscle tone, no atrophy.  Skin:    Warm, dry and intact.    Psych:    Mood is good and affect is appropriate.    Diagnostics     Lab Results   Labs Ordered and Resulted from Time of ED Arrival to Time of ED Departure   BASIC METABOLIC PANEL - Abnormal       Result Value    Sodium 136      Potassium 5.1      Chloride 105      Carbon Dioxide (CO2) 16 (*)     Anion Gap 15      Urea Nitrogen 29.2 (*)     Creatinine 1.53 (*)     GFR Estimate 60 (*)     Calcium 8.9      Glucose 356 (*)    CBC WITH PLATELETS AND DIFFERENTIAL - Abnormal    WBC Count        RBC Count 3.20 (*)     Hemoglobin 9.8 (*) "     Hematocrit 28.7 (*)     MCV 90      MCH 30.6      MCHC 34.1      RDW 18.7 (*)     Platelet Count 566 (*)     NRBCs per 100 WBC 1 (*)     Absolute NRBCs 6.4     NT PROBNP INPATIENT - Abnormal    N terminal Pro BNP Inpatient 2,613 (*)    MAGNESIUM - Abnormal    Magnesium 2.5 (*)    TROPONIN T, HIGH SENSITIVITY - Abnormal    Troponin T, High Sensitivity 28 (*)    HEMOGLOBIN A1C - Abnormal    Hemoglobin A1C 8.2 (*)    CBC WITH PLATELETS AND DIFFERENTIAL - Abnormal    WBC Count 425.1 (*)     RBC Count 2.89 (*)     Hemoglobin 9.2 (*)     Hematocrit 25.8 (*)     MCV 89      MCH 31.8      MCHC 35.7      RDW 18.6 (*)     Platelet Count 467 (*)     NRBCs per 100 WBC 1 (*)     Absolute NRBCs 6.1     MANUAL DIFFERENTIAL - Abnormal    % Neutrophils 21      % Lymphocytes 2      % Monocytes 2      % Eosinophils 9      % Basophils 7      % Metamyelocytes 14      % Myelocytes 6      % Promyelocytes 26      % Blasts 12      % Plasma Cells 1      Absolute Neutrophils 89.3 (*)     Absolute Lymphocytes 8.5 (*)     Absolute Monocytes 8.5 (*)     Absolute Eosinophils 38.3 (*)     Absolute Basophils 29.8 (*)     Absolute Metamyelocytes 59.5 (*)     Absolute Myelocytes 25.5 (*)     Absolute Promyelocytes 110.5 (*)     Absolute Blasts 51.0 (*)     Absolute Plasma Cells 4.3 (*)     RBC Morphology Confirmed RBC Indices      Platelet Assessment        Value: Automated Count Confirmed. Platelet morphology is normal.   TROPONIN T, HIGH SENSITIVITY - Normal    Troponin T, High Sensitivity 16     MANUAL DIFFERENTIAL   LAB BLOOD MORPHOLOGY PATHOLOGIST REVIEW       Imaging   Chest XR,  PA & LAT   Final Result   IMPRESSION: Cardiac pacemaker/ICD in place. Status post median sternotomy with normal heart size. Lungs are clear of CHF, lobar consolidation or effusion. Mediastinum and bony structures are unremarkable.          EKG   ECG taken at 1530, ECG read at 1542  Sinus rhythm with complete heart block and ventricular-paced rhythm   Abnormal ECG  "  Rate 55 bpm. MT interval * ms. QRS duration 194 ms. QT/QTc 598/572 ms. P-R-T axes 29 240 39.    Independent Interpretation   CXR: No pneumothorax or infiltrate.    ED Course      Medications Administered   Medications - No data to display    Procedures   Procedures     Discussion of Management   Dr. Beard pathology who reported myeloid blasts with left shift and granulocytosis.  She has recommended flow cytometry.    Dr. 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  He did have a episode of V-fib resulting in syncope.  His cardiology team has increased his metoprolol from 25 to 50 mg.  His pacemaker was interrogated and there was no additional concerning pathology.  His pacemaker is functioning well.  He was evaluated for atypical ACS.  No overt ischemic changes on EKG.  Initial troponin was elevated but 2-hour delta troponin is decreasing thus not consistent with ACS.  He has 1+ peripheral edema but no pulmonary edema.  Most recent echocardiogram reveals preserved EF although with diastolic dysfunction.  No sinister pathology noted on his cardiac workup today.    During his workup, CBC was undertaken in which patient has anemia of 9.2, thrombocytosis of 467 but marked leukocytosis at 425K.  Last CBC reported is 2022 and normal.  Peripheral smear was undertaken.  I spoke with the pathologist on-call who reports there are myeloid blast with granulocytosis and left shift.  It \"looks chronic\" but this is a preliminary result and cannot definitively rule out acute leukemia.  They have requested flow cytometry which will " not return for the next 24-48 hours.  I spoke with hematologist on whether patient would require admission versus urgent outpatient follow-up.  Due to the degree of blasts present, we are most comfortable transferring patient to the [facility] especially given his complicated medical history and inability to definitively rule out acute leukemia today.    Disposition   The patient was changed over to Dr. Wilkerson pending transfer to [facility].    Diagnosis     ICD-10-CM    1. Leukocytosis, unspecified type  D72.829 Flow Cytometry      2. Thrombocytosis  D75.839 Flow Cytometry      3. Anemia, unspecified type  D64.9 Flow Cytometry           Discharge Medications   New Prescriptions    No medications on file         Scribe Disclosure:  I, Estefanía Benjamin, am serving as a scribe at 4:20 PM on 9/14/2024 to document services personally performed by Galdino Orellana MD based on my observations and the provider's statements to me.        Galdino Orellana MD  09/15/24 [id number]

## 2024-09-14 NOTE — ED TRIAGE NOTES
HX: Bacteremia, open heart surgery. Reports his defibrillator went off last Wednesday 09/04, pt believes he had a panic attack because he was told that he wasn't going to be able to see his children. Was evaluated by EMS at that time, 12 lead WNL. Had an echo done this Thursday. Reports ongoing dizziness, lightheadedness since defibrillator went off. Reports BL feet swelling started today.      Triage Assessment (Adult)       Row Name 09/14/24 3554          Triage Assessment    Airway WDL WDL        Respiratory WDL    Respiratory WDL WDL        Skin Circulation/Temperature WDL    Skin Circulation/Temperature WDL WDL        Cardiac WDL    Cardiac WDL WDL        Peripheral/Neurovascular WDL    Peripheral Neurovascular WDL WDL        Cognitive/Neuro/Behavioral WDL    Cognitive/Neuro/Behavioral WDL WDL

## 2024-09-15 ENCOUNTER — APPOINTMENT (OUTPATIENT)
Dept: CT IMAGING | Facility: CLINIC | Age: 39
DRG: 834 | End: 2024-09-15
Attending: PHYSICIAN ASSISTANT
Payer: COMMERCIAL

## 2024-09-15 ENCOUNTER — HOSPITAL ENCOUNTER (INPATIENT)
Facility: CLINIC | Age: 39
LOS: 4 days | Discharge: HOME | DRG: 834 | End: 2024-09-19
Attending: INTERNAL MEDICINE | Admitting: STUDENT IN AN ORGANIZED HEALTH CARE EDUCATION/TRAINING PROGRAM
Payer: COMMERCIAL

## 2024-09-15 VITALS
WEIGHT: 211.64 LBS | OXYGEN SATURATION: 94 % | HEIGHT: 70 IN | DIASTOLIC BLOOD PRESSURE: 75 MMHG | SYSTOLIC BLOOD PRESSURE: 128 MMHG | RESPIRATION RATE: 16 BRPM | TEMPERATURE: 98.8 F | HEART RATE: 55 BPM | BODY MASS INDEX: 30.3 KG/M2

## 2024-09-15 DIAGNOSIS — I49.01 VENTRICULAR FIBRILLATION (H): ICD-10-CM

## 2024-09-15 DIAGNOSIS — E11.65 HYPERGLYCEMIA DUE TO DIABETES MELLITUS (H): Primary | ICD-10-CM

## 2024-09-15 DIAGNOSIS — Z95.4 S/P TVR (TRICUSPID VALVE REPLACEMENT): ICD-10-CM

## 2024-09-15 DIAGNOSIS — C92.10 CML (CHRONIC MYELOCYTIC LEUKEMIA) (H): ICD-10-CM

## 2024-09-15 PROBLEM — C92.00 AML (ACUTE MYELOGENOUS LEUKEMIA) (H): Status: ACTIVE | Noted: 2024-09-15

## 2024-09-15 LAB
ALBUMIN SERPL BCG-MCNC: 3.4 G/DL (ref 3.5–5.2)
ALBUMIN UR-MCNC: NEGATIVE MG/DL
ALP SERPL-CCNC: 156 U/L (ref 40–150)
ALT SERPL W P-5'-P-CCNC: 13 U/L (ref 0–70)
ANION GAP SERPL CALCULATED.3IONS-SCNC: 10 MMOL/L (ref 7–15)
ANION GAP SERPL CALCULATED.3IONS-SCNC: 11 MMOL/L (ref 7–15)
ANION GAP SERPL CALCULATED.3IONS-SCNC: 9 MMOL/L (ref 7–15)
APPEARANCE UR: CLEAR
APTT PPP: 33 SECONDS (ref 22–38)
APTT PPP: 34 SECONDS (ref 22–38)
APTT PPP: 38 SECONDS (ref 22–38)
AST SERPL W P-5'-P-CCNC: 32 U/L (ref 0–45)
BASOPHILS # BLD AUTO: ABNORMAL 10*3/UL
BASOPHILS # BLD MANUAL: 16.1 10E3/UL (ref 0–0.2)
BASOPHILS NFR BLD AUTO: ABNORMAL %
BASOPHILS NFR BLD MANUAL: 4 %
BILIRUB DIRECT SERPL-MCNC: 0.34 MG/DL (ref 0–0.3)
BILIRUB SERPL-MCNC: 0.9 MG/DL
BILIRUB UR QL STRIP: NEGATIVE
BUN SERPL-MCNC: 22.2 MG/DL (ref 6–20)
BUN SERPL-MCNC: 22.7 MG/DL (ref 6–20)
BUN SERPL-MCNC: 23 MG/DL (ref 6–20)
BURR CELLS BLD QL SMEAR: SLIGHT
C DIFF TOX B STL QL: NEGATIVE
CALCIUM SERPL-MCNC: 8.5 MG/DL (ref 8.8–10.4)
CALCIUM SERPL-MCNC: 8.7 MG/DL (ref 8.8–10.4)
CALCIUM SERPL-MCNC: 8.8 MG/DL (ref 8.8–10.4)
CHLORIDE SERPL-SCNC: 110 MMOL/L (ref 98–107)
CHLORIDE SERPL-SCNC: 110 MMOL/L (ref 98–107)
CHLORIDE SERPL-SCNC: 113 MMOL/L (ref 98–107)
COLOR UR AUTO: ABNORMAL
CREAT SERPL-MCNC: 1.16 MG/DL (ref 0.67–1.17)
CREAT SERPL-MCNC: 1.25 MG/DL (ref 0.67–1.17)
CREAT SERPL-MCNC: 1.32 MG/DL (ref 0.67–1.17)
EGFRCR SERPLBLD CKD-EPI 2021: 72 ML/MIN/1.73M2
EGFRCR SERPLBLD CKD-EPI 2021: 77 ML/MIN/1.73M2
EGFRCR SERPLBLD CKD-EPI 2021: 84 ML/MIN/1.73M2
ELLIPTOCYTES BLD QL SMEAR: SLIGHT
EOSINOPHIL # BLD AUTO: ABNORMAL 10*3/UL
EOSINOPHIL # BLD MANUAL: 24.1 10E3/UL (ref 0–0.7)
EOSINOPHIL NFR BLD AUTO: ABNORMAL %
EOSINOPHIL NFR BLD MANUAL: 6 %
ERYTHROCYTE [DISTWIDTH] IN BLOOD BY AUTOMATED COUNT: 19 % (ref 10–15)
FIBRINOGEN PPP-MCNC: 382 MG/DL (ref 170–510)
FIBRINOGEN PPP-MCNC: 389 MG/DL (ref 170–510)
FIBRINOGEN PPP-MCNC: 455 MG/DL (ref 170–510)
GLUCOSE BLDC GLUCOMTR-MCNC: 122 MG/DL (ref 70–99)
GLUCOSE BLDC GLUCOMTR-MCNC: 159 MG/DL (ref 70–99)
GLUCOSE BLDC GLUCOMTR-MCNC: 174 MG/DL (ref 70–99)
GLUCOSE BLDC GLUCOMTR-MCNC: 230 MG/DL (ref 70–99)
GLUCOSE SERPL-MCNC: 141 MG/DL (ref 70–99)
GLUCOSE SERPL-MCNC: 205 MG/DL (ref 70–99)
GLUCOSE SERPL-MCNC: 231 MG/DL (ref 70–99)
GLUCOSE UR STRIP-MCNC: >=1000 MG/DL
HBV CORE AB SERPL QL IA: NONREACTIVE
HBV SURFACE AB SERPL IA-ACNC: <3.5 M[IU]/ML
HBV SURFACE AB SERPL IA-ACNC: NONREACTIVE M[IU]/ML
HBV SURFACE AG SERPL QL IA: NONREACTIVE
HCO3 SERPL-SCNC: 19 MMOL/L (ref 22–29)
HCO3 SERPL-SCNC: 21 MMOL/L (ref 22–29)
HCO3 SERPL-SCNC: 21 MMOL/L (ref 22–29)
HCT VFR BLD AUTO: 26.1 % (ref 40–53)
HGB BLD-MCNC: 8.7 G/DL (ref 13.3–17.7)
HGB UR QL STRIP: NEGATIVE
HIV 1+2 AB+HIV1 P24 AG SERPL QL IA: NONREACTIVE
HOLD SPECIMEN: NORMAL
HOLD SPECIMEN: NORMAL
IMM GRANULOCYTES # BLD: ABNORMAL 10*3/UL
IMM GRANULOCYTES NFR BLD: ABNORMAL %
INR PPP: 1.45 (ref 0.85–1.15)
INR PPP: 1.57 (ref 0.85–1.15)
INR PPP: 1.59 (ref 0.85–1.15)
KETONES UR STRIP-MCNC: NEGATIVE MG/DL
LDH SERPL L TO P-CCNC: 1048 U/L (ref 0–250)
LDH SERPL L TO P-CCNC: 881 U/L (ref 0–250)
LDH SERPL L TO P-CCNC: 927 U/L (ref 0–250)
LEUKOCYTE ESTERASE UR QL STRIP: NEGATIVE
LYMPHOCYTES # BLD AUTO: ABNORMAL 10*3/UL
LYMPHOCYTES # BLD MANUAL: 12 10E3/UL (ref 0.8–5.3)
LYMPHOCYTES NFR BLD AUTO: ABNORMAL %
LYMPHOCYTES NFR BLD MANUAL: 3 %
MCH RBC QN AUTO: 30.1 PG (ref 26.5–33)
MCHC RBC AUTO-ENTMCNC: 33.3 G/DL (ref 31.5–36.5)
MCV RBC AUTO: 90 FL (ref 78–100)
METAMYELOCYTES # BLD MANUAL: 32.1 10E3/UL
METAMYELOCYTES NFR BLD MANUAL: 8 %
MONOCYTES # BLD AUTO: ABNORMAL 10*3/UL
MONOCYTES # BLD MANUAL: 16.1 10E3/UL (ref 0–1.3)
MONOCYTES NFR BLD AUTO: ABNORMAL %
MONOCYTES NFR BLD MANUAL: 4 %
MUCOUS THREADS #/AREA URNS LPF: PRESENT /LPF
MYELOCYTES # BLD MANUAL: 72.3 10E3/UL
MYELOCYTES NFR BLD MANUAL: 18 %
NEUTROPHILS # BLD AUTO: ABNORMAL 10*3/UL
NEUTROPHILS # BLD MANUAL: 176.7 10E3/UL (ref 1.6–8.3)
NEUTROPHILS NFR BLD AUTO: ABNORMAL %
NEUTROPHILS NFR BLD MANUAL: 44 %
NITRATE UR QL: NEGATIVE
NRBC # BLD AUTO: 4 10E3/UL
NRBC # BLD AUTO: 5.4 10E3/UL
NRBC BLD AUTO-RTO: 1 /100
NRBC BLD MANUAL-RTO: 1 %
PH UR STRIP: 5.5 [PH] (ref 5–7)
PHOSPHATE SERPL-MCNC: 2.8 MG/DL (ref 2.5–4.5)
PHOSPHATE SERPL-MCNC: 3.2 MG/DL (ref 2.5–4.5)
PHOSPHATE SERPL-MCNC: 3.8 MG/DL (ref 2.5–4.5)
PLAT MORPH BLD: ABNORMAL
PLATELET # BLD AUTO: 511 10E3/UL (ref 150–450)
POTASSIUM SERPL-SCNC: 3.5 MMOL/L (ref 3.4–5.3)
POTASSIUM SERPL-SCNC: 4.1 MMOL/L (ref 3.4–5.3)
POTASSIUM SERPL-SCNC: 4.4 MMOL/L (ref 3.4–5.3)
PROMYELOCYTES # BLD MANUAL: 52.2 10E3/UL
PROMYELOCYTES NFR BLD MANUAL: 13 %
PROT SERPL-MCNC: 5.9 G/DL (ref 6.4–8.3)
RBC # BLD AUTO: 2.89 10E6/UL (ref 4.4–5.9)
RBC MORPH BLD: ABNORMAL
RBC URINE: <1 /HPF
SODIUM SERPL-SCNC: 140 MMOL/L (ref 135–145)
SODIUM SERPL-SCNC: 142 MMOL/L (ref 135–145)
SODIUM SERPL-SCNC: 142 MMOL/L (ref 135–145)
SP GR UR STRIP: 1.03 (ref 1–1.03)
T4 FREE SERPL-MCNC: 1.33 NG/DL (ref 0.9–1.7)
TSH SERPL DL<=0.005 MIU/L-ACNC: 4.67 UIU/ML (ref 0.3–4.2)
URATE SERPL-MCNC: 4 MG/DL (ref 3.4–7)
URATE SERPL-MCNC: 4.9 MG/DL (ref 3.4–7)
URATE SERPL-MCNC: 6.1 MG/DL (ref 3.4–7)
UROBILINOGEN UR STRIP-MCNC: NORMAL MG/DL
WBC # BLD AUTO: 401.6 10E3/UL (ref 4–11)
WBC URINE: 0 /HPF

## 2024-09-15 PROCEDURE — 84439 ASSAY OF FREE THYROXINE: CPT | Performed by: STUDENT IN AN ORGANIZED HEALTH CARE EDUCATION/TRAINING PROGRAM

## 2024-09-15 PROCEDURE — 85027 COMPLETE CBC AUTOMATED: CPT | Performed by: STUDENT IN AN ORGANIZED HEALTH CARE EDUCATION/TRAINING PROGRAM

## 2024-09-15 PROCEDURE — 250N000009 HC RX 250: Performed by: PHYSICIAN ASSISTANT

## 2024-09-15 PROCEDURE — 206N000001 HC R&B BMT UMMC

## 2024-09-15 PROCEDURE — 87389 HIV-1 AG W/HIV-1&-2 AB AG IA: CPT | Performed by: PHYSICIAN ASSISTANT

## 2024-09-15 PROCEDURE — 94660 CPAP INITIATION&MGMT: CPT

## 2024-09-15 PROCEDURE — 81003 URINALYSIS AUTO W/O SCOPE: CPT | Performed by: STUDENT IN AN ORGANIZED HEALTH CARE EDUCATION/TRAINING PROGRAM

## 2024-09-15 PROCEDURE — 88271 CYTOGENETICS DNA PROBE: CPT | Performed by: INTERNAL MEDICINE

## 2024-09-15 PROCEDURE — 85610 PROTHROMBIN TIME: CPT | Performed by: STUDENT IN AN ORGANIZED HEALTH CARE EDUCATION/TRAINING PROGRAM

## 2024-09-15 PROCEDURE — 99418 PROLNG IP/OBS E/M EA 15 MIN: CPT | Performed by: INTERNAL MEDICINE

## 2024-09-15 PROCEDURE — 86665 EPSTEIN-BARR CAPSID VCA: CPT | Performed by: PHYSICIAN ASSISTANT

## 2024-09-15 PROCEDURE — 250N000011 HC RX IP 250 OP 636: Performed by: EMERGENCY MEDICINE

## 2024-09-15 PROCEDURE — 85384 FIBRINOGEN ACTIVITY: CPT | Performed by: STUDENT IN AN ORGANIZED HEALTH CARE EDUCATION/TRAINING PROGRAM

## 2024-09-15 PROCEDURE — 36415 COLL VENOUS BLD VENIPUNCTURE: CPT | Performed by: STUDENT IN AN ORGANIZED HEALTH CARE EDUCATION/TRAINING PROGRAM

## 2024-09-15 PROCEDURE — 86706 HEP B SURFACE ANTIBODY: CPT | Performed by: PHYSICIAN ASSISTANT

## 2024-09-15 PROCEDURE — 250N000013 HC RX MED GY IP 250 OP 250 PS 637: Performed by: PHYSICIAN ASSISTANT

## 2024-09-15 PROCEDURE — 82248 BILIRUBIN DIRECT: CPT | Performed by: STUDENT IN AN ORGANIZED HEALTH CARE EDUCATION/TRAINING PROGRAM

## 2024-09-15 PROCEDURE — 86704 HEP B CORE ANTIBODY TOTAL: CPT | Performed by: PHYSICIAN ASSISTANT

## 2024-09-15 PROCEDURE — 80048 BASIC METABOLIC PNL TOTAL CA: CPT | Performed by: STUDENT IN AN ORGANIZED HEALTH CARE EDUCATION/TRAINING PROGRAM

## 2024-09-15 PROCEDURE — 84100 ASSAY OF PHOSPHORUS: CPT | Performed by: STUDENT IN AN ORGANIZED HEALTH CARE EDUCATION/TRAINING PROGRAM

## 2024-09-15 PROCEDURE — 99223 1ST HOSP IP/OBS HIGH 75: CPT | Performed by: STUDENT IN AN ORGANIZED HEALTH CARE EDUCATION/TRAINING PROGRAM

## 2024-09-15 PROCEDURE — 88291 CYTO/MOLECULAR REPORT: CPT | Performed by: MEDICAL GENETICS

## 2024-09-15 PROCEDURE — 250N000013 HC RX MED GY IP 250 OP 250 PS 637: Performed by: STUDENT IN AN ORGANIZED HEALTH CARE EDUCATION/TRAINING PROGRAM

## 2024-09-15 PROCEDURE — 88368 INSITU HYBRIDIZATION MANUAL: CPT | Mod: 26 | Performed by: MEDICAL GENETICS

## 2024-09-15 PROCEDURE — 86696 HERPES SIMPLEX TYPE 2 TEST: CPT | Performed by: PHYSICIAN ASSISTANT

## 2024-09-15 PROCEDURE — 82310 ASSAY OF CALCIUM: CPT | Performed by: STUDENT IN AN ORGANIZED HEALTH CARE EDUCATION/TRAINING PROGRAM

## 2024-09-15 PROCEDURE — 84550 ASSAY OF BLOOD/URIC ACID: CPT | Performed by: STUDENT IN AN ORGANIZED HEALTH CARE EDUCATION/TRAINING PROGRAM

## 2024-09-15 PROCEDURE — 83615 LACTATE (LD) (LDH) ENZYME: CPT | Performed by: STUDENT IN AN ORGANIZED HEALTH CARE EDUCATION/TRAINING PROGRAM

## 2024-09-15 PROCEDURE — 36415 COLL VENOUS BLD VENIPUNCTURE: CPT | Performed by: INTERNAL MEDICINE

## 2024-09-15 PROCEDURE — 84443 ASSAY THYROID STIM HORMONE: CPT | Performed by: STUDENT IN AN ORGANIZED HEALTH CARE EDUCATION/TRAINING PROGRAM

## 2024-09-15 PROCEDURE — 258N000003 HC RX IP 258 OP 636: Performed by: PHYSICIAN ASSISTANT

## 2024-09-15 PROCEDURE — 71250 CT THORAX DX C-: CPT

## 2024-09-15 PROCEDURE — 71250 CT THORAX DX C-: CPT | Mod: 26 | Performed by: RADIOLOGY

## 2024-09-15 PROCEDURE — 85730 THROMBOPLASTIN TIME PARTIAL: CPT | Performed by: STUDENT IN AN ORGANIZED HEALTH CARE EDUCATION/TRAINING PROGRAM

## 2024-09-15 PROCEDURE — 74176 CT ABD & PELVIS W/O CONTRAST: CPT | Mod: 26 | Performed by: RADIOLOGY

## 2024-09-15 PROCEDURE — 86644 CMV ANTIBODY: CPT | Performed by: PHYSICIAN ASSISTANT

## 2024-09-15 PROCEDURE — 88264 CHROMOSOME ANALYSIS 20-25: CPT | Performed by: INTERNAL MEDICINE

## 2024-09-15 PROCEDURE — 258N000003 HC RX IP 258 OP 636: Performed by: EMERGENCY MEDICINE

## 2024-09-15 PROCEDURE — 999N000157 HC STATISTIC RCP TIME EA 10 MIN

## 2024-09-15 PROCEDURE — 87493 C DIFF AMPLIFIED PROBE: CPT | Performed by: STUDENT IN AN ORGANIZED HEALTH CARE EDUCATION/TRAINING PROGRAM

## 2024-09-15 PROCEDURE — 96365 THER/PROPH/DIAG IV INF INIT: CPT

## 2024-09-15 PROCEDURE — 87340 HEPATITIS B SURFACE AG IA: CPT | Performed by: PHYSICIAN ASSISTANT

## 2024-09-15 PROCEDURE — 99223 1ST HOSP IP/OBS HIGH 75: CPT | Mod: FS | Performed by: INTERNAL MEDICINE

## 2024-09-15 PROCEDURE — 250N000013 HC RX MED GY IP 250 OP 250 PS 637: Performed by: EMERGENCY MEDICINE

## 2024-09-15 PROCEDURE — 36415 COLL VENOUS BLD VENIPUNCTURE: CPT | Performed by: PHYSICIAN ASSISTANT

## 2024-09-15 PROCEDURE — 250N000011 HC RX IP 250 OP 636: Performed by: PHYSICIAN ASSISTANT

## 2024-09-15 PROCEDURE — 85007 BL SMEAR W/DIFF WBC COUNT: CPT | Performed by: STUDENT IN AN ORGANIZED HEALTH CARE EDUCATION/TRAINING PROGRAM

## 2024-09-15 RX ORDER — METOPROLOL SUCCINATE 25 MG/1
50 TABLET, EXTENDED RELEASE ORAL DAILY
Status: DISCONTINUED | OUTPATIENT
Start: 2024-09-15 | End: 2024-09-15

## 2024-09-15 RX ORDER — METOPROLOL SUCCINATE 25 MG/1
25 TABLET, EXTENDED RELEASE ORAL DAILY
Status: DISCONTINUED | OUTPATIENT
Start: 2024-09-15 | End: 2024-09-19 | Stop reason: HOSPADM

## 2024-09-15 RX ORDER — ACETAMINOPHEN 650 MG/1
650 SUPPOSITORY RECTAL EVERY 6 HOURS PRN
Status: DISCONTINUED | OUTPATIENT
Start: 2024-09-15 | End: 2024-09-17

## 2024-09-15 RX ORDER — LEVOTHYROXINE SODIUM 75 UG/1
150 TABLET ORAL DAILY
Status: DISCONTINUED | OUTPATIENT
Start: 2024-09-15 | End: 2024-09-19 | Stop reason: HOSPADM

## 2024-09-15 RX ORDER — ACYCLOVIR 400 MG/1
400 TABLET ORAL 2 TIMES DAILY
Status: DISCONTINUED | OUTPATIENT
Start: 2024-09-15 | End: 2024-09-17

## 2024-09-15 RX ORDER — NICOTINE POLACRILEX 4 MG
15-30 LOZENGE BUCCAL
Status: DISCONTINUED | OUTPATIENT
Start: 2024-09-15 | End: 2024-09-19 | Stop reason: HOSPADM

## 2024-09-15 RX ORDER — LEVOFLOXACIN 250 MG/1
250 TABLET, FILM COATED ORAL DAILY
Status: DISCONTINUED | OUTPATIENT
Start: 2024-09-15 | End: 2024-09-17

## 2024-09-15 RX ORDER — PROCHLORPERAZINE MALEATE 5 MG
10 TABLET ORAL EVERY 6 HOURS PRN
Status: DISCONTINUED | OUTPATIENT
Start: 2024-09-15 | End: 2024-09-19 | Stop reason: HOSPADM

## 2024-09-15 RX ORDER — HYDRALAZINE HYDROCHLORIDE 20 MG/ML
10 INJECTION INTRAMUSCULAR; INTRAVENOUS EVERY 4 HOURS PRN
Status: DISCONTINUED | OUTPATIENT
Start: 2024-09-15 | End: 2024-09-19 | Stop reason: HOSPADM

## 2024-09-15 RX ORDER — LOSARTAN POTASSIUM 50 MG/1
50 TABLET ORAL DAILY
Status: DISCONTINUED | OUTPATIENT
Start: 2024-09-15 | End: 2024-09-15 | Stop reason: HOSPADM

## 2024-09-15 RX ORDER — LOSARTAN POTASSIUM 50 MG/1
50 TABLET ORAL DAILY
Status: DISCONTINUED | OUTPATIENT
Start: 2024-09-15 | End: 2024-09-19 | Stop reason: HOSPADM

## 2024-09-15 RX ORDER — ALLOPURINOL 300 MG/1
300 TABLET ORAL DAILY
Status: DISCONTINUED | OUTPATIENT
Start: 2024-09-15 | End: 2024-09-19 | Stop reason: HOSPADM

## 2024-09-15 RX ORDER — LIDOCAINE 40 MG/G
CREAM TOPICAL
Status: DISCONTINUED | OUTPATIENT
Start: 2024-09-15 | End: 2024-09-19 | Stop reason: HOSPADM

## 2024-09-15 RX ORDER — BUPROPION HYDROCHLORIDE 150 MG/1
300 TABLET ORAL EVERY MORNING
Status: DISCONTINUED | OUTPATIENT
Start: 2024-09-15 | End: 2024-09-15 | Stop reason: HOSPADM

## 2024-09-15 RX ORDER — POLYETHYLENE GLYCOL 3350 17 G/17G
17 POWDER, FOR SOLUTION ORAL 2 TIMES DAILY PRN
Status: DISCONTINUED | OUTPATIENT
Start: 2024-09-15 | End: 2024-09-19 | Stop reason: HOSPADM

## 2024-09-15 RX ORDER — HYDROXYUREA 500 MG/1
2000 CAPSULE ORAL EVERY 8 HOURS
Status: DISCONTINUED | OUTPATIENT
Start: 2024-09-15 | End: 2024-09-16

## 2024-09-15 RX ORDER — ONDANSETRON 2 MG/ML
4 INJECTION INTRAMUSCULAR; INTRAVENOUS EVERY 6 HOURS PRN
Status: DISCONTINUED | OUTPATIENT
Start: 2024-09-15 | End: 2024-09-19 | Stop reason: HOSPADM

## 2024-09-15 RX ORDER — ONDANSETRON 4 MG/1
4 TABLET, ORALLY DISINTEGRATING ORAL EVERY 6 HOURS PRN
Status: DISCONTINUED | OUTPATIENT
Start: 2024-09-15 | End: 2024-09-19 | Stop reason: HOSPADM

## 2024-09-15 RX ORDER — ACETAMINOPHEN 325 MG/1
975 TABLET ORAL EVERY 6 HOURS PRN
Status: DISCONTINUED | OUTPATIENT
Start: 2024-09-15 | End: 2024-09-17

## 2024-09-15 RX ORDER — HYDRALAZINE HYDROCHLORIDE 10 MG/1
10 TABLET, FILM COATED ORAL EVERY 4 HOURS PRN
Status: DISCONTINUED | OUTPATIENT
Start: 2024-09-15 | End: 2024-09-19 | Stop reason: HOSPADM

## 2024-09-15 RX ORDER — BUPROPION HYDROCHLORIDE 150 MG/1
300 TABLET ORAL EVERY MORNING
Status: DISCONTINUED | OUTPATIENT
Start: 2024-09-15 | End: 2024-09-19 | Stop reason: HOSPADM

## 2024-09-15 RX ORDER — METOPROLOL SUCCINATE 50 MG/1
50 TABLET, EXTENDED RELEASE ORAL DAILY
Status: DISCONTINUED | OUTPATIENT
Start: 2024-09-15 | End: 2024-09-15 | Stop reason: HOSPADM

## 2024-09-15 RX ORDER — PROCHLORPERAZINE 25 MG
25 SUPPOSITORY, RECTAL RECTAL EVERY 12 HOURS PRN
Status: DISCONTINUED | OUTPATIENT
Start: 2024-09-15 | End: 2024-09-17

## 2024-09-15 RX ORDER — DEXTROSE MONOHYDRATE 25 G/50ML
25-50 INJECTION, SOLUTION INTRAVENOUS
Status: DISCONTINUED | OUTPATIENT
Start: 2024-09-15 | End: 2024-09-19 | Stop reason: HOSPADM

## 2024-09-15 RX ORDER — AMOXICILLIN 250 MG
2 CAPSULE ORAL 2 TIMES DAILY PRN
Status: DISCONTINUED | OUTPATIENT
Start: 2024-09-15 | End: 2024-09-19 | Stop reason: HOSPADM

## 2024-09-15 RX ORDER — GUAIFENESIN/DEXTROMETHORPHAN 100-10MG/5
10 SYRUP ORAL EVERY 4 HOURS PRN
Status: DISCONTINUED | OUTPATIENT
Start: 2024-09-15 | End: 2024-09-19 | Stop reason: HOSPADM

## 2024-09-15 RX ORDER — AMOXICILLIN 250 MG
1 CAPSULE ORAL 2 TIMES DAILY PRN
Status: DISCONTINUED | OUTPATIENT
Start: 2024-09-15 | End: 2024-09-19 | Stop reason: HOSPADM

## 2024-09-15 RX ORDER — CALCIUM CARBONATE 500 MG/1
1000 TABLET, CHEWABLE ORAL 4 TIMES DAILY PRN
Status: DISCONTINUED | OUTPATIENT
Start: 2024-09-15 | End: 2024-09-19 | Stop reason: HOSPADM

## 2024-09-15 RX ADMIN — ALLOPURINOL 300 MG: 300 TABLET ORAL at 00:20

## 2024-09-15 RX ADMIN — HYDROXYUREA 2000 MG: 500 CAPSULE ORAL at 23:00

## 2024-09-15 RX ADMIN — ALLOPURINOL 300 MG: 300 TABLET ORAL at 07:52

## 2024-09-15 RX ADMIN — LEVOTHYROXINE SODIUM 150 MCG: 75 TABLET ORAL at 07:51

## 2024-09-15 RX ADMIN — BUPROPION HYDROCHLORIDE 300 MG: 150 TABLET, EXTENDED RELEASE ORAL at 07:51

## 2024-09-15 RX ADMIN — MICAFUNGIN SODIUM 50 MG: 50 INJECTION, POWDER, LYOPHILIZED, FOR SOLUTION INTRAVENOUS at 09:37

## 2024-09-15 RX ADMIN — HYDROXYUREA 2000 MG: 500 CAPSULE ORAL at 00:21

## 2024-09-15 RX ADMIN — PHYTONADIONE 5 MG: 10 INJECTION, EMULSION INTRAMUSCULAR; INTRAVENOUS; SUBCUTANEOUS at 15:42

## 2024-09-15 RX ADMIN — METOPROLOL SUCCINATE 25 MG: 25 TABLET, EXTENDED RELEASE ORAL at 07:52

## 2024-09-15 RX ADMIN — ACYCLOVIR 400 MG: 400 TABLET ORAL at 19:54

## 2024-09-15 RX ADMIN — HYDROXYUREA 2000 MG: 500 CAPSULE ORAL at 15:43

## 2024-09-15 RX ADMIN — Medication 5 MG: at 22:59

## 2024-09-15 RX ADMIN — LEVOFLOXACIN 250 MG: 250 TABLET, FILM COATED ORAL at 09:37

## 2024-09-15 RX ADMIN — SODIUM CHLORIDE 6 MG: 9 INJECTION, SOLUTION INTRAVENOUS at 00:25

## 2024-09-15 RX ADMIN — ACYCLOVIR 400 MG: 400 TABLET ORAL at 09:37

## 2024-09-15 RX ADMIN — HYDROXYUREA 2000 MG: 500 CAPSULE ORAL at 07:52

## 2024-09-15 ASSESSMENT — ACTIVITIES OF DAILY LIVING (ADL)
ADLS_ACUITY_SCORE: 22
ADLS_ACUITY_SCORE: 22
DEPENDENT_IADLS:: INDEPENDENT
ADLS_ACUITY_SCORE: 22
ADLS_ACUITY_SCORE: 37
ADLS_ACUITY_SCORE: 22
ADLS_ACUITY_SCORE: 37
ADLS_ACUITY_SCORE: 22
ADLS_ACUITY_SCORE: 22
ADLS_ACUITY_SCORE: 37
ADLS_ACUITY_SCORE: 22
ADLS_ACUITY_SCORE: 24
ADLS_ACUITY_SCORE: 22
ADLS_ACUITY_SCORE: 22
ADLS_ACUITY_SCORE: 24
ADLS_ACUITY_SCORE: 22
ADLS_ACUITY_SCORE: 22

## 2024-09-15 NOTE — PROGRESS NOTES
"Transfer Type: [facility]  Transfer Triage Note    Date of call: 09/14/24  Time of call: 10:29 PM    Current Patient Location:  [facility] ED  Current Level of Care: ED    Vitals: Temp: 98.8  F (37.1  C) Temp src: Oral BP: 120/57 Pulse: 55   Resp: 16 SpO2: 96 % Height: 177.8 cm (5' 10\") Weight: 96 kg (211 lb 10.3 oz)    at    Diagnosis: Possible AML   Reason for requested transfer: Further diagnostic work up, management, and consultation for specialized care   Isolation Needs: None    Care everywhere has been updated and reviewed: Yes  Necessary images have been sent through PACS: Yes    If patient is transferring for specialty care or specific procedure, the specialist required has participated in the transfer call and agreed with need for transfer and anticipated timeline: Yes, Provider name: Dr. Varner specialty with: Malignant hematology    Transfer accepted: Yes  Stability of Patient: Patient is at risk for instability, however patient requires urgent transfer and does not meet ICU criteria   Is the patient appropriate for [facility]? No, What specific [place] needs are anticipated? Heme/malignancy service  Level of Care Needed: Med Surg  Telemetry Needed:  Cardiac Telemetry  Expected Time of Arrival for Transfer: 8-24 hours  Arrival Location:  [facility]     Recommendations for Management and Stabilization: Given    Additional Comments: 38M DM2 s/p cardiac arrest on ASA s/p tricuspid valve replacement. Presented to [facility] ED for dizziness. WBC > 400k on x2 checks w/ 89% neutrophils. Pt vitally stable. Plt slightly elevated at 467k. Hgb 9.8 & 9.2. OCTAVIO w/ Cr 1.5, CO2 of 16. Lytes wnl. Notably he has an implanted defibrillator that went off due to Vfib syncope episode recently. CXR clear. Heme/malignancy staff recommends:  -INR, PTT, Fibrinogen. Give cryo if fibrinogen < 100  -Give rasburicase if uric acid > 8 (or would help prompt ED " to ED transfer if [facility] does not have it)  -Order 2000mg oral hydroxyurea Q8H (starting now)   -Order Allopurinol 300mg every day (starting now)  -Trend BMP, Mag, Phos, Uric acid, LDH Q6H  -Send peripheral blood morphology and flow cytometry   -Monitor closely, if fevers would get blood cultures, repeat CXR, UA with reflex to culture, and start cefepime 2g IV Q8H  -If worsens clinically or above occurs, would encourage ED to ED transfer. Otherwise will prioritize this as an urgent transfer to [facility], however we have no beds tonight currently.     Jules Anderson,  09/14/24 at 10:43 PM

## 2024-09-15 NOTE — PLAN OF CARE
"/71 (BP Location: Right arm)   Temp 98.6  F (37  C) (Oral)   Resp 18   Ht 1.778 m (5' 10\")   Wt 96 kg (211 lb 9.6 oz)   SpO2 98%   BMI 30.36 kg/m      AVSS on RA. On telemetry, paced rhythm. Denies pain and nausea. Voiding well. Still needs UA/UC and stool sample for c.diff. Pt refused insulin overnight, per pt insulin \"makes his labs weird\". Pt would like to meet with endocrine to discuss insulin orders.  Up independently. Continue plan of  care.     Problem: Adult Inpatient Plan of Care  Goal: Plan of Care Review  Description: The Plan of Care Review/Shift note should be completed every shift.  The Outcome Evaluation is a brief statement about your assessment that the patient is improving, declining, or no change.  This information will be displayed automatically on your shift  note.  Outcome: Progressing     Problem: Fatigue  Goal: Improved Activity Tolerance  Outcome: Progressing  Intervention: Promote Improved Energy  Recent Flowsheet Documentation  Taken 9/15/2024 0300 by Anay Kowalski, RN  Activity Management: activity adjusted per tolerance   Goal Outcome Evaluation:                        "

## 2024-09-15 NOTE — LETTER
[facility] [address] [place]  [address] [address]  Phone: [phone]  Fax: [phone]    September 15, 2024        Anil Montoya  [address] [address] [address]          To whom it may concern:    RE: Anil Montoya    This individual is currently ongoing medical care at the hospital and should be excused from work for the week of 9/16.     Please contact me for questions or concerns.      Sincerely,    Ashlyn SERRANO [facility]  [phone]

## 2024-09-15 NOTE — H&P
[facility]    History and Physical - Hospitalist Service       Date of Admission:  (Not on file)    Assessment & Plan        Anil Montoya is a 38 year old male with PMH of uncontrolled diabetes, bacterial endocarditis s/p tricuspid valve replacement and ICD admitted on 9/15/2024 for possible/probable AML w/ WBC > 400k.      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 Had syncopal episode as below. Went to [facility] ED and found to have WBC count >400 on x2 checks. Transferred to [facility] for malignant heme service. Had some signs of tumor lysis syndrome with uric acid > 10, given rasburicase at outside ED. Potassium borderline high, LDH high, Cr elevated with concern of tumor lysis syndrome. Afebrile, vitally stable on arrival.   -Follow up peripheral smear and flow cytometry sent at OSH  -Cont hydroxyurea 2000mg Q8H(started at OSH per heme/onc recs)  -Cont allopurinol 300mg every day (started at OSH per heme/onc recs)  -TLS labs, BMP, DIC labs Q6H  -transfuse for fibrinogen < 100   -Daily CBC with diff  -Hold VTE ppx in case day team plans for procedure / bone marrow biposy     OCTAVIO  Possible CKD  Cr at outside ED elevated at 1.5, most recent BMP at [facility] with Cr at 1.23. Bicarb at 16 on BMP thus could also be CKD or more of a chronic issue. In setting of possible malignancy and elevated TLS labs as above will treat this as OCTAVIO.  -Daily BMP  -UA   -Consider renal US or further imaging workup pending clinical course if does not improve as expected  -Pt reports taking lasix 20mg daily since his heart surgery however it is not on his active med list from his pharmacy on intake. Would hold in setting of OCTAVIO but would double check with pharmacy on med rec during day team      DM2  On PTA ozempic,  "jardiance, metformin. Hyperglycemic > 300 on ED presentation. Pt says insulin \"does not do well for him\" and does not want insulin but agreed to low dose sliding scale for now   -Hold all PTA DM meds while inpatient for now  -Takes ozempic weekly on Saturdays, pt says he can have family bring his supply in if deemed necessary  -A1c check at 8.2%  -LDSSI for now, titrtae insulin plan as needed.   -Hypoglycemia protocol      WARNER: CPAP at night      History of bacterial endocarditis s/p tricuspid valve replacement and ICD  Had hospital course in 4/2022 with the above, thought to be groin abscess as source. Was treated with course of IV abx after above interventions. Notably in the week prior to this admission he had a-fib syncopal episode which fired his ICD defibrillator. Had follow up echo shortly after.   -Metoprolol 25mg XL every day (pt says this was just increased to 50mg but he correlates this to his worsening fatigue and exercise intolerance and is not interested in the higher dose at this time).      Hypothyroidism  Noted per hx.   -TSH w/ reflex to T4  -Cont PTA levothyroxine     ADD: PTA wellbutrin           Diet: Combination Diet Regular Diet Adult  DVT Prophylaxis: Pneumatic Compression Devices  Schwartz Catheter: Not present  Lines: None     Cardiac Monitoring: None  Code Status: Full Code    Clinically Significant Risk Factors Present on Admission               # Coagulation Defect: INR = 1.83 (Ref range: 0.85 - 1.15) and/or PTT = 38 Seconds (Ref range: 22 - 38 Seconds), will monitor for bleeding       # Anemia: based on hgb <11      # DMII: A1C = 8.2 % (Ref range: <5.7 %) within past 6 months    # Obesity: Estimated body mass index is 30.37 kg/m  as calculated from the following:    Height as of an earlier encounter on 9/14/24: 1.778 m (5' 10\").    Weight as of an earlier encounter on 9/14/24: 96 kg (211 lb 10.3 oz).        # ICD device present             Disposition Plan     Medically Ready for " "Discharge: Anticipated in 2-4 Days           Jules Anderson DO  Hospitalist Service  [facility]  Securely message with [employer] (more info)  Text page via [facility] Paging/Directory     ______________________________________________________________________    Chief Complaint   Fatigue, dizziness    History is obtained from the patient    History of Present Illness   Anil Montoya is a 38 year old male who presents with fatigue, dizziness, lightheadedness all for about 1 week since he got shocked by his ICD last week. He got an echo done after this and increased his metoprolol to 50mg. No chest pain, SOB at rest, N/V, abdominal pain. No fevers, chills, night sweats. He reports poor sleep and lower extremity edema for the last 5 days.       Past Medical History    Past Medical History:   Diagnosis Date    Pneumonia        Past Surgical History   Past Surgical History:   Procedure Laterality Date    CV CORONARY ANGIOGRAM N/A 3/31/2022    Procedure: Coronary Angiogram;  Surgeon: Lidia Quintanilla MD;  Location:  HEART CARDIAC CATH LAB    EP ICD INSERT SINGLE N/A 4/12/2022    Procedure: Implantable Cardioverter Defibrillator Device & Lead Implant Single or Dual;  Surgeon: Suleman Higgins MD;  Location:  HEART CARDIAC CATH LAB    IR LUMBAR PUNCTURE  7/30/2012    REPLACE VALVE AORTIC N/A 4/1/2022    Procedure: AORTIC VALVE exploration;  Surgeon: Marti Melton MD;  Location:  OR    REPLACE VALVE TRICUSPID N/A 4/1/2022    Procedure: TRICUSPID VALVE REPLACEMENT;  Surgeon: Marti Melton MD;  Location:  OR       Prior to Admission Medications   Cannot display prior to admission medications because the patient has not been admitted in this contact.           Physical Exam   Vital Signs:                    Weight: 0 lbs 0 oz  /71 (BP Location: Right arm)   Temp 98.6  F (37  C) (Oral)   Resp 18   Ht 1.778 m (5' 10\")   Wt " 96 kg (211 lb 9.6 oz)   SpO2 98%   BMI 30.36 kg/m        Gen: No acute distress, non-toxic, alert  CV: Regular rate, regular rhythm  Pulm: Clear to auscultate bilaterally, breathing non-labored  Abd: Non-tender, non-distended  Ext: Moves all extremities, non-cyanotic, bilateral +3 pitting edema   Psych: Normal mood and affect      Medical Decision Making       80 MINUTES SPENT BY ME on the date of service doing chart review, history, exam, documentation & further activities per the note.      Data     I have personally reviewed the following data over the past 24 hrs:    425.1 (HH)  \   9.2 (L)   / 467 (H)     136 105 29.2 (H) /  356 (H)   5.1 16 (L) 1.53 (H) \     Trop: 16 BNP: 2,613 (H)     TSH: N/A T4: N/A A1C: 8.2 (H)     INR:  1.83 (H) PTT:  38   D-dimer:  N/A Fibrinogen:  423     Ferritin:  N/A % Retic:  4.0 (H) LDH:  1,290 (H)       Imaging results reviewed over the past 24 hrs:   Recent Results (from the past 24 hour(s))   Chest XR,  PA & LAT    Narrative    EXAM: XR CHEST 2 VIEWS  LOCATION: [facility]  DATE: 9/14/2024    INDICATION: dyspnea  COMPARISON: 4/13/2022      Impression    IMPRESSION: Cardiac pacemaker/ICD in place. Status post median sternotomy with normal heart size. Lungs are clear of CHF, lobar consolidation or effusion. Mediastinum and bony structures are unremarkable.

## 2024-09-15 NOTE — PROGRESS NOTES
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 He presented to [facility] [place] on 9/14/24 with lightheadedness and was found to have hyperleukocytosis (WBC >400k) so was transferred to [facility] for leukemia work up.     HEME/ONC  # Hyperleukocytosis  # Concern for leukemia, favor chronic over acute  # Thrombocytosis  Patient reports that 1 week ago his ICD fired for run of vfib. This was during an emotional time where he thought he may not be able to see his kids. After this, his metoprolol was increased from 25 to 50 mg on Monday 9/9. Since then, he has felt dizzy and lightheaded. Only other new symptoms include new LE edema and mild SKAGGS. He presented to [facility] [place] on 9/14/24 and was found to have hyperleukocytosis (WBC 469k) so was transferred to [facility] for leukemia work up. No s/sx of leukostasis.   - Baseline studies:   - Viral serologies ordered  - EKG with complete heart block and ventricular paced rhythm  - Echo 9/12 with LVEF 55-60%  - CXR negative  - CT CAP without contrast ordered  - Continue Hydrea 2g q8hr  - Peripheral smear and FISH pending. FISH will be run first thing Monday morning.   - Plan for BMBx Monday 9/16. Needs to be scheduled. Patient requests this be sedated due to history of poorly tolerated medical procedures in the past.     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   - Monitor CBC daily  - Transfuse if Hgb <7    # TLS, improving  # OCTAVIO, improving  On presentation, Cr 1.53, LDH 1290, uric acid 10. Lytes WNL. Unclear baseline Cr. S/p 1 dose of rasburicase.   - Allopurinol 300 mg daily  - Monitor TLS labs BID    # Risk  "for DIC  # Elevated INR  INR up to 1.83 on admission, now improving. PTT and fibrinogen WNL.   - Monitor coags BID  - S/p vit K 5 mg x1 on 9/15, repeat PRN    ID  # ID PPX  -  mg BID  - Levaquin 250 mg daily  - Micafungin 50 mg IV    # History of cryptococcal pneumonia  Patient states that when he was 26 he was hospitalized for cryptococcal pneumonia. He was inpatient for 60 days and \"on life support.\"  - Obtain baseline chest CT    CHRONIC  # Uncontrolled T2DM  Hgb A1C 8.2 on admission. Pt reports that he takes metformin and Ozempic (Saturdays) PTA. Has lost 50 lb in the last ~6 months due to Ozempic.   - Low-intensity sliding scale insulin  - Hold PTA metformin and Ozempic  - Patient would like to continue Ozempic while inpatient, could have family member bring in home supply. Defer to weekday team to discuss any issues with [facility].   - Patient has had trouble establishing with endocrine provider recently. Asks for a referral within our system; however, our endocrinologists are booking out several months. Consider endo referral at discharge vs having pt follow up with PCP for diabetes management.     # H/o bacterial endocarditis s/p tricuspid valve replacement and ICD  # Recent runs of vfib  Had hospital course in 4/2022 with the above, thought to be groin abscess as source. Was treated with course of IV abx after above interventions. Notably in the week prior to this admission he had vfib syncopal episode which fired his ICD defibrillator. Reportedly he had another run of vfib that he was asymptomatic from. Had follow up echo shortly after on 9/12 which was overall stable with LVEF 55-60%. BNP 2613 on admission.   - Metoprolol 25 mg XL daily   - Pt says this was just increased to 50 mg but he correlates this to his worsening fatigue and exercise intolerance and is not interested in the higher dose at this time.  - Pt reports taking Lasix 20 mg daily since his heart surgery; however it is not on his active med " "list from his pharmacy on intake. Holding in setting of OCTAVIO.    # WARNER - PTA CPAP    # Social  Currently going through a divorce. Has 4 children. Works for [employer].     Clinically Significant Risk Factors Present on Admission          # Hypocalcemia: Lowest Ca = 8.5 mg/dL in last 2 days, will monitor and replace as appropriate     # Hypoalbuminemia: Lowest albumin = 3.4 g/dL at 9/15/2024  4:35 AM, will monitor as appropriate  # Coagulation Defect: INR = 1.45 (Ref range: 0.85 - 1.15) and/or PTT = 33 Seconds (Ref range: 22 - 38 Seconds), will monitor for bleeding    # Hypertension: Home medication list includes antihypertensive(s)    # Anemia: based on hgb <11      # DMII: A1C = 8.2 % (Ref range: <5.7 %) within past 6 months    # Overweight: Estimated body mass index is 29.77 kg/m  as calculated from the following:    Height as of this encounter: 1.778 m (5' 10\").    Weight as of this encounter: 94.1 kg (207 lb 8 oz).        # ICD device present    FEN  Diet: Regular Diet Adult   IVF: Bolus PRN   Lytes: Replete per protocol    PPX  VTE: Holding for now, resume after BMBx  Bowel: Senna/MiraLax PRN  GI/PUD: None indicated    MISC  Code Status: Full Code   Lines/Drains: PIV  Medically Ready for Discharge: Anticipated in 2-4 Days  Dispo: Pending results of BMBx. If confirmed as CML, will discharge home with oral TKI.   Follow Up: TBD    Patient was seen and plan of care was discussed with attending physician Dr. Varner.    I spent 120 minutes in the care of this patient today, which included time necessary for review of interval events, obtaining history and physical exam, ordering medications/tests/procedures as medically indicated, review of pertinent medical literature, counseling of the patient, coordination of care, and documentation time. Over 50% of time was spent counseling the patient and/or coordinating care.    Ashlyn Koo PA-C  Hematology/Oncology   Pager: [phone]  Desk phone: [phone]    Interval " History   Admitted overnight for 1 week of lightheadedness and leukocytosis. Feels overall well this morning. Well-appearing. Patient reports that 1 week ago his ICD fired for run of vfib. This was during an emotional time where he thought he may not be able to see his kids. After this, his metoprolol was increased from 25 to 50 mg on Monday 9/9. Since then, he has felt dizzy and lightheaded. Reportedly he had another run of vfib that he was asymptomatic from. Only other new symptoms include new LE edema since Tuesday and mild SKAGGS. He has noted easy bruising but felt this always happened after he ate ice cream, so attributed it to that. Based off of timing of onset, likely related to leukemia.     Denies fever, headache, vision changes, URI symptoms, SOB at rest, chest pain, nausea, abdominal pain, constipation, diarrhea, urinary symptoms, rashes, weakness, appetite changes.     Vital Signs with Ranges  Temp:  [97.7  F (36.5  C)-98.8  F (37.1  C)] 98.6  F (37  C)  Pulse:  [53-55] 55  Resp:  [16-18] 16  BP: (112-130)/(57-78) 117/69  SpO2:  [94 %-100 %] 99 %  I/O last 3 completed shifts:  In: 1120 [P.O.:1000; I.V.:120]  Out: 1200 [Urine:1200]    Physical Exam   General: Sitting up in bed, alert, NAD. Pleasant and conversational.  Skin: No concerning lesions, rash, jaundice, cyanosis, erythema, or ecchymoses on exposed surfaces.   HEENT: NCAT. Anicteric sclera. Moist mucous membranes with no lesions, erythema, or thrush.   Respiratory: Non-labored breathing on room air, good air exchange, lungs clear to auscultation bilaterally.  Cardiovascular: RRR.   Gastrointestinal: Normoactive BS. Abdomen soft, ND, NT. Palpable hepatomegaly.   Extremities: 1+ non-pitting BLE edema.   Neurologic: A&O x 3, speech normal, no deficits grossly.    Medications   Current Facility-Administered Medications   Medication Dose Route Frequency Provider Last Rate Last Admin     Current Facility-Administered Medications   Medication Dose Route  Frequency Provider Last Rate Last Admin    acyclovir (ZOVIRAX) tablet 400 mg  400 mg Oral BID Ashlyn Beal PA-C   400 mg at 09/15/24 0937    allopurinol (ZYLOPRIM) tablet 300 mg  300 mg Oral Daily Justin, Jules A, DO   300 mg at 09/15/24 0752    buPROPion (WELLBUTRIN XL) 24 hr tablet 300 mg  300 mg Oral QAM Justin Jules A, DO   300 mg at 09/15/24 0751    hydroxyurea (HYDREA) capsule 2,000 mg  2,000 mg Oral Q8H Justin, Jules A, DO   2,000 mg at 09/15/24 1543    insulin aspart (NovoLOG) injection (RAPID ACTING)  1-3 Units Subcutaneous TID AC Justin Jules A, DO        insulin aspart (NovoLOG) injection (RAPID ACTING)  1-3 Units Subcutaneous At Bedtime Justin, Jules A, DO        levofloxacin (LEVAQUIN) tablet 250 mg  250 mg Oral Daily Ashlyn Beal PA-C   250 mg at 09/15/24 0937    levothyroxine (SYNTHROID/LEVOTHROID) tablet 150 mcg  150 mcg Oral Daily Justin, Jules A, DO   150 mcg at 09/15/24 0751    [Held by provider] losartan (COZAAR) tablet 50 mg  50 mg Oral Daily Justin, Jules A, DO        metoprolol succinate ER (TOPROL XL) 24 hr tablet 25 mg  25 mg Oral Daily Justin, Jules A, DO   25 mg at 09/15/24 0752    micafungin (MYCAMINE) 50 mg in sodium chloride 0.9 % 100 mL intermittent infusion  50 mg Intravenous Q24H Ashlyn Beal PA-C 100 mL/hr at 09/15/24 0937 50 mg at 09/15/24 0937    sodium chloride (PF) 0.9% PF flush 3 mL  3 mL Intracatheter Q8H Justin, Jules A, DO   3 mL at 09/15/24 0435     Data   Results for orders placed or performed during the hospital encounter of 09/15/24 (from the past 24 hour(s))   Basic metabolic panel   Result Value Ref Range    Sodium 142 135 - 145 mmol/L    Potassium 3.5 3.4 - 5.3 mmol/L    Chloride 113 (H) 98 - 107 mmol/L    Carbon Dioxide (CO2) 19 (L) 22 - 29 mmol/L    Anion Gap 10 7 - 15 mmol/L    Urea Nitrogen 22.7 (H) 6.0 - 20.0 mg/dL    Creatinine 1.32 (H) 0.67 - 1.17 mg/dL    GFR Estimate 72 >60 mL/min/1.73m2    Calcium 8.5 (L) 8.8 - 10.4 mg/dL     Glucose 141 (H) 70 - 99 mg/dL   Hepatic panel   Result Value Ref Range    Protein Total 5.9 (L) 6.4 - 8.3 g/dL    Albumin 3.4 (L) 3.5 - 5.2 g/dL    Bilirubin Total 0.9 <=1.2 mg/dL    Alkaline Phosphatase 156 (H) 40 - 150 U/L    AST 32 0 - 45 U/L    ALT 13 0 - 70 U/L    Bilirubin Direct 0.34 (H) 0.00 - 0.30 mg/dL   CBC with platelets differential    Narrative    The following orders were created for panel order CBC with platelets differential.  Procedure                               Abnormality         Status                     ---------                               -----------         ------                     CBC with platelets and d...[[id number]]  Abnormal            Final result               Manual Differential[[id number]]          Abnormal            Final result                 Please view results for these tests on the individual orders.   Uric acid   Result Value Ref Range    Uric Acid 6.1 3.4 - 7.0 mg/dL   Phosphorus   Result Value Ref Range    Phosphorus 2.8 2.5 - 4.5 mg/dL   Lactate Dehydrogenase   Result Value Ref Range    Lactate Dehydrogenase 881 (H) 0 - 250 U/L   INR   Result Value Ref Range    INR 1.59 (H) 0.85 - 1.15   Partial thromboplastin time   Result Value Ref Range    aPTT 38 22 - 38 Seconds   Fibrinogen activity   Result Value Ref Range    Fibrinogen Activity 382 170 - 510 mg/dL   TSH with free T4 reflex   Result Value Ref Range    TSH 4.67 (H) 0.30 - 4.20 uIU/mL   CBC with platelets and differential   Result Value Ref Range    WBC Count 401.6 (HH) 4.0 - 11.0 10e3/uL    RBC Count 2.89 (L) 4.40 - 5.90 10e6/uL    Hemoglobin 8.7 (L) 13.3 - 17.7 g/dL    Hematocrit 26.1 (L) 40.0 - 53.0 %    MCV 90 78 - 100 fL    MCH 30.1 26.5 - 33.0 pg    MCHC 33.3 31.5 - 36.5 g/dL    RDW 19.0 (H) 10.0 - 15.0 %    Platelet Count 511 (H) 150 - 450 10e3/uL    % Neutrophils      % Lymphocytes      % Monocytes      % Eosinophils      % Basophils      % Immature Granulocytes      NRBCs per 100 WBC 1 (H) <1 /100     Absolute Neutrophils      Absolute Lymphocytes      Absolute Monocytes      Absolute Eosinophils      Absolute Basophils      Absolute Immature Granulocytes      Absolute NRBCs 5.4 10e3/uL   T4 free   Result Value Ref Range    Free T4 1.33 0.90 - 1.70 ng/dL   Manual Differential   Result Value Ref Range    % Neutrophils 44 %    % Lymphocytes 3 %    % Monocytes 4 %    % Eosinophils 6 %    % Basophils 4 %    % Metamyelocytes 8 %    % Myelocytes 18 %    % Promyelocytes 13 %    NRBCs per 100 WBC 1 (H) <=0 %    Absolute Neutrophils 176.7 (H) 1.6 - 8.3 10e3/uL    Absolute Lymphocytes 12.0 (H) 0.8 - 5.3 10e3/uL    Absolute Monocytes 16.1 (H) 0.0 - 1.3 10e3/uL    Absolute Eosinophils 24.1 (H) 0.0 - 0.7 10e3/uL    Absolute Basophils 16.1 (H) 0.0 - 0.2 10e3/uL    Absolute Metamyelocytes 32.1 (H) <=0.0 10e3/uL    Absolute Myelocytes 72.3 (H) <=0.0 10e3/uL    Absolute Promyelocytes 52.2 (H) <=0.0 10e3/uL    Absolute NRBCs 4.0 (H) <=0.0 10e3/uL    RBC Morphology Confirmed RBC Indices     Platelet Assessment  Automated Count Confirmed. Platelet morphology is normal.     Automated Count Confirmed. Platelet morphology is normal.    Hank Cells Slight (A) None Seen    Elliptocytes Slight (A) None Seen   Glucose by meter   Result Value Ref Range    GLUCOSE BY METER POCT 122 (H) 70 - 99 mg/dL   UA with Microscopic reflex to Culture    Specimen: Urine, NOS   Result Value Ref Range    Color Urine Light Yellow Colorless, Straw, Light Yellow, Yellow    Appearance Urine Clear Clear    Glucose Urine >=1000 (A) Negative mg/dL    Bilirubin Urine Negative Negative    Ketones Urine Negative Negative mg/dL    Specific Gravity Urine 1.027 1.003 - 1.035    Blood Urine Negative Negative    pH Urine 5.5 5.0 - 7.0    Protein Albumin Urine Negative Negative mg/dL    Urobilinogen Urine Normal Normal, 2.0 mg/dL    Nitrite Urine Negative Negative    Leukocyte Esterase Urine Negative Negative    Mucus Urine Present (A) None Seen /LPF    RBC Urine <1 <=2  /HPF    WBC Urine 0 <=5 /HPF    Narrative    Urine Culture not indicated   Hepatitis B core antibody   Result Value Ref Range    Hepatitis B Core Antibody Total Nonreactive Nonreactive   Hepatitis B surface antigen   Result Value Ref Range    Hepatitis B Surface Antigen Nonreactive Nonreactive   Hepatitis B Surface Antibody   Result Value Ref Range    Hepatitis B Surface Antibody Nonreactive     Hepatitis B Surface Antibody Instrument Value <3.50 <8.5 m[IU]/mL   HIV Antigen Antibody Combo Cascade   Result Value Ref Range    HIV Antigen Antibody Combo Nonreactive Nonreactive   C. difficile Toxin B PCR with reflex to C. difficile Antigen and Toxins A/B EIA    Specimen: Per Rectum; Stool   Result Value Ref Range    C Difficile Toxin B by PCR Negative Negative    Narrative    The Provade Xpert C. difficile Assay, performed on the [employer]  Instrument Systems, is a qualitative in vitro diagnostic test for rapid detection of toxin B gene sequences from unformed (liquid or soft) stool specimens collected from patients suspected of having Clostridioides difficile infection (CDI). The test utilizes automated real-time polymerase chain reaction (PCR) to detect toxin gene sequences associated with toxin producing C. difficile. The Xpert C. difficile Assay is intended as an aid in the diagnosis of CDI.   Basic metabolic panel   Result Value Ref Range    Sodium 140 135 - 145 mmol/L    Potassium 4.1 3.4 - 5.3 mmol/L    Chloride 110 (H) 98 - 107 mmol/L    Carbon Dioxide (CO2) 21 (L) 22 - 29 mmol/L    Anion Gap 9 7 - 15 mmol/L    Urea Nitrogen 22.2 (H) 6.0 - 20.0 mg/dL    Creatinine 1.25 (H) 0.67 - 1.17 mg/dL    GFR Estimate 77 >60 mL/min/1.73m2    Calcium 8.7 (L) 8.8 - 10.4 mg/dL    Glucose 231 (H) 70 - 99 mg/dL   Uric acid   Result Value Ref Range    Uric Acid 4.9 3.4 - 7.0 mg/dL   Phosphorus   Result Value Ref Range    Phosphorus 3.2 2.5 - 4.5 mg/dL   Lactate Dehydrogenase   Result Value Ref Range    Lactate  Dehydrogenase 927 (H) 0 - 250 U/L   INR   Result Value Ref Range    INR 1.57 (H) 0.85 - 1.15   Partial thromboplastin time   Result Value Ref Range    aPTT 34 22 - 38 Seconds   Fibrinogen activity   Result Value Ref Range    Fibrinogen Activity 389 170 - 510 mg/dL   Extra Tube    Narrative    The following orders were created for panel order Extra Tube.  Procedure                               Abnormality         Status                     ---------                               -----------         ------                     Extra Purple Top Tube[[id number]]                            Final result                 Please view results for these tests on the individual orders.   Extra Purple Top Tube   Result Value Ref Range    Hold Specimen JIC    Glucose by meter   Result Value Ref Range    GLUCOSE BY METER POCT 174 (H) 70 - 99 mg/dL   CT Chest Abdomen Pelvis w/o Contrast    Narrative    EXAM: CT chest, abdomen, and pelvis without intravenous contrast.  9/15/2024 1:23 PM    HISTORY: New leukemia. Obtain baseline imaging. Has history of  cryptococcal pneumonia. Evaluate for hepatosplenomegaly.    TECHNIQUE: Helical acquisition of image data was performed for the  chest, abdomen, and pelvis without intravenous contrast.    COMPARISON: CT abdomen/pelvis 3/22/2022    FINDINGS:    : Unremarkable.    Lines and tubes: Left chest wall implantable cardiac defibrillator  with leads projecting along the right atrium and right ventricle.     CHEST:    Lungs/pleura/airways: Central airways are patent. Several bilateral  sub-6 mm pulmonary nodules, for example a 3 mm right upper lobe nodule  (series 6 image 128), and several 2 mm nodules in the left upper lobe  (series 6 image 56, series 6 image 122). Small area of tree-in-bud  nodularity in the peripheral right upper lobe (series 6 image 145). No  focal airspace consolidation. No pleural effusions. No pneumothorax.    Lower neck/axillae/mediastinum: Thyroid appears  unremarkable.  Prominent axillary and mediastinal lymph nodes are not enlarged by  short axis criteria. The heart is not enlarged. No pericardial  effusion. Normal caliber thoracic aorta. The main pulmonary artery  measures 3.5 cm. Scattered atherosclerotic calcification of the aorta.  The esophagus appears unremarkable.    ABDOMEN/PELVIS:    Liver: Hepatomegaly measuring 27.8 cm. No intrahepatic biliary  dilatation. No focal hepatic mass.    Gallbladder/biliary tree: The common bile duct is not dilated.  Gallbladder appears unremarkable.    Pancreas: The pancreatic duct is not dilated.    Spleen: Splenomegaly measuring 24.0 cm.    Adrenal glands: No adrenal nodules.    Kidneys/ureters: No hydronephrosis. Punctate obstructing renal  calculus versus vascular calcification in the left kidney. Nonspecific  bilateral perinephric stranding.    Bladder/pelvic organs: Unremarkable.    Bowel/mesentery: No dilated loops of small bowel or colon. Colonic  diverticulosis without evidence of acute diverticulitis The appendix  is unremarkable.    Peritoneum/retroperitoneum: No extraluminal bowel gas. Small volume  free fluid in the pelvis.    Lymph nodes: No enlarged abdominal or pelvic lymph nodes by short axis  criteria.    Major vessels: An aneurysmal abdominal aorta. Esophagogastric and  upper abdominal varices.    BONES/SOFT TISSUE: Multilevel degenerative changes throughout the  visualized spine is pronounced at L5-S1. Median sternotomy with intact  median sternotomy wires. No acute or suspicious osseous lesion.      Impression    IMPRESSION:  1. Hepatosplenomegaly.  2. Evidence of portal hypertension including esophagogastric and upper  abdominal varices and small volume ascites.  3. Punctate nonobstructing left renal calculus.  4. Several sub-6 mm pulmonary nodules throughout the lungs. Recommend  attention on follow-up.  5. Focal area of tree-in-bud nodularity in the peripheral right upper  lobe, likely  infectious/inflammatory. Recommend follow-up to clearing.  6. Dilated main pulmonary artery measuring 3.5 cm, which can be seen  in pulmonary arterial hypertension.    I have personally reviewed the examination and initial interpretation  and I agree with the findings.    ROBERTO NG MD         SYSTEM ID:  [id number]   Basic metabolic panel   Result Value Ref Range    Sodium 142 135 - 145 mmol/L    Potassium 4.4 3.4 - 5.3 mmol/L    Chloride 110 (H) 98 - 107 mmol/L    Carbon Dioxide (CO2) 21 (L) 22 - 29 mmol/L    Anion Gap 11 7 - 15 mmol/L    Urea Nitrogen 23.0 (H) 6.0 - 20.0 mg/dL    Creatinine 1.16 0.67 - 1.17 mg/dL    GFR Estimate 84 >60 mL/min/1.73m2    Calcium 8.8 8.8 - 10.4 mg/dL    Glucose 205 (H) 70 - 99 mg/dL   Uric acid   Result Value Ref Range    Uric Acid 4.0 3.4 - 7.0 mg/dL   Phosphorus   Result Value Ref Range    Phosphorus 3.8 2.5 - 4.5 mg/dL   Lactate Dehydrogenase   Result Value Ref Range    Lactate Dehydrogenase 1,048 (H) 0 - 250 U/L   INR   Result Value Ref Range    INR 1.45 (H) 0.85 - 1.15   Partial thromboplastin time   Result Value Ref Range    aPTT 33 22 - 38 Seconds   Fibrinogen activity   Result Value Ref Range    Fibrinogen Activity 455 170 - 510 mg/dL   Extra Tube    Narrative    The following orders were created for panel order Extra Tube.  Procedure                               Abnormality         Status                     ---------                               -----------         ------                     Extra Green Top (Lithium...[[id number]]                      In process                   Please view results for these tests on the individual orders.

## 2024-09-15 NOTE — PLAN OF CARE
"AVSS. Tele discontinued.  No pain, no nausea.  Stool and c.diff sent (negative).  UA/UC sent.  CT done.  Many labs sent.  Scheduled labs now q12hrs. Prophy meds started. BMBX hopefully tomorrow.  SW consult put in for help with HCD.  Will shower later this evening.  Up independently, walking some.  Sister and Dad here and very involved in care. Sister will return tomorrow and always appreciates updates from team.  Continue to monitor. Continue plan of care.    Problem: Adult Inpatient Plan of Care  Goal: Plan of Care Review  Description: The Plan of Care Review/Shift note should be completed every shift.  The Outcome Evaluation is a brief statement about your assessment that the patient is improving, declining, or no change.  This information will be displayed automatically on your shift  note.  9/15/2024 1607 by Ina Campos, RN  Outcome: Not Progressing  Flowsheets (Taken 9/15/2024 1607)  Plan of Care Reviewed With:   patient   parent   sibling  9/15/2024 1605 by Ina Campos, RN  Outcome: Progressing  Flowsheets (Taken 9/15/2024 1605)  Plan of Care Reviewed With:   patient   parent   sibling  Goal: Patient-Specific Goal (Individualized)  Description: You can add care plan individualizations to a care plan. Examples of Individualization might be:  \"Parent requests to be called daily at 9am for status\", \"I have a hard time hearing out of my right ear\", or \"Do not touch me to wake me up as it startles  me\".  9/15/2024 1607 by Ina Campos, RN  Outcome: Not Progressing  9/15/2024 1605 by Ina Campos, RN  Outcome: Progressing  Goal: Absence of Hospital-Acquired Illness or Injury  9/15/2024 1607 by Ina Campos, RN  Outcome: Not Progressing  9/15/2024 1605 by Ina Campos, RN  Outcome: Progressing  Intervention: Identify and Manage Fall Risk  Recent Flowsheet Documentation  Taken 9/15/2024 1558 by Ina Campos, RN  Safety Promotion/Fall Prevention:   clutter free environment maintained   lighting adjusted   " nonskid shoes/slippers when out of bed   safety round/check completed  Intervention: Prevent Skin Injury  Recent Flowsheet Documentation  Taken 9/15/2024 0843 by Ina Campos RN  Body Position: position changed independently  Intervention: Prevent Infection  Recent Flowsheet Documentation  Taken 9/15/2024 0843 by Ina Campos, RN  Infection Prevention:   environmental surveillance performed   equipment surfaces disinfected   hand hygiene promoted   personal protective equipment utilized   rest/sleep promoted   single patient room provided  Goal: Optimal Comfort and Wellbeing  9/15/2024 1607 by Ina Campos RN  Outcome: Not Progressing  9/15/2024 1605 by Ina Campos RN  Outcome: Progressing  Goal: Readiness for Transition of Care  9/15/2024 1607 by Ina Campos RN  Outcome: Not Progressing  9/15/2024 1605 by Ina Campos RN  Outcome: Progressing     Problem: Fatigue  Goal: Improved Activity Tolerance  9/15/2024 1607 by Ina Campos RN  Outcome: Not Progressing  9/15/2024 1605 by Ina Campos RN  Outcome: Progressing  Intervention: Promote Improved Energy  Recent Flowsheet Documentation  Taken 9/15/2024 0843 by Ina Campos RN  Fatigue Management: frequent rest breaks encouraged  Sleep/Rest Enhancement: awakenings minimized  Activity Management: activity adjusted per tolerance   Goal Outcome Evaluation:      Plan of Care Reviewed With: patient, parent, sibling

## 2024-09-15 NOTE — PLAN OF CARE
Goal Outcome Evaluation:      Plan of Care Reviewed With: patient      Outcome Evaluation: Plan is for the patient to discharge home when medically ready.    Yaneth Hernandez, MICHAEL, LGSW   9/15/2024       Social Work and Care Management Department       SEARCHABLE in [facility] - search SOCIAL WORK       [place] (0800 - 1630) Saturday and Sunday     Units: 4A Vocera, 4C Vocera, & 4E Vocera        Units: 5A [id number] Vocera, 5A 2350-7457 Vocera , BMT SW 1 BMT SW 2, BMT SW 3 & BMT SW 4  5C Off Service 5401 - 5444  5C Off Service [phone]     Units: 6A Vocera & 6B Vocera      Units: 6C Vocera     Units: 7A Vocera & 7B Vocera      Units: 7C Med Surg 7401 thru 7418 and 7C Med Surg 7502 thru 7521      Unit: [place] ED Vocera & [place] Obs Vocera     [facility] ([phone]) Saturday and Sunday      Units: 5 Ortho Vocera, 5 Med Surg Vocera & WB ED Vocera     Units: 6 Med Surg Vocera, 8 Med Surg Vocera, & 10 ICU Vocera      After hours Vocera [facility] and After Hours Vocera [place]     Saturday & Sunday (1630 - 0000)    Mon-Fri (2999-2516)     [facility] Recognized Holidays  (7568-1627)    Units: ALL   - see above VOCERA links to units and after hours

## 2024-09-15 NOTE — ED PROVIDER NOTES
Discussed case with hematology and hospitalist at [facility] who accept the patient for transfer.  Specific recommendations from hematology below:    Allopurinol 300 mg daily  Hydroxyurea 2000 mg every 8 hours  Check LDH, uric acid, fibrinogen, INR, PTT  For fibrinogen less than 100, give cryoprecipitate  For uric acid greater than 8, give rasburicase and initiate ED to ED transfer  For any signs of stroke, pulmonary edema, other vaso-occlusive concerns, initiate ED to ED transfer    Otherwise pending transfer to a med/surg telemetry bed at [facility].  No current beds available and unknown projected timeframe for bed availability.  Further specific recommendations outlined in Dr. Barry's note.  We did administer rasburicase here for a uric acid level of 10.1.    Patient was thankfully able to transfer to [facility] prior to the end of my shift.     Gera Wilkerson MD  09/15/24 8028

## 2024-09-15 NOTE — CONSULTS
Care Management Initial Consult    General Information  Assessment completed with: Patient,    Type of CM/SW Visit: Initial Assessment    Primary Care Provider verified and updated as needed: Yes Patient reports his PCP is Anat Gan at [facility]  Readmission within the last 30 days: no previous admission in last 30 days      Reason for Consult: discharge planning, health care directive, elevated readmission risk score  Advance Care Planning: Advance Care Planning Reviewed:  There is no Health Care Directive on file. Social work provided the patient with a blank Health Care Directive.Patient reports he is interested in filling out a Health Care Directive.         Communication Assessment  Patient's communication style: spoken language (English or Bilingual)    Hearing Difficulty or Deaf: no   Wear Glasses or Blind: yes    Cognitive  Cognitive/Neuro/Behavioral: WDL                      Living Environment:   People in home: alone     Current living Arrangements: house      Able to return to prior arrangements: yes       Family/Social Support:  Care provided by: self  Provides care for: no one  Marital Status:  Patient reports he is in the process of getting a divorse.  Support system: Parent(s), Sibling(s) Patient's father Kolby Montoya Phone [phone], Patient's sister Christine Garcia Phone [phone]       Description of Support System: Supportive, Involved         Current Resources:   Patient receiving home care services: No        Community Resources: None  Equipment currently used at home: none  Supplies currently used at home: None    Employment/Financial:  Employment Status: employed full-time Patient reports he works for [employer]. Patient reports he is on short-term-disability.      Financial Concerns:  Patient reports that due to his health condition, he is not able to work as many hours and his income is less. Social work provided patient with informaiton about applying for [employer]  [facility].  Referral to Financial Worker: No       Does the patient's insurance plan have a 3 day qualifying hospital stay waiver?  No    Lifestyle & Psychosocial Needs:  Social Determinants of Health     Food Insecurity: Low Risk  (9/15/2024)    Food Insecurity     Within the past 12 months, did you worry that your food would run out before you got money to buy more?: No     Within the past 12 months, did the food you bought just not last and you didn t have money to get more?: No   Depression: Not at risk (1/11/2024)    PHQ-2     PHQ-2 Score: 0   Housing Stability: Low Risk  (9/15/2024)    Housing Stability     Do you have housing? : Yes     Are you worried about losing your housing?: No   Tobacco Use: Low Risk  (5/7/2024)    Received from [facility]    Patient History     Smoking Tobacco Use: Never     Smokeless Tobacco Use: Never     Passive Exposure: Not on file   Financial Resource Strain: Low Risk  (9/15/2024)    Financial Resource Strain     Within the past 12 months, have you or your family members you live with been unable to get utilities (heat, electricity) when it was really needed?: No   Alcohol Use: Unknown (10/9/2020)    Received from [facility], [facility]    AUDIT-C     Frequency of Alcohol Consumption: Monthly or less     Average Number of Drinks: Not on file     Frequency of Binge Drinking: Not on file   Transportation Needs: Low Risk  (9/15/2024)    Transportation Needs     Within the past 12 months, has lack of transportation kept you from medical appointments, getting your medicines, non-medical meetings or appointments, work, or from getting things that you need?: No   Physical Activity: Not on file   Interpersonal Safety: Low Risk  (9/15/2024)    Interpersonal Safety     Do you feel physically and emotionally safe where you currently live?: Yes     Within the past 12 months, have you been hit, slapped, kicked or otherwise physically hurt by someone?: No     Within the past 12  "months, have you been humiliated or emotionally abused in other ways by your partner or ex-partner?: No   Stress: Not on file   Social Connections: Not on file   Health Literacy: Not on file       Functional Status:  Prior to admission patient needed assistance:   Dependent ADLs:: Independent  Dependent IADLs:: Independent Patient reports sometimes his sister provides transportation for him.       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     Care management team consulted d/t elevated unplanned hospital readmission risk score (BETTIE) and for health care directive. This writer met with patient at bedside to complete the care management assessment. Writer introduced self and the role of the .     Patient reports that he is going through a divorce right now and he has 4 children ages 16 years old, 14 years old, 12 years old and 9 years old.     Patient expressed that he wants to make sure that he has a Health Care Directive that reflects who he would want to make health care decisions if he does not able to. Social work provided the patient with a blank Health Care Directive.     Social work provided the following support for the patient during this patient interaction: active listening, facilitating a calm, non-judgmental conversation.     Next Steps: Care Management Notary to notarize the Health Care Directive once it is complete.Social Work and RNCC " will continue to follow and assist with discharge plan as appropriate.       Yaneth Hernandez, MSW, LGSW   9/15/2024       Social Work and Care Management Department       SEARCHABLE in [facility] - search SOCIAL WORK       [place] (0800 - 1630) Saturday and Sunday     Units: 4A Vocera, 4C Vocera, & 4E Vocera        Units: 5A 2903-9694 Vocera, 5A 3997-6030 Vocera , BMT SW 1 BMT SW 2, BMT SW 3 & BMT SW 4  5C Off Service 5401 - 5416  5C Off Service 6668-7870     Units: 6A Vocera & 6B Vocera      Units: 6C Vocera     Units: 7A Vocera & 7B Vocera      Units: 7C Med Surg 7401 thru 7418 and 7C Med Surg 7502 thru 7521      Unit: [place] ED Vocera & [place] Obs Vocera     [facility] ([phone]) Saturday and Sunday      Units: 5 Ortho Vocera, 5 Med Surg Vocera & WB ED Vocera     Units: 6 Med Surg Vocera, 8 Med Surg Vocera, & 10 ICU Vocera      After hours Vocera [facility] and After Hours Vocera [place]     Saturday & Sunday (1630 - 0000)    Mon-Fri (1931-9992)     [facility] Recognized Holidays  (6169-5166)    Units: ALL   - see above VOCERA links to units and after hours

## 2024-09-15 NOTE — PHARMACY-ADMISSION MEDICATION HISTORY
Pharmacist Admission Medication History    Admission medication history is complete. The information provided in this note is only as accurate as the sources available at the time of the update.    Information Source(s): Patient and CareEverywhere/SureScripts via in-person    Pertinent Information: Patient would like to change from metoprolol succinate 25 mg tablets two tablets once daily to 50 mg tablets one tablet daily. Patient is also due for semaglutide today.    Changes made to PTA medication list:  Added: ibuprofen  Deleted: cyclobenzaprine, gabapentin, duplicate levothyroxine, liraglutide, loratadine, methocarbamol  Changed: empagliflozin, levothyroxine, semaglutide    Allergies reviewed with patient and updates made in EHR: yes    Medication History Completed By:   Kemi Serrano, PharmD, [facility]  Emergency Medicine Clinical Pharmacist  [phone]   9/14/2024 10:03 PM    PTA Med List   Medication Sig Last Dose    acetaminophen (TYLENOL) 325 MG tablet Take 2 tablets (650 mg) by mouth every 4 hours as needed for mild pain or fever Past Month at -    buPROPion (WELLBUTRIN XL) 300 MG 24 hr tablet Take 300 mg by mouth every morning 9/14/2024 at am    empagliflozin (JARDIANCE) 25 MG TABS tablet Take 25 mg by mouth daily. 9/14/2024 at am    furosemide (LASIX) 20 MG tablet Take 1 tablet (20 mg) by mouth daily 9/14/2024 at am    ibuprofen (ADVIL/MOTRIN) 200 MG tablet Take 800 mg by mouth daily as needed for pain. 9/14/2024 at am    losartan (COZAAR) 50 MG tablet Take 1 tablet (50 mg) by mouth daily 9/14/2024 at am    metFORMIN (GLUCOPHAGE) 1000 MG tablet Take 1,000 mg by mouth 2 times daily (with meals) 9/14/2024 at x1    metoprolol succinate ER (TOPROL XL) 25 MG 24 hr tablet Take 2 tablets (50 mg) by mouth daily. 9/14/2024 at am    semaglutide (OZEMPIC) 2 MG/1.5ML pen Inject 2 mg subcutaneously every 7 days. 9/7/2024 at -

## 2024-09-15 NOTE — PROGRESS NOTES
Patient admitted to: 5417  Admitted from: [facility] ED  Arrived by: EMS  Reason for admission: Possible new leuk  Patient accompanied by: Self and belongings  Belongings: Phone and clothes  Teaching: Orientation to room and unit  Skin double check completed by: Needs to be completed

## 2024-09-16 ENCOUNTER — ANESTHESIA (OUTPATIENT)
Dept: SURGERY | Facility: CLINIC | Age: 39
DRG: 834 | End: 2024-09-16
Payer: COMMERCIAL

## 2024-09-16 ENCOUNTER — ANESTHESIA EVENT (OUTPATIENT)
Dept: SURGERY | Facility: CLINIC | Age: 39
DRG: 834 | End: 2024-09-16
Payer: COMMERCIAL

## 2024-09-16 PROBLEM — C92.10 CML (CHRONIC MYELOCYTIC LEUKEMIA) (H): Status: ACTIVE | Noted: 2024-09-16

## 2024-09-16 LAB
ACANTHOCYTES BLD QL SMEAR: SLIGHT
ALBUMIN SERPL BCG-MCNC: 3.4 G/DL (ref 3.5–5.2)
ALP SERPL-CCNC: 165 U/L (ref 40–150)
ALT SERPL W P-5'-P-CCNC: 11 U/L (ref 0–70)
ANION GAP SERPL CALCULATED.3IONS-SCNC: 11 MMOL/L (ref 7–15)
ANION GAP SERPL CALCULATED.3IONS-SCNC: 9 MMOL/L (ref 7–15)
APTT PPP: 34 SECONDS (ref 22–38)
APTT PPP: 37 SECONDS (ref 22–38)
AST SERPL W P-5'-P-CCNC: 28 U/L (ref 0–45)
ATRIAL RATE - MUSE: 86 BPM
BASOPHILS # BLD AUTO: ABNORMAL 10*3/UL
BASOPHILS # BLD AUTO: ABNORMAL 10*3/UL
BASOPHILS # BLD MANUAL: 35.8 10E3/UL (ref 0–0.2)
BASOPHILS # BLD MANUAL: 37.8 10E3/UL (ref 0–0.2)
BASOPHILS # BLD MANUAL: 9.4 10E3/UL (ref 0–0.2)
BASOPHILS NFR BLD AUTO: ABNORMAL %
BASOPHILS NFR BLD AUTO: ABNORMAL %
BASOPHILS NFR BLD MANUAL: 11 %
BASOPHILS NFR BLD MANUAL: 11 %
BASOPHILS NFR BLD MANUAL: 2 %
BILIRUB DIRECT SERPL-MCNC: 0.33 MG/DL (ref 0–0.3)
BILIRUB SERPL-MCNC: 0.8 MG/DL
BLASTS # BLD MANUAL: 13 10E3/UL
BLASTS # BLD MANUAL: 70.4 10E3/UL
BLASTS NFR BLD MANUAL: 15 %
BLASTS NFR BLD MANUAL: 4 %
BUN SERPL-MCNC: 25.5 MG/DL (ref 6–20)
BUN SERPL-MCNC: 30.2 MG/DL (ref 6–20)
BURR CELLS BLD QL SMEAR: SLIGHT
CALCIUM SERPL-MCNC: 8.7 MG/DL (ref 8.8–10.4)
CALCIUM SERPL-MCNC: 8.8 MG/DL (ref 8.8–10.4)
CHLORIDE SERPL-SCNC: 107 MMOL/L (ref 98–107)
CHLORIDE SERPL-SCNC: 112 MMOL/L (ref 98–107)
CREAT SERPL-MCNC: 1.22 MG/DL (ref 0.67–1.17)
CREAT SERPL-MCNC: 1.25 MG/DL (ref 0.67–1.17)
CULTURE HARVEST COMPLETE DATE: NORMAL
DIASTOLIC BLOOD PRESSURE - MUSE: NORMAL MMHG
EGFRCR SERPLBLD CKD-EPI 2021: 77 ML/MIN/1.73M2
EGFRCR SERPLBLD CKD-EPI 2021: 79 ML/MIN/1.73M2
ELLIPTOCYTES BLD QL SMEAR: SLIGHT
EOSINOPHIL # BLD AUTO: ABNORMAL 10*3/UL
EOSINOPHIL # BLD AUTO: ABNORMAL 10*3/UL
EOSINOPHIL # BLD MANUAL: 0 10E3/UL (ref 0–0.7)
EOSINOPHIL # BLD MANUAL: 10.3 10E3/UL (ref 0–0.7)
EOSINOPHIL # BLD MANUAL: 37.6 10E3/UL (ref 0–0.7)
EOSINOPHIL NFR BLD AUTO: ABNORMAL %
EOSINOPHIL NFR BLD AUTO: ABNORMAL %
EOSINOPHIL NFR BLD MANUAL: 0 %
EOSINOPHIL NFR BLD MANUAL: 3 %
EOSINOPHIL NFR BLD MANUAL: 8 %
ERYTHROCYTE [DISTWIDTH] IN BLOOD BY AUTOMATED COUNT: 18.6 % (ref 10–15)
ERYTHROCYTE [DISTWIDTH] IN BLOOD BY AUTOMATED COUNT: 18.6 % (ref 10–15)
ERYTHROCYTE [DISTWIDTH] IN BLOOD BY AUTOMATED COUNT: 18.7 % (ref 10–15)
FIBRINOGEN PPP-MCNC: 422 MG/DL (ref 170–510)
FIBRINOGEN PPP-MCNC: 449 MG/DL (ref 170–510)
GLUCOSE BLDC GLUCOMTR-MCNC: 127 MG/DL (ref 70–99)
GLUCOSE BLDC GLUCOMTR-MCNC: 140 MG/DL (ref 70–99)
GLUCOSE BLDC GLUCOMTR-MCNC: 222 MG/DL (ref 70–99)
GLUCOSE SERPL-MCNC: 152 MG/DL (ref 70–99)
GLUCOSE SERPL-MCNC: 289 MG/DL (ref 70–99)
HCO3 SERPL-SCNC: 19 MMOL/L (ref 22–29)
HCO3 SERPL-SCNC: 19 MMOL/L (ref 22–29)
HCT VFR BLD AUTO: 27.4 % (ref 40–53)
HCT VFR BLD AUTO: 28.7 % (ref 40–53)
HCT VFR BLD AUTO: 30.9 % (ref 40–53)
HGB BLD-MCNC: 10 G/DL (ref 13.3–17.7)
HGB BLD-MCNC: 9.1 G/DL (ref 13.3–17.7)
HGB BLD-MCNC: 9.8 G/DL (ref 13.3–17.7)
HSV1 IGG SERPL QL IA: <0.01 INDEX
HSV1 IGG SERPL QL IA: NORMAL
HSV2 IGG SERPL QL IA: 0.06 INDEX
HSV2 IGG SERPL QL IA: NORMAL
IMM GRANULOCYTES # BLD: ABNORMAL 10*3/UL
IMM GRANULOCYTES # BLD: ABNORMAL 10*3/UL
IMM GRANULOCYTES NFR BLD: ABNORMAL %
IMM GRANULOCYTES NFR BLD: ABNORMAL %
INR PPP: 1.45 (ref 0.85–1.15)
INR PPP: 1.45 (ref 0.85–1.15)
INTERPRETATION ECG - MUSE: NORMAL
INTERPRETATION: NORMAL
LAB DIRECTOR COMMENTS: NORMAL
LAB DIRECTOR COMMENTS: NORMAL
LAB DIRECTOR DISCLAIMER: NORMAL
LAB DIRECTOR DISCLAIMER: NORMAL
LAB DIRECTOR INTERPRETATION: NORMAL
LAB DIRECTOR INTERPRETATION: NORMAL
LAB DIRECTOR METHODOLOGY: NORMAL
LAB DIRECTOR METHODOLOGY: NORMAL
LAB DIRECTOR RESULTS: NORMAL
LAB DIRECTOR RESULTS: NORMAL
LDH SERPL L TO P-CCNC: 977 U/L (ref 0–250)
LYMPHOCYTES # BLD AUTO: ABNORMAL 10*3/UL
LYMPHOCYTES # BLD AUTO: ABNORMAL 10*3/UL
LYMPHOCYTES # BLD MANUAL: 0 10E3/UL (ref 0.8–5.3)
LYMPHOCYTES # BLD MANUAL: 13.7 10E3/UL (ref 0.8–5.3)
LYMPHOCYTES # BLD MANUAL: 14.1 10E3/UL (ref 0.8–5.3)
LYMPHOCYTES NFR BLD AUTO: ABNORMAL %
LYMPHOCYTES NFR BLD AUTO: ABNORMAL %
LYMPHOCYTES NFR BLD MANUAL: 0 %
LYMPHOCYTES NFR BLD MANUAL: 3 %
LYMPHOCYTES NFR BLD MANUAL: 4 %
MCH RBC QN AUTO: 28.9 PG (ref 26.5–33)
MCH RBC QN AUTO: 30 PG (ref 26.5–33)
MCH RBC QN AUTO: 30.6 PG (ref 26.5–33)
MCHC RBC AUTO-ENTMCNC: 32.4 G/DL (ref 31.5–36.5)
MCHC RBC AUTO-ENTMCNC: 33.2 G/DL (ref 31.5–36.5)
MCHC RBC AUTO-ENTMCNC: 34.1 G/DL (ref 31.5–36.5)
MCV RBC AUTO: 89 FL (ref 78–100)
MCV RBC AUTO: 90 FL (ref 78–100)
MCV RBC AUTO: 90 FL (ref 78–100)
METAMYELOCYTES # BLD MANUAL: 17.2 10E3/UL
METAMYELOCYTES # BLD MANUAL: 19.5 10E3/UL
METAMYELOCYTES # BLD MANUAL: 32.9 10E3/UL
METAMYELOCYTES NFR BLD MANUAL: 5 %
METAMYELOCYTES NFR BLD MANUAL: 6 %
METAMYELOCYTES NFR BLD MANUAL: 7 %
MONOCYTES # BLD AUTO: ABNORMAL 10*3/UL
MONOCYTES # BLD AUTO: ABNORMAL 10*3/UL
MONOCYTES # BLD MANUAL: 14.1 10E3/UL (ref 0–1.3)
MONOCYTES # BLD MANUAL: 3.4 10E3/UL (ref 0–1.3)
MONOCYTES # BLD MANUAL: 6.5 10E3/UL (ref 0–1.3)
MONOCYTES NFR BLD AUTO: ABNORMAL %
MONOCYTES NFR BLD AUTO: ABNORMAL %
MONOCYTES NFR BLD MANUAL: 1 %
MONOCYTES NFR BLD MANUAL: 2 %
MONOCYTES NFR BLD MANUAL: 3 %
MYELOCYTES # BLD MANUAL: 32.6 10E3/UL
MYELOCYTES # BLD MANUAL: 51.7 10E3/UL
MYELOCYTES # BLD MANUAL: 58.4 10E3/UL
MYELOCYTES NFR BLD MANUAL: 10 %
MYELOCYTES NFR BLD MANUAL: 11 %
MYELOCYTES NFR BLD MANUAL: 17 %
NEUTROPHILS # BLD AUTO: ABNORMAL 10*3/UL
NEUTROPHILS # BLD AUTO: ABNORMAL 10*3/UL
NEUTROPHILS # BLD MANUAL: 182.4 10E3/UL (ref 1.6–8.3)
NEUTROPHILS # BLD MANUAL: 185.4 10E3/UL (ref 1.6–8.3)
NEUTROPHILS # BLD MANUAL: 84.5 10E3/UL (ref 1.6–8.3)
NEUTROPHILS NFR BLD AUTO: ABNORMAL %
NEUTROPHILS NFR BLD AUTO: ABNORMAL %
NEUTROPHILS NFR BLD MANUAL: 18 %
NEUTROPHILS NFR BLD MANUAL: 54 %
NEUTROPHILS NFR BLD MANUAL: 56 %
NRBC # BLD AUTO: 1.6 10E3/UL
NRBC # BLD AUTO: 2.2 10E3/UL
NRBC # BLD AUTO: 3.4 10E3/UL
NRBC # BLD AUTO: 6.4 10E3/UL
NRBC BLD AUTO-RTO: 1 /100
NRBC BLD MANUAL-RTO: 1 %
OTHER CELLS # BLD MANUAL: 13.7 10E3/UL
OTHER CELLS NFR BLD MANUAL: 4 %
P AXIS - MUSE: 29 DEGREES
PATH REPORT.COMMENTS IMP SPEC: ABNORMAL
PATH REPORT.COMMENTS IMP SPEC: YES
PATH REPORT.FINAL DX SPEC: ABNORMAL
PATH REPORT.MICROSCOPIC SPEC OTHER STN: ABNORMAL
PATH REPORT.RELEVANT HX SPEC: ABNORMAL
PATH REV: ABNORMAL
PHOSPHATE SERPL-MCNC: 3.9 MG/DL (ref 2.5–4.5)
PHOSPHATE SERPL-MCNC: 4.9 MG/DL (ref 2.5–4.5)
PLASMA CELLS # BLD MANUAL: 9.4 10E3/UL
PLASMA CELLS NFR BLD MANUAL: 2 %
PLAT MORPH BLD: ABNORMAL
PLATELET # BLD AUTO: 443 10E3/UL (ref 150–450)
PLATELET # BLD AUTO: 561 10E3/UL (ref 150–450)
PLATELET # BLD AUTO: ABNORMAL 10*3/UL
POLYCHROMASIA BLD QL SMEAR: SLIGHT
POTASSIUM SERPL-SCNC: 4.5 MMOL/L (ref 3.4–5.3)
POTASSIUM SERPL-SCNC: 5 MMOL/L (ref 3.4–5.3)
PR INTERVAL - MUSE: NORMAL MS
PROMYELOCYTES # BLD MANUAL: 145.6 10E3/UL
PROMYELOCYTES # BLD MANUAL: 3.4 10E3/UL
PROMYELOCYTES # BLD MANUAL: 35.8 10E3/UL
PROMYELOCYTES NFR BLD MANUAL: 1 %
PROMYELOCYTES NFR BLD MANUAL: 11 %
PROMYELOCYTES NFR BLD MANUAL: 31 %
PROT SERPL-MCNC: 6.2 G/DL (ref 6.4–8.3)
QRS DURATION - MUSE: 194 MS
QT - MUSE: 598 MS
QTC - MUSE: 572 MS
R AXIS - MUSE: 240 DEGREES
RBC # BLD AUTO: 3.03 10E6/UL (ref 4.4–5.9)
RBC # BLD AUTO: 3.2 10E6/UL (ref 4.4–5.9)
RBC # BLD AUTO: 3.46 10E6/UL (ref 4.4–5.9)
RBC MORPH BLD: ABNORMAL
SODIUM SERPL-SCNC: 137 MMOL/L (ref 135–145)
SODIUM SERPL-SCNC: 140 MMOL/L (ref 135–145)
SPECIMEN DESCRIPTION: NORMAL
SPECIMEN DESCRIPTION: NORMAL
SYSTOLIC BLOOD PRESSURE - MUSE: NORMAL MMHG
T AXIS - MUSE: 39 DEGREES
URATE SERPL-MCNC: 3.6 MG/DL (ref 3.4–7)
URATE SERPL-MCNC: 4.1 MG/DL (ref 3.4–7)
VENTRICULAR RATE- MUSE: 55 BPM
WBC # BLD AUTO: 325.7 10E3/UL (ref 4–11)
WBC # BLD AUTO: 343.3 10E3/UL (ref 4–11)
WBC # BLD AUTO: 469.6 10E3/UL (ref 4–11)

## 2024-09-16 PROCEDURE — 258N000003 HC RX IP 258 OP 636: Performed by: PHYSICIAN ASSISTANT

## 2024-09-16 PROCEDURE — 81207 BCR/ABL1 GENE MINOR BP: CPT | Performed by: PHYSICIAN ASSISTANT

## 2024-09-16 PROCEDURE — 88189 FLOWCYTOMETRY/READ 16 & >: CPT | Performed by: STUDENT IN AN ORGANIZED HEALTH CARE EDUCATION/TRAINING PROGRAM

## 2024-09-16 PROCEDURE — 88313 SPECIAL STAINS GROUP 2: CPT | Mod: 26 | Performed by: STUDENT IN AN ORGANIZED HEALTH CARE EDUCATION/TRAINING PROGRAM

## 2024-09-16 PROCEDURE — 85610 PROTHROMBIN TIME: CPT | Performed by: PHYSICIAN ASSISTANT

## 2024-09-16 PROCEDURE — 88264 CHROMOSOME ANALYSIS 20-25: CPT | Performed by: PHYSICIAN ASSISTANT

## 2024-09-16 PROCEDURE — 999N000141 HC STATISTIC PRE-PROCEDURE NURSING ASSESSMENT: Performed by: PHYSICIAN ASSISTANT

## 2024-09-16 PROCEDURE — 88341 IMHCHEM/IMCYTCHM EA ADD ANTB: CPT | Mod: TC | Performed by: PHYSICIAN ASSISTANT

## 2024-09-16 PROCEDURE — 84550 ASSAY OF BLOOD/URIC ACID: CPT | Performed by: PHYSICIAN ASSISTANT

## 2024-09-16 PROCEDURE — 250N000013 HC RX MED GY IP 250 OP 250 PS 637: Performed by: HOSPITALIST

## 2024-09-16 PROCEDURE — 85007 BL SMEAR W/DIFF WBC COUNT: CPT | Performed by: PHYSICIAN ASSISTANT

## 2024-09-16 PROCEDURE — 85384 FIBRINOGEN ACTIVITY: CPT | Performed by: PHYSICIAN ASSISTANT

## 2024-09-16 PROCEDURE — 250N000012 HC RX MED GY IP 250 OP 636 PS 637: Performed by: STUDENT IN AN ORGANIZED HEALTH CARE EDUCATION/TRAINING PROGRAM

## 2024-09-16 PROCEDURE — 88185 FLOWCYTOMETRY/TC ADD-ON: CPT | Performed by: PHYSICIAN ASSISTANT

## 2024-09-16 PROCEDURE — 99418 PROLNG IP/OBS E/M EA 15 MIN: CPT | Performed by: INTERNAL MEDICINE

## 2024-09-16 PROCEDURE — 88237 TISSUE CULTURE BONE MARROW: CPT | Performed by: PHYSICIAN ASSISTANT

## 2024-09-16 PROCEDURE — 38222 DX BONE MARROW BX & ASPIR: CPT | Performed by: PHYSICIAN ASSISTANT

## 2024-09-16 PROCEDURE — 88311 DECALCIFY TISSUE: CPT | Mod: 26 | Performed by: STUDENT IN AN ORGANIZED HEALTH CARE EDUCATION/TRAINING PROGRAM

## 2024-09-16 PROCEDURE — 88271 CYTOGENETICS DNA PROBE: CPT | Performed by: PHYSICIAN ASSISTANT

## 2024-09-16 PROCEDURE — 250N000013 HC RX MED GY IP 250 OP 250 PS 637: Performed by: INTERNAL MEDICINE

## 2024-09-16 PROCEDURE — 88189 FLOWCYTOMETRY/READ 16 & >: CPT | Mod: GC | Performed by: STUDENT IN AN ORGANIZED HEALTH CARE EDUCATION/TRAINING PROGRAM

## 2024-09-16 PROCEDURE — 85097 BONE MARROW INTERPRETATION: CPT | Performed by: STUDENT IN AN ORGANIZED HEALTH CARE EDUCATION/TRAINING PROGRAM

## 2024-09-16 PROCEDURE — 88305 TISSUE EXAM BY PATHOLOGIST: CPT | Mod: TC | Performed by: PHYSICIAN ASSISTANT

## 2024-09-16 PROCEDURE — 079T3ZX DRAINAGE OF BONE MARROW, PERCUTANEOUS APPROACH, DIAGNOSTIC: ICD-10-PCS | Performed by: PHYSICIAN ASSISTANT

## 2024-09-16 PROCEDURE — 83615 LACTATE (LD) (LDH) ENZYME: CPT | Performed by: PHYSICIAN ASSISTANT

## 2024-09-16 PROCEDURE — 250N000013 HC RX MED GY IP 250 OP 250 PS 637: Performed by: STUDENT IN AN ORGANIZED HEALTH CARE EDUCATION/TRAINING PROGRAM

## 2024-09-16 PROCEDURE — 360N000075 HC SURGERY LEVEL 2, PER MIN: Performed by: PHYSICIAN ASSISTANT

## 2024-09-16 PROCEDURE — 88341 IMHCHEM/IMCYTCHM EA ADD ANTB: CPT | Mod: 26 | Performed by: STUDENT IN AN ORGANIZED HEALTH CARE EDUCATION/TRAINING PROGRAM

## 2024-09-16 PROCEDURE — 07DR3ZX EXTRACTION OF ILIAC BONE MARROW, PERCUTANEOUS APPROACH, DIAGNOSTIC: ICD-10-PCS | Performed by: PHYSICIAN ASSISTANT

## 2024-09-16 PROCEDURE — 84100 ASSAY OF PHOSPHORUS: CPT | Performed by: PHYSICIAN ASSISTANT

## 2024-09-16 PROCEDURE — 88184 FLOWCYTOMETRY/ TC 1 MARKER: CPT | Performed by: STUDENT IN AN ORGANIZED HEALTH CARE EDUCATION/TRAINING PROGRAM

## 2024-09-16 PROCEDURE — 999N000010 HC STATISTIC ANES STAT CODE-CRNA PER MINUTE: Performed by: PHYSICIAN ASSISTANT

## 2024-09-16 PROCEDURE — 88185 FLOWCYTOMETRY/TC ADD-ON: CPT | Performed by: EMERGENCY MEDICINE

## 2024-09-16 PROCEDURE — 250N000013 HC RX MED GY IP 250 OP 250 PS 637: Performed by: PHYSICIAN ASSISTANT

## 2024-09-16 PROCEDURE — 370N000017 HC ANESTHESIA TECHNICAL FEE, PER MIN: Performed by: PHYSICIAN ASSISTANT

## 2024-09-16 PROCEDURE — 250N000011 HC RX IP 250 OP 636: Performed by: NURSE ANESTHETIST, CERTIFIED REGISTERED

## 2024-09-16 PROCEDURE — 258N000003 HC RX IP 258 OP 636: Performed by: NURSE ANESTHETIST, CERTIFIED REGISTERED

## 2024-09-16 PROCEDURE — 36415 COLL VENOUS BLD VENIPUNCTURE: CPT | Performed by: PHYSICIAN ASSISTANT

## 2024-09-16 PROCEDURE — 206N000001 HC R&B BMT UMMC

## 2024-09-16 PROCEDURE — 710N000010 HC RECOVERY PHASE 1, LEVEL 2, PER MIN: Performed by: PHYSICIAN ASSISTANT

## 2024-09-16 PROCEDURE — 85730 THROMBOPLASTIN TIME PARTIAL: CPT | Performed by: PHYSICIAN ASSISTANT

## 2024-09-16 PROCEDURE — 250N000009 HC RX 250: Performed by: PHYSICIAN ASSISTANT

## 2024-09-16 PROCEDURE — 82248 BILIRUBIN DIRECT: CPT | Performed by: STUDENT IN AN ORGANIZED HEALTH CARE EDUCATION/TRAINING PROGRAM

## 2024-09-16 PROCEDURE — 250N000011 HC RX IP 250 OP 636: Performed by: PHYSICIAN ASSISTANT

## 2024-09-16 PROCEDURE — G0452 MOLECULAR PATHOLOGY INTERPR: HCPCS | Mod: 26 | Performed by: PATHOLOGY

## 2024-09-16 PROCEDURE — 38220 DX BONE MARROW ASPIRATIONS: CPT | Performed by: NURSE ANESTHETIST, CERTIFIED REGISTERED

## 2024-09-16 PROCEDURE — 88342 IMHCHEM/IMCYTCHM 1ST ANTB: CPT | Mod: 26 | Performed by: STUDENT IN AN ORGANIZED HEALTH CARE EDUCATION/TRAINING PROGRAM

## 2024-09-16 PROCEDURE — 88305 TISSUE EXAM BY PATHOLOGIST: CPT | Mod: 26 | Performed by: STUDENT IN AN ORGANIZED HEALTH CARE EDUCATION/TRAINING PROGRAM

## 2024-09-16 PROCEDURE — 99233 SBSQ HOSP IP/OBS HIGH 50: CPT | Mod: 25 | Performed by: INTERNAL MEDICINE

## 2024-09-16 PROCEDURE — 85027 COMPLETE CBC AUTOMATED: CPT | Performed by: PHYSICIAN ASSISTANT

## 2024-09-16 PROCEDURE — 38220 DX BONE MARROW ASPIRATIONS: CPT | Performed by: ANESTHESIOLOGY

## 2024-09-16 PROCEDURE — 81206 BCR/ABL1 GENE MAJOR BP: CPT | Performed by: PHYSICIAN ASSISTANT

## 2024-09-16 RX ORDER — ONDANSETRON 2 MG/ML
4 INJECTION INTRAMUSCULAR; INTRAVENOUS EVERY 30 MIN PRN
Status: DISCONTINUED | OUTPATIENT
Start: 2024-09-16 | End: 2024-09-16

## 2024-09-16 RX ORDER — ACETAMINOPHEN 325 MG/1
975 TABLET ORAL ONCE
Status: COMPLETED | OUTPATIENT
Start: 2024-09-16 | End: 2024-09-16

## 2024-09-16 RX ORDER — OXYCODONE HYDROCHLORIDE 10 MG/1
10 TABLET ORAL
Status: DISCONTINUED | OUTPATIENT
Start: 2024-09-16 | End: 2024-09-16

## 2024-09-16 RX ORDER — PROPOFOL 10 MG/ML
INJECTION, EMULSION INTRAVENOUS PRN
Status: DISCONTINUED | OUTPATIENT
Start: 2024-09-16 | End: 2024-09-16

## 2024-09-16 RX ORDER — HYDROMORPHONE HCL IN WATER/PF 6 MG/30 ML
0.4 PATIENT CONTROLLED ANALGESIA SYRINGE INTRAVENOUS EVERY 5 MIN PRN
Status: DISCONTINUED | OUTPATIENT
Start: 2024-09-16 | End: 2024-09-16

## 2024-09-16 RX ORDER — NALOXONE HYDROCHLORIDE 0.4 MG/ML
0.1 INJECTION, SOLUTION INTRAMUSCULAR; INTRAVENOUS; SUBCUTANEOUS
Status: DISCONTINUED | OUTPATIENT
Start: 2024-09-16 | End: 2024-09-16

## 2024-09-16 RX ORDER — NALOXONE HYDROCHLORIDE 0.4 MG/ML
0.2 INJECTION, SOLUTION INTRAMUSCULAR; INTRAVENOUS; SUBCUTANEOUS
Status: DISCONTINUED | OUTPATIENT
Start: 2024-09-16 | End: 2024-09-19 | Stop reason: HOSPADM

## 2024-09-16 RX ORDER — ONDANSETRON 4 MG/1
4 TABLET, ORALLY DISINTEGRATING ORAL EVERY 30 MIN PRN
Status: DISCONTINUED | OUTPATIENT
Start: 2024-09-16 | End: 2024-09-16

## 2024-09-16 RX ORDER — SODIUM CHLORIDE, SODIUM LACTATE, POTASSIUM CHLORIDE, CALCIUM CHLORIDE 600; 310; 30; 20 MG/100ML; MG/100ML; MG/100ML; MG/100ML
INJECTION, SOLUTION INTRAVENOUS CONTINUOUS
Status: DISCONTINUED | OUTPATIENT
Start: 2024-09-16 | End: 2024-09-16 | Stop reason: HOSPADM

## 2024-09-16 RX ORDER — HYDROXYUREA 500 MG/1
2000 CAPSULE ORAL 2 TIMES DAILY
Status: DISCONTINUED | OUTPATIENT
Start: 2024-09-16 | End: 2024-09-19 | Stop reason: HOSPADM

## 2024-09-16 RX ORDER — FENTANYL CITRATE 50 UG/ML
50 INJECTION, SOLUTION INTRAMUSCULAR; INTRAVENOUS EVERY 5 MIN PRN
Status: CANCELLED | OUTPATIENT
Start: 2024-09-16

## 2024-09-16 RX ORDER — SODIUM CHLORIDE, SODIUM LACTATE, POTASSIUM CHLORIDE, CALCIUM CHLORIDE 600; 310; 30; 20 MG/100ML; MG/100ML; MG/100ML; MG/100ML
INJECTION, SOLUTION INTRAVENOUS CONTINUOUS
Status: CANCELLED | OUTPATIENT
Start: 2024-09-16

## 2024-09-16 RX ORDER — DEXAMETHASONE SODIUM PHOSPHATE 10 MG/ML
4 INJECTION, SOLUTION INTRAMUSCULAR; INTRAVENOUS
Status: DISCONTINUED | OUTPATIENT
Start: 2024-09-16 | End: 2024-09-16

## 2024-09-16 RX ORDER — NALOXONE HYDROCHLORIDE 0.4 MG/ML
0.1 INJECTION, SOLUTION INTRAMUSCULAR; INTRAVENOUS; SUBCUTANEOUS
Status: CANCELLED | OUTPATIENT
Start: 2024-09-16

## 2024-09-16 RX ORDER — FENTANYL CITRATE 50 UG/ML
INJECTION, SOLUTION INTRAMUSCULAR; INTRAVENOUS PRN
Status: DISCONTINUED | OUTPATIENT
Start: 2024-09-16 | End: 2024-09-16

## 2024-09-16 RX ORDER — ONDANSETRON 4 MG/1
4 TABLET, ORALLY DISINTEGRATING ORAL EVERY 30 MIN PRN
Status: CANCELLED | OUTPATIENT
Start: 2024-09-16

## 2024-09-16 RX ORDER — SODIUM CHLORIDE, SODIUM LACTATE, POTASSIUM CHLORIDE, CALCIUM CHLORIDE 600; 310; 30; 20 MG/100ML; MG/100ML; MG/100ML; MG/100ML
INJECTION, SOLUTION INTRAVENOUS CONTINUOUS PRN
Status: DISCONTINUED | OUTPATIENT
Start: 2024-09-16 | End: 2024-09-16

## 2024-09-16 RX ORDER — FENTANYL CITRATE 50 UG/ML
25 INJECTION, SOLUTION INTRAMUSCULAR; INTRAVENOUS EVERY 5 MIN PRN
Status: DISCONTINUED | OUTPATIENT
Start: 2024-09-16 | End: 2024-09-16

## 2024-09-16 RX ORDER — PROPOFOL 10 MG/ML
INJECTION, EMULSION INTRAVENOUS CONTINUOUS PRN
Status: DISCONTINUED | OUTPATIENT
Start: 2024-09-16 | End: 2024-09-16

## 2024-09-16 RX ORDER — FENTANYL CITRATE 50 UG/ML
50 INJECTION, SOLUTION INTRAMUSCULAR; INTRAVENOUS EVERY 5 MIN PRN
Status: DISCONTINUED | OUTPATIENT
Start: 2024-09-16 | End: 2024-09-16

## 2024-09-16 RX ORDER — HYDROMORPHONE HCL IN WATER/PF 6 MG/30 ML
0.4 PATIENT CONTROLLED ANALGESIA SYRINGE INTRAVENOUS EVERY 5 MIN PRN
Status: CANCELLED | OUTPATIENT
Start: 2024-09-16

## 2024-09-16 RX ORDER — NALOXONE HYDROCHLORIDE 0.4 MG/ML
0.4 INJECTION, SOLUTION INTRAMUSCULAR; INTRAVENOUS; SUBCUTANEOUS
Status: DISCONTINUED | OUTPATIENT
Start: 2024-09-16 | End: 2024-09-19 | Stop reason: HOSPADM

## 2024-09-16 RX ORDER — ONDANSETRON 2 MG/ML
4 INJECTION INTRAMUSCULAR; INTRAVENOUS EVERY 30 MIN PRN
Status: CANCELLED | OUTPATIENT
Start: 2024-09-16

## 2024-09-16 RX ORDER — SODIUM CHLORIDE, SODIUM LACTATE, POTASSIUM CHLORIDE, CALCIUM CHLORIDE 600; 310; 30; 20 MG/100ML; MG/100ML; MG/100ML; MG/100ML
INJECTION, SOLUTION INTRAVENOUS CONTINUOUS
Status: DISCONTINUED | OUTPATIENT
Start: 2024-09-16 | End: 2024-09-16

## 2024-09-16 RX ORDER — OXYCODONE HYDROCHLORIDE 5 MG/1
5 TABLET ORAL
Status: CANCELLED | OUTPATIENT
Start: 2024-09-16

## 2024-09-16 RX ORDER — OXYCODONE HYDROCHLORIDE 5 MG/1
5 TABLET ORAL
Status: DISCONTINUED | OUTPATIENT
Start: 2024-09-16 | End: 2024-09-16

## 2024-09-16 RX ORDER — SODIUM CHLORIDE, SODIUM LACTATE, POTASSIUM CHLORIDE, CALCIUM CHLORIDE 600; 310; 30; 20 MG/100ML; MG/100ML; MG/100ML; MG/100ML
INJECTION, SOLUTION INTRAVENOUS CONTINUOUS
Status: DISCONTINUED | OUTPATIENT
Start: 2024-09-16 | End: 2024-09-17

## 2024-09-16 RX ORDER — GLYCOPYRROLATE 0.2 MG/ML
INJECTION, SOLUTION INTRAMUSCULAR; INTRAVENOUS PRN
Status: DISCONTINUED | OUTPATIENT
Start: 2024-09-16 | End: 2024-09-16

## 2024-09-16 RX ORDER — OXYCODONE HYDROCHLORIDE 10 MG/1
10 TABLET ORAL
Status: CANCELLED | OUTPATIENT
Start: 2024-09-16

## 2024-09-16 RX ORDER — HYDROMORPHONE HCL IN WATER/PF 6 MG/30 ML
0.2 PATIENT CONTROLLED ANALGESIA SYRINGE INTRAVENOUS EVERY 5 MIN PRN
Status: DISCONTINUED | OUTPATIENT
Start: 2024-09-16 | End: 2024-09-16

## 2024-09-16 RX ORDER — DEXAMETHASONE SODIUM PHOSPHATE 10 MG/ML
4 INJECTION, SOLUTION INTRAMUSCULAR; INTRAVENOUS
Status: CANCELLED | OUTPATIENT
Start: 2024-09-16

## 2024-09-16 RX ORDER — OXYCODONE HYDROCHLORIDE 5 MG/1
5 TABLET ORAL EVERY 4 HOURS PRN
Status: COMPLETED | OUTPATIENT
Start: 2024-09-16 | End: 2024-09-16

## 2024-09-16 RX ORDER — FENTANYL CITRATE 50 UG/ML
25 INJECTION, SOLUTION INTRAMUSCULAR; INTRAVENOUS EVERY 5 MIN PRN
Status: CANCELLED | OUTPATIENT
Start: 2024-09-16

## 2024-09-16 RX ORDER — LIDOCAINE 40 MG/G
CREAM TOPICAL
Status: DISCONTINUED | OUTPATIENT
Start: 2024-09-16 | End: 2024-09-16 | Stop reason: HOSPADM

## 2024-09-16 RX ORDER — HYDROMORPHONE HCL IN WATER/PF 6 MG/30 ML
0.2 PATIENT CONTROLLED ANALGESIA SYRINGE INTRAVENOUS EVERY 5 MIN PRN
Status: CANCELLED | OUTPATIENT
Start: 2024-09-16

## 2024-09-16 RX ADMIN — PROPOFOL 30 MG: 10 INJECTION, EMULSION INTRAVENOUS at 10:36

## 2024-09-16 RX ADMIN — PROPOFOL 100 MCG/KG/MIN: 10 INJECTION, EMULSION INTRAVENOUS at 10:34

## 2024-09-16 RX ADMIN — MIDAZOLAM 1 MG: 1 INJECTION INTRAMUSCULAR; INTRAVENOUS at 10:28

## 2024-09-16 RX ADMIN — HYDROXYUREA 2000 MG: 500 CAPSULE ORAL at 20:32

## 2024-09-16 RX ADMIN — PROPOFOL 20 MG: 10 INJECTION, EMULSION INTRAVENOUS at 10:39

## 2024-09-16 RX ADMIN — SODIUM CHLORIDE, POTASSIUM CHLORIDE, SODIUM LACTATE AND CALCIUM CHLORIDE: 600; 310; 30; 20 INJECTION, SOLUTION INTRAVENOUS at 10:29

## 2024-09-16 RX ADMIN — PHENYLEPHRINE HYDROCHLORIDE 200 MCG: 10 INJECTION INTRAVENOUS at 11:13

## 2024-09-16 RX ADMIN — ACETAMINOPHEN 975 MG: 325 TABLET ORAL at 10:12

## 2024-09-16 RX ADMIN — ACYCLOVIR 400 MG: 400 TABLET ORAL at 20:31

## 2024-09-16 RX ADMIN — ACETAMINOPHEN 975 MG: 325 TABLET ORAL at 20:29

## 2024-09-16 RX ADMIN — PHENYLEPHRINE HYDROCHLORIDE 150 MCG: 10 INJECTION INTRAVENOUS at 10:47

## 2024-09-16 RX ADMIN — MICAFUNGIN SODIUM 50 MG: 50 INJECTION, POWDER, LYOPHILIZED, FOR SOLUTION INTRAVENOUS at 08:53

## 2024-09-16 RX ADMIN — Medication 5 MG: at 21:50

## 2024-09-16 RX ADMIN — ALLOPURINOL 300 MG: 300 TABLET ORAL at 08:46

## 2024-09-16 RX ADMIN — OXYCODONE HYDROCHLORIDE 5 MG: 5 TABLET ORAL at 20:30

## 2024-09-16 RX ADMIN — LEVOFLOXACIN 250 MG: 250 TABLET, FILM COATED ORAL at 08:46

## 2024-09-16 RX ADMIN — FENTANYL CITRATE 50 MCG: 50 INJECTION INTRAMUSCULAR; INTRAVENOUS at 10:28

## 2024-09-16 RX ADMIN — PHENYLEPHRINE HYDROCHLORIDE 200 MCG: 10 INJECTION INTRAVENOUS at 11:02

## 2024-09-16 RX ADMIN — BUPROPION HYDROCHLORIDE 300 MG: 150 TABLET, EXTENDED RELEASE ORAL at 08:46

## 2024-09-16 RX ADMIN — LEVOTHYROXINE SODIUM 150 MCG: 75 TABLET ORAL at 08:46

## 2024-09-16 RX ADMIN — ACETAMINOPHEN 975 MG: 325 TABLET ORAL at 14:32

## 2024-09-16 RX ADMIN — HYDROXYUREA 2000 MG: 500 CAPSULE ORAL at 08:43

## 2024-09-16 RX ADMIN — OXYCODONE HYDROCHLORIDE 5 MG: 5 TABLET ORAL at 16:14

## 2024-09-16 RX ADMIN — ACYCLOVIR 400 MG: 400 TABLET ORAL at 08:46

## 2024-09-16 RX ADMIN — GLYCOPYRROLATE 0.2 MG: 0.2 INJECTION, SOLUTION INTRAMUSCULAR; INTRAVENOUS at 10:32

## 2024-09-16 RX ADMIN — INSULIN ASPART 1 UNITS: 100 INJECTION, SOLUTION INTRAVENOUS; SUBCUTANEOUS at 21:51

## 2024-09-16 RX ADMIN — SODIUM CHLORIDE, POTASSIUM CHLORIDE, SODIUM LACTATE AND CALCIUM CHLORIDE: 600; 310; 30; 20 INJECTION, SOLUTION INTRAVENOUS at 13:17

## 2024-09-16 RX ADMIN — METOPROLOL SUCCINATE 25 MG: 25 TABLET, EXTENDED RELEASE ORAL at 08:46

## 2024-09-16 RX ADMIN — FENTANYL CITRATE 50 MCG: 50 INJECTION INTRAMUSCULAR; INTRAVENOUS at 11:02

## 2024-09-16 ASSESSMENT — ACTIVITIES OF DAILY LIVING (ADL)
ADLS_ACUITY_SCORE: 22

## 2024-09-16 ASSESSMENT — LIFESTYLE VARIABLES: TOBACCO_USE: 0

## 2024-09-16 NOTE — PROGRESS NOTES
"CLINICAL NUTRITION SERVICES - ASSESSMENT NOTE     Nutrition Prescription    RECOMMENDATIONS FOR MDs/PROVIDERS TO ORDER:  None at this time     Malnutrition Status:    Severe malnutrition in the context of chronic illness    Recommendations already ordered by Registered Dietitian (RD):  PRN snacks/supplements     Future/Additional Recommendations:  -Monitor PO intake   -Monitor labs  -Monitor wt trends      REASON FOR ASSESSMENT  Anil Montoya is a/an 38 year old male assessed by the dietitian for Nutrition Risk Monitoring      CLINICAL HISTORY  Per H&P, \"38 year old male with PMH of uncontrolled diabetes, 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     NUTRITION HISTORY  Met with pt, his dad, and wife at bedside. Pt reports he typically has a good appetite (has been eating 1-2 meals daily since starting Ozempic in December 2023, mainly protein based foods such as chicken or hot dogs). Pt reports he has unintentionally lost wt while on this medication. Pt denies any nausea or taste changes recently.      CURRENT NUTRITION ORDERS  Diet: Regular   Intake/Tolerance: 100% documented at dinner last night per RN flowsheets.     LABS  Labs reviewed.  BUN 25.5  Cr 1.22  Ca 8.7  Glucose 152  A1c 8.2 (9/14)    MEDICATIONS  Medications reviewed.   Acyclovir  Wellbutrin XL  Insulin  Levothyroxine  LR @ 100 mL/hr  PRN Zofran, Miralax, Compazine, Senokot     ANTHROPOMETRICS  Height: 177.8 cm (5' 10\")  Most Recent Weight: 92.5 kg (204 lb) - pt is currently 122.6% of IBW  IBW: 75.4 kg  BMI: Overweight BMI 25-29.9   Weight History: Pt reports UBW of 255#, started losing wt in December 2023 after starting Ozympic.   Dosing Weight: 79.6 kg (adjusted wt based off of IBW and current wt)  Care Everywhere Records:  1/11/2024- 105.7 kg (233 lb): 12.5% wt loss x~6 months "       ASSESSED NUTRITION NEEDS  Estimated Energy Needs: 0231-6772 kcals/day (30-35 kcals/kg)  Justification: Increased needs  Estimated Protein Needs:  grams protein/day (1.2 - 1.5 grams of pro/kg)  Justification: Increased needs  Estimated Fluid Needs: 4780-6268 mL/day (1 mL/kcal)   Justification: Maintenance    PHYSICAL FINDINGS  See malnutrition section below.       MALNUTRITION  % Intake: </=75% for >/= 1 month (severe)  % Weight Loss: > 10% in 6 months (severe)  Subcutaneous Fat Loss: None observed  Muscle Loss: None observed  Fluid Accumulation/Edema: Does not meet criteria (trace edema on B/L ankles and B/L feet per RN flowsheets)  Malnutrition Diagnosis: Severe malnutrition in the context of chronic illness    NUTRITION DIAGNOSIS  Unintended weight loss related to decreased appetite secondary to Ozempic as evidenced by 12.5% wt loss x 6 months.       INTERVENTIONS  Implementation  Nutrition Education: Provided education on the importance of eating 3 meals daily (and having a source of protein at each meal). Recommended pt consume at least  g of protein daily. Reviewed sources of protein.      Medical food supplement therapy- ordered PRN snacks/supplements     Goals  Once diet is advanced, patient to consume % of nutritionally adequate meal trays TID, or the equivalent with supplements/snacks.     Monitoring/Evaluation  Progress toward goals will be monitored and evaluated per protocol.  Alyce Carlos RD (Maggie), LD- 5C Clinical Dietitian   Available on [employer]  No longer available by paging

## 2024-09-16 NOTE — ANESTHESIA CARE TRANSFER NOTE
Patient: Anil Montoya    Procedure: Procedure(s):  Bone marrow biopsy, bone specimen, needle/trocar, left posterior iliac crest       Diagnosis: Elevated lymphocyte count [D72.820]  Diagnosis Additional Information: No value filed.    Anesthesia Type:   MAC     Note:    Oropharynx: oropharynx clear of all foreign objects and spontaneously breathing  Level of Consciousness: awake  Oxygen Supplementation: room air    Independent Airway: airway patency satisfactory and stable  Dentition: dentition unchanged  Vital Signs Stable: post-procedure vital signs reviewed and stable  Report to RN Given: handoff report given  Patient transferred to: PACU  Comments: Awake for PACU check out. Report called by myself to 5C  Handoff Report: Identifed the Patient, Identified the Reponsible Provider, Reviewed the pertinent medical history, Discussed the surgical course, Reviewed Intra-OP anesthesia mangement and issues during anesthesia, Set expectations for post-procedure period and Allowed opportunity for questions and acknowledgement of understanding      Vitals:  Vitals Value Taken Time   BP     Temp     Pulse 55 09/16/24 1130   Resp     SpO2 96 % 09/16/24 1130   Vitals shown include unfiled device data.    Electronically Signed By: MACARIO Watt CRNA  September 16, 2024  11:30 AM

## 2024-09-16 NOTE — PHARMACY-ADMISSION MEDICATION HISTORY
Admission medication history completed at [facility]. Please see Pharmacist Admission Medication History note from 09/14/2024.

## 2024-09-16 NOTE — CONSULTS
Discharge Pharmacy Test Claim    Patient's insurance plan is not contracted with [place] pharmacy, therefore Liaison cannot determine expected copay for dasatinib. Patient's insurance prefers [facility]. Liaison will initiate prior authorization in case one is needed.      Test Claim Copay Notes   Dasatinib PA required [facility] pharmacy not contracted, [facility] preferred       Noni Sharp  [facility] Liaison  Phone: [phone] Fax: [phone]  Available on Teams & Vocera

## 2024-09-16 NOTE — ANESTHESIA PREPROCEDURE EVALUATION
"Anesthesia Pre-Procedure Evaluation    Patient: Anil Montoya   MRN: [id number] : 1985        Procedure : Procedure(s):  Bone marrow biopsy, bone specimen, needle/trocar          Past Medical History:   Diagnosis Date    Pneumonia       Past Surgical History:   Procedure Laterality Date    CV CORONARY ANGIOGRAM N/A 3/31/2022    Procedure: Coronary Angiogram;  Surgeon: Lidia Quintanilla MD;  [facility]:  [facility]    EP ICD INSERT SINGLE N/A 2022    Procedure: Implantable Cardioverter Defibrillator Device & Lead Implant Single or Dual;  Surgeon: Suleman Higgins MD;  Location:  [facility]    IR LUMBAR PUNCTURE  2012    REPLACE VALVE AORTIC N/A 2022    Procedure: AORTIC VALVE exploration;  Surgeon: Marti Melton MD;  Location:  OR    REPLACE VALVE TRICUSPID N/A 2022    Procedure: TRICUSPID VALVE REPLACEMENT;  Surgeon: Marti Melton MD;  Location:  OR      Allergies   Allergen Reactions    Vancomycin      \"Red Sonia Syndrome\"    Clindamycin Unknown    Ceftriaxone Rash      Social History     Tobacco Use    Smoking status: Never    Smokeless tobacco: Never   Substance Use Topics    Alcohol use: Not Currently      Wt Readings from Last 1 Encounters:   24 92.5 kg (204 lb)        Anesthesia Evaluation            ROS/MED HX  ENT/Pulmonary: Comment: Hx of PNA, and per patient left him with 80% residual lung function.    (+) sleep apnea,                                    (-) tobacco use   Neurologic:       Cardiovascular:     (+)  hypertension- -   -  - -                           valvular problems/murmurs  Bacterial endocarditis involving TV and AV.    Previous cardiac testing   Echo: Date: 3/28/22 Results:  Interpretation Summary     Tricuspid valve leaflets are thickened, and the atrial aspect of the septal  leaflet is irregularly bordered with small mobile filamentous elements (0.7cm  long). There is a large " fixed heterogeneous mass in the right ventricle  attached to the interventricular septum (2.5 x 2cm), appears to be connected  to the tricuspid subvalvular apparatus, with a pedunculated round mobile  element (1.8 x 1cm). There is no significant tricuspid regurgitation.  Differential includes endocarditis involving the tricuspid valve with  extension of a large vegetation into the RV with associated thrombus, or  possibly endocarditis and a separate RV tumor with associated thrombus.  This mobile RVOT component does not appear to interfere with the pulmonary  valve apparatus, normal PV function on Doppler interrogation.     Aortic valve cusps are slightly thickened. There are very small filamentous  strands (0.2cm) on the aortic aspect of the valve on the 3-chamber view, which  may reflect benign fibrin, however cannot exclude early endocarditis. Aortic  valve function is normal.     Left ventricular systolic function is normal. The visual ejection fraction is  55-60%.  Right ventricle is normal in structure, function and size.  A contrast injection (Bubble Study) was performed that was negative for flow  across the interatrial septum.  The left atrial appendage was well visualized and free of thrombus.  No clear evidence of vegetations involving the mitral valve, pulmonic valve.     Addendum: Reviewed aortic valve as above, discussed with CT surgeon.     ______________________________________________________________________________  TEJINDER  Normal transesophageal echocardiogram. I determined this patient to be an  appropriate candidate for the planned sedation and procedure and have  reassessed the patient immediately prior to sedation and procedure. Total  sedation time: 12 mins minutes of continuous bedside 1:1 monitoring. Versed  (4mg) was given intravenously. Fentanyl (100mcg) was given intravenously.  Consent to the procedure was obtained prior to sedation. Prior to the exam,  the oral cavity was checked and no  overcrowding was noted. The transesophageal  probe was passed without difficulty. There were no complications associated  with this procedure.     Left Ventricle  The left ventricle is normal in size. Left ventricular systolic function is  normal. The visual ejection fraction is 55-60%.     Right Ventricle  The right ventricle is normal in structure, function and size.     Atria  Normal left atrial size. Right atrial size is normal. Intact atrial septum. A  contrast injection (Bubble Study) was performed that was negative for flow  across the interatrial septum. The left atrial appendage was well visualized  and free of thrombus.     Mitral Valve  The mitral valve is normal in structure and function. No evidence of  vegetations seen.     Tricuspid Valve  Tricuspid valve leaflets are thickened, and the atrial aspect of the septal  leaflet is irregularly bordered with small mobile filamentous elements (0.7cm  long). There is a large fixed heterogeneous mass in the right ventricle  attached to the interventricular septum (2.5 x 2cm), appears to be connected  to the tricuspid subvalvular apparatus, with a pedunculated round mobile  element (1.8 x 1cm). There is no significant tricuspid regurgitation.  Differential includes endocarditis involving the tricuspid valve with  extension of a large vegetation into the RV with associated thrombus, or  possibly endocarditis and a separate RV tumor with associated thrombus.  This mobile RVOT component does not appear to interfere with the pulmonary  valve apparatus, normal PV function on Doppler interrogation. Vegetation on  tricuspid valve leaflet(s).     Aortic Valve  The aortic valve is trileaflet. Aortic valve cusps are slightly thickened.  There are very small filamentous strands (0.2cm) on the aortic aspect of the  valve on the 3-chamber view, which may reflect benign fibrin, however cannot  exclude early vegetation.     Pulmonic Valve  The pulmonic valve is normal in  structure and function. No evidence of  vegetations seen.     Vessels  The aortic root is normal size. Inferior vena cava not well visualized for  estimation of right atrial pressure.     Pericardial/Pleural  There is no pericardial effusion.  Stress Test:  Date: Results:    ECG Reviewed:  Date: 2/28/22 Results:  ST at 117bpm  Cath:  Date: 3/31/22 Results:  Conclusion      Angiographically normal coronary arteries.        METS/Exercise Tolerance:     Hematologic:       Musculoskeletal: Comment: Diskitis      GI/Hepatic:    (-) GERD   Renal/Genitourinary:       Endo:     (+)  type II DM,        thyroid problem, hypothyroidism,    Obesity,       Psychiatric/Substance Use:     (+) psychiatric history anxiety and other (comment)       Infectious Disease:       Malignancy:       Other:            Physical Exam    Airway        Mallampati: I   TM distance: > 3 FB   Neck ROM: full   Mouth opening: > 3 cm    Respiratory Devices and Support         Dental       (+) Completely normal teeth      Cardiovascular          Rhythm and rate: regular and normal     Pulmonary           breath sounds clear to auscultation           OUTSIDE LABS:  CBC:   Lab Results   Component Value Date    .3 (HH) 09/16/2024    .6 (HH) 09/15/2024    HGB 9.1 (L) 09/16/2024    HGB 8.7 (L) 09/15/2024    HCT 27.4 (L) 09/16/2024    HCT 26.1 (L) 09/15/2024     09/16/2024     (H) 09/15/2024     BMP:   Lab Results   Component Value Date     09/16/2024     09/15/2024    POTASSIUM 4.5 09/16/2024    POTASSIUM 4.4 09/15/2024    CHLORIDE 112 (H) 09/16/2024    CHLORIDE 110 (H) 09/15/2024    CO2 19 (L) 09/16/2024    CO2 21 (L) 09/15/2024    BUN 25.5 (H) 09/16/2024    BUN 23.0 (H) 09/15/2024    CR 1.22 (H) 09/16/2024    CR 1.16 09/15/2024     (H) 09/16/2024     (H) 09/16/2024     COAGS:   Lab Results   Component Value Date    PTT 37 09/16/2024    INR 1.45 (H) 09/16/2024    FIBR 422 09/16/2024     POC: No results  "found for: \"BGM\", \"HCG\", \"HCGS\"  HEPATIC:   Lab Results   Component Value Date    ALBUMIN 3.4 (L) 09/16/2024    PROTTOTAL 6.2 (L) 09/16/2024    ALT 11 09/16/2024    AST 28 09/16/2024    ALKPHOS 165 (H) 09/16/2024    BILITOTAL 0.8 09/16/2024     OTHER:   Lab Results   Component Value Date    PH 7.28 (L) 04/01/2022    LACT 2.8 (H) 04/05/2022    A1C 8.2 (H) 09/14/2024    IMAN 8.7 (L) 09/16/2024    PHOS 3.9 09/16/2024    MAG 2.5 (H) 09/14/2024    LIPASE 270 11/24/2017    TSH 4.67 (H) 09/15/2024    T4 1.33 09/15/2024    CRP 25.8 (H) 06/01/2022    SED 19 (H) 06/01/2022       Anesthesia Plan    ASA Status:  2    NPO Status:  NPO Appropriate    Anesthesia Type: MAC.     - Reason for MAC: immobility needed   Induction: Intravenous.   Maintenance: TIVA.        Consents    Anesthesia Plan(s) and associated risks, benefits, and realistic alternatives discussed. Questions answered and patient/representative(s) expressed understanding.     - Discussed:     - Discussed with:  Patient      - Extended Intubation/Ventilatory Support Discussed: No.      - Patient is DNR/DNI Status: No     Use of blood products discussed: No .     Postoperative Care    Pain management: IV analgesics.   PONV prophylaxis: Ondansetron (or other 5HT-3)     Comments:               Akshat Aguilar DO    I have reviewed the pertinent notes and labs in the chart from the past 30 days and (re)examined the patient.  Any updates or changes from those notes are reflected in this note.             "

## 2024-09-16 NOTE — PLAN OF CARE
Anil went to the OR for a bone marrow biopsy under sedation.  Returned alert and orientated.  Pain at BMBX site was a 2/10 but increased to 7/10.  Tylenol given at his request.  Will need us to follow up if additional pain control is needed.  His sister and Dad are here visiting.  Telemetry showed 100% paced rhythm 55 beats per minute for one hour post procedure, telemetry now discontinued.  Eating lunch now, blood sugars were 140 and 127, patient says he does not use insulin at home and does not want it here.      Problem: Adult Inpatient Plan of Care  Goal: Plan of Care Review  Description: The Plan of Care Review/Shift note should be completed every shift.  The Outcome Evaluation is a brief statement about your assessment that the patient is improving, declining, or no change.  This information will be displayed automatically on your shift  note.  Outcome: Progressing  Flowsheets (Taken 9/16/2024 1503)  Plan of Care Reviewed With: patient  Overall Patient Progress: no change  Goal: Optimal Comfort and Wellbeing  Outcome: Progressing  Intervention: Monitor Pain and Promote Comfort  Recent Flowsheet Documentation  Taken 9/16/2024 1432 by Laurie Resendiz, RN  Pain Management Interventions: medication (see MAR)  Taken 9/16/2024 1200 by Laurie Resendiz, RN  Pain Management Interventions: declines     Problem: Fatigue  Goal: Improved Activity Tolerance  Outcome: Progressing  Intervention: Promote Improved Energy  Recent Flowsheet Documentation  Taken 9/16/2024 1432 by Laurie Resendiz, RN  Activity Management: up ad rolan  Taken 9/16/2024 1200 by Laurie Resendiz, RN  Activity Management: bedrest  Taken 9/16/2024 0800 by Laurie Resendiz, RN  Activity Management: up ad rolan   Goal Outcome Evaluation:      Plan of Care Reviewed With: patient    Overall Patient Progress: no changeOverall Patient Progress: no change

## 2024-09-16 NOTE — PLAN OF CARE
"/69   Pulse 55   Temp 98.7  F (37.1  C) (Oral)   Resp 14   Ht 1.778 m (5' 10\")   Wt 94.1 kg (207 lb 8 oz)   SpO2 98%   BMI 29.77 kg/m      Anil Montoya had a generally restful night, reporting few interruptions to sleep. Abnormal vital signs: ICD pacing at 55. 0/10 pain. Irregular assessment findings include: edema, ICD/bradycardia. Plan for the day consists of: potential BMBx with sedation.     Problem: Adult Inpatient Plan of Care  Goal: Plan of Care Review  Description: The Plan of Care Review/Shift note should be completed every shift.  The Outcome Evaluation is a brief statement about your assessment that the patient is improving, declining, or no change.  This information will be displayed automatically on your shift  note.  Outcome: Progressing  Flowsheets (Taken 9/16/2024 0504)  Plan of Care Reviewed With: patient  Overall Patient Progress: no change  Goal: Patient-Specific Goal (Individualized)  Description: You can add care plan individualizations to a care plan. Examples of Individualization might be:  \"Parent requests to be called daily at 9am for status\", \"I have a hard time hearing out of my right ear\", or \"Do not touch me to wake me up as it startles  me\".  Outcome: Progressing  Flowsheets (Taken 9/16/2024 0504)  Patient/Family-Specific Goals (Include Timeframe): Patient will report minimal interruptions to sleep overnight  Goal: Absence of Hospital-Acquired Illness or Injury  Intervention: Identify and Manage Fall Risk  Recent Flowsheet Documentation  Taken 9/15/2024 2000 by Justo Chawla RN  Safety Promotion/Fall Prevention:   clutter free environment maintained   nonskid shoes/slippers when out of bed   room near nurse's station   room organization consistent   safety round/check completed  Intervention: Prevent Skin Injury  Recent Flowsheet Documentation  Taken 9/15/2024 2000 by Justo Chawla, RN  Body Position: position changed " independently  Intervention: Prevent Infection  Recent Flowsheet Documentation  Taken 9/16/2024 0445 by Justo Chawla, RN  Infection Prevention:   environmental surveillance performed   hand hygiene promoted   personal protective equipment utilized   rest/sleep promoted   single patient room provided  Taken 9/15/2024 2302 by Justo Chawla RN  Infection Prevention:   environmental surveillance performed   hand hygiene promoted   personal protective equipment utilized   rest/sleep promoted   single patient room provided  Taken 9/15/2024 2000 by Justo Chawla, RN  Infection Prevention:   environmental surveillance performed   hand hygiene promoted   personal protective equipment utilized   rest/sleep promoted   single patient room provided     Problem: Fatigue  Goal: Improved Activity Tolerance  Intervention: Promote Improved Energy  Recent Flowsheet Documentation  Taken 9/16/2024 0445 by Justo Chawla, RN  Activity Management: ambulated to bathroom  Taken 9/15/2024 2302 by Justo Chawla, RN  Activity Management: ambulated to bathroom  Taken 9/15/2024 2000 by Justo Chawla RN  Activity Management: ambulated to bathroom   Goal Outcome Evaluation:      Plan of Care Reviewed With: patient    Overall Patient Progress: no change

## 2024-09-16 NOTE — PROGRESS NOTES
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 He presented to [facility] on 9/14/24 with lightheadedness and was found to have hyperleukocytosis (WBC >400k) so was transferred to [facility] for leukemia work up.    Today:  - S/p BMBx in OR, tolerated well. Follow for results  - Reduce hydrea to BID dosing  - Continue TLS/DIC labs BID - stable  - Continue mIVF, prophylactic antimicrobials  - Continue to monitor and provide best supportive cares     HEME/ONC  # Hyperleukocytosis  # Concern for leukemia, favor chronic over acute  # Thrombocytosis  Patient reports that 1 week ago his ICD fired for run of vfib. This was during an emotional time where he thought he may not be able to see his kids. After this, his metoprolol was increased from 25 to 50 mg on Monday 9/9. Since then, he has felt dizzy and lightheaded. Only other new symptoms include new LE edema and mild SKAGGS. He presented to [facility] on 9/14/24 and was found to have hyperleukocytosis (WBC 469k) so was transferred to [facility] for leukemia work up. No s/sx of leukostasis.   - Hydrea 2g TID (9/14-9/16), BID x9/16  - Peripheral smear and FISH pending.   - S/p BMBx 9/16, follow for pending results. Per verbal report, preliminary results consistent with CML.    - Baseline studies:   - Viral serologies: Hep B, Hep C, HSV, HIV negative; EBV and CMV pending  - EKG with complete heart block and ventricular paced rhythm  - Echo 9/12 with LVEF 55-60%  - CXR negative  - CT CAP with hepatosplenomegaly with evidence of portal hypertension including esophagogastric and upper abdominal varices, small volume  "ascites, several sub-6 mm pulmonary nodules, focal area of tree in bud nodularity of peripheral right upper lobe, dilated main pulmonary artery (3.5 cm)      # Normocytic anemia  Likely due to new leukemia.   - Monitor CBC daily  - Transfuse if Hgb <7    # TLS, improving  # OCTAVIO, improving  On presentation, Cr 1.53, LDH 1290, uric acid 10. Lytes WNL. Unclear baseline Cr. S/p 1 dose of rasburicase.   - Allopurinol 300 mg daily  - Monitor TLS labs BID    # Risk for DIC  # Elevated INR  INR up to 1.83 on admission, now improving. PTT and fibrinogen WNL.   - Monitor coags BID  - S/p vit K 5 mg x1 on 9/15, repeat PRN    ID  # ID PPX  -  mg BID  - Levaquin 250 mg daily  - Micafungin 50 mg IV    # History of cryptococcal pneumonia  Patient states that when he was 26 he was hospitalized for cryptococcal pneumonia. He was inpatient for 60 days and \"on life support.\" No current respiratory symptoms reported.  - Obtain baseline chest CT -  noted focal area of tree-in-bud nodularity in the peripheral right upper  lobe, likely infectious/inflammatory. Recommend follow-up to clearing    CHRONIC  # Uncontrolled T2DM  Hgb A1C 8.2 on admission. Pt reports that he takes metformin and Ozempic (Saturdays) PTA. Has lost 50 lb in the last ~6 months due to Ozempic.   - Low-intensity sliding scale insulin  - Hold PTA metformin and Ozempic  - Patient would like to continue Ozempic while inpatient, could have family member bring in home supply-- discussed with pharmacy, will defer while inpatient and consider resuming upon discharge    - Patient has had trouble establishing with endocrine provider recently. Asks for a referral within our system; however, our endocrinologists are booking out several months. Consider endo referral at discharge vs having pt follow up with PCP for diabetes management. Referral placed x9/16    # H/o bacterial endocarditis s/p tricuspid valve replacement and ICD  # Recent runs of vfib  Had hospital course in " "4/2022 with the above, thought to be groin abscess as source. Was treated with course of IV abx after above interventions. Notably in the week prior to this admission he had vfib syncopal episode which fired his ICD defibrillator. Reportedly he had another run of vfib that he was asymptomatic from. Had follow up echo shortly after on 9/12 which was overall stable with LVEF 55-60%. BNP 2613 on admission.   - Metoprolol 25 mg XL daily   - Pt says this was just increased to 50 mg but he correlates this to his worsening fatigue and exercise intolerance and is not interested in the higher dose at this time.  - Pt reports taking Lasix 20 mg daily since his heart surgery; however it is not on his active med list from his pharmacy on intake. Holding in setting of OCTAVIO.    # WARNER - PTA CPAP    # Pulmonary nodules  Noted on CT CAP on admission - several sub-6 mm pulmonary nodules throughout the lungs. Recommend attention on follow up    # Social  Currently going through a divorce. Has 4 children. Works for [employer].     Clinically Significant Risk Factors          # Hypocalcemia: Lowest Ca = 8.5 mg/dL in last 2 days, will monitor and replace as appropriate     # Hypoalbuminemia: Lowest albumin = 3.4 g/dL at 9/16/2024  5:52 AM, will monitor as appropriate    # Coagulation Defect: INR = 1.45 (Ref range: 0.85 - 1.15) and/or PTT = 37 Seconds (Ref range: 22 - 38 Seconds), will monitor for bleeding             # DMII: A1C = 8.2 % (Ref range: <5.7 %) within past 6 months, PRESENT ON ADMISSION  # Overweight: Estimated body mass index is 29.27 kg/m  as calculated from the following:    Height as of this encounter: 1.778 m (5' 10\").    Weight as of this encounter: 92.5 kg (204 lb)., PRESENT ON ADMISSION  # Severe Malnutrition: based on nutrition assessment, PRESENT ON ADMISSION    # ICD device present    FEN  Diet: Regular Diet Adult   IVF: Bolus PRN   Lytes: Replete per protocol    PPX  VTE: Holding for now  Bowel: Senna/MiraLax " PRN  GI/PUD: None indicated    MISC  Code Status: Full Code   Lines/Drains: PIV  Medically Ready for Discharge: Anticipated in 2-4 Days  Dispo: Pending results of BMBx. If confirmed as CML, will discharge home with oral TKI.   Follow Up: TBD    Patient was seen and plan of care was discussed with attending physician Dr. Varner.    I spent 85 minutes in the care of this patient today, which included time necessary for review of interval events, obtaining history and physical exam, ordering medications/tests/procedures as medically indicated, review of pertinent medical literature, counseling of the patient, coordination of care, and documentation time. Over 50% of time was spent counseling the patient and/or coordinating care.    Noni Sauer PA-C  Hematology/Oncology  Pager #[phone]     Interval History   Chart reviewed, no acute events overnight. Anil is feeling well today. He notes that since his metoprolol dosing is decreased back to 25 mg, his lightheadedness has resolved. He denies all symptoms including fevers, chills, headache, chest pain, shortness of breath, cough, abdominal pain, nausea, vomiting.  Urinating and having regular bowel movements.  Reviewed workup thus far, plan for bone marrow biopsy today for diagnostic confirmation.  He has consented to biopsy.  After biopsy, he visited with patient, sister, and father at bedside, and addressed several questions to the best of my abilities.      Vital Signs with Ranges  Temp:  [97.5  F (36.4  C)-98.8  F (37.1  C)] 98.2  F (36.8  C)  Pulse:  [55] 55  Resp:  [12-16] 16  BP: (117-128)/(62-70) 117/66  SpO2:  [96 %-99 %] 98 %  I/O last 3 completed shifts:  In: 2800 [P.O.:2680; I.V.:120]  Out: 3800 [Urine:3800]    Physical Exam   Constitutional: Awake and conversational. Non- toxic appearing.   HEENT: NCAT. Moist mucus membranes without lesions, thrush, or exudates appreciated  Respiratory: Breathing comfortably on room air, speaking in full sentences, no evidence  of respiratory distress. Lungs CTAB without stridor, wheeze, rhonchi, or rales.   Cardiovascular: Regular rate and rhythm. Trace bilateral lower extremity peripheral edema.    GI: Abdomen with normoactive bowel sounds, soft and non-tender throughout.   Skin: Skin is clean, dry, intact. No jaundice or significant rashes appreciated on exposed areas.   Neurologic: Alert and with normal speech. Grossly nonfocal.  Moves extremities spontaneously.  Memory and thought process preserved.   Neuropsychiatric: Calm, affect congruent to situation. Appropriate mood and affect.         Medications   Current Facility-Administered Medications   Medication Dose Route Frequency Provider Last Rate Last Admin    lactated ringers infusion   Intravenous Continuous Noni Sauer PA-C 75 mL/hr at 09/16/24 1317 New Bag at 09/16/24 1317     Current Facility-Administered Medications   Medication Dose Route Frequency Provider Last Rate Last Admin    acyclovir (ZOVIRAX) tablet 400 mg  400 mg Oral BID Ashlyn Beal PA-C   400 mg at 09/16/24 0846    allopurinol (ZYLOPRIM) tablet 300 mg  300 mg Oral Daily Jules Anderson A, DO   300 mg at 09/16/24 0846    buPROPion (WELLBUTRIN XL) 24 hr tablet 300 mg  300 mg Oral QAM Radha Andersonh A, DO   300 mg at 09/16/24 0846    hydroxyurea (HYDREA) capsule 2,000 mg  2,000 mg Oral BID Jesus Varner MD        insulin aspart (NovoLOG) injection (RAPID ACTING)  1-3 Units Subcutaneous TID AC Jules Anderson A, DO        insulin aspart (NovoLOG) injection (RAPID ACTING)  1-3 Units Subcutaneous At Bedtime Justin Jules A, DO        levofloxacin (LEVAQUIN) tablet 250 mg  250 mg Oral Daily Ashlyn Beal PA-C   250 mg at 09/16/24 0846    levothyroxine (SYNTHROID/LEVOTHROID) tablet 150 mcg  150 mcg Oral Daily Jules Anderson A, DO   150 mcg at 09/16/24 0846    [Held by provider] losartan (COZAAR) tablet 50 mg  50 mg Oral Daily Justin Jules A, DO        metoprolol succinate ER (TOPROL XL) 24 hr tablet  25 mg  25 mg Oral Daily Justin Jules A, DO   25 mg at 09/16/24 0846    micafungin (MYCAMINE) 50 mg in sodium chloride 0.9 % 100 mL intermittent infusion  50 mg Intravenous Q24H Ashlyn Beal PA-C 100 mL/hr at 09/16/24 0853 50 mg at 09/16/24 0853    sodium chloride (PF) 0.9% PF flush 3 mL  3 mL Intracatheter Q8H Justin Jules A, DO   3 mL at 09/16/24 0442     Data   Results for orders placed or performed during the hospital encounter of 09/15/24 (from the past 24 hour(s))   Basic metabolic panel   Result Value Ref Range    Sodium 142 135 - 145 mmol/L    Potassium 4.4 3.4 - 5.3 mmol/L    Chloride 110 (H) 98 - 107 mmol/L    Carbon Dioxide (CO2) 21 (L) 22 - 29 mmol/L    Anion Gap 11 7 - 15 mmol/L    Urea Nitrogen 23.0 (H) 6.0 - 20.0 mg/dL    Creatinine 1.16 0.67 - 1.17 mg/dL    GFR Estimate 84 >60 mL/min/1.73m2    Calcium 8.8 8.8 - 10.4 mg/dL    Glucose 205 (H) 70 - 99 mg/dL   Uric acid   Result Value Ref Range    Uric Acid 4.0 3.4 - 7.0 mg/dL   Phosphorus   Result Value Ref Range    Phosphorus 3.8 2.5 - 4.5 mg/dL   Lactate Dehydrogenase   Result Value Ref Range    Lactate Dehydrogenase 1,048 (H) 0 - 250 U/L   INR   Result Value Ref Range    INR 1.45 (H) 0.85 - 1.15   Partial thromboplastin time   Result Value Ref Range    aPTT 33 22 - 38 Seconds   Fibrinogen activity   Result Value Ref Range    Fibrinogen Activity 455 170 - 510 mg/dL   Extra Tube    Narrative    The following orders were created for panel order Extra Tube.  Procedure                               Abnormality         Status                     ---------                               -----------         ------                     Extra Green Top (Lithium...[[id number]]                      Final result                 Please view results for these tests on the individual orders.   Extra Green Top (Lithium Heparin) Tube   Result Value Ref Range    Hold Specimen JI    Glucose by meter   Result Value Ref Range    GLUCOSE BY METER POCT 159 (H) 70 -  99 mg/dL   Glucose by meter   Result Value Ref Range    GLUCOSE BY METER POCT 230 (H) 70 - 99 mg/dL   CBC with platelets differential    Narrative    The following orders were created for panel order CBC with platelets differential.  Procedure                               Abnormality         Status                     ---------                               -----------         ------                     CBC with platelets and d...[[id number]]  Abnormal            Final result               Manual Differential[[id number]]          Abnormal            Preliminary result           Please view results for these tests on the individual orders.   Hepatic panel   Result Value Ref Range    Protein Total 6.2 (L) 6.4 - 8.3 g/dL    Albumin 3.4 (L) 3.5 - 5.2 g/dL    Bilirubin Total 0.8 <=1.2 mg/dL    Alkaline Phosphatase 165 (H) 40 - 150 U/L    AST 28 0 - 45 U/L    ALT 11 0 - 70 U/L    Bilirubin Direct 0.33 (H) 0.00 - 0.30 mg/dL   Basic metabolic panel   Result Value Ref Range    Sodium 140 135 - 145 mmol/L    Potassium 4.5 3.4 - 5.3 mmol/L    Chloride 112 (H) 98 - 107 mmol/L    Carbon Dioxide (CO2) 19 (L) 22 - 29 mmol/L    Anion Gap 9 7 - 15 mmol/L    Urea Nitrogen 25.5 (H) 6.0 - 20.0 mg/dL    Creatinine 1.22 (H) 0.67 - 1.17 mg/dL    GFR Estimate 79 >60 mL/min/1.73m2    Calcium 8.7 (L) 8.8 - 10.4 mg/dL    Glucose 152 (H) 70 - 99 mg/dL   Fibrinogen activity   Result Value Ref Range    Fibrinogen Activity 422 170 - 510 mg/dL   INR   Result Value Ref Range    INR 1.45 (H) 0.85 - 1.15   Lactate Dehydrogenase   Result Value Ref Range    Lactate Dehydrogenase 977 (H) 0 - 250 U/L   Partial thromboplastin time   Result Value Ref Range    aPTT 37 22 - 38 Seconds   Phosphorus   Result Value Ref Range    Phosphorus 3.9 2.5 - 4.5 mg/dL   Uric acid   Result Value Ref Range    Uric Acid 4.1 3.4 - 7.0 mg/dL   CBC with platelets and differential   Result Value Ref Range    WBC Count 343.3 (HH) 4.0 - 11.0 10e3/uL    RBC Count 3.03 (L) 4.40 -  5.90 10e6/uL    Hemoglobin 9.1 (L) 13.3 - 17.7 g/dL    Hematocrit 27.4 (L) 40.0 - 53.0 %    MCV 90 78 - 100 fL    MCH 30.0 26.5 - 33.0 pg    MCHC 33.2 31.5 - 36.5 g/dL    RDW 18.6 (H) 10.0 - 15.0 %    Platelet Count 443 150 - 450 10e3/uL    % Neutrophils      % Lymphocytes      % Monocytes      % Eosinophils      % Basophils      % Immature Granulocytes      NRBCs per 100 WBC 1 (H) <1 /100    Absolute Neutrophils      Absolute Lymphocytes      Absolute Monocytes      Absolute Eosinophils      Absolute Basophils      Absolute Immature Granulocytes      Absolute NRBCs 2.2 10e3/uL    Narrative    .BKR     Manual Differential   Result Value Ref Range    % Neutrophils 54 %    % Lymphocytes 4 %    % Monocytes 1 %    % Eosinophils 3 %    % Basophils 11 %    % Metamyelocytes 5 %    % Myelocytes 17 %    % Promyelocytes 1 %    % Other Cells 4 %    NRBCs per 100 WBC 1 (H) <=0 %    Absolute Neutrophils 185.4 (H) 1.6 - 8.3 10e3/uL    Absolute Lymphocytes 13.7 (H) 0.8 - 5.3 10e3/uL    Absolute Monocytes 3.4 (H) 0.0 - 1.3 10e3/uL    Absolute Eosinophils 10.3 (H) 0.0 - 0.7 10e3/uL    Absolute Basophils 37.8 (H) 0.0 - 0.2 10e3/uL    Absolute Metamyelocytes 17.2 (H) <=0.0 10e3/uL    Absolute Myelocytes 58.4 (H) <=0.0 10e3/uL    Absolute Promyelocytes 3.4 (H) <=0.0 10e3/uL    Absolute Other Cells 13.7 (H) <=0.0 10e3/uL    Absolute NRBCs 3.4 (H) <=0.0 10e3/uL    RBC Morphology Confirmed RBC Indices     Platelet Assessment  Automated Count Confirmed. Platelet morphology is normal.     Automated Count Confirmed. Platelet morphology is normal.    Acanthocytes Slight (A) None Seen    [place] Cells Slight (A) None Seen    Elliptocytes Slight (A) None Seen    Polychromasia Slight (A) None Seen    Narrative    .BKRHEME    Sent for Review by Pathologist. Review comments will be entered. Results will be updated after review as applicable.     Glucose by meter   Result Value Ref Range    GLUCOSE BY METER POCT 140 (H) 70 - 99 mg/dL   Glucose by  meter   Result Value Ref Range    GLUCOSE BY METER POCT 127 (H) 70 - 99 mg/dL

## 2024-09-16 NOTE — PROCEDURES
Bone Marrow Biopsy Procedure Note  Anil Montoya  September 16, 2024    PROCEDURE:  Unilateral Bone Marrow Biopsy and Unilateral Aspirate    INDICATION:  Hyperleukocytosis, concern for myeloproliferative neoplasm    PERFORMED BY:  Dr. Lorena Herron and supervised/assisted by Noni Sauer PA-C      CONSENT:  Informed consent was obtained from the patient. The risks and benefits of the procedure were explained. The patient agreed to undergo the procedure. The consent form was signed and placed in the chart.    PREMEDICATION:  MAC per anesthesiology    PROCEDURE SUMMARY:  The patient's identification was positively verified by patient identification band. Following the administration of MAC per anesthesia for comfort, the patient was laid in the right lateral decubitus position. The left posterior iliac crest was prepped and draped in a sterile manner. The skin, deeper tissues, and periosteum of the LPIC were anesthetized with approximately 10 mL 1% lidocaine. Following this, a 3mm incision was made. The Jamshidi needle was advanced into the LPIC bone cavity and a total of 20 mm core biopsy was taken. Initially, bone marrow was dry for aspirate and thus an additional 20 mm of core was obtained. Subsequently attempted to obtain additional aspirate to complete necessary diagnostic collection, and a total of 10 ml were able to be obtained. Following the procedure, a sterile pressure dressing was applied to the bone marrow biopsy site.     COMPLICATIONS:  None. The patient tolerated the procedure well with no discomfort.      RECOMMENDATIONS:   The patient was placed in the supine position to maintain pressure on the biopsy site.   The patient was instructed to lie flat for 60 minutes and not remove dressing or bathe/shower for 24 hours post-procedure.     TESTS ORDERED:  Morphology  Flow cytometry  Chromosomes  FISH   Molecular (process and hold)    Noni Sauer PA-C  Hematology/Oncology  Pager: [phone]

## 2024-09-17 ENCOUNTER — DOCUMENTATION ONLY (OUTPATIENT)
Dept: OTHER | Facility: CLINIC | Age: 39
End: 2024-09-17

## 2024-09-17 ENCOUNTER — DOCUMENTATION ONLY (OUTPATIENT)
Dept: ONCOLOGY | Facility: CLINIC | Age: 39
End: 2024-09-17

## 2024-09-17 LAB
ALBUMIN SERPL BCG-MCNC: 3.7 G/DL (ref 3.5–5.2)
ALP SERPL-CCNC: 174 U/L (ref 40–150)
ALT SERPL W P-5'-P-CCNC: 12 U/L (ref 0–70)
ANION GAP SERPL CALCULATED.3IONS-SCNC: 10 MMOL/L (ref 7–15)
APTT PPP: 35 SECONDS (ref 22–38)
AST SERPL W P-5'-P-CCNC: 30 U/L (ref 0–45)
BASOPHILS # BLD AUTO: ABNORMAL 10*3/UL
BASOPHILS # BLD MANUAL: 29.8 10E3/UL (ref 0–0.2)
BASOPHILS # BLD MANUAL: 37.8 10E3/UL (ref 0–0.2)
BASOPHILS NFR BLD AUTO: ABNORMAL %
BASOPHILS NFR BLD MANUAL: 13 %
BASOPHILS NFR BLD MANUAL: 7 %
BILIRUB DIRECT SERPL-MCNC: 0.32 MG/DL (ref 0–0.3)
BILIRUB SERPL-MCNC: 0.8 MG/DL
BLASTS # BLD MANUAL: 5.8 10E3/UL
BLASTS # BLD MANUAL: 51 10E3/UL
BLASTS NFR BLD MANUAL: 12 %
BLASTS NFR BLD MANUAL: 2 %
BUN SERPL-MCNC: 28.1 MG/DL (ref 6–20)
CALCIUM SERPL-MCNC: 8.8 MG/DL (ref 8.8–10.4)
CHLORIDE SERPL-SCNC: 109 MMOL/L (ref 98–107)
CREAT SERPL-MCNC: 1.1 MG/DL (ref 0.67–1.17)
EGFRCR SERPLBLD CKD-EPI 2021: 89 ML/MIN/1.73M2
ELLIPTOCYTES BLD QL SMEAR: SLIGHT
EOSINOPHIL # BLD AUTO: ABNORMAL 10*3/UL
EOSINOPHIL # BLD MANUAL: 38.3 10E3/UL (ref 0–0.7)
EOSINOPHIL # BLD MANUAL: 8.7 10E3/UL (ref 0–0.7)
EOSINOPHIL NFR BLD AUTO: ABNORMAL %
EOSINOPHIL NFR BLD MANUAL: 3 %
EOSINOPHIL NFR BLD MANUAL: 9 %
ERYTHROCYTE [DISTWIDTH] IN BLOOD BY AUTOMATED COUNT: 18.2 % (ref 10–15)
ERYTHROCYTE [DISTWIDTH] IN BLOOD BY AUTOMATED COUNT: 18.6 % (ref 10–15)
FIBRINOGEN PPP-MCNC: 465 MG/DL (ref 170–510)
GLUCOSE BLDC GLUCOMTR-MCNC: 157 MG/DL (ref 70–99)
GLUCOSE BLDC GLUCOMTR-MCNC: 159 MG/DL (ref 70–99)
GLUCOSE BLDC GLUCOMTR-MCNC: 196 MG/DL (ref 70–99)
GLUCOSE BLDC GLUCOMTR-MCNC: 241 MG/DL (ref 70–99)
GLUCOSE BLDC GLUCOMTR-MCNC: 263 MG/DL (ref 70–99)
GLUCOSE SERPL-MCNC: 177 MG/DL (ref 70–99)
HCO3 SERPL-SCNC: 18 MMOL/L (ref 22–29)
HCT VFR BLD AUTO: 25.8 % (ref 40–53)
HCT VFR BLD AUTO: 30.7 % (ref 40–53)
HGB BLD-MCNC: 10.2 G/DL (ref 13.3–17.7)
HGB BLD-MCNC: 9.2 G/DL (ref 13.3–17.7)
IMM GRANULOCYTES # BLD: ABNORMAL 10*3/UL
IMM GRANULOCYTES NFR BLD: ABNORMAL %
INR PPP: 1.5 (ref 0.85–1.15)
LAB DIRECTOR COMMENTS: NORMAL
LAB DIRECTOR COMMENTS: NORMAL
LAB DIRECTOR DISCLAIMER: NORMAL
LAB DIRECTOR DISCLAIMER: NORMAL
LAB DIRECTOR INTERPRETATION: NORMAL
LAB DIRECTOR INTERPRETATION: NORMAL
LAB DIRECTOR METHODOLOGY: NORMAL
LAB DIRECTOR METHODOLOGY: NORMAL
LAB DIRECTOR RESULTS: NORMAL
LAB DIRECTOR RESULTS: NORMAL
LDH SERPL L TO P-CCNC: 983 U/L (ref 0–250)
LOCATION OF TASK: NORMAL
LYMPHOCYTES # BLD AUTO: ABNORMAL 10*3/UL
LYMPHOCYTES # BLD MANUAL: 11.6 10E3/UL (ref 0.8–5.3)
LYMPHOCYTES # BLD MANUAL: 8.5 10E3/UL (ref 0.8–5.3)
LYMPHOCYTES NFR BLD AUTO: ABNORMAL %
LYMPHOCYTES NFR BLD MANUAL: 2 %
LYMPHOCYTES NFR BLD MANUAL: 4 %
MCH RBC QN AUTO: 29.1 PG (ref 26.5–33)
MCH RBC QN AUTO: 31.8 PG (ref 26.5–33)
MCHC RBC AUTO-ENTMCNC: 33.2 G/DL (ref 31.5–36.5)
MCHC RBC AUTO-ENTMCNC: 35.7 G/DL (ref 31.5–36.5)
MCV RBC AUTO: 88 FL (ref 78–100)
MCV RBC AUTO: 89 FL (ref 78–100)
METAMYELOCYTES # BLD MANUAL: 14.5 10E3/UL
METAMYELOCYTES # BLD MANUAL: 59.5 10E3/UL
METAMYELOCYTES NFR BLD MANUAL: 14 %
METAMYELOCYTES NFR BLD MANUAL: 5 %
MONOCYTES # BLD AUTO: ABNORMAL 10*3/UL
MONOCYTES # BLD MANUAL: 8.5 10E3/UL (ref 0–1.3)
MONOCYTES # BLD MANUAL: 8.7 10E3/UL (ref 0–1.3)
MONOCYTES NFR BLD AUTO: ABNORMAL %
MONOCYTES NFR BLD MANUAL: 2 %
MONOCYTES NFR BLD MANUAL: 3 %
MYELOCYTES # BLD MANUAL: 25.5 10E3/UL
MYELOCYTES # BLD MANUAL: 32 10E3/UL
MYELOCYTES NFR BLD MANUAL: 11 %
MYELOCYTES NFR BLD MANUAL: 6 %
NEUTROPHILS # BLD AUTO: ABNORMAL 10*3/UL
NEUTROPHILS # BLD MANUAL: 148.4 10E3/UL (ref 1.6–8.3)
NEUTROPHILS # BLD MANUAL: 89.3 10E3/UL (ref 1.6–8.3)
NEUTROPHILS NFR BLD AUTO: ABNORMAL %
NEUTROPHILS NFR BLD MANUAL: 21 %
NEUTROPHILS NFR BLD MANUAL: 51 %
NRBC # BLD AUTO: 2.8 10E3/UL
NRBC # BLD AUTO: 6.1 10E3/UL
NRBC BLD AUTO-RTO: 1 /100
NRBC BLD AUTO-RTO: 1 /100
PATH REPORT.COMMENTS IMP SPEC: ABNORMAL
PATH REPORT.COMMENTS IMP SPEC: YES
PATH REPORT.FINAL DX SPEC: ABNORMAL
PATH REPORT.MICROSCOPIC SPEC OTHER STN: ABNORMAL
PATH REPORT.RELEVANT HX SPEC: ABNORMAL
PHOSPHATE SERPL-MCNC: 4 MG/DL (ref 2.5–4.5)
PLASMA CELLS # BLD MANUAL: 4.3 10E3/UL
PLASMA CELLS NFR BLD MANUAL: 1 %
PLAT MORPH BLD: ABNORMAL
PLAT MORPH BLD: ABNORMAL
PLATELET # BLD AUTO: 467 10E3/UL (ref 150–450)
PLATELET # BLD AUTO: 552 10E3/UL (ref 150–450)
POLYCHROMASIA BLD QL SMEAR: SLIGHT
POTASSIUM SERPL-SCNC: 4.6 MMOL/L (ref 3.4–5.3)
PROMYELOCYTES # BLD MANUAL: 110.5 10E3/UL
PROMYELOCYTES # BLD MANUAL: 23.3 10E3/UL
PROMYELOCYTES NFR BLD MANUAL: 26 %
PROMYELOCYTES NFR BLD MANUAL: 8 %
PROT SERPL-MCNC: 6.6 G/DL (ref 6.4–8.3)
RBC # BLD AUTO: 2.89 10E6/UL (ref 4.4–5.9)
RBC # BLD AUTO: 3.5 10E6/UL (ref 4.4–5.9)
RBC MORPH BLD: ABNORMAL
RBC MORPH BLD: ABNORMAL
SODIUM SERPL-SCNC: 137 MMOL/L (ref 135–145)
SPECIMEN DESCRIPTION: NORMAL
SPECIMEN DESCRIPTION: NORMAL
URATE SERPL-MCNC: 4.5 MG/DL (ref 3.4–7)
WBC # BLD AUTO: 290.9 10E3/UL (ref 4–11)
WBC # BLD AUTO: 425.1 10E3/UL (ref 4–11)

## 2024-09-17 PROCEDURE — 81206 BCR/ABL1 GENE MAJOR BP: CPT | Performed by: INTERNAL MEDICINE

## 2024-09-17 PROCEDURE — 250N000013 HC RX MED GY IP 250 OP 250 PS 637: Performed by: STUDENT IN AN ORGANIZED HEALTH CARE EDUCATION/TRAINING PROGRAM

## 2024-09-17 PROCEDURE — 5A09357 ASSISTANCE WITH RESPIRATORY VENTILATION, LESS THAN 24 CONSECUTIVE HOURS, CONTINUOUS POSITIVE AIRWAY PRESSURE: ICD-10-PCS | Performed by: STUDENT IN AN ORGANIZED HEALTH CARE EDUCATION/TRAINING PROGRAM

## 2024-09-17 PROCEDURE — 83615 LACTATE (LD) (LDH) ENZYME: CPT | Performed by: INTERNAL MEDICINE

## 2024-09-17 PROCEDURE — G0452 MOLECULAR PATHOLOGY INTERPR: HCPCS | Mod: 26 | Performed by: PATHOLOGY

## 2024-09-17 PROCEDURE — 99233 SBSQ HOSP IP/OBS HIGH 50: CPT | Mod: FS | Performed by: INTERNAL MEDICINE

## 2024-09-17 PROCEDURE — 99418 PROLNG IP/OBS E/M EA 15 MIN: CPT | Performed by: INTERNAL MEDICINE

## 2024-09-17 PROCEDURE — 85007 BL SMEAR W/DIFF WBC COUNT: CPT | Performed by: INTERNAL MEDICINE

## 2024-09-17 PROCEDURE — 85018 HEMOGLOBIN: CPT | Performed by: INTERNAL MEDICINE

## 2024-09-17 PROCEDURE — 250N000013 HC RX MED GY IP 250 OP 250 PS 637: Performed by: PHYSICIAN ASSISTANT

## 2024-09-17 PROCEDURE — 84100 ASSAY OF PHOSPHORUS: CPT | Performed by: INTERNAL MEDICINE

## 2024-09-17 PROCEDURE — 82248 BILIRUBIN DIRECT: CPT | Performed by: INTERNAL MEDICINE

## 2024-09-17 PROCEDURE — 85384 FIBRINOGEN ACTIVITY: CPT | Performed by: INTERNAL MEDICINE

## 2024-09-17 PROCEDURE — 250N000013 HC RX MED GY IP 250 OP 250 PS 637: Performed by: INTERNAL MEDICINE

## 2024-09-17 PROCEDURE — 258N000003 HC RX IP 258 OP 636: Performed by: PHYSICIAN ASSISTANT

## 2024-09-17 PROCEDURE — 250N000011 HC RX IP 250 OP 636: Performed by: PHYSICIAN ASSISTANT

## 2024-09-17 PROCEDURE — 85610 PROTHROMBIN TIME: CPT | Performed by: INTERNAL MEDICINE

## 2024-09-17 PROCEDURE — 36415 COLL VENOUS BLD VENIPUNCTURE: CPT | Performed by: INTERNAL MEDICINE

## 2024-09-17 PROCEDURE — 84550 ASSAY OF BLOOD/URIC ACID: CPT | Performed by: INTERNAL MEDICINE

## 2024-09-17 PROCEDURE — 81207 BCR/ABL1 GENE MINOR BP: CPT | Performed by: INTERNAL MEDICINE

## 2024-09-17 PROCEDURE — 206N000001 HC R&B BMT UMMC

## 2024-09-17 PROCEDURE — 85730 THROMBOPLASTIN TIME PARTIAL: CPT | Performed by: INTERNAL MEDICINE

## 2024-09-17 RX ORDER — ACETAMINOPHEN 325 MG/1
650 TABLET ORAL EVERY 4 HOURS PRN
Status: DISCONTINUED | OUTPATIENT
Start: 2024-09-17 | End: 2024-09-19 | Stop reason: HOSPADM

## 2024-09-17 RX ADMIN — MICAFUNGIN SODIUM 50 MG: 50 INJECTION, POWDER, LYOPHILIZED, FOR SOLUTION INTRAVENOUS at 08:54

## 2024-09-17 RX ADMIN — LEVOTHYROXINE SODIUM 150 MCG: 75 TABLET ORAL at 08:47

## 2024-09-17 RX ADMIN — ACETAMINOPHEN 975 MG: 325 TABLET ORAL at 09:05

## 2024-09-17 RX ADMIN — ACETAMINOPHEN 650 MG: 325 TABLET ORAL at 15:14

## 2024-09-17 RX ADMIN — HYDROXYUREA 2000 MG: 500 CAPSULE ORAL at 19:00

## 2024-09-17 RX ADMIN — ACETAMINOPHEN 650 MG: 325 TABLET ORAL at 18:59

## 2024-09-17 RX ADMIN — LEVOFLOXACIN 250 MG: 250 TABLET, FILM COATED ORAL at 08:47

## 2024-09-17 RX ADMIN — ALLOPURINOL 300 MG: 300 TABLET ORAL at 08:47

## 2024-09-17 RX ADMIN — BUPROPION HYDROCHLORIDE 300 MG: 150 TABLET, EXTENDED RELEASE ORAL at 08:48

## 2024-09-17 RX ADMIN — Medication 5 MG: at 22:21

## 2024-09-17 RX ADMIN — ACETAMINOPHEN 650 MG: 325 TABLET ORAL at 22:59

## 2024-09-17 RX ADMIN — ACYCLOVIR 400 MG: 400 TABLET ORAL at 08:47

## 2024-09-17 RX ADMIN — HYDROXYUREA 2000 MG: 500 CAPSULE ORAL at 08:42

## 2024-09-17 RX ADMIN — METOPROLOL SUCCINATE 25 MG: 25 TABLET, EXTENDED RELEASE ORAL at 08:47

## 2024-09-17 RX ADMIN — ACETAMINOPHEN 975 MG: 325 TABLET ORAL at 02:13

## 2024-09-17 ASSESSMENT — ACTIVITIES OF DAILY LIVING (ADL)
ADLS_ACUITY_SCORE: 22

## 2024-09-17 NOTE — PROGRESS NOTES
S-pt iv infusing at 75cc hrly LR- voiding at intervals of 400cc vol approx. Presently up in fluids by 1liter. Lungs clear-BMbx site dressing d/I/c/. Pain at site ranging from 2-4 this shayla. Pt asks for analgesia to try 'get ahead of the pain' -feels this is best that works for him. Received dx of CML and MD reviewed what expectations and future plan of care to anticipate. Instruction booklet from [employer] given to pt. Father at bedside and is supportive. Acquaitences calling and are supporotive. Pt pleasant conversive with saddened tone to interaction. Steady gait to BR . Evening chem levels with inc potassium 5.0 Phos 4.9 cr. 1.25.. glucose 289 -covered with sliding scale insulin 2units. Next chk due 2200-0n call provider notified. WBC decreased from am level-see flowsheet  B-newly dx CML  A-High WBC count decreasing with aid of Hydrea.  R-monitoring iv fluid balance and q12hrs chemistry blood values. Assist with cares as needed. Support Education on dx of CML.

## 2024-09-17 NOTE — PROGRESS NOTES
PA Initiation    Medication: SPRYCEL 100 MG PO TABS  Insurance Company: [employer] ([facility]) - Phone [phone] Fax [phone]  Start Date: 9/17/2024          Noni Sharp  [facility] Liaison  Phone [phone]  Fax [phone]  Available on Teams & Vocera

## 2024-09-17 NOTE — PROGRESS NOTES
Care Management Follow Up    Length of Stay (days): 2    Expected Discharge Date: 09/20/2024     Concerns to be Addressed: healthcare directive    Patient plan of care discussed at interdisciplinary rounds: Yes    Anticipated Discharge Disposition: Home    Anticipated Discharge Services:  tbd  Anticipated Discharge DME:  tbd    Patient/family educated on Medicare website which has current facility and service quality ratings:  tbd  Education Provided on the Discharge Plan:  tbd  Patient/Family in Agreement with the Plan:  tbd    Referrals Placed by CM/SW:  n/a  Private pay costs discussed: Not applicable    Discussed  Partnership in Safe Discharge Planning  document with patient/family: No     Handoff Completed: No, handoff not indicated or clinically appropriate    Additional Information:    CHW delegated to notarize patient's [employer] Directive. CHW emailed forms to [employer] and provided patient with copies of their form, along with their original. CHW to follow up if needed.     Romina Ulloa  5A/5C Community Health Worker  [place], [place]  [email or web address]  [phone]

## 2024-09-17 NOTE — PLAN OF CARE
"Goal Outcome Evaluation:      Plan of Care Reviewed With: patient    Overall Patient Progress: improvingOverall Patient Progress: improving     /66 (BP Location: Left arm)   Pulse 55   Temp 98.7  F (37.1  C) (Oral)   Resp 20   Ht 1.778 m (5' 10\")   Wt 92.5 kg (204 lb)   SpO2 97%   BMI 29.27 kg/m      Neuro: A&Ox4. Neuro intact  Cardiac: Afebrile, VSS.   Respiratory: home cpap use through sleep. O2 sats wdl  GI/: Voiding spontaneously. No BM this shift.   Diet/appetite: Tolerating diet. Denies nausea   Endocrine: ac/hs BG and ssi orders in place. 0200 BG- 196  Activity: Up independent in room   Pain: PRN tylenol admin x1 for discomfort at biopsy site  Skin: Biopsy site dressing CDI  Lines: PIV infusing LR at 75 ml/hr  Replacement: No RN managed or conditional transfusion orders in place. Will monitor lab results and address as needed    Rested btwn cares. Able to make needs known. Continue to monitor. Notify MD with concerns.      Problem: Adult Inpatient Plan of Care  Goal: Optimal Comfort and Wellbeing  Outcome: Progressing     Problem: Adult Inpatient Plan of Care  Goal: Readiness for Transition of Care  Outcome: Progressing     Problem: Fatigue  Goal: Improved Activity Tolerance  Outcome: Progressing       "

## 2024-09-17 NOTE — PLAN OF CARE
BMBX site bandage was removed 24 hours after the biopsy was completed.  The biopsy site looks good with minimal bruising.  Tylenol was effective for biopsy site pain.  No other complaints.  Up walking more today.  His sister is here visiting.  Eating well, blood sugars were 157 and 263, agreeable to receiving sliding scale insulin today.  PIV is now saline locked as his maintenance IV was discontinued. Awaiting BMBx results to confirm diagnosis and then start treatment.           Problem: Adult Inpatient Plan of Care  Goal: Plan of Care Review  Description: The Plan of Care Review/Shift note should be completed every shift.  The Outcome Evaluation is a brief statement about your assessment that the patient is improving, declining, or no change.  This information will be displayed automatically on your shift  note.  Outcome: Progressing  Flowsheets (Taken 9/17/2024 1346)  Plan of Care Reviewed With: patient  Goal: Optimal Comfort and Wellbeing  Outcome: Progressing  Intervention: Monitor Pain and Promote Comfort  Recent Flowsheet Documentation  Taken 9/17/2024 0905 by Laurie Resendiz, RN  Pain Management Interventions: medication (see MAR)     Problem: Fatigue  Goal: Improved Activity Tolerance  Outcome: Progressing  Intervention: Promote Improved Energy  Recent Flowsheet Documentation  Taken 9/17/2024 0800 by Laurie Resendiz, RN  Fatigue Management: paced activity encouraged  Activity Management: up ad rolan   Goal Outcome Evaluation:      Plan of Care Reviewed With: patient    Overall Patient Progress: no changeOverall Patient Progress: no change

## 2024-09-17 NOTE — PROGRESS NOTES
Prior Authorization Approval    Medication: SPRYCEL 100 MG PO TABS  Authorization Effective Date: 9/17/2024  Authorization Expiration Date: 9/17/2025  Approved Dose/Quantity: 100mg  /  30 tabs  Reference #: [id number]   Insurance Company: [place] ([facility]) - Phone [phone] Fax [phone]  Expected CoPay: Liaison unable to check. [facility] not contracted with plan  Which Pharmacy is filling the prescription:  [facility]  Provider Notified: Yes          Noni Sharp  [facility] Liaison  Phone [phone]  Fax [phone]  Available on Teams & Vocera

## 2024-09-17 NOTE — PROGRESS NOTES
Cross Cover Note    September 16, 2024  10:27 PM    Blood sugars sustained > 200 increased sliding scale insulin from low to high dose     Yayo Bhatti MD  [facility]

## 2024-09-17 NOTE — PROGRESS NOTES
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 He presented to [facility] on 9/14/24 with lightheadedness and was found to have hyperleukocytosis (WBC >400k) so was transferred to [facility] for leukemia work up.    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 Awaiting BMBx results to confirm diagnosis. Anticipate starting treatment shortly thereafter. Appreciate pharmacy liaison assistance with dasatinib PA.   - Continue hydrea 2g BID; follow daily labs  - Stop mIVF and prophylactic antimicrobials   - Continue to monitor and provide best supportive cares     HEME/ONC  # Hyperleukocytosis  # Concern for leukemia, favor chronic over acute  # Thrombocytosis  Patient reports that 1 week ago his ICD fired for run of vfib. This was during an emotional time where he thought he may not be able to see his kids. After this, his metoprolol was increased from 25 to 50 mg on Monday 9/9. Since then, he has felt dizzy and lightheaded. Only other new symptoms include new LE edema and mild SKAGGS. He presented to [facility] on 9/14/24 and was found to have hyperleukocytosis (WBC 469k) so was transferred to [facility] for leukemia work up. No s/sx of leukostasis.   - Hydrea 2g TID (9/14-9/16), reduced to BID x9/16  - Peripheral FISH with BCR:ABL1 fusion (89%) and peripheral flow cytometry 4.8% abnormal myeloid blasts. Consistent with CML, awaiting BMBx results to confirm and delineate phase of diagnosis.   - S/p BMBx 9/16, follow for pending results.    - Baseline studies:   - Viral serologies: Hep B, Hep C, HSV, HIV negative; EBV and CMV pending  - EKG with  "complete heart block and ventricular paced rhythm  - Echo 9/12 with LVEF 55-60%  - CXR negative  - CT CAP with hepatosplenomegaly with evidence of portal hypertension including esophagogastric and upper abdominal varices, small volume ascites, several sub-6 mm pulmonary nodules, focal area of tree in bud nodularity of peripheral right upper lobe, dilated main pulmonary artery (3.5 cm)    # Normocytic anemia  Likely due to new leukemia.   - Monitor CBC daily  - Transfuse if Hgb <7    # TLS, improving  # OCTAVIO, improving  On presentation, Cr 1.53, LDH 1290, uric acid 10. Lytes WNL. Unclear baseline Cr. S/p 1 dose of rasburicase.   - Allopurinol 300 mg daily  - Monitor TLS labs BID    # Risk for DIC  # Elevated INR, improving  INR up to 1.83 on admission, now improving. PTT and fibrinogen WNL.   - Monitor coags BID  - S/p vit K 5 mg x1 on 9/15, repeat PRN    ID  # ID PPX  ACV, levofloxacin, micafungin initiated on admission with concern for new leukemia diagnosis; now with CML, will stop ppx (9/17).    # History of cryptococcal pneumonia  Patient states that when he was 26 he was hospitalized for cryptococcal pneumonia. He was inpatient for 60 days and \"on life support.\" No current respiratory symptoms reported.  - Obtain baseline chest CT -  noted focal area of tree-in-bud nodularity in the peripheral right upper  lobe, likely infectious/inflammatory. Recommend follow-up to clearing    CARDIAC  # H/o bacterial endocarditis s/p tricuspid valve replacement and ICD  # Recent runs of vfib  Had hospital course in 4/2022 with the above, thought to be groin abscess as source. Was treated with course of IV abx after above interventions. Notably in the week prior to this admission he had vfib syncopal episode which fired his ICD defibrillator. Reportedly he had another run of vfib that he was asymptomatic from. Had follow up echo shortly after on 9/12 which was overall stable with LVEF 55-60%. BNP 2613 on admission.   - " Metoprolol 25 mg XL daily   - Pt says this was just increased to 50 mg but he correlates this to his worsening fatigue and exercise intolerance and is not interested in the higher dose at this time.  - Pt reports taking Lasix 20 mg daily since his heart surgery; however it is not on his active med list from his pharmacy on intake. Holding in setting of OCTAVIO.    GI  # Portal vein hypertension  # Esophageal varices  Incidental finding on CT CAP 9/15. Discussed with hepatology, no clear etiology of portal vein hypertension at this time. Consider residual from hepatosplenomegaly, likely in setting of CML? Recommend further workup with iron studies to rule out iron overload/hemochromatosis and close monitoring.   - Follow up on repeat imaging    CHRONIC  # Poorly controlled T2DM  Hgb A1C 8.2 on admission. Pt reports that he takes metformin and Ozempic (Saturdays) PTA. Has lost 50 lb in the last ~6 months due to Ozempic.   - Low-intensity sliding scale insulin  - Hold PTA metformin and Ozempic  - Patient would like to continue Ozempic while inpatient, could have family member bring in home supply-- discussed with pharmacy, will defer while inpatient and consider resuming upon discharge    - Patient has had trouble establishing with endocrine provider recently. Asks for a referral within our system; however, our endocrinologists are booking out several months. Consider endo referral at discharge vs having pt follow up with PCP for diabetes management. Referral placed x9/16    # WARNER - PTA CPAP    # Pulmonary nodules  Incidentally noted on CT CAP on admission - several sub-6 mm pulmonary nodules throughout the lungs. Recommend attention on follow up    # Social  Currently going through a divorce. Has 4 children. Works for [employer].     Clinically Significant Risk Factors              # Hypoalbuminemia: Lowest albumin = 3.4 g/dL at 9/16/2024  5:52 AM, will monitor as appropriate    # Coagulation Defect: INR = 1.50 (Ref range:  "0.85 - 1.15) and/or PTT = 35 Seconds (Ref range: 22 - 38 Seconds), will monitor for bleeding             # DMII: A1C = 8.2 % (Ref range: <5.7 %) within past 6 months, PRESENT ON ADMISSION  # Obesity: Estimated body mass index is 30.16 kg/m  as calculated from the following:    Height as of this encounter: 1.778 m (5' 10\").    Weight as of this encounter: 95.3 kg (210 lb 3.2 oz)., PRESENT ON ADMISSION  # Severe Malnutrition: based on nutrition assessment, PRESENT ON ADMISSION    # ICD device present    FEN  Diet: Regular Diet Adult   IVF: Bolus PRN   Lytes: Replete per protocol    PPX  VTE: Holding for now  Bowel: Senna/MiraLax PRN  GI/PUD: None indicated    MISC  Code Status: Full Code   Lines/Drains: PIV  Medically Ready for Discharge: Anticipated in 2-4 Days  Dispo: Pending results of BMBx. If confirmed as CML, will discharge home with oral TKI.   Follow Up: TBD    Patient was seen and plan of care was discussed with attending physician Dr. Varner.    I spent 65 minutes in the care of this patient today, which included time necessary for review of interval events, obtaining history and physical exam, ordering medications/tests/procedures as medically indicated, review of pertinent medical literature, counseling of the patient, coordination of care, and documentation time. Over 50% of time was spent counseling the patient and/or coordinating care.    Noni Sauer PA-C  Hematology/Oncology  Pager #[phone]     Interval History   Chart reviewed, no acute events overnight. Christiano is feeling alright today. He is having some discomfort at his bone marrow biopsy site but a bit better than yesterday. Taking tylenol with improvement. Otherwise doing well and denies all symptoms including fevers, chills, headaches, dizziness, chest pain, SOB, cough, abdominal pain, nausea, vomiting, diarrhea, urinary symptoms. He shares that Dr. Varner gave him news that likely has CML and would probably start chemo pill in the next few days, " still awaiting BMBx results. He has no other questions at this time.     Vital Signs with Ranges  Temp:  [96.6  F (35.9  C)-98.7  F (37.1  C)] 96.6  F (35.9  C)  Pulse:  [50-55] 55  Resp:  [18-20] 18  BP: (105-115)/(61-67) 105/61  SpO2:  [97 %-99 %] 99 %  I/O last 3 completed shifts:  In: 3530 [P.O.:1180; I.V.:2350]  Out: 3585 [Urine:3550; Blood:35]    Physical Exam   Constitutional: Awake and conversational. Non- toxic appearing.   HEENT: NCAT. Moist mucus membranes without lesions, thrush, or exudates appreciated  Respiratory: Breathing comfortably on room air, speaking in full sentences, no evidence of respiratory distress. Lungs CTAB without stridor, wheeze, rhonchi, or rales.   Cardiovascular: Regular rate and rhythm. Trace bilateral lower extremity peripheral edema.    GI: Abdomen with normoactive bowel sounds, soft and non-tender throughout.   Skin: Skin is clean, dry, intact. No jaundice or significant rashes appreciated on exposed areas. BMBx site with dressing CDI, no evidence of bleeding or hematoma.   Neurologic: Alert and with normal speech. Grossly nonfocal.  Moves extremities spontaneously.  Memory and thought process preserved.   Neuropsychiatric: Calm, affect congruent to situation. Appropriate mood and affect.       Medications   Current Facility-Administered Medications   Medication Dose Route Frequency Provider Last Rate Last Admin     Current Facility-Administered Medications   Medication Dose Route Frequency Provider Last Rate Last Admin    allopurinol (ZYLOPRIM) tablet 300 mg  300 mg Oral Daily Justin, Jules A, DO   300 mg at 09/17/24 0847    buPROPion (WELLBUTRIN XL) 24 hr tablet 300 mg  300 mg Oral QAM Justin, Jules A, DO   300 mg at 09/17/24 0848    hydroxyurea (HYDREA) capsule 2,000 mg  2,000 mg Oral BID Jesus Varner MD   2,000 mg at 09/17/24 0842    insulin aspart (NovoLOG) injection (RAPID ACTING)  1-10 Units Subcutaneous TID Mik Cheema MD   1 Units at 09/17/24 0855     insulin aspart (NovoLOG) injection (RAPID ACTING)  1-7 Units Subcutaneous At Bedtime Mik Bhatti MD        levothyroxine (SYNTHROID/LEVOTHROID) tablet 150 mcg  150 mcg Oral Daily Justin, Jules A, DO   150 mcg at 09/17/24 0847    [Held by provider] losartan (COZAAR) tablet 50 mg  50 mg Oral Daily Justin, Jules A, DO        metoprolol succinate ER (TOPROL XL) 24 hr tablet 25 mg  25 mg Oral Daily Justin, Jules A, DO   25 mg at 09/17/24 0847    sodium chloride (PF) 0.9% PF flush 3 mL  3 mL Intracatheter Q8H Justin, Jules A, DO   3 mL at 09/17/24 0206     Data   Results for orders placed or performed during the hospital encounter of 09/15/24 (from the past 24 hour(s))   Glucose by meter   Result Value Ref Range    GLUCOSE BY METER POCT 127 (H) 70 - 99 mg/dL   CBC with platelets differential    Narrative    The following orders were created for panel order CBC with platelets differential.  Procedure                               Abnormality         Status                     ---------                               -----------         ------                     CBC with platelets and d...[[id number]]  Abnormal            Final result               Manual Differential[[id number]]          Abnormal            Final result                 Please view results for these tests on the individual orders.   Basic metabolic panel   Result Value Ref Range    Sodium 137 135 - 145 mmol/L    Potassium 5.0 3.4 - 5.3 mmol/L    Chloride 107 98 - 107 mmol/L    Carbon Dioxide (CO2) 19 (L) 22 - 29 mmol/L    Anion Gap 11 7 - 15 mmol/L    Urea Nitrogen 30.2 (H) 6.0 - 20.0 mg/dL    Creatinine 1.25 (H) 0.67 - 1.17 mg/dL    GFR Estimate 77 >60 mL/min/1.73m2    Calcium 8.8 8.8 - 10.4 mg/dL    Glucose 289 (H) 70 - 99 mg/dL   Fibrinogen activity   Result Value Ref Range    Fibrinogen Activity 449 170 - 510 mg/dL   INR   Result Value Ref Range    INR 1.45 (H) 0.85 - 1.15   Partial thromboplastin time   Result Value Ref Range    aPTT 34 22 - 38  Seconds   Phosphorus   Result Value Ref Range    Phosphorus 4.9 (H) 2.5 - 4.5 mg/dL   Uric acid   Result Value Ref Range    Uric Acid 3.6 3.4 - 7.0 mg/dL   CBC with platelets and differential   Result Value Ref Range    WBC Count 325.7 (HH) 4.0 - 11.0 10e3/uL    RBC Count 3.46 (L) 4.40 - 5.90 10e6/uL    Hemoglobin 10.0 (L) 13.3 - 17.7 g/dL    Hematocrit 30.9 (L) 40.0 - 53.0 %    MCV 89 78 - 100 fL    MCH 28.9 26.5 - 33.0 pg    MCHC 32.4 31.5 - 36.5 g/dL    RDW 18.6 (H) 10.0 - 15.0 %    Platelet Count 561 (H) 150 - 450 10e3/uL    % Neutrophils      % Lymphocytes      % Monocytes      % Eosinophils      % Basophils      % Immature Granulocytes      NRBCs per 100 WBC 1 (H) <1 /100    Absolute Neutrophils      Absolute Lymphocytes      Absolute Monocytes      Absolute Eosinophils      Absolute Basophils      Absolute Immature Granulocytes      Absolute NRBCs 1.6 10e3/uL   Manual Differential   Result Value Ref Range    % Neutrophils 56 %    % Lymphocytes 0 %    % Monocytes 2 %    % Eosinophils 0 %    % Basophils 11 %    % Metamyelocytes 6 %    % Myelocytes 10 %    % Promyelocytes 11 %    % Blasts 4 %    Absolute Neutrophils 182.4 (H) 1.6 - 8.3 10e3/uL    Absolute Lymphocytes 0.0 (L) 0.8 - 5.3 10e3/uL    Absolute Monocytes 6.5 (H) 0.0 - 1.3 10e3/uL    Absolute Eosinophils 0.0 0.0 - 0.7 10e3/uL    Absolute Basophils 35.8 (H) 0.0 - 0.2 10e3/uL    Absolute Metamyelocytes 19.5 (H) <=0.0 10e3/uL    Absolute Myelocytes 32.6 (H) <=0.0 10e3/uL    Absolute Promyelocytes 35.8 (H) <=0.0 10e3/uL    Absolute Blasts 13.0 (H) <=0.0 10e3/uL    RBC Morphology Confirmed RBC Indices     Platelet Assessment  Automated Count Confirmed. Platelet morphology is normal.     Automated Count Confirmed. Platelet morphology is normal.   Glucose by meter   Result Value Ref Range    GLUCOSE BY METER POCT 222 (H) 70 - 99 mg/dL   Glucose by meter   Result Value Ref Range    GLUCOSE BY METER POCT 196 (H) 70 - 99 mg/dL   CBC with platelets differential     Narrative    The following orders were created for panel order CBC with platelets differential.  Procedure                               Abnormality         Status                     ---------                               -----------         ------                     CBC with platelets and d...[[id number]]  Abnormal            Final result               Manual Differential[[id number]]          Abnormal            Final result                 Please view results for these tests on the individual orders.   Basic metabolic panel   Result Value Ref Range    Sodium 137 135 - 145 mmol/L    Potassium 4.6 3.4 - 5.3 mmol/L    Chloride 109 (H) 98 - 107 mmol/L    Carbon Dioxide (CO2) 18 (L) 22 - 29 mmol/L    Anion Gap 10 7 - 15 mmol/L    Urea Nitrogen 28.1 (H) 6.0 - 20.0 mg/dL    Creatinine 1.10 0.67 - 1.17 mg/dL    GFR Estimate 89 >60 mL/min/1.73m2    Calcium 8.8 8.8 - 10.4 mg/dL    Glucose 177 (H) 70 - 99 mg/dL   Fibrinogen activity   Result Value Ref Range    Fibrinogen Activity 465 170 - 510 mg/dL   Hepatic panel   Result Value Ref Range    Protein Total 6.6 6.4 - 8.3 g/dL    Albumin 3.7 3.5 - 5.2 g/dL    Bilirubin Total 0.8 <=1.2 mg/dL    Alkaline Phosphatase 174 (H) 40 - 150 U/L    AST 30 0 - 45 U/L    ALT 12 0 - 70 U/L    Bilirubin Direct 0.32 (H) 0.00 - 0.30 mg/dL   INR   Result Value Ref Range    INR 1.50 (H) 0.85 - 1.15   Lactate Dehydrogenase   Result Value Ref Range    Lactate Dehydrogenase 983 (H) 0 - 250 U/L   Partial thromboplastin time   Result Value Ref Range    aPTT 35 22 - 38 Seconds   Phosphorus   Result Value Ref Range    Phosphorus 4.0 2.5 - 4.5 mg/dL   Uric acid   Result Value Ref Range    Uric Acid 4.5 3.4 - 7.0 mg/dL   CBC with platelets and differential   Result Value Ref Range    WBC Count 290.9 (HH) 4.0 - 11.0 10e3/uL    RBC Count 3.50 (L) 4.40 - 5.90 10e6/uL    Hemoglobin 10.2 (L) 13.3 - 17.7 g/dL    Hematocrit 30.7 (L) 40.0 - 53.0 %    MCV 88 78 - 100 fL    MCH 29.1 26.5 - 33.0 pg    MCHC 33.2  31.5 - 36.5 g/dL    RDW 18.2 (H) 10.0 - 15.0 %    Platelet Count 552 (H) 150 - 450 10e3/uL    % Neutrophils      % Lymphocytes      % Monocytes      % Eosinophils      % Basophils      % Immature Granulocytes      NRBCs per 100 WBC 1 (H) <1 /100    Absolute Neutrophils      Absolute Lymphocytes      Absolute Monocytes      Absolute Eosinophils      Absolute Basophils      Absolute Immature Granulocytes      Absolute NRBCs 2.8 10e3/uL   Manual Differential   Result Value Ref Range    % Neutrophils 51 %    % Lymphocytes 4 %    % Monocytes 3 %    % Eosinophils 3 %    % Basophils 13 %    % Metamyelocytes 5 %    % Myelocytes 11 %    % Promyelocytes 8 %    % Blasts 2 %    Absolute Neutrophils 148.4 (H) 1.6 - 8.3 10e3/uL    Absolute Lymphocytes 11.6 (H) 0.8 - 5.3 10e3/uL    Absolute Monocytes 8.7 (H) 0.0 - 1.3 10e3/uL    Absolute Eosinophils 8.7 (H) 0.0 - 0.7 10e3/uL    Absolute Basophils 37.8 (H) 0.0 - 0.2 10e3/uL    Absolute Metamyelocytes 14.5 (H) <=0.0 10e3/uL    Absolute Myelocytes 32.0 (H) <=0.0 10e3/uL    Absolute Promyelocytes 23.3 (H) <=0.0 10e3/uL    Absolute Blasts 5.8 (H) <=0.0 10e3/uL    RBC Morphology Confirmed RBC Indices     Platelet Assessment  Automated Count Confirmed. Platelet morphology is normal.     Automated Count Confirmed. Platelet morphology is normal.    Elliptocytes Slight (A) None Seen    Polychromasia Slight (A) None Seen   Glucose by meter   Result Value Ref Range    GLUCOSE BY METER POCT 157 (H) 70 - 99 mg/dL

## 2024-09-18 ENCOUNTER — PATIENT OUTREACH (OUTPATIENT)
Dept: ONCOLOGY | Facility: CLINIC | Age: 39
End: 2024-09-18
Payer: COMMERCIAL

## 2024-09-18 DIAGNOSIS — C92.10 CML (CHRONIC MYELOCYTIC LEUKEMIA) (H): Primary | ICD-10-CM

## 2024-09-18 LAB
ALBUMIN SERPL BCG-MCNC: 3.4 G/DL (ref 3.5–5.2)
ALP SERPL-CCNC: 158 U/L (ref 40–150)
ALT SERPL W P-5'-P-CCNC: 10 U/L (ref 0–70)
ANION GAP SERPL CALCULATED.3IONS-SCNC: 9 MMOL/L (ref 7–15)
APTT PPP: 38 SECONDS (ref 22–38)
AST SERPL W P-5'-P-CCNC: 26 U/L (ref 0–45)
BASOPHILS # BLD MANUAL: 27.6 10E3/UL (ref 0–0.2)
BASOPHILS NFR BLD MANUAL: 12 %
BILIRUB DIRECT SERPL-MCNC: 0.29 MG/DL (ref 0–0.3)
BILIRUB SERPL-MCNC: 0.6 MG/DL
BUN SERPL-MCNC: 29.7 MG/DL (ref 6–20)
CALCIUM SERPL-MCNC: 8.4 MG/DL (ref 8.8–10.4)
CHLORIDE SERPL-SCNC: 109 MMOL/L (ref 98–107)
CMV IGG SERPL IA-ACNC: <0.2 U/ML
CMV IGG SERPL IA-ACNC: NORMAL
CREAT SERPL-MCNC: 1.12 MG/DL (ref 0.67–1.17)
EBV VCA IGG SER IA-ACNC: 528 U/ML
EBV VCA IGG SER IA-ACNC: POSITIVE
EGFRCR SERPLBLD CKD-EPI 2021: 87 ML/MIN/1.73M2
ELLIPTOCYTES BLD QL SMEAR: SLIGHT
EOSINOPHIL # BLD MANUAL: 18.4 10E3/UL (ref 0–0.7)
EOSINOPHIL NFR BLD MANUAL: 8 %
ERYTHROCYTE [DISTWIDTH] IN BLOOD BY AUTOMATED COUNT: 17.9 % (ref 10–15)
FERRITIN SERPL-MCNC: 264 NG/ML (ref 31–409)
FIBRINOGEN PPP-MCNC: 442 MG/DL (ref 170–510)
FRAGMENTS BLD QL SMEAR: ABNORMAL
GLUCOSE BLDC GLUCOMTR-MCNC: 141 MG/DL (ref 70–99)
GLUCOSE BLDC GLUCOMTR-MCNC: 177 MG/DL (ref 70–99)
GLUCOSE BLDC GLUCOMTR-MCNC: 246 MG/DL (ref 70–99)
GLUCOSE BLDC GLUCOMTR-MCNC: 254 MG/DL (ref 70–99)
GLUCOSE SERPL-MCNC: 173 MG/DL (ref 70–99)
HCO3 SERPL-SCNC: 18 MMOL/L (ref 22–29)
HCT VFR BLD AUTO: 28.1 % (ref 40–53)
HGB BLD-MCNC: 9.2 G/DL (ref 13.3–17.7)
INR PPP: 1.46 (ref 0.85–1.15)
IRON BINDING CAPACITY (ROCHE): 298 UG/DL (ref 240–430)
IRON SATN MFR SERPL: 8 % (ref 15–46)
IRON SERPL-MCNC: 23 UG/DL (ref 61–157)
LDH SERPL L TO P-CCNC: 864 U/L (ref 0–250)
LYMPHOCYTES # BLD MANUAL: 9.2 10E3/UL (ref 0.8–5.3)
LYMPHOCYTES NFR BLD MANUAL: 4 %
MAGNESIUM SERPL-MCNC: 2 MG/DL (ref 1.7–2.3)
MCH RBC QN AUTO: 28.8 PG (ref 26.5–33)
MCHC RBC AUTO-ENTMCNC: 32.7 G/DL (ref 31.5–36.5)
MCV RBC AUTO: 88 FL (ref 78–100)
METAMYELOCYTES # BLD MANUAL: 13.8 10E3/UL
METAMYELOCYTES NFR BLD MANUAL: 6 %
MONOCYTES # BLD MANUAL: 2.3 10E3/UL (ref 0–1.3)
MONOCYTES NFR BLD MANUAL: 1 %
MYELOCYTES # BLD MANUAL: 23 10E3/UL
MYELOCYTES NFR BLD MANUAL: 10 %
NEUTROPHILS # BLD MANUAL: 135.6 10E3/UL (ref 1.6–8.3)
NEUTROPHILS NFR BLD MANUAL: 59 %
NRBC # BLD AUTO: 2.3 10E3/UL
NRBC # BLD AUTO: 3 10E3/UL
NRBC BLD AUTO-RTO: 1 /100
NRBC BLD MANUAL-RTO: 1 %
PHOSPHATE SERPL-MCNC: 3.8 MG/DL (ref 2.5–4.5)
PLAT MORPH BLD: ABNORMAL
PLATELET # BLD AUTO: 463 10E3/UL (ref 150–450)
POTASSIUM SERPL-SCNC: 4.5 MMOL/L (ref 3.4–5.3)
PROT SERPL-MCNC: 6.2 G/DL (ref 6.4–8.3)
RBC # BLD AUTO: 3.19 10E6/UL (ref 4.4–5.9)
RBC MORPH BLD: ABNORMAL
RETICS # AUTO: 0.09 10E6/UL (ref 0.03–0.1)
RETICS/RBC NFR AUTO: 2.9 % (ref 0.5–2)
SODIUM SERPL-SCNC: 136 MMOL/L (ref 135–145)
TRANSFERRIN SERPL-MCNC: 254 MG/DL (ref 200–360)
URATE SERPL-MCNC: 4.6 MG/DL (ref 3.4–7)
WBC # BLD AUTO: 229.8 10E3/UL (ref 4–11)

## 2024-09-18 PROCEDURE — 206N000001 HC R&B BMT UMMC

## 2024-09-18 PROCEDURE — 84100 ASSAY OF PHOSPHORUS: CPT | Performed by: PHYSICIAN ASSISTANT

## 2024-09-18 PROCEDURE — 85730 THROMBOPLASTIN TIME PARTIAL: CPT | Performed by: PHYSICIAN ASSISTANT

## 2024-09-18 PROCEDURE — 250N000013 HC RX MED GY IP 250 OP 250 PS 637: Performed by: STUDENT IN AN ORGANIZED HEALTH CARE EDUCATION/TRAINING PROGRAM

## 2024-09-18 PROCEDURE — 36415 COLL VENOUS BLD VENIPUNCTURE: CPT | Performed by: INTERNAL MEDICINE

## 2024-09-18 PROCEDURE — 83735 ASSAY OF MAGNESIUM: CPT | Performed by: PHYSICIAN ASSISTANT

## 2024-09-18 PROCEDURE — 99233 SBSQ HOSP IP/OBS HIGH 50: CPT | Mod: FS | Performed by: PHYSICIAN ASSISTANT

## 2024-09-18 PROCEDURE — 85610 PROTHROMBIN TIME: CPT | Performed by: PHYSICIAN ASSISTANT

## 2024-09-18 PROCEDURE — 82248 BILIRUBIN DIRECT: CPT | Performed by: INTERNAL MEDICINE

## 2024-09-18 PROCEDURE — 85384 FIBRINOGEN ACTIVITY: CPT | Performed by: PHYSICIAN ASSISTANT

## 2024-09-18 PROCEDURE — 250N000013 HC RX MED GY IP 250 OP 250 PS 637: Performed by: INTERNAL MEDICINE

## 2024-09-18 PROCEDURE — 85007 BL SMEAR W/DIFF WBC COUNT: CPT | Performed by: PHYSICIAN ASSISTANT

## 2024-09-18 PROCEDURE — 84550 ASSAY OF BLOOD/URIC ACID: CPT | Performed by: PHYSICIAN ASSISTANT

## 2024-09-18 PROCEDURE — 250N000013 HC RX MED GY IP 250 OP 250 PS 637: Performed by: HOSPITALIST

## 2024-09-18 PROCEDURE — 85045 AUTOMATED RETICULOCYTE COUNT: CPT | Performed by: PHYSICIAN ASSISTANT

## 2024-09-18 PROCEDURE — 84466 ASSAY OF TRANSFERRIN: CPT | Performed by: PHYSICIAN ASSISTANT

## 2024-09-18 PROCEDURE — 83615 LACTATE (LD) (LDH) ENZYME: CPT | Performed by: PHYSICIAN ASSISTANT

## 2024-09-18 PROCEDURE — 83550 IRON BINDING TEST: CPT | Performed by: PHYSICIAN ASSISTANT

## 2024-09-18 PROCEDURE — 99222 1ST HOSP IP/OBS MODERATE 55: CPT | Mod: GC | Performed by: INTERNAL MEDICINE

## 2024-09-18 PROCEDURE — 80053 COMPREHEN METABOLIC PANEL: CPT | Performed by: PHYSICIAN ASSISTANT

## 2024-09-18 PROCEDURE — 82728 ASSAY OF FERRITIN: CPT | Performed by: PHYSICIAN ASSISTANT

## 2024-09-18 PROCEDURE — 85027 COMPLETE CBC AUTOMATED: CPT | Performed by: PHYSICIAN ASSISTANT

## 2024-09-18 PROCEDURE — 250N000013 HC RX MED GY IP 250 OP 250 PS 637: Performed by: PHYSICIAN ASSISTANT

## 2024-09-18 RX ORDER — HEPARIN SODIUM,PORCINE 10 UNIT/ML
5-20 VIAL (ML) INTRAVENOUS DAILY PRN
OUTPATIENT
Start: 2024-09-18

## 2024-09-18 RX ORDER — HEPARIN SODIUM (PORCINE) LOCK FLUSH IV SOLN 100 UNIT/ML 100 UNIT/ML
5 SOLUTION INTRAVENOUS
OUTPATIENT
Start: 2024-09-18

## 2024-09-18 RX ORDER — ALLOPURINOL 300 MG/1
300 TABLET ORAL DAILY
Qty: 30 TABLET | Refills: 0 | Status: SHIPPED | OUTPATIENT
Start: 2024-09-19 | End: 2024-09-19

## 2024-09-18 RX ORDER — EPINEPHRINE 1 MG/ML
0.3 INJECTION, SOLUTION, CONCENTRATE INTRAVENOUS EVERY 5 MIN PRN
OUTPATIENT
Start: 2024-09-18

## 2024-09-18 RX ORDER — DIPHENHYDRAMINE HYDROCHLORIDE 50 MG/ML
50 INJECTION INTRAMUSCULAR; INTRAVENOUS
Start: 2024-09-18

## 2024-09-18 RX ORDER — OXYCODONE HYDROCHLORIDE 5 MG/1
5 TABLET ORAL ONCE
Status: COMPLETED | OUTPATIENT
Start: 2024-09-18 | End: 2024-09-18

## 2024-09-18 RX ORDER — LIDOCAINE 4 G/G
2 PATCH TOPICAL
Status: DISCONTINUED | OUTPATIENT
Start: 2024-09-18 | End: 2024-09-19 | Stop reason: HOSPADM

## 2024-09-18 RX ORDER — METOPROLOL SUCCINATE 25 MG/1
25 TABLET, EXTENDED RELEASE ORAL DAILY
Status: SHIPPED
Start: 2024-09-18 | End: 2024-09-19

## 2024-09-18 RX ADMIN — ACETAMINOPHEN 650 MG: 325 TABLET ORAL at 17:24

## 2024-09-18 RX ADMIN — METOPROLOL SUCCINATE 25 MG: 25 TABLET, EXTENDED RELEASE ORAL at 08:50

## 2024-09-18 RX ADMIN — HYDROXYUREA 2000 MG: 500 CAPSULE ORAL at 19:37

## 2024-09-18 RX ADMIN — ALLOPURINOL 300 MG: 300 TABLET ORAL at 08:54

## 2024-09-18 RX ADMIN — ACETAMINOPHEN 650 MG: 325 TABLET ORAL at 21:28

## 2024-09-18 RX ADMIN — OXYCODONE HYDROCHLORIDE 5 MG: 5 TABLET ORAL at 19:36

## 2024-09-18 RX ADMIN — LEVOTHYROXINE SODIUM 150 MCG: 75 TABLET ORAL at 08:50

## 2024-09-18 RX ADMIN — ACETAMINOPHEN 650 MG: 325 TABLET ORAL at 08:50

## 2024-09-18 RX ADMIN — ACETAMINOPHEN 650 MG: 325 TABLET ORAL at 13:04

## 2024-09-18 RX ADMIN — BUPROPION HYDROCHLORIDE 300 MG: 150 TABLET, EXTENDED RELEASE ORAL at 08:50

## 2024-09-18 RX ADMIN — ACETAMINOPHEN 650 MG: 325 TABLET ORAL at 03:05

## 2024-09-18 RX ADMIN — Medication 5 MG: at 21:30

## 2024-09-18 RX ADMIN — HYDROXYUREA 2000 MG: 500 CAPSULE ORAL at 08:50

## 2024-09-18 RX ADMIN — DASATINIB 100 MG: 50 TABLET ORAL at 17:26

## 2024-09-18 RX ADMIN — LIDOCAINE 1 PATCH: 4 PATCH TOPICAL at 10:40

## 2024-09-18 ASSESSMENT — ACTIVITIES OF DAILY LIVING (ADL)
ADLS_ACUITY_SCORE: 22

## 2024-09-18 NOTE — CONSULTS
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 Liaison is unable to determine coverage.      Noni Sharp  [facility] Liaison  Phone: [phone] Fax: [phone]  Available on Teams & Vocera

## 2024-09-18 NOTE — PLAN OF CARE
[id number]: VSS. Using home CPAP overnight. Bmbx site continues to be tender. Tylenol given Q4hr at patient request. No further complaints. Voiding well. Resting in between cares. Awaiting final bmbx results.     Problem: Adult Inpatient Plan of Care  Goal: Plan of Care Review  Description: The Plan of Care Review/Shift note should be completed every shift.  The Outcome Evaluation is a brief statement about your assessment that the patient is improving, declining, or no change.  This information will be displayed automatically on your shift  note.  Outcome: Progressing     Problem: Adult Inpatient Plan of Care  Goal: Optimal Comfort and Wellbeing  Outcome: Progressing  Intervention: Monitor Pain and Promote Comfort  Recent Flowsheet Documentation  Taken 9/17/2024 2334 by Anay Robison, RN  Pain Management Interventions: rest     Problem: Fatigue  Goal: Improved Activity Tolerance  Outcome: Progressing  Intervention: Promote Improved Energy  Recent Flowsheet Documentation  Taken 9/18/2024 0000 by Anay Robison, RN  Sleep/Rest Enhancement:   awakenings minimized   comfort measures  Activity Management: up ad rolan   Goal Outcome Evaluation:

## 2024-09-18 NOTE — PLAN OF CARE
Goal Outcome Evaluation:       Reports pain at bone marrow biopsy site continues, using tylenol every 4 hours. No complaints of nausea. Good appetite. Ambulating independently in room and halls. VSS.           Problem: Adult Inpatient Plan of Care  Goal: Absence of Hospital-Acquired Illness or Injury  Intervention: Prevent Infection  Recent Flowsheet Documentation  Taken 9/17/2024 1600 by Elba Pollack RN  Infection Prevention:   environmental surveillance performed   hand hygiene promoted   rest/sleep promoted   single patient room provided     Problem: Adult Inpatient Plan of Care  Goal: Optimal Comfort and Wellbeing  Outcome: Progressing  Intervention: Monitor Pain and Promote Comfort  Recent Flowsheet Documentation  Taken 9/17/2024 1600 by Elba Pollack RN  Pain Management Interventions: declines     Problem: Adult Inpatient Plan of Care  Goal: Optimal Comfort and Wellbeing  Intervention: Monitor Pain and Promote Comfort  Recent Flowsheet Documentation  Taken 9/17/2024 1600 by Elba Pollack RN  Pain Management Interventions: declines     Problem: Fatigue  Goal: Improved Activity Tolerance  Outcome: Progressing  Intervention: Promote Improved Energy  Recent Flowsheet Documentation  Taken 9/17/2024 1600 by Elba Pollack RN  Fatigue Management: paced activity encouraged  Activity Management: up ad rolan

## 2024-09-18 NOTE — CONSULTS
Inpatient Diabetes Management Service : New Consult Note     Patient: Anil Montoya   YOB: 1985    MRN: [id number]     Date of Consult : 09/18/2024   Date of Admission: 9/15/2024     Reason for Consult: T2 DM management   Consult Requestor: Oncology team.           History of Present Illness:   Anil Montoya is a 38 year old male with a history of uncontrolled T2DM, bacterial endocarditis s/p tricuspid valve replacement and ICD. He presented to [facility] on 9/14/24 with lightheadedness and was found to have hyperleukocytosis (WBC >400k) so was transferred to [facility] for leukemia work up. Confirmed to have Ph+ CML     History obtained via the patient, chart review and discussion with primary team.       Patient was diagnosed with type 2 DM in 2012, at age 27.  At home, he reports a glycemic control.  He only checks his blood glucose in the morning.  In a regular day, he has 2 meals, with lunch today and generally larger meal and the dinner being smaller.  He might have approximately 2 snacks a day.    Last dose insulin PTA: lantus during hospitalization for heart issues, mentions that developed hypoglycemic episodes.    Current inpatient regimen:  Ozempic 2mg weekly, metformin 1000mg twice a day.  Both were discontinued last week, in preparation for chemotherapy.  BG at time of consult: 141- 254    Other Active/Contributory Medical Problems: Recently discover CML  GAD65 antibody, zinc transporter 8 antibody, islet antibody, insulin antibody, and c-peptide are not available on epic search     Inpatient Glucose Trends:           Diabetes History:   Diabetes Type and Duration: 2012 diagnosed.     PTA Medication Regimen: Ozempic 2mg, metformin 1000mg.  He reports losing approximately 55 pounds over the recent years.   Historical Diabetes Medications: Jardiance is mentioned on the outside notes but the patient does not remember taking it.  He reports taking Victoza prior to  transitioning to Ozempic, in December 2023.  ------------------------  Complications:    Last eye exam was 1 year ago.  According to the patient, he was no diagnosed with diabetic retinopathy.  Urine microalbumin positive back in 2021.  Recent GFR 87.  The patient denies experiencing numbness or tingling sensation in his feet.    Last A1c: 8.2%   Hemoglobin at time of last A1c: 9.2   Prior A1c numbers have been higher.    History of DKA: no   Hypoglycemia PTA: 1 episode of hypoglycemia which occurred during a hospitalization with the patient received insulin, years ago.  According to the patient, the blood sugar was 38 and he was completely asymptomatic.  Diet/Lifestyle: active         Medications Impacting Glycemia:    Steroids: none  Per oncology team, no treatment with steroids is planned in the near future.         Diet/Nutrition:    Orders Placed This Encounter      Regular Diet Adult          Review of Systems:    CC: none  Constitutional: denies fever and chills. Denies recent weight gain or loss.    HEENT: denies headache, vision changes, hearing changes, sinus congestion, and swollen glands.   Cardiac: denies  chest pain, palpitations, or racing heart.    Respiratory: denies  dyspnea on exertion and at rest. denies  wheezing and cough, denies  no active sputum production.    GI: denies  abdominal pain, tenderness and bloating. denies  nausea and vomiting. denies  changes in stool pattern, constipation and diarrhea.    : denies  difficulty urinating, dysuria, and incontinence.    MSK/Integumentary:denies  swelling/edema. denies  weakness. denies  new rashes, wounds, and bruising.    Endocrine: denies  polyuria, polydipsia           Past Medical History:       Past Medical History:   Diagnosis Date    Pneumonia    Tricuspid valve replacement s/p ICD placement in 2022.    Hypothyroidism, diagnosed a couple of years ago  Dyslipidemia  Hypertension  Obstructive sleep apnea  History of  "obesity  Endocarditis  Cardiac arrest         Past Surgical History:      Past Surgical History:   Procedure Laterality Date    BONE MARROW BIOPSY, BONE SPECIMEN, NEEDLE/TROCAR Left 9/16/2024    Procedure: Bone marrow biopsy, bone specimen, needle/trocar, left posterior iliac crest;  Surgeon: Noni Sauer PA-C;  Location: [facility] OR    CV CORONARY ANGIOGRAM N/A 3/31/2022    Procedure: Coronary Angiogram;  Surgeon: Lidia Quintanilla MD;  Location:  HEART CARDIAC CATH LAB    EP ICD INSERT SINGLE N/A 4/12/2022    Procedure: Implantable Cardioverter Defibrillator Device & Lead Implant Single or Dual;  Surgeon: Suleman Higgins MD;  Location:  HEART CARDIAC CATH LAB    IR LUMBAR PUNCTURE  7/30/2012    REPLACE VALVE AORTIC N/A 4/1/2022    Procedure: AORTIC VALVE exploration;  Surgeon: Marti Melton MD;  Location:  OR    REPLACE VALVE TRICUSPID N/A 4/1/2022    Procedure: TRICUSPID VALVE REPLACEMENT;  Surgeon: Marti Melton MD;  Location:  OR             Social History:    .  He has 4 children.  Social History     Tobacco Use    Smoking status: Never    Smokeless tobacco: Never   Substance Use Topics    Alcohol use: Not Currently        History   Sexual Activity    Sexual activity: Not on file               Family History:    Reviewed and non-contributory.    Family History of Diabetes: Mother T1DM, Father and older sister diagnosed with type 2 DM     History reviewed. No pertinent family history.          Physical Exam:    /71 (BP Location: Left arm)   Pulse 54   Temp 97.3  F (36.3  C) (Axillary)   Resp 18   Ht 1.778 m (5' 10\")   Wt 94.9 kg (209 lb 4.8 oz)   SpO2 99%   BMI 30.03 kg/m     Physical Exam  General Appearance: alert, no distress noted   Eyes: grossly normal to inspection, conjunctivae and sclerae normal, no lid lag or stare   Respiratory: no audible wheeze, cough, or visible cyanosis.  No visible retractions or increased work of breathing.  Able to speak " fully in complete sentences.  Neurological: Cranial nerves grossly intact, mentation intact and speech normal; no tremor of the hands   Skin: no lesions on exposed skin   Psychological: mentation appears normal, affect normal, judgement and insight intact, normal speech          Laboratory Data:      Lab Results   Component Value Date    A1C 8.2 (H) 09/14/2024    A1C 9.6 (H) 04/14/2022    A1C 11.9 (H) 04/03/2022    A1C 12.6 (H) 03/27/2022     Recent Labs   Lab Test 09/18/24  0450   .8*   RBC 3.19*   HGB 9.2*   HCT 28.1*   MCV 88   MCH 28.8   MCHC 32.7   RDW 17.9*   *     Recent Labs   Lab Test 09/18/24  1146 09/18/24  0720 09/18/24  0450 09/17/24  0716 09/17/24  0637   NA  --   --  136  --  137   POTASSIUM  --   --  4.5  --  4.6   CHLORIDE  --   --  109*  --  109*   CO2  --   --  18*  --  18*   ANIONGAP  --   --  9  --  10   * 141* 173*   < > 177*   BUN  --   --  29.7*  --  28.1*   CR  --   --  1.12  --  1.10   IMAN  --   --  8.4*  --  8.8    < > = values in this interval not displayed.     Recent Labs   Lab Test 09/18/24  0450   PROTTOTAL 6.2*   ALBUMIN 3.4*   BILITOTAL 0.6   ALKPHOS 158*   AST 26   ALT 10            Assessment and Recommendations:       Assessment:   Type 2 Diabetes Mellitus, diagnosed in 2012, with no known diabetic microvascular complications and suboptimal glycemic control.  Currently, the outpatient antidiabetic medications are metformin and Ozempic.  The patient is now hospitalized to start therapy with Dasatinib.   We discussed with the patient that the safest and most effective approach in managing the blood sugar while undergoing chemotherapy is a subcutaneous insulin.  We counseled him on the administration of insulin, the difference between short and long-acting insulin, the need of adjusting the dose of short acting insulin based on the carbohydrate intake.  Also discussed about hypoglycemia, which is the most common side effect of insulin.  Discussed about  hypoglycemic symptoms and correction of hypoglycemia by having 15 g of carbohydrates.    The patient states his preference of not having to take insulin as he had a bad experience while being treated with insulin in the hospital, in the past.  In particular, he is concerned about the large variations of the blood sugar numbers which he noticed while he underwent treatment with insulin and also the fact that he experienced a completely asymptomatic hypoglycemic episodes when his blood sugar was 38.    He is interested in continuing Ozempic.  We expressed our concern on having to take a GLP-1 agonist which can further suppress the appetite and might aggravate the potential GI side effects of chemotherapy.  Metformin is something the primary team might consider continuing, if there are no significant interactions with Dasatinib.    For now, the patient declines insulin therapy.  He is aware of the risk of hyperglycemia. He prefers to continue to monitor the blood sugars and determine the effect of chemotherapy on glycemic control.    Tentative insulin plan to be considered by the primary team if the glycemic control deteriorates:  - Lantus 15 q 24 hrs at 24   - Novolog Meal Coverage: 1 units per 15 g CHO, TID AC and PRN with snacks/supplements   - Novolog Correction Scale: High resistance TID AC / HS / snacks  - Hypoglycemia protocol    - Carb counting protocol    Thank you for this consult. IDS will continue to follow.      Kaitlynn Anton MD   Endocrinology Fellow   Page # [phone]     Case was discussed and reviewed with Dr Buitrago      To contact Inpatient Diabetes Service:     7 AM - 5 PM: Page the IDS CARTER following the patient that day (see filed or incomplete progress notes/consult notes under Endocrinology)    OR if uncertain of provider assignment: page job code [phone]    5 PM - 7 AM: First call after hours is to primary service.    For urgent after-hours questions, page job code for on call fellow: [phone]      I spent a total of 60 minutes on the date of the encounter doing prep/post-work, chart review, history and exam, documentation and further activities per the note including lab review, multidisciplinary communication, counseling the patient and/or coordinating care regarding acute hyper/hypoglycemic management, as well as discharge management and planning/communication.  See note for details.

## 2024-09-18 NOTE — PROGRESS NOTES
"Care Management Follow Up    Length of Stay (days): 3    Expected Discharge Date: 09/20/2024     Concerns to be Addressed: discharge planning     Patient plan of care discussed at interdisciplinary rounds: Yes    Anticipated Discharge Disposition: Home              Anticipated Discharge Services:    Anticipated Discharge DME:      Patient/family educated on Medicare website which has current facility and service quality ratings:    Education Provided on the Discharge Plan:    Patient/Family in Agreement with the Plan:      Referrals Placed by CM/SW:    Private pay costs discussed: Not applicable    Discussed  Partnership in Safe Discharge Planning  document with patient/family: No     Handoff Completed: No, handoff not indicated or clinically appropriate    Additional Information:  PCP is Anat Gan at [facility]     Per initial CMA by :  \"Patient reports that he is going through a divorce right now and he has 4 children ages 16 years old, 14 years old, 12 years old and 9 years old.      Patient expressed that he wants to make sure that he has a Health Care Directive that reflects who he would want to make health care decisions if he does not able to. Social work provided the patient with a blank Health Care Directive.\"     RNCC confirmed with CHW that this was completed   CHW emailed forms to [employer] and provided patient with copies of their form, along with their original. CHW to follow up if needed.     Next Steps: No further care management intervention anticipated at this time.  Please re-consult if further needs arise.  Care management signing off.       HARSHA Ocampo  [phone]     "

## 2024-09-18 NOTE — PLAN OF CARE
"AVSS.  A & O.  Pain at [facility] site.  Tylenol Q4hr and lidocaine patch started.  Can also get Bengay tonight after the lidocaine patch comes off. Trying to get up and walk around more. BS checks 141, 254.  Willing to take sliding scale insulin for over 200s but refusing lantus.  Dasatinib started tonight.  Found [facility] pharmacy's phone number for him to contact and they can deliver drug to him Friday.  Probable discharge tomorrow.  Continue to monitor, continue plan of care.    Problem: Adult Inpatient Plan of Care  Goal: Plan of Care Review  Description: The Plan of Care Review/Shift note should be completed every shift.  The Outcome Evaluation is a brief statement about your assessment that the patient is improving, declining, or no change.  This information will be displayed automatically on your shift  note.  Outcome: Progressing  Flowsheets (Taken 9/18/2024 1751)  Plan of Care Reviewed With:   patient   parent   sibling  Goal: Patient-Specific Goal (Individualized)  Description: You can add care plan individualizations to a care plan. Examples of Individualization might be:  \"Parent requests to be called daily at 9am for status\", \"I have a hard time hearing out of my right ear\", or \"Do not touch me to wake me up as it startles  me\".  Outcome: Progressing  Goal: Absence of Hospital-Acquired Illness or Injury  Outcome: Progressing  Intervention: Identify and Manage Fall Risk  Recent Flowsheet Documentation  Taken 9/18/2024 1600 by Ina Campos, RN  Safety Promotion/Fall Prevention:   clutter free environment maintained   lighting adjusted   nonskid shoes/slippers when out of bed   safety round/check completed  Taken 9/18/2024 1200 by Ina Campos, RN  Safety Promotion/Fall Prevention:   clutter free environment maintained   lighting adjusted   nonskid shoes/slippers when out of bed   safety round/check completed  Taken 9/18/2024 0816 by Ina Campos, RN  Safety Promotion/Fall Prevention:   clutter free environment " maintained   lighting adjusted   nonskid shoes/slippers when out of bed   safety round/check completed  Intervention: Prevent Skin Injury  Recent Flowsheet Documentation  Taken 9/18/2024 0816 by Ina Campos, RN  Body Position: position changed independently  Intervention: Prevent Infection  Recent Flowsheet Documentation  Taken 9/18/2024 0816 by Ina Campos, RN  Infection Prevention:   environmental surveillance performed   equipment surfaces disinfected   hand hygiene promoted   personal protective equipment utilized   rest/sleep promoted   single patient room provided  Goal: Optimal Comfort and Wellbeing  Outcome: Progressing  Intervention: Monitor Pain and Promote Comfort  Recent Flowsheet Documentation  Taken 9/18/2024 1304 by Ina Campos, RN  Pain Management Interventions: medication (see MAR)  Taken 9/18/2024 0850 by Ina Campos, RN  Pain Management Interventions: medication (see MAR)  Goal: Readiness for Transition of Care  Outcome: Progressing     Problem: Fatigue  Goal: Improved Activity Tolerance  Outcome: Progressing  Intervention: Promote Improved Energy  Recent Flowsheet Documentation  Taken 9/18/2024 0816 by Ina Campos, RN  Fatigue Management: paced activity encouraged  Sleep/Rest Enhancement: awakenings minimized  Activity Management: activity adjusted per tolerance   Goal Outcome Evaluation:      Plan of Care Reviewed With: patient, parent, sibling

## 2024-09-18 NOTE — PROGRESS NOTES
New Patient Hematology / Oncology Nurse Navigator Note     Referral Date: 9/16/24    Referring provider: In patient Malignant Hematology team    Referring Clinic/Organization: [facility]     Referred to: Medical Oncology    Requested provider (if applicable): Dr. Varner    Evaluation for : outpatient follow up of CML    Clinical Assessment / Barriers to Care (Per Nurse):    None identified at time of call, patient is aware that we are only scheduling the outpatient MD piece of is discharge care and a separate team will address the twice weekly labs and infusion appt for possible transfusions at [place], message to in-patient CARTER for check-out orders to go to [facility] for coordination of that piece       Records Location: [facility]     Records Needed:     See Pre-Visit encounter from CSS team for additional details on records collection. Additional questions on records needed for initial consult can be sent to P ONC ADULT NEW PATIENT RECORDS [[id number]] with a copy to P CANCER CARE NURSE NAVIGATION [[id number]]. Please include diagnosis and urgency in subject line.    Additional testing needed prior to consult:     Twice weekly labs and infusion appts to be coordinated separately    Referral updates and Plan:     Triage instructions updated and sent to NPS for completion, call transferred to NPS      Michelle Brice, MICKN, RN, PHN, OCN  Hematology/Oncology Nurse Navigator   [facility]   [phone]   [email or web address]

## 2024-09-18 NOTE — PROGRESS NOTES
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 He presented to [facility] on 9/14/24 with lightheadedness and was found to have hyperleukocytosis (WBC >400k) so was transferred to [facility] for leukemia work up. Confirmed to have Ph+ CML    Today:  - BMBx consistent with CML Ph+ with hypercellular marrow and less than 2% blasts  - PA approved at [facility] for dasatinib - expected ~$100 monthly co-pay. Discussed with patient who is amenable. Rx sent to pharmacy and plan to start here inpatient.   - Endocrine consulted for assistance with DM2 management - appreciate recs  - Continue hydrea 2g BID; follow daily labs  - Appt18 sent to arrange for outpatient follow ups  - Continue to monitor and provide best supportive cares    HEME/ONC  # New diagnosis Ph+ Chronic Myeloid Leukemia  # Hyperleukocytosis  # Thrombocytosis  Patient reports that 1 week ago his ICD fired for run of vfib. This was during an emotional time where he thought he may not be able to see his kids. After this, his metoprolol was increased from 25 to 50 mg on Monday 9/9. Since then, he has felt dizzy and lightheaded. Only other new symptoms include new LE edema and mild SKAGGS. He presented to [facility] on 9/14/24 and was found to have hyperleukocytosis (WBC 469k) so was transferred to [facility] for leukemia work up. No s/sx of leukostasis.   - Hydrea 2g TID (9/14-9/16), reduced to BID x9/16  - Peripheral FISH with BCR:ABL1 fusion (89%) and peripheral flow cytometry 4.8% abnormal myeloid blasts.  - BMBx 9/16 consistent with chronic myeloid leukemia BCR:ABL1 positive with hypercellular marrow  "(100%) with less than 2% blasts; NGS and cytogenetics pending  - PA sent for dasatinib, approved at [facility] - prescription sent. Will start inpatient with hospital supply.  - Patient will establish care with Dr. Varner - appt scheduled for 9/24. Will need 2x weekly labs on discharge. Request sent for scheduling.    - Baseline studies:   - Viral serologies: Hep B, Hep C, HSV, HIV negative; EBV and CMV pending  - EKG with complete heart block and ventricular paced rhythm  - Echo 9/12 with LVEF 55-60%  - CXR negative  - CT CAP with hepatosplenomegaly with evidence of portal hypertension including esophagogastric and upper abdominal varices, small volume ascites, several sub-6 mm pulmonary nodules, focal area of tree in bud nodularity of peripheral right upper lobe, dilated main pulmonary artery (3.5 cm)    # Normocytic anemia  Likely due to new leukemia.   - Monitor CBC daily  - Transfuse if Hgb <7    # TLS, improving  # OCTAVIO, resolved  On presentation, Cr 1.53, LDH 1290, uric acid 10. Lytes WNL. Unclear baseline Cr. S/p 1 dose of rasburicase.   - Allopurinol 300 mg daily  - Monitor TLS labs BID    # Risk for DIC  # Elevated INR, improving  INR up to 1.83 on admission, now improving. PTT and fibrinogen WNL.   - Monitor coags BID  - S/p vit K 5 mg x1 on 9/15, repeat PRN    ID  # ID PPX  ACV, levofloxacin, micafungin initiated on admission with concern for new leukemia diagnosis; now with confirmed diagnosis of CML, will stop ppx (9/17).    # History of cryptococcal pneumonia  Patient states that when he was 26 he was hospitalized for cryptococcal pneumonia. He was inpatient for 60 days and \"on life support.\" No current respiratory symptoms reported.  - Obtain baseline chest CT -  noted focal area of tree-in-bud nodularity in the peripheral right upper  lobe, likely infectious/inflammatory. Recommend follow-up to clearing    CARDIAC  # H/o bacterial endocarditis s/p tricuspid valve replacement and ICD  # Recent runs of " vfib  Had hospital course in 4/2022 with the above, thought to be groin abscess as source. Was treated with course of IV abx after above interventions. Notably in the week prior to this admission he had vfib syncopal episode which fired his ICD defibrillator. Reportedly he had another run of vfib that he was asymptomatic from. Had follow up echo shortly after on 9/12 which was overall stable with LVEF 55-60%. BNP 2613 on admission.   - Metoprolol 25 mg XL daily   - Pt says this was just increased to 50 mg but he correlates this to his worsening fatigue and exercise intolerance and is not interested in the higher dose at this time.  - Pt reports taking Lasix 20 mg daily since his heart surgery; however it is not on his active med list from his pharmacy on intake. Has been held in setting of OCTAVIO on admission. Continue to monitor and diurese as indicated  - Message sent to outpatient cardiology team x9/17 with update; currently scheduled for EP cardiology visit 10/3    GI  # Portal vein hypertension  # Esophageal varices  Incidental finding on CT CAP 9/15. Discussed with hepatology, no clear etiology of portal vein hypertension at this time. Consider residual from hepatosplenomegaly, likely in setting of CML? Recommend further workup with iron studies to rule out iron overload/hemochromatosis and close monitoring.   - Follow up on repeat imaging    CHRONIC  # Poorly controlled T2DM  Hgb A1C 8.2 on admission. Pt reports that he takes metformin and Ozempic (Saturdays) PTA. Has lost 50 lb in the last ~6 months due to Ozempic.   - High intensity sliding scale insulin  - Holding PTA metformin and Ozempic while inpatient - endocrinology consulted for assistance with DM management. Appreciate recs  - Patient has had trouble establishing with endocrine provider recently. Asks for a referral within our system; referral placed x9/16    # WARNER - PTA CPAP    # Pulmonary nodules  Incidentally noted on CT CAP on admission - several  "sub-6 mm pulmonary nodules throughout the lungs. Recommend attention on follow up    # Social  Currently going through a divorce. Has 4 children. Works for [employer].     Clinically Significant Risk Factors          # Hypocalcemia: Lowest Ca = 8.4 mg/dL in last 2 days, will monitor and replace as appropriate     # Hypoalbuminemia: Lowest albumin = 3.4 g/dL at 9/18/2024  4:50 AM, will monitor as appropriate    # Coagulation Defect: INR = 1.46 (Ref range: 0.85 - 1.15) and/or PTT = 38 Seconds (Ref range: 22 - 38 Seconds), will monitor for bleeding             # DMII: A1C = 8.2 % (Ref range: <5.7 %) within past 6 months, PRESENT ON ADMISSION  # Obesity: Estimated body mass index is 30.03 kg/m  as calculated from the following:    Height as of this encounter: 1.778 m (5' 10\").    Weight as of this encounter: 94.9 kg (209 lb 4.8 oz)., PRESENT ON ADMISSION  # Severe Malnutrition: based on nutrition assessment, PRESENT ON ADMISSION    # ICD device present    FEN  Diet: Regular Diet Adult   IVF: Bolus PRN   Lytes: Replete per protocol    PPX  VTE: Holding for now  Bowel: Senna/MiraLax PRN  GI/PUD: None indicated    MISC  Code Status: Full Code   Lines/Drains: PIV  Medically Ready for Discharge: Anticipated in 2-4 Days  Dispo: 1-2 days pending outpatient follow up planning and oral chemotherapy plan  Follow Up: Appt18 and new patient referral placed    Patient was seen and plan of care was discussed with attending physician Dr. Avila    I spent 60 minutes in the care of this patient today, which included time necessary for review of interval events, obtaining history and physical exam, ordering medications/tests/procedures as medically indicated, review of pertinent medical literature, counseling of the patient, coordination of care, and documentation time. Over 50% of time was spent counseling the patient and/or coordinating care.    Noni Sauer PA-C  Hematology/Oncology  Pager #[phone]     Interval History   Chart reviewed, no " acute events overnight. Christiano is feeling alright today. Continues to have some discomfort at bone marrow biopsy site, managed with tylenol. Will add lidocaine patches today to which he is agreeable. Otherwise feeling well and denies symptoms including dizziness, headaches, chest pain, SOB, abdominal pain, nausea, vomiting, diarrhea, urinary symptoms. We discussed estimated copay of $100/month for oral chemo per [facility] - he is agreeable to this. Will explore possible S kishore etc - SW helping with this. Also discussed starting this today as well as endocrine consult for DM management. He is appreciative of care and in agreement with this plan. No further questions at this time.     Vital Signs with Ranges  Temp:  [96.6  F (35.9  C)-99.1  F (37.3  C)] 97.1  F (36.2  C)  Pulse:  [54-55] 54  Resp:  [17-18] 17  BP: (105-119)/(59-70) 115/67  SpO2:  [97 %-100 %] 100 %  I/O last 3 completed shifts:  In: 1495 [P.O.:1020; I.V.:475]  Out: 2650 [Urine:2650]    Physical Exam   Constitutional: Awake and conversational. Non- toxic appearing.   HEENT: NCAT. Moist mucus membranes without lesions, thrush, or exudates appreciated  Respiratory: Breathing comfortably on room air, speaking in full sentences, no evidence of respiratory distress. Lungs CTAB without stridor, wheeze, rhonchi, or rales.   Cardiovascular: Regular rate and rhythm. Trace bilateral lower extremity peripheral edema.    GI: Abdomen with normoactive bowel sounds, soft and non-tender throughout.   Skin: Skin is clean, dry, intact. No jaundice or significant rashes appreciated on exposed areas. BMBx site CDI, no evidence of bleeding or hematoma.   Neurologic: Alert and with normal speech. Grossly nonfocal.  Moves extremities spontaneously.  Memory and thought process preserved.   Neuropsychiatric: Calm, affect congruent to situation. Appropriate mood and affect.       Medications   Current Facility-Administered Medications   Medication Dose Route Frequency Provider  Last Rate Last Admin     Current Facility-Administered Medications   Medication Dose Route Frequency Provider Last Rate Last Admin    allopurinol (ZYLOPRIM) tablet 300 mg  300 mg Oral Daily Justin, Jules A, DO   300 mg at 09/18/24 0854    buPROPion (WELLBUTRIN XL) 24 hr tablet 300 mg  300 mg Oral QAM Justin, Jules A, DO   300 mg at 09/18/24 0850    hydroxyurea (HYDREA) capsule 2,000 mg  2,000 mg Oral BID Jesus Varner MD   2,000 mg at 09/18/24 0850    insulin aspart (NovoLOG) injection (RAPID ACTING)  1-10 Units Subcutaneous TID AC Mik Bhatti MD   1 Units at 09/17/24 1810    insulin aspart (NovoLOG) injection (RAPID ACTING)  1-7 Units Subcutaneous At Bedtime Mik Bhatti MD   2 Units at 09/17/24 2221    levothyroxine (SYNTHROID/LEVOTHROID) tablet 150 mcg  150 mcg Oral Daily Justin, Jules A, DO   150 mcg at 09/18/24 0850    Lidocaine (LIDOCARE) 4 % Patch 2 patch  2 patch Transdermal Q24H Noni Sauer PA-C        [Held by provider] losartan (COZAAR) tablet 50 mg  50 mg Oral Daily Justin, Jules A, DO        metoprolol succinate ER (TOPROL XL) 24 hr tablet 25 mg  25 mg Oral Daily Justin, Jules A, DO   25 mg at 09/18/24 0850     Data   Results for orders placed or performed during the hospital encounter of 09/15/24 (from the past 24 hour(s))   Glucose by meter   Result Value Ref Range    GLUCOSE BY METER POCT 263 (H) 70 - 99 mg/dL   Glucose by meter   Result Value Ref Range    GLUCOSE BY METER POCT 159 (H) 70 - 99 mg/dL   Glucose by meter   Result Value Ref Range    GLUCOSE BY METER POCT 241 (H) 70 - 99 mg/dL   CBC with Platelets & Differential    Narrative    The following orders were created for panel order CBC with Platelets & Differential.  Procedure                               Abnormality         Status                     ---------                               -----------         ------                     CBC with platelets and d...[[id number]]  Abnormal            Final result                Manual Differential[[id number]]          Abnormal            Final result                 Please view results for these tests on the individual orders.   Iron & Iron Binding Capacity   Result Value Ref Range    Iron 23 (L) 61 - 157 ug/dL    Iron Binding Capacity 298 240 - 430 ug/dL    Iron Sat Index 8 (L) 15 - 46 %   Transferrin   Result Value Ref Range    Transferrin 254.0 200.0 - 360.0 mg/dL   Ferritin   Result Value Ref Range    Ferritin 264 31 - 409 ng/mL   Reticulocyte count   Result Value Ref Range    % Reticulocyte 2.9 (H) 0.5 - 2.0 %    Absolute Reticulocyte 0.090 0.025 - 0.095 10e6/uL   Comprehensive metabolic panel   Result Value Ref Range    Sodium 136 135 - 145 mmol/L    Potassium 4.5 3.4 - 5.3 mmol/L    Carbon Dioxide (CO2) 18 (L) 22 - 29 mmol/L    Anion Gap 9 7 - 15 mmol/L    Urea Nitrogen 29.7 (H) 6.0 - 20.0 mg/dL    Creatinine 1.12 0.67 - 1.17 mg/dL    GFR Estimate 87 >60 mL/min/1.73m2    Calcium 8.4 (L) 8.8 - 10.4 mg/dL    Chloride 109 (H) 98 - 107 mmol/L    Glucose 173 (H) 70 - 99 mg/dL    Alkaline Phosphatase 158 (H) 40 - 150 U/L    AST 26 0 - 45 U/L    ALT 10 0 - 70 U/L    Protein Total 6.2 (L) 6.4 - 8.3 g/dL    Albumin 3.4 (L) 3.5 - 5.2 g/dL    Bilirubin Total 0.6 <=1.2 mg/dL   INR   Result Value Ref Range    INR 1.46 (H) 0.85 - 1.15   Partial thromboplastin time   Result Value Ref Range    aPTT 38 22 - 38 Seconds   Fibrinogen activity   Result Value Ref Range    Fibrinogen Activity 442 170 - 510 mg/dL   Phosphorus   Result Value Ref Range    Phosphorus 3.8 2.5 - 4.5 mg/dL   Magnesium   Result Value Ref Range    Magnesium 2.0 1.7 - 2.3 mg/dL   Uric acid   Result Value Ref Range    Uric Acid 4.6 3.4 - 7.0 mg/dL   Lactate Dehydrogenase   Result Value Ref Range    Lactate Dehydrogenase 864 (H) 0 - 250 U/L   Bilirubin direct   Result Value Ref Range    Bilirubin Direct 0.29 0.00 - 0.30 mg/dL   CBC with platelets and differential   Result Value Ref Range    WBC Count 229.8 () 4.0 - 11.0 10e3/uL     RBC Count 3.19 (L) 4.40 - 5.90 10e6/uL    Hemoglobin 9.2 (L) 13.3 - 17.7 g/dL    Hematocrit 28.1 (L) 40.0 - 53.0 %    MCV 88 78 - 100 fL    MCH 28.8 26.5 - 33.0 pg    MCHC 32.7 31.5 - 36.5 g/dL    RDW 17.9 (H) 10.0 - 15.0 %    Platelet Count 463 (H) 150 - 450 10e3/uL    NRBCs per 100 WBC 1 (H) <1 /100    Absolute NRBCs 3.0 10e3/uL   Manual Differential   Result Value Ref Range    % Neutrophils 59 %    % Lymphocytes 4 %    % Monocytes 1 %    % Eosinophils 8 %    % Basophils 12 %    % Metamyelocytes 6 %    % Myelocytes 10 %    Absolute Neutrophils 135.6 (H) 1.6 - 8.3 10e3/uL    Absolute Lymphocytes 9.2 (H) 0.8 - 5.3 10e3/uL    Absolute Monocytes 2.3 (H) 0.0 - 1.3 10e3/uL    Absolute Eosinophils 18.4 (H) 0.0 - 0.7 10e3/uL    Absolute Basophils 27.6 (H) 0.0 - 0.2 10e3/uL    Absolute Metamyelocytes 13.8 (H) <=0.0 10e3/uL    Absolute Myelocytes 23.0 (H) <=0.0 10e3/uL    RBC Morphology Confirmed RBC Indices     Platelet Assessment  Automated Count Confirmed. Platelet morphology is normal.     Automated Count Confirmed. Platelet morphology is normal.    Elliptocytes Slight (A) None Seen    RBC Fragments Rare (A) None Seen    NRBCs per 100 WBC 1 %    Absolute NRBCs 2.3 10e3/uL   Glucose by meter   Result Value Ref Range    GLUCOSE BY METER POCT 141 (H) 70 - 99 mg/dL

## 2024-09-19 ENCOUNTER — ANCILLARY PROCEDURE (OUTPATIENT)
Dept: CARDIOLOGY | Facility: CLINIC | Age: 39
DRG: 834 | End: 2024-09-19
Attending: NURSE PRACTITIONER
Payer: COMMERCIAL

## 2024-09-19 VITALS
TEMPERATURE: 98.2 F | OXYGEN SATURATION: 100 % | HEART RATE: 56 BPM | DIASTOLIC BLOOD PRESSURE: 54 MMHG | WEIGHT: 206.2 LBS | HEIGHT: 70 IN | RESPIRATION RATE: 16 BRPM | SYSTOLIC BLOOD PRESSURE: 112 MMHG | BODY MASS INDEX: 29.52 KG/M2

## 2024-09-19 LAB
ALBUMIN SERPL BCG-MCNC: 3.4 G/DL (ref 3.5–5.2)
ALP SERPL-CCNC: 168 U/L (ref 40–150)
ALT SERPL W P-5'-P-CCNC: 9 U/L (ref 0–70)
ANION GAP SERPL CALCULATED.3IONS-SCNC: 10 MMOL/L (ref 7–15)
APTT PPP: 36 SECONDS (ref 22–38)
AST SERPL W P-5'-P-CCNC: 28 U/L (ref 0–45)
BASOPHILS # BLD MANUAL: 21.7 10E3/UL (ref 0–0.2)
BASOPHILS NFR BLD MANUAL: 10 %
BILIRUB DIRECT SERPL-MCNC: 0.29 MG/DL (ref 0–0.3)
BILIRUB SERPL-MCNC: 0.7 MG/DL
BLASTS # BLD MANUAL: 2.2 10E3/UL
BLASTS NFR BLD MANUAL: 1 %
BUN SERPL-MCNC: 32.6 MG/DL (ref 6–20)
BURR CELLS BLD QL SMEAR: SLIGHT
CALCIUM SERPL-MCNC: 8.3 MG/DL (ref 8.8–10.4)
CHLORIDE SERPL-SCNC: 109 MMOL/L (ref 98–107)
CREAT SERPL-MCNC: 1.06 MG/DL (ref 0.67–1.17)
CULTURE HARVEST COMPLETE DATE: NORMAL
EGFRCR SERPLBLD CKD-EPI 2021: >90 ML/MIN/1.73M2
EOSINOPHIL # BLD MANUAL: 8.7 10E3/UL (ref 0–0.7)
EOSINOPHIL NFR BLD MANUAL: 4 %
ERYTHROCYTE [DISTWIDTH] IN BLOOD BY AUTOMATED COUNT: 18.1 % (ref 10–15)
FIBRINOGEN PPP-MCNC: 500 MG/DL (ref 170–510)
FRAGMENTS BLD QL SMEAR: ABNORMAL
GLUCOSE BLDC GLUCOMTR-MCNC: 191 MG/DL (ref 70–99)
GLUCOSE BLDC GLUCOMTR-MCNC: 208 MG/DL (ref 70–99)
GLUCOSE SERPL-MCNC: 217 MG/DL (ref 70–99)
HCO3 SERPL-SCNC: 17 MMOL/L (ref 22–29)
HCT VFR BLD AUTO: 31.9 % (ref 40–53)
HGB BLD-MCNC: 10.1 G/DL (ref 13.3–17.7)
INR PPP: 1.36 (ref 0.85–1.15)
INTERPRETATION: NORMAL
INTERPRETATION: NORMAL
ISCN: NORMAL
LDH SERPL L TO P-CCNC: 874 U/L (ref 0–250)
LYMPHOCYTES # BLD MANUAL: 6.5 10E3/UL (ref 0.8–5.3)
LYMPHOCYTES NFR BLD MANUAL: 3 %
MAGNESIUM SERPL-MCNC: 2.2 MG/DL (ref 1.7–2.3)
MCH RBC QN AUTO: 28.4 PG (ref 26.5–33)
MCHC RBC AUTO-ENTMCNC: 31.7 G/DL (ref 31.5–36.5)
MCV RBC AUTO: 90 FL (ref 78–100)
MDC_IDC_EPISODE_DTM: NORMAL
MDC_IDC_EPISODE_DURATION: 13 S
MDC_IDC_EPISODE_DURATION: 16 S
MDC_IDC_EPISODE_DURATION: 48 S
MDC_IDC_EPISODE_ID: NORMAL
MDC_IDC_EPISODE_TYPE: NORMAL
MDC_IDC_EPISODE_TYPE_INDUCED: NO
MDC_IDC_EPISODE_VENDOR_TYPE: NORMAL
MDC_IDC_LEAD_CONNECTION_STATUS: NORMAL
MDC_IDC_LEAD_IMPLANT_DT: NORMAL
MDC_IDC_LEAD_LOCATION: NORMAL
MDC_IDC_LEAD_LOCATION_DETAIL_1: NORMAL
MDC_IDC_LEAD_MFG: NORMAL
MDC_IDC_LEAD_MODEL: NORMAL
MDC_IDC_LEAD_POLARITY_TYPE: NORMAL
MDC_IDC_LEAD_SERIAL: NORMAL
MDC_IDC_MSMT_BATTERY_DTM: NORMAL
MDC_IDC_MSMT_BATTERY_DTM: NORMAL
MDC_IDC_MSMT_BATTERY_REMAINING_LONGEVITY: 108 MO
MDC_IDC_MSMT_BATTERY_REMAINING_LONGEVITY: 108 MO
MDC_IDC_MSMT_BATTERY_REMAINING_PERCENTAGE: 100 %
MDC_IDC_MSMT_BATTERY_REMAINING_PERCENTAGE: 97 %
MDC_IDC_MSMT_BATTERY_STATUS: NORMAL
MDC_IDC_MSMT_BATTERY_STATUS: NORMAL
MDC_IDC_MSMT_CAP_CHARGE_DTM: NORMAL
MDC_IDC_MSMT_CAP_CHARGE_ENERGY: 41 J
MDC_IDC_MSMT_CAP_CHARGE_ENERGY: 41 J
MDC_IDC_MSMT_CAP_CHARGE_TIME: 10.1 S
MDC_IDC_MSMT_CAP_CHARGE_TYPE: NORMAL
MDC_IDC_MSMT_LEADCHNL_RA_IMPEDANCE_VALUE: 583 OHM
MDC_IDC_MSMT_LEADCHNL_RA_IMPEDANCE_VALUE: 608 OHM
MDC_IDC_MSMT_LEADCHNL_RV_IMPEDANCE_VALUE: 428 OHM
MDC_IDC_MSMT_LEADCHNL_RV_IMPEDANCE_VALUE: 469 OHM
MDC_IDC_PG_IMPLANT_DTM: NORMAL
MDC_IDC_PG_IMPLANT_DTM: NORMAL
MDC_IDC_PG_MFG: NORMAL
MDC_IDC_PG_MFG: NORMAL
MDC_IDC_PG_MODEL: NORMAL
MDC_IDC_PG_MODEL: NORMAL
MDC_IDC_PG_SERIAL: NORMAL
MDC_IDC_PG_SERIAL: NORMAL
MDC_IDC_PG_TYPE: NORMAL
MDC_IDC_PG_TYPE: NORMAL
MDC_IDC_SESS_CLINIC_NAME: NORMAL
MDC_IDC_SESS_CLINIC_NAME: NORMAL
MDC_IDC_SESS_DTM: NORMAL
MDC_IDC_SESS_DTM: NORMAL
MDC_IDC_SESS_TYPE: NORMAL
MDC_IDC_SESS_TYPE: NORMAL
MDC_IDC_SET_BRADY_AT_MODE_SWITCH_MODE: NORMAL
MDC_IDC_SET_BRADY_AT_MODE_SWITCH_RATE: 170 {BEATS}/MIN
MDC_IDC_SET_BRADY_AT_MODE_SWITCH_RATE: 170 {BEATS}/MIN
MDC_IDC_SET_BRADY_LOWRATE: 55 {BEATS}/MIN
MDC_IDC_SET_BRADY_LOWRATE: 60 {BEATS}/MIN
MDC_IDC_SET_BRADY_MAX_SENSOR_RATE: 140 {BEATS}/MIN
MDC_IDC_SET_BRADY_MAX_TRACKING_RATE: 140 {BEATS}/MIN
MDC_IDC_SET_BRADY_MODE: NORMAL
MDC_IDC_SET_BRADY_MODE: NORMAL
MDC_IDC_SET_BRADY_PAV_DELAY_HIGH: 100 MS
MDC_IDC_SET_BRADY_PAV_DELAY_LOW: 300 MS
MDC_IDC_SET_BRADY_SAV_DELAY_HIGH: 100 MS
MDC_IDC_SET_BRADY_SAV_DELAY_LOW: 300 MS
MDC_IDC_SET_LEADCHNL_RA_PACING_AMPLITUDE: 3.5 V
MDC_IDC_SET_LEADCHNL_RA_PACING_AMPLITUDE: 3.5 V
MDC_IDC_SET_LEADCHNL_RA_PACING_CAPTURE_MODE: NORMAL
MDC_IDC_SET_LEADCHNL_RA_PACING_CAPTURE_MODE: NORMAL
MDC_IDC_SET_LEADCHNL_RA_PACING_POLARITY: NORMAL
MDC_IDC_SET_LEADCHNL_RA_PACING_POLARITY: NORMAL
MDC_IDC_SET_LEADCHNL_RA_PACING_PULSEWIDTH: 0.4 MS
MDC_IDC_SET_LEADCHNL_RA_PACING_PULSEWIDTH: 0.4 MS
MDC_IDC_SET_LEADCHNL_RA_SENSING_ADAPTATION_MODE: NORMAL
MDC_IDC_SET_LEADCHNL_RA_SENSING_ADAPTATION_MODE: NORMAL
MDC_IDC_SET_LEADCHNL_RA_SENSING_POLARITY: NORMAL
MDC_IDC_SET_LEADCHNL_RA_SENSING_POLARITY: NORMAL
MDC_IDC_SET_LEADCHNL_RA_SENSING_SENSITIVITY: 0.25 MV
MDC_IDC_SET_LEADCHNL_RA_SENSING_SENSITIVITY: 0.25 MV
MDC_IDC_SET_LEADCHNL_RV_PACING_AMPLITUDE: 3.5 V
MDC_IDC_SET_LEADCHNL_RV_PACING_AMPLITUDE: 3.5 V
MDC_IDC_SET_LEADCHNL_RV_PACING_CAPTURE_MODE: NORMAL
MDC_IDC_SET_LEADCHNL_RV_PACING_CAPTURE_MODE: NORMAL
MDC_IDC_SET_LEADCHNL_RV_PACING_POLARITY: NORMAL
MDC_IDC_SET_LEADCHNL_RV_PACING_POLARITY: NORMAL
MDC_IDC_SET_LEADCHNL_RV_PACING_PULSEWIDTH: 0.4 MS
MDC_IDC_SET_LEADCHNL_RV_PACING_PULSEWIDTH: 0.4 MS
MDC_IDC_SET_LEADCHNL_RV_SENSING_ADAPTATION_MODE: NORMAL
MDC_IDC_SET_LEADCHNL_RV_SENSING_ADAPTATION_MODE: NORMAL
MDC_IDC_SET_LEADCHNL_RV_SENSING_POLARITY: NORMAL
MDC_IDC_SET_LEADCHNL_RV_SENSING_POLARITY: NORMAL
MDC_IDC_SET_LEADCHNL_RV_SENSING_SENSITIVITY: 0.6 MV
MDC_IDC_SET_LEADCHNL_RV_SENSING_SENSITIVITY: 0.6 MV
MDC_IDC_SET_ZONE_DETECTION_INTERVAL: 250 MS
MDC_IDC_SET_ZONE_DETECTION_INTERVAL: 250 MS
MDC_IDC_SET_ZONE_DETECTION_INTERVAL: 286 MS
MDC_IDC_SET_ZONE_DETECTION_INTERVAL: 286 MS
MDC_IDC_SET_ZONE_DETECTION_INTERVAL: 353 MS
MDC_IDC_SET_ZONE_DETECTION_INTERVAL: 353 MS
MDC_IDC_SET_ZONE_STATUS: NORMAL
MDC_IDC_SET_ZONE_TYPE: NORMAL
MDC_IDC_SET_ZONE_VENDOR_TYPE: NORMAL
MDC_IDC_STAT_BRADY_DTM_END: NORMAL
MDC_IDC_STAT_BRADY_DTM_END: NORMAL
MDC_IDC_STAT_BRADY_DTM_START: NORMAL
MDC_IDC_STAT_BRADY_DTM_START: NORMAL
MDC_IDC_STAT_BRADY_RA_PERCENT_PACED: 10 %
MDC_IDC_STAT_BRADY_RA_PERCENT_PACED: 10 %
MDC_IDC_STAT_BRADY_RV_PERCENT_PACED: 95 %
MDC_IDC_STAT_BRADY_RV_PERCENT_PACED: 95 %
MDC_IDC_STAT_EPISODE_RECENT_COUNT: 0
MDC_IDC_STAT_EPISODE_RECENT_COUNT: 2
MDC_IDC_STAT_EPISODE_RECENT_COUNT: 3
MDC_IDC_STAT_EPISODE_RECENT_COUNT_DTM_END: NORMAL
MDC_IDC_STAT_EPISODE_RECENT_COUNT_DTM_START: NORMAL
MDC_IDC_STAT_EPISODE_TYPE: NORMAL
MDC_IDC_STAT_EPISODE_VENDOR_TYPE: NORMAL
MDC_IDC_STAT_TACHYTHERAPY_ATP_DELIVERED_RECENT: 0
MDC_IDC_STAT_TACHYTHERAPY_ATP_DELIVERED_RECENT: 0
MDC_IDC_STAT_TACHYTHERAPY_ATP_DELIVERED_TOTAL: 1
MDC_IDC_STAT_TACHYTHERAPY_ATP_DELIVERED_TOTAL: 1
MDC_IDC_STAT_TACHYTHERAPY_RECENT_DTM_END: NORMAL
MDC_IDC_STAT_TACHYTHERAPY_RECENT_DTM_END: NORMAL
MDC_IDC_STAT_TACHYTHERAPY_RECENT_DTM_START: NORMAL
MDC_IDC_STAT_TACHYTHERAPY_RECENT_DTM_START: NORMAL
MDC_IDC_STAT_TACHYTHERAPY_SHOCKS_ABORTED_RECENT: 0
MDC_IDC_STAT_TACHYTHERAPY_SHOCKS_ABORTED_RECENT: 0
MDC_IDC_STAT_TACHYTHERAPY_SHOCKS_ABORTED_TOTAL: 0
MDC_IDC_STAT_TACHYTHERAPY_SHOCKS_ABORTED_TOTAL: 0
MDC_IDC_STAT_TACHYTHERAPY_SHOCKS_DELIVERED_RECENT: 1
MDC_IDC_STAT_TACHYTHERAPY_SHOCKS_DELIVERED_RECENT: 1
MDC_IDC_STAT_TACHYTHERAPY_SHOCKS_DELIVERED_TOTAL: 2
MDC_IDC_STAT_TACHYTHERAPY_SHOCKS_DELIVERED_TOTAL: 2
MDC_IDC_STAT_TACHYTHERAPY_TOTAL_DTM_END: NORMAL
MDC_IDC_STAT_TACHYTHERAPY_TOTAL_DTM_END: NORMAL
MDC_IDC_STAT_TACHYTHERAPY_TOTAL_DTM_START: NORMAL
MDC_IDC_STAT_TACHYTHERAPY_TOTAL_DTM_START: NORMAL
METAMYELOCYTES # BLD MANUAL: 10.8 10E3/UL
METAMYELOCYTES NFR BLD MANUAL: 5 %
METHODS: NORMAL
MONOCYTES # BLD MANUAL: 4.3 10E3/UL (ref 0–1.3)
MONOCYTES NFR BLD MANUAL: 2 %
MYELOCYTES # BLD MANUAL: 2.2 10E3/UL
MYELOCYTES NFR BLD MANUAL: 1 %
NEUTROPHILS # BLD MANUAL: 160.4 10E3/UL (ref 1.6–8.3)
NEUTROPHILS NFR BLD MANUAL: 74 %
NRBC # BLD AUTO: 3.6 10E3/UL
NRBC # BLD AUTO: 4.3 10E3/UL
NRBC BLD AUTO-RTO: 2 /100
NRBC BLD MANUAL-RTO: 2 %
PATH REPORT.ADDENDUM SPEC: ABNORMAL
PATH REPORT.COMMENTS IMP SPEC: ABNORMAL
PATH REPORT.COMMENTS IMP SPEC: ABNORMAL
PATH REPORT.COMMENTS IMP SPEC: YES
PATH REPORT.FINAL DX SPEC: ABNORMAL
PATH REPORT.GROSS SPEC: ABNORMAL
PATH REPORT.MICROSCOPIC SPEC OTHER STN: ABNORMAL
PATH REPORT.MICROSCOPIC SPEC OTHER STN: ABNORMAL
PATH REPORT.RELEVANT HX SPEC: ABNORMAL
PHOSPHATE SERPL-MCNC: 3.9 MG/DL (ref 2.5–4.5)
PLAT MORPH BLD: ABNORMAL
PLATELET # BLD AUTO: 414 10E3/UL (ref 150–450)
POLYCHROMASIA BLD QL SMEAR: SLIGHT
POTASSIUM SERPL-SCNC: 4.9 MMOL/L (ref 3.4–5.3)
PROMYELOCYTES # BLD MANUAL: 0 10E3/UL
PROMYELOCYTES NFR BLD MANUAL: 0 %
PROT SERPL-MCNC: 6.3 G/DL (ref 6.4–8.3)
RBC # BLD AUTO: 3.56 10E6/UL (ref 4.4–5.9)
RBC MORPH BLD: ABNORMAL
SODIUM SERPL-SCNC: 136 MMOL/L (ref 135–145)
URATE SERPL-MCNC: 4.8 MG/DL (ref 3.4–7)
WBC # BLD AUTO: 216.7 10E3/UL (ref 4–11)

## 2024-09-19 PROCEDURE — 93283 PRGRMG EVAL IMPLANTABLE DFB: CPT

## 2024-09-19 PROCEDURE — 85027 COMPLETE CBC AUTOMATED: CPT | Performed by: PHYSICIAN ASSISTANT

## 2024-09-19 PROCEDURE — 80053 COMPREHEN METABOLIC PANEL: CPT | Performed by: PHYSICIAN ASSISTANT

## 2024-09-19 PROCEDURE — 250N000013 HC RX MED GY IP 250 OP 250 PS 637: Performed by: PHYSICIAN ASSISTANT

## 2024-09-19 PROCEDURE — 93005 ELECTROCARDIOGRAM TRACING: CPT

## 2024-09-19 PROCEDURE — 99222 1ST HOSP IP/OBS MODERATE 55: CPT | Mod: 25 | Performed by: NURSE PRACTITIONER

## 2024-09-19 PROCEDURE — 250N000013 HC RX MED GY IP 250 OP 250 PS 637: Performed by: STUDENT IN AN ORGANIZED HEALTH CARE EDUCATION/TRAINING PROGRAM

## 2024-09-19 PROCEDURE — 82248 BILIRUBIN DIRECT: CPT | Performed by: INTERNAL MEDICINE

## 2024-09-19 PROCEDURE — 85384 FIBRINOGEN ACTIVITY: CPT | Performed by: PHYSICIAN ASSISTANT

## 2024-09-19 PROCEDURE — 99239 HOSP IP/OBS DSCHRG MGMT >30: CPT | Mod: FS | Performed by: PHYSICIAN ASSISTANT

## 2024-09-19 PROCEDURE — 93283 PRGRMG EVAL IMPLANTABLE DFB: CPT | Mod: 26 | Performed by: INTERNAL MEDICINE

## 2024-09-19 PROCEDURE — 99232 SBSQ HOSP IP/OBS MODERATE 35: CPT | Performed by: INTERNAL MEDICINE

## 2024-09-19 PROCEDURE — 84100 ASSAY OF PHOSPHORUS: CPT | Performed by: PHYSICIAN ASSISTANT

## 2024-09-19 PROCEDURE — 36415 COLL VENOUS BLD VENIPUNCTURE: CPT | Performed by: INTERNAL MEDICINE

## 2024-09-19 PROCEDURE — 85730 THROMBOPLASTIN TIME PARTIAL: CPT | Performed by: PHYSICIAN ASSISTANT

## 2024-09-19 PROCEDURE — 84550 ASSAY OF BLOOD/URIC ACID: CPT | Performed by: PHYSICIAN ASSISTANT

## 2024-09-19 PROCEDURE — 83615 LACTATE (LD) (LDH) ENZYME: CPT | Performed by: PHYSICIAN ASSISTANT

## 2024-09-19 PROCEDURE — 250N000013 HC RX MED GY IP 250 OP 250 PS 637: Performed by: INTERNAL MEDICINE

## 2024-09-19 PROCEDURE — 93010 ELECTROCARDIOGRAM REPORT: CPT | Mod: 59 | Performed by: INTERNAL MEDICINE

## 2024-09-19 PROCEDURE — 85610 PROTHROMBIN TIME: CPT | Performed by: PHYSICIAN ASSISTANT

## 2024-09-19 PROCEDURE — 85007 BL SMEAR W/DIFF WBC COUNT: CPT | Performed by: PHYSICIAN ASSISTANT

## 2024-09-19 PROCEDURE — 83735 ASSAY OF MAGNESIUM: CPT | Performed by: PHYSICIAN ASSISTANT

## 2024-09-19 PROCEDURE — 250N000013 HC RX MED GY IP 250 OP 250 PS 637: Performed by: HOSPITALIST

## 2024-09-19 RX ORDER — OXYCODONE HYDROCHLORIDE 5 MG/1
5 TABLET ORAL ONCE
Status: COMPLETED | OUTPATIENT
Start: 2024-09-19 | End: 2024-09-19

## 2024-09-19 RX ORDER — ALLOPURINOL 300 MG/1
300 TABLET ORAL DAILY
Qty: 30 TABLET | Refills: 0 | Status: SHIPPED | OUTPATIENT
Start: 2024-09-19

## 2024-09-19 RX ORDER — METOPROLOL SUCCINATE 25 MG/1
37.5 TABLET, EXTENDED RELEASE ORAL DAILY
Status: SHIPPED
Start: 2024-09-19 | End: 2024-10-07

## 2024-09-19 RX ORDER — HYDROXYUREA 500 MG/1
2000 CAPSULE ORAL 2 TIMES DAILY
Qty: 40 CAPSULE | Refills: 0 | Status: SHIPPED | OUTPATIENT
Start: 2024-09-19 | End: 2024-09-24

## 2024-09-19 RX ORDER — HYDROXYUREA 500 MG/1
2000 CAPSULE ORAL 2 TIMES DAILY
Qty: 40 CAPSULE | Refills: 0 | Status: SHIPPED | OUTPATIENT
Start: 2024-09-19 | End: 2024-09-19

## 2024-09-19 RX ADMIN — BUPROPION HYDROCHLORIDE 300 MG: 150 TABLET, EXTENDED RELEASE ORAL at 09:03

## 2024-09-19 RX ADMIN — ACETAMINOPHEN 650 MG: 325 TABLET ORAL at 04:18

## 2024-09-19 RX ADMIN — DASATINIB 100 MG: 50 TABLET ORAL at 12:43

## 2024-09-19 RX ADMIN — LOSARTAN POTASSIUM 50 MG: 50 TABLET, FILM COATED ORAL at 09:03

## 2024-09-19 RX ADMIN — LEVOTHYROXINE SODIUM 150 MCG: 75 TABLET ORAL at 09:02

## 2024-09-19 RX ADMIN — HYDROXYUREA 2000 MG: 500 CAPSULE ORAL at 09:03

## 2024-09-19 RX ADMIN — ALLOPURINOL 300 MG: 300 TABLET ORAL at 09:03

## 2024-09-19 RX ADMIN — ACETAMINOPHEN 650 MG: 325 TABLET ORAL at 09:03

## 2024-09-19 RX ADMIN — LIDOCAINE 1 PATCH: 4 PATCH TOPICAL at 09:07

## 2024-09-19 RX ADMIN — ACETAMINOPHEN 650 MG: 325 TABLET ORAL at 13:06

## 2024-09-19 RX ADMIN — OXYCODONE HYDROCHLORIDE 5 MG: 5 TABLET ORAL at 01:22

## 2024-09-19 RX ADMIN — METOPROLOL SUCCINATE 25 MG: 25 TABLET, EXTENDED RELEASE ORAL at 09:03

## 2024-09-19 ASSESSMENT — ACTIVITIES OF DAILY LIVING (ADL)
ADLS_ACUITY_SCORE: 22

## 2024-09-19 NOTE — TELEPHONE ENCOUNTER
RECORDS STATUS - ALL OTHER DIAGNOSIS      RECORDS RECEIVED FROM: [facility] - Internal records   DATE RECEIVED: 9/19

## 2024-09-19 NOTE — DISCHARGE SUMMARY
[facility]    Discharge Summary  Hematology / Oncology    Date of Admission:  9/15/2024  Date of Discharge:  9/19/2024  Discharging Provider: Noni Sauer PA-C + Dr. Avila  Date of Service (when I saw the patient): 09/19/24    Discharge Diagnoses   # New diagnosis Ph+ Chronic Myeloid Leukemia  # Hyperleukocytosis  # Thrombocytosis  # Normocytic anemia   # TLS, improving  # OCTAVIO, resolved  # Risk for DIC  # Elevated INR, improving  # H/o bacterial endocarditis s/p tricuspid valve replacement and ICD  # Recent runs of vfib  # Portal vein hypertension  # Esophageal varices  # Poorly controlled T2DM   # Pulmonary nodules   # WARNER     History of Present Illness   Anil Montoya is a 38 year old male with a history of uncontrolled T2DM, bacterial endocarditis s/p tricuspid valve replacement and ICD. He presented to [facility] on 9/14/24 with lightheadedness and was found to have hyperleukocytosis (WBC >400k) so was transferred to [facility] for leukemia work up. Confirmed to have Ph+ CML by bone marrow biopsy and started on dasatinib. Course complicated by OCTAVIO with mild TLS, treated supportively with IVF and rasburicase x1 with resolution. Noted to have good cytoreduction (WBC ~460?216) with hydrea. Additionally during this admission, EP cardiology was consulted and assisted with ICD check and reprogramming as well as medication adjustments. He is scheduled to see his EP cardiologist in 2 weeks outpatient. Endocrine was consulted and initially recommended insulin, however patient declined, and thus resumed on oral hyperglycemic agents. Endocrine referral placed to establish care on discharge.    On the day of discharge, patient is feeling well at baseline health and looking forward to going home. He denies all symptoms including dizziness, chest pain, SOB, cough, abdominal pain, nausea, diarrhea. He is seen ambulating with a steady gait.     Prior to discharge, I  reviewed with Anil the plan of care, including upcoming follow-up appointments and new medications. Appropriate prescriptions were sent to the discharge pharmacy, as needed. We reviewed strict discharge precautions, including reasons to call clinic triage or present to the ED, and he voiced understanding. He was provided with the clinic triage number, as well as written discharge instructions, in their discharge paperwork. Patient had an opportunity to ask questions, all of which were answered to their stated satisfaction. On the day of discharge, patient was overall well-appearing, hemodynamically stable, and felt safe and comfortable with the plans for discharge to home with follow-up as described.    Outpatient follow-up issues:  - Discharged with 5 day supply of hydrea to bridge to follow up appointment, will need additional rx sent if plans to continue as appropriate.  on day of discharge, hgb and plt stable (10 and 414, respectively).   - Endocrine referral placed, follow up to ensure scheduling for DM management     Discharge medications:  New/changed:  - Dasatinib 100 mg daily   - Hydrea 2g BID  - Allopurinol 300 mg daily  - Metoprolol XL change to 37.5 mg daily    Remainder of medications continued as PTA     Upcoming follow-up:  - 9/23, 9/26 labs  - 9/24 appointment with Dr. Varner to establish care  - 10/3 cardiology follow up appointment    Hospital Course   Anil Montoya was admitted on 9/15/2024.  The following problems were addressed during his hospitalization:    HEME/ONC  # New diagnosis Ph+ Chronic Myeloid Leukemia  # Hyperleukocytosis  # Thrombocytosis  Patient reports that 1 week ago his ICD fired for run of vfib. This was during an emotional time where he thought he may not be able to see his kids. After this, his metoprolol was increased from 25 to 50 mg on Monday 9/9. Since then, he has felt dizzy and lightheaded. Only other new symptoms include new LE edema and  mild SKAGGS. He presented to [facility] on 9/14/24 and was found to have hyperleukocytosis (WBC 469k) so was transferred to [facility] for leukemia work up. No s/sx of leukostasis.   - Hydrea 2g TID (9/14-9/16), reduced to BID x9/16  - Peripheral FISH with BCR:ABL1 fusion (89%) and peripheral flow cytometry 4.8% abnormal myeloid blasts.  - BMBx 9/16 consistent with chronic myeloid leukemia BCR:ABL1 positive with hypercellular marrow (100%) with less than 2% blasts; NGS and cytogenetics pending  - PA sent for dasatinib, approved at [employer] - prescription sent. Started inpatient with hospital supply and confirmed with patient that dasatinib is scheduled to be delivered to his home x9/20 by noon.  - Patient will establish care with Dr. Varner - appt scheduled for 9/24. Will need 2x weekly labs on discharge, may reduce frequency in coming weeks pending trends.     - Baseline studies:   - Viral serologies: Hep B, Hep C, HSV, HIV negative; EBV and CMV pending  - EKG with complete heart block and ventricular paced rhythm  - Echo 9/12 with LVEF 55-60%  - CXR negative  - CT CAP with hepatosplenomegaly with evidence of portal hypertension including esophagogastric and upper abdominal varices, small volume ascites, several sub-6 mm pulmonary nodules, focal area of tree in bud nodularity of peripheral right upper lobe, dilated main pulmonary artery (3.5 cm)     # Normocytic anemia  Likely due to new leukemia.   - Monitor CBC daily  - Transfuse if Hgb <7     # TLS, resolved  # OCTAVIO, resolved  On presentation, Cr 1.53, LDH 1290, uric acid 10. Lytes WNL. Unclear baseline Cr. S/p 1 dose of rasburicase.   - Allopurinol 300 mg daily  - Monitor TLS labs      # Risk for DIC  # Elevated INR, improving  INR up to 1.83 on admission, now improving. PTT and fibrinogen WNL.   - Monitor coags BID  - S/p vit K 5 mg x1 on 9/15, repeat PRN     ID  # ID PPX  ACV, levofloxacin, micafungin initiated on admission with concern for new leukemia diagnosis; now  "with confirmed diagnosis of CML, will stop ppx (9/17).     # History of cryptococcal pneumonia  Patient states that when he was 26 he was hospitalized for cryptococcal pneumonia. He was inpatient for 60 days and \"on life support.\" No current respiratory symptoms reported.  - Obtain baseline chest CT -  noted focal area of tree-in-bud nodularity in the peripheral right upper lobe, likely infectious/inflammatory. Recommend follow-up to clearing     CARDIAC  # H/o bacterial endocarditis s/p tricuspid valve replacement and ICD  # Recent runs of vfib  Had hospital course in 4/2022 with the above, thought to be groin abscess as source. Was treated with course of IV abx after above interventions. Notably in the week prior to this admission he had vfib syncopal episode which fired his ICD defibrillator. Reportedly he had another run of vfib that he was asymptomatic from. Had follow up echo shortly after on 9/12 which was overall stable with LVEF 55-60%. BNP 2613 on admission.   - Metoprolol 25 mg XL daily   - Pt says this was just increased to 50 mg but he correlates this to his worsening fatigue and exercise intolerance and is not interested in the higher dose at this time.  - Pt reports taking Lasix 20 mg daily since his heart surgery; however it is not on his active med list from his pharmacy on intake. Has been held in setting of OCTAVIO on admission. Continue to monitor and diurese as indicated  - Message sent to outpatient cardiology team x9/17 with update; currently scheduled for EP cardiology visit 10/3  - EP consulted while inpatient -- ICD check and reprogrammed and metoprolol dose changed to 37.5 mg daily. Pt in agreement with this plan.      GI  # Portal vein hypertension  # Esophageal varices  Incidental finding on CT CAP 9/15. Discussed with hepatology, no clear etiology of portal vein hypertension at this time. Consider residual from hepatosplenomegaly, likely in setting of CML? Recommend further workup with iron " "studies to rule out iron overload/hemochromatosis and close monitoring.   - Follow up on repeat imaging     CHRONIC  # Poorly controlled T2DM  Hgb A1C 8.2 on admission. Pt reports that he takes metformin and Ozempic (Saturdays) PTA. Has lost 50 lb in the last ~6 months due to Ozempic.   - High intensity sliding scale insulin  - Holding PTA metformin and Ozempic while inpatient - endocrinology consulted for assistance with DM management. Appreciate recs  - Recommended insulin for management, however patient declining. Thus, recommend continued PTA regimen   - Patient has had trouble establishing with endocrine provider recently. Asks for a referral within our system; referral placed x9/16     # WARNER - PTA CPAP     # Pulmonary nodules  Incidentally noted on CT CAP on admission - several sub-6 mm pulmonary nodules throughout the lungs. Recommend attention on follow up     # Social  Currently going through a divorce. Has 4 children. Works for [employer].     Clinically Significant Risk Factors          # Hypocalcemia: Lowest Ca = 8.3 mg/dL in last 2 days, will monitor and replace as appropriate     # Hypoalbuminemia: Lowest albumin = 3.4 g/dL at 9/19/2024  4:37 AM, will monitor as appropriate    # Coagulation Defect: INR = 1.36 (Ref range: 0.85 - 1.15) and/or PTT = 36 Seconds (Ref range: 22 - 38 Seconds), will monitor for bleeding             # DMII: A1C = 8.2 % (Ref range: <5.7 %) within past 6 months   # Overweight: Estimated body mass index is 29.59 kg/m  as calculated from the following:    Height as of this encounter: 1.778 m (5' 10\").    Weight as of this encounter: 93.5 kg (206 lb 3.2 oz).   # Severe Malnutrition: based on nutrition assessment     # ICD device present        Patient was staffed with Dr. Margarita Sauer PATrinyC  Hematology/Oncology  Page: #[phone]    80 MINUTES SPENT BY ME on the date of service doing chart review, history, exam, documentation & further activities per the note.         Pending Results " "  These results will be followed up by outpatient heme/onc team  Unresulted Labs Ordered in the Past 30 Days of this Admission       Date and Time Order Name Status Description    9/17/2024  4:56 PM CHROMOSOME ANALYSIS, BLOOD, LEUKEMIA With Professional Interpretation In process     9/16/2024  9:27 AM FISH With Professional Interpretation In process     9/14/2024  7:41 PM Bld morphology pathology review In process             Code Status   Full Code    Primary Care Physician   Anat ZACHJulia Gan    /54   Pulse 56   Temp 98.2  F (36.8  C) (Oral)   Resp 16   Ht 1.778 m (5' 10\")   Wt 93.5 kg (206 lb 3.2 oz)   SpO2 100%   BMI 29.59 kg/m      Constitutional: Awake and conversational. Non- toxic appearing.   HEENT: NCAT. Moist mucus membranes without lesions, thrush, or exudates appreciated  Respiratory: Breathing comfortably on room air, speaking in full sentences, no evidence of respiratory distress. Lungs CTAB without stridor, wheeze, rhonchi, or rales.   Cardiovascular: Regular rate and rhythm. Trace bilateral lower extremity peripheral edema.    GI: Abdomen with normoactive bowel sounds, soft and non-tender throughout.   Skin: Skin is clean, dry, intact. No jaundice or significant rashes appreciated on exposed areas. BMBx site CDI, no evidence of bleeding or hematoma.   Neurologic: Alert and with normal speech. Grossly nonfocal.  Moves extremities spontaneously.  Memory and thought process preserved.   Neuropsychiatric: Calm, affect congruent to situation. Appropriate mood and affect.       Time Spent on this Encounter   Noni MARIO PA-C, personally saw the patient today and spent greater than 30 minutes discharging this patient.    Discharge Disposition   Discharged to home  Condition at discharge: Stable    Consultations This Hospital Stay   SOCIAL WORK IP CONSULT  CARE MANAGEMENT / SOCIAL WORK IP CONSULT  PHARMACY LIAISON FOR MEDICATION COVERAGE CONSULT  ENDOCRINE DIABETES ADULT IP " CONSULT  PHARMACY LIAISON FOR MEDICATION COVERAGE CONSULT  DIABETES EDUCATION IP CONSULT  CARDIOLOGY GENERAL ADULT IP CONSULT  CARDIOLOGY GENERAL ADULT IP CONSULT  CARDIOLOGY ELECTROPHYSIOLOGY (EP) IP CONSULT    Discharge Orders      Comprehensive metabolic panel     Adult Endocrinology  Referral      Adult Oncology/Hematology  Referral      Reason for your hospital stay    You were in the hospital for high white blood cell count and found to have chronic myeloid leukemia.     Activity    Your activity upon discharge: activity as tolerated     Adult [facility] Follow-up and recommended labs and tests    Please check your [employer] account for the most up to date information on appointment scheduling  Your appointments will be at the [facility] - [address] [place] [address] (labs)  - 9/23 labs  - 9/24 appointment with Dr. Varner      Appointments on [place] and/or [facility] (with [facility] or [facility] provider or service). Call [phone] if you haven't heard regarding these appointments within 7 days of discharge.     When to contact your care team    Please call the [facility] at [phone] if you develop temperature above 100.4, shortness of breath, chest pain, headaches, vision changes, bleeding, uncontrolled nausea, vomiting, diarrhea, pain, or any other signs or symptoms of concern. If you are concerned that your symptoms are life-threatening, don't hesitate to call 911 or go to the nearest Emergency Room.     Discharge Instructions    Please take your medications as prescribed.     New  - Dasatinib (oral chemo) daily  - Hydrea 2x daily  - until told to stop by the clinic  - Allopurinol daily  - Cardiology increased your metoprolol to 37.5 mg daily    You may take tylenol (acetaminophen) 325 mg every 6-8 hours as needed for pain. Maximum dose of 4000 mg in a 24 hour period. Do not take ibuprofen (advil/motrin), aspirin,  or any other NSAIDs (due to low blood counts)    Continue the rest of your medications as you were taking prior to admission.     It was nice being a part of your hospital care team - best of luck to you and take care!    - Noni Sauer PA-C     Diet    Follow this diet upon discharge: Regular     Check Out Appointment Request    Please schedule 2x weekly labs (Denny preferred)  Appt to establish with Dr. Varner next available, ideally week of 9/23-9/27  If unable to establish with Dr. Varner until 9/30 week, please schedule CARTER visit for week of 9/23    **Prefers appts after 9AM when possible due to work schedule     CBC with platelets differential     Discharge Medications   Current Discharge Medication List        START taking these medications    Details   allopurinol (ZYLOPRIM) 300 MG tablet Take 1 tablet (300 mg) by mouth daily.  Qty: 30 tablet, Refills: 0    Comments: Resending to [facility] due to patient insurance thierno restrictions, unable to fill Rx's at [facility]  Associated Diagnoses: CML (chronic myelocytic leukemia) (H)      hydroxyurea (HYDREA) 500 MG capsule Take 4 capsules (2,000 mg) by mouth 2 times daily  Qty: 40 capsule, Refills: 0    Comments: Resending to [facility] due to patient insurance thierno restrictions, unable to fill Rx's at [facility]  Associated Diagnoses: CML (chronic myelocytic leukemia) (H)      !! dasatinib (SPRYCEL) 100 MG tablet Take 1 tablet (100 mg) by mouth daily Avoid grapefruit and grapefruit juice. May be given with or without food, but should be consistent.  Qty: 30 tablet, Refills: 0    Associated Diagnoses: CML (chronic myelocytic leukemia) (H)       !! - Potential duplicate medications found. Please discuss with provider.        CONTINUE these medications which have CHANGED    Details   metoprolol succinate ER (TOPROL XL) 25 MG 24 hr tablet Take 1.5 tablets (37.5 mg) by mouth daily.    Associated Diagnoses: Ventricular  "fibrillation (H); S/P TVR (tricuspid valve replacement)           CONTINUE these medications which have NOT CHANGED    Details   acetaminophen (TYLENOL) 325 MG tablet Take 2 tablets (650 mg) by mouth every 4 hours as needed for mild pain or fever  Qty: 30 tablet, Refills: 0    Associated Diagnoses: Cardiac arrest (H)      buPROPion (WELLBUTRIN XL) 300 MG 24 hr tablet Take 300 mg by mouth every morning      !! dasatinib (SPRYCEL) 100 MG tablet Take 1 tablet (100 mg) by mouth daily Avoid grapefruit and grapefruit juice. May be given with or without food, but should be consistent.  Qty: 30 tablet, Refills: 0    Associated Diagnoses: CML (chronic myelocytic leukemia) (H)      empagliflozin (JARDIANCE) 25 MG TABS tablet Take 25 mg by mouth daily.      furosemide (LASIX) 20 MG tablet Take 1 tablet (20 mg) by mouth daily    Associated Diagnoses: Bilateral leg edema      levothyroxine (SYNTHROID/LEVOTHROID) 125 MCG tablet Take 150 mcg by mouth daily.      losartan (COZAAR) 50 MG tablet Take 1 tablet (50 mg) by mouth daily      metFORMIN (GLUCOPHAGE) 1000 MG tablet Take 1,000 mg by mouth 2 times daily (with meals)      semaglutide (OZEMPIC) 2 MG/1.5ML pen Inject 2 mg subcutaneously every 7 days.       !! - Potential duplicate medications found. Please discuss with provider.        STOP taking these medications       ibuprofen (ADVIL/MOTRIN) 200 MG tablet Comments:   Reason for Stopping:             Allergies   Allergies   Allergen Reactions    Vancomycin      \"Red Sonia Syndrome\"    Clindamycin Unknown    Ceftriaxone Rash     Data   Most Recent 3 CBC's:  Recent Labs   Lab Test 09/19/24  0437 09/18/24  0450 09/17/24  0637   .7* 229.8* 290.9*   HGB 10.1* 9.2* 10.2*   MCV 90 88 88    463* 552*      Most Recent 3 BMP's:  Recent Labs   Lab Test 09/19/24  0750 09/19/24  0437 09/19/24  0125 09/18/24  0720 09/18/24  0450 09/17/24  0716 09/17/24  0637   NA  --  136  --   --  136  --  137   POTASSIUM  --  4.9  --   --  " 4.5  --  4.6   CHLORIDE  --  109*  --   --  109*  --  109*   CO2  --  17*  --   --  18*  --  18*   BUN  --  32.6*  --   --  29.7*  --  28.1*   CR  --  1.06  --   --  1.12  --  1.10   ANIONGAP  --  10  --   --  9  --  10   IMAN  --  8.3*  --   --  8.4*  --  8.8   * 217* 191*   < > 173*   < > 177*    < > = values in this interval not displayed.     Most Recent 2 LFT's:  Recent Labs   Lab Test 09/19/24  0437 09/18/24  0450   AST 28 26   ALT 9 10   ALKPHOS 168* 158*   BILITOTAL 0.7 0.6     Most Recent INR's and Anticoagulation Dosing History:  Anticoagulation Dose History  More data exists         Latest Ref Rng & Units 4/5/2022 9/14/2024 9/15/2024 9/16/2024 9/17/2024 9/18/2024 9/19/2024   Recent Dosing and Labs   INR 0.85 - 1.15 1.17  1.83  1.45  1.57  1.59  1.45  1.45  1.50  1.46  1.36       Details          Multiple values from one day are sorted in reverse-chronological order             Most Recent 3 Troponin's:  Recent Labs   Lab Test 11/25/17  0250 11/24/17  2350   TROPI <0.015 <0.015     Most Recent Cholesterol Panel:  Recent Labs   Lab Test 01/09/24  0812   TRIG 231*     Most Recent 6 Bacteria Isolates From Any Culture (See EPIC Reports for Culture Details):No lab results found.  Most Recent TSH, T4 and A1c Labs:  Recent Labs   Lab Test 09/15/24  0435 09/14/24  1523   TSH 4.67*  --    T4 1.33  --    A1C  --  8.2*     Results for orders placed or performed during the hospital encounter of 09/15/24   CT Chest Abdomen Pelvis w/o Contrast    Narrative    EXAM: CT chest, abdomen, and pelvis without intravenous contrast.  9/15/2024 1:23 PM    HISTORY: New leukemia. Obtain baseline imaging. Has history of  cryptococcal pneumonia. Evaluate for hepatosplenomegaly.    TECHNIQUE: Helical acquisition of image data was performed for the  chest, abdomen, and pelvis without intravenous contrast.    COMPARISON: CT abdomen/pelvis 3/22/2022    FINDINGS:    : Unremarkable.    Lines and tubes: Left chest wall  implantable cardiac defibrillator  with leads projecting along the right atrium and right ventricle.     CHEST:    Lungs/pleura/airways: Central airways are patent. Several bilateral  sub-6 mm pulmonary nodules, for example a 3 mm right upper lobe nodule  (series 6 image 128), and several 2 mm nodules in the left upper lobe  (series 6 image 56, series 6 image 122). Small area of tree-in-bud  nodularity in the peripheral right upper lobe (series 6 image 145). No  focal airspace consolidation. No pleural effusions. No pneumothorax.    Lower neck/axillae/mediastinum: Thyroid appears unremarkable.  Prominent axillary and mediastinal lymph nodes are not enlarged by  short axis criteria. The heart is not enlarged. No pericardial  effusion. Normal caliber thoracic aorta. The main pulmonary artery  measures 3.5 cm. Scattered atherosclerotic calcification of the aorta.  The esophagus appears unremarkable.    ABDOMEN/PELVIS:    Liver: Hepatomegaly measuring 27.8 cm. No intrahepatic biliary  dilatation. No focal hepatic mass.    Gallbladder/biliary tree: The common bile duct is not dilated.  Gallbladder appears unremarkable.    Pancreas: The pancreatic duct is not dilated.    Spleen: Splenomegaly measuring 24.0 cm.    Adrenal glands: No adrenal nodules.    Kidneys/ureters: No hydronephrosis. Punctate obstructing renal  calculus versus vascular calcification in the left kidney. Nonspecific  bilateral perinephric stranding.    Bladder/pelvic organs: Unremarkable.    Bowel/mesentery: No dilated loops of small bowel or colon. Colonic  diverticulosis without evidence of acute diverticulitis The appendix  is unremarkable.    Peritoneum/retroperitoneum: No extraluminal bowel gas. Small volume  free fluid in the pelvis.    Lymph nodes: No enlarged abdominal or pelvic lymph nodes by short axis  criteria.    Major vessels: An aneurysmal abdominal aorta. Esophagogastric and  upper abdominal varices.    BONES/SOFT TISSUE: Multilevel  degenerative changes throughout the  visualized spine is pronounced at L5-S1. Median sternotomy with intact  median sternotomy wires. No acute or suspicious osseous lesion.      Impression    IMPRESSION:  1. Hepatosplenomegaly.  2. Evidence of portal hypertension including esophagogastric and upper  abdominal varices and small volume ascites.  3. Punctate nonobstructing left renal calculus.  4. Several sub-6 mm pulmonary nodules throughout the lungs. Recommend  attention on follow-up.  5. Focal area of tree-in-bud nodularity in the peripheral right upper  lobe, likely infectious/inflammatory. Recommend follow-up to clearing.  6. Dilated main pulmonary artery measuring 3.5 cm, which can be seen  in pulmonary arterial hypertension.    I have personally reviewed the examination and initial interpretation  and I agree with the findings.    ROBERTO NG MD         SYSTEM ID:  [id number]

## 2024-09-19 NOTE — PROGRESS NOTES
[facility]    Discharge Summary  Hematology / Oncology    Date of Admission:  9/15/2024  Date of Discharge:  9/19/2024  Discharging Provider: Noni Sauer PA-C + Dr. Avila  Date of Service (when I saw the patient): 09/19/24    Discharge Diagnoses   # New diagnosis Ph+ Chronic Myeloid Leukemia  # Hyperleukocytosis  # Thrombocytosis  # Normocytic anemia   # TLS, improving  # OCTAVIO, resolved  # Risk for DIC  # Elevated INR, improving  # H/o bacterial endocarditis s/p tricuspid valve replacement and ICD  # Recent runs of vfib  # Portal vein hypertension  # Esophageal varices  # Poorly controlled T2DM   # Pulmonary nodules   # WARNER     History of Present Illness   Anil Montoya is a 38 year old male with a history of uncontrolled T2DM, bacterial endocarditis s/p tricuspid valve replacement and ICD. He presented to [facility] on 9/14/24 with lightheadedness and was found to have hyperleukocytosis (WBC >400k) so was transferred to [facility] for leukemia work up. Confirmed to have Ph+ CML by bone marrow biopsy and started on dasatinib. Course complicated by OCTAVIO with mild TLS, treated supportively with IVF and rasburicase x1 with resolution. Noted to have good cytoreduction (WBC ~460?216) with hydrea. Additionally during this admission, EP cardiology was consulted and assisted with ICD check and reprogramming as well as medication adjustments. He is scheduled to see his EP cardiologist in 2 weeks outpatient. Endocrine was consulted and initially recommended insulin, however patient declined, and thus resumed on oral hyperglycemic agents. Endocrine referral placed to establish care on discharge.    On the day of discharge, patient is feeling well at baseline health and looking forward to going home. He denies all symptoms including dizziness, chest pain, SOB, cough, abdominal pain, nausea, diarrhea. He is seen ambulating with a steady gait.     Prior to discharge, I  reviewed with Anil the plan of care, including upcoming follow-up appointments and new medications. Appropriate prescriptions were sent to the discharge pharmacy, as needed. We reviewed strict discharge precautions, including reasons to call clinic triage or present to the ED, and he voiced understanding. He was provided with the clinic triage number, as well as written discharge instructions, in their discharge paperwork. Patient had an opportunity to ask questions, all of which were answered to their stated satisfaction. On the day of discharge, patient was overall well-appearing, hemodynamically stable, and felt safe and comfortable with the plans for discharge to home with follow-up as described.    Outpatient follow-up issues:  - Discharged with 5 day supply of hydrea to bridge to follow up appointment, will need additional rx sent if plans to continue as appropriate.  on day of discharge, hgb and plt stable (10 and 414, respectively).   - Endocrine referral placed, follow up to ensure scheduling for DM management     Discharge medications:  New/changed:  - Dasatinib 100 mg daily   - Hydrea 2g BID  - Allopurinol 300 mg daily  - Metoprolol XL change to 37.5 mg daily    Remainder of medications continued as PTA     Upcoming follow-up:  - 9/23, 9/26 labs  - 9/24 appointment with Dr. Varner to establish care  - 10/3 cardiology follow up appointment    Hospital Course   Anil Montoya was admitted on 9/15/2024.  The following problems were addressed during his hospitalization:    HEME/ONC  # New diagnosis Ph+ Chronic Myeloid Leukemia  # Hyperleukocytosis  # Thrombocytosis  Patient reports that 1 week ago his ICD fired for run of vfib. This was during an emotional time where he thought he may not be able to see his kids. After this, his metoprolol was increased from 25 to 50 mg on Monday 9/9. Since then, he has felt dizzy and lightheaded. Only other new symptoms include new LE edema and  mild SKAGGS. He presented to [facility] on 9/14/24 and was found to have hyperleukocytosis (WBC 469k) so was transferred to [facility] for leukemia work up. No s/sx of leukostasis.   - Hydrea 2g TID (9/14-9/16), reduced to BID x9/16  - Peripheral FISH with BCR:ABL1 fusion (89%) and peripheral flow cytometry 4.8% abnormal myeloid blasts.  - BMBx 9/16 consistent with chronic myeloid leukemia BCR:ABL1 positive with hypercellular marrow (100%) with less than 2% blasts; NGS and cytogenetics pending  - PA sent for dasatinib, approved at [facility] - prescription sent. Started inpatient with hospital supply and confirmed with patient that dasatinib is scheduled to be delivered to his home x9/20 by noon.  - Patient will establish care with Dr. Varner - appt scheduled for 9/24. Will need 2x weekly labs on discharge, may reduce frequency in coming weeks pending trends.     - Baseline studies:   - Viral serologies: Hep B, Hep C, HSV, HIV negative; EBV and CMV pending  - EKG with complete heart block and ventricular paced rhythm  - Echo 9/12 with LVEF 55-60%  - CXR negative  - CT CAP with hepatosplenomegaly with evidence of portal hypertension including esophagogastric and upper abdominal varices, small volume ascites, several sub-6 mm pulmonary nodules, focal area of tree in bud nodularity of peripheral right upper lobe, dilated main pulmonary artery (3.5 cm)     # Normocytic anemia  Likely due to new leukemia.   - Monitor CBC daily  - Transfuse if Hgb <7     # TLS, resolved  # OCTAVIO, resolved  On presentation, Cr 1.53, LDH 1290, uric acid 10. Lytes WNL. Unclear baseline Cr. S/p 1 dose of rasburicase.   - Allopurinol 300 mg daily  - Monitor TLS labs      # Risk for DIC  # Elevated INR, improving  INR up to 1.83 on admission, now improving. PTT and fibrinogen WNL.   - Monitor coags BID  - S/p vit K 5 mg x1 on 9/15, repeat PRN     ID  # ID PPX  ACV, levofloxacin, micafungin initiated on admission with concern for new leukemia diagnosis; now  "with confirmed diagnosis of CML, will stop ppx (9/17).     # History of cryptococcal pneumonia  Patient states that when he was 26 he was hospitalized for cryptococcal pneumonia. He was inpatient for 60 days and \"on life support.\" No current respiratory symptoms reported.  - Obtain baseline chest CT -  noted focal area of tree-in-bud nodularity in the peripheral right upper lobe, likely infectious/inflammatory. Recommend follow-up to clearing     CARDIAC  # H/o bacterial endocarditis s/p tricuspid valve replacement and ICD  # Recent runs of vfib  Had hospital course in 4/2022 with the above, thought to be groin abscess as source. Was treated with course of IV abx after above interventions. Notably in the week prior to this admission he had vfib syncopal episode which fired his ICD defibrillator. Reportedly he had another run of vfib that he was asymptomatic from. Had follow up echo shortly after on 9/12 which was overall stable with LVEF 55-60%. BNP 2613 on admission.   - Metoprolol 25 mg XL daily   - Pt says this was just increased to 50 mg but he correlates this to his worsening fatigue and exercise intolerance and is not interested in the higher dose at this time.  - Pt reports taking Lasix 20 mg daily since his heart surgery; however it is not on his active med list from his pharmacy on intake. Has been held in setting of OCTAVIO on admission. Continue to monitor and diurese as indicated  - Message sent to outpatient cardiology team x9/17 with update; currently scheduled for EP cardiology visit 10/3  - EP consulted while inpatient -- ICD check and reprogrammed and metoprolol dose changed to 37.5 mg daily. Pt in agreement with this plan.      GI  # Portal vein hypertension  # Esophageal varices  Incidental finding on CT CAP 9/15. Discussed with hepatology, no clear etiology of portal vein hypertension at this time. Consider residual from hepatosplenomegaly, likely in setting of CML? Recommend further workup with iron " "studies to rule out iron overload/hemochromatosis and close monitoring.   - Follow up on repeat imaging     CHRONIC  # Poorly controlled T2DM  Hgb A1C 8.2 on admission. Pt reports that he takes metformin and Ozempic (Saturdays) PTA. Has lost 50 lb in the last ~6 months due to Ozempic.   - High intensity sliding scale insulin  - Holding PTA metformin and Ozempic while inpatient - endocrinology consulted for assistance with DM management. Appreciate recs  - Recommended insulin for management, however patient declining. Thus, recommend continued PTA regimen   - Patient has had trouble establishing with endocrine provider recently. Asks for a referral within our system; referral placed x9/16     # WARNER - PTA CPAP     # Pulmonary nodules  Incidentally noted on CT CAP on admission - several sub-6 mm pulmonary nodules throughout the lungs. Recommend attention on follow up     # Social  Currently going through a divorce. Has 4 children. Works for [employer].     Clinically Significant Risk Factors          # Hypocalcemia: Lowest Ca = 8.3 mg/dL in last 2 days, will monitor and replace as appropriate     # Hypoalbuminemia: Lowest albumin = 3.4 g/dL at 9/19/2024  4:37 AM, will monitor as appropriate    # Coagulation Defect: INR = 1.36 (Ref range: 0.85 - 1.15) and/or PTT = 36 Seconds (Ref range: 22 - 38 Seconds), will monitor for bleeding             # DMII: A1C = 8.2 % (Ref range: <5.7 %) within past 6 months   # Overweight: Estimated body mass index is 29.59 kg/m  as calculated from the following:    Height as of this encounter: 1.778 m (5' 10\").    Weight as of this encounter: 93.5 kg (206 lb 3.2 oz).   # Severe Malnutrition: based on nutrition assessment     # ICD device present        Patient was staffed with Dr. Margarita Sauer PATrniyC  Hematology/Oncology  Page: #[phone]    80 MINUTES SPENT BY ME on the date of service doing chart review, history, exam, documentation & further activities per the note.         Pending Results " "  These results will be followed up by outpatient heme/onc team  Unresulted Labs Ordered in the Past 30 Days of this Admission       Date and Time Order Name Status Description    9/17/2024  4:56 PM CHROMOSOME ANALYSIS, BLOOD, LEUKEMIA With Professional Interpretation In process     9/16/2024  9:27 AM FISH With Professional Interpretation In process     9/14/2024  7:41 PM Bld morphology pathology review In process             Code Status   Full Code    Primary Care Physician   Anat ZACHJulia Gan    /54   Pulse 56   Temp 98.2  F (36.8  C) (Oral)   Resp 16   Ht 1.778 m (5' 10\")   Wt 93.5 kg (206 lb 3.2 oz)   SpO2 100%   BMI 29.59 kg/m      Constitutional: Awake and conversational. Non- toxic appearing.   HEENT: NCAT. Moist mucus membranes without lesions, thrush, or exudates appreciated  Respiratory: Breathing comfortably on room air, speaking in full sentences, no evidence of respiratory distress. Lungs CTAB without stridor, wheeze, rhonchi, or rales.   Cardiovascular: Regular rate and rhythm. Trace bilateral lower extremity peripheral edema.    GI: Abdomen with normoactive bowel sounds, soft and non-tender throughout.   Skin: Skin is clean, dry, intact. No jaundice or significant rashes appreciated on exposed areas. BMBx site CDI, no evidence of bleeding or hematoma.   Neurologic: Alert and with normal speech. Grossly nonfocal.  Moves extremities spontaneously.  Memory and thought process preserved.   Neuropsychiatric: Calm, affect congruent to situation. Appropriate mood and affect.       Time Spent on this Encounter   Noni MARIO PA-C, personally saw the patient today and spent greater than 30 minutes discharging this patient.    Discharge Disposition   Discharged to home  Condition at discharge: Stable    Consultations This Hospital Stay   SOCIAL WORK IP CONSULT  CARE MANAGEMENT / SOCIAL WORK IP CONSULT  PHARMACY LIAISON FOR MEDICATION COVERAGE CONSULT  ENDOCRINE DIABETES ADULT IP " CONSULT  PHARMACY LIAISON FOR MEDICATION COVERAGE CONSULT  DIABETES EDUCATION IP CONSULT  CARDIOLOGY GENERAL ADULT IP CONSULT  CARDIOLOGY GENERAL ADULT IP CONSULT  CARDIOLOGY ELECTROPHYSIOLOGY (EP) IP CONSULT    Discharge Orders      Comprehensive metabolic panel     Adult Endocrinology  Referral      Adult Oncology/Hematology  Referral      Reason for your hospital stay    You were in the hospital for high white blood cell count and found to have chronic myeloid leukemia.     Activity    Your activity upon discharge: activity as tolerated     Adult [facility] Follow-up and recommended labs and tests    Please check your [employer] account for the most up to date information on appointment scheduling  Your appointments will be at the [facility] - [address] [place] [address] (labs)  - 9/23 labs  - 9/24 appointment with Dr. Varner      Appointments on [place] and/or [facility] (with [facility] or [facility] provider or service). Call [phone] if you haven't heard regarding these appointments within 7 days of discharge.     When to contact your care team    Please call the [facility] at [phone] if you develop temperature above 100.4, shortness of breath, chest pain, headaches, vision changes, bleeding, uncontrolled nausea, vomiting, diarrhea, pain, or any other signs or symptoms of concern. If you are concerned that your symptoms are life-threatening, don't hesitate to call 911 or go to the nearest Emergency Room.     Discharge Instructions    Please take your medications as prescribed.     New  - Dasatinib (oral chemo) daily  - Hydrea 2x daily  - until told to stop by the clinic  - Allopurinol daily  - Cardiology increased your metoprolol to 37.5 mg daily    You may take tylenol (acetaminophen) 325 mg every 6-8 hours as needed for pain. Maximum dose of 4000 mg in a 24 hour period. Do not take ibuprofen (advil/motrin), aspirin,  or any other NSAIDs (due to low blood counts)    Continue the rest of your medications as you were taking prior to admission.     It was nice being a part of your hospital care team - best of luck to you and take care!    - Noni Sauer PA-C     Diet    Follow this diet upon discharge: Regular     Check Out Appointment Request    Please schedule 2x weekly labs (Denny preferred)  Appt to establish with Dr. Varner next available, ideally week of 9/23-9/27  If unable to establish with Dr. Varner until 9/30 week, please schedule CARTER visit for week of 9/23    **Prefers appts after 9AM when possible due to work schedule     CBC with platelets differential     Discharge Medications   Current Discharge Medication List        START taking these medications    Details   allopurinol (ZYLOPRIM) 300 MG tablet Take 1 tablet (300 mg) by mouth daily.  Qty: 30 tablet, Refills: 0    Comments: Resending to [facility] due to patient insurance thierno restrictions, unable to fill Rx's at [facility]  Associated Diagnoses: CML (chronic myelocytic leukemia) (H)      hydroxyurea (HYDREA) 500 MG capsule Take 4 capsules (2,000 mg) by mouth 2 times daily  Qty: 40 capsule, Refills: 0    Comments: Resending to [facility] due to patient insurance thierno restrictions, unable to fill Rx's at [facility]  Associated Diagnoses: CML (chronic myelocytic leukemia) (H)      !! dasatinib (SPRYCEL) 100 MG tablet Take 1 tablet (100 mg) by mouth daily Avoid grapefruit and grapefruit juice. May be given with or without food, but should be consistent.  Qty: 30 tablet, Refills: 0    Associated Diagnoses: CML (chronic myelocytic leukemia) (H)       !! - Potential duplicate medications found. Please discuss with provider.        CONTINUE these medications which have CHANGED    Details   metoprolol succinate ER (TOPROL XL) 25 MG 24 hr tablet Take 1.5 tablets (37.5 mg) by mouth daily.    Associated Diagnoses: Ventricular  "fibrillation (H); S/P TVR (tricuspid valve replacement)           CONTINUE these medications which have NOT CHANGED    Details   acetaminophen (TYLENOL) 325 MG tablet Take 2 tablets (650 mg) by mouth every 4 hours as needed for mild pain or fever  Qty: 30 tablet, Refills: 0    Associated Diagnoses: Cardiac arrest (H)      buPROPion (WELLBUTRIN XL) 300 MG 24 hr tablet Take 300 mg by mouth every morning      !! dasatinib (SPRYCEL) 100 MG tablet Take 1 tablet (100 mg) by mouth daily Avoid grapefruit and grapefruit juice. May be given with or without food, but should be consistent.  Qty: 30 tablet, Refills: 0    Associated Diagnoses: CML (chronic myelocytic leukemia) (H)      empagliflozin (JARDIANCE) 25 MG TABS tablet Take 25 mg by mouth daily.      furosemide (LASIX) 20 MG tablet Take 1 tablet (20 mg) by mouth daily    Associated Diagnoses: Bilateral leg edema      levothyroxine (SYNTHROID/LEVOTHROID) 125 MCG tablet Take 150 mcg by mouth daily.      losartan (COZAAR) 50 MG tablet Take 1 tablet (50 mg) by mouth daily      metFORMIN (GLUCOPHAGE) 1000 MG tablet Take 1,000 mg by mouth 2 times daily (with meals)      semaglutide (OZEMPIC) 2 MG/1.5ML pen Inject 2 mg subcutaneously every 7 days.       !! - Potential duplicate medications found. Please discuss with provider.        STOP taking these medications       ibuprofen (ADVIL/MOTRIN) 200 MG tablet Comments:   Reason for Stopping:             Allergies   Allergies   Allergen Reactions    Vancomycin      \"Red Sonia Syndrome\"    Clindamycin Unknown    Ceftriaxone Rash     Data   Most Recent 3 CBC's:  Recent Labs   Lab Test 09/19/24  0437 09/18/24  0450 09/17/24  0637   .7* 229.8* 290.9*   HGB 10.1* 9.2* 10.2*   MCV 90 88 88    463* 552*      Most Recent 3 BMP's:  Recent Labs   Lab Test 09/19/24  0750 09/19/24  0437 09/19/24  0125 09/18/24  0720 09/18/24  0450 09/17/24  0716 09/17/24  0637   NA  --  136  --   --  136  --  137   POTASSIUM  --  4.9  --   --  " 4.5  --  4.6   CHLORIDE  --  109*  --   --  109*  --  109*   CO2  --  17*  --   --  18*  --  18*   BUN  --  32.6*  --   --  29.7*  --  28.1*   CR  --  1.06  --   --  1.12  --  1.10   ANIONGAP  --  10  --   --  9  --  10   IMAN  --  8.3*  --   --  8.4*  --  8.8   * 217* 191*   < > 173*   < > 177*    < > = values in this interval not displayed.     Most Recent 2 LFT's:  Recent Labs   Lab Test 09/19/24  0437 09/18/24  0450   AST 28 26   ALT 9 10   ALKPHOS 168* 158*   BILITOTAL 0.7 0.6     Most Recent INR's and Anticoagulation Dosing History:  Anticoagulation Dose History  More data exists         Latest Ref Rng & Units 4/5/2022 9/14/2024 9/15/2024 9/16/2024 9/17/2024 9/18/2024 9/19/2024   Recent Dosing and Labs   INR 0.85 - 1.15 1.17  1.83  1.45  1.57  1.59  1.45  1.45  1.50  1.46  1.36       Details          Multiple values from one day are sorted in reverse-chronological order             Most Recent 3 Troponin's:  Recent Labs   Lab Test 11/25/17  0250 11/24/17  2350   TROPI <0.015 <0.015     Most Recent Cholesterol Panel:  Recent Labs   Lab Test 01/09/24  0812   TRIG 231*     Most Recent 6 Bacteria Isolates From Any Culture (See EPIC Reports for Culture Details):No lab results found.  Most Recent TSH, T4 and A1c Labs:  Recent Labs   Lab Test 09/15/24  0435 09/14/24  1523   TSH 4.67*  --    T4 1.33  --    A1C  --  8.2*     Results for orders placed or performed during the hospital encounter of 09/15/24   CT Chest Abdomen Pelvis w/o Contrast    Narrative    EXAM: CT chest, abdomen, and pelvis without intravenous contrast.  9/15/2024 1:23 PM    HISTORY: New leukemia. Obtain baseline imaging. Has history of  cryptococcal pneumonia. Evaluate for hepatosplenomegaly.    TECHNIQUE: Helical acquisition of image data was performed for the  chest, abdomen, and pelvis without intravenous contrast.    COMPARISON: CT abdomen/pelvis 3/22/2022    FINDINGS:    : Unremarkable.    Lines and tubes: Left chest wall  implantable cardiac defibrillator  with leads projecting along the right atrium and right ventricle.     CHEST:    Lungs/pleura/airways: Central airways are patent. Several bilateral  sub-6 mm pulmonary nodules, for example a 3 mm right upper lobe nodule  (series 6 image 128), and several 2 mm nodules in the left upper lobe  (series 6 image 56, series 6 image 122). Small area of tree-in-bud  nodularity in the peripheral right upper lobe (series 6 image 145). No  focal airspace consolidation. No pleural effusions. No pneumothorax.    Lower neck/axillae/mediastinum: Thyroid appears unremarkable.  Prominent axillary and mediastinal lymph nodes are not enlarged by  short axis criteria. The heart is not enlarged. No pericardial  effusion. Normal caliber thoracic aorta. The main pulmonary artery  measures 3.5 cm. Scattered atherosclerotic calcification of the aorta.  The esophagus appears unremarkable.    ABDOMEN/PELVIS:    Liver: Hepatomegaly measuring 27.8 cm. No intrahepatic biliary  dilatation. No focal hepatic mass.    Gallbladder/biliary tree: The common bile duct is not dilated.  Gallbladder appears unremarkable.    Pancreas: The pancreatic duct is not dilated.    Spleen: Splenomegaly measuring 24.0 cm.    Adrenal glands: No adrenal nodules.    Kidneys/ureters: No hydronephrosis. Punctate obstructing renal  calculus versus vascular calcification in the left kidney. Nonspecific  bilateral perinephric stranding.    Bladder/pelvic organs: Unremarkable.    Bowel/mesentery: No dilated loops of small bowel or colon. Colonic  diverticulosis without evidence of acute diverticulitis The appendix  is unremarkable.    Peritoneum/retroperitoneum: No extraluminal bowel gas. Small volume  free fluid in the pelvis.    Lymph nodes: No enlarged abdominal or pelvic lymph nodes by short axis  criteria.    Major vessels: An aneurysmal abdominal aorta. Esophagogastric and  upper abdominal varices.    BONES/SOFT TISSUE: Multilevel  degenerative changes throughout the  visualized spine is pronounced at L5-S1. Median sternotomy with intact  median sternotomy wires. No acute or suspicious osseous lesion.      Impression    IMPRESSION:  1. Hepatosplenomegaly.  2. Evidence of portal hypertension including esophagogastric and upper  abdominal varices and small volume ascites.  3. Punctate nonobstructing left renal calculus.  4. Several sub-6 mm pulmonary nodules throughout the lungs. Recommend  attention on follow-up.  5. Focal area of tree-in-bud nodularity in the peripheral right upper  lobe, likely infectious/inflammatory. Recommend follow-up to clearing.  6. Dilated main pulmonary artery measuring 3.5 cm, which can be seen  in pulmonary arterial hypertension.    I have personally reviewed the examination and initial interpretation  and I agree with the findings.    ROBERTO NG MD         SYSTEM ID:  [id number]

## 2024-09-19 NOTE — PROGRESS NOTES
Endocrinology Progress Note     Assessment:    Type 2 Diabetes Mellitus, diagnosed in 2012, with no known diabetic microvascular complications and suboptimal glycemic control.  Currently, the outpatient antidiabetic medications are metformin and Ozempic.  The patient started treatment with Dasatinib for newly dx CML, yesterday.  He tolerates it with no side effects.    Anil continues to decline treatment with insulin given his past experience.    Discussed about the fact that he can resume metformin and we can also consider glimepiride, at a starting dose of 2 mg in the morning.  Controversial whether or not he should resume taking Ozempic, in the context of malignancy and tx with Dasatinib.  His wife shared her experience with medications like glipizide on glimepiride, as she also has diabetes.    Recommendations:  Continue to take metformin   Start glimepiride at 2 mg daily.  Counseled on the risk of hypoglycemia.  Check blood sugar at least twice daily, before breakfast and at bedtime  Schedule a follow-up appointment in the endocrine clinic in approximately 2 weeks.    =========================================================================================    Interval History:  Anil Montoya is a 38 year old male with a history of uncontrolled T2DM, bacterial endocarditis s/p tricuspid valve replacement and ICD. He presented to [facility] on 9/14/24 with lightheadedness and was found to have hyperleukocytosis (WBC >400k) so was transferred to [facility] for leukemia work up. Confirmed to have Ph+ CML.  He is now admitted to start treatment with Dasatinib.    Patient was diagnosed with type 2 DM in 2012, at age 27.  At home, he reports a glycemic control.  He only checks his blood glucose in the morning.  In a regular day, he has 2 meals, with lunch today and generally larger meal and the dinner being smaller.  He might have approximately 2 snacks a day.    Outpatient regimen: Ozempic 2mg weekly,  "metformin 1000mg twice a day.  Both were discontinued last week, in preparation for chemotherapy.  Historical Diabetes Medications: Jardiance is mentioned on the outside notes but the patient does not remember taking it.  He reports taking Victoza prior to transitioning to Ozempic, in December 2023.  Most recent A1c was 8.2, on 9/14/2024.    Yesterday, he received the first dose of Dasatinib.  His blood sugar numbers have been variable between 140 and 254.  Received 4 units of insulin NovoLog for correction yesterday.  His morning blood sugar today was 28.  Patient reports feeling good.  He does not endorse any gastrointestinal symptoms.  Eating with no issues.  Wife was present at the time of the visit.    Recent Labs   Lab 09/19/24  0750 09/19/24  0437 09/19/24  0125 09/18/24 2208 09/18/24 1834 09/18/24  1146   * 217* 191* 246* 177* 254*       Past Medical History:   Diagnosis Date    Pneumonia    Tricuspid valve replacement s/p ICD placement in 2022.    Hypothyroidism, diagnosed a couple of years ago  Dyslipidemia  Hypertension  Obstructive sleep apnea  History of obesity  Endocarditis  Cardiac arrest    Review of Systems:   The Review of Systems is negative other than noted in the Interval History.    Physical Exam:  Vitals: /54   Pulse 56   Temp 98.2  F (36.8  C) (Oral)   Resp 16   Ht 1.778 m (5' 10\")   Wt 93.5 kg (206 lb 3.2 oz)   SpO2 100%   BMI 29.59 kg/m    BMI= Body mass index is 29.59 kg/m .  Pt appears comfortable, in no distress  Alert, responsive         Data:     Recent Labs   Lab 09/19/24  0750 09/19/24  0437 09/19/24  0125 09/18/24 2208 09/18/24  1834 09/18/24  1146   * 217* 191* 246* 177* 254*     Lab Results   Component Value Date    .7 (HH) 09/19/2024    HGB 10.1 (L) 09/19/2024    HCT 31.9 (L) 09/19/2024    MCV 90 09/19/2024     09/19/2024     Lab Results   Component Value Date     09/19/2024     09/18/2024     09/17/2024    POTASSIUM " 4.9 09/19/2024    POTASSIUM 4.5 09/18/2024    POTASSIUM 4.6 09/17/2024    CHLORIDE 109 (H) 09/19/2024    CHLORIDE 109 (H) 09/18/2024    CHLORIDE 109 (H) 09/17/2024    CO2 17 (L) 09/19/2024    CO2 18 (L) 09/18/2024    CO2 18 (L) 09/17/2024     (H) 09/19/2024     (H) 09/19/2024     (H) 09/19/2024     Lab Results   Component Value Date    BUN 32.6 (H) 09/19/2024    BUN 29.7 (H) 09/18/2024    BUN 28.1 (H) 09/17/2024     Lab Results   Component Value Date    TSH 4.67 (H) 09/15/2024     Lab Results   Component Value Date    AST 28 09/19/2024    AST 26 09/18/2024    AST 30 09/17/2024    ALT 9 09/19/2024    ALT 10 09/18/2024    ALT 12 09/17/2024    ALKPHOS 168 (H) 09/19/2024    ALKPHOS 158 (H) 09/18/2024    ALKPHOS 174 (H) 09/17/2024

## 2024-09-19 NOTE — CONSULTS
"Diabetes Educator consulted for Anil Montoya, 38 year old male, for being \"new to insulin, going to start chemo and at this point would recommend stopping PTA meds.\" PTA he was monitoring BGs once daily in AM. Per Endocrinology and nursing notes, Anil is declining insulin at this time due to a past experience of hypoglycemia unawareness. Confirmed with BMT PA that Anil is still declining insulin today. Will not see patient for education. Please re-consult for any other needs.     MACARIO Negron, [facility]  Diabetes Educator  Pager: [phone]  Office: [phone]  Securely message with [employer]   "

## 2024-09-19 NOTE — CONSULTS
Electrophysiology Consultation Note   EP Attending: .   Reason for consultation: VF with ICD shock.   Provider requesting consultation:  JORY Sauer Heme-Onc BMT Service  Date of Service: 9/19/2024      HPI:   Mr. Montoya is a 38 year old male who has a medical history significant for poorly controlled DM2, MSSA TV endocarditis s/p bioprosthetic TVR 4/1/22 c/b CHB and POD3 VF arrest s/p dual chamber ICD 4/12/22, and newly diagnosed CLL.   He had MSSA tricuspid valve endocarditis and  underwent bioprosthetic TVR on 4/1/2022.  The procedure was complicated by complete AV block with accelerated junctional escape rhythm around 80 bpm.  Normal LV function.  On 4/4/2022 (POD3), the patient suffered VF cardiac arrest that was unexpected and not preceded by bradycardia or other reversible cause.  On that day, his blood cultures from 4/2/2022 came back positive for GPC.  The patient later developed an epidural abscess in his lower back.  After several days of IV antibiotics and negative blood cultures, the decision was made to proceed with ICD implantation.  A subcutaneous ICD was considered given the patient's young age, recent endocarditis, and the fact that a transvenous lead would have to cross the bioprosthetic tricuspid valve.  Unfortunately, complete AV block persisted 11 days after surgery so he had a dual chamber transvenous ICD implanted on 4/12/22. On 9/4/24, he had an appropriate ICD shock for VF that successfully terminated the arrhyhtmia. This was during an emotional time where he thought he may not be able to see his kids. His Metoprolol was increased from 25 to 50 mg on 9/9/24. Since then, he has felt dizziness and lightheadedness and noted some mild SKAGGS and BLE edema. He then presented to [facility] on 9/14/24 with lightheadedness and was found to have hyperleukocytosis (WBC >400k) so was transferred to [facility] for leukemia work up. Confirmed to have Ph+ CML. He has been found to have portal vein  "hypertension and esophageal varices incidental finding on CT CAP from 9/15/24. No clear etiology of portal vein hypertension at this time. Last echo from 9/12/24 showed normal biventricular function and mean gradient of 10mmHg on TV valve (was 7.5 mmHg previously).  SCr 1.06, GFR >90, electrolytes WDL, .7, Hgb 10.1. VSS. Current cardiac medications include: Losartan and Toprol XL.     Past Medical History:   Past Medical History:   Diagnosis Date    Pneumonia      Past Surgical History:   Past Surgical History:   Procedure Laterality Date    BONE MARROW BIOPSY, BONE SPECIMEN, NEEDLE/TROCAR Left 9/16/2024    Procedure: Bone marrow biopsy, bone specimen, needle/trocar, left posterior iliac crest;  Surgeon: Noni Sauer PA-C;  Location: [facility] OR    CV CORONARY ANGIOGRAM N/A 3/31/2022    Procedure: Coronary Angiogram;  Surgeon: Lidia Quintanilla MD;  Location:  HEART CARDIAC CATH LAB    EP ICD INSERT SINGLE N/A 4/12/2022    Procedure: Implantable Cardioverter Defibrillator Device & Lead Implant Single or Dual;  Surgeon: Suleman Higgins MD;  Location:  HEART CARDIAC CATH LAB    IR LUMBAR PUNCTURE  7/30/2012    REPLACE VALVE AORTIC N/A 4/1/2022    Procedure: AORTIC VALVE exploration;  Surgeon: Marti Melton MD;  Location:  OR    REPLACE VALVE TRICUSPID N/A 4/1/2022    Procedure: TRICUSPID VALVE REPLACEMENT;  Surgeon: Marti Melton MD;  Location:  OR     Allergies: Per MAR     Allergies   Allergen Reactions    Vancomycin      \"Red Sonia Syndrome\"    Clindamycin Unknown    Ceftriaxone Rash     Medications:   Per MAR current outpatient cardiovascular medications include:   Medications Prior to Admission   Medication Sig Dispense Refill Last Dose    acetaminophen (TYLENOL) 325 MG tablet Take 2 tablets (650 mg) by mouth every 4 hours as needed for mild pain or fever 30 tablet 0     buPROPion (WELLBUTRIN XL) 300 MG 24 hr tablet Take 300 mg by mouth every morning       empagliflozin " (JARDIANCE) 25 MG TABS tablet Take 25 mg by mouth daily.       furosemide (LASIX) 20 MG tablet Take 1 tablet (20 mg) by mouth daily       levothyroxine (SYNTHROID/LEVOTHROID) 125 MCG tablet Take 150 mcg by mouth daily.       losartan (COZAAR) 50 MG tablet Take 1 tablet (50 mg) by mouth daily       metFORMIN (GLUCOPHAGE) 1000 MG tablet Take 1,000 mg by mouth 2 times daily (with meals)       semaglutide (OZEMPIC) 2 MG/1.5ML pen Inject 2 mg subcutaneously every 7 days.       [DISCONTINUED] ibuprofen (ADVIL/MOTRIN) 200 MG tablet Take 800 mg by mouth daily as needed for pain.       [DISCONTINUED] metoprolol succinate ER (TOPROL XL) 25 MG 24 hr tablet Take 2 tablets (50 mg) by mouth daily. 90 tablet 3      Current Outpatient Medications   Medication Sig Dispense Refill    allopurinol (ZYLOPRIM) 300 MG tablet Take 1 tablet (300 mg) by mouth daily. 30 tablet 0    hydroxyurea (HYDREA) 500 MG capsule Take 4 capsules (2,000 mg) by mouth 2 times daily for 5 days 40 capsule 0    metoprolol succinate ER (TOPROL XL) 25 MG 24 hr tablet Take 1 tablet (25 mg) by mouth daily.      dasatinib (SPRYCEL) 100 MG tablet Take 1 tablet (100 mg) by mouth daily Avoid grapefruit and grapefruit juice. May be given with or without food, but should be consistent. 30 tablet 0    dasatinib (SPRYCEL) 100 MG tablet Take 1 tablet (100 mg) by mouth daily Avoid grapefruit and grapefruit juice. May be given with or without food, but should be consistent. 30 tablet 0     Current Facility-Administered Medications   Medication Dose Route Frequency Provider Last Rate Last Admin    acetaminophen (TYLENOL) tablet 650 mg  650 mg Oral Q4H PRN Lorena Herron MD   650 mg at 09/19/24 0903    allopurinol (ZYLOPRIM) tablet 300 mg  300 mg Oral Daily JustinJules, DO   300 mg at 09/19/24 0903    benzocaine-menthol (CHLORASEPTIC MAX) 15-10 MG lozenge 1 lozenge  1 lozenge Buccal Q1H PRN Justin, Jules A, DO        buPROPion (WELLBUTRIN XL) 24 hr tablet 300 mg  300 mg Oral  QAM Justin, Jules A, DO   300 mg at 09/19/24 0903    calcium carbonate (TUMS) chewable tablet 1,000 mg  1,000 mg Oral 4x Daily PRN Justin, Jules A, DO        dasatinib (SPRYCEL) tablet 100 mg  100 mg Oral Daily Noni Sauer PA-C   100 mg at 09/18/24 1726    glucose gel 15-30 g  15-30 g Oral Q15 Min PRN Justin, Jules A, DO        Or    dextrose 50 % injection 25-50 mL  25-50 mL Intravenous Q15 Min PRN Justin, Jules A, DO        Or    glucagon injection 1 mg  1 mg Subcutaneous Q15 Min PRN Justin, Jules A, DO        guaiFENesin-dextromethorphan (ROBITUSSIN DM) 100-10 MG/5ML syrup 10 mL  10 mL Oral Q4H PRN Justin, Jules A, DO        hydrALAZINE (APRESOLINE) tablet 10 mg  10 mg Oral Q4H PRN Justin, Jules A, DO        Or    hydrALAZINE (APRESOLINE) injection 10 mg  10 mg Intravenous Q4H PRN Justin, Jules A, DO        hydroxyurea (HYDREA) capsule 2,000 mg  2,000 mg Oral BID Jesus Varner MD   2,000 mg at 09/19/24 0903    insulin aspart (NovoLOG) injection (RAPID ACTING)   Subcutaneous TID w/meals Alejandra Hoover PA        insulin aspart (NovoLOG) injection (RAPID ACTING)   Subcutaneous With Snacks or Supplements lAejandra Hoover PA        insulin aspart (NovoLOG) injection (RAPID ACTING)  1-7 Units Subcutaneous BID Alejandra Hoover PA        insulin aspart (NovoLOG) injection (RAPID ACTING)  1-10 Units Subcutaneous TID AC Mik Bhatti MD   4 Units at 09/18/24 1230    insulin glargine (LANTUS PEN) injection 18 Units  18 Units Subcutaneous Daily Alejandra Hoover PA        levothyroxine (SYNTHROID/LEVOTHROID) tablet 150 mcg  150 mcg Oral Daily Justin, Jules A, DO   150 mcg at 09/19/24 0902    Lidocaine (LIDOCARE) 4 % Patch 2 patch  2 patch Transdermal Q24H Noni Sauer PA-C   1 patch at 09/19/24 0907    lidocaine (LMX4) cream   Topical Q1H PRN Jules Anderson,         lidocaine 1 % 0.1-1 mL  0.1-1 mL Other Q1H PRN Jules Anderson, DO        losartan (COZAAR) tablet 50 mg  50 mg Oral Daily Noni Sauer,  JORY   50 mg at 09/19/24 0903    melatonin tablet 5 mg  5 mg Oral At Bedtime PRN Justin, Jules A, DO   5 mg at 09/18/24 2130    menthol (Topical Analgesic) 2.5% (BENGAY VANISHING SCENT) 2.5 % topical gel   Topical Q6H PRN Noni Sauer PA-C        metoprolol succinate ER (TOPROL XL) 24 hr tablet 25 mg  25 mg Oral Daily Justin, Jules A, DO   25 mg at 09/19/24 0903    miconazole (MICATIN) 2 % powder   Topical BID PRN Justin, Jules A, DO        naloxone (NARCAN) injection 0.2 mg  0.2 mg Intravenous Q2 Min PRN Jesus Varner MD        Or    naloxone (NARCAN) injection 0.4 mg  0.4 mg Intravenous Q2 Min PRN Jesus Varner MD        Or    naloxone (NARCAN) injection 0.2 mg  0.2 mg Intramuscular Q2 Min PRN Jesus Varner MD        Or    naloxone (NARCAN) injection 0.4 mg  0.4 mg Intramuscular Q2 Min PRN Jesus Varner MD        ondansetron (ZOFRAN ODT) ODT tab 4 mg  4 mg Oral Q6H PRN Justin, Jules A, DO        Or    ondansetron (ZOFRAN) injection 4 mg  4 mg Intravenous Q6H PRN Justin, Jules A, DO        polyethylene glycol (MIRALAX) Packet 17 g  17 g Oral BID PRN Justin, Jules A, DO        prochlorperazine (COMPAZINE) injection 10 mg  10 mg Intravenous Q6H PRN Justin, Jules A, DO        Or    prochlorperazine (COMPAZINE) tablet 10 mg  10 mg Oral Q6H PRN Justin, Jules A, DO        senna-docusate (SENOKOT-S/PERICOLACE) 8.6-50 MG per tablet 1 tablet  1 tablet Oral BID PRN Justin, Jules A, DO        Or    senna-docusate (SENOKOT-S/PERICOLACE) 8.6-50 MG per tablet 2 tablet  2 tablet Oral BID PRN Justin, Jules A, DO        sodium chloride (PF) 0.9% PF flush 3 mL  3 mL Intracatheter q1 min prn Jules Anderson A, DO         Family History:   History reviewed. No pertinent family history.  Social History:   Social History     Tobacco Use    Smoking status: Never    Smokeless tobacco: Never   Substance Use Topics    Alcohol use: Not Currently       ROS:   A comprehensive 10 point ROS was negative other than  "as mentioned in HPI.    Physical Examination:   VITALS: /64 (BP Location: Left arm)   Pulse 55   Temp 98.6  F (37  C) (Oral)   Resp 16   Ht 1.778 m (5' 10\")   Wt 93.5 kg (206 lb 3.2 oz)   SpO2 100%   BMI 29.59 kg/m    GENERAL APPEARANCE: AxO, NAD   HEENT: NCAT, EOMI, MMM. PERRLA.   NECK: Supple.   CHEST: CTAB   CARDIOVASCULAR: S1S2, Reg, No m/r/g.   ABDOMEN: BS+, NT, ND.  EXTREMITIES: No pedal edema. Distal pulses intact.   NEURO: Grossly nonfocal.   PSYCH: Normal affect.  SKIN: Warm and dry.   Data:   Labs:  BMP  Recent Labs   Lab 24  0750 24  04324  0125 248 24  0720 24  0450 24  0716 24  0637 24  2144 24  1638   NA  --  136  --   --   --  136  --  137  --  137   POTASSIUM  --  4.9  --   --   --  4.5  --  4.6  --  5.0   CHLORIDE  --  109*  --   --   --  109*  --  109*  --  107   IMAN  --  8.3*  --   --   --  8.4*  --  8.8  --  8.8   CO2  --  17*  --   --   --  18*  --  18*  --  19*   BUN  --  32.6*  --   --   --  29.7*  --  28.1*  --  30.2*   CR  --  1.06  --   --   --  1.12  --  1.10  --  1.25*   * 217* 191* 246*   < > 173*   < > 177*   < > 289*    < > = values in this interval not displayed.     CBC  Recent Labs   Lab 24  04324  04524  0637 24  1638   .7* 229.8* 290.9* 325.7*   RBC 3.56* 3.19* 3.50* 3.46*   HGB 10.1* 9.2* 10.2* 10.0*   HCT 31.9* 28.1* 30.7* 30.9*   MCV 90 88 88 89   MCH 28.4 28.8 29.1 28.9   MCHC 31.7 32.7 33.2 32.4   RDW 18.1* 17.9* 18.2* 18.6*    463* 552* 561*     INR  Recent Labs   Lab 24  0437 24  0450 24  0637 24  1638   INR 1.36* 1.46* 1.50* 1.45*     No results found for: \"CKTOTAL\", \"CKMB\", \"TROPN\"  No results found for: \"CHOL\", \"CHOLHDLRATIO\", \"HDL\", \"LDL\"  EK/12/24 ECHO:   Interpretation Summary     S/P TV replacement with 31 mm epic stented valve- mean gradient has increased  to 10mmHg from 7.5mmHg previously  Left " ventricular systolic function is normal.  The right ventricle is not well visualized.  The right ventricular systolic function is normal.  Technically difficult, suboptimal study.    Assessment:   Mr. Montoya is a 38 year old male who has a medical history significant for poorly controlled DM2, MSSA TV endocarditis s/p bioprosthetic TVR 4/1/22 c/b CHB and POD3 VF arrest s/p dual chamber ICD 4/12/22, and newly diagnosed CLL.   He had MSSA tricuspid valve endocarditis and  underwent bioprosthetic TVR on 4/1/2022.  The procedure was complicated by complete AV block with accelerated junctional escape rhythm around 80 bpm.  Normal LV function.  On 4/4/2022 (POD3), the patient suffered VF cardiac arrest that was unexpected and not preceded by bradycardia or other reversible cause.  On that day, his blood cultures from 4/2/2022 came back positive for GPC.  The patient later developed an epidural abscess in his lower back.  After several days of IV antibiotics and negative blood cultures, the decision was made to proceed with ICD implantation.  A subcutaneous ICD was considered given the patient's young age, recent endocarditis, and the fact that a transvenous lead would have to cross the bioprosthetic tricuspid valve.  Unfortunately, complete AV block persisted 11 days after surgery so he had a dual chamber transvenous ICD implanted on 4/12/22. On 9/4/24, he had an appropriate ICD shock for VF that successfully terminated the arrhyhtmia. This was during an emotional time where he thought he may not be able to see his kids. His Metoprolol was increased from 25 to 50 mg on 9/9/24. Since then, he has felt dizziness and lightheadedness and noted some mild SKAGGS and BLE edema. He then presented to [facility]24 with lightheadedness and was found to have hyperleukocytosis (WBC >400k) so was transferred to [facility] for leukemia work up. Confirmed to have Ph+ CML. He has been found to have portal vein hypertension and esophageal  varices incidental finding on CT CAP from 9/15/24. No clear etiology of portal vein hypertension at this time. Last echo from 9/12/24 showed normal biventricular function and mean gradient of 10mmHg on TV valve (was 7.5 mmHg previously).  SCr 1.06, GFR >90, electrolytes WDL, .7, Hgb 10.1. VSS. Current cardiac medications include: Losartan and Toprol XL.   EP Recommendations:  Complete Heart Block  VF in hospital arrest s/p ICD 4/2022  VF with ICD shock:  - Increase Metoprolol to 37.5 mg daily  - Device was programmed DDI since implant and thus not tracing sinus activity. Appears this was attempted to be changed to DDD at one point but patient felt short of breath. We discussed that we do strongly recommend him to try this mode because prior to recent VF episode he had a long short interval and a  and then VF. Atrial tracking and synchronized pacing may help avoid this and also will be more physiologic for him. He is agreeable to this programming change.   - Would consider AAT if continued episodes.     The patient states understanding and is agreeable with plan.   Thank you for allowing us to participate in the care of this patient.     The patient was discussed w/ Dr. Crawley.  The above note reflects our joint plan.    MACARIO Kilpatrick CNP  Electrophysiology Consult Service  Securely message with [employer]   Text page via [employer]       CARTER Total time spent on patient visit, reviewing notes, imaging, labs, orders, and completing necessary documentation: 60 minutes.  >50% of visit spent on counseling patient and/or coordination of care.    EP STAFF NOTE  I have seen and examined the patient as part of a shared visit with BRONWYN Kilpatrick NP.  I agree with the note above. I reviewed today's vital signs and medications. I have reviewed and discussed with the advanced practice provider their physical examination, assessment, and plan   Briefly, VF with ICD shock, NICM, CHB  My key history/exam  findings are: RRR.   The key management decisions made by me: try DDD again (previously changed because his sinus rate and associated symptoms), follow-up with primary EP team.  Total time spent on patient visit, reviewing notes, imaging, labs, orders, and completing necessary documentation: 45 minutes.  >50% of visit spent on counseling patient and/or coordination of care.     Bakari Crawley MD [facility]  Cardiology - Electrophysiology

## 2024-09-19 NOTE — PLAN OF CARE
"  Problem: Adult Inpatient Plan of Care  Goal: Plan of Care Review  Description: The Plan of Care Review/Shift note should be completed every shift.  The Outcome Evaluation is a brief statement about your assessment that the patient is improving, declining, or no change.  This information will be displayed automatically on your shift  note.  Outcome: Adequate for Care Transition  Goal: Patient-Specific Goal (Individualized)  Description: You can add care plan individualizations to a care plan. Examples of Individualization might be:  \"Parent requests to be called daily at 9am for status\", \"I have a hard time hearing out of my right ear\", or \"Do not touch me to wake me up as it startles  me\".  Outcome: Adequate for Care Transition  Goal: Absence of Hospital-Acquired Illness or Injury  Outcome: Adequate for Care Transition  Intervention: Identify and Manage Fall Risk  Recent Flowsheet Documentation  Taken 9/19/2024 0900 by Ina Campos RN  Safety Promotion/Fall Prevention:   clutter free environment maintained   lighting adjusted   nonskid shoes/slippers when out of bed   safety round/check completed  Intervention: Prevent Skin Injury  Recent Flowsheet Documentation  Taken 9/19/2024 0900 by Ina Campos, RN  Body Position: position changed independently  Skin Protection: protective footwear used  Intervention: Prevent Infection  Recent Flowsheet Documentation  Taken 9/19/2024 0900 by Ina Campos, RN  Infection Prevention:   environmental surveillance performed   equipment surfaces disinfected   hand hygiene promoted   personal protective equipment utilized   rest/sleep promoted   single patient room provided  Goal: Optimal Comfort and Wellbeing  Outcome: Adequate for Care Transition  Intervention: Monitor Pain and Promote Comfort  Recent Flowsheet Documentation  Taken 9/19/2024 1306 by Ina Campos, RN  Pain Management Interventions: medication (see MAR)  Taken 9/19/2024 0903 by Ina Campos, RN  Pain " Management Interventions: medication (see MAR)  Goal: Readiness for Transition of Care  Outcome: Adequate for Care Transition     Problem: Fatigue  Goal: Improved Activity Tolerance  Outcome: Adequate for Care Transition  Intervention: Promote Improved Energy  Recent Flowsheet Documentation  Taken 9/19/2024 0900 by Ina Campos, RN  Sleep/Rest Enhancement:   awakenings minimized   comfort measures  Activity Management: activity adjusted per tolerance   Goal Outcome Evaluation:      Plan of Care Reviewed With: patient, parent, sibling

## 2024-09-19 NOTE — PLAN OF CARE
VSS. Using home CPAP overnight. Bmbx site continues to be painful. Received 5mg of oxy x2 & tylenol x2. Oxy more helpful in relieving the pain. Declined using Bengay cream at the site. Blood sugars 246 & 191. Pt declined insulin. No further complaints. Resting in between cares. Hopeful to discharge home today.     Problem: Adult Inpatient Plan of Care  Goal: Plan of Care Review  Description: The Plan of Care Review/Shift note should be completed every shift.  The Outcome Evaluation is a brief statement about your assessment that the patient is improving, declining, or no change.  This information will be displayed automatically on your shift  note.  Outcome: Progressing     Problem: Adult Inpatient Plan of Care  Goal: Optimal Comfort and Wellbeing  Outcome: Progressing  Intervention: Monitor Pain and Promote Comfort  Recent Flowsheet Documentation  Taken 9/18/2024 1936 by Anay Robison, RN  Pain Management Interventions: medication (see MAR)     Problem: Adult Inpatient Plan of Care  Goal: Readiness for Transition of Care  Outcome: Progressing   Goal Outcome Evaluation:

## 2024-09-19 NOTE — PROGRESS NOTES
Pt discharged to: home  Via: ambulatory  Time: 1540  Reason not before 11am or 2 hrs after order written: gone before 2 hrs  Accompanied by: sister  Belongings: sent with pt  Teaching: per discharge orders  Cap and line flushed with caregiver: KIRK  Central line teach back questions complete: NA  Pill box filled: no, needs to  meds at [facility], dasatinib being delivered to house  Clinic appointment: labs Monday and provider tuesday  Local housing:  [place]

## 2024-09-20 ENCOUNTER — TELEPHONE (OUTPATIENT)
Dept: ONCOLOGY | Facility: CLINIC | Age: 39
End: 2024-09-20
Payer: COMMERCIAL

## 2024-09-20 LAB
ATRIAL RATE - MUSE: 84 BPM
DIASTOLIC BLOOD PRESSURE - MUSE: NORMAL MMHG
INTERPRETATION ECG - MUSE: NORMAL
INTERPRETATION: NORMAL
P AXIS - MUSE: 36 DEGREES
PR INTERVAL - MUSE: NORMAL MS
QRS DURATION - MUSE: 188 MS
QT - MUSE: 550 MS
QTC - MUSE: 526 MS
R AXIS - MUSE: 241 DEGREES
SIGNIFICANT RESULTS: NORMAL
SYSTOLIC BLOOD PRESSURE - MUSE: NORMAL MMHG
T AXIS - MUSE: 39 DEGREES
VENTRICULAR RATE- MUSE: 55 BPM

## 2024-09-20 NOTE — ORAL ONC MGMT
"Oral Chemotherapy Monitoring Program    Lab Monitoring Plan    Subjective/Objective:  Anil Montoya is a 38 year old male contacted by phone for an initial visit for oral chemotherapy education.         9/20/2024     2:00 PM   ORAL CHEMOTHERAPY   Assessment Type Initial Work up;New Teach   Diagnosis Code Chronic Myeloid Leukemia (CML)   Providers Dr. Varner   Clinic Name/Location [facility]   Is this patient followed by the [facility] OC team? Yes   Drug Name Sprycel (dasatinib)   Dose 100 mg   Current Schedule Daily   Cycle Details Continuous   Start Date of Last Cycle 9/18/2024   Is the dose as ordered appropriate for the patient? Yes       Last PHQ-2 Score on record:       1/11/2024     9:42 AM   PHQ-2 ( 1999 Pfizer)   Q1: Little interest or pleasure in doing things 0   Q2: Feeling down, depressed or hopeless 0   PHQ-2 Score 0       Vitals:  BP:   BP Readings from Last 1 Encounters:   09/19/24 112/54     Wt Readings from Last 1 Encounters:   09/19/24 93.5 kg (206 lb 3.2 oz)     Estimated body surface area is 2.15 meters squared as calculated from the following:    Height as of 9/15/24: 1.778 m (5' 10\").    Weight as of 9/19/24: 93.5 kg (206 lb 3.2 oz).    Labs:  _  Result Component Current Result Ref Range   Sodium 136 (9/19/2024) 135 - 145 mmol/L     _  Result Component Current Result Ref Range   Potassium 4.9 (9/19/2024) 3.4 - 5.3 mmol/L     _  Result Component Current Result Ref Range   Calcium 8.3 (L) (9/19/2024) 8.8 - 10.4 mg/dL     _  Result Component Current Result Ref Range   Magnesium 2.2 (9/19/2024) 1.7 - 2.3 mg/dL     _  Result Component Current Result Ref Range   Phosphorus 3.9 (9/19/2024) 2.5 - 4.5 mg/dL     _  Result Component Current Result Ref Range   Albumin 3.4 (L) (9/19/2024) 3.5 - 5.2 g/dL     _  Result Component Current Result Ref Range   Urea Nitrogen 32.6 (H) (9/19/2024) 6.0 - 20.0 mg/dL     _  Result Component Current Result Ref Range   Creatinine 1.06 (9/19/2024) 0.67 - 1.17 " mg/dL     _  Result Component Current Result Ref Range   AST 28 (9/19/2024) 0 - 45 U/L     _  Result Component Current Result Ref Range   ALT 9 (9/19/2024) 0 - 70 U/L     _  Result Component Current Result Ref Range   Bilirubin Total 0.7 (9/19/2024) <=1.2 mg/dL     _  Result Component Current Result Ref Range   WBC Count 216.7 (HH) (9/19/2024) 4.0 - 11.0 10e3/uL     _  Result Component Current Result Ref Range   Hemoglobin 10.1 (L) (9/19/2024) 13.3 - 17.7 g/dL     _  Result Component Current Result Ref Range   Platelet Count 414 (9/19/2024) 150 - 450 10e3/uL     _  Result Component Current Result Ref Range   Absolute Neutrophils 160.4 (H) (9/19/2024) 1.6 - 8.3 10e3/uL     No results found for ANC within last 30 days.        Assessment:  Patient is appropriate to start therapy.    Plan:  Basic chemotherapy teaching was reviewed with the patient including indication, dose, administration, adverse effects, missed doses, food and drug interactions, monitoring, side effect management, office contact information, and safe handling. Written materials were provided and all questions answered. According to the patient he has already [facility] and has continued since starting inpatient on 9/18.    Follow-Up:  9/23 labs  9/24 Dr. Telly Abad, PharmD  Oral Chemotherapy Monitoring Program  [facility]  [phone]

## 2024-09-23 ENCOUNTER — LAB (OUTPATIENT)
Dept: LAB | Facility: CLINIC | Age: 39
End: 2024-09-23
Payer: COMMERCIAL

## 2024-09-23 DIAGNOSIS — C92.10 CML (CHRONIC MYELOCYTIC LEUKEMIA) (H): ICD-10-CM

## 2024-09-23 DIAGNOSIS — C92.10 CML (CHRONIC MYELOCYTIC LEUKEMIA) (H): Primary | ICD-10-CM

## 2024-09-23 DIAGNOSIS — E11.65 HYPERGLYCEMIA DUE TO DIABETES MELLITUS (H): ICD-10-CM

## 2024-09-23 PROBLEM — E11.22 TYPE 2 DIABETES MELLITUS WITH STAGE 2 CHRONIC KIDNEY DISEASE (H): Status: ACTIVE | Noted: 2022-03-22

## 2024-09-23 PROBLEM — N17.9 ACUTE KIDNEY INJURY (H): Status: RESOLVED | Noted: 2022-04-18 | Resolved: 2024-09-23

## 2024-09-23 PROBLEM — A41.9 SEPSIS (H): Status: RESOLVED | Noted: 2022-04-18 | Resolved: 2024-09-23

## 2024-09-23 PROBLEM — B95.61 MSSA BACTEREMIA: Status: RESOLVED | Noted: 2022-03-28 | Resolved: 2024-09-23

## 2024-09-23 PROBLEM — M46.20 OSTEOMYELITIS OF SPINE (H): Status: RESOLVED | Noted: 2022-04-18 | Resolved: 2024-09-23

## 2024-09-23 PROBLEM — N18.2 TYPE 2 DIABETES MELLITUS WITH STAGE 2 CHRONIC KIDNEY DISEASE (H): Status: ACTIVE | Noted: 2022-03-22

## 2024-09-23 PROBLEM — E87.70 FLUID OVERLOAD: Status: RESOLVED | Noted: 2022-04-18 | Resolved: 2024-09-23

## 2024-09-23 PROBLEM — R78.81 MSSA BACTEREMIA: Status: RESOLVED | Noted: 2022-03-28 | Resolved: 2024-09-23

## 2024-09-23 LAB
ACANTHOCYTES BLD QL SMEAR: ABNORMAL
ALBUMIN SERPL BCG-MCNC: 3.6 G/DL (ref 3.5–5.2)
ALP SERPL-CCNC: 195 U/L (ref 40–150)
ALT SERPL W P-5'-P-CCNC: 22 U/L (ref 0–70)
ANION GAP SERPL CALCULATED.3IONS-SCNC: 13 MMOL/L (ref 7–15)
AST SERPL W P-5'-P-CCNC: 28 U/L (ref 0–45)
BASOPHILS # BLD MANUAL: 2.2 10E3/UL (ref 0–0.2)
BASOPHILS NFR BLD MANUAL: 6 %
BILIRUB SERPL-MCNC: 0.6 MG/DL
BUN SERPL-MCNC: 31.8 MG/DL (ref 6–20)
BURR CELLS BLD QL SMEAR: SLIGHT
CALCIUM SERPL-MCNC: 8.3 MG/DL (ref 8.8–10.4)
CHLORIDE SERPL-SCNC: 110 MMOL/L (ref 98–107)
CREAT SERPL-MCNC: 1.07 MG/DL (ref 0.67–1.17)
EGFRCR SERPLBLD CKD-EPI 2021: >90 ML/MIN/1.73M2
ELLIPTOCYTES BLD QL SMEAR: SLIGHT
EOSINOPHIL # BLD MANUAL: 0.4 10E3/UL (ref 0–0.7)
EOSINOPHIL NFR BLD MANUAL: 1 %
ERYTHROCYTE [DISTWIDTH] IN BLOOD BY AUTOMATED COUNT: 18.8 % (ref 10–15)
FRAGMENTS BLD QL SMEAR: SLIGHT
GLUCOSE SERPL-MCNC: 292 MG/DL (ref 70–99)
HCO3 SERPL-SCNC: 14 MMOL/L (ref 22–29)
HCT VFR BLD AUTO: 33.7 % (ref 40–53)
HGB BLD-MCNC: 10.1 G/DL (ref 13.3–17.7)
LDH SERPL L TO P-CCNC: 484 U/L (ref 0–250)
LYMPHOCYTES # BLD MANUAL: 2.2 10E3/UL (ref 0.8–5.3)
LYMPHOCYTES NFR BLD MANUAL: 6 %
MCH RBC QN AUTO: 26.5 PG (ref 26.5–33)
MCHC RBC AUTO-ENTMCNC: 30 G/DL (ref 31.5–36.5)
MCV RBC AUTO: 89 FL (ref 78–100)
MONOCYTES # BLD MANUAL: 0.7 10E3/UL (ref 0–1.3)
MONOCYTES NFR BLD MANUAL: 2 %
NEUTROPHILS # BLD MANUAL: 30.5 10E3/UL (ref 1.6–8.3)
NEUTROPHILS NFR BLD MANUAL: 85 %
NRBC # BLD AUTO: 0.6 10E3/UL
NRBC # BLD AUTO: 1.1 10E3/UL
NRBC BLD AUTO-RTO: 2 /100
NRBC BLD MANUAL-RTO: 3 %
PLAT MORPH BLD: ABNORMAL
PLATELET # BLD AUTO: 621 10E3/UL (ref 150–450)
POLYCHROMASIA BLD QL SMEAR: SLIGHT
POTASSIUM SERPL-SCNC: 5.7 MMOL/L (ref 3.4–5.3)
PROT SERPL-MCNC: 6.7 G/DL (ref 6.4–8.3)
RBC # BLD AUTO: 3.81 10E6/UL (ref 4.4–5.9)
RBC MORPH BLD: ABNORMAL
SODIUM SERPL-SCNC: 137 MMOL/L (ref 135–145)
TOXIC GRANULES BLD QL SMEAR: PRESENT
URATE SERPL-MCNC: 5.3 MG/DL (ref 3.4–7)
WBC # BLD AUTO: 35.9 10E3/UL (ref 4–11)

## 2024-09-23 PROCEDURE — 36415 COLL VENOUS BLD VENIPUNCTURE: CPT

## 2024-09-23 PROCEDURE — 85041 AUTOMATED RBC COUNT: CPT

## 2024-09-23 PROCEDURE — 84550 ASSAY OF BLOOD/URIC ACID: CPT | Performed by: INTERNAL MEDICINE

## 2024-09-23 PROCEDURE — 80053 COMPREHEN METABOLIC PANEL: CPT

## 2024-09-23 PROCEDURE — 85007 BL SMEAR W/DIFF WBC COUNT: CPT

## 2024-09-23 PROCEDURE — 83615 LACTATE (LD) (LDH) ENZYME: CPT | Performed by: INTERNAL MEDICINE

## 2024-09-23 NOTE — PROGRESS NOTES
Virtual Visit Details    Type of service:  Video Visit     Originating Location (pt. Location): Home    Distant Location (provider location):  Off-site  Platform used for Video Visit: Haydee        REASON FOR VISIT:  Management of chronic myeloid leukemia (CML)    HISTORY OF PRESENT ILLNESS:  Anil Montoya is a 38 year old with chronic myeloid leukemia (CML).  To summarize his course, he presented with a couple of weeks of dizziness and was found to have hyperleukocytosis and thrombocytosis with moderate anemia as well as Tumor Lysis Syndrome.  Workup confirmed CML in chronic phase, hepatosplenomegaly on imaging, peripheral blood FISH and BCR-ABL p210 by PCR were detected, and bone marrow biopsy showed t(9;22) as the sole abnormality.  Peripheral blood BCR-ABL p210 by PCR was 62.5%.  He was started on dasatinib in 09/2024.  Visit for management of CML.    He is with his sister.  Energy level is OK.  Some chills and hot flashes since starting meds - nothing major just annoying.  No dizziness or chest pain.  Overall managing alright.    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 30 tablet 0    buPROPion (WELLBUTRIN XL) 300 MG 24 hr tablet Take 300 mg by mouth every morning      dasatinib (SPRYCEL) 100 MG tablet Take 1 tablet (100 mg) by mouth daily Avoid grapefruit and grapefruit juice. May be given with or without food, but should be consistent. 30 tablet 0    dasatinib (SPRYCEL) 100 MG tablet Take 1 tablet (100 mg) by mouth  "daily Avoid grapefruit and grapefruit juice. May be given with or without food, but should be consistent. 30 tablet 0    empagliflozin (JARDIANCE) 25 MG TABS tablet Take 25 mg by mouth daily.      furosemide (LASIX) 20 MG tablet Take 1 tablet (20 mg) by mouth daily      hydroxyurea (HYDREA) 500 MG capsule Take 4 capsules (2,000 mg) by mouth 2 times daily 40 capsule 0    levothyroxine (SYNTHROID/LEVOTHROID) 125 MCG tablet Take 150 mcg by mouth daily.      losartan (COZAAR) 50 MG tablet Take 1 tablet (50 mg) by mouth daily      metFORMIN (GLUCOPHAGE) 1000 MG tablet Take 1,000 mg by mouth 2 times daily (with meals)      metoprolol succinate ER (TOPROL XL) 25 MG 24 hr tablet Take 1.5 tablets (37.5 mg) by mouth daily.      semaglutide (OZEMPIC) 2 MG/1.5ML pen Inject 2 mg subcutaneously every 7 days.       No current facility-administered medications for this visit.     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  HEENT: Sclerae anicteric.  Lungs: Breathing comfortably. No cough.  MSK: Grossly normal movement.  Neuro: Grossly non-focal.  Skin/access: Normal skin tone.  Psych: Alert and oriented. No distress.  Performance status: ECOG 1    LABORATORY DATA: Reviewed by me  Recent Labs   Lab Test 09/23/24  0938 09/19/24  0437 09/18/24  0450 09/17/24  0637 09/16/24  1638 09/15/24  0435 09/14/24  1941 09/14/24  1523 06/01/22  1330 05/25/22  1155 05/18/22  1045   WBC 35.9* 216.7* 229.8* 290.9* 325.7*   < >  --    < > 6.3 6.9 3.4*   HGB 10.1* 10.1* 9.2* 10.2* 10.0*   < >  --    < > 13.8 13.7 12.8*   * 414 463* 552* 561*   < >  --    < > 275 379 334   ANEU " 30.5* 160.4* 135.6* 148.4* 182.4*   < >  --    < >  --   --   --    ANEUTAUTO  --   --   --   --   --   --   --   --  4.0 4.6 1.6   ABLA  --  2.2*  --  5.8* 13.0*  --   --    < >  --   --   --    ALYM 2.2 6.5* 9.2* 11.6* 0.0*   < >  --    < >  --   --   --    ALYMPAUTO  --   --   --   --   --   --   --   --  1.1 1.2 0.8   RETICABSCT  --   --  0.090  --   --   --  0.118*  --   --   --   --    SED  --   --   --   --   --   --   --   --  19* 19* 26*    < > = values in this interval not displayed.     Recent Labs   Lab Test 09/23/24 0938 09/19/24 0437 09/18/24 0450 09/17/24 0637 09/14/24  1722 09/14/24  1523    136 136 137   < > 136   POTASSIUM 5.7* 4.9 4.5 4.6   < > 5.1   CHLORIDE 110* 109* 109* 109*   < > 105   CO2 14* 17* 18* 18*   < > 16*   BUN 31.8* 32.6* 29.7* 28.1*   < > 29.2*   CR 1.07 1.06 1.12 1.10   < > 1.53*   IMAN 8.3* 8.3* 8.4* 8.8   < > 8.9   MAG  --  2.2 2.0  --   --  2.5*   PHOS  --  3.9 3.8 4.0   < >  --    URIC 5.3 4.8 4.6 4.5   < >  --    * 874* 864* 983*   < >  --     < > = values in this interval not displayed.     Recent Labs   Lab Test 09/23/24 0938 09/19/24 0437 09/18/24 0450 09/17/24 0637   AST 28 28 26 30   ALT 22 9 10 12   ALKPHOS 195* 168* 158* 174*   BILITOTAL 0.6 0.7 0.6 0.8   INR  --  1.36* 1.46* 1.50*     09/17/2024 blood BCR-ABL p210 by PCR 62.5%  09/16/2024 bone marrow biopsy results reviewed as noted in HPI    IMPRESSION AND PLAN:  Anil Montoya is a 38 year old with chronic myeloid leukemia (CML).    We reviewed his recent course, diagnosis, natural history of chronic phase CML, and treatment recommendations.  He has been on hydroxyurea and started dasatinib 100 mg daily.  Treatment has been tolerated, and we discuss plans to monitor for toxicity and treatment response.  We discussed that nuisance side effects tend to improve over the first few months and the importance of strict adherence to treatment for the best outcomes.  I have notified the Oral Chemo  Pharmacy Team who will help with continued access and monitoring for dasatinib.  He will continue dasatinib and can stop hydroxyurea with improvement in leukocytosis.  We will continue weekly labs and taper as long as there are no new issues.  He agrees with the plan.    There are no active ID issues.  I recommended an updated flu shot and COVID-19 vaccine.    Potassium was slightly elevated on his last lab draw - likely pseudohyperkalemia.  Kidney function is good and no other indication of TLS.  We will recheck with upcoming labs.    He will continue to work with Cardiology for his complex cardiology issues, Endocrine for diabetes management, and his primary care provider (working on finding a new one) for health maintenance and other medical issues.    We will request monitoring labs, an CARTER visit in about a month, and a visit with me in about 3 months.  I reminded him to contact us if questions, concerns, or new issues come up between visits.    Video start time: 12:50 PM  Video end time: 1:25 PM  Video duration: 35 minutes    Total of 40 minutes on patient visit, reviewing records, interpreting test results, placing orders, and documentation on the day of service.  I spent an additional 30 minutes prior to date of service reviewing records in preparation for the visit.    The longitudinal plan of care for the diagnosis(es)/condition(s) as documented were addressed during this visit. Due to the added complexity in care, I will continue to support Anil in the subsequent management and with ongoing continuity of care.    Jesus Varner MD, PhD  Attending Physician, [facility]  [phone] [facility]

## 2024-09-23 NOTE — ANESTHESIA POSTPROCEDURE EVALUATION
Patient: Anil Montoya    Procedure: Procedure(s):  Bone marrow biopsy, bone specimen, needle/trocar, left posterior iliac crest       Anesthesia Type:  MAC    Note:  Anesthesia Post Evaluation    Last vitals:  Vitals Value Taken Time   /62 09/16/24 1130   Temp 36.4  C (97.5  F) 09/16/24 1130   Pulse 55 09/16/24 1130   Resp 16 09/16/24 1130   SpO2 96 % 09/16/24 1130       Electronically Signed By: Akshat Aguilar DO  September 23, 2024  9:35 AM

## 2024-09-24 ENCOUNTER — PATIENT OUTREACH (OUTPATIENT)
Dept: ONCOLOGY | Facility: CLINIC | Age: 39
End: 2024-09-24

## 2024-09-24 ENCOUNTER — VIRTUAL VISIT (OUTPATIENT)
Dept: ONCOLOGY | Facility: CLINIC | Age: 39
End: 2024-09-24
Attending: PHYSICIAN ASSISTANT
Payer: COMMERCIAL

## 2024-09-24 ENCOUNTER — PRE VISIT (OUTPATIENT)
Dept: ONCOLOGY | Facility: CLINIC | Age: 39
End: 2024-09-24
Payer: COMMERCIAL

## 2024-09-24 VITALS — HEIGHT: 70 IN | WEIGHT: 200 LBS | BODY MASS INDEX: 28.63 KG/M2

## 2024-09-24 DIAGNOSIS — Z95.810 ICD (IMPLANTABLE CARDIOVERTER-DEFIBRILLATOR) IN PLACE: ICD-10-CM

## 2024-09-24 DIAGNOSIS — Z95.4 S/P TVR (TRICUSPID VALVE REPLACEMENT): ICD-10-CM

## 2024-09-24 DIAGNOSIS — C92.10 CML (CHRONIC MYELOCYTIC LEUKEMIA) (H): Primary | ICD-10-CM

## 2024-09-24 PROCEDURE — 99215 OFFICE O/P EST HI 40 MIN: CPT | Mod: 95 | Performed by: INTERNAL MEDICINE

## 2024-09-24 PROCEDURE — G2211 COMPLEX E/M VISIT ADD ON: HCPCS | Mod: 95 | Performed by: INTERNAL MEDICINE

## 2024-09-24 ASSESSMENT — PAIN SCALES - GENERAL: PAINLEVEL: NO PAIN (0)

## 2024-09-24 NOTE — LETTER
9/24/2024      Anil Montoya  [address] [address] [address]      Dear Colleague,    Thank you for referring your patient, Anil Montoya, to the [facility]. Please see a copy of my visit note below.    Virtual Visit Details    Type of service:  Video Visit     Originating Location (pt. Location): Home    Distant Location (provider location):  Off-site  Platform used for Video Visit: AmWell        REASON FOR VISIT:  Management of chronic myeloid leukemia (CML)    HISTORY OF PRESENT ILLNESS:  Anil Montoya is a 38 year old with chronic myeloid leukemia (CML).  To summarize his course, he presented with a couple of weeks of dizziness and was found to have hyperleukocytosis and thrombocytosis with moderate anemia as well as Tumor Lysis Syndrome.  Workup confirmed CML in chronic phase, hepatosplenomegaly on imaging, peripheral blood FISH and BCR-ABL p210 by PCR were detected, and bone marrow biopsy showed t(9;22) as the sole abnormality.  Peripheral blood BCR-ABL p210 by PCR was 62.5%.  He was started on dasatinib in 09/2024.  Visit for management of CML.    He is with his sister.  Energy level is OK.  Some chills and hot flashes since starting meds - nothing major just annoying.  No dizziness or chest pain.  Overall managing alright.    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 30 tablet 0     buPROPion (WELLBUTRIN XL) 300 MG 24 hr tablet Take 300 mg by mouth  "every morning       dasatinib (SPRYCEL) 100 MG tablet Take 1 tablet (100 mg) by mouth daily Avoid grapefruit and grapefruit juice. May be given with or without food, but should be consistent. 30 tablet 0     dasatinib (SPRYCEL) 100 MG tablet Take 1 tablet (100 mg) by mouth daily Avoid grapefruit and grapefruit juice. May be given with or without food, but should be consistent. 30 tablet 0     empagliflozin (JARDIANCE) 25 MG TABS tablet Take 25 mg by mouth daily.       furosemide (LASIX) 20 MG tablet Take 1 tablet (20 mg) by mouth daily       hydroxyurea (HYDREA) 500 MG capsule Take 4 capsules (2,000 mg) by mouth 2 times daily 40 capsule 0     levothyroxine (SYNTHROID/LEVOTHROID) 125 MCG tablet Take 150 mcg by mouth daily.       losartan (COZAAR) 50 MG tablet Take 1 tablet (50 mg) by mouth daily       metFORMIN (GLUCOPHAGE) 1000 MG tablet Take 1,000 mg by mouth 2 times daily (with meals)       metoprolol succinate ER (TOPROL XL) 25 MG 24 hr tablet Take 1.5 tablets (37.5 mg) by mouth daily.       semaglutide (OZEMPIC) 2 MG/1.5ML pen Inject 2 mg subcutaneously every 7 days.       No current facility-administered medications for this visit.     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  HEENT: Sclerae anicteric.  Lungs: Breathing comfortably. No cough.  MSK: Grossly normal movement.  Neuro: Grossly non-focal.  Skin/access: Normal skin tone.  Psych: Alert and oriented. No distress.  Performance status: ECOG 1    LABORATORY DATA: Reviewed by me  Recent Labs   Lab Test 09/23/24  0938 09/19/24  0437 09/18/24  0450 09/17/24  0637 09/16/24  1638 09/15/24  0435 " 09/14/24  1941 09/14/24  1523 06/01/22  1330 05/25/22  1155 05/18/22  1045   WBC 35.9* 216.7* 229.8* 290.9* 325.7*   < >  --    < > 6.3 6.9 3.4*   HGB 10.1* 10.1* 9.2* 10.2* 10.0*   < >  --    < > 13.8 13.7 12.8*   * 414 463* 552* 561*   < >  --    < > 275 379 334   ANEU 30.5* 160.4* 135.6* 148.4* 182.4*   < >  --    < >  --   --   --    ANEUTAUTO  --   --   --   --   --   --   --   --  4.0 4.6 1.6   ABLA  --  2.2*  --  5.8* 13.0*  --   --    < >  --   --   --    ALYM 2.2 6.5* 9.2* 11.6* 0.0*   < >  --    < >  --   --   --    ALYMPAUTO  --   --   --   --   --   --   --   --  1.1 1.2 0.8   RETICABSCT  --   --  0.090  --   --   --  0.118*  --   --   --   --    SED  --   --   --   --   --   --   --   --  19* 19* 26*    < > = values in this interval not displayed.     Recent Labs   Lab Test 09/23/24  0938 09/19/24  0437 09/18/24  0450 09/17/24  0637 09/14/24  1722 09/14/24  1523    136 136 137   < > 136   POTASSIUM 5.7* 4.9 4.5 4.6   < > 5.1   CHLORIDE 110* 109* 109* 109*   < > 105   CO2 14* 17* 18* 18*   < > 16*   BUN 31.8* 32.6* 29.7* 28.1*   < > 29.2*   CR 1.07 1.06 1.12 1.10   < > 1.53*   IMAN 8.3* 8.3* 8.4* 8.8   < > 8.9   MAG  --  2.2 2.0  --   --  2.5*   PHOS  --  3.9 3.8 4.0   < >  --    URIC 5.3 4.8 4.6 4.5   < >  --    * 874* 864* 983*   < >  --     < > = values in this interval not displayed.     Recent Labs   Lab Test 09/23/24  0938 09/19/24  0437 09/18/24  0450 09/17/24  0637   AST 28 28 26 30   ALT 22 9 10 12   ALKPHOS 195* 168* 158* 174*   BILITOTAL 0.6 0.7 0.6 0.8   INR  --  1.36* 1.46* 1.50*     09/17/2024 blood BCR-ABL p210 by PCR 62.5%  09/16/2024 bone marrow biopsy results reviewed as noted in HPI    IMPRESSION AND PLAN:  Anil Montoya is a 38 year old with chronic myeloid leukemia (CML).    We reviewed his recent course, diagnosis, natural history of chronic phase CML, and treatment recommendations.  He has been on hydroxyurea and started dasatinib 100 mg daily.  Treatment has  been tolerated, and we discuss plans to monitor for toxicity and treatment response.  We discussed that nuisance side effects tend to improve over the first few months and the importance of strict adherence to treatment for the best outcomes.  I have notified the Oral Chemo Pharmacy Team who will help with continued access and monitoring for dasatinib.  He will continue dasatinib and can stop hydroxyurea with improvement in leukocytosis.  We will continue weekly labs and taper as long as there are no new issues.  He agrees with the plan.    There are no active ID issues.  I recommended an updated flu shot and COVID-19 vaccine.    Potassium was slightly elevated on his last lab draw - likely pseudohyperkalemia.  Kidney function is good and no other indication of TLS.  We will recheck with upcoming labs.    He will continue to work with Cardiology for his complex cardiology issues, Endocrine for diabetes management, and his primary care provider (working on finding a new one) for health maintenance and other medical issues.    We will request monitoring labs, an CARTER visit in about a month, and a visit with me in about 3 months.  I reminded him to contact us if questions, concerns, or new issues come up between visits.    Video start time: 12:50 PM  Video end time: 1:25 PM  Video duration: 35 minutes    Total of 40 minutes on patient visit, reviewing records, interpreting test results, placing orders, and documentation on the day of service.  I spent an additional 30 minutes prior to date of service reviewing records in preparation for the visit.    The longitudinal plan of care for the diagnosis(es)/condition(s) as documented were addressed during this visit. Due to the added complexity in care, I will continue to support Anil in the subsequent management and with ongoing continuity of care.    Jesus Varner MD, PhD  Attending Physician, [facility]  [phone] clinic      Again, thank you for  allowing me to participate in the care of your patient.        Sincerely,        Jesus Varner MD

## 2024-09-24 NOTE — LETTER
September 24, 2024      Anil Montoya  [address] [address] [address]        To Whom It May Concern:    Anil Montoya was seen in our clinic today and is being treated for leukemia. He may return to work without restrictions.    Please feel free to contact us with questions.       Sincerely,        Jesus Varner MD, PhD   [facility]  [phone]

## 2024-09-24 NOTE — PROGRESS NOTES
[facility]: Cancer Care                                                                                          [facility]  Request for letter to return to work.  Writer completed letter and sent to pt via my chart.  Also provided pt with contact info for writer if further needs arise prior to next appt.    Theresa Guerrero, MICKN, RN  RN Care Coordinator  [facility]

## 2024-09-24 NOTE — NURSING NOTE
Current patient location:  Pt in car with sister    Is the patient currently in the state of [place]? YES    Visit mode:VIDEO    If the visit is dropped, the patient can be reconnected by: VIDEO VISIT: Text to cell phone:   Telephone Information:   Mobile [phone]       Will anyone else be joining the visit? NO  (If patient encounters technical issues they should call [phone]    How would you like to obtain your AVS? MyChart    Are changes needed to the allergy or medication list? Pt stated no changes to allergies and Pt stated no med changes    Are refills needed on medications prescribed by this physician? YES states will need refills on Chemo drug Sprycel     dasatinib (SPRYCEL) 100 MG tablet       Rooming Documentation:  Questionnaire(s) not done per department protocol    Reason for visit: Consult    Ramandeep House LPN

## 2024-09-25 ENCOUNTER — PATIENT OUTREACH (OUTPATIENT)
Dept: ONCOLOGY | Facility: CLINIC | Age: 39
End: 2024-09-25
Payer: COMMERCIAL

## 2024-09-25 NOTE — LETTER
September 25, 2024      Anil Montoya  [address] [address] [address]        To Whom It May Concern:    Anil Montoya is being treated for leukemia in our clinic. There are no work restrictions related to this condition including lifting.  He is clear to work as tolerated from a leukemia perspective.     Please feel free to contact us with questions.     Sincerely,      Jesus Varner MD, PhD  Hematology/Oncology   [facility]  [phone]

## 2024-09-25 NOTE — PROGRESS NOTES
[facility]: Cancer Care                                                                                          Call from pt that his return to work letter needs to include no lifting restrictions.  Letter updated and sent to pt via [email or web address].    Theresa Guerrero, MICKN, RN  RN Care Coordinator  [facility]

## 2024-09-26 ENCOUNTER — LAB (OUTPATIENT)
Dept: LAB | Facility: CLINIC | Age: 39
End: 2024-09-26
Payer: COMMERCIAL

## 2024-09-26 ENCOUNTER — TELEPHONE (OUTPATIENT)
Dept: ONCOLOGY | Facility: CLINIC | Age: 39
End: 2024-09-26

## 2024-09-26 DIAGNOSIS — I49.01 VENTRICULAR FIBRILLATION (H): ICD-10-CM

## 2024-09-26 DIAGNOSIS — C92.10 CML (CHRONIC MYELOCYTIC LEUKEMIA) (H): ICD-10-CM

## 2024-09-26 LAB
ALBUMIN SERPL BCG-MCNC: 3.7 G/DL (ref 3.5–5.2)
ALP SERPL-CCNC: 203 U/L (ref 40–150)
ALT SERPL W P-5'-P-CCNC: 39 U/L (ref 0–70)
ANION GAP SERPL CALCULATED.3IONS-SCNC: 15 MMOL/L (ref 7–15)
AST SERPL W P-5'-P-CCNC: 47 U/L (ref 0–45)
BASOPHILS # BLD AUTO: 0.4 10E3/UL (ref 0–0.2)
BASOPHILS NFR BLD AUTO: 5 %
BILIRUB SERPL-MCNC: 0.5 MG/DL
BUN SERPL-MCNC: 28.2 MG/DL (ref 6–20)
BURR CELLS BLD QL SMEAR: ABNORMAL
CALCIUM SERPL-MCNC: 8.3 MG/DL (ref 8.8–10.4)
CHLORIDE SERPL-SCNC: 109 MMOL/L (ref 98–107)
CREAT SERPL-MCNC: 1.21 MG/DL (ref 0.67–1.17)
EGFRCR SERPLBLD CKD-EPI 2021: 79 ML/MIN/1.73M2
ELLIPTOCYTES BLD QL SMEAR: SLIGHT
EOSINOPHIL # BLD AUTO: 0.1 10E3/UL (ref 0–0.7)
EOSINOPHIL NFR BLD AUTO: 2 %
ERYTHROCYTE [DISTWIDTH] IN BLOOD BY AUTOMATED COUNT: 19.3 % (ref 10–15)
GIANT PLATELETS BLD QL SMEAR: SLIGHT
GLUCOSE SERPL-MCNC: 183 MG/DL (ref 70–99)
HCO3 SERPL-SCNC: 14 MMOL/L (ref 22–29)
HCT VFR BLD AUTO: 31.8 % (ref 40–53)
HGB BLD-MCNC: 9.7 G/DL (ref 13.3–17.7)
IMM GRANULOCYTES # BLD: 0.2 10E3/UL
IMM GRANULOCYTES NFR BLD: 2 %
LDH SERPL L TO P-CCNC: 337 U/L (ref 0–250)
LYMPHOCYTES # BLD AUTO: 1 10E3/UL (ref 0.8–5.3)
LYMPHOCYTES NFR BLD AUTO: 15 %
MAGNESIUM SERPL-MCNC: 2.1 MG/DL (ref 1.7–2.3)
MCH RBC QN AUTO: 26.9 PG (ref 26.5–33)
MCHC RBC AUTO-ENTMCNC: 30.5 G/DL (ref 31.5–36.5)
MCV RBC AUTO: 88 FL (ref 78–100)
MONOCYTES # BLD AUTO: 0.1 10E3/UL (ref 0–1.3)
MONOCYTES NFR BLD AUTO: 2 %
NEUTROPHILS # BLD AUTO: 5.2 10E3/UL (ref 1.6–8.3)
NEUTROPHILS NFR BLD AUTO: 75 %
NRBC # BLD AUTO: 0.3 10E3/UL
NRBC BLD AUTO-RTO: 4 /100
PHOSPHATE SERPL-MCNC: 4.3 MG/DL (ref 2.5–4.5)
PLAT MORPH BLD: ABNORMAL
PLATELET # BLD AUTO: 460 10E3/UL (ref 150–450)
POTASSIUM SERPL-SCNC: 5 MMOL/L (ref 3.4–5.3)
PROT SERPL-MCNC: 6.8 G/DL (ref 6.4–8.3)
RBC # BLD AUTO: 3.6 10E6/UL (ref 4.4–5.9)
RBC MORPH BLD: ABNORMAL
SODIUM SERPL-SCNC: 138 MMOL/L (ref 135–145)
URATE SERPL-MCNC: 4.6 MG/DL (ref 3.4–7)
WBC # BLD AUTO: 7 10E3/UL (ref 4–11)

## 2024-09-26 PROCEDURE — 85025 COMPLETE CBC W/AUTO DIFF WBC: CPT

## 2024-09-26 PROCEDURE — 36415 COLL VENOUS BLD VENIPUNCTURE: CPT

## 2024-09-26 PROCEDURE — 80053 COMPREHEN METABOLIC PANEL: CPT

## 2024-09-26 PROCEDURE — 84100 ASSAY OF PHOSPHORUS: CPT

## 2024-09-26 PROCEDURE — 84550 ASSAY OF BLOOD/URIC ACID: CPT

## 2024-09-26 PROCEDURE — 83615 LACTATE (LD) (LDH) ENZYME: CPT

## 2024-09-26 PROCEDURE — 83735 ASSAY OF MAGNESIUM: CPT

## 2024-09-26 NOTE — ORAL ONC MGMT
"Oral Chemotherapy Monitoring Program    Subjective/Objective:  Anil Montoya is a 39 year old male contacted by phone for a follow-up visit for oral chemotherapy.  He started Sprycel in the hospital on 9/18. He stopped Hydrea on 9/24 after visit with Dr. Varner. He has not noticed any side effects with Sprycel other than \"cold flashes\" which are only slightly bothersome and we discussed will improve with time. Patient said he had looser stools when he started but that has returned to normal. He said a new rash developed in his inner thighs last night. This is not a typical TKI-induced drug rash location. Patient said the area is not raised, just red and itchy. He will use lotion and hydrocortisone topically for itching and call us if it worsens or spreads.     Reviewed lab results with patient from this morning (CBC, CMP, Mg, Phos, LDH, uric acid)        9/20/2024     2:00 PM 9/26/2024    10:00 AM   ORAL CHEMOTHERAPY   Assessment Type Initial Work up;New Teach Lab Monitoring;Initial Follow up   Diagnosis Code Chronic Myeloid Leukemia (CML) Chronic Myeloid Leukemia (CML)   Providers Dr. Telly Varner   Clinic Name/Location [facility]   Is this patient followed by the [facility] OC team? Yes Yes   Drug Name Sprycel (dasatinib) Sprycel (dasatinib)   Dose 100 mg 100 mg   Current Schedule Daily Daily   Cycle Details Continuous Continuous   Start Date of Last Cycle 9/18/2024    Doses missed in last 2 weeks  0   Adherence Assessment  Adherent   Adverse Effects  Rash   Rash  Grade 1   Pharmacist Intervention(Rash)  Yes   Intervention(s)  OTC recommendation;Patient education   Is the dose as ordered appropriate for the patient? Yes        Last PHQ-2 Score on record:       1/11/2024     9:42 AM   PHQ-2 ( 1999 [employer])   Q1: Little interest or pleasure in doing things 0   Q2: Feeling down, depressed or hopeless 0   PHQ-2 Score 0       Vitals:  BP:   BP Readings from Last 1 Encounters:   09/19/24 " "112/54     Wt Readings from Last 1 Encounters:   09/24/24 90.7 kg (200 lb)     Estimated body surface area is 2.12 meters squared as calculated from the following:    Height as of 9/24/24: 1.778 m (5' 10\").    Weight as of 9/24/24: 90.7 kg (200 lb).    Labs:  _  Result Component Current Result Ref Range   Sodium 138 (9/26/2024) 135 - 145 mmol/L     _  Result Component Current Result Ref Range   Potassium 5.0 (9/26/2024) 3.4 - 5.3 mmol/L     _  Result Component Current Result Ref Range   Calcium 8.3 (L) (9/26/2024) 8.8 - 10.4 mg/dL     _  Result Component Current Result Ref Range   Magnesium 2.1 (9/26/2024) 1.7 - 2.3 mg/dL     _  Result Component Current Result Ref Range   Phosphorus 4.3 (9/26/2024) 2.5 - 4.5 mg/dL     _  Result Component Current Result Ref Range   Albumin 3.7 (9/26/2024) 3.5 - 5.2 g/dL     _  Result Component Current Result Ref Range   Urea Nitrogen 28.2 (H) (9/26/2024) 6.0 - 20.0 mg/dL     _  Result Component Current Result Ref Range   Creatinine 1.21 (H) (9/26/2024) 0.67 - 1.17 mg/dL     _  Result Component Current Result Ref Range   AST 47 (H) (9/26/2024) 0 - 45 U/L     _  Result Component Current Result Ref Range   ALT 39 (9/26/2024) 0 - 70 U/L     _  Result Component Current Result Ref Range   Bilirubin Total 0.5 (9/26/2024) <=1.2 mg/dL     _  Result Component Current Result Ref Range   WBC Count 7.0 (9/26/2024) 4.0 - 11.0 10e3/uL     _  Result Component Current Result Ref Range   Hemoglobin 9.7 (L) (9/26/2024) 13.3 - 17.7 g/dL     _  Result Component Current Result Ref Range   Platelet Count 460 (H) (9/26/2024) 150 - 450 10e3/uL     _  Result Component Current Result Ref Range   Absolute Neutrophils 30.5 (H) (9/23/2024) 1.6 - 8.3 10e3/uL     _  Result Component Current Result Ref Range   Absolute Neutrophils 5.2 (9/26/2024) 1.6 - 8.3 10e3/uL          Assessment/Plan:  He appears to be tolerating therapy and will continue with dasatinib 100 mg daily as prescribed. Confirmed that patient has our " oral chemo monitoring phone number and patient understands he can contact us regarding side effects. Will need to follow up and make sure rash improves.    Reviewed labs - results show improvement in WBC count to normal! LDH improving. TLS labs stable and K has returned to normal range. Slight increase in LFTs but not concerning or warranting change at this time. Patient informed of all labs and verbalized understanding of weekly lab checks for x3, every other week x2, then monthly.     Follow-Up:  Labs 9/30      Jessica Dodson PharmD  Oral Chemotherapy Monitoring Program  [facility]  [phone]

## 2024-10-03 ENCOUNTER — OFFICE VISIT (OUTPATIENT)
Dept: CARDIOLOGY | Facility: CLINIC | Age: 39
End: 2024-10-03
Payer: COMMERCIAL

## 2024-10-03 ENCOUNTER — LAB (OUTPATIENT)
Dept: LAB | Facility: CLINIC | Age: 39
End: 2024-10-03
Payer: COMMERCIAL

## 2024-10-03 VITALS
HEART RATE: 100 BPM | WEIGHT: 203 LBS | OXYGEN SATURATION: 100 % | HEIGHT: 70 IN | SYSTOLIC BLOOD PRESSURE: 103 MMHG | BODY MASS INDEX: 29.06 KG/M2 | DIASTOLIC BLOOD PRESSURE: 64 MMHG

## 2024-10-03 DIAGNOSIS — I44.2 ATRIOVENTRICULAR BLOCK, COMPLETE (H): ICD-10-CM

## 2024-10-03 DIAGNOSIS — C92.10 CML (CHRONIC MYELOCYTIC LEUKEMIA) (H): ICD-10-CM

## 2024-10-03 DIAGNOSIS — Z95.810 ICD (IMPLANTABLE CARDIOVERTER-DEFIBRILLATOR) IN PLACE: ICD-10-CM

## 2024-10-03 DIAGNOSIS — I49.01 VENTRICULAR FIBRILLATION (H): Primary | ICD-10-CM

## 2024-10-03 DIAGNOSIS — I46.9 CARDIAC ARREST (H): ICD-10-CM

## 2024-10-03 DIAGNOSIS — Z95.4 S/P TVR (TRICUSPID VALVE REPLACEMENT): ICD-10-CM

## 2024-10-03 LAB
ALBUMIN SERPL BCG-MCNC: 3.8 G/DL (ref 3.5–5.2)
ALP SERPL-CCNC: 213 U/L (ref 40–150)
ALT SERPL W P-5'-P-CCNC: 33 U/L (ref 0–70)
ANION GAP SERPL CALCULATED.3IONS-SCNC: 11 MMOL/L (ref 7–15)
AST SERPL W P-5'-P-CCNC: 26 U/L (ref 0–45)
BASOPHILS # BLD AUTO: 0.3 10E3/UL (ref 0–0.2)
BASOPHILS NFR BLD AUTO: 8 %
BILIRUB SERPL-MCNC: 0.5 MG/DL
BUN SERPL-MCNC: 20.6 MG/DL (ref 6–20)
CALCIUM SERPL-MCNC: 8.6 MG/DL (ref 8.8–10.4)
CHLORIDE SERPL-SCNC: 109 MMOL/L (ref 98–107)
CREAT SERPL-MCNC: 1.25 MG/DL (ref 0.67–1.17)
EGFRCR SERPLBLD CKD-EPI 2021: 76 ML/MIN/1.73M2
EOSINOPHIL # BLD AUTO: 0.1 10E3/UL (ref 0–0.7)
EOSINOPHIL NFR BLD AUTO: 2 %
ERYTHROCYTE [DISTWIDTH] IN BLOOD BY AUTOMATED COUNT: 18.7 % (ref 10–15)
GLUCOSE SERPL-MCNC: 136 MG/DL (ref 70–99)
HCO3 SERPL-SCNC: 17 MMOL/L (ref 22–29)
HCT VFR BLD AUTO: 26.7 % (ref 40–53)
HGB BLD-MCNC: 8.2 G/DL (ref 13.3–17.7)
IMM GRANULOCYTES # BLD: 0 10E3/UL
IMM GRANULOCYTES NFR BLD: 1 %
LDH SERPL L TO P-CCNC: 230 U/L (ref 0–250)
LYMPHOCYTES # BLD AUTO: 0.9 10E3/UL (ref 0.8–5.3)
LYMPHOCYTES NFR BLD AUTO: 26 %
MAGNESIUM SERPL-MCNC: 1.9 MG/DL (ref 1.7–2.3)
MCH RBC QN AUTO: 26.8 PG (ref 26.5–33)
MCHC RBC AUTO-ENTMCNC: 30.7 G/DL (ref 31.5–36.5)
MCV RBC AUTO: 87 FL (ref 78–100)
MONOCYTES # BLD AUTO: 0.1 10E3/UL (ref 0–1.3)
MONOCYTES NFR BLD AUTO: 2 %
NEUTROPHILS # BLD AUTO: 2.1 10E3/UL (ref 1.6–8.3)
NEUTROPHILS NFR BLD AUTO: 61 %
NRBC # BLD AUTO: 0 10E3/UL
NRBC BLD AUTO-RTO: 0 /100
PHOSPHATE SERPL-MCNC: 3.2 MG/DL (ref 2.5–4.5)
PLATELET # BLD AUTO: 113 10E3/UL (ref 150–450)
POTASSIUM SERPL-SCNC: 4.5 MMOL/L (ref 3.4–5.3)
PROT SERPL-MCNC: 6.7 G/DL (ref 6.4–8.3)
RBC # BLD AUTO: 3.06 10E6/UL (ref 4.4–5.9)
SODIUM SERPL-SCNC: 137 MMOL/L (ref 135–145)
URATE SERPL-MCNC: 3.3 MG/DL (ref 3.4–7)
WBC # BLD AUTO: 3.4 10E3/UL (ref 4–11)

## 2024-10-03 PROCEDURE — 84550 ASSAY OF BLOOD/URIC ACID: CPT | Performed by: INTERNAL MEDICINE

## 2024-10-03 PROCEDURE — 36415 COLL VENOUS BLD VENIPUNCTURE: CPT | Performed by: INTERNAL MEDICINE

## 2024-10-03 PROCEDURE — 83735 ASSAY OF MAGNESIUM: CPT | Performed by: INTERNAL MEDICINE

## 2024-10-03 PROCEDURE — 80053 COMPREHEN METABOLIC PANEL: CPT | Performed by: INTERNAL MEDICINE

## 2024-10-03 PROCEDURE — 84100 ASSAY OF PHOSPHORUS: CPT | Performed by: INTERNAL MEDICINE

## 2024-10-03 PROCEDURE — 83615 LACTATE (LD) (LDH) ENZYME: CPT | Performed by: INTERNAL MEDICINE

## 2024-10-03 PROCEDURE — 93000 ELECTROCARDIOGRAM COMPLETE: CPT | Performed by: INTERNAL MEDICINE

## 2024-10-03 PROCEDURE — 85025 COMPLETE CBC W/AUTO DIFF WBC: CPT | Performed by: INTERNAL MEDICINE

## 2024-10-03 PROCEDURE — 99215 OFFICE O/P EST HI 40 MIN: CPT | Performed by: INTERNAL MEDICINE

## 2024-10-03 NOTE — LETTER
10/3/2024    Rami M. Almokayyad, MD [facility] [address] [facility] [address]    RE: Anil Montoya       Dear Colleague,     I had the pleasure of seeing Anil Montoya in the [facility].  [facility] HEART [facility]  Cardiac Electrophysiology Clinic    Anil Montoya MRN# [id number]   YOB: 1985 Age: 39 year old       I had the pleasure of seeing Anil Montoya  who is a 39 year old male with history of type 2 diabetes, hypertension, WARNER, hypothyroidism, chronic myeloid leukemia, tricuspid valve endocarditis s/p bioprosthetic TVR (4/1/2022), complicated by complete heart block and VF cardiac arrest (4/4/2022), s/p dual-chamber ICD ([employer] 4/12/2022).  He follows with Dr. Higgins and Dr. Dorsey.  The patient had an episode of fast VT in 1/2024.  He had ATP x 1 which was unsuccessful and the rhythm deteriorated into VF which was successfully terminated with 1 shock.  He was taking out the garbage, recalls feeling very dizzy, squatted down, and then woke up in the driveway with a lump on his head.  He did note that he had been fasting that day.  His device was programmed DDI, and this was changed to DDD.  He was not on carvedilol at the time, so he was started on low-dose metoprolol.  On 9/4/2024, he had an appropriate ICD shock for ventricular fibrillation.  It was noted that this was during emotional stress when he thought he might not build to see his kids again and his partner had filed a restraining order.  His metoprolol dose was increased, but he had worsening dizziness, dyspnea on exertion, and lower extremity edema.  He presented to [facility] on 9/14/2024, and was noted to have hyperleukocytosis so was transferred to [facility].  He had a bone marrow biopsy and was ultimately diagnosed with chronic myeloid leukemia  The EP team was consulted during his admission and  increased his metoprolol to 37.5 mg daily.  He was also noted that his device was programmed DDI and it was changed to DDD again.      Today's Visit:  He has recovered well from his recent hospitalization.  He also notes that he felt wiped out on the higher dose of metoprolol, and is feeling much better now that he is back on 25 mg daily.  He reports no chest pain, shortness of breath, palpitations, dizziness, or syncope.  He feels like he has better endurance now.  He states it did take a little while to get used to the new programming on his device, but he is feeling well with that now.          Diagnostic Testing:  EKG today, which I overread, showed Sinus rhythm with ventricular pacing, rate 96 bpm  EKG 9/19/2024-sinus rhythm with AV dyssynchrony, ventricular paced at 55 bpm      Device interrogation 9/19/2024  Patient seen in [address] for evaluation and iterative programming of their ICD per MD orders.      Device: [employer] D433 RESONATE EL ICD  Normal device function.  Mode: DDDR  bpm  AP: 10%  : 95%  Intrinsic rhythm: 2:1 AVB; EGM did not save snapshot of sensing test.   Lead Trends Appear Stable: Shock impedance and V Pace impedance gradually decreasing since June 2024.  Estimated battery longevity to RRT = 9 years.   Atrial Arrhythmia: None.  Ventricular Arrhythmia: 1 NSVT episode recorded and EGM available shows a VT/VF episode and arrhythmia breaks naturally. No therapies delivered.   Setting Changes: Mode changed from DDI to DDD. LRL increased from 55 bpm to 60 bpm. Rate response turned ON. AV Search + turned OFF.         Echocardiogram 9/12/2024    S/P TV replacement with 31 mm epic stented valve- mean gradient has increased  to 10mmHg from 7.5mmHg previously  Left ventricular systolic function is normal.  The right ventricle is not well visualized.  The right ventricular systolic function is normal.  Technically difficult, suboptimal study.    Echo 5/12/2022   S/P TV replacement with 31  mm epic stented valve  MG across TV is 7.5mm at 57bpm.(compared to 6.1mm in April)  No tricuspoid regurgitation  Left ventricular systolic function is normal.  The visual ejection fraction is 55-60%.  The study was technically limited.    Cardiac Catheterization 3/31/2022   Pre-op for TVR  Angiographically normal coronary arteries.         Last Comprehensive Metabolic Panel:  Lab Results   Component Value Date     09/26/2024    POTASSIUM 5.0 09/26/2024    CHLORIDE 109 (H) 09/26/2024    CO2 14 (L) 09/26/2024    ANIONGAP 15 09/26/2024     (H) 09/26/2024    BUN 28.2 (H) 09/26/2024    CR 1.21 (H) 09/26/2024    GFRESTIMATED 79 09/26/2024    IMAN 8.3 (L) 09/26/2024        Magnesium   Date Value Ref Range Status   09/26/2024 2.1 1.7 - 2.3 mg/dL Final   11/24/2017 2.2 1.6 - 2.3 mg/dL Final        TSH   Date Value Ref Range Status   09/15/2024 4.67 (H) 0.30 - 4.20 uIU/mL Final        Free T4   Date Value Ref Range Status   09/15/2024 1.33 0.90 - 1.70 ng/dL Final            Assessment/Plan:    39 year old male with history of type 2 diabetes, hypertension, WARNER, hypothyroidism, chronic myeloid leukemia, tricuspid valve endocarditis s/p bioprosthetic TVR (4/1/2022), complicated by complete heart block and VF cardiac arrest (4/4/2022), s/p dual-chamber ICD ([employer] 4/12/2022).  Has had appropriate ICD therapies for VT/VF.  Most recently on 9/4/2024 he had an ICD shock for ventricular fibrillation.  His device was previously programmed DDI, and he had ventricular pacing with AV dyssynchrony.  He is now reprogrammed to DDDR.  His EKG today shows sinus rhythm with appropriate ventricular pacing.  He is scheduled for another remote device check in a couple of weeks.  His recent echocardiogram shows preserved LV function, his tricuspid valve gradient was noted to be higher than previously measured, however this was prior to his device reprogramming.  He has no heart failure symptoms, and has actually noted  improvement in his endurance.      Continue metoprolol 25 mg daily  Scheduled for remote device check in a couple weeks  Routine follow up in 6 months       Fred Clinton MD        Orders this Visit:  Orders Placed This Encounter   Procedures     Follow-Up with Cardiology EP     EKG 12-lead complete w/read - Clinics (performed today)     No orders of the defined types were placed in this encounter.    There are no discontinued medications.    Encounter Diagnoses   Name Primary?     Ventricular fibrillation (H) Yes     S/P TVR (tricuspid valve replacement)      ICD (implantable cardioverter-defibrillator) in place      Cardiac arrest (H)      Atrioventricular block, complete (H)      Today's clinic visit entailed:  Review of the result(s) of each unique test - echo  The following tests were independently interpreted by me as noted in my documentation: EKG, device check  45 minutes spent by me on the date of the encounter doing chart review, history and exam, documentation and further activities per the note    CURRENT MEDICATIONS:  Current Outpatient Medications   Medication Sig Dispense Refill     acetaminophen (TYLENOL) 325 MG tablet Take 2 tablets (650 mg) by mouth every 4 hours as needed for mild pain or fever 30 tablet 0     allopurinol (ZYLOPRIM) 300 MG tablet Take 1 tablet (300 mg) by mouth daily. 30 tablet 0     buPROPion (WELLBUTRIN XL) 300 MG 24 hr tablet Take 300 mg by mouth every morning       dasatinib (SPRYCEL) 100 MG tablet Take 1 tablet (100 mg) by mouth daily Avoid grapefruit and grapefruit juice. May be given with or without food, but should be consistent. 30 tablet 0     empagliflozin (JARDIANCE) 25 MG TABS tablet Take 25 mg by mouth daily.       furosemide (LASIX) 20 MG tablet Take 1 tablet (20 mg) by mouth daily       levothyroxine (SYNTHROID/LEVOTHROID) 125 MCG tablet Take 150 mcg by mouth daily.       losartan (COZAAR) 50 MG tablet Take 1 tablet (50 mg) by mouth daily       metFORMIN (GLUCOPHAGE)  "1000 MG tablet Take 1,000 mg by mouth 2 times daily (with meals)       metoprolol succinate ER (TOPROL XL) 25 MG 24 hr tablet Take 1.5 tablets (37.5 mg) by mouth daily.       semaglutide (OZEMPIC) 2 MG/1.5ML pen Inject 2 mg subcutaneously every 7 days.         Review of Systems:  Pertinent review of system as stated above in HPI    Skin:        Eyes:       ENT:       Respiratory:       Cardiovascular:       Gastroenterology:      Genitourinary:       Musculoskeletal:       Neurologic:       Psychiatric:       Heme/Lymph/Imm:       Endocrine:         Physical Exam:  Vitals: /64   Pulse 100   Ht 1.778 m (5' 10\")   Wt 92.1 kg (203 lb)   SpO2 100%   BMI 29.13 kg/m      Constitutional: Pleasant, no apparent distress.  Respiratory: Breathing non-labored.   Cardiovascular:  Regular rate and rhythm  Neurologic: No gross motor deficits.   Psychiatric: Affect appropriate.        ALLERGIES  Allergies   Allergen Reactions     Vancomycin      \"Red Sonia Syndrome\"     Clindamycin Unknown     Ceftriaxone Rash       PAST MEDICAL HISTORY:  Past Medical History:   Diagnosis Date     Acute kidney injury (H) 04/18/2022     Fluid overload 04/18/2022     MSSA bacteremia 03/28/2022     Osteomyelitis of spine (H) 04/18/2022     Pneumonia      Sepsis (H) 04/18/2022       PAST SURGICAL HISTORY:  Past Surgical History:   Procedure Laterality Date     BONE MARROW BIOPSY, BONE SPECIMEN, NEEDLE/TROCAR Left 9/16/2024    Procedure: Bone marrow biopsy, bone specimen, needle/trocar, left posterior iliac crest;  Surgeon: Noni Sauer PA-C;  Location: [facility] OR     CV CORONARY ANGIOGRAM N/A 3/31/2022    Procedure: Coronary Angiogram;  Surgeon: Lidia Quintanilla MD;  Location:  HEART CARDIAC CATH LAB     EP ICD INSERT SINGLE N/A 4/12/2022    Procedure: Implantable Cardioverter Defibrillator Device & Lead Implant Single or Dual;  Surgeon: Suleman Higgins MD;  Location:  HEART CARDIAC CATH LAB     IR LUMBAR PUNCTURE  7/30/2012 "     REPLACE VALVE AORTIC N/A 4/1/2022    Procedure: AORTIC VALVE exploration;  Surgeon: Marti Melton MD;  Location:  OR     REPLACE VALVE TRICUSPID N/A 4/1/2022    Procedure: TRICUSPID VALVE REPLACEMENT;  Surgeon: Marti Melton MD;  Location:  OR       FAMILY HISTORY:  History reviewed. No pertinent family history.    SOCIAL HISTORY:  Social History     Socioeconomic History     Marital status:      Spouse name: None     Number of children: None     Years of education: None     Highest education level: None   Tobacco Use     Smoking status: Never     Smokeless tobacco: Never   Substance and Sexual Activity     Alcohol use: Not Currently     Drug use: Never     Social Determinants of Health     Financial Resource Strain: Low Risk  (9/15/2024)    Financial Resource Strain      Within the past 12 months, have you or your family members you live with been unable to get utilities (heat, electricity) when it was really needed?: No   Food Insecurity: Low Risk  (9/15/2024)    Food Insecurity      Within the past 12 months, did you worry that your food would run out before you got money to buy more?: No      Within the past 12 months, did the food you bought just not last and you didn t have money to get more?: No   Transportation Needs: Low Risk  (9/15/2024)    Transportation Needs      Within the past 12 months, has lack of transportation kept you from medical appointments, getting your medicines, non-medical meetings or appointments, work, or from getting things that you need?: No   Interpersonal Safety: Low Risk  (9/15/2024)    Interpersonal Safety      Do you feel physically and emotionally safe where you currently live?: Yes      Within the past 12 months, have you been hit, slapped, kicked or otherwise physically hurt by someone?: No      Within the past 12 months, have you been humiliated or emotionally abused in other ways by your partner or ex-partner?: No   Housing Stability: Low  Risk  (9/15/2024)    Housing Stability      Do you have housing? : Yes      Are you worried about losing your housing?: No             CC  No referring provider defined for this encounter.      Thank you for allowing me to participate in the care of your patient.      Sincerely,     Fred Clinton MD     [facility]  cc:   Anat Gan MD  [facility] [place]  [address] [place] [address]

## 2024-10-03 NOTE — PROGRESS NOTES
[facility] HEART [facility]  Cardiac Electrophysiology Clinic    Anil Montoya MRN# [id number]   YOB: 1985 Age: 39 year old       I had the pleasure of seeing Anil Montoya  who is a 39 year old male with history of type 2 diabetes, hypertension, WARNER, hypothyroidism, chronic myeloid leukemia, tricuspid valve endocarditis s/p bioprosthetic TVR (4/1/2022), complicated by complete heart block and VF cardiac arrest (4/4/2022), s/p dual-chamber ICD ([employer] 4/12/2022).  He follows with Dr. Higgins and Dr. Dorsey.  The patient had an episode of fast VT in 1/2024.  He had ATP x 1 which was unsuccessful and the rhythm deteriorated into VF which was successfully terminated with 1 shock.  He was taking out the garbage, recalls feeling very dizzy, squatted down, and then woke up in the driveway with a lump on his head.  He did note that he had been fasting that day.  His device was programmed DDI, and this was changed to DDD.  He was not on carvedilol at the time, so he was started on low-dose metoprolol.  On 9/4/2024, he had an appropriate ICD shock for ventricular fibrillation.  It was noted that this was during emotional stress when he thought he might not build to see his kids again and his partner had filed a restraining order.  His metoprolol dose was increased, but he had worsening dizziness, dyspnea on exertion, and lower extremity edema.  He presented to [facility] on 9/14/2024, and was noted to have hyperleukocytosis so was transferred to [facility].  He had a bone marrow biopsy and was ultimately diagnosed with chronic myeloid leukemia  The EP team was consulted during his admission and increased his metoprolol to 37.5 mg daily.  He was also noted that his device was programmed DDI and it was changed to DDD again.      Today's Visit:  He has recovered well from his recent hospitalization.  He also notes that he felt wiped out on the higher dose of metoprolol,  and is feeling much better now that he is back on 25 mg daily.  He reports no chest pain, shortness of breath, palpitations, dizziness, or syncope.  He feels like he has better endurance now.  He states it did take a little while to get used to the new programming on his device, but he is feeling well with that now.          Diagnostic Testing:  EKG today, which I overread, showed Sinus rhythm with ventricular pacing, rate 96 bpm  EKG 9/19/2024-sinus rhythm with AV dyssynchrony, ventricular paced at 55 bpm      Device interrogation 9/19/2024  Patient seen in [address] for evaluation and iterative programming of their ICD per MD orders.      Device: [employer] D433 RESONATE EL ICD  Normal device function.  Mode: DDDR  bpm  AP: 10%  : 95%  Intrinsic rhythm: 2:1 AVB; EGM did not save snapshot of sensing test.   Lead Trends Appear Stable: Shock impedance and V Pace impedance gradually decreasing since June 2024.  Estimated battery longevity to RRT = 9 years.   Atrial Arrhythmia: None.  Ventricular Arrhythmia: 1 NSVT episode recorded and EGM available shows a VT/VF episode and arrhythmia breaks naturally. No therapies delivered.   Setting Changes: Mode changed from DDI to DDD. LRL increased from 55 bpm to 60 bpm. Rate response turned ON. AV Search + turned OFF.         Echocardiogram 9/12/2024    S/P TV replacement with 31 mm epic stented valve- mean gradient has increased  to 10mmHg from 7.5mmHg previously  Left ventricular systolic function is normal.  The right ventricle is not well visualized.  The right ventricular systolic function is normal.  Technically difficult, suboptimal study.    Echo 5/12/2022   S/P TV replacement with 31 mm epic stented valve  MG across TV is 7.5mm at 57bpm.(compared to 6.1mm in April)  No tricuspoid regurgitation  Left ventricular systolic function is normal.  The visual ejection fraction is 55-60%.  The study was technically limited.    Cardiac Catheterization 3/31/2022    Pre-op for TVR  Angiographically normal coronary arteries.         Last Comprehensive Metabolic Panel:  Lab Results   Component Value Date     09/26/2024    POTASSIUM 5.0 09/26/2024    CHLORIDE 109 (H) 09/26/2024    CO2 14 (L) 09/26/2024    ANIONGAP 15 09/26/2024     (H) 09/26/2024    BUN 28.2 (H) 09/26/2024    CR 1.21 (H) 09/26/2024    GFRESTIMATED 79 09/26/2024    IMAN 8.3 (L) 09/26/2024        Magnesium   Date Value Ref Range Status   09/26/2024 2.1 1.7 - 2.3 mg/dL Final   11/24/2017 2.2 1.6 - 2.3 mg/dL Final        TSH   Date Value Ref Range Status   09/15/2024 4.67 (H) 0.30 - 4.20 uIU/mL Final        Free T4   Date Value Ref Range Status   09/15/2024 1.33 0.90 - 1.70 ng/dL Final            Assessment/Plan:    39 year old male with history of type 2 diabetes, hypertension, WARNER, hypothyroidism, chronic myeloid leukemia, tricuspid valve endocarditis s/p bioprosthetic TVR (4/1/2022), complicated by complete heart block and VF cardiac arrest (4/4/2022), s/p dual-chamber ICD ([employer] 4/12/2022).  Has had appropriate ICD therapies for VT/VF.  Most recently on 9/4/2024 he had an ICD shock for ventricular fibrillation.  His device was previously programmed DDI, and he had ventricular pacing with AV dyssynchrony.  He is now reprogrammed to DDDR.  His EKG today shows sinus rhythm with appropriate ventricular pacing.  He is scheduled for another remote device check in a couple of weeks.  His recent echocardiogram shows preserved LV function, his tricuspid valve gradient was noted to be higher than previously measured, however this was prior to his device reprogramming.  He has no heart failure symptoms, and has actually noted improvement in his endurance.      Continue metoprolol 25 mg daily  Scheduled for remote device check in a couple weeks  Routine follow up in 6 months       Fred Clinton MD        Orders this Visit:  Orders Placed This Encounter   Procedures    Follow-Up with Cardiology EP    EKG  12-lead complete w/read - Clinics (performed today)     No orders of the defined types were placed in this encounter.    There are no discontinued medications.    Encounter Diagnoses   Name Primary?    Ventricular fibrillation (H) Yes    S/P TVR (tricuspid valve replacement)     ICD (implantable cardioverter-defibrillator) in place     Cardiac arrest (H)     Atrioventricular block, complete (H)      Today's clinic visit entailed:  Review of the result(s) of each unique test - echo  The following tests were independently interpreted by me as noted in my documentation: EKG, device check  45 minutes spent by me on the date of the encounter doing chart review, history and exam, documentation and further activities per the note    CURRENT MEDICATIONS:  Current Outpatient Medications   Medication Sig Dispense Refill    acetaminophen (TYLENOL) 325 MG tablet Take 2 tablets (650 mg) by mouth every 4 hours as needed for mild pain or fever 30 tablet 0    allopurinol (ZYLOPRIM) 300 MG tablet Take 1 tablet (300 mg) by mouth daily. 30 tablet 0    buPROPion (WELLBUTRIN XL) 300 MG 24 hr tablet Take 300 mg by mouth every morning      dasatinib (SPRYCEL) 100 MG tablet Take 1 tablet (100 mg) by mouth daily Avoid grapefruit and grapefruit juice. May be given with or without food, but should be consistent. 30 tablet 0    empagliflozin (JARDIANCE) 25 MG TABS tablet Take 25 mg by mouth daily.      furosemide (LASIX) 20 MG tablet Take 1 tablet (20 mg) by mouth daily      levothyroxine (SYNTHROID/LEVOTHROID) 125 MCG tablet Take 150 mcg by mouth daily.      losartan (COZAAR) 50 MG tablet Take 1 tablet (50 mg) by mouth daily      metFORMIN (GLUCOPHAGE) 1000 MG tablet Take 1,000 mg by mouth 2 times daily (with meals)      metoprolol succinate ER (TOPROL XL) 25 MG 24 hr tablet Take 1.5 tablets (37.5 mg) by mouth daily.      semaglutide (OZEMPIC) 2 MG/1.5ML pen Inject 2 mg subcutaneously every 7 days.         Review of Systems:  Pertinent review  "of system as stated above in HPI    Skin:        Eyes:       ENT:       Respiratory:       Cardiovascular:       Gastroenterology:      Genitourinary:       Musculoskeletal:       Neurologic:       Psychiatric:       Heme/Lymph/Imm:       Endocrine:         Physical Exam:  Vitals: /64   Pulse 100   Ht 1.778 m (5' 10\")   Wt 92.1 kg (203 lb)   SpO2 100%   BMI 29.13 kg/m      Constitutional: Pleasant, no apparent distress.  Respiratory: Breathing non-labored.   Cardiovascular:  Regular rate and rhythm  Neurologic: No gross motor deficits.   Psychiatric: Affect appropriate.        ALLERGIES  Allergies   Allergen Reactions    Vancomycin      \"Red Sonia Syndrome\"    Clindamycin Unknown    Ceftriaxone Rash       PAST MEDICAL HISTORY:  Past Medical History:   Diagnosis Date    Acute kidney injury (H) 04/18/2022    Fluid overload 04/18/2022    MSSA bacteremia 03/28/2022    Osteomyelitis of spine (H) 04/18/2022    Pneumonia     Sepsis (H) 04/18/2022       PAST SURGICAL HISTORY:  Past Surgical History:   Procedure Laterality Date    BONE MARROW BIOPSY, BONE SPECIMEN, NEEDLE/TROCAR Left 9/16/2024    Procedure: Bone marrow biopsy, bone specimen, needle/trocar, left posterior iliac crest;  Surgeon: Noni Sauer PA-C;  Location: [facility] OR    CV CORONARY ANGIOGRAM N/A 3/31/2022    Procedure: Coronary Angiogram;  Surgeon: Lidia Quintanilla MD;  Location:  HEART CARDIAC CATH LAB    EP ICD INSERT SINGLE N/A 4/12/2022    Procedure: Implantable Cardioverter Defibrillator Device & Lead Implant Single or Dual;  Surgeon: Suleman Higgins MD;  Location:  HEART CARDIAC CATH LAB    IR LUMBAR PUNCTURE  7/30/2012    REPLACE VALVE AORTIC N/A 4/1/2022    Procedure: AORTIC VALVE exploration;  Surgeon: Marti Melton MD;  Location:  OR    REPLACE VALVE TRICUSPID N/A 4/1/2022    Procedure: TRICUSPID VALVE REPLACEMENT;  Surgeon: Marti Melton MD;  Location:  OR       FAMILY HISTORY:  History reviewed. No " pertinent family history.    SOCIAL HISTORY:  Social History     Socioeconomic History    Marital status:      Spouse name: None    Number of children: None    Years of education: None    Highest education level: None   Tobacco Use    Smoking status: Never    Smokeless tobacco: Never   Substance and Sexual Activity    Alcohol use: Not Currently    Drug use: Never     Social Determinants of Health     Financial Resource Strain: Low Risk  (9/15/2024)    Financial Resource Strain     Within the past 12 months, have you or your family members you live with been unable to get utilities (heat, electricity) when it was really needed?: No   Food Insecurity: Low Risk  (9/15/2024)    Food Insecurity     Within the past 12 months, did you worry that your food would run out before you got money to buy more?: No     Within the past 12 months, did the food you bought just not last and you didn t have money to get more?: No   Transportation Needs: Low Risk  (9/15/2024)    Transportation Needs     Within the past 12 months, has lack of transportation kept you from medical appointments, getting your medicines, non-medical meetings or appointments, work, or from getting things that you need?: No   Interpersonal Safety: Low Risk  (9/15/2024)    Interpersonal Safety     Do you feel physically and emotionally safe where you currently live?: Yes     Within the past 12 months, have you been hit, slapped, kicked or otherwise physically hurt by someone?: No     Within the past 12 months, have you been humiliated or emotionally abused in other ways by your partner or ex-partner?: No   Housing Stability: Low Risk  (9/15/2024)    Housing Stability     Do you have housing? : Yes     Are you worried about losing your housing?: No             CC  No referring provider defined for this encounter.

## 2024-10-04 ENCOUNTER — MYC MEDICAL ADVICE (OUTPATIENT)
Dept: ONCOLOGY | Facility: CLINIC | Age: 39
End: 2024-10-04
Payer: COMMERCIAL

## 2024-10-04 NOTE — ORAL ONC MGMT
Oral Chemotherapy Monitoring Program  Lab Follow Up    Reviewed lab results from 10/3/24.        9/20/2024     2:00 PM 9/26/2024    10:00 AM 10/4/2024     4:00 PM   ORAL CHEMOTHERAPY   Assessment Type Initial Work up;New Teach Lab Monitoring;Initial Follow up Lab Monitoring   Diagnosis Code Chronic Myeloid Leukemia (CML) Chronic Myeloid Leukemia (CML) Chronic Myeloid Leukemia (CML)   Providers Dr. Telly Varner   Clinic Name/Location [facility]   Is this patient followed by the [facility] OC team? Yes Yes Yes   Drug Name Sprycel (dasatinib) Sprycel (dasatinib) Sprycel (dasatinib)   Dose 100 mg 100 mg 100 mg   Current Schedule Daily Daily Daily   Cycle Details Continuous Continuous Continuous   Start Date of Last Cycle 9/18/2024     Doses missed in last 2 weeks  0    Adherence Assessment  Adherent    Adverse Effects  Rash    Rash  Grade 1    Pharmacist Intervention(Rash)  Yes    Intervention(s)  OTC recommendation;Patient education    Is the dose as ordered appropriate for the patient? Yes         Labs:  _  Result Component Current Result Ref Range   Sodium 137 (10/3/2024) 135 - 145 mmol/L     _  Result Component Current Result Ref Range   Potassium 4.5 (10/3/2024) 3.4 - 5.3 mmol/L     _  Result Component Current Result Ref Range   Calcium 8.6 (L) (10/3/2024) 8.8 - 10.4 mg/dL     _  Result Component Current Result Ref Range   Magnesium 1.9 (10/3/2024) 1.7 - 2.3 mg/dL     _  Result Component Current Result Ref Range   Phosphorus 3.2 (10/3/2024) 2.5 - 4.5 mg/dL     _  Result Component Current Result Ref Range   Albumin 3.8 (10/3/2024) 3.5 - 5.2 g/dL     _  Result Component Current Result Ref Range   Urea Nitrogen 20.6 (H) (10/3/2024) 6.0 - 20.0 mg/dL     _  Result Component Current Result Ref Range   Creatinine 1.25 (H) (10/3/2024) 0.67 - 1.17 mg/dL     _  Result Component Current Result Ref Range   AST 26 (10/3/2024) 0 - 45 U/L     _  Result Component Current Result Ref Range   ALT 33  (10/3/2024) 0 - 70 U/L     _  Result Component Current Result Ref Range   Bilirubin Total 0.5 (10/3/2024) <=1.2 mg/dL     _  Result Component Current Result Ref Range   WBC Count 3.4 (L) (10/3/2024) 4.0 - 11.0 10e3/uL     _  Result Component Current Result Ref Range   Hemoglobin 8.2 (L) (10/3/2024) 13.3 - 17.7 g/dL     _  Result Component Current Result Ref Range   Platelet Count 113 (L) (10/3/2024) 150 - 450 10e3/uL     _  Result Component Current Result Ref Range   Absolute Neutrophils 30.5 (H) (9/23/2024) 1.6 - 8.3 10e3/uL     _  Result Component Current Result Ref Range   Absolute Neutrophils 2.1 (10/3/2024) 1.6 - 8.3 10e3/uL        Assessment & Plan:  No concerning abnormalities. Plan to continue dasatinib as prescribed. Patient was contacted via [employer].    Follow-Up:  Next lab appt on 10/7    Lux Abad PharmD  Oral Chemotherapy Monitoring Program  [facility]  [phone]

## 2024-10-04 NOTE — RESULT ENCOUNTER NOTE
Dear Anil,   Here are your recent results. WBC has come down very nicely. Hemoglobin and platelets have also dropped down a bit - which can happen as we discussed. They typically rebound back to normal or near normal over time. No changes to the plan.    Please contact us if you have questions.    Jesus Varner MD, PhD

## 2024-10-07 ENCOUNTER — NURSE TRIAGE (OUTPATIENT)
Dept: ONCOLOGY | Facility: CLINIC | Age: 39
End: 2024-10-07

## 2024-10-07 ENCOUNTER — LAB (OUTPATIENT)
Dept: LAB | Facility: CLINIC | Age: 39
End: 2024-10-07
Payer: COMMERCIAL

## 2024-10-07 ENCOUNTER — MYC MEDICAL ADVICE (OUTPATIENT)
Dept: ONCOLOGY | Facility: CLINIC | Age: 39
End: 2024-10-07

## 2024-10-07 DIAGNOSIS — C92.10 CML (CHRONIC MYELOCYTIC LEUKEMIA) (H): ICD-10-CM

## 2024-10-07 LAB
ALBUMIN SERPL BCG-MCNC: 3.9 G/DL (ref 3.5–5.2)
ALP SERPL-CCNC: 194 U/L (ref 40–150)
ALT SERPL W P-5'-P-CCNC: 27 U/L (ref 0–70)
ANION GAP SERPL CALCULATED.3IONS-SCNC: 14 MMOL/L (ref 7–15)
AST SERPL W P-5'-P-CCNC: 24 U/L (ref 0–45)
BASOPHILS # BLD AUTO: 0.2 10E3/UL (ref 0–0.2)
BASOPHILS NFR BLD AUTO: 14 %
BILIRUB SERPL-MCNC: 0.6 MG/DL
BUN SERPL-MCNC: 24.4 MG/DL (ref 6–20)
CALCIUM SERPL-MCNC: 8.3 MG/DL (ref 8.8–10.4)
CHLORIDE SERPL-SCNC: 106 MMOL/L (ref 98–107)
CREAT SERPL-MCNC: 1.4 MG/DL (ref 0.67–1.17)
EGFRCR SERPLBLD CKD-EPI 2021: 66 ML/MIN/1.73M2
ELLIPTOCYTES BLD QL SMEAR: SLIGHT
EOSINOPHIL # BLD AUTO: 0.2 10E3/UL (ref 0–0.7)
EOSINOPHIL NFR BLD AUTO: 10 %
ERYTHROCYTE [DISTWIDTH] IN BLOOD BY AUTOMATED COUNT: 18.6 % (ref 10–15)
FRAGMENTS BLD QL SMEAR: SLIGHT
GLUCOSE SERPL-MCNC: 154 MG/DL (ref 70–99)
HCO3 SERPL-SCNC: 18 MMOL/L (ref 22–29)
HCT VFR BLD AUTO: 24.2 % (ref 40–53)
HGB BLD-MCNC: 7.5 G/DL (ref 13.3–17.7)
IMM GRANULOCYTES # BLD: 0 10E3/UL
IMM GRANULOCYTES NFR BLD: 1 %
LDH SERPL L TO P-CCNC: 199 U/L (ref 0–250)
LYMPHOCYTES # BLD AUTO: 0.9 10E3/UL (ref 0.8–5.3)
LYMPHOCYTES NFR BLD AUTO: 58 %
MAGNESIUM SERPL-MCNC: 2 MG/DL (ref 1.7–2.3)
MCH RBC QN AUTO: 26.8 PG (ref 26.5–33)
MCHC RBC AUTO-ENTMCNC: 31 G/DL (ref 31.5–36.5)
MCV RBC AUTO: 86 FL (ref 78–100)
MONOCYTES # BLD AUTO: 0.1 10E3/UL (ref 0–1.3)
MONOCYTES NFR BLD AUTO: 6 %
NEUTROPHILS # BLD AUTO: 0.2 10E3/UL (ref 1.6–8.3)
NEUTROPHILS NFR BLD AUTO: 12 %
NRBC # BLD AUTO: 0 10E3/UL
NRBC BLD AUTO-RTO: 0 /100
PHOSPHATE SERPL-MCNC: 3.5 MG/DL (ref 2.5–4.5)
PLAT MORPH BLD: ABNORMAL
PLATELET # BLD AUTO: 50 10E3/UL (ref 150–450)
POTASSIUM SERPL-SCNC: 4.9 MMOL/L (ref 3.4–5.3)
PROT SERPL-MCNC: 6.6 G/DL (ref 6.4–8.3)
RBC # BLD AUTO: 2.8 10E6/UL (ref 4.4–5.9)
RBC MORPH BLD: ABNORMAL
SODIUM SERPL-SCNC: 138 MMOL/L (ref 135–145)
URATE SERPL-MCNC: 3.7 MG/DL (ref 3.4–7)
WBC # BLD AUTO: 1.6 10E3/UL (ref 4–11)

## 2024-10-07 PROCEDURE — 84550 ASSAY OF BLOOD/URIC ACID: CPT

## 2024-10-07 PROCEDURE — 84100 ASSAY OF PHOSPHORUS: CPT

## 2024-10-07 PROCEDURE — 83615 LACTATE (LD) (LDH) ENZYME: CPT

## 2024-10-07 PROCEDURE — 85025 COMPLETE CBC W/AUTO DIFF WBC: CPT

## 2024-10-07 PROCEDURE — 36415 COLL VENOUS BLD VENIPUNCTURE: CPT

## 2024-10-07 PROCEDURE — 83735 ASSAY OF MAGNESIUM: CPT

## 2024-10-07 PROCEDURE — 84155 ASSAY OF PROTEIN SERUM: CPT

## 2024-10-07 RX ORDER — METOPROLOL SUCCINATE 25 MG/1
25 TABLET, EXTENDED RELEASE ORAL DAILY
Qty: 90 TABLET | Refills: 3 | Status: SHIPPED | OUTPATIENT
Start: 2024-10-07

## 2024-10-07 NOTE — TELEPHONE ENCOUNTER
Oral Chemotherapy Monitoring Program  Lab Follow Up    Reviewed lab results from 10/7/24.        9/20/2024     2:00 PM 9/26/2024    10:00 AM 10/4/2024     4:00 PM 10/7/2024    12:00 PM   ORAL CHEMOTHERAPY   Assessment Type Initial Work up;New Teach Lab Monitoring;Initial Follow up Lab Monitoring Lab Monitoring   Diagnosis Code Chronic Myeloid Leukemia (CML) Chronic Myeloid Leukemia (CML) Chronic Myeloid Leukemia (CML) Chronic Myeloid Leukemia (CML)   Providers Dr. Telly Varner   [facility]   Is this patient followed by the [facility] OC team? Yes Yes Yes Yes   Drug Name Sprycel (dasatinib) Sprycel (dasatinib) Sprycel (dasatinib) Sprycel (dasatinib)   Dose 100 mg 100 mg 100 mg 100 mg   Current Schedule Daily Daily Daily Daily   Cycle Details Continuous Continuous Continuous Continuous   Start Date of Last Cycle 9/18/2024      Doses missed in last 2 weeks  0     Adherence Assessment  Adherent     Adverse Effects  Rash     Rash  Grade 1     Pharmacist Intervention(Rash)  Yes     Intervention(s)  OTC recommendation;Patient education     Is the dose as ordered appropriate for the patient? Yes          Labs:  _  Result Component Current Result Ref Range   Sodium 138 (10/7/2024) 135 - 145 mmol/L     _  Result Component Current Result Ref Range   Potassium 4.9 (10/7/2024) 3.4 - 5.3 mmol/L     _  Result Component Current Result Ref Range   Calcium 8.3 (L) (10/7/2024) 8.8 - 10.4 mg/dL     _  Result Component Current Result Ref Range   Magnesium 2.0 (10/7/2024) 1.7 - 2.3 mg/dL     _  Result Component Current Result Ref Range   Phosphorus 3.5 (10/7/2024) 2.5 - 4.5 mg/dL     _  Result Component Current Result Ref Range   Albumin 3.9 (10/7/2024) 3.5 - 5.2 g/dL     _  Result Component Current Result Ref Range   Urea Nitrogen 24.4 (H) (10/7/2024) 6.0 - 20.0 mg/dL     _  Result Component Current Result Ref Range   Creatinine 1.40 (H) (10/7/2024) 0.67 - 1.17 mg/dL      _  Result Component Current Result Ref Range   AST 24 (10/7/2024) 0 - 45 U/L     _  Result Component Current Result Ref Range   ALT 27 (10/7/2024) 0 - 70 U/L     _  Result Component Current Result Ref Range   Bilirubin Total 0.6 (10/7/2024) <=1.2 mg/dL     _  Result Component Current Result Ref Range   WBC Count 1.6 (L) (10/7/2024) 4.0 - 11.0 10e3/uL     _  Result Component Current Result Ref Range   Hemoglobin 7.5 (L) (10/7/2024) 13.3 - 17.7 g/dL     _  Result Component Current Result Ref Range   Platelet Count 50 (L) (10/7/2024) 150 - 450 10e3/uL     _  Result Component Current Result Ref Range   Absolute Neutrophils 30.5 (H) (9/23/2024) 1.6 - 8.3 10e3/uL     _  Result Component Current Result Ref Range   Absolute Neutrophils 0.2 (LL) (10/7/2024) 1.6 - 8.3 10e3/uL        Assessment & Plan:  Results are concerning for low WBC and ANC.  Discussed with MD who advises patient to hold the Sprycel for now.  It can be restarted when ANC>1 and PLT>50.    [facility] message sent to patient.    Anna Kraus, PharmD, BCPS, BCOP  Oncology Clinical Pharmacy Specialist  [facility]  [phone]

## 2024-10-07 NOTE — CONFIDENTIAL NOTE
Critical Labs:    WBC-1.6  ANC-0.2  Platelets-50  Hgb-7.5    Acknowledged by Dr. Varner.  No change in care at this time.

## 2024-10-10 ENCOUNTER — MYC MEDICAL ADVICE (OUTPATIENT)
Dept: ONCOLOGY | Facility: CLINIC | Age: 39
End: 2024-10-10

## 2024-10-10 ENCOUNTER — LAB (OUTPATIENT)
Dept: LAB | Facility: CLINIC | Age: 39
End: 2024-10-10
Payer: COMMERCIAL

## 2024-10-10 DIAGNOSIS — D64.9 ANEMIA, UNSPECIFIED TYPE: ICD-10-CM

## 2024-10-10 DIAGNOSIS — C92.10 CML (CHRONIC MYELOCYTIC LEUKEMIA) (H): ICD-10-CM

## 2024-10-10 DIAGNOSIS — C92.10 CML (CHRONIC MYELOCYTIC LEUKEMIA) (H): Primary | ICD-10-CM

## 2024-10-10 LAB
ALBUMIN SERPL BCG-MCNC: 4.1 G/DL (ref 3.5–5.2)
ALP SERPL-CCNC: 187 U/L (ref 40–150)
ALT SERPL W P-5'-P-CCNC: 25 U/L (ref 0–70)
ANION GAP SERPL CALCULATED.3IONS-SCNC: 16 MMOL/L (ref 7–15)
AST SERPL W P-5'-P-CCNC: 21 U/L (ref 0–45)
BASOPHILS # BLD AUTO: 0.1 10E3/UL (ref 0–0.2)
BASOPHILS NFR BLD AUTO: 8 %
BILIRUB SERPL-MCNC: 0.7 MG/DL
BUN SERPL-MCNC: 15 MG/DL (ref 6–20)
CALCIUM SERPL-MCNC: 8.6 MG/DL (ref 8.8–10.4)
CHLORIDE SERPL-SCNC: 104 MMOL/L (ref 98–107)
CREAT SERPL-MCNC: 1.12 MG/DL (ref 0.67–1.17)
EGFRCR SERPLBLD CKD-EPI 2021: 87 ML/MIN/1.73M2
EOSINOPHIL # BLD AUTO: 0.2 10E3/UL (ref 0–0.7)
EOSINOPHIL NFR BLD AUTO: 13 %
ERYTHROCYTE [DISTWIDTH] IN BLOOD BY AUTOMATED COUNT: 19.5 % (ref 10–15)
GLUCOSE SERPL-MCNC: 174 MG/DL (ref 70–99)
HCO3 SERPL-SCNC: 19 MMOL/L (ref 22–29)
HCT VFR BLD AUTO: 22.8 % (ref 40–53)
HGB BLD-MCNC: 7.2 G/DL (ref 13.3–17.7)
IMM GRANULOCYTES # BLD: 0 10E3/UL
IMM GRANULOCYTES NFR BLD: 1 %
LDH SERPL L TO P-CCNC: 183 U/L (ref 0–250)
LYMPHOCYTES # BLD AUTO: 0.5 10E3/UL (ref 0.8–5.3)
LYMPHOCYTES NFR BLD AUTO: 40 %
MAGNESIUM SERPL-MCNC: 2 MG/DL (ref 1.7–2.3)
MCH RBC QN AUTO: 27 PG (ref 26.5–33)
MCHC RBC AUTO-ENTMCNC: 31.6 G/DL (ref 31.5–36.5)
MCV RBC AUTO: 85 FL (ref 78–100)
MONOCYTES # BLD AUTO: 0.1 10E3/UL (ref 0–1.3)
MONOCYTES NFR BLD AUTO: 6 %
NEUTROPHILS # BLD AUTO: 0.5 10E3/UL (ref 1.6–8.3)
NEUTROPHILS NFR BLD AUTO: 33 %
NRBC # BLD AUTO: 0 10E3/UL
NRBC BLD AUTO-RTO: 0 /100
PHOSPHATE SERPL-MCNC: 4.1 MG/DL (ref 2.5–4.5)
PLATELET # BLD AUTO: 62 10E3/UL (ref 150–450)
POTASSIUM SERPL-SCNC: 4.3 MMOL/L (ref 3.4–5.3)
PROT SERPL-MCNC: 6.9 G/DL (ref 6.4–8.3)
RBC # BLD AUTO: 2.67 10E6/UL (ref 4.4–5.9)
SODIUM SERPL-SCNC: 139 MMOL/L (ref 135–145)
URATE SERPL-MCNC: 3.1 MG/DL (ref 3.4–7)
VIT B12 SERPL-MCNC: >4000 PG/ML (ref 232–1245)
WBC # BLD AUTO: 1.4 10E3/UL (ref 4–11)

## 2024-10-10 PROCEDURE — 36415 COLL VENOUS BLD VENIPUNCTURE: CPT

## 2024-10-10 PROCEDURE — 80053 COMPREHEN METABOLIC PANEL: CPT

## 2024-10-10 PROCEDURE — 82607 VITAMIN B-12: CPT | Performed by: INTERNAL MEDICINE

## 2024-10-10 PROCEDURE — 84100 ASSAY OF PHOSPHORUS: CPT

## 2024-10-10 PROCEDURE — 83735 ASSAY OF MAGNESIUM: CPT

## 2024-10-10 PROCEDURE — 85025 COMPLETE CBC W/AUTO DIFF WBC: CPT

## 2024-10-10 PROCEDURE — 83615 LACTATE (LD) (LDH) ENZYME: CPT

## 2024-10-10 PROCEDURE — 84550 ASSAY OF BLOOD/URIC ACID: CPT

## 2024-10-10 NOTE — ORAL ONC MGMT
Oral Chemotherapy Monitoring Program  Lab Follow Up    Reviewed lab results from 10/10.        9/20/2024     2:00 PM 9/26/2024    10:00 AM 10/4/2024     4:00 PM 10/7/2024    12:00 PM 10/10/2024    11:00 AM   ORAL CHEMOTHERAPY   Assessment Type Initial Work up;New Teach Lab Monitoring;Initial Follow up Lab Monitoring Lab Monitoring Lab Monitoring   Diagnosis Code Chronic Myeloid Leukemia (CML) Chronic Myeloid Leukemia (CML) Chronic Myeloid Leukemia (CML) Chronic Myeloid Leukemia (CML) Chronic Myeloid Leukemia (CML)   Providers Dr. Telly Varner   [facility]   Is this patient followed by the [facility] OC team? Yes Yes Yes Yes Yes   Drug Name Sprycel (dasatinib) Sprycel (dasatinib) Sprycel (dasatinib) Sprycel (dasatinib) Sprycel (dasatinib)   Dose 100 mg 100 mg 100 mg 100 mg 100 mg   Current Schedule Daily Daily Daily Daily Daily   Cycle Details Continuous Continuous Continuous Continuous Drug on Hold   Start Date of Last Cycle 9/18/2024       Doses missed in last 2 weeks  0      Adherence Assessment  Adherent      Adverse Effects  Rash      Rash  Grade 1      Pharmacist Intervention(Rash)  Yes      Intervention(s)  OTC recommendation;Patient education      Is the dose as ordered appropriate for the patient? Yes           Labs:  Lab Results   Component Value Date     10/10/2024    POTASSIUM 4.3 10/10/2024    MAG 2.0 10/10/2024    CR 1.12 10/10/2024    IMAN 8.6 (L) 10/10/2024    BILITOTAL 0.7 10/10/2024    ALBUMIN 4.1 10/10/2024    ALT 25 10/10/2024    AST 21 10/10/2024     Lab Results   Component Value Date    URIC 3.1 10/10/2024              Lab Results   Component Value Date    HGB 7.2 (L) 10/10/2024    WBC 1.4 (L) 10/10/2024    ANEU 30.5 (H) 09/23/2024    ANEUTAUTO 0.5 (L) 10/10/2024    PLT 62 (L) 10/10/2024       Assessment & Plan:  Neutropenia improved to 0.5 today. Results notable for grade 3 (Hgb <8 g/L) anemia,  grade 2 (plt <75K) thrombocytopenia and grade 3 (ANC <1) neutropenia. Metabolic panel, mag, phos, uric acid, and LDH all stable. Continue to HOLD dasatinib until platelets >50 and ANC >1.     [employer] message sent to patient.     Follow-Up:  Labs 2x per week with next check 10/15     Jessica Dodson PharmD  Oral Chemotherapy Monitoring Program  [facility]  [phone]

## 2024-10-14 ENCOUNTER — ANCILLARY PROCEDURE (OUTPATIENT)
Dept: CARDIOLOGY | Facility: CLINIC | Age: 39
End: 2024-10-14
Attending: INTERNAL MEDICINE
Payer: COMMERCIAL

## 2024-10-14 DIAGNOSIS — Z95.810 ICD (IMPLANTABLE CARDIOVERTER-DEFIBRILLATOR) IN PLACE: ICD-10-CM

## 2024-10-14 DIAGNOSIS — I46.9 CARDIAC ARREST (H): ICD-10-CM

## 2024-10-14 DIAGNOSIS — I44.2 ATRIOVENTRICULAR BLOCK, COMPLETE (H): ICD-10-CM

## 2024-10-14 PROCEDURE — 93295 DEV INTERROG REMOTE 1/2/MLT: CPT | Performed by: INTERNAL MEDICINE

## 2024-10-14 PROCEDURE — 93296 REM INTERROG EVL PM/IDS: CPT | Performed by: INTERNAL MEDICINE

## 2024-10-15 ENCOUNTER — APPOINTMENT (OUTPATIENT)
Dept: LAB | Facility: CLINIC | Age: 39
End: 2024-10-15
Payer: COMMERCIAL

## 2024-10-15 ENCOUNTER — NURSE TRIAGE (OUTPATIENT)
Dept: ONCOLOGY | Facility: CLINIC | Age: 39
End: 2024-10-15

## 2024-10-15 DIAGNOSIS — C92.10 CML (CHRONIC MYELOCYTIC LEUKEMIA) (H): Primary | ICD-10-CM

## 2024-10-15 LAB
BLD PROD TYP BPU: NORMAL
BLOOD COMPONENT TYPE: NORMAL
CODING SYSTEM: NORMAL
CROSSMATCH: NORMAL
ISSUE DATE AND TIME: NORMAL
UNIT ABO/RH: NORMAL
UNIT NUMBER: NORMAL
UNIT STATUS: NORMAL
UNIT TYPE ISBT: 8400

## 2024-10-15 PROCEDURE — 82746 ASSAY OF FOLIC ACID SERUM: CPT | Performed by: INTERNAL MEDICINE

## 2024-10-15 RX ORDER — EPINEPHRINE 1 MG/ML
0.3 INJECTION, SOLUTION INTRAMUSCULAR; SUBCUTANEOUS EVERY 5 MIN PRN
Status: CANCELLED | OUTPATIENT
Start: 2024-10-15

## 2024-10-15 RX ORDER — DIPHENHYDRAMINE HYDROCHLORIDE 50 MG/ML
50 INJECTION INTRAMUSCULAR; INTRAVENOUS
Status: CANCELLED
Start: 2024-10-15

## 2024-10-15 RX ORDER — HEPARIN SODIUM,PORCINE 10 UNIT/ML
5-20 VIAL (ML) INTRAVENOUS DAILY PRN
Status: CANCELLED | OUTPATIENT
Start: 2024-10-15

## 2024-10-15 RX ORDER — HEPARIN SODIUM (PORCINE) LOCK FLUSH IV SOLN 100 UNIT/ML 100 UNIT/ML
5 SOLUTION INTRAVENOUS
Status: CANCELLED | OUTPATIENT
Start: 2024-10-15

## 2024-10-15 NOTE — CONFIDENTIAL NOTE
Critical Labs reported:    Hgb-6.6  Platelets-29    Discussed with Dr. Jesus Varner who placed orders for a blood transfusion.  Add-on orders placed for type and cross.  RNCC working on obtaining consent and scheduling.

## 2024-10-16 ENCOUNTER — MYC MEDICAL ADVICE (OUTPATIENT)
Dept: ONCOLOGY | Facility: CLINIC | Age: 39
End: 2024-10-16

## 2024-10-16 ENCOUNTER — INFUSION THERAPY VISIT (OUTPATIENT)
Dept: INFUSION THERAPY | Facility: CLINIC | Age: 39
End: 2024-10-16
Attending: INTERNAL MEDICINE
Payer: COMMERCIAL

## 2024-10-16 VITALS
OXYGEN SATURATION: 100 % | DIASTOLIC BLOOD PRESSURE: 62 MMHG | RESPIRATION RATE: 16 BRPM | TEMPERATURE: 97.8 F | HEART RATE: 73 BPM | SYSTOLIC BLOOD PRESSURE: 103 MMHG

## 2024-10-16 DIAGNOSIS — C92.10 CML (CHRONIC MYELOCYTIC LEUKEMIA) (H): Primary | ICD-10-CM

## 2024-10-16 PROCEDURE — 86923 COMPATIBILITY TEST ELECTRIC: CPT | Performed by: INTERNAL MEDICINE

## 2024-10-16 PROCEDURE — 36430 TRANSFUSION BLD/BLD COMPNT: CPT

## 2024-10-16 PROCEDURE — P9016 RBC LEUKOCYTES REDUCED: HCPCS | Performed by: INTERNAL MEDICINE

## 2024-10-16 NOTE — PATIENT INSTRUCTIONS
After Your Blood Transfusion  Discharge Instructions after you leave  After you have a blood transfusion,* watch for signs of a transfusion reaction for the next 48 hours.   Signs to watch for:  Shaking or chills  Fever above 100.4 F  Headache  Nausea (feeling sich to your stomach)  Hives  Itching  Swelling of the face or feeling flushed  Ongoing dry cough (nothing is coughed up)  Trouble breathing, or wheezing      Some signs of a reaction won't show up for a few days or up to 4 weeks.   These may include:  Fatigue (feeling very tired)  Dizziness  Pink or red urine    *A blood transfusion is when you receive red blood cells, platelets, plasma or cryoprecipitate.

## 2024-10-16 NOTE — PROGRESS NOTES
Infusion Nursing Note:  Anil Montoya presents today for 1 U PRBC.    Patient seen by provider today: No   present during visit today: Not Applicable.    Note: Per patient, this is his 1st blood transfusion.  Reviewed signs of transfusion reaction and what to watch for upon discharge.  Reviewed infection prevention with patient.    Intravenous Access:  Peripheral IV placed.    Treatment Conditions:  Lab Results   Component Value Date    HGB 6.6 (LL) 10/15/2024    WBC 1.7 (L) 10/15/2024    ANEU 30.5 (H) 09/23/2024    ANEUTAUTO 0.8 (L) 10/15/2024    PLT 29 (LL) 10/15/2024        Lab Results   Component Value Date     10/15/2024    POTASSIUM 4.4 10/15/2024    MAG 1.9 10/15/2024    CR 1.17 10/15/2024    IMAN 8.2 (L) 10/15/2024    BILITOTAL 0.8 10/15/2024    ALBUMIN 4.0 10/15/2024    ALT 19 10/15/2024    AST 17 10/15/2024       Results reviewed, labs MET treatment parameters, ok to proceed with treatment.  Blood transfusion consent signed 10/15/24.      Post Infusion Assessment:  Patient tolerated infusion without incident.  Blood return noted pre and post infusion.  Site patent and intact, free from redness, edema or discomfort.  No evidence of extravasations.  Access discontinued per protocol.       Discharge Plan:   Discharge instructions reviewed with: Patient.  Patient and/or family verbalized understanding of discharge instructions and all questions answered.  Copy of AVS reviewed with patient and/or family.  Patient will return 10/18/24 for next appointment.  Patient discharged in stable condition accompanied by: self.  Departure Mode: Ambulatory.      Amanda Lancaster RN

## 2024-10-17 LAB
MDC_IDC_EPISODE_DTM: NORMAL
MDC_IDC_EPISODE_DTM: NORMAL
MDC_IDC_EPISODE_ID: NORMAL
MDC_IDC_EPISODE_ID: NORMAL
MDC_IDC_EPISODE_TYPE: NORMAL
MDC_IDC_EPISODE_TYPE: NORMAL
MDC_IDC_LEAD_CONNECTION_STATUS: NORMAL
MDC_IDC_LEAD_CONNECTION_STATUS: NORMAL
MDC_IDC_LEAD_IMPLANT_DT: NORMAL
MDC_IDC_LEAD_IMPLANT_DT: NORMAL
MDC_IDC_LEAD_LOCATION: NORMAL
MDC_IDC_LEAD_LOCATION: NORMAL
MDC_IDC_LEAD_LOCATION_DETAIL_1: NORMAL
MDC_IDC_LEAD_LOCATION_DETAIL_1: NORMAL
MDC_IDC_LEAD_MFG: NORMAL
MDC_IDC_LEAD_MFG: NORMAL
MDC_IDC_LEAD_MODEL: NORMAL
MDC_IDC_LEAD_MODEL: NORMAL
MDC_IDC_LEAD_POLARITY_TYPE: NORMAL
MDC_IDC_LEAD_POLARITY_TYPE: NORMAL
MDC_IDC_LEAD_SERIAL: NORMAL
MDC_IDC_LEAD_SERIAL: NORMAL
MDC_IDC_MSMT_BATTERY_DTM: NORMAL
MDC_IDC_MSMT_BATTERY_REMAINING_LONGEVITY: 90 MO
MDC_IDC_MSMT_BATTERY_REMAINING_PERCENTAGE: 85 %
MDC_IDC_MSMT_BATTERY_STATUS: NORMAL
MDC_IDC_MSMT_CAP_CHARGE_DTM: NORMAL
MDC_IDC_MSMT_CAP_CHARGE_DTM: NORMAL
MDC_IDC_MSMT_CAP_CHARGE_ENERGY: 41 J
MDC_IDC_MSMT_CAP_CHARGE_TIME: 10.1 S
MDC_IDC_MSMT_CAP_CHARGE_TIME: 10.1 S
MDC_IDC_MSMT_CAP_CHARGE_TYPE: NORMAL
MDC_IDC_MSMT_CAP_CHARGE_TYPE: NORMAL
MDC_IDC_MSMT_LEADCHNL_RA_IMPEDANCE_VALUE: 599 OHM
MDC_IDC_MSMT_LEADCHNL_RV_IMPEDANCE_VALUE: 434 OHM
MDC_IDC_PG_IMPLANT_DTM: NORMAL
MDC_IDC_PG_MFG: NORMAL
MDC_IDC_PG_MODEL: NORMAL
MDC_IDC_PG_SERIAL: NORMAL
MDC_IDC_PG_TYPE: NORMAL
MDC_IDC_SESS_CLINIC_NAME: NORMAL
MDC_IDC_SESS_DTM: NORMAL
MDC_IDC_SESS_TYPE: NORMAL
MDC_IDC_SET_BRADY_AT_MODE_SWITCH_MODE: NORMAL
MDC_IDC_SET_BRADY_AT_MODE_SWITCH_RATE: 170 {BEATS}/MIN
MDC_IDC_SET_BRADY_LOWRATE: 60 {BEATS}/MIN
MDC_IDC_SET_BRADY_MAX_SENSOR_RATE: 140 {BEATS}/MIN
MDC_IDC_SET_BRADY_MAX_TRACKING_RATE: 140 {BEATS}/MIN
MDC_IDC_SET_BRADY_MODE: NORMAL
MDC_IDC_SET_BRADY_PAV_DELAY_HIGH: 100 MS
MDC_IDC_SET_BRADY_PAV_DELAY_LOW: 300 MS
MDC_IDC_SET_BRADY_SAV_DELAY_HIGH: 100 MS
MDC_IDC_SET_BRADY_SAV_DELAY_LOW: 300 MS
MDC_IDC_SET_LEADCHNL_RA_PACING_AMPLITUDE: 3.5 V
MDC_IDC_SET_LEADCHNL_RA_PACING_CAPTURE_MODE: NORMAL
MDC_IDC_SET_LEADCHNL_RA_PACING_POLARITY: NORMAL
MDC_IDC_SET_LEADCHNL_RA_PACING_PULSEWIDTH: 0.4 MS
MDC_IDC_SET_LEADCHNL_RA_SENSING_ADAPTATION_MODE: NORMAL
MDC_IDC_SET_LEADCHNL_RA_SENSING_POLARITY: NORMAL
MDC_IDC_SET_LEADCHNL_RA_SENSING_SENSITIVITY: 0.25 MV
MDC_IDC_SET_LEADCHNL_RV_PACING_AMPLITUDE: 3.5 V
MDC_IDC_SET_LEADCHNL_RV_PACING_CAPTURE_MODE: NORMAL
MDC_IDC_SET_LEADCHNL_RV_PACING_POLARITY: NORMAL
MDC_IDC_SET_LEADCHNL_RV_PACING_PULSEWIDTH: 0.4 MS
MDC_IDC_SET_LEADCHNL_RV_SENSING_ADAPTATION_MODE: NORMAL
MDC_IDC_SET_LEADCHNL_RV_SENSING_POLARITY: NORMAL
MDC_IDC_SET_LEADCHNL_RV_SENSING_SENSITIVITY: 0.6 MV
MDC_IDC_SET_ZONE_DETECTION_INTERVAL: 250 MS
MDC_IDC_SET_ZONE_DETECTION_INTERVAL: 286 MS
MDC_IDC_SET_ZONE_DETECTION_INTERVAL: 353 MS
MDC_IDC_SET_ZONE_STATUS: NORMAL
MDC_IDC_SET_ZONE_TYPE: NORMAL
MDC_IDC_SET_ZONE_VENDOR_TYPE: NORMAL
MDC_IDC_STAT_AT_BURDEN_PERCENT: 0 %
MDC_IDC_STAT_AT_DTM_END: NORMAL
MDC_IDC_STAT_AT_DTM_START: NORMAL
MDC_IDC_STAT_BRADY_DTM_END: NORMAL
MDC_IDC_STAT_BRADY_DTM_START: NORMAL
MDC_IDC_STAT_BRADY_RA_PERCENT_PACED: 9 %
MDC_IDC_STAT_BRADY_RV_PERCENT_PACED: 95 %
MDC_IDC_STAT_EPISODE_RECENT_COUNT: 0
MDC_IDC_STAT_EPISODE_RECENT_COUNT: 3
MDC_IDC_STAT_EPISODE_RECENT_COUNT_DTM_END: NORMAL
MDC_IDC_STAT_EPISODE_RECENT_COUNT_DTM_START: NORMAL
MDC_IDC_STAT_EPISODE_TYPE: NORMAL
MDC_IDC_STAT_EPISODE_VENDOR_TYPE: NORMAL
MDC_IDC_STAT_TACHYTHERAPY_ATP_DELIVERED_RECENT: 0
MDC_IDC_STAT_TACHYTHERAPY_ATP_DELIVERED_TOTAL: 1
MDC_IDC_STAT_TACHYTHERAPY_RECENT_DTM_END: NORMAL
MDC_IDC_STAT_TACHYTHERAPY_RECENT_DTM_START: NORMAL
MDC_IDC_STAT_TACHYTHERAPY_SHOCKS_ABORTED_RECENT: 0
MDC_IDC_STAT_TACHYTHERAPY_SHOCKS_ABORTED_TOTAL: 0
MDC_IDC_STAT_TACHYTHERAPY_SHOCKS_DELIVERED_RECENT: 1
MDC_IDC_STAT_TACHYTHERAPY_SHOCKS_DELIVERED_TOTAL: 2
MDC_IDC_STAT_TACHYTHERAPY_TOTAL_DTM_END: NORMAL
MDC_IDC_STAT_TACHYTHERAPY_TOTAL_DTM_START: NORMAL

## 2024-10-18 ENCOUNTER — INFUSION THERAPY VISIT (OUTPATIENT)
Dept: INFUSION THERAPY | Facility: CLINIC | Age: 39
End: 2024-10-18
Attending: INTERNAL MEDICINE
Payer: COMMERCIAL

## 2024-10-18 ENCOUNTER — TELEPHONE (OUTPATIENT)
Dept: ONCOLOGY | Facility: CLINIC | Age: 39
End: 2024-10-18

## 2024-10-18 DIAGNOSIS — C92.10 CML (CHRONIC MYELOCYTIC LEUKEMIA) (H): Primary | ICD-10-CM

## 2024-10-18 LAB
ABO/RH(D): NORMAL
ALBUMIN SERPL BCG-MCNC: 4.1 G/DL (ref 3.5–5.2)
ALP SERPL-CCNC: 150 U/L (ref 40–150)
ALT SERPL W P-5'-P-CCNC: 17 U/L (ref 0–70)
ANION GAP SERPL CALCULATED.3IONS-SCNC: 14 MMOL/L (ref 7–15)
ANTIBODY SCREEN: NEGATIVE
AST SERPL W P-5'-P-CCNC: 16 U/L (ref 0–45)
BASOPHILS # BLD MANUAL: 0.1 10E3/UL (ref 0–0.2)
BASOPHILS NFR BLD MANUAL: 3 %
BILIRUB SERPL-MCNC: 1 MG/DL
BUN SERPL-MCNC: 14.4 MG/DL (ref 6–20)
CALCIUM SERPL-MCNC: 8.5 MG/DL (ref 8.8–10.4)
CHLORIDE SERPL-SCNC: 107 MMOL/L (ref 98–107)
CREAT SERPL-MCNC: 1.24 MG/DL (ref 0.67–1.17)
EGFRCR SERPLBLD CKD-EPI 2021: 77 ML/MIN/1.73M2
ELLIPTOCYTES BLD QL SMEAR: SLIGHT
EOSINOPHIL # BLD MANUAL: 0.1 10E3/UL (ref 0–0.7)
EOSINOPHIL NFR BLD MANUAL: 7 %
ERYTHROCYTE [DISTWIDTH] IN BLOOD BY AUTOMATED COUNT: 24.4 % (ref 10–15)
GLUCOSE SERPL-MCNC: 96 MG/DL (ref 70–99)
HCO3 SERPL-SCNC: 21 MMOL/L (ref 22–29)
HCT VFR BLD AUTO: 23 % (ref 40–53)
HGB BLD-MCNC: 7.2 G/DL (ref 13.3–17.7)
LYMPHOCYTES # BLD MANUAL: 0.7 10E3/UL (ref 0.8–5.3)
LYMPHOCYTES NFR BLD MANUAL: 44 %
MCH RBC QN AUTO: 28.1 PG (ref 26.5–33)
MCHC RBC AUTO-ENTMCNC: 31.3 G/DL (ref 31.5–36.5)
MCV RBC AUTO: 90 FL (ref 78–100)
MONOCYTES # BLD MANUAL: 0 10E3/UL (ref 0–1.3)
MONOCYTES NFR BLD MANUAL: 2 %
NEUTROPHILS # BLD MANUAL: 0.7 10E3/UL (ref 1.6–8.3)
NEUTROPHILS NFR BLD MANUAL: 44 %
NRBC # BLD AUTO: 0.1 10E3/UL
NRBC BLD MANUAL-RTO: 3 %
PLAT MORPH BLD: ABNORMAL
PLATELET # BLD AUTO: 23 10E3/UL (ref 150–450)
POTASSIUM SERPL-SCNC: 4.2 MMOL/L (ref 3.4–5.3)
PROT SERPL-MCNC: 6.6 G/DL (ref 6.4–8.3)
RBC # BLD AUTO: 2.56 10E6/UL (ref 4.4–5.9)
RBC MORPH BLD: ABNORMAL
SODIUM SERPL-SCNC: 142 MMOL/L (ref 135–145)
SPECIMEN EXPIRATION DATE: NORMAL
WBC # BLD AUTO: 1.7 10E3/UL (ref 4–11)

## 2024-10-18 PROCEDURE — 86900 BLOOD TYPING SEROLOGIC ABO: CPT | Performed by: PHYSICIAN ASSISTANT

## 2024-10-18 PROCEDURE — 80053 COMPREHEN METABOLIC PANEL: CPT | Performed by: INTERNAL MEDICINE

## 2024-10-18 PROCEDURE — 36415 COLL VENOUS BLD VENIPUNCTURE: CPT | Performed by: PHYSICIAN ASSISTANT

## 2024-10-18 PROCEDURE — 86901 BLOOD TYPING SEROLOGIC RH(D): CPT | Performed by: PHYSICIAN ASSISTANT

## 2024-10-18 PROCEDURE — 85027 COMPLETE CBC AUTOMATED: CPT | Performed by: INTERNAL MEDICINE

## 2024-10-18 PROCEDURE — 85007 BL SMEAR W/DIFF WBC COUNT: CPT | Performed by: INTERNAL MEDICINE

## 2024-10-18 RX ORDER — HEPARIN SODIUM,PORCINE 10 UNIT/ML
5-20 VIAL (ML) INTRAVENOUS DAILY PRN
OUTPATIENT
Start: 2024-10-18

## 2024-10-18 RX ORDER — HEPARIN SODIUM (PORCINE) LOCK FLUSH IV SOLN 100 UNIT/ML 100 UNIT/ML
5 SOLUTION INTRAVENOUS
OUTPATIENT
Start: 2024-10-18

## 2024-10-18 RX ORDER — EPINEPHRINE 1 MG/ML
0.3 INJECTION, SOLUTION INTRAMUSCULAR; SUBCUTANEOUS EVERY 5 MIN PRN
OUTPATIENT
Start: 2024-10-18

## 2024-10-18 RX ORDER — DIPHENHYDRAMINE HYDROCHLORIDE 50 MG/ML
50 INJECTION INTRAMUSCULAR; INTRAVENOUS
Start: 2024-10-18

## 2024-10-18 NOTE — ORAL ONC MGMT
Oral Chemotherapy Monitoring Program  Lab Follow Up    Reviewed lab results from 10/18/24.        9/20/2024     2:00 PM 9/26/2024    10:00 AM 10/4/2024     4:00 PM 10/7/2024    12:00 PM 10/10/2024    11:00 AM 10/16/2024     1:00 PM   ORAL CHEMOTHERAPY   Assessment Type Initial Work up;New Teach Lab Monitoring;Initial Follow up Lab Monitoring Lab Monitoring Lab Monitoring Lab Monitoring   Diagnosis Code Chronic Myeloid Leukemia (CML) Chronic Myeloid Leukemia (CML) Chronic Myeloid Leukemia (CML) Chronic Myeloid Leukemia (CML) Chronic Myeloid Leukemia (CML) Chronic Myeloid Leukemia (CML)   Providers Dr. eTlly Varner   [facility]   Is this patient followed by the [facility] OC team? Yes Yes Yes Yes Yes Yes   Drug Name Sprycel (dasatinib) Sprycel (dasatinib) Sprycel (dasatinib) Sprycel (dasatinib) Sprycel (dasatinib) Sprycel (dasatinib)   Dose 100 mg 100 mg 100 mg 100 mg 100 mg 100 mg   Current Schedule Daily Daily Daily Daily Daily Daily   Cycle Details Continuous Continuous Continuous Continuous Drug on Hold Drug on Hold   Start Date of Last Cycle 9/18/2024        Doses missed in last 2 weeks  0       Adherence Assessment  Adherent       Adverse Effects  Rash       Rash  Grade 1       Pharmacist Intervention(Rash)  Yes       Intervention(s)  OTC recommendation;Patient education       Is the dose as ordered appropriate for the patient? Yes            Labs:  _  Result Component Current Result Ref Range   Sodium 142 (10/18/2024) 135 - 145 mmol/L     _  Result Component Current Result Ref Range   Potassium 4.2 (10/18/2024) 3.4 - 5.3 mmol/L     _  Result Component Current Result Ref Range   Calcium 8.5 (L) (10/18/2024) 8.8 - 10.4 mg/dL     _  Result Component Current Result Ref Range   Magnesium 1.9 (10/15/2024) 1.7 - 2.3 mg/dL     _  Result Component Current Result Ref Range   Phosphorus 3.5 (10/15/2024) 2.5 -  4.5 mg/dL     _  Result Component Current Result Ref Range   Albumin 4.1 (10/18/2024) 3.5 - 5.2 g/dL     _  Result Component Current Result Ref Range   Urea Nitrogen 14.4 (10/18/2024) 6.0 - 20.0 mg/dL     _  Result Component Current Result Ref Range   Creatinine 1.24 (H) (10/18/2024) 0.67 - 1.17 mg/dL     _  Result Component Current Result Ref Range   AST 16 (10/18/2024) 0 - 45 U/L     _  Result Component Current Result Ref Range   ALT 17 (10/18/2024) 0 - 70 U/L     _  Result Component Current Result Ref Range   Bilirubin Total 1.0 (10/18/2024) <=1.2 mg/dL     _  Result Component Current Result Ref Range   WBC Count 1.7 (L) (10/18/2024) 4.0 - 11.0 10e3/uL     _  Result Component Current Result Ref Range   Hemoglobin 7.2 (L) (10/18/2024) 13.3 - 17.7 g/dL     _  Result Component Current Result Ref Range   Platelet Count 23 (LL) (10/18/2024) 150 - 450 10e3/uL     _  Result Component Current Result Ref Range   Absolute Neutrophils 0.7 (L) (10/18/2024) 1.6 - 8.3 10e3/uL     _  Result Component Current Result Ref Range   Absolute Neutrophils 0.8 (L) (10/15/2024) 1.6 - 8.3 10e3/uL        Assessment & Plan:  Results are concerning for plts 23, ANC 0.7. Advised Anil to continue to hold dasatinib. Per Dr. Varner, continue to hold until platelets >50 and ANC >1.     Questions answered to patient's satisfaction.    Follow-Up:  10/21: repeat labs    Lety Chang, MikeD, BCOP  Hematology/Oncology Clinical Pharmacist  [facility]  [phone]

## 2024-10-21 ENCOUNTER — LAB (OUTPATIENT)
Dept: LAB | Facility: CLINIC | Age: 39
End: 2024-10-21
Payer: COMMERCIAL

## 2024-10-21 ENCOUNTER — NURSE TRIAGE (OUTPATIENT)
Dept: ONCOLOGY | Facility: CLINIC | Age: 39
End: 2024-10-21

## 2024-10-21 ENCOUNTER — TELEPHONE (OUTPATIENT)
Dept: ONCOLOGY | Facility: CLINIC | Age: 39
End: 2024-10-21

## 2024-10-21 DIAGNOSIS — C92.10 CML (CHRONIC MYELOCYTIC LEUKEMIA) (H): ICD-10-CM

## 2024-10-21 DIAGNOSIS — C92.10 CML (CHRONIC MYELOCYTIC LEUKEMIA) (H): Primary | ICD-10-CM

## 2024-10-21 LAB
ALBUMIN SERPL BCG-MCNC: 4.3 G/DL (ref 3.5–5.2)
ALP SERPL-CCNC: 155 U/L (ref 40–150)
ALT SERPL W P-5'-P-CCNC: 17 U/L (ref 0–70)
ANION GAP SERPL CALCULATED.3IONS-SCNC: 12 MMOL/L (ref 7–15)
AST SERPL W P-5'-P-CCNC: 16 U/L (ref 0–45)
BASOPHILS # BLD MANUAL: 0.1 10E3/UL (ref 0–0.2)
BASOPHILS NFR BLD MANUAL: 2 %
BILIRUB SERPL-MCNC: 1 MG/DL
BUN SERPL-MCNC: 17.5 MG/DL (ref 6–20)
CALCIUM SERPL-MCNC: 8.8 MG/DL (ref 8.8–10.4)
CHLORIDE SERPL-SCNC: 104 MMOL/L (ref 98–107)
CREAT SERPL-MCNC: 1.21 MG/DL (ref 0.67–1.17)
DACRYOCYTES BLD QL SMEAR: SLIGHT
EGFRCR SERPLBLD CKD-EPI 2021: 79 ML/MIN/1.73M2
ELLIPTOCYTES BLD QL SMEAR: SLIGHT
EOSINOPHIL # BLD MANUAL: 0.1 10E3/UL (ref 0–0.7)
EOSINOPHIL NFR BLD MANUAL: 5 %
ERYTHROCYTE [DISTWIDTH] IN BLOOD BY AUTOMATED COUNT: 25.2 % (ref 10–15)
FRAGMENTS BLD QL SMEAR: SLIGHT
GLUCOSE SERPL-MCNC: 154 MG/DL (ref 70–99)
HCO3 SERPL-SCNC: 23 MMOL/L (ref 22–29)
HCT VFR BLD AUTO: 28.4 % (ref 40–53)
HGB BLD-MCNC: 8.4 G/DL (ref 13.3–17.7)
LYMPHOCYTES # BLD MANUAL: 1 10E3/UL (ref 0.8–5.3)
LYMPHOCYTES NFR BLD MANUAL: 35 %
MCH RBC QN AUTO: 27.2 PG (ref 26.5–33)
MCHC RBC AUTO-ENTMCNC: 29.6 G/DL (ref 31.5–36.5)
MCV RBC AUTO: 92 FL (ref 78–100)
MONOCYTES # BLD MANUAL: 0.2 10E3/UL (ref 0–1.3)
MONOCYTES NFR BLD MANUAL: 8 %
MYELOCYTES # BLD MANUAL: 0.1 10E3/UL
MYELOCYTES NFR BLD MANUAL: 3 %
NEUTROPHILS # BLD MANUAL: 1.4 10E3/UL (ref 1.6–8.3)
NEUTROPHILS NFR BLD MANUAL: 47 %
NRBC # BLD AUTO: 0.1 10E3/UL
NRBC BLD MANUAL-RTO: 5 %
PLAT MORPH BLD: ABNORMAL
PLATELET # BLD AUTO: 42 10E3/UL (ref 150–450)
POLYCHROMASIA BLD QL SMEAR: SLIGHT
POTASSIUM SERPL-SCNC: 4.7 MMOL/L (ref 3.4–5.3)
PROT SERPL-MCNC: 7 G/DL (ref 6.4–8.3)
RBC # BLD AUTO: 3.09 10E6/UL (ref 4.4–5.9)
RBC MORPH BLD: ABNORMAL
SODIUM SERPL-SCNC: 139 MMOL/L (ref 135–145)
WBC # BLD AUTO: 2.9 10E3/UL (ref 4–11)

## 2024-10-21 PROCEDURE — 85027 COMPLETE CBC AUTOMATED: CPT

## 2024-10-21 PROCEDURE — 80053 COMPREHEN METABOLIC PANEL: CPT

## 2024-10-21 PROCEDURE — 36415 COLL VENOUS BLD VENIPUNCTURE: CPT

## 2024-10-21 PROCEDURE — 85007 BL SMEAR W/DIFF WBC COUNT: CPT

## 2024-10-21 RX ORDER — DASATINIB 80 MG/1
80 TABLET, FILM COATED ORAL DAILY
Qty: 30 TABLET | Refills: 0 | Status: SHIPPED | OUTPATIENT
Start: 2024-10-21

## 2024-10-21 NOTE — TELEPHONE ENCOUNTER
DATE/TIME OF CALL RECEIVED FROM LAB:  10/21/24 at 10:45 AM   LAB TEST/VALUE:  plt 42  Other values: hgb 8.4, WBC 2.9   Previous labs from 10/18/24: plt 23, hgb 7.2, WBC 1.7   PROVIDER NOTIFIED?: Yes  PROVIDER NAME: Dr. Jesus Varner and Ana Sullivan, as she sees pt on 10/23.   TIME LAB VALUE REPORTED TO PROVIDER: 1050  MECHANISM OF PROVIDER NOTIFICATION:  routed encounter to care team d/t trending values  Therapy plan calls for transfusion if hgb 7 or less; plt 10 or less.   PROVIDER RESPONSE:  acknowledged, no further action required.

## 2024-10-21 NOTE — ORAL ONC MGMT
Oral Chemotherapy Monitoring Program  Lab Follow Up    Reviewed lab results from 10/21/24.        9/26/2024    10:00 AM 10/4/2024     4:00 PM 10/7/2024    12:00 PM 10/10/2024    11:00 AM 10/16/2024     1:00 PM 10/18/2024     2:00 PM 10/21/2024     2:00 PM   ORAL CHEMOTHERAPY   Assessment Type Lab Monitoring;Initial Follow up Lab Monitoring Lab Monitoring Lab Monitoring Lab Monitoring Lab Monitoring Lab Monitoring   Diagnosis Code Chronic Myeloid Leukemia (CML) Chronic Myeloid Leukemia (CML) Chronic Myeloid Leukemia (CML) Chronic Myeloid Leukemia (CML) Chronic Myeloid Leukemia (CML) Chronic Myeloid Leukemia (CML) Chronic Myeloid Leukemia (CML)   Providers Dr. Telly Varner   [facility]   Is this patient followed by the [facility] OC team? Yes Yes Yes Yes Yes Yes Yes   Drug Name Sprycel (dasatinib) Sprycel (dasatinib) Sprycel (dasatinib) Sprycel (dasatinib) Sprycel (dasatinib) Sprycel (dasatinib) Sprycel (dasatinib)   Dose 100 mg 100 mg 100 mg 100 mg 100 mg 100 mg 80 mg   Current Schedule Daily Daily Daily Daily Daily Daily Daily   Cycle Details Continuous Continuous Continuous Drug on Hold Drug on Hold Drug on Hold Drug on Hold   Doses missed in last 2 weeks 0         Adherence Assessment Adherent         Adverse Effects Rash         Rash Grade 1         Pharmacist Intervention(Rash) Yes         Intervention(s) OTC recommendation;Patient education             Labs:  _  Result Component Current Result Ref Range   Sodium 139 (10/21/2024) 135 - 145 mmol/L     _  Result Component Current Result Ref Range   Potassium 4.7 (10/21/2024) 3.4 - 5.3 mmol/L     _  Result Component Current Result Ref Range   Calcium 8.8 (10/21/2024) 8.8 - 10.4 mg/dL     _  Result Component Current Result Ref Range   Magnesium 1.9 (10/15/2024) 1.7 - 2.3 mg/dL     _  Result Component Current Result Ref Range    Phosphorus 3.5 (10/15/2024) 2.5 - 4.5 mg/dL     _  Result Component Current Result Ref Range   Albumin 4.3 (10/21/2024) 3.5 - 5.2 g/dL     _  Result Component Current Result Ref Range   Urea Nitrogen 17.5 (10/21/2024) 6.0 - 20.0 mg/dL     _  Result Component Current Result Ref Range   Creatinine 1.21 (H) (10/21/2024) 0.67 - 1.17 mg/dL     _  Result Component Current Result Ref Range   AST 16 (10/21/2024) 0 - 45 U/L     _  Result Component Current Result Ref Range   ALT 17 (10/21/2024) 0 - 70 U/L     _  Result Component Current Result Ref Range   Bilirubin Total 1.0 (10/21/2024) <=1.2 mg/dL     _  Result Component Current Result Ref Range   WBC Count 2.9 (L) (10/21/2024) 4.0 - 11.0 10e3/uL     _  Result Component Current Result Ref Range   Hemoglobin 8.4 (L) (10/21/2024) 13.3 - 17.7 g/dL     _  Result Component Current Result Ref Range   Platelet Count 42 (LL) (10/21/2024) 150 - 450 10e3/uL     _  Result Component Current Result Ref Range   Absolute Neutrophils 1.4 (L) (10/21/2024) 1.6 - 8.3 10e3/uL     _  Result Component Current Result Ref Range   Absolute Neutrophils 0.8 (L) (10/15/2024) 1.6 - 8.3 10e3/uL        Assessment & Plan:  No concerning abnormalities, counts are continuing to improve. Dr. Varner commented on labs in EPIC stating plan to restart in the next week or 2 at a lower 80 mg daily dose. I called Anil to let him know a new dose will be delivered to him and not to start until instructed to by the care team.    Questions answered to patient's satisfaction.    Follow-Up:  10/23 CARTER follow up    Lux Abad PharmD  Oral Chemotherapy Monitoring Program  [facility]  [phone]

## 2024-10-21 NOTE — RESULT ENCOUNTER NOTE
Dear Anil,   Here are your recent results. All of the blood cell numbers and low hemoglobin (anemia) are improving.  Let's continue to watch a bit longer, but we will likely restart the Sprycel (dasatinib) in the next week or two. We will restart at a slightly lower dose (80 mg per day) to start to reduce issues with low blood cell numbers. The pharmacy will help get you the correct pills, etc.    Please contact us if you have questions.    Jesus Varner MD, PhD

## 2024-10-22 LAB
ABO/RH(D): NORMAL
ANTIBODY SCREEN: NEGATIVE
SPECIMEN EXPIRATION DATE: NORMAL

## 2024-10-22 NOTE — PROGRESS NOTES
REASON FOR VISIT:  Management of chronic myeloid leukemia (CML)  DATE OF VISIT: Oct 23, 2024    HISTORY OF PRESENT ILLNESS:  Anil Montoya is a 39 year old with chronic myeloid leukemia (CML).  To summarize his course, he presented with a couple of weeks of dizziness and was found to have hyperleukocytosis and thrombocytosis with moderate anemia as well as Tumor Lysis Syndrome.  Workup confirmed CML in chronic phase, hepatosplenomegaly on imaging, peripheral blood FISH and BCR-ABL p210 by PCR were detected, and bone marrow biopsy showed t(9;22) as the sole abnormality.  Peripheral blood BCR-ABL p210 by PCR was 62.5%.  He was started on dasatinib in 09/2024.  Visit for management of CML.    INTERVAL HISTORY:  Anil is here today for follow up  Feeling well no acute complaints. No bleeding or abnormal bruising. No dizziness or chest pain.  Overall managing alright.    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 30 tablet 0    buPROPion (WELLBUTRIN XL) 300 MG 24 hr tablet Take 300 mg by mouth every morning      CONTOUR NEXT TEST test strip USE TO TEST DAILY AS DIRECTED      dasatinib (SPRYCEL) 80 MG tablet Take 1 tablet (80 mg) by mouth daily 30 tablet 0    empagliflozin (JARDIANCE) 25 MG TABS tablet Take 25 mg by mouth daily.      Ferrous Sulfate (IRON) 28 MG TABS Take 28 mg by mouth daily.      furosemide (LASIX) 20 MG tablet Take 1 tablet (20 mg) by mouth daily      levothyroxine (SYNTHROID/LEVOTHROID) 150 MCG tablet Take 1  tablet by mouth daily at 2 pm.      losartan (COZAAR) 50 MG tablet Take 1 tablet (50 mg) by mouth daily      metFORMIN (GLUCOPHAGE) 1000 MG tablet Take 1,000 mg by mouth 2 times daily (with meals)      metoprolol succinate ER (TOPROL XL) 25 MG 24 hr tablet Take 1 tablet (25 mg) by mouth daily. 90 tablet 3    semaglutide (OZEMPIC) 2 MG/1.5ML pen Inject 2 mg subcutaneously every 7 days.      levothyroxine (SYNTHROID/LEVOTHROID) 125 MCG tablet Take 150 mcg by mouth daily. (Patient not taking: Reported on 10/23/2024)       No current facility-administered medications for this visit.     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  HEENT: EOMI, PERRL. Sclerae are anicteric. Oral mucosa is pink and moist with no lesions or thrush.   Lymph: Neck is supple with no lymphadenopathy in the cervical or supraclavicular areas.   Heart: Regular rate and rhythm.   Lungs: Clear to auscultation bilaterally.   Abdomen: Bowel sounds present, soft, nontender with no palpable hepatosplenomegaly or masses.   Extremities: No lower extremity edema noted bilaterally.   Neuro: Alert and oriented x3, CN grossly intact, steady gait  Skin: No rashes, petechiae, or bruising noted on exposed skin.    Wt Readings from Last 4 Encounters:   10/23/24 95.5 kg (210 lb 9.6 oz)   10/03/24 92.1 kg (203 lb)   09/24/24 90.7 kg (200 lb)   09/19/24 93.5 kg (206 lb 3.2 oz)         LABORATORY DATA:  I personally reviewed the following labs:    Most Recent 3 CBC's:  Recent Labs   Lab Test 10/23/24  1411 10/21/24  0941 10/18/24  0911   WBC 2.5* 2.9* 1.7*   HGB 9.0* 8.4* 7.2*   MCV 89 92 90   PLT 80* 42* 23*     Most Recent 3 BMP's:  Recent Labs   Lab Test 10/21/24  0941 10/18/24  0911 10/15/24  0923    142 138   POTASSIUM 4.7  4.2 4.4   CHLORIDE 104 107 104   CO2 23 21* 20*   BUN 17.5 14.4 16.5   CR 1.21* 1.24* 1.17   ANIONGAP 12 14 14   IMAN 8.8 8.5* 8.2*   * 96 164*     Most Recent 2 LFT's:  Recent Labs   Lab Test 10/21/24  0941 10/18/24  0911   AST 16 16   ALT 17 17   ALKPHOS 155* 150   BILITOTAL 1.0 1.0     BCR ABL     IMPRESSION AND PLAN:  Anil Montoya is a 38 year old with chronic myeloid leukemia (CML).    We reviewed his recent course, diagnosis, natural history of chronic phase CML, and treatment recommendations.  He has been on hydroxyurea and started dasatinib 100 mg daily.  Treatment has been tolerated, and we discuss plans to monitor for toxicity and treatment response.    - Stopped Hydrea  - Desatinib 100 mg held due to acute cytopenias-- plan to resume Desatinib 80 mg daily once he receives new supply   - We will continue weekly labs and taper as long as there are no new issues.    - RTC ~6 weeks with Dr Varner    There are no active ID issues.  I recommended an updated flu shot and COVID-19 vaccine.    Health Maintenance  He will continue to work with Cardiology for his complex cardiology issues, Endocrine for diabetes management, and his primary care provider (working on finding a new one) for health maintenance and other medical issues.    Plan--   We will request monitoring labs and visit with Telly in about 6 weeks.  I reminded him to contact us if questions, concerns, or new issues come up between visits.      The longitudinal plan of care for the diagnosis(es)/condition(s) as documented were addressed during this visit. Due to the added complexity in care, I will continue to support Anil in the subsequent management and with ongoing continuity of care.    Ana Sullivan [facility]

## 2024-10-23 ENCOUNTER — APPOINTMENT (OUTPATIENT)
Dept: LAB | Facility: CLINIC | Age: 39
End: 2024-10-23
Attending: INTERNAL MEDICINE
Payer: COMMERCIAL

## 2024-10-23 ENCOUNTER — ONCOLOGY VISIT (OUTPATIENT)
Dept: ONCOLOGY | Facility: CLINIC | Age: 39
End: 2024-10-23
Attending: INTERNAL MEDICINE
Payer: COMMERCIAL

## 2024-10-23 VITALS
TEMPERATURE: 98.5 F | BODY MASS INDEX: 30.22 KG/M2 | RESPIRATION RATE: 16 BRPM | WEIGHT: 210.6 LBS | HEART RATE: 82 BPM | DIASTOLIC BLOOD PRESSURE: 79 MMHG | SYSTOLIC BLOOD PRESSURE: 126 MMHG | OXYGEN SATURATION: 99 %

## 2024-10-23 DIAGNOSIS — Z95.4 S/P TVR (TRICUSPID VALVE REPLACEMENT): Primary | ICD-10-CM

## 2024-10-23 DIAGNOSIS — C92.10 CML (CHRONIC MYELOCYTIC LEUKEMIA) (H): ICD-10-CM

## 2024-10-23 LAB
ACANTHOCYTES BLD QL SMEAR: SLIGHT
ALBUMIN SERPL BCG-MCNC: 4.1 G/DL (ref 3.5–5.2)
ALP SERPL-CCNC: 141 U/L (ref 40–150)
ALT SERPL W P-5'-P-CCNC: 14 U/L (ref 0–70)
ANION GAP SERPL CALCULATED.3IONS-SCNC: 12 MMOL/L (ref 7–15)
AST SERPL W P-5'-P-CCNC: 15 U/L (ref 0–45)
BASOPHILS # BLD MANUAL: 0.1 10E3/UL (ref 0–0.2)
BASOPHILS NFR BLD MANUAL: 2 %
BILIRUB SERPL-MCNC: 1 MG/DL
BUN SERPL-MCNC: 15.1 MG/DL (ref 6–20)
CALCIUM SERPL-MCNC: 9.1 MG/DL (ref 8.8–10.4)
CHLORIDE SERPL-SCNC: 108 MMOL/L (ref 98–107)
CREAT SERPL-MCNC: 1.22 MG/DL (ref 0.67–1.17)
EGFRCR SERPLBLD CKD-EPI 2021: 78 ML/MIN/1.73M2
EOSINOPHIL # BLD MANUAL: 0.2 10E3/UL (ref 0–0.7)
EOSINOPHIL NFR BLD MANUAL: 9 %
ERYTHROCYTE [DISTWIDTH] IN BLOOD BY AUTOMATED COUNT: 24.5 % (ref 10–15)
FRAGMENTS BLD QL SMEAR: SLIGHT
GLUCOSE SERPL-MCNC: 126 MG/DL (ref 70–99)
HCO3 SERPL-SCNC: 22 MMOL/L (ref 22–29)
HCT VFR BLD AUTO: 28.5 % (ref 40–53)
HGB BLD-MCNC: 9 G/DL (ref 13.3–17.7)
LYMPHOCYTES # BLD MANUAL: 0.6 10E3/UL (ref 0.8–5.3)
LYMPHOCYTES NFR BLD MANUAL: 24 %
MCH RBC QN AUTO: 28.1 PG (ref 26.5–33)
MCHC RBC AUTO-ENTMCNC: 31.6 G/DL (ref 31.5–36.5)
MCV RBC AUTO: 89 FL (ref 78–100)
MONOCYTES # BLD MANUAL: 0.6 10E3/UL (ref 0–1.3)
MONOCYTES NFR BLD MANUAL: 23 %
NEUTROPHILS # BLD MANUAL: 1.1 10E3/UL (ref 1.6–8.3)
NEUTROPHILS NFR BLD MANUAL: 42 %
NRBC # BLD AUTO: 0.2 10E3/UL
NRBC BLD MANUAL-RTO: 7 %
PLAT MORPH BLD: ABNORMAL
PLATELET # BLD AUTO: 80 10E3/UL (ref 150–450)
POLYCHROMASIA BLD QL SMEAR: ABNORMAL
POTASSIUM SERPL-SCNC: 4.5 MMOL/L (ref 3.4–5.3)
PROT SERPL-MCNC: 6.9 G/DL (ref 6.4–8.3)
RBC # BLD AUTO: 3.2 10E6/UL (ref 4.4–5.9)
RBC MORPH BLD: ABNORMAL
SODIUM SERPL-SCNC: 142 MMOL/L (ref 135–145)
WBC # BLD AUTO: 2.5 10E3/UL (ref 4–11)

## 2024-10-23 PROCEDURE — 86900 BLOOD TYPING SEROLOGIC ABO: CPT | Performed by: NURSE PRACTITIONER

## 2024-10-23 PROCEDURE — G2211 COMPLEX E/M VISIT ADD ON: HCPCS | Performed by: NURSE PRACTITIONER

## 2024-10-23 PROCEDURE — 36415 COLL VENOUS BLD VENIPUNCTURE: CPT | Performed by: NURSE PRACTITIONER

## 2024-10-23 PROCEDURE — 86901 BLOOD TYPING SEROLOGIC RH(D): CPT | Performed by: NURSE PRACTITIONER

## 2024-10-23 PROCEDURE — 80053 COMPREHEN METABOLIC PANEL: CPT | Performed by: NURSE PRACTITIONER

## 2024-10-23 PROCEDURE — 99214 OFFICE O/P EST MOD 30 MIN: CPT | Performed by: NURSE PRACTITIONER

## 2024-10-23 PROCEDURE — 85007 BL SMEAR W/DIFF WBC COUNT: CPT | Performed by: NURSE PRACTITIONER

## 2024-10-23 PROCEDURE — 99213 OFFICE O/P EST LOW 20 MIN: CPT | Performed by: NURSE PRACTITIONER

## 2024-10-23 PROCEDURE — 85027 COMPLETE CBC AUTOMATED: CPT | Performed by: NURSE PRACTITIONER

## 2024-10-23 RX ORDER — LEVOTHYROXINE SODIUM 150 UG/1
1 TABLET ORAL
COMMUNITY
Start: 2024-08-15

## 2024-10-23 RX ORDER — BUPROPION HYDROCHLORIDE 300 MG/1
300 TABLET ORAL EVERY MORNING
Qty: 90 TABLET | Refills: 3 | Status: SHIPPED | OUTPATIENT
Start: 2024-10-23

## 2024-10-23 RX ORDER — LOSARTAN POTASSIUM 50 MG/1
50 TABLET ORAL DAILY
Qty: 90 TABLET | Refills: 3 | Status: SHIPPED | OUTPATIENT
Start: 2024-10-23

## 2024-10-23 ASSESSMENT — PAIN SCALES - GENERAL: PAINLEVEL_OUTOF10: NO PAIN (0)

## 2024-10-23 NOTE — NURSING NOTE
"Oncology Rooming Note    October 23, 2024 2:24 PM   Anil Montoya is a 39 year old male who presents for:    Chief Complaint   Patient presents with    Oncology Clinic Visit     CML (chronic myelocytic leukemia)    Blood Draw     Labs drawn via  by RN in lab.  VS taken     Initial Vitals: /79   Pulse 82   Temp 98.5  F (36.9  C) (Oral)   Resp 16   Wt 95.5 kg (210 lb 9.6 oz)   SpO2 99%   BMI 30.22 kg/m   Estimated body mass index is 30.22 kg/m  as calculated from the following:    Height as of 10/3/24: 1.778 m (5' 10\").    Weight as of this encounter: 95.5 kg (210 lb 9.6 oz). Body surface area is 2.17 meters squared.  No Pain (0) Comment: Data Unavailable   No LMP for male patient.  Allergies reviewed: Yes  Medications reviewed: Yes    Medications: Medication refills not needed today.  Pharmacy name entered into [facility]:    [id number] - [place], [place] - [address]  [facility] - [place], [place] - [address]   [facility] - [place], [place] - [address]    Frailty Screening:   Is the patient here for a new oncology consult visit in cancer care? 2. No      Clinical concerns: Patient states no new concerns to discuss with provider.       Vivian Valdovinos EMT            "

## 2024-10-23 NOTE — LETTER
10/23/2024      Anil Montoya  [address] [address] [address]      Dear Colleague,    Thank you for referring your patient, Anil Montoya, to the [facility]. Please see a copy of my visit note below.    REASON FOR VISIT:  Management of chronic myeloid leukemia (CML)  DATE OF VISIT: Oct 23, 2024    HISTORY OF PRESENT ILLNESS:  Anil Montoya is a 39 year old with chronic myeloid leukemia (CML).  To summarize his course, he presented with a couple of weeks of dizziness and was found to have hyperleukocytosis and thrombocytosis with moderate anemia as well as Tumor Lysis Syndrome.  Workup confirmed CML in chronic phase, hepatosplenomegaly on imaging, peripheral blood FISH and BCR-ABL p210 by PCR were detected, and bone marrow biopsy showed t(9;22) as the sole abnormality.  Peripheral blood BCR-ABL p210 by PCR was 62.5%.  He was started on dasatinib in 09/2024.  Visit for management of CML.    INTERVAL HISTORY:  Anil is here today for follow up  Feeling well no acute complaints. No bleeding or abnormal bruising. No dizziness or chest pain.  Overall managing alright.    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 30 tablet 0     buPROPion (WELLBUTRIN XL) 300 MG 24 hr tablet Take 300 mg by mouth every morning       CONTOUR NEXT TEST test strip USE TO TEST DAILY AS DIRECTED       dasatinib (SPRYCEL) 80 MG tablet Take 1 tablet (80 mg) by mouth daily 30 tablet 0      empagliflozin (JARDIANCE) 25 MG TABS tablet Take 25 mg by mouth daily.       Ferrous Sulfate (IRON) 28 MG TABS Take 28 mg by mouth daily.       furosemide (LASIX) 20 MG tablet Take 1 tablet (20 mg) by mouth daily       levothyroxine (SYNTHROID/LEVOTHROID) 150 MCG tablet Take 1 tablet by mouth daily at 2 pm.       losartan (COZAAR) 50 MG tablet Take 1 tablet (50 mg) by mouth daily       metFORMIN (GLUCOPHAGE) 1000 MG tablet Take 1,000 mg by mouth 2 times daily (with meals)       metoprolol succinate ER (TOPROL XL) 25 MG 24 hr tablet Take 1 tablet (25 mg) by mouth daily. 90 tablet 3     semaglutide (OZEMPIC) 2 MG/1.5ML pen Inject 2 mg subcutaneously every 7 days.       levothyroxine (SYNTHROID/LEVOTHROID) 125 MCG tablet Take 150 mcg by mouth daily. (Patient not taking: Reported on 10/23/2024)       No current facility-administered medications for this visit.     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  HEENT: EOMI, PERRL. Sclerae are anicteric. Oral mucosa is pink and moist with no lesions or thrush.   Lymph: Neck is supple with no lymphadenopathy in the cervical or supraclavicular areas.   Heart: Regular rate and rhythm.   Lungs: Clear to auscultation bilaterally.   Abdomen: Bowel sounds present, soft, nontender with no palpable hepatosplenomegaly or masses.   Extremities: No lower extremity edema noted bilaterally.   Neuro: Alert and oriented x3, CN grossly intact, steady gait  Skin: No rashes, petechiae, or bruising noted on exposed skin.    Wt Readings from Last 4 Encounters:   10/23/24 95.5 kg (210 lb 9.6 oz)   10/03/24 92.1 kg (203 lb)   09/24/24 90.7 kg (200 lb)   09/19/24 93.5 kg (206 lb 3.2 oz)         LABORATORY DATA:  I personally reviewed the following labs:    Most  Recent 3 CBC's:  Recent Labs   Lab Test 10/23/24  1411 10/21/24  0941 10/18/24  0911   WBC 2.5* 2.9* 1.7*   HGB 9.0* 8.4* 7.2*   MCV 89 92 90   PLT 80* 42* 23*     Most Recent 3 BMP's:  Recent Labs   Lab Test 10/21/24  0941 10/18/24  0911 10/15/24  0923    142 138   POTASSIUM 4.7 4.2 4.4   CHLORIDE 104 107 104   CO2 23 21* 20*   BUN 17.5 14.4 16.5   CR 1.21* 1.24* 1.17   ANIONGAP 12 14 14   IMAN 8.8 8.5* 8.2*   * 96 164*     Most Recent 2 LFT's:  Recent Labs   Lab Test 10/21/24  0941 10/18/24  0911   AST 16 16   ALT 17 17   ALKPHOS 155* 150   BILITOTAL 1.0 1.0     BCR ABL     IMPRESSION AND PLAN:  Anil Montoya is a 38 year old with chronic myeloid leukemia (CML).    We reviewed his recent course, diagnosis, natural history of chronic phase CML, and treatment recommendations.  He has been on hydroxyurea and started dasatinib 100 mg daily.  Treatment has been tolerated, and we discuss plans to monitor for toxicity and treatment response.    - Stopped Hydrea  - Desatinib 100 mg held due to acute cytopenias-- plan to resume Desatinib 80 mg daily once he receives new supply   - We will continue weekly labs and taper as long as there are no new issues.    - RTC ~6 weeks with Dr Varner    There are no active ID issues.  I recommended an updated flu shot and COVID-19 vaccine.    Health Maintenance  He will continue to work with Cardiology for his complex cardiology issues, Endocrine for diabetes management, and his primary care provider (working on finding a new one) for health maintenance and other medical issues.    Plan--   We will request monitoring labs and visit with Telly in about 6 weeks.  I reminded him to contact us if questions, concerns, or new issues come up between visits.      The longitudinal plan of care for the diagnosis(es)/condition(s) as documented were addressed during this visit. Due to the added complexity in care, I will continue to support Anil in the subsequent management and  with ongoing continuity of care.    Ana Sullivan [facility]      Again, thank you for allowing me to participate in the care of your patient.        Sincerely,        MACARIO Guzman CNP

## 2024-10-23 NOTE — NURSING NOTE
Chief Complaint   Patient presents with    Oncology Clinic Visit     CML (chronic myelocytic leukemia)    Blood Draw     Labs drawn via  by RN in lab.  VS taken       Labs collected from venipuncture by RN. Vitals taken. Checked in for appointment(s).    Elly Jaramillo RN

## 2024-10-28 ENCOUNTER — TELEPHONE (OUTPATIENT)
Dept: CARDIOLOGY | Facility: CLINIC | Age: 39
End: 2024-10-28
Payer: COMMERCIAL

## 2024-10-28 ENCOUNTER — MYC MEDICAL ADVICE (OUTPATIENT)
Dept: CARDIOLOGY | Facility: CLINIC | Age: 39
End: 2024-10-28
Payer: COMMERCIAL

## 2024-10-28 NOTE — TELEPHONE ENCOUNTER
[facility] Call Center    Phone Message    May a detailed message be left on voicemail: yes     Reason for Call: Form or Letter   Type or form/letter needing completion: Short Term Disability   Provider: Noé Richter form needed: 10/17/24 fax did not all go through  Once completed: Fax form to: Pt did not have the fax number.      Patient was told fax did not go through. Please resend form, and all notes for October. Thank you     Action Taken: Other: cardiology     Travel Screening: Not Applicable    Thank you!  [facility]       Date of Service:

## 2024-10-28 NOTE — TELEPHONE ENCOUNTER
No mention of recent short term disability paperwork in patient's chart. Sent him a MyChart message today to clarify. Patient was recently shocked in 9/2024 and diagnosed with leukemia.  CROW DYE

## 2024-10-29 ENCOUNTER — LAB (OUTPATIENT)
Dept: LAB | Facility: CLINIC | Age: 39
End: 2024-10-29
Payer: COMMERCIAL

## 2024-10-29 DIAGNOSIS — C92.10 CML (CHRONIC MYELOCYTIC LEUKEMIA) (H): ICD-10-CM

## 2024-10-29 LAB
ACANTHOCYTES BLD QL SMEAR: SLIGHT
ALBUMIN SERPL BCG-MCNC: 4.5 G/DL (ref 3.5–5.2)
ALP SERPL-CCNC: 135 U/L (ref 40–150)
ALT SERPL W P-5'-P-CCNC: 17 U/L (ref 0–70)
ANION GAP SERPL CALCULATED.3IONS-SCNC: 11 MMOL/L (ref 7–15)
AST SERPL W P-5'-P-CCNC: 19 U/L (ref 0–45)
BASOPHILS # BLD MANUAL: 1.4 10E3/UL (ref 0–0.2)
BASOPHILS NFR BLD MANUAL: 23 %
BILIRUB SERPL-MCNC: 0.9 MG/DL
BUN SERPL-MCNC: 12.5 MG/DL (ref 6–20)
CALCIUM SERPL-MCNC: 9.2 MG/DL (ref 8.8–10.4)
CHLORIDE SERPL-SCNC: 108 MMOL/L (ref 98–107)
CREAT SERPL-MCNC: 1.23 MG/DL (ref 0.67–1.17)
EGFRCR SERPLBLD CKD-EPI 2021: 78 ML/MIN/1.73M2
ELLIPTOCYTES BLD QL SMEAR: ABNORMAL
EOSINOPHIL # BLD MANUAL: 0.1 10E3/UL (ref 0–0.7)
EOSINOPHIL NFR BLD MANUAL: 2 %
ERYTHROCYTE [DISTWIDTH] IN BLOOD BY AUTOMATED COUNT: 23 % (ref 10–15)
FRAGMENTS BLD QL SMEAR: SLIGHT
GLUCOSE SERPL-MCNC: 163 MG/DL (ref 70–99)
HCO3 SERPL-SCNC: 24 MMOL/L (ref 22–29)
HCT VFR BLD AUTO: 36.1 % (ref 40–53)
HGB BLD-MCNC: 10.8 G/DL (ref 13.3–17.7)
LYMPHOCYTES # BLD MANUAL: 1.2 10E3/UL (ref 0.8–5.3)
LYMPHOCYTES NFR BLD MANUAL: 20 %
MCH RBC QN AUTO: 27.6 PG (ref 26.5–33)
MCHC RBC AUTO-ENTMCNC: 29.9 G/DL (ref 31.5–36.5)
MCV RBC AUTO: 92 FL (ref 78–100)
MONOCYTES # BLD MANUAL: 1.1 10E3/UL (ref 0–1.3)
MONOCYTES NFR BLD MANUAL: 18 %
MYELOCYTES # BLD MANUAL: 0.5 10E3/UL
MYELOCYTES NFR BLD MANUAL: 8 %
NEUTROPHILS # BLD MANUAL: 1.8 10E3/UL (ref 1.6–8.3)
NEUTROPHILS NFR BLD MANUAL: 29 %
NRBC # BLD AUTO: 0.1 10E3/UL
NRBC BLD MANUAL-RTO: 1 %
PLAT MORPH BLD: ABNORMAL
PLATELET # BLD AUTO: 360 10E3/UL (ref 150–450)
POLYCHROMASIA BLD QL SMEAR: SLIGHT
POTASSIUM SERPL-SCNC: 4.6 MMOL/L (ref 3.4–5.3)
PROT SERPL-MCNC: 7.4 G/DL (ref 6.4–8.3)
RBC # BLD AUTO: 3.91 10E6/UL (ref 4.4–5.9)
RBC MORPH BLD: ABNORMAL
SODIUM SERPL-SCNC: 143 MMOL/L (ref 135–145)
WBC # BLD AUTO: 6.1 10E3/UL (ref 4–11)

## 2024-10-29 PROCEDURE — 36415 COLL VENOUS BLD VENIPUNCTURE: CPT

## 2024-10-29 PROCEDURE — 85018 HEMOGLOBIN: CPT

## 2024-10-29 PROCEDURE — 82310 ASSAY OF CALCIUM: CPT

## 2024-10-29 PROCEDURE — 85007 BL SMEAR W/DIFF WBC COUNT: CPT

## 2024-10-30 NOTE — ORAL ONC MGMT
Oral Chemotherapy Monitoring Program  Lab Follow Up    Reviewed lab results from 10/29.        10/4/2024     4:00 PM 10/7/2024    12:00 PM 10/10/2024    11:00 AM 10/16/2024     1:00 PM 10/18/2024     2:00 PM 10/21/2024     2:00 PM 10/30/2024     2:00 PM   ORAL CHEMOTHERAPY   Assessment Type Lab Monitoring Lab Monitoring Lab Monitoring Lab Monitoring Lab Monitoring Lab Monitoring Lab Monitoring;Other   Diagnosis Code Chronic Myeloid Leukemia (CML) Chronic Myeloid Leukemia (CML) Chronic Myeloid Leukemia (CML) Chronic Myeloid Leukemia (CML) Chronic Myeloid Leukemia (CML) Chronic Myeloid Leukemia (CML) Chronic Myeloid Leukemia (CML)   Providers Dr. Telly Varner   [facility]   Is this patient followed by the [facility] OC team? Yes Yes Yes Yes Yes Yes Yes   Drug Name Sprycel (dasatinib) Sprycel (dasatinib) Sprycel (dasatinib) Sprycel (dasatinib) Sprycel (dasatinib) Sprycel (dasatinib) Sprycel (dasatinib)   Dose 100 mg 100 mg 100 mg 100 mg 100 mg 80 mg 80 mg   Current Schedule Daily Daily Daily Daily Daily Daily Daily   Cycle Details Continuous Continuous Drug on Hold Drug on Hold Drug on Hold Drug on Hold Continuous   Start Date of Last Cycle       10/30/2024       Labs:  Lab Results   Component Value Date     10/29/2024    POTASSIUM 4.6 10/29/2024    MAG 1.9 10/15/2024    CR 1.23 (H) 10/29/2024    IMAN 9.2 10/29/2024    BILITOTAL 0.9 10/29/2024    ALBUMIN 4.5 10/29/2024    ALT 17 10/29/2024    AST 19 10/29/2024     Lab Results   Component Value Date    URIC 3.1 10/10/2024              Lab Results   Component Value Date    HGB 10.8 (L) 10/29/2024    WBC 6.1 10/29/2024    ANEU 1.8 10/29/2024    ANEUTAUTO 0.8 (L) 10/15/2024     10/29/2024       Assessment & Plan:  Platelet and neutrophils recovered after dasatinib on hold. Results notable for grade 1 (Hgb <LLN to 10 g/L) anemia.  Serum creatinine elevated (baseline near 1). Continue to monitor. Patient restarted lower dose of dasatinib today, 80 mg daily.     Questions answered to patient's satisfaction. MyChart with [facility].    Follow-Up:  Labs weekly, next 11/5    Jessica Dodson PharmD  Oral Chemotherapy Monitoring Program  [facility]  [phone]

## 2024-11-05 ENCOUNTER — LAB (OUTPATIENT)
Dept: LAB | Facility: CLINIC | Age: 39
End: 2024-11-05
Payer: COMMERCIAL

## 2024-11-05 ENCOUNTER — TELEPHONE (OUTPATIENT)
Dept: ONCOLOGY | Facility: CLINIC | Age: 39
End: 2024-11-05

## 2024-11-05 DIAGNOSIS — C92.10 CML (CHRONIC MYELOCYTIC LEUKEMIA) (H): ICD-10-CM

## 2024-11-05 LAB
ALBUMIN SERPL BCG-MCNC: 4.7 G/DL (ref 3.5–5.2)
ALP SERPL-CCNC: 116 U/L (ref 40–150)
ALT SERPL W P-5'-P-CCNC: 17 U/L (ref 0–70)
ANION GAP SERPL CALCULATED.3IONS-SCNC: 14 MMOL/L (ref 7–15)
AST SERPL W P-5'-P-CCNC: 20 U/L (ref 0–45)
BASOPHILS # BLD MANUAL: 1.3 10E3/UL (ref 0–0.2)
BASOPHILS NFR BLD MANUAL: 8 %
BILIRUB SERPL-MCNC: 0.7 MG/DL
BUN SERPL-MCNC: 16.1 MG/DL (ref 6–20)
BURR CELLS BLD QL SMEAR: SLIGHT
CALCIUM SERPL-MCNC: 9.3 MG/DL (ref 8.8–10.4)
CHLORIDE SERPL-SCNC: 102 MMOL/L (ref 98–107)
CREAT SERPL-MCNC: 1.18 MG/DL (ref 0.67–1.17)
EGFRCR SERPLBLD CKD-EPI 2021: 82 ML/MIN/1.73M2
ELLIPTOCYTES BLD QL SMEAR: SLIGHT
EOSINOPHIL # BLD MANUAL: 0 10E3/UL (ref 0–0.7)
EOSINOPHIL NFR BLD MANUAL: 0 %
ERYTHROCYTE [DISTWIDTH] IN BLOOD BY AUTOMATED COUNT: 21.9 % (ref 10–15)
FRAGMENTS BLD QL SMEAR: SLIGHT
GLUCOSE SERPL-MCNC: 127 MG/DL (ref 70–99)
HCO3 SERPL-SCNC: 20 MMOL/L (ref 22–29)
HCT VFR BLD AUTO: 39.1 % (ref 40–53)
HGB BLD-MCNC: 11.8 G/DL (ref 13.3–17.7)
LYMPHOCYTES # BLD MANUAL: 2.3 10E3/UL (ref 0.8–5.3)
LYMPHOCYTES NFR BLD MANUAL: 14 %
MAGNESIUM SERPL-MCNC: 2 MG/DL (ref 1.7–2.3)
MCH RBC QN AUTO: 27.4 PG (ref 26.5–33)
MCHC RBC AUTO-ENTMCNC: 30.2 G/DL (ref 31.5–36.5)
MCV RBC AUTO: 91 FL (ref 78–100)
METAMYELOCYTES # BLD MANUAL: 1 10E3/UL
METAMYELOCYTES NFR BLD MANUAL: 6 %
MONOCYTES # BLD MANUAL: 1.7 10E3/UL (ref 0–1.3)
MONOCYTES NFR BLD MANUAL: 10 %
MYELOCYTES # BLD MANUAL: 0.3 10E3/UL
MYELOCYTES NFR BLD MANUAL: 2 %
NEUTROPHILS # BLD MANUAL: 10 10E3/UL (ref 1.6–8.3)
NEUTROPHILS NFR BLD MANUAL: 60 %
PHOSPHATE SERPL-MCNC: 4.5 MG/DL (ref 2.5–4.5)
PLAT MORPH BLD: ABNORMAL
PLATELET # BLD AUTO: 424 10E3/UL (ref 150–450)
POTASSIUM SERPL-SCNC: 4.3 MMOL/L (ref 3.4–5.3)
PROT SERPL-MCNC: 7.6 G/DL (ref 6.4–8.3)
RBC # BLD AUTO: 4.31 10E6/UL (ref 4.4–5.9)
RBC MORPH BLD: ABNORMAL
SODIUM SERPL-SCNC: 136 MMOL/L (ref 135–145)
WBC # BLD AUTO: 16.6 10E3/UL (ref 4–11)

## 2024-11-05 PROCEDURE — 80053 COMPREHEN METABOLIC PANEL: CPT

## 2024-11-05 PROCEDURE — 83735 ASSAY OF MAGNESIUM: CPT

## 2024-11-05 PROCEDURE — 84100 ASSAY OF PHOSPHORUS: CPT

## 2024-11-05 PROCEDURE — 36415 COLL VENOUS BLD VENIPUNCTURE: CPT

## 2024-11-05 PROCEDURE — 85007 BL SMEAR W/DIFF WBC COUNT: CPT

## 2024-11-05 PROCEDURE — 85027 COMPLETE CBC AUTOMATED: CPT

## 2024-11-05 NOTE — ORAL ONC MGMT
Oral Chemotherapy Monitoring Program  Lab Follow Up    Reviewed lab results from 11/5 and called to check in with new reduced dosing of dasatinib.        10/7/2024    12:00 PM 10/10/2024    11:00 AM 10/16/2024     1:00 PM 10/18/2024     2:00 PM 10/21/2024     2:00 PM 10/30/2024     2:00 PM 11/5/2024     3:00 PM   ORAL CHEMOTHERAPY   Assessment Type Lab Monitoring Lab Monitoring Lab Monitoring Lab Monitoring Lab Monitoring Lab Monitoring;Other Lab Monitoring;Other   Diagnosis Code Chronic Myeloid Leukemia (CML) Chronic Myeloid Leukemia (CML) Chronic Myeloid Leukemia (CML) Chronic Myeloid Leukemia (CML) Chronic Myeloid Leukemia (CML) Chronic Myeloid Leukemia (CML) Chronic Myeloid Leukemia (CML)   Providers Dr. Telly Varner   [facility]   Is this patient followed by the [facility] OC team? Yes Yes Yes Yes Yes Yes Yes   Drug Name Sprycel (dasatinib) Sprycel (dasatinib) Sprycel (dasatinib) Sprycel (dasatinib) Sprycel (dasatinib) Sprycel (dasatinib) Sprycel (dasatinib)   Dose 100 mg 100 mg 100 mg 100 mg 80 mg 80 mg 80 mg   Current Schedule Daily Daily Daily Daily Daily Daily Daily   Cycle Details Continuous Drug on Hold Drug on Hold Drug on Hold Drug on Hold Continuous Continuous   Start Date of Last Cycle      10/30/2024        Labs:  _  Result Component Current Result Ref Range   Sodium 136 (11/5/2024) 135 - 145 mmol/L     _  Result Component Current Result Ref Range   Potassium 4.3 (11/5/2024) 3.4 - 5.3 mmol/L     _  Result Component Current Result Ref Range   Calcium 9.3 (11/5/2024) 8.8 - 10.4 mg/dL     _  Result Component Current Result Ref Range   Magnesium 2.0 (11/5/2024) 1.7 - 2.3 mg/dL     _  Result Component Current Result Ref Range   Phosphorus 4.5 (11/5/2024) 2.5 - 4.5 mg/dL     _  Result Component Current Result Ref Range   Albumin 4.7 (11/5/2024) 3.5 - 5.2 g/dL     _  Result  Component Current Result Ref Range   Urea Nitrogen 16.1 (11/5/2024) 6.0 - 20.0 mg/dL     _  Result Component Current Result Ref Range   Creatinine 1.18 (H) (11/5/2024) 0.67 - 1.17 mg/dL     _  Result Component Current Result Ref Range   AST 20 (11/5/2024) 0 - 45 U/L     _  Result Component Current Result Ref Range   ALT 17 (11/5/2024) 0 - 70 U/L     _  Result Component Current Result Ref Range   Bilirubin Total 0.7 (11/5/2024) <=1.2 mg/dL     _  Result Component Current Result Ref Range   WBC Count 16.6 (H) (11/5/2024) 4.0 - 11.0 10e3/uL     _  Result Component Current Result Ref Range   Hemoglobin 11.8 (L) (11/5/2024) 13.3 - 17.7 g/dL     _  Result Component Current Result Ref Range   Platelet Count 424 (11/5/2024) 150 - 450 10e3/uL     _  Result Component Current Result Ref Range   Absolute Neutrophils 10.0 (H) (11/5/2024) 1.6 - 8.3 10e3/uL     _  Result Component Current Result Ref Range   Absolute Neutrophils 0.8 (L) (10/15/2024) 1.6 - 8.3 10e3/uL        Assessment & Plan:  Dasatinib was reduced to 80 mg for concern of cytopenias. Anil does endorse muscle cramping and nose bleeds over the weekend, which are now resolved. Lab results now show elevated neutrophils, monocytes, and basophils. While he does not endorse feeling sick, he states that he rarely feels sick during flu season. Likely he is fighting off an infection. Labs are currently scheduled for 11/12 recheck to trend results.    Questions answered to patient's satisfaction.    Follow-Up:  11/12 lab     Lux Abad PharmD  Oral Chemotherapy Monitoring Program  [facility]  [phone]

## 2024-11-08 NOTE — TELEPHONE ENCOUNTER
Pt left  today saying he needs his short term disability and work paperwork from 10/14 resent, the same form as before. He said the paperwork will be resent to us and he wants us to refax the paperwork we already filled out.     Paperwork has been sent to scan into Epic, but has not been scanned in yet (see note from 10/31/2024). Found original and re-faxed to [employer] at [phone].     Original fax is now in my file drawer.     Called pt. Told him the information was re-faxed. Pt asked about having several months of his records faxed somewhere as well. I gave him the phone number ([phone]) and fax number ([phone]) for [facility].

## 2024-11-12 ENCOUNTER — LAB (OUTPATIENT)
Dept: LAB | Facility: CLINIC | Age: 39
End: 2024-11-12
Payer: COMMERCIAL

## 2024-11-12 ENCOUNTER — TELEPHONE (OUTPATIENT)
Dept: ONCOLOGY | Facility: CLINIC | Age: 39
End: 2024-11-12

## 2024-11-12 DIAGNOSIS — C92.10 CML (CHRONIC MYELOCYTIC LEUKEMIA) (H): ICD-10-CM

## 2024-11-12 LAB
ALBUMIN SERPL BCG-MCNC: 4.8 G/DL (ref 3.5–5.2)
ALP SERPL-CCNC: 117 U/L (ref 40–150)
ALT SERPL W P-5'-P-CCNC: 21 U/L (ref 0–70)
ANION GAP SERPL CALCULATED.3IONS-SCNC: 15 MMOL/L (ref 7–15)
AST SERPL W P-5'-P-CCNC: 21 U/L (ref 0–45)
BASOPHILS # BLD MANUAL: 0.2 10E3/UL (ref 0–0.2)
BASOPHILS NFR BLD MANUAL: 1 %
BILIRUB SERPL-MCNC: 0.7 MG/DL
BUN SERPL-MCNC: 22.9 MG/DL (ref 6–20)
BURR CELLS BLD QL SMEAR: SLIGHT
CALCIUM SERPL-MCNC: 9.3 MG/DL (ref 8.8–10.4)
CHLORIDE SERPL-SCNC: 104 MMOL/L (ref 98–107)
CREAT SERPL-MCNC: 1.43 MG/DL (ref 0.67–1.17)
DACRYOCYTES BLD QL SMEAR: SLIGHT
EGFRCR SERPLBLD CKD-EPI 2021: 65 ML/MIN/1.73M2
ELLIPTOCYTES BLD QL SMEAR: SLIGHT
EOSINOPHIL # BLD MANUAL: 0 10E3/UL (ref 0–0.7)
EOSINOPHIL NFR BLD MANUAL: 0 %
ERYTHROCYTE [DISTWIDTH] IN BLOOD BY AUTOMATED COUNT: 21.6 % (ref 10–15)
FRAGMENTS BLD QL SMEAR: SLIGHT
GIANT PLATELETS BLD QL SMEAR: SLIGHT
GLUCOSE SERPL-MCNC: 145 MG/DL (ref 70–99)
HCO3 SERPL-SCNC: 21 MMOL/L (ref 22–29)
HCT VFR BLD AUTO: 40.2 % (ref 40–53)
HGB BLD-MCNC: 12.3 G/DL (ref 13.3–17.7)
LYMPHOCYTES # BLD MANUAL: 2.2 10E3/UL (ref 0.8–5.3)
LYMPHOCYTES NFR BLD MANUAL: 9 %
MCH RBC QN AUTO: 27.2 PG (ref 26.5–33)
MCHC RBC AUTO-ENTMCNC: 30.6 G/DL (ref 31.5–36.5)
MCV RBC AUTO: 89 FL (ref 78–100)
METAMYELOCYTES # BLD MANUAL: 0.2 10E3/UL
METAMYELOCYTES NFR BLD MANUAL: 1 %
MONOCYTES # BLD MANUAL: 1.7 10E3/UL (ref 0–1.3)
MONOCYTES NFR BLD MANUAL: 7 %
MYELOCYTES # BLD MANUAL: 0.2 10E3/UL
MYELOCYTES NFR BLD MANUAL: 1 %
NEUTROPHILS # BLD MANUAL: 19.7 10E3/UL (ref 1.6–8.3)
NEUTROPHILS NFR BLD MANUAL: 81 %
PLAT MORPH BLD: ABNORMAL
PLATELET # BLD AUTO: 372 10E3/UL (ref 150–450)
POLYCHROMASIA BLD QL SMEAR: SLIGHT
POTASSIUM SERPL-SCNC: 4.5 MMOL/L (ref 3.4–5.3)
PROT SERPL-MCNC: 7.7 G/DL (ref 6.4–8.3)
RBC # BLD AUTO: 4.53 10E6/UL (ref 4.4–5.9)
RBC MORPH BLD: ABNORMAL
SODIUM SERPL-SCNC: 140 MMOL/L (ref 135–145)
WBC # BLD AUTO: 24.3 10E3/UL (ref 4–11)

## 2024-11-12 PROCEDURE — 85007 BL SMEAR W/DIFF WBC COUNT: CPT

## 2024-11-12 PROCEDURE — 85014 HEMATOCRIT: CPT

## 2024-11-12 PROCEDURE — 36415 COLL VENOUS BLD VENIPUNCTURE: CPT

## 2024-11-12 PROCEDURE — 82040 ASSAY OF SERUM ALBUMIN: CPT

## 2024-11-12 NOTE — ORAL ONC MGMT
Oral Chemotherapy Monitoring Program  Lab Follow Up    Reviewed lab results from 11/12/24.        10/10/2024    11:00 AM 10/16/2024     1:00 PM 10/18/2024     2:00 PM 10/21/2024     2:00 PM 10/30/2024     2:00 PM 11/5/2024     3:00 PM 11/12/2024     4:00 PM   ORAL CHEMOTHERAPY   Assessment Type Lab Monitoring Lab Monitoring Lab Monitoring Lab Monitoring Lab Monitoring;Other Lab Monitoring;Other Lab Monitoring   Diagnosis Code Chronic Myeloid Leukemia (CML) Chronic Myeloid Leukemia (CML) Chronic Myeloid Leukemia (CML) Chronic Myeloid Leukemia (CML) Chronic Myeloid Leukemia (CML) Chronic Myeloid Leukemia (CML) Chronic Myeloid Leukemia (CML)   Providers Dr. Telly Varner   [facility]   Is this patient followed by the [facility] OC team? Yes Yes Yes Yes Yes Yes Yes   Drug Name Sprycel (dasatinib) Sprycel (dasatinib) Sprycel (dasatinib) Sprycel (dasatinib) Sprycel (dasatinib) Sprycel (dasatinib) Sprycel (dasatinib)   Dose 100 mg 100 mg 100 mg 80 mg 80 mg 80 mg 80 mg   Current Schedule Daily Daily Daily Daily Daily Daily Daily   Cycle Details Drug on Hold Drug on Hold Drug on Hold Drug on Hold Continuous Continuous Continuous   Start Date of Last Cycle     10/30/2024         Labs:  _  Result Component Current Result Ref Range   Sodium 140 (11/12/2024) 135 - 145 mmol/L     _  Result Component Current Result Ref Range   Potassium 4.5 (11/12/2024) 3.4 - 5.3 mmol/L     _  Result Component Current Result Ref Range   Calcium 9.3 (11/12/2024) 8.8 - 10.4 mg/dL     _  Result Component Current Result Ref Range   Magnesium 2.0 (11/5/2024) 1.7 - 2.3 mg/dL     _  Result Component Current Result Ref Range   Phosphorus 4.5 (11/5/2024) 2.5 - 4.5 mg/dL     _  Result Component Current Result Ref Range   Albumin 4.8 (11/12/2024) 3.5 - 5.2 g/dL     _  Result Component Current Result Ref Range   Urea Nitrogen  22.9 (H) (11/12/2024) 6.0 - 20.0 mg/dL     _  Result Component Current Result Ref Range   Creatinine 1.43 (H) (11/12/2024) 0.67 - 1.17 mg/dL     _  Result Component Current Result Ref Range   AST 21 (11/12/2024) 0 - 45 U/L     _  Result Component Current Result Ref Range   ALT 21 (11/12/2024) 0 - 70 U/L     _  Result Component Current Result Ref Range   Bilirubin Total 0.7 (11/12/2024) <=1.2 mg/dL     _  Result Component Current Result Ref Range   WBC Count 24.3 (H) (11/12/2024) 4.0 - 11.0 10e3/uL     _  Result Component Current Result Ref Range   Hemoglobin 12.3 (L) (11/12/2024) 13.3 - 17.7 g/dL     _  Result Component Current Result Ref Range   Platelet Count 372 (11/12/2024) 150 - 450 10e3/uL     _  Result Component Current Result Ref Range   Absolute Neutrophils 19.7 (H) (11/12/2024) 1.6 - 8.3 10e3/uL     _  Result Component Current Result Ref Range   Absolute Neutrophils 0.8 (L) (10/15/2024) 1.6 - 8.3 10e3/uL        Assessment & Plan:  Results are concerning for WBC 24.3, ANC 19.7. Per Dr Varner, no change in treatment required, this is likely an effect of being off treatment for a few weeks previously. Anil will continue dasatinib 80 mg.    Questions answered to patient's satisfaction.    Follow-Up:  11/19: repeat weekly labs    Lety Chang, MikeD, BCOP  Hematology/Oncology Clinical Pharmacist  [facility]  [facility]  [phone]

## 2024-11-14 NOTE — TELEPHONE ENCOUNTER
"Called patient who stated his \"extension\" came thru so no need to refax tis paperwork  Brittny DYE  "

## 2024-11-19 ENCOUNTER — LAB (OUTPATIENT)
Dept: LAB | Facility: CLINIC | Age: 39
End: 2024-11-19
Payer: COMMERCIAL

## 2024-11-19 ENCOUNTER — MYC MEDICAL ADVICE (OUTPATIENT)
Dept: ONCOLOGY | Facility: CLINIC | Age: 39
End: 2024-11-19

## 2024-11-19 DIAGNOSIS — C92.10 CML (CHRONIC MYELOCYTIC LEUKEMIA) (H): ICD-10-CM

## 2024-11-19 LAB
ACANTHOCYTES BLD QL SMEAR: SLIGHT
ALBUMIN SERPL BCG-MCNC: 4.6 G/DL (ref 3.5–5.2)
ALP SERPL-CCNC: 112 U/L (ref 40–150)
ALT SERPL W P-5'-P-CCNC: 23 U/L (ref 0–70)
ANION GAP SERPL CALCULATED.3IONS-SCNC: 14 MMOL/L (ref 7–15)
AST SERPL W P-5'-P-CCNC: 20 U/L (ref 0–45)
BASOPHILS # BLD MANUAL: 0 10E3/UL (ref 0–0.2)
BASOPHILS NFR BLD MANUAL: 0 %
BILIRUB SERPL-MCNC: 0.7 MG/DL
BUN SERPL-MCNC: 25.3 MG/DL (ref 6–20)
CALCIUM SERPL-MCNC: 9.3 MG/DL (ref 8.8–10.4)
CHLORIDE SERPL-SCNC: 108 MMOL/L (ref 98–107)
CREAT SERPL-MCNC: 1.43 MG/DL (ref 0.67–1.17)
EGFRCR SERPLBLD CKD-EPI 2021: 65 ML/MIN/1.73M2
ELLIPTOCYTES BLD QL SMEAR: ABNORMAL
EOSINOPHIL # BLD MANUAL: 0 10E3/UL (ref 0–0.7)
EOSINOPHIL NFR BLD MANUAL: 0 %
ERYTHROCYTE [DISTWIDTH] IN BLOOD BY AUTOMATED COUNT: 21.4 % (ref 10–15)
GIANT PLATELETS BLD QL SMEAR: SLIGHT
GLUCOSE SERPL-MCNC: 142 MG/DL (ref 70–99)
HCO3 SERPL-SCNC: 20 MMOL/L (ref 22–29)
HCT VFR BLD AUTO: 40.2 % (ref 40–53)
HGB BLD-MCNC: 12.3 G/DL (ref 13.3–17.7)
LYMPHOCYTES # BLD MANUAL: 2.1 10E3/UL (ref 0.8–5.3)
LYMPHOCYTES NFR BLD MANUAL: 18 %
MCH RBC QN AUTO: 27 PG (ref 26.5–33)
MCHC RBC AUTO-ENTMCNC: 30.6 G/DL (ref 31.5–36.5)
MCV RBC AUTO: 88 FL (ref 78–100)
METAMYELOCYTES # BLD MANUAL: 0.2 10E3/UL
METAMYELOCYTES NFR BLD MANUAL: 2 %
MONOCYTES # BLD MANUAL: 0.7 10E3/UL (ref 0–1.3)
MONOCYTES NFR BLD MANUAL: 6 %
NEUTROPHILS # BLD MANUAL: 8.5 10E3/UL (ref 1.6–8.3)
NEUTROPHILS NFR BLD MANUAL: 74 %
PLAT MORPH BLD: ABNORMAL
PLATELET # BLD AUTO: 205 10E3/UL (ref 150–450)
POTASSIUM SERPL-SCNC: 4.8 MMOL/L (ref 3.4–5.3)
PROT SERPL-MCNC: 7.5 G/DL (ref 6.4–8.3)
RBC # BLD AUTO: 4.55 10E6/UL (ref 4.4–5.9)
RBC MORPH BLD: ABNORMAL
SODIUM SERPL-SCNC: 142 MMOL/L (ref 135–145)
TOXIC GRANULES BLD QL SMEAR: PRESENT
WBC # BLD AUTO: 11.5 10E3/UL (ref 4–11)

## 2024-11-19 PROCEDURE — 36415 COLL VENOUS BLD VENIPUNCTURE: CPT

## 2024-11-19 PROCEDURE — 85007 BL SMEAR W/DIFF WBC COUNT: CPT

## 2024-11-19 PROCEDURE — 80053 COMPREHEN METABOLIC PANEL: CPT

## 2024-11-19 PROCEDURE — 82247 BILIRUBIN TOTAL: CPT

## 2024-11-19 PROCEDURE — 85014 HEMATOCRIT: CPT

## 2024-11-19 PROCEDURE — 82040 ASSAY OF SERUM ALBUMIN: CPT

## 2024-11-25 DIAGNOSIS — C92.10 CML (CHRONIC MYELOCYTIC LEUKEMIA) (H): Primary | ICD-10-CM

## 2024-11-25 RX ORDER — DASATINIB 80 MG/1
80 TABLET, FILM COATED ORAL DAILY
Qty: 30 TABLET | Refills: 0 | Status: SHIPPED | OUTPATIENT
Start: 2024-11-25

## 2024-11-26 ENCOUNTER — LAB (OUTPATIENT)
Dept: LAB | Facility: CLINIC | Age: 39
End: 2024-11-26
Payer: COMMERCIAL

## 2024-11-26 ENCOUNTER — MYC MEDICAL ADVICE (OUTPATIENT)
Dept: ONCOLOGY | Facility: CLINIC | Age: 39
End: 2024-11-26

## 2024-11-26 DIAGNOSIS — C92.10 CML (CHRONIC MYELOCYTIC LEUKEMIA) (H): ICD-10-CM

## 2024-11-26 LAB
ALBUMIN SERPL BCG-MCNC: 4.7 G/DL (ref 3.5–5.2)
ALP SERPL-CCNC: 106 U/L (ref 40–150)
ALT SERPL W P-5'-P-CCNC: 22 U/L (ref 0–70)
ANION GAP SERPL CALCULATED.3IONS-SCNC: 16 MMOL/L (ref 7–15)
AST SERPL W P-5'-P-CCNC: 20 U/L (ref 0–45)
BASOPHILS # BLD AUTO: 0.3 10E3/UL (ref 0–0.2)
BASOPHILS NFR BLD AUTO: 2 %
BILIRUB SERPL-MCNC: 0.8 MG/DL
BUN SERPL-MCNC: 23.8 MG/DL (ref 6–20)
CALCIUM SERPL-MCNC: 9.1 MG/DL (ref 8.8–10.4)
CHLORIDE SERPL-SCNC: 101 MMOL/L (ref 98–107)
CREAT SERPL-MCNC: 1.34 MG/DL (ref 0.67–1.17)
EGFRCR SERPLBLD CKD-EPI 2021: 70 ML/MIN/1.73M2
EOSINOPHIL # BLD AUTO: 0.1 10E3/UL (ref 0–0.7)
EOSINOPHIL NFR BLD AUTO: 1 %
ERYTHROCYTE [DISTWIDTH] IN BLOOD BY AUTOMATED COUNT: 21.1 % (ref 10–15)
GLUCOSE SERPL-MCNC: 231 MG/DL (ref 70–99)
HCO3 SERPL-SCNC: 20 MMOL/L (ref 22–29)
HCT VFR BLD AUTO: 41 % (ref 40–53)
HGB BLD-MCNC: 12.8 G/DL (ref 13.3–17.7)
IMM GRANULOCYTES # BLD: 0 10E3/UL
IMM GRANULOCYTES NFR BLD: 0 %
LYMPHOCYTES # BLD AUTO: 1.2 10E3/UL (ref 0.8–5.3)
LYMPHOCYTES NFR BLD AUTO: 11 %
MCH RBC QN AUTO: 27.4 PG (ref 26.5–33)
MCHC RBC AUTO-ENTMCNC: 31.2 G/DL (ref 31.5–36.5)
MCV RBC AUTO: 88 FL (ref 78–100)
MONOCYTES # BLD AUTO: 0.5 10E3/UL (ref 0–1.3)
MONOCYTES NFR BLD AUTO: 5 %
NEUTROPHILS # BLD AUTO: 8.2 10E3/UL (ref 1.6–8.3)
NEUTROPHILS NFR BLD AUTO: 80 %
NRBC # BLD AUTO: 0 10E3/UL
NRBC BLD AUTO-RTO: 0 /100
PLATELET # BLD AUTO: 155 10E3/UL (ref 150–450)
POTASSIUM SERPL-SCNC: 5.1 MMOL/L (ref 3.4–5.3)
PROT SERPL-MCNC: 7.4 G/DL (ref 6.4–8.3)
RBC # BLD AUTO: 4.67 10E6/UL (ref 4.4–5.9)
SODIUM SERPL-SCNC: 137 MMOL/L (ref 135–145)
WBC # BLD AUTO: 10.3 10E3/UL (ref 4–11)

## 2024-11-26 PROCEDURE — 84295 ASSAY OF SERUM SODIUM: CPT

## 2024-11-26 PROCEDURE — 80053 COMPREHEN METABOLIC PANEL: CPT

## 2024-11-26 PROCEDURE — 82247 BILIRUBIN TOTAL: CPT

## 2024-11-26 PROCEDURE — 85025 COMPLETE CBC W/AUTO DIFF WBC: CPT

## 2024-11-26 PROCEDURE — 82310 ASSAY OF CALCIUM: CPT

## 2024-11-26 PROCEDURE — 36415 COLL VENOUS BLD VENIPUNCTURE: CPT

## 2024-11-26 NOTE — ORAL ONC MGMT
Oral Chemotherapy Monitoring Program  Lab Follow Up    Reviewed lab results from 11/26.        10/21/2024     2:00 PM 10/30/2024     2:00 PM 11/5/2024     3:00 PM 11/12/2024     4:00 PM 11/19/2024     1:00 PM 11/25/2024     8:00 AM 11/26/2024     1:00 PM   ORAL CHEMOTHERAPY   Assessment Type Lab Monitoring Lab Monitoring;Other Lab Monitoring;Other Lab Monitoring Lab Monitoring Refill Lab Monitoring   Diagnosis Code Chronic Myeloid Leukemia (CML) Chronic Myeloid Leukemia (CML) Chronic Myeloid Leukemia (CML) Chronic Myeloid Leukemia (CML) Chronic Myeloid Leukemia (CML) Chronic Myeloid Leukemia (CML) Chronic Myeloid Leukemia (CML)   Providers Dr. Telly Varner   [facility]   Is this patient followed by the [facility] OC team? Yes Yes Yes Yes Yes Yes Yes   Drug Name Sprycel (dasatinib) Sprycel (dasatinib) Sprycel (dasatinib) Sprycel (dasatinib) Sprycel (dasatinib) Sprycel (dasatinib) Sprycel (dasatinib)   Dose 80 mg 80 mg 80 mg 80 mg 80 mg 80 mg 80 mg   Current Schedule Daily Daily Daily Daily Daily Daily Daily   Cycle Details Drug on Hold Continuous Continuous Continuous Continuous Continuous Continuous   Start Date of Last Cycle  10/30/2024            Labs:  _  Result Component Current Result Ref Range   Sodium 137 (11/26/2024) 135 - 145 mmol/L     _  Result Component Current Result Ref Range   Potassium 5.1 (11/26/2024) 3.4 - 5.3 mmol/L     _  Result Component Current Result Ref Range   Calcium 9.1 (11/26/2024) 8.8 - 10.4 mg/dL     _  Result Component Current Result Ref Range   Magnesium 2.0 (11/5/2024) 1.7 - 2.3 mg/dL     _  Result Component Current Result Ref Range   Phosphorus 4.5 (11/5/2024) 2.5 - 4.5 mg/dL     _  Result Component Current Result Ref Range   Albumin 4.7 (11/26/2024) 3.5 - 5.2 g/dL     _  Result Component Current Result Ref Range   Urea Nitrogen 23.8 (H) (11/26/2024)  6.0 - 20.0 mg/dL     _  Result Component Current Result Ref Range   Creatinine 1.34 (H) (11/26/2024) 0.67 - 1.17 mg/dL     _  Result Component Current Result Ref Range   AST 20 (11/26/2024) 0 - 45 U/L     _  Result Component Current Result Ref Range   ALT 22 (11/26/2024) 0 - 70 U/L     _  Result Component Current Result Ref Range   Bilirubin Total 0.8 (11/26/2024) <=1.2 mg/dL     _  Result Component Current Result Ref Range   WBC Count 10.3 (11/26/2024) 4.0 - 11.0 10e3/uL     _  Result Component Current Result Ref Range   Hemoglobin 12.8 (L) (11/26/2024) 13.3 - 17.7 g/dL     _  Result Component Current Result Ref Range   Platelet Count 155 (11/26/2024) 150 - 450 10e3/uL     _  Result Component Current Result Ref Range   Absolute Neutrophils 8.5 (H) (11/19/2024) 1.6 - 8.3 10e3/uL     _  Result Component Current Result Ref Range   Absolute Neutrophils 8.2 (11/26/2024) 1.6 - 8.3 10e3/uL        Assessment & Plan:  No concerning blood count abnormalities. Patient's blood glucose is above 200. He follows with endocrine and recently increased Jardiance.  Continue dasatinib as prescribed.    Yorxst message sent to patient.     Follow-Up:  Labs on 12/3 and 12/10    Jessica Dodson PharmD  Oral Chemotherapy Monitoring Program  [facility]  [phone]

## 2024-12-03 ENCOUNTER — DOCUMENTATION ONLY (OUTPATIENT)
Dept: ONCOLOGY | Facility: CLINIC | Age: 39
End: 2024-12-03

## 2024-12-03 ENCOUNTER — LAB (OUTPATIENT)
Dept: LAB | Facility: CLINIC | Age: 39
End: 2024-12-03
Payer: COMMERCIAL

## 2024-12-03 DIAGNOSIS — C92.10 CML (CHRONIC MYELOCYTIC LEUKEMIA) (H): ICD-10-CM

## 2024-12-03 LAB
ALBUMIN SERPL BCG-MCNC: 4.7 G/DL (ref 3.5–5.2)
ALP SERPL-CCNC: 102 U/L (ref 40–150)
ALT SERPL W P-5'-P-CCNC: 21 U/L (ref 0–70)
ANION GAP SERPL CALCULATED.3IONS-SCNC: 14 MMOL/L (ref 7–15)
AST SERPL W P-5'-P-CCNC: 20 U/L (ref 0–45)
BASOPHILS # BLD AUTO: 0.2 10E3/UL (ref 0–0.2)
BASOPHILS NFR BLD AUTO: 2 %
BILIRUB SERPL-MCNC: 0.8 MG/DL
BUN SERPL-MCNC: 25.2 MG/DL (ref 6–20)
CALCIUM SERPL-MCNC: 9.1 MG/DL (ref 8.8–10.4)
CHLORIDE SERPL-SCNC: 104 MMOL/L (ref 98–107)
CREAT SERPL-MCNC: 1.3 MG/DL (ref 0.67–1.17)
EGFRCR SERPLBLD CKD-EPI 2021: 73 ML/MIN/1.73M2
EOSINOPHIL # BLD AUTO: 0.1 10E3/UL (ref 0–0.7)
EOSINOPHIL NFR BLD AUTO: 2 %
ERYTHROCYTE [DISTWIDTH] IN BLOOD BY AUTOMATED COUNT: 20 % (ref 10–15)
GLUCOSE SERPL-MCNC: 106 MG/DL (ref 70–99)
HCO3 SERPL-SCNC: 22 MMOL/L (ref 22–29)
HCT VFR BLD AUTO: 39.9 % (ref 40–53)
HGB BLD-MCNC: 12.3 G/DL (ref 13.3–17.7)
IMM GRANULOCYTES # BLD: 0 10E3/UL
IMM GRANULOCYTES NFR BLD: 1 %
LYMPHOCYTES # BLD AUTO: 1.6 10E3/UL (ref 0.8–5.3)
LYMPHOCYTES NFR BLD AUTO: 21 %
MCH RBC QN AUTO: 27.3 PG (ref 26.5–33)
MCHC RBC AUTO-ENTMCNC: 30.8 G/DL (ref 31.5–36.5)
MCV RBC AUTO: 89 FL (ref 78–100)
MONOCYTES # BLD AUTO: 0.5 10E3/UL (ref 0–1.3)
MONOCYTES NFR BLD AUTO: 7 %
NEUTROPHILS # BLD AUTO: 5.1 10E3/UL (ref 1.6–8.3)
NEUTROPHILS NFR BLD AUTO: 68 %
NRBC # BLD AUTO: 0 10E3/UL
NRBC BLD AUTO-RTO: 0 /100
PLATELET # BLD AUTO: 111 10E3/UL (ref 150–450)
POTASSIUM SERPL-SCNC: 4.8 MMOL/L (ref 3.4–5.3)
PROT SERPL-MCNC: 7.5 G/DL (ref 6.4–8.3)
RBC # BLD AUTO: 4.5 10E6/UL (ref 4.4–5.9)
SODIUM SERPL-SCNC: 140 MMOL/L (ref 135–145)
WBC # BLD AUTO: 7.5 10E3/UL (ref 4–11)

## 2024-12-03 PROCEDURE — 85004 AUTOMATED DIFF WBC COUNT: CPT

## 2024-12-03 PROCEDURE — 36415 COLL VENOUS BLD VENIPUNCTURE: CPT

## 2024-12-03 PROCEDURE — 85014 HEMATOCRIT: CPT

## 2024-12-03 PROCEDURE — 82310 ASSAY OF CALCIUM: CPT

## 2024-12-03 PROCEDURE — 84520 ASSAY OF UREA NITROGEN: CPT

## 2024-12-03 PROCEDURE — 84075 ASSAY ALKALINE PHOSPHATASE: CPT

## 2024-12-03 NOTE — PROGRESS NOTES
Oral Chemotherapy Monitoring Program  Lab Follow Up    Reviewed lab results from 12/3/24.        10/30/2024     2:00 PM 11/5/2024     3:00 PM 11/12/2024     4:00 PM 11/19/2024     1:00 PM 11/25/2024     8:00 AM 11/26/2024     1:00 PM 12/3/2024    12:00 PM   ORAL CHEMOTHERAPY   Assessment Type Lab Monitoring;Other Lab Monitoring;Other Lab Monitoring Lab Monitoring Refill Lab Monitoring Lab Monitoring   Diagnosis Code Chronic Myeloid Leukemia (CML) Chronic Myeloid Leukemia (CML) Chronic Myeloid Leukemia (CML) Chronic Myeloid Leukemia (CML) Chronic Myeloid Leukemia (CML) Chronic Myeloid Leukemia (CML) Chronic Myeloid Leukemia (CML)   Providers Dr. Telly Varner   [facility]   Is this patient followed by the [facility] OC team? Yes Yes Yes Yes Yes Yes Yes   Drug Name Sprycel (dasatinib) Sprycel (dasatinib) Sprycel (dasatinib) Sprycel (dasatinib) Sprycel (dasatinib) Sprycel (dasatinib) Sprycel (dasatinib)   Dose 80 mg 80 mg 80 mg 80 mg 80 mg 80 mg 80 mg   Current Schedule Daily Daily Daily Daily Daily Daily Daily   Cycle Details Continuous Continuous Continuous Continuous Continuous Continuous Continuous   Start Date of Last Cycle 10/30/2024             Labs:  _  Result Component Current Result Ref Range   Sodium 140 (12/3/2024) 135 - 145 mmol/L     _  Result Component Current Result Ref Range   Potassium 4.8 (12/3/2024) 3.4 - 5.3 mmol/L     _  Result Component Current Result Ref Range   Calcium 9.1 (12/3/2024) 8.8 - 10.4 mg/dL     _  Result Component Current Result Ref Range   Magnesium 2.0 (11/5/2024) 1.7 - 2.3 mg/dL     _  Result Component Current Result Ref Range   Phosphorus 4.5 (11/5/2024) 2.5 - 4.5 mg/dL     _  Result Component Current Result Ref Range   Albumin 4.7 (12/3/2024) 3.5 - 5.2 g/dL     _  Result Component Current Result Ref Range   Urea Nitrogen 25.2 (H) (12/3/2024) 6.0 -  20.0 mg/dL     _  Result Component Current Result Ref Range   Creatinine 1.30 (H) (12/3/2024) 0.67 - 1.17 mg/dL     _  Result Component Current Result Ref Range   AST 20 (12/3/2024) 0 - 45 U/L     _  Result Component Current Result Ref Range   ALT 21 (12/3/2024) 0 - 70 U/L     _  Result Component Current Result Ref Range   Bilirubin Total 0.8 (12/3/2024) <=1.2 mg/dL     _  Result Component Current Result Ref Range   WBC Count 7.5 (12/3/2024) 4.0 - 11.0 10e3/uL     _  Result Component Current Result Ref Range   Hemoglobin 12.3 (L) (12/3/2024) 13.3 - 17.7 g/dL     _  Result Component Current Result Ref Range   Platelet Count 111 (L) (12/3/2024) 150 - 450 10e3/uL     _  Result Component Current Result Ref Range   Absolute Neutrophils 8.5 (H) (11/19/2024) 1.6 - 8.3 10e3/uL     _  Result Component Current Result Ref Range   Absolute Neutrophils 5.1 (12/3/2024) 1.6 - 8.3 10e3/uL        Assessment & Plan:  No concerning abnormalities. Results show grade 1 anemia and thrombocytopenia. Per Anil' [employer] message took several doses of 100 mg while waiting for 80 mg dose delivery. Will continue to monitor counts, but unlikely to decrease substantially. Patient was contacted to continue therapy as prescribed via [employer].    Follow-Up:  12/10/24 labs    Lux Abad, Sandee  Oral Chemotherapy Monitoring Program  [facility]  [phone]

## 2024-12-11 ENCOUNTER — LAB (OUTPATIENT)
Dept: LAB | Facility: CLINIC | Age: 39
End: 2024-12-11
Payer: COMMERCIAL

## 2024-12-11 DIAGNOSIS — C92.10 CML (CHRONIC MYELOCYTIC LEUKEMIA) (H): ICD-10-CM

## 2024-12-11 LAB
ALBUMIN SERPL BCG-MCNC: 4.6 G/DL (ref 3.5–5.2)
ALP SERPL-CCNC: 102 U/L (ref 40–150)
ALT SERPL W P-5'-P-CCNC: 20 U/L (ref 0–70)
ANION GAP SERPL CALCULATED.3IONS-SCNC: 13 MMOL/L (ref 7–15)
AST SERPL W P-5'-P-CCNC: 20 U/L (ref 0–45)
BASOPHILS # BLD AUTO: 0.1 10E3/UL (ref 0–0.2)
BASOPHILS NFR BLD AUTO: 2 %
BILIRUB SERPL-MCNC: 0.6 MG/DL
BUN SERPL-MCNC: 15.8 MG/DL (ref 6–20)
CALCIUM SERPL-MCNC: 9.2 MG/DL (ref 8.8–10.4)
CHLORIDE SERPL-SCNC: 104 MMOL/L (ref 98–107)
CREAT SERPL-MCNC: 1.22 MG/DL (ref 0.67–1.17)
EGFRCR SERPLBLD CKD-EPI 2021: 78 ML/MIN/1.73M2
EOSINOPHIL # BLD AUTO: 0.2 10E3/UL (ref 0–0.7)
EOSINOPHIL NFR BLD AUTO: 5 %
ERYTHROCYTE [DISTWIDTH] IN BLOOD BY AUTOMATED COUNT: 19 % (ref 10–15)
GLUCOSE SERPL-MCNC: 157 MG/DL (ref 70–99)
HCO3 SERPL-SCNC: 22 MMOL/L (ref 22–29)
HCT VFR BLD AUTO: 38.9 % (ref 40–53)
HGB BLD-MCNC: 12.1 G/DL (ref 13.3–17.7)
IMM GRANULOCYTES # BLD: 0 10E3/UL
IMM GRANULOCYTES NFR BLD: 0 %
LAB DIRECTOR COMMENTS: NORMAL
LAB DIRECTOR DISCLAIMER: NORMAL
LAB DIRECTOR INTERPRETATION: NORMAL
LAB DIRECTOR METHODOLOGY: NORMAL
LAB DIRECTOR RESULTS: NORMAL
LOCATION OF TASK: NORMAL
LYMPHOCYTES # BLD AUTO: 1.1 10E3/UL (ref 0.8–5.3)
LYMPHOCYTES NFR BLD AUTO: 22 %
MCH RBC QN AUTO: 27.4 PG (ref 26.5–33)
MCHC RBC AUTO-ENTMCNC: 31.1 G/DL (ref 31.5–36.5)
MCV RBC AUTO: 88 FL (ref 78–100)
MONOCYTES # BLD AUTO: 0.5 10E3/UL (ref 0–1.3)
MONOCYTES NFR BLD AUTO: 11 %
NEUTROPHILS # BLD AUTO: 2.8 10E3/UL (ref 1.6–8.3)
NEUTROPHILS NFR BLD AUTO: 60 %
NRBC # BLD AUTO: 0 10E3/UL
NRBC BLD AUTO-RTO: 0 /100
PLATELET # BLD AUTO: 123 10E3/UL (ref 150–450)
POTASSIUM SERPL-SCNC: 4.5 MMOL/L (ref 3.4–5.3)
PROT SERPL-MCNC: 7.6 G/DL (ref 6.4–8.3)
RBC # BLD AUTO: 4.41 10E6/UL (ref 4.4–5.9)
SODIUM SERPL-SCNC: 139 MMOL/L (ref 135–145)
SPECIMEN TYPE: NORMAL
WBC # BLD AUTO: 4.7 10E3/UL (ref 4–11)

## 2024-12-11 PROCEDURE — 82947 ASSAY GLUCOSE BLOOD QUANT: CPT

## 2024-12-11 PROCEDURE — 85041 AUTOMATED RBC COUNT: CPT

## 2024-12-11 PROCEDURE — G0452 MOLECULAR PATHOLOGY INTERPR: HCPCS | Mod: 26 | Performed by: PATHOLOGY

## 2024-12-11 PROCEDURE — 84460 ALANINE AMINO (ALT) (SGPT): CPT

## 2024-12-11 PROCEDURE — 36415 COLL VENOUS BLD VENIPUNCTURE: CPT

## 2024-12-11 PROCEDURE — 85004 AUTOMATED DIFF WBC COUNT: CPT

## 2024-12-11 PROCEDURE — 84450 TRANSFERASE (AST) (SGOT): CPT

## 2024-12-16 ENCOUNTER — LAB (OUTPATIENT)
Dept: LAB | Facility: CLINIC | Age: 39
End: 2024-12-16
Payer: COMMERCIAL

## 2024-12-16 ENCOUNTER — PATIENT OUTREACH (OUTPATIENT)
Dept: ONCOLOGY | Facility: CLINIC | Age: 39
End: 2024-12-16

## 2024-12-16 DIAGNOSIS — D50.8 IRON DEFICIENCY ANEMIA SECONDARY TO INADEQUATE DIETARY IRON INTAKE: Primary | ICD-10-CM

## 2024-12-16 DIAGNOSIS — C92.10 CML (CHRONIC MYELOCYTIC LEUKEMIA) (H): ICD-10-CM

## 2024-12-16 PROBLEM — J18.9 PNEUMONIA OF LEFT LOWER LOBE DUE TO INFECTIOUS ORGANISM: Status: RESOLVED | Noted: 2022-03-22 | Resolved: 2024-12-16

## 2024-12-16 PROBLEM — N18.2 CKD (CHRONIC KIDNEY DISEASE) STAGE 2, GFR 60-89 ML/MIN: Status: ACTIVE | Noted: 2024-12-16

## 2024-12-16 PROBLEM — Z86.79 HISTORY OF ENDOCARDITIS IN ADULTHOOD: Status: ACTIVE | Noted: 2022-04-01

## 2024-12-16 LAB
ALBUMIN SERPL BCG-MCNC: 4.5 G/DL (ref 3.5–5.2)
ALP SERPL-CCNC: 103 U/L (ref 40–150)
ALT SERPL W P-5'-P-CCNC: 20 U/L (ref 0–70)
ANION GAP SERPL CALCULATED.3IONS-SCNC: 13 MMOL/L (ref 7–15)
AST SERPL W P-5'-P-CCNC: 23 U/L (ref 0–45)
BASOPHILS # BLD AUTO: 0.1 10E3/UL (ref 0–0.2)
BASOPHILS NFR BLD AUTO: 1 %
BILIRUB SERPL-MCNC: 0.8 MG/DL
BUN SERPL-MCNC: 24.2 MG/DL (ref 6–20)
CALCIUM SERPL-MCNC: 9.2 MG/DL (ref 8.8–10.4)
CHLORIDE SERPL-SCNC: 104 MMOL/L (ref 98–107)
CREAT SERPL-MCNC: 1.29 MG/DL (ref 0.67–1.17)
EGFRCR SERPLBLD CKD-EPI 2021: 73 ML/MIN/1.73M2
EOSINOPHIL # BLD AUTO: 0.2 10E3/UL (ref 0–0.7)
EOSINOPHIL NFR BLD AUTO: 4 %
ERYTHROCYTE [DISTWIDTH] IN BLOOD BY AUTOMATED COUNT: 18.8 % (ref 10–15)
FERRITIN SERPL-MCNC: 75 NG/ML (ref 31–409)
GLUCOSE SERPL-MCNC: 194 MG/DL (ref 70–99)
HCO3 SERPL-SCNC: 22 MMOL/L (ref 22–29)
HCT VFR BLD AUTO: 41.7 % (ref 40–53)
HGB BLD-MCNC: 12.9 G/DL (ref 13.3–17.7)
IMM GRANULOCYTES # BLD: 0 10E3/UL
IMM GRANULOCYTES NFR BLD: 0 %
IRON BINDING CAPACITY (ROCHE): 338 UG/DL (ref 240–430)
IRON SATN MFR SERPL: 16 % (ref 15–46)
IRON SERPL-MCNC: 53 UG/DL (ref 61–157)
LYMPHOCYTES # BLD AUTO: 1.2 10E3/UL (ref 0.8–5.3)
LYMPHOCYTES NFR BLD AUTO: 25 %
MAGNESIUM SERPL-MCNC: 2.2 MG/DL (ref 1.7–2.3)
MCH RBC QN AUTO: 27.4 PG (ref 26.5–33)
MCHC RBC AUTO-ENTMCNC: 30.9 G/DL (ref 31.5–36.5)
MCV RBC AUTO: 89 FL (ref 78–100)
MONOCYTES # BLD AUTO: 0.6 10E3/UL (ref 0–1.3)
MONOCYTES NFR BLD AUTO: 12 %
NEUTROPHILS # BLD AUTO: 2.8 10E3/UL (ref 1.6–8.3)
NEUTROPHILS NFR BLD AUTO: 58 %
NRBC # BLD AUTO: 0 10E3/UL
NRBC BLD AUTO-RTO: 0 /100
PHOSPHATE SERPL-MCNC: 4.1 MG/DL (ref 2.5–4.5)
PLATELET # BLD AUTO: 128 10E3/UL (ref 150–450)
POTASSIUM SERPL-SCNC: 4.9 MMOL/L (ref 3.4–5.3)
PROT SERPL-MCNC: 7.5 G/DL (ref 6.4–8.3)
RBC # BLD AUTO: 4.7 10E6/UL (ref 4.4–5.9)
SODIUM SERPL-SCNC: 139 MMOL/L (ref 135–145)
WBC # BLD AUTO: 4.9 10E3/UL (ref 4–11)

## 2024-12-16 PROCEDURE — 80053 COMPREHEN METABOLIC PANEL: CPT

## 2024-12-16 PROCEDURE — 83735 ASSAY OF MAGNESIUM: CPT

## 2024-12-16 PROCEDURE — 36415 COLL VENOUS BLD VENIPUNCTURE: CPT

## 2024-12-16 PROCEDURE — 82728 ASSAY OF FERRITIN: CPT | Performed by: INTERNAL MEDICINE

## 2024-12-16 PROCEDURE — 82040 ASSAY OF SERUM ALBUMIN: CPT

## 2024-12-16 PROCEDURE — 84100 ASSAY OF PHOSPHORUS: CPT

## 2024-12-16 PROCEDURE — 85025 COMPLETE CBC W/AUTO DIFF WBC: CPT

## 2024-12-16 PROCEDURE — 83550 IRON BINDING TEST: CPT | Performed by: INTERNAL MEDICINE

## 2024-12-16 NOTE — PROGRESS NOTES
Virtual Visit Details    Type of service:  Video Visit     Originating Location (pt. Location): Home    Distant Location (provider location):  On-site  Platform used for Video Visit: Haydee        REASON FOR VISIT:  Management of chronic myeloid leukemia (CML)    HISTORY OF PRESENT ILLNESS:  Anil Montoya is a 39 year old with chronic myeloid leukemia (CML).  To summarize his course, he presented with a couple of weeks of dizziness and was found to have hyperleukocytosis and thrombocytosis with moderate anemia as well as Tumor Lysis Syndrome.  Workup confirmed CML in chronic phase, hepatosplenomegaly on imaging, peripheral blood FISH and BCR-ABL p210 by PCR were detected, and bone marrow biopsy showed t(9;22) as the sole abnormality.  Peripheral blood BCR-ABL p210 by PCR was 62.5%.  He was started on dasatinib 100 mg daily in 09/2024.  Dasatinib was held for about 2 weeks in 10/2024 due to pancytopenia, was found to have iron deficiency and started an oral iron supplement, and blood cell counts improved so he resumed at 80 mg daily toward the end of 10/2024.  Visit for management of CML.    He is on his own today.  Energy level is improving.  No fevers, night sweats, or unintentional weight loss.  No dyspnea, cough, or chest pain.  Normal bowel function.  No bleeding or unusual bruising.  No new lumps or bumps.  No dizziness or chest pain.  Back working - still tired but manageable.  No big plans for the holidays - difficult time of year.    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 90 tablet 3    CONTOUR NEXT TEST test strip USE TO TEST DAILY AS DIRECTED      dasatinib (SPRYCEL) 80 MG tablet Take 1 tablet (80 mg) by mouth daily 30 tablet 0    dasatinib (SPRYCEL) 80 MG tablet Take 1 tablet (80 mg) by mouth daily 30 tablet 0    empagliflozin (JARDIANCE) 25 MG TABS tablet Take 25 mg by mouth daily.      Ferrous Sulfate (IRON) 28 MG TABS Take 28 mg by mouth daily.      furosemide (LASIX) 20 MG tablet Take 1 tablet (20 mg) by mouth daily      levothyroxine (SYNTHROID/LEVOTHROID) 150 MCG tablet Take 1 tablet by mouth daily at 2 pm.      losartan (COZAAR) 50 MG tablet Take 1 tablet (50 mg) by mouth daily. 90 tablet 3    metFORMIN (GLUCOPHAGE) 1000 MG tablet Take 1,000 mg by mouth 2 times daily (with meals)      metoprolol succinate ER (TOPROL XL) 25 MG 24 hr tablet Take 1 tablet (25 mg) by mouth daily. 90 tablet 3    semaglutide (OZEMPIC) 2 MG/1.5ML pen Inject 2 mg subcutaneously every 7 days.      levothyroxine (SYNTHROID/LEVOTHROID) 125 MCG tablet Take 150 mcg by mouth daily. (Patient not taking: Reported on 10/23/2024)       No current facility-administered medications for this visit.     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  HEENT: Sclerae anicteric.  Lungs: Breathing comfortably. No cough.  MSK: Grossly normal movement.  Neuro: Grossly non-focal.  Skin/access: Normal skin tone.  Psych: Alert and oriented. No distress.  Performance status: ECOG 1    LABORATORY DATA: Reviewed by me  Recent Labs   Lab Test 12/16/24  0706 12/11/24  0705 12/03/24  1144 11/26/24  0955 " 11/19/24  0901 11/12/24  1147 11/05/24  1018 09/23/24  0938 09/19/24  0437 09/18/24  0450 09/17/24  0637 09/16/24  1638 09/15/24  0435 09/14/24  1941 09/14/24  1523 06/01/22  1330 05/25/22  1155 05/18/22  1045   WBC 4.9 4.7 7.5   < > 11.5* 24.3* 16.6*   < > 216.7* 229.8* 290.9* 325.7*   < >  --    < > 6.3 6.9 3.4*   HGB 12.9* 12.1* 12.3*   < > 12.3* 12.3* 11.8*   < > 10.1* 9.2* 10.2* 10.0*   < >  --    < > 13.8 13.7 12.8*   * 123* 111*   < > 205 372 424   < > 414 463* 552* 561*   < >  --    < > 275 379 334   ANEU  --   --   --   --  8.5* 19.7* 10.0*   < > 160.4* 135.6* 148.4* 182.4*   < >  --    < >  --   --   --    ANEUTAUTO 2.8 2.8 5.1   < >  --   --   --    < >  --   --   --   --   --   --   --  4.0 4.6 1.6   ABLA  --   --   --   --   --   --   --   --  2.2*  --  5.8* 13.0*  --   --    < >  --   --   --    ALYM  --   --   --   --  2.1 2.2 2.3   < > 6.5* 9.2* 11.6* 0.0*   < >  --    < >  --   --   --    ALYMPAUTO 1.2 1.1 1.6   < >  --   --   --    < >  --   --   --   --   --   --   --  1.1 1.2 0.8   RETICABSCT  --   --   --   --   --   --   --   --   --  0.090  --   --   --  0.118*  --   --   --   --    SED  --   --   --   --   --   --   --   --   --   --   --   --   --   --   --  19* 19* 26*    < > = values in this interval not displayed.     Recent Labs   Lab Test 12/16/24  0706 12/11/24  0705 12/03/24  1144 11/12/24  1147 11/05/24  1018 10/18/24  0911 10/15/24  0923 10/10/24  0929 10/07/24  0942 10/03/24  1433    139 140   < > 136   < > 138 139 138 137   POTASSIUM 4.9 4.5 4.8   < > 4.3   < > 4.4 4.3 4.9 4.5   CHLORIDE 104 104 104   < > 102   < > 104 104 106 109*   CO2 22 22 22   < > 20*   < > 20* 19* 18* 17*   BUN 24.2* 15.8 25.2*   < > 16.1   < > 16.5 15.0 24.4* 20.6*   CR 1.29* 1.22* 1.30*   < > 1.18*   < > 1.17 1.12 1.40* 1.25*   IMAN 9.2 9.2 9.1   < > 9.3   < > 8.2* 8.6* 8.3* 8.6*   MAG 2.2  --   --   --  2.0  --  1.9 2.0 2.0 1.9   PHOS 4.1  --   --   --  4.5  --  3.5 4.1 3.5 3.2   URIC  --   --    --   --   --   --   --  3.1* 3.7 3.3*   LDH  --   --   --   --   --   --   --  183 199 230    < > = values in this interval not displayed.     Recent Labs   Lab Test 12/16/24  0706 12/11/24  0705 12/03/24  1144 09/23/24  0938 09/19/24  0437 09/18/24  0450 09/17/24  0637   AST 23 20 20   < > 28 26 30   ALT 20 20 21   < > 9 10 12   ALKPHOS 103 102 102   < > 168* 158* 174*   BILITOTAL 0.8 0.6 0.8   < > 0.7 0.6 0.8   INR  --   --   --   --  1.36* 1.46* 1.50*    < > = values in this interval not displayed.     Recent Labs   Lab Test 12/16/24  0706 10/15/24  0923 10/10/24  0929 09/18/24  0450   TOMASA 75  --   --  264   IRONSAT 16  --   --  8*   IRON 53*  --   --  23*     --   --  298   B12  --   --  >4,000*  --    FOLIC  --  23.3  --   --       09/17/2024 blood BCR-ABL p210 by PCR 62.5%  12/11/2024 blood BCR-ABL p210 by PCR 22.1%    IMPRESSION AND PLAN:  Anil Montoya is a 39 year old with chronic myeloid leukemia (CML).    CML has responded nicely to dasatinib.  Treatment was interrupted by pancytopenia that is likely related to a considerable burden of BCR-ABL dependent hematopoiesis at diagnosis.  Blood cell counts improved with holding dasatinib are are improving with an essentially complete hematologic response on dasatinib 80 mg daily.  BCR-ABL remains >10%, but he has not been consistently on treatment during the first 3 months, so we will not make any changes at this point.  We agreed to continue treatment and to monitor for treatment toxicity or other issues.  We could consider increasing to 100 mg dasatinib if blood cell counts continue to improve to maximize the response.    Iron deficiency anemia has improved with oral iron.  We will continue to monitor.  Iron deficiency may be related to blood loss from past cardiac issues, but we will have a low threshold to pursue GI workup if there are ongoing issues after repletion of iron stores.    There are no active ID issues.  I have recommended an updated  flu shot and COVID-19 vaccine.      He will continue to work with Cardiology for his complex cardiology issues, Endocrine for diabetes management, and his primary care provider (working on finding a new one) for health maintenance and other medical issues.    We will request monitoring labs and a visit with me in about 3 months.  I reminded him to contact us if questions, concerns, or new issues come up between visits.    Video start time: 1:02 PM  Video end time: 1:19 PM  Video duration: 17 minutes    Total of 30 minutes on patient visit, reviewing records, interpreting test results, placing orders, and documentation on the day of service.    The longitudinal plan of care for the diagnosis(es)/condition(s) as documented were addressed during this visit. Due to the added complexity in care, I will continue to support Anil in the subsequent management and with ongoing continuity of care.    Jesus Varner MD, PhD  Attending Physician, [facility]  [phone] [facility]

## 2024-12-16 NOTE — PROGRESS NOTES
[facility]: Cancer Care                                                                                          Pt had labs done this am, pt also scheduled for lab appt tomorrow.  Message sent to pt and scheduling to cancel appt tomorrow for lab.    Theresa Guerrero, MICKN, RN  RN Care Coordinator  [facility]

## 2024-12-17 ENCOUNTER — VIRTUAL VISIT (OUTPATIENT)
Dept: ONCOLOGY | Facility: CLINIC | Age: 39
End: 2024-12-17
Attending: INTERNAL MEDICINE
Payer: COMMERCIAL

## 2024-12-17 VITALS — BODY MASS INDEX: 27.92 KG/M2 | HEIGHT: 70 IN | WEIGHT: 195 LBS

## 2024-12-17 DIAGNOSIS — Z95.4 S/P TVR (TRICUSPID VALVE REPLACEMENT): ICD-10-CM

## 2024-12-17 DIAGNOSIS — N18.2 CKD (CHRONIC KIDNEY DISEASE) STAGE 2, GFR 60-89 ML/MIN: ICD-10-CM

## 2024-12-17 DIAGNOSIS — G47.33 OSA (OBSTRUCTIVE SLEEP APNEA): ICD-10-CM

## 2024-12-17 DIAGNOSIS — Z86.79 HISTORY OF ENDOCARDITIS IN ADULTHOOD: ICD-10-CM

## 2024-12-17 DIAGNOSIS — C92.10 CML (CHRONIC MYELOCYTIC LEUKEMIA) (H): Primary | ICD-10-CM

## 2024-12-17 DIAGNOSIS — D50.8 IRON DEFICIENCY ANEMIA SECONDARY TO INADEQUATE DIETARY IRON INTAKE: ICD-10-CM

## 2024-12-17 DIAGNOSIS — N18.2 TYPE 2 DIABETES MELLITUS WITH STAGE 2 CHRONIC KIDNEY DISEASE, UNSPECIFIED WHETHER LONG TERM INSULIN USE (H): ICD-10-CM

## 2024-12-17 DIAGNOSIS — E11.22 TYPE 2 DIABETES MELLITUS WITH STAGE 2 CHRONIC KIDNEY DISEASE, UNSPECIFIED WHETHER LONG TERM INSULIN USE (H): ICD-10-CM

## 2024-12-17 PROCEDURE — G2211 COMPLEX E/M VISIT ADD ON: HCPCS | Mod: 95 | Performed by: INTERNAL MEDICINE

## 2024-12-17 PROCEDURE — 99214 OFFICE O/P EST MOD 30 MIN: CPT | Mod: 95 | Performed by: INTERNAL MEDICINE

## 2024-12-17 ASSESSMENT — PAIN SCALES - GENERAL: PAINLEVEL_OUTOF10: NO PAIN (0)

## 2024-12-17 NOTE — NURSING NOTE
Is the patient currently in the state of [place]? YES    Visit mode:VIDEO    If the visit is dropped, the patient can be reconnected by:VIDEO VISIT: Send to e-mail at: [email or web address]    Will anyone else be joining the visit? NO  (If patient encounters technical issues they should call [phone]    Are changes needed to the allergy or medication list? No    Are refills needed on medications prescribed by this physician? NO    Rooming Documentation:  Questionnaire(s) completed    Reason for visit: Video Visit (Follow Up )    Melvina IZQUIERDO

## 2024-12-17 NOTE — LETTER
12/17/2024      Anil Montoya  [address] [address] [address]      Dear Colleague,    Thank you for referring your patient, Anil Montoya, to the [facility]. Please see a copy of my visit note below.    Virtual Visit Details    Type of service:  Video Visit     Originating Location (pt. Location): Home    Distant Location (provider location):  On-site  Platform used for Video Visit: Haydee        REASON FOR VISIT:  Management of chronic myeloid leukemia (CML)    HISTORY OF PRESENT ILLNESS:  Anil Montoya is a 39 year old with chronic myeloid leukemia (CML).  To summarize his course, he presented with a couple of weeks of dizziness and was found to have hyperleukocytosis and thrombocytosis with moderate anemia as well as Tumor Lysis Syndrome.  Workup confirmed CML in chronic phase, hepatosplenomegaly on imaging, peripheral blood FISH and BCR-ABL p210 by PCR were detected, and bone marrow biopsy showed t(9;22) as the sole abnormality.  Peripheral blood BCR-ABL p210 by PCR was 62.5%.  He was started on dasatinib 100 mg daily in 09/2024.  Dasatinib was held for about 2 weeks in 10/2024 due to pancytopenia, was found to have iron deficiency and started an oral iron supplement, and blood cell counts improved so he resumed at 80 mg daily toward the end of 10/2024.  Visit for management of CML.    He is on his own today.  Energy level is improving.  No fevers, night sweats, or unintentional weight loss.  No dyspnea, cough, or chest pain.  Normal bowel function.  No bleeding or unusual bruising.  No new lumps or bumps.  No dizziness or chest pain.  Back working - still tired but manageable.  No big plans for the holidays - difficult time of year.    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 90 tablet 3     CONTOUR NEXT TEST test strip USE TO TEST DAILY AS DIRECTED       dasatinib (SPRYCEL) 80 MG tablet Take 1 tablet (80 mg) by mouth daily 30 tablet 0     dasatinib (SPRYCEL) 80 MG tablet Take 1 tablet (80 mg) by mouth daily 30 tablet 0     empagliflozin (JARDIANCE) 25 MG TABS tablet Take 25 mg by mouth daily.       Ferrous Sulfate (IRON) 28 MG TABS Take 28 mg by mouth daily.       furosemide (LASIX) 20 MG tablet Take 1 tablet (20 mg) by mouth daily       levothyroxine (SYNTHROID/LEVOTHROID) 150 MCG tablet Take 1 tablet by mouth daily at 2 pm.       losartan (COZAAR) 50 MG tablet Take 1 tablet (50 mg) by mouth daily. 90 tablet 3     metFORMIN (GLUCOPHAGE) 1000 MG tablet Take 1,000 mg by mouth 2 times daily (with meals)       metoprolol succinate ER (TOPROL XL) 25 MG 24 hr tablet Take 1 tablet (25 mg) by mouth daily. 90 tablet 3     semaglutide (OZEMPIC) 2 MG/1.5ML pen Inject 2 mg subcutaneously every 7 days.       levothyroxine (SYNTHROID/LEVOTHROID) 125 MCG tablet Take 150 mcg by mouth daily. (Patient not taking: Reported on 10/23/2024)       No current facility-administered medications for this visit.     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  HEENT: Sclerae anicteric.  Lungs: Breathing " comfortably. No cough.  MSK: Grossly normal movement.  Neuro: Grossly non-focal.  Skin/access: Normal skin tone.  Psych: Alert and oriented. No distress.  Performance status: ECOG 1    LABORATORY DATA: Reviewed by me  Recent Labs   Lab Test 12/16/24  0706 12/11/24  0705 12/03/24  1144 11/26/24  0955 11/19/24  0901 11/12/24  1147 11/05/24  1018 09/23/24  0938 09/19/24  0437 09/18/24  0450 09/17/24  0637 09/16/24  1638 09/15/24  0435 09/14/24  1941 09/14/24  1523 06/01/22  1330 05/25/22  1155 05/18/22  1045   WBC 4.9 4.7 7.5   < > 11.5* 24.3* 16.6*   < > 216.7* 229.8* 290.9* 325.7*   < >  --    < > 6.3 6.9 3.4*   HGB 12.9* 12.1* 12.3*   < > 12.3* 12.3* 11.8*   < > 10.1* 9.2* 10.2* 10.0*   < >  --    < > 13.8 13.7 12.8*   * 123* 111*   < > 205 372 424   < > 414 463* 552* 561*   < >  --    < > 275 379 334   ANEU  --   --   --   --  8.5* 19.7* 10.0*   < > 160.4* 135.6* 148.4* 182.4*   < >  --    < >  --   --   --    ANEUTAUTO 2.8 2.8 5.1   < >  --   --   --    < >  --   --   --   --   --   --   --  4.0 4.6 1.6   ABLA  --   --   --   --   --   --   --   --  2.2*  --  5.8* 13.0*  --   --    < >  --   --   --    ALYM  --   --   --   --  2.1 2.2 2.3   < > 6.5* 9.2* 11.6* 0.0*   < >  --    < >  --   --   --    ALYMPAUTO 1.2 1.1 1.6   < >  --   --   --    < >  --   --   --   --   --   --   --  1.1 1.2 0.8   RETICABSCT  --   --   --   --   --   --   --   --   --  0.090  --   --   --  0.118*  --   --   --   --    SED  --   --   --   --   --   --   --   --   --   --   --   --   --   --   --  19* 19* 26*    < > = values in this interval not displayed.     Recent Labs   Lab Test 12/16/24  0706 12/11/24  0705 12/03/24  1144 11/12/24  1147 11/05/24  1018 10/18/24  0911 10/15/24  0923 10/10/24  0929 10/07/24  0942 10/03/24  1433    139 140   < > 136   < > 138 139 138 137   POTASSIUM 4.9 4.5 4.8   < > 4.3   < > 4.4 4.3 4.9 4.5   CHLORIDE 104 104 104   < > 102   < > 104 104 106 109*   CO2 22 22 22   < > 20*   < > 20* 19*  18* 17*   BUN 24.2* 15.8 25.2*   < > 16.1   < > 16.5 15.0 24.4* 20.6*   CR 1.29* 1.22* 1.30*   < > 1.18*   < > 1.17 1.12 1.40* 1.25*   IMAN 9.2 9.2 9.1   < > 9.3   < > 8.2* 8.6* 8.3* 8.6*   MAG 2.2  --   --   --  2.0  --  1.9 2.0 2.0 1.9   PHOS 4.1  --   --   --  4.5  --  3.5 4.1 3.5 3.2   URIC  --   --   --   --   --   --   --  3.1* 3.7 3.3*   LDH  --   --   --   --   --   --   --  183 199 230    < > = values in this interval not displayed.     Recent Labs   Lab Test 12/16/24  0706 12/11/24  0705 12/03/24  1144 09/23/24  0938 09/19/24  0437 09/18/24  0450 09/17/24  0637   AST 23 20 20   < > 28 26 30   ALT 20 20 21   < > 9 10 12   ALKPHOS 103 102 102   < > 168* 158* 174*   BILITOTAL 0.8 0.6 0.8   < > 0.7 0.6 0.8   INR  --   --   --   --  1.36* 1.46* 1.50*    < > = values in this interval not displayed.     Recent Labs   Lab Test 12/16/24  0706 10/15/24  0923 10/10/24  0929 09/18/24  0450   TOMASA 75  --   --  264   IRONSAT 16  --   --  8*   IRON 53*  --   --  23*     --   --  298   B12  --   --  >4,000*  --    FOLIC  --  23.3  --   --       09/17/2024 blood BCR-ABL p210 by PCR 62.5%  12/11/2024 blood BCR-ABL p210 by PCR 22.1%    IMPRESSION AND PLAN:  Anil Montoya is a 39 year old with chronic myeloid leukemia (CML).    CML has responded nicely to dasatinib.  Treatment was interrupted by pancytopenia that is likely related to a considerable burden of BCR-ABL dependent hematopoiesis at diagnosis.  Blood cell counts improved with holding dasatinib are are improving with an essentially complete hematologic response on dasatinib 80 mg daily.  BCR-ABL remains >10%, but he has not been consistently on treatment during the first 3 months, so we will not make any changes at this point.  We agreed to continue treatment and to monitor for treatment toxicity or other issues.  We could consider increasing to 100 mg dasatinib if blood cell counts continue to improve to maximize the response.    Iron deficiency anemia has  improved with oral iron.  We will continue to monitor.  Iron deficiency may be related to blood loss from past cardiac issues, but we will have a low threshold to pursue GI workup if there are ongoing issues after repletion of iron stores.    There are no active ID issues.  I have recommended an updated flu shot and COVID-19 vaccine.      He will continue to work with Cardiology for his complex cardiology issues, Endocrine for diabetes management, and his primary care provider (working on finding a new one) for health maintenance and other medical issues.    We will request monitoring labs and a visit with me in about 3 months.  I reminded him to contact us if questions, concerns, or new issues come up between visits.    Video start time: 1:02 PM  Video end time: 1:19 PM  Video duration: 17 minutes    Total of 30 minutes on patient visit, reviewing records, interpreting test results, placing orders, and documentation on the day of service.    The longitudinal plan of care for the diagnosis(es)/condition(s) as documented were addressed during this visit. Due to the added complexity in care, I will continue to support Anil in the subsequent management and with ongoing continuity of care.    Jesus Varner MD, PhD  Attending Physician, [facility]  [phone] clinic      Again, thank you for allowing me to participate in the care of your patient.        Sincerely,        Jesus Varner MD

## 2024-12-19 DIAGNOSIS — C92.10 CML (CHRONIC MYELOCYTIC LEUKEMIA) (H): Primary | ICD-10-CM

## 2024-12-19 RX ORDER — DASATINIB 80 MG/1
80 TABLET, FILM COATED ORAL DAILY
Qty: 30 TABLET | Refills: 0 | Status: SHIPPED | OUTPATIENT
Start: 2024-12-23

## 2024-12-21 ENCOUNTER — HEALTH MAINTENANCE LETTER (OUTPATIENT)
Age: 39
End: 2024-12-21

## 2025-01-20 ENCOUNTER — LAB (OUTPATIENT)
Dept: LAB | Facility: CLINIC | Age: 40
End: 2025-01-20
Payer: COMMERCIAL

## 2025-01-20 ENCOUNTER — DOCUMENTATION ONLY (OUTPATIENT)
Dept: ONCOLOGY | Facility: CLINIC | Age: 40
End: 2025-01-20

## 2025-01-20 DIAGNOSIS — C92.10 CML (CHRONIC MYELOCYTIC LEUKEMIA) (H): Primary | ICD-10-CM

## 2025-01-20 DIAGNOSIS — C92.10 CML (CHRONIC MYELOCYTIC LEUKEMIA) (H): ICD-10-CM

## 2025-01-20 LAB
ALBUMIN SERPL BCG-MCNC: 4.4 G/DL (ref 3.5–5.2)
ALP SERPL-CCNC: 106 U/L (ref 40–150)
ALT SERPL W P-5'-P-CCNC: 28 U/L (ref 0–70)
ANION GAP SERPL CALCULATED.3IONS-SCNC: 11 MMOL/L (ref 7–15)
AST SERPL W P-5'-P-CCNC: 25 U/L (ref 0–45)
BASOPHILS # BLD AUTO: 0.1 10E3/UL (ref 0–0.2)
BASOPHILS NFR BLD AUTO: 1 %
BILIRUB SERPL-MCNC: 1.6 MG/DL
BUN SERPL-MCNC: 24.5 MG/DL (ref 6–20)
CALCIUM SERPL-MCNC: 9.6 MG/DL (ref 8.8–10.4)
CHLORIDE SERPL-SCNC: 106 MMOL/L (ref 98–107)
CREAT SERPL-MCNC: 1.33 MG/DL (ref 0.67–1.17)
EGFRCR SERPLBLD CKD-EPI 2021: 71 ML/MIN/1.73M2
EOSINOPHIL # BLD AUTO: 0.1 10E3/UL (ref 0–0.7)
EOSINOPHIL NFR BLD AUTO: 1 %
ERYTHROCYTE [DISTWIDTH] IN BLOOD BY AUTOMATED COUNT: 18.6 % (ref 10–15)
GLUCOSE SERPL-MCNC: 150 MG/DL (ref 70–99)
HCO3 SERPL-SCNC: 24 MMOL/L (ref 22–29)
HCT VFR BLD AUTO: 46.6 % (ref 40–53)
HGB BLD-MCNC: 15.3 G/DL (ref 13.3–17.7)
IMM GRANULOCYTES # BLD: 0 10E3/UL
IMM GRANULOCYTES NFR BLD: 1 %
LYMPHOCYTES # BLD AUTO: 1.4 10E3/UL (ref 0.8–5.3)
LYMPHOCYTES NFR BLD AUTO: 21 %
MAGNESIUM SERPL-MCNC: 2.1 MG/DL (ref 1.7–2.3)
MCH RBC QN AUTO: 28.3 PG (ref 26.5–33)
MCHC RBC AUTO-ENTMCNC: 32.8 G/DL (ref 31.5–36.5)
MCV RBC AUTO: 86 FL (ref 78–100)
MONOCYTES # BLD AUTO: 0.8 10E3/UL (ref 0–1.3)
MONOCYTES NFR BLD AUTO: 13 %
NEUTROPHILS # BLD AUTO: 4 10E3/UL (ref 1.6–8.3)
NEUTROPHILS NFR BLD AUTO: 63 %
NRBC # BLD AUTO: 0 10E3/UL
NRBC BLD AUTO-RTO: 0 /100
PHOSPHATE SERPL-MCNC: 4.1 MG/DL (ref 2.5–4.5)
PLATELET # BLD AUTO: 117 10E3/UL (ref 150–450)
POTASSIUM SERPL-SCNC: 4.4 MMOL/L (ref 3.4–5.3)
PROT SERPL-MCNC: 7.8 G/DL (ref 6.4–8.3)
RBC # BLD AUTO: 5.41 10E6/UL (ref 4.4–5.9)
SODIUM SERPL-SCNC: 141 MMOL/L (ref 135–145)
WBC # BLD AUTO: 6.4 10E3/UL (ref 4–11)

## 2025-01-20 PROCEDURE — 36415 COLL VENOUS BLD VENIPUNCTURE: CPT

## 2025-01-20 PROCEDURE — 85014 HEMATOCRIT: CPT

## 2025-01-20 PROCEDURE — 80051 ELECTROLYTE PANEL: CPT

## 2025-01-20 PROCEDURE — 82040 ASSAY OF SERUM ALBUMIN: CPT

## 2025-01-20 PROCEDURE — 85004 AUTOMATED DIFF WBC COUNT: CPT

## 2025-01-20 PROCEDURE — 84100 ASSAY OF PHOSPHORUS: CPT

## 2025-01-20 PROCEDURE — 85048 AUTOMATED LEUKOCYTE COUNT: CPT

## 2025-01-20 PROCEDURE — 83735 ASSAY OF MAGNESIUM: CPT

## 2025-01-20 PROCEDURE — 84155 ASSAY OF PROTEIN SERUM: CPT

## 2025-01-20 RX ORDER — DASATINIB 80 MG/1
80 TABLET, FILM COATED ORAL DAILY
Qty: 30 TABLET | Refills: 0 | Status: SHIPPED | OUTPATIENT
Start: 2025-01-22

## 2025-01-20 NOTE — PROGRESS NOTES
Oral Chemotherapy Monitoring Program  Lab Follow Up    Reviewed lab results from 1/20/25.        11/19/2024     1:00 PM 11/25/2024     8:00 AM 11/26/2024     1:00 PM 12/3/2024    12:00 PM 12/13/2024     2:00 PM 12/19/2024    10:00 AM 1/20/2025     9:00 AM   ORAL CHEMOTHERAPY   Assessment Type Lab Monitoring Refill Lab Monitoring Lab Monitoring Lab Monitoring;Monthly Follow up Refill Lab Monitoring   Diagnosis Code Chronic Myeloid Leukemia (CML) Chronic Myeloid Leukemia (CML) Chronic Myeloid Leukemia (CML) Chronic Myeloid Leukemia (CML) Chronic Myeloid Leukemia (CML) Chronic Myeloid Leukemia (CML) Chronic Myeloid Leukemia (CML)   Providers Dr. Telly Varner   [facility]   Is this patient followed by the [facility] team? Yes Yes Yes Yes Yes Yes Yes   Drug Name Sprycel (dasatinib) Sprycel (dasatinib) Sprycel (dasatinib) Sprycel (dasatinib) Sprycel (dasatinib) Sprycel (dasatinib) Sprycel (dasatinib)   Dose 80 mg 80 mg 80 mg 80 mg 80 mg 80 mg 80 mg   Current Schedule Daily Daily Daily Daily Daily Daily Daily   Cycle Details Continuous Continuous Continuous Continuous Continuous Continuous Continuous   Adherence Assessment     Adherent     Adverse Effects     No AE identified during assessment  Increased AST/ALT/T BILI;Thrombocytopenia   Thrombocytopenia       Grade 1   Pharmacist Intervention(thrombocytopenia)       No       Labs:  _  Result Component Current Result Ref Range   Sodium 141 (1/20/2025) 135 - 145 mmol/L     _  Result Component Current Result Ref Range   Potassium 4.4 (1/20/2025) 3.4 - 5.3 mmol/L     _  Result Component Current Result Ref Range   Calcium 9.6 (1/20/2025) 8.8 - 10.4 mg/dL     _  Result Component Current Result Ref Range   Magnesium 2.1 (1/20/2025) 1.7 - 2.3 mg/dL     _  Result Component Current Result Ref Range   Phosphorus 4.1 (1/20/2025) 2.5 - 4.5 mg/dL      _  Result Component Current Result Ref Range   Albumin 4.4 (1/20/2025) 3.5 - 5.2 g/dL     _  Result Component Current Result Ref Range   Urea Nitrogen 24.5 (H) (1/20/2025) 6.0 - 20.0 mg/dL     _  Result Component Current Result Ref Range   Creatinine 1.33 (H) (1/20/2025) 0.67 - 1.17 mg/dL     _  Result Component Current Result Ref Range   AST 25 (1/20/2025) 0 - 45 U/L     _  Result Component Current Result Ref Range   ALT 28 (1/20/2025) 0 - 70 U/L     _  Result Component Current Result Ref Range   Bilirubin Total 1.6 (H) (1/20/2025) <=1.2 mg/dL     _  Result Component Current Result Ref Range   WBC Count 6.4 (1/20/2025) 4.0 - 11.0 10e3/uL     _  Result Component Current Result Ref Range   Hemoglobin 15.3 (1/20/2025) 13.3 - 17.7 g/dL     _  Result Component Current Result Ref Range   Platelet Count 117 (L) (1/20/2025) 150 - 450 10e3/uL     No results found for ANC within last 30 days.     _  Result Component Current Result Ref Range   Absolute Neutrophils 4.0 (1/20/2025) 1.6 - 8.3 10e3/uL        Assessment & Plan:    Results are notable for increased bilirubin 1.6. Other results are consistent with previous numbers. Continue monitoring. Will recheck bilirubin with next lab results.    Patient is contacted via Room[employer] message.     Follow-Up:  2/10/25 lab draw    Julia Macario  Pharmacy Intern  Oral Chemotherapy Monitoring Program  [facility]  [phone]

## 2025-02-02 NOTE — PROGRESS NOTES
[employer] Consult ICD check from 9/14/2024. Pt in ER for dizziness    [place] [employer] Resonate (D) ICD  Presenting rhythm shows AS in the 90s with occasional AP, and  55 bpm.   Mode: DDI 55  Battery 9 yrs estimated longevity   Available lead measurements WNL and stable  1 ventricular episode recorded since the alert for shock on 9/5/2024, EGM shows nonsustained polymorphic VT lasting 12 seconds,  bpm.

## 2025-02-12 ENCOUNTER — TELEPHONE (OUTPATIENT)
Dept: INTERNAL MEDICINE | Facility: CLINIC | Age: 40
End: 2025-02-12
Payer: COMMERCIAL

## 2025-02-12 NOTE — TELEPHONE ENCOUNTER
S-(situation): Pt calling to request med refill.     B-(background): Was a Pt at [facility], is establishing care with Dr. Monge on 2/24/25     A-(assessment): Pt is out of his Ozempic and will miss 2 doses before being seen by new PCP. Writer advised Pt to call previous PCP for refill. He reports they will not send Rx unless he is seen.     R-(recommendations): Please consider sending Rx for Ozempic for Pt establishing care 2/24/25. Writer advised this is unlikely but would ask. Med pended for review.     Rodriguez Pinzon RN [facility]

## 2025-02-13 NOTE — TELEPHONE ENCOUNTER
Per Care everywhere, pt contacted his previous Endocrinologist yesterday, through [employer], and it appears that they refilled this.

## 2025-02-15 ENCOUNTER — LAB (OUTPATIENT)
Dept: LAB | Facility: CLINIC | Age: 40
End: 2025-02-15
Payer: COMMERCIAL

## 2025-02-15 ENCOUNTER — TELEPHONE (OUTPATIENT)
Dept: NURSING | Facility: CLINIC | Age: 40
End: 2025-02-15

## 2025-02-15 DIAGNOSIS — C92.10 CML (CHRONIC MYELOCYTIC LEUKEMIA) (H): ICD-10-CM

## 2025-02-15 LAB
ALBUMIN SERPL BCG-MCNC: 4.3 G/DL (ref 3.5–5.2)
ALP SERPL-CCNC: 98 U/L (ref 40–150)
ALT SERPL W P-5'-P-CCNC: 21 U/L (ref 0–70)
ANION GAP SERPL CALCULATED.3IONS-SCNC: 11 MMOL/L (ref 7–15)
AST SERPL W P-5'-P-CCNC: 24 U/L (ref 0–45)
BASOPHILS # BLD AUTO: 0 10E3/UL (ref 0–0.2)
BASOPHILS NFR BLD AUTO: 1 %
BILIRUB SERPL-MCNC: 0.9 MG/DL
BUN SERPL-MCNC: 27 MG/DL (ref 6–20)
CALCIUM SERPL-MCNC: 9.6 MG/DL (ref 8.8–10.4)
CHLORIDE SERPL-SCNC: 108 MMOL/L (ref 98–107)
CREAT SERPL-MCNC: 1.66 MG/DL (ref 0.67–1.17)
EGFRCR SERPLBLD CKD-EPI 2021: 54 ML/MIN/1.73M2
EOSINOPHIL # BLD AUTO: 0.1 10E3/UL (ref 0–0.7)
EOSINOPHIL NFR BLD AUTO: 2 %
ERYTHROCYTE [DISTWIDTH] IN BLOOD BY AUTOMATED COUNT: 19.4 % (ref 10–15)
GLUCOSE SERPL-MCNC: 116 MG/DL (ref 70–99)
HCO3 SERPL-SCNC: 24 MMOL/L (ref 22–29)
HCT VFR BLD AUTO: 39.2 % (ref 40–53)
HGB BLD-MCNC: 13.1 G/DL (ref 13.3–17.7)
IMM GRANULOCYTES # BLD: 0 10E3/UL
IMM GRANULOCYTES NFR BLD: 0 %
LYMPHOCYTES # BLD AUTO: 1.2 10E3/UL (ref 0.8–5.3)
LYMPHOCYTES NFR BLD AUTO: 19 %
MAGNESIUM SERPL-MCNC: 2.2 MG/DL (ref 1.7–2.3)
MCH RBC QN AUTO: 28.9 PG (ref 26.5–33)
MCHC RBC AUTO-ENTMCNC: 33.4 G/DL (ref 31.5–36.5)
MCV RBC AUTO: 87 FL (ref 78–100)
MONOCYTES # BLD AUTO: 1.1 10E3/UL (ref 0–1.3)
MONOCYTES NFR BLD AUTO: 18 %
NEUTROPHILS # BLD AUTO: 3.7 10E3/UL (ref 1.6–8.3)
NEUTROPHILS NFR BLD AUTO: 61 %
NRBC # BLD AUTO: 0 10E3/UL
NRBC BLD AUTO-RTO: 0 /100
PHOSPHATE SERPL-MCNC: 4.8 MG/DL (ref 2.5–4.5)
PLATELET # BLD AUTO: 53 10E3/UL (ref 150–450)
POTASSIUM SERPL-SCNC: 5.1 MMOL/L (ref 3.4–5.3)
PROT SERPL-MCNC: 7.3 G/DL (ref 6.4–8.3)
RBC # BLD AUTO: 4.53 10E6/UL (ref 4.4–5.9)
SODIUM SERPL-SCNC: 143 MMOL/L (ref 135–145)
WBC # BLD AUTO: 6.1 10E3/UL (ref 4–11)

## 2025-02-15 PROCEDURE — 80053 COMPREHEN METABOLIC PANEL: CPT

## 2025-02-15 PROCEDURE — 84100 ASSAY OF PHOSPHORUS: CPT

## 2025-02-15 PROCEDURE — 83735 ASSAY OF MAGNESIUM: CPT

## 2025-02-15 PROCEDURE — 36415 COLL VENOUS BLD VENIPUNCTURE: CPT

## 2025-02-15 PROCEDURE — 85025 COMPLETE CBC W/AUTO DIFF WBC: CPT

## 2025-02-15 NOTE — TELEPHONE ENCOUNTER
Date/time of call received from lab: 02/15/25 at 10:48 AM.    Lab test:  platelet    Lab value:  48    Ordering provider name:Dr Varner    Ordering provider department: oncology    Primary Care Provider: Anat Gan     Result managed by: After Hours: [place] Nurse Advisor Does patient have an [facility] PCP? Yes, patient has [facility] PCP. On-call provider for PCP will be paged.     Action taken: RN will page on-call provider, following paging standard work process.       Provider consult indicated.     Reason for page: critical lab    Page sent to Dr. Salinas by Answering Service at 10:56 am. Dr Salinas called back felt there was nothing to worry about at this time as long as he is not bleeding. Patient called answered the phone and he is not bleeding  recommended he be seen if he has a fall or hits his head. Or call the nurse advisors.  Patient verbally understood.  Dr Salinas will seen message to Dr Howell that he will get on 2/17/2025.  Lab stated it was a easy draw  was not clotted.    Lara Isaac, RN

## 2025-02-17 ENCOUNTER — TELEPHONE (OUTPATIENT)
Dept: ONCOLOGY | Facility: CLINIC | Age: 40
End: 2025-02-17

## 2025-02-17 NOTE — ORAL ONC MGMT
Oral Chemotherapy Monitoring Program  Lab Follow Up    Reviewed lab results from 2/15/25.        11/26/2024     1:00 PM 12/3/2024    12:00 PM 12/13/2024     2:00 PM 12/19/2024    10:00 AM 1/20/2025     9:00 AM 1/31/2025     3:00 PM 2/17/2025     9:00 AM   ORAL CHEMOTHERAPY   Assessment Type Lab Monitoring Lab Monitoring Lab Monitoring;Monthly Follow up Refill Lab Monitoring Monthly Follow up Left Voicemail   Diagnosis Code Chronic Myeloid Leukemia (CML) Chronic Myeloid Leukemia (CML) Chronic Myeloid Leukemia (CML) Chronic Myeloid Leukemia (CML) Chronic Myeloid Leukemia (CML) Chronic Myeloid Leukemia (CML) Chronic Myeloid Leukemia (CML)   Providers Dr. Telly Varner   [facility]   Is this patient followed by the [facility] OC team? Yes Yes Yes Yes Yes Yes Yes   Drug Name Sprycel (dasatinib) Sprycel (dasatinib) Sprycel (dasatinib) Sprycel (dasatinib) Sprycel (dasatinib) Sprycel (dasatinib) Sprycel (dasatinib)   Dose 80 mg 80 mg 80 mg 80 mg 80 mg 80 mg 80 mg   Current Schedule Daily Daily Daily Daily Daily Daily Daily   Cycle Details Continuous Continuous Continuous Continuous Continuous Continuous Continuous   Adherence Assessment   Adherent   Adherent    Adverse Effects   No AE identified during assessment  Increased AST/ALT/T BILI;Thrombocytopenia No AE identified during assessment    Thrombocytopenia     Grade 1     Pharmacist Intervention(thrombocytopenia)     No         Labs:  _  Result Component Current Result Ref Range   Sodium 143 (2/15/2025) 135 - 145 mmol/L     _  Result Component Current Result Ref Range   Potassium 5.1 (2/15/2025) 3.4 - 5.3 mmol/L     _  Result Component Current Result Ref Range   Calcium 9.6 (2/15/2025) 8.8 - 10.4 mg/dL     _  Result Component Current Result Ref Range   Magnesium 2.2 (2/15/2025) 1.7 - 2.3 mg/dL     _  Result Component Current Result Ref  Range   Phosphorus 4.8 (H) (2/15/2025) 2.5 - 4.5 mg/dL     _  Result Component Current Result Ref Range   Albumin 4.3 (2/15/2025) 3.5 - 5.2 g/dL     _  Result Component Current Result Ref Range   Urea Nitrogen 27.0 (H) (2/15/2025) 6.0 - 20.0 mg/dL     _  Result Component Current Result Ref Range   Creatinine 1.66 (H) (2/15/2025) 0.67 - 1.17 mg/dL     _  Result Component Current Result Ref Range   AST 24 (2/15/2025) 0 - 45 U/L     _  Result Component Current Result Ref Range   ALT 21 (2/15/2025) 0 - 70 U/L     _  Result Component Current Result Ref Range   Bilirubin Total 0.9 (2/15/2025) <=1.2 mg/dL     _  Result Component Current Result Ref Range   WBC Count 6.1 (2/15/2025) 4.0 - 11.0 10e3/uL     _  Result Component Current Result Ref Range   Hemoglobin 13.1 (L) (2/15/2025) 13.3 - 17.7 g/dL     _  Result Component Current Result Ref Range   Platelet Count 53 (L) (2/15/2025) 150 - 450 10e3/uL     No results found for ANC within last 30 days.     _  Result Component Current Result Ref Range   Absolute Neutrophils 3.7 (2/15/2025) 1.6 - 8.3 10e3/uL        Assessment & Plan:  Results are concerning for plts 53. Per Dr Varner, continue dasatinib and recheck labs on 3/3 as currently scheduled.    Left message to discuss with patient.    Follow-Up:  3/3: labs    Lety Chang, MikeD, BCOP  Hematology/Oncology Clinical Pharmacist  [facility]  [facility]  [phone]

## 2025-02-18 DIAGNOSIS — C92.10 CML (CHRONIC MYELOCYTIC LEUKEMIA) (H): Primary | ICD-10-CM

## 2025-02-18 RX ORDER — DASATINIB 80 MG/1
80 TABLET, FILM COATED ORAL DAILY
Qty: 30 TABLET | Refills: 0 | Status: SHIPPED | OUTPATIENT
Start: 2025-02-21

## 2025-02-23 SDOH — HEALTH STABILITY: PHYSICAL HEALTH: ON AVERAGE, HOW MANY MINUTES DO YOU ENGAGE IN EXERCISE AT THIS LEVEL?: 120 MIN

## 2025-02-23 SDOH — HEALTH STABILITY: PHYSICAL HEALTH: ON AVERAGE, HOW MANY DAYS PER WEEK DO YOU ENGAGE IN MODERATE TO STRENUOUS EXERCISE (LIKE A BRISK WALK)?: 5 DAYS

## 2025-02-23 ASSESSMENT — SOCIAL DETERMINANTS OF HEALTH (SDOH): HOW OFTEN DO YOU GET TOGETHER WITH FRIENDS OR RELATIVES?: MORE THAN THREE TIMES A WEEK

## 2025-02-24 ENCOUNTER — ANCILLARY PROCEDURE (OUTPATIENT)
Dept: CARDIOLOGY | Facility: CLINIC | Age: 40
End: 2025-02-24
Attending: INTERNAL MEDICINE
Payer: COMMERCIAL

## 2025-02-24 ENCOUNTER — OFFICE VISIT (OUTPATIENT)
Dept: INTERNAL MEDICINE | Facility: CLINIC | Age: 40
End: 2025-02-24
Payer: COMMERCIAL

## 2025-02-24 VITALS
HEART RATE: 77 BPM | OXYGEN SATURATION: 100 % | HEIGHT: 69 IN | WEIGHT: 212 LBS | SYSTOLIC BLOOD PRESSURE: 127 MMHG | BODY MASS INDEX: 31.4 KG/M2 | DIASTOLIC BLOOD PRESSURE: 82 MMHG | TEMPERATURE: 96 F | RESPIRATION RATE: 14 BRPM

## 2025-02-24 DIAGNOSIS — Z95.810 ICD (IMPLANTABLE CARDIOVERTER-DEFIBRILLATOR) IN PLACE: ICD-10-CM

## 2025-02-24 DIAGNOSIS — E87.5 HYPERKALEMIA: ICD-10-CM

## 2025-02-24 DIAGNOSIS — G47.33 OSA (OBSTRUCTIVE SLEEP APNEA): ICD-10-CM

## 2025-02-24 DIAGNOSIS — C92.10 CML (CHRONIC MYELOCYTIC LEUKEMIA) (H): ICD-10-CM

## 2025-02-24 DIAGNOSIS — I46.9 CARDIAC ARREST (H): ICD-10-CM

## 2025-02-24 DIAGNOSIS — N18.2 TYPE 2 DIABETES MELLITUS WITH STAGE 2 CHRONIC KIDNEY DISEASE, WITHOUT LONG-TERM CURRENT USE OF INSULIN (H): ICD-10-CM

## 2025-02-24 DIAGNOSIS — Z00.00 ENCOUNTER FOR PREVENTATIVE ADULT HEALTH CARE EXAMINATION: Primary | ICD-10-CM

## 2025-02-24 DIAGNOSIS — E03.9 ACQUIRED HYPOTHYROIDISM: ICD-10-CM

## 2025-02-24 DIAGNOSIS — I44.2 ATRIOVENTRICULAR BLOCK, COMPLETE (H): ICD-10-CM

## 2025-02-24 DIAGNOSIS — E66.01 MORBID OBESITY (H): ICD-10-CM

## 2025-02-24 DIAGNOSIS — N18.2 CKD (CHRONIC KIDNEY DISEASE) STAGE 2, GFR 60-89 ML/MIN: ICD-10-CM

## 2025-02-24 DIAGNOSIS — Z95.4 S/P TVR (TRICUSPID VALVE REPLACEMENT): ICD-10-CM

## 2025-02-24 DIAGNOSIS — Z11.59 NEED FOR HEPATITIS C SCREENING TEST: ICD-10-CM

## 2025-02-24 DIAGNOSIS — E11.22 TYPE 2 DIABETES MELLITUS WITH STAGE 2 CHRONIC KIDNEY DISEASE, WITHOUT LONG-TERM CURRENT USE OF INSULIN (H): ICD-10-CM

## 2025-02-24 LAB
ERYTHROCYTE [DISTWIDTH] IN BLOOD BY AUTOMATED COUNT: 20.8 % (ref 10–15)
EST. AVERAGE GLUCOSE BLD GHB EST-MCNC: 131 MG/DL
HBA1C MFR BLD: 6.2 % (ref 0–5.6)
HCT VFR BLD AUTO: 45.2 % (ref 40–53)
HGB BLD-MCNC: 14.9 G/DL (ref 13.3–17.7)
MCH RBC QN AUTO: 29.2 PG (ref 26.5–33)
MCHC RBC AUTO-ENTMCNC: 33 G/DL (ref 31.5–36.5)
MCV RBC AUTO: 89 FL (ref 78–100)
PLATELET # BLD AUTO: 100 10E3/UL (ref 150–450)
RBC # BLD AUTO: 5.1 10E6/UL (ref 4.4–5.9)
WBC # BLD AUTO: 9.5 10E3/UL (ref 4–11)

## 2025-02-24 PROCEDURE — 99395 PREV VISIT EST AGE 18-39: CPT | Performed by: INTERNAL MEDICINE

## 2025-02-24 PROCEDURE — 99213 OFFICE O/P EST LOW 20 MIN: CPT | Mod: 25 | Performed by: INTERNAL MEDICINE

## 2025-02-24 PROCEDURE — 80061 LIPID PANEL: CPT | Performed by: INTERNAL MEDICINE

## 2025-02-24 PROCEDURE — 36415 COLL VENOUS BLD VENIPUNCTURE: CPT | Performed by: INTERNAL MEDICINE

## 2025-02-24 PROCEDURE — 82043 UR ALBUMIN QUANTITATIVE: CPT | Performed by: INTERNAL MEDICINE

## 2025-02-24 PROCEDURE — 82570 ASSAY OF URINE CREATININE: CPT | Performed by: INTERNAL MEDICINE

## 2025-02-24 PROCEDURE — 83036 HEMOGLOBIN GLYCOSYLATED A1C: CPT | Performed by: INTERNAL MEDICINE

## 2025-02-24 PROCEDURE — 86803 HEPATITIS C AB TEST: CPT | Performed by: INTERNAL MEDICINE

## 2025-02-24 PROCEDURE — 80053 COMPREHEN METABOLIC PANEL: CPT | Performed by: INTERNAL MEDICINE

## 2025-02-24 PROCEDURE — 84443 ASSAY THYROID STIM HORMONE: CPT | Performed by: INTERNAL MEDICINE

## 2025-02-24 PROCEDURE — 84439 ASSAY OF FREE THYROXINE: CPT | Performed by: INTERNAL MEDICINE

## 2025-02-24 PROCEDURE — 85027 COMPLETE CBC AUTOMATED: CPT | Performed by: INTERNAL MEDICINE

## 2025-02-24 RX ORDER — LEVOTHYROXINE SODIUM 150 UG/1
150 TABLET ORAL
Qty: 90 TABLET | Refills: 3 | Status: SHIPPED | OUTPATIENT
Start: 2025-02-24

## 2025-02-24 ASSESSMENT — PAIN SCALES - GENERAL: PAINLEVEL_OUTOF10: NO PAIN (0)

## 2025-02-24 NOTE — PROGRESS NOTES
Preventive Care Visit  [facility]  Chester Monge MD, Internal Medicine  Feb 24, 2025      Assessment & Plan     Type 2 diabetes mellitus with stage 2 chronic kidney disease, without long-term current use of insulin (H)  Assess lab  Continue treatment   - Lipid panel reflex to direct LDL Non-fasting  - Hemoglobin A1c  - Semaglutide, 2 MG/DOSE, (OZEMPIC) 8 MG/3ML pen; Inject 2 mg subcutaneously every 7 days.  - metFORMIN (GLUCOPHAGE) 1000 MG tablet; Take 1 tablet (1,000 mg) by mouth 2 times daily (with meals).  - empagliflozin (JARDIANCE) 25 MG TABS tablet; Take 1 tablet (25 mg) by mouth daily.  - OFFICE/OUTPT VISIT,REYES DAWSON III    CKD (chronic kidney disease) stage 2, GFR 60-89 ml/min  Monitor renal function, keep hydrated   - Albumin Random Urine Quantitative with Creat Ratio  - CBC with platelets  - Comprehensive metabolic panel (BMP + Alb, Alk Phos, ALT, AST, Total. Bili, TP)  - OFFICE/OUTPT VISIT,REYES DAWSON III    Need for hepatitis C screening test    - Hepatitis C Screen Reflex to HCV RNA Quant and Genotype    Acquired hypothyroidism  Assess thyroid function   Continue treatment   - TSH with free T4 reflex  - levothyroxine (SYNTHROID/LEVOTHROID) 150 MCG tablet; Take 1 tablet (150 mcg) by mouth daily at 2 pm.  - OFFICE/OUTPT VISIT,EST,LEVL III    Morbid obesity (H)  On treatment, has good results  Continue exercise and diet   - Semaglutide, 2 MG/DOSE, (OZEMPIC) 8 MG/3ML pen; Inject 2 mg subcutaneously every 7 days.  - OFFICE/OUTPT VISIT,REYES DAWSON III    Encounter for preventative adult health care examination  advised regular aerobic activity, low cholesterol, low salt diet, wearing seat belt,  self examinations, sunscreen protection.Obtain screening cholesterol, immunizations reviewed.      WARNER (obstructive sleep apnea)  On CPAP   - OFFICE/OUTPT VISIT,EST,LEVL III    ICD (implantable cardioverter-defibrillator) in place  Follow up with cardiology   - OFFICE/OUTPT VISIT,EST,LEVL  "III    CML (chronic myelocytic leukemia) (H)  On treatment, follow up with oncology   - OFFICE/OUTPT VISIT,EST,LEVL III    S/P TVR (tricuspid valve replacement)-31 mm Epic   Post infectious endocarditis   - OFFICE/OUTPT VISIT,ESTSHAWNAL III    Patient has been advised of split billing requirements and indicates understanding: Yes        BMI  Estimated body mass index is 31.31 kg/m  as calculated from the following:    Height as of this encounter: 1.753 m (5' 9\").    Weight as of this encounter: 96.2 kg (212 lb).   Weight management plan: Discussed healthy diet and exercise guidelines    Counseling  Appropriate preventive services were addressed with this patient via screening, questionnaire, or discussion as appropriate for fall prevention, nutrition, physical activity, Tobacco-use cessation, social engagement, weight loss and cognition.  Checklist reviewing preventive services available has been given to the patient.  Reviewed patient's diet, addressing concerns and/or questions.   The patient was instructed to see the dentist every 6 months.       See Patient Instructions    Manuel Ma is a 39 year old, presenting for the following:  Physical (fasting)        2/24/2025    10:20 AM   Additional Questions   Roomed by Payal SERRANO   Accompanied by n/a        Via the Health Maintenance questionnaire, the patient has reported the following services have been completed -Eye Exam: [facility] 2025-09-16, this information has been sent to the abstraction team.    HPI    Has  history of  DM. On diet , exercise and Metformin, Jardiance and Ozempic. Blood sugars are controlled. Checking 1-2 times daily. No paresthesias. No hypoglycemias.  Has history of hypothyroidism. On replacement treatment with Synthroid. No heat /cold intolerance, heart palpitations, weight loss/ gain ,  change in bowel habits.  Has h/o HTN. on medical treatment. BP has been controlled. No side effects from medications. No CP, HA, dizziness. good " compliance with medications and low salt diet.  Has h/o infectious endocarditis ,needed tricuspid valve replacement. Has h/o VF, has a PM defibrillator. Follows with oncology.   Diagnosed with CML in September 2024, on treatment. Follows with oncology. Responds to treatment.   Has h/o  obesity and WARNER. On CPAP. Uses Ozempic, keeps diet and exercise. Has lost over 100 lbs.   Has h/o ADHD and panic attacks on Wellbutrin. Helps with symptoms.         Health Care Directive  Patient has a Health Care Directive on file  Advance care planning document is on file and is current.      2/23/2025   General Health   How would you rate your overall physical health? Good   Feel stress (tense, anxious, or unable to sleep) Not at all         2/23/2025   Nutrition   Three or more servings of calcium each day? (!) I DON'T KNOW   Diet: Regular (no restrictions)   How many servings of fruit and vegetables per day? (!) 0-1   How many sweetened beverages each day? 0-1         2/23/2025   Exercise   Days per week of moderate/strenous exercise 5 days   Average minutes spent exercising at this level 120 min         2/23/2025   Social Factors   Frequency of gathering with friends or relatives More than three times a week   Worry food won't last until get money to buy more No   Food not last or not have enough money for food? No   Do you have housing? (Housing is defined as stable permanent housing and does not include staying ouside in a car, in a tent, in an abandoned building, in an overnight shelter, or couch-surfing.) Yes   Are you worried about losing your housing? No   Lack of transportation? No   Unable to get utilities (heat,electricity)? No         2/23/2025   Dental   Dentist two times every year? (!) NO            Today's PHQ-2 Score:       2/23/2025     9:13 PM   PHQ-2 ( 1999 [employer])   Q1: Little interest or pleasure in doing things 0   Q2: Feeling down, depressed or hopeless 0   PHQ-2 Score 0    Q1: Little interest or pleasure  "in doing things Not at all   Q2: Feeling down, depressed or hopeless Not at all   PHQ-2 Score 0       Patient-reported           2/23/2025   Substance Use   Alcohol more than 3/day or more than 7/wk Not Applicable   Do you use any other substances recreationally? No     Social History     Tobacco Use    Smoking status: Never    Smokeless tobacco: Never   Vaping Use    Vaping status: Never Used   Substance Use Topics    Alcohol use: Not Currently    Drug use: Never           2/23/2025   STI Screening   New sexual partner(s) since last STI/HIV test? No         2/23/2025   Contraception/Family Planning   Questions about contraception or family planning No        Reviewed and updated as needed this visit by Provider                    Lab work is in process  Labs reviewed in EPIC      Review of Systems  Constitutional, HEENT, cardiovascular, pulmonary, GI, , musculoskeletal, neuro, skin, endocrine and psych systems are negative, except as otherwise noted.     Objective    Exam  /82 (BP Location: Left arm, Cuff Size: Adult Regular)   Pulse 77   Temp (!) 96  F (35.6  C) (Tympanic)   Resp 14   Ht 1.753 m (5' 9\")   Wt 96.2 kg (212 lb)   SpO2 100%   BMI 31.31 kg/m     Estimated body mass index is 31.31 kg/m  as calculated from the following:    Height as of this encounter: 1.753 m (5' 9\").    Weight as of this encounter: 96.2 kg (212 lb).    Physical Exam  GENERAL: alert and no distress  EYES: Eyes grossly normal to inspection, PERRL and conjunctivae and sclerae normal  HENT: ear canals and TM's normal, nose and mouth without ulcers or lesions  NECK: no adenopathy, no asymmetry, masses, or scars  RESP: lungs clear to auscultation - no rales, rhonchi or wheezes  CV: regular rate and rhythm, normal S1 S2, no S3 or S4, no murmur, click or rub, no peripheral edema  ABDOMEN: soft, nontender, no hepatosplenomegaly, no masses and bowel sounds normal  MS: no gross musculoskeletal defects noted, no edema  SKIN: no " suspicious lesions or rashes  NEURO: Normal strength and tone, mentation intact and speech normal  PSYCH: mentation appears normal, affect normal/bright        Signed Electronically by: Chester Monge MD

## 2025-02-25 LAB
ALBUMIN SERPL BCG-MCNC: 4.8 G/DL (ref 3.5–5.2)
ALP SERPL-CCNC: 92 U/L (ref 40–150)
ALT SERPL W P-5'-P-CCNC: 26 U/L (ref 0–70)
ANION GAP SERPL CALCULATED.3IONS-SCNC: 13 MMOL/L (ref 7–15)
AST SERPL W P-5'-P-CCNC: 26 U/L (ref 0–45)
BILIRUB SERPL-MCNC: 0.9 MG/DL
BUN SERPL-MCNC: 26.7 MG/DL (ref 6–20)
CALCIUM SERPL-MCNC: 10 MG/DL (ref 8.8–10.4)
CHLORIDE SERPL-SCNC: 105 MMOL/L (ref 98–107)
CHOLEST SERPL-MCNC: 179 MG/DL
CREAT SERPL-MCNC: 1.35 MG/DL (ref 0.67–1.17)
CREAT UR-MCNC: 56.5 MG/DL
EGFRCR SERPLBLD CKD-EPI 2021: 69 ML/MIN/1.73M2
FASTING STATUS PATIENT QL REPORTED: YES
FASTING STATUS PATIENT QL REPORTED: YES
GLUCOSE SERPL-MCNC: 101 MG/DL (ref 70–99)
HCO3 SERPL-SCNC: 22 MMOL/L (ref 22–29)
HCV AB SERPL QL IA: NONREACTIVE
HDLC SERPL-MCNC: 40 MG/DL
LDLC SERPL CALC-MCNC: 106 MG/DL
MICROALBUMIN UR-MCNC: 13.4 MG/L
MICROALBUMIN/CREAT UR: 23.72 MG/G CR (ref 0–17)
NONHDLC SERPL-MCNC: 139 MG/DL
POTASSIUM SERPL-SCNC: 5.9 MMOL/L (ref 3.4–5.3)
PROT SERPL-MCNC: 7.9 G/DL (ref 6.4–8.3)
SODIUM SERPL-SCNC: 140 MMOL/L (ref 135–145)
T4 FREE SERPL-MCNC: 1.51 NG/DL (ref 0.9–1.7)
TRIGL SERPL-MCNC: 166 MG/DL
TSH SERPL DL<=0.005 MIU/L-ACNC: 4.69 UIU/ML (ref 0.3–4.2)

## 2025-02-26 LAB
MDC_IDC_LEAD_CONNECTION_STATUS: NORMAL
MDC_IDC_LEAD_CONNECTION_STATUS: NORMAL
MDC_IDC_LEAD_IMPLANT_DT: NORMAL
MDC_IDC_LEAD_IMPLANT_DT: NORMAL
MDC_IDC_LEAD_LOCATION: NORMAL
MDC_IDC_LEAD_LOCATION: NORMAL
MDC_IDC_LEAD_LOCATION_DETAIL_1: NORMAL
MDC_IDC_LEAD_LOCATION_DETAIL_1: NORMAL
MDC_IDC_LEAD_MFG: NORMAL
MDC_IDC_LEAD_MFG: NORMAL
MDC_IDC_LEAD_MODEL: NORMAL
MDC_IDC_LEAD_MODEL: NORMAL
MDC_IDC_LEAD_POLARITY_TYPE: NORMAL
MDC_IDC_LEAD_POLARITY_TYPE: NORMAL
MDC_IDC_LEAD_SERIAL: NORMAL
MDC_IDC_LEAD_SERIAL: NORMAL
MDC_IDC_MSMT_BATTERY_DTM: NORMAL
MDC_IDC_MSMT_BATTERY_REMAINING_LONGEVITY: 96 MO
MDC_IDC_MSMT_BATTERY_REMAINING_PERCENTAGE: 85 %
MDC_IDC_MSMT_BATTERY_STATUS: NORMAL
MDC_IDC_MSMT_CAP_CHARGE_DTM: NORMAL
MDC_IDC_MSMT_CAP_CHARGE_DTM: NORMAL
MDC_IDC_MSMT_CAP_CHARGE_ENERGY: 41 J
MDC_IDC_MSMT_CAP_CHARGE_TIME: 10.1 S
MDC_IDC_MSMT_CAP_CHARGE_TIME: 10.1 S
MDC_IDC_MSMT_CAP_CHARGE_TYPE: NORMAL
MDC_IDC_MSMT_CAP_CHARGE_TYPE: NORMAL
MDC_IDC_MSMT_LEADCHNL_RA_IMPEDANCE_VALUE: 664 OHM
MDC_IDC_MSMT_LEADCHNL_RV_IMPEDANCE_VALUE: 470 OHM
MDC_IDC_PG_IMPLANT_DTM: NORMAL
MDC_IDC_PG_MFG: NORMAL
MDC_IDC_PG_MODEL: NORMAL
MDC_IDC_PG_SERIAL: NORMAL
MDC_IDC_PG_TYPE: NORMAL
MDC_IDC_SESS_CLINIC_NAME: NORMAL
MDC_IDC_SESS_DTM: NORMAL
MDC_IDC_SESS_TYPE: NORMAL
MDC_IDC_SET_BRADY_AT_MODE_SWITCH_MODE: NORMAL
MDC_IDC_SET_BRADY_AT_MODE_SWITCH_RATE: 170 {BEATS}/MIN
MDC_IDC_SET_BRADY_LOWRATE: 60 {BEATS}/MIN
MDC_IDC_SET_BRADY_MAX_SENSOR_RATE: 140 {BEATS}/MIN
MDC_IDC_SET_BRADY_MAX_TRACKING_RATE: 140 {BEATS}/MIN
MDC_IDC_SET_BRADY_MODE: NORMAL
MDC_IDC_SET_BRADY_PAV_DELAY_HIGH: 100 MS
MDC_IDC_SET_BRADY_PAV_DELAY_LOW: 300 MS
MDC_IDC_SET_BRADY_SAV_DELAY_HIGH: 100 MS
MDC_IDC_SET_BRADY_SAV_DELAY_LOW: 300 MS
MDC_IDC_SET_LEADCHNL_RA_PACING_AMPLITUDE: 3.5 V
MDC_IDC_SET_LEADCHNL_RA_PACING_CAPTURE_MODE: NORMAL
MDC_IDC_SET_LEADCHNL_RA_PACING_POLARITY: NORMAL
MDC_IDC_SET_LEADCHNL_RA_PACING_PULSEWIDTH: 0.4 MS
MDC_IDC_SET_LEADCHNL_RA_SENSING_ADAPTATION_MODE: NORMAL
MDC_IDC_SET_LEADCHNL_RA_SENSING_POLARITY: NORMAL
MDC_IDC_SET_LEADCHNL_RA_SENSING_SENSITIVITY: 0.25 MV
MDC_IDC_SET_LEADCHNL_RV_PACING_AMPLITUDE: 3.5 V
MDC_IDC_SET_LEADCHNL_RV_PACING_CAPTURE_MODE: NORMAL
MDC_IDC_SET_LEADCHNL_RV_PACING_POLARITY: NORMAL
MDC_IDC_SET_LEADCHNL_RV_PACING_PULSEWIDTH: 0.4 MS
MDC_IDC_SET_LEADCHNL_RV_SENSING_ADAPTATION_MODE: NORMAL
MDC_IDC_SET_LEADCHNL_RV_SENSING_POLARITY: NORMAL
MDC_IDC_SET_LEADCHNL_RV_SENSING_SENSITIVITY: 0.6 MV
MDC_IDC_SET_ZONE_DETECTION_INTERVAL: 250 MS
MDC_IDC_SET_ZONE_DETECTION_INTERVAL: 286 MS
MDC_IDC_SET_ZONE_DETECTION_INTERVAL: 353 MS
MDC_IDC_SET_ZONE_STATUS: NORMAL
MDC_IDC_SET_ZONE_TYPE: NORMAL
MDC_IDC_SET_ZONE_VENDOR_TYPE: NORMAL
MDC_IDC_STAT_AT_BURDEN_PERCENT: 0 %
MDC_IDC_STAT_AT_DTM_END: NORMAL
MDC_IDC_STAT_AT_DTM_START: NORMAL
MDC_IDC_STAT_BRADY_DTM_END: NORMAL
MDC_IDC_STAT_BRADY_DTM_START: NORMAL
MDC_IDC_STAT_BRADY_RA_PERCENT_PACED: 7 %
MDC_IDC_STAT_BRADY_RV_PERCENT_PACED: 97 %
MDC_IDC_STAT_EPISODE_RECENT_COUNT: 0
MDC_IDC_STAT_EPISODE_RECENT_COUNT: 3
MDC_IDC_STAT_EPISODE_RECENT_COUNT_DTM_END: NORMAL
MDC_IDC_STAT_EPISODE_RECENT_COUNT_DTM_START: NORMAL
MDC_IDC_STAT_EPISODE_TYPE: NORMAL
MDC_IDC_STAT_EPISODE_VENDOR_TYPE: NORMAL
MDC_IDC_STAT_TACHYTHERAPY_ATP_DELIVERED_RECENT: 0
MDC_IDC_STAT_TACHYTHERAPY_ATP_DELIVERED_TOTAL: 1
MDC_IDC_STAT_TACHYTHERAPY_RECENT_DTM_END: NORMAL
MDC_IDC_STAT_TACHYTHERAPY_RECENT_DTM_START: NORMAL
MDC_IDC_STAT_TACHYTHERAPY_SHOCKS_ABORTED_RECENT: 0
MDC_IDC_STAT_TACHYTHERAPY_SHOCKS_ABORTED_TOTAL: 0
MDC_IDC_STAT_TACHYTHERAPY_SHOCKS_DELIVERED_RECENT: 1
MDC_IDC_STAT_TACHYTHERAPY_SHOCKS_DELIVERED_TOTAL: 2
MDC_IDC_STAT_TACHYTHERAPY_TOTAL_DTM_END: NORMAL
MDC_IDC_STAT_TACHYTHERAPY_TOTAL_DTM_START: NORMAL

## 2025-03-01 ENCOUNTER — LAB (OUTPATIENT)
Dept: LAB | Facility: CLINIC | Age: 40
End: 2025-03-01
Payer: COMMERCIAL

## 2025-03-01 DIAGNOSIS — E87.5 HYPERKALEMIA: ICD-10-CM

## 2025-03-01 DIAGNOSIS — E11.22 TYPE 2 DIABETES MELLITUS WITH STAGE 2 CHRONIC KIDNEY DISEASE, UNSPECIFIED WHETHER LONG TERM INSULIN USE (H): ICD-10-CM

## 2025-03-01 DIAGNOSIS — C92.10 CML (CHRONIC MYELOCYTIC LEUKEMIA) (H): ICD-10-CM

## 2025-03-01 DIAGNOSIS — N18.2 TYPE 2 DIABETES MELLITUS WITH STAGE 2 CHRONIC KIDNEY DISEASE, UNSPECIFIED WHETHER LONG TERM INSULIN USE (H): ICD-10-CM

## 2025-03-01 LAB
ALBUMIN SERPL BCG-MCNC: 4.5 G/DL (ref 3.5–5.2)
ALP SERPL-CCNC: 95 U/L (ref 40–150)
ALT SERPL W P-5'-P-CCNC: 31 U/L (ref 0–70)
ANION GAP SERPL CALCULATED.3IONS-SCNC: 11 MMOL/L (ref 7–15)
AST SERPL W P-5'-P-CCNC: 25 U/L (ref 0–45)
BASOPHILS # BLD AUTO: 0.1 10E3/UL (ref 0–0.2)
BASOPHILS NFR BLD AUTO: 1 %
BILIRUB SERPL-MCNC: 1 MG/DL
BUN SERPL-MCNC: 26.2 MG/DL (ref 6–20)
CALCIUM SERPL-MCNC: 9.7 MG/DL (ref 8.8–10.4)
CHLORIDE SERPL-SCNC: 106 MMOL/L (ref 98–107)
CREAT SERPL-MCNC: 1.25 MG/DL (ref 0.67–1.17)
EGFRCR SERPLBLD CKD-EPI 2021: 76 ML/MIN/1.73M2
EOSINOPHIL # BLD AUTO: 0.1 10E3/UL (ref 0–0.7)
EOSINOPHIL NFR BLD AUTO: 2 %
ERYTHROCYTE [DISTWIDTH] IN BLOOD BY AUTOMATED COUNT: 19.9 % (ref 10–15)
EST. AVERAGE GLUCOSE BLD GHB EST-MCNC: 143 MG/DL
GLUCOSE SERPL-MCNC: 135 MG/DL (ref 70–99)
HBA1C MFR BLD: 6.6 %
HCO3 SERPL-SCNC: 21 MMOL/L (ref 22–29)
HCT VFR BLD AUTO: 40.8 % (ref 40–53)
HGB BLD-MCNC: 13.6 G/DL (ref 13.3–17.7)
IMM GRANULOCYTES # BLD: 0 10E3/UL
IMM GRANULOCYTES NFR BLD: 1 %
LAB DIRECTOR COMMENTS: NORMAL
LAB DIRECTOR DISCLAIMER: NORMAL
LAB DIRECTOR INTERPRETATION: NORMAL
LAB DIRECTOR METHODOLOGY: NORMAL
LAB DIRECTOR RESULTS: NORMAL
LOCATION OF TASK: NORMAL
LYMPHOCYTES # BLD AUTO: 1.3 10E3/UL (ref 0.8–5.3)
LYMPHOCYTES NFR BLD AUTO: 19 %
MAGNESIUM SERPL-MCNC: 1.9 MG/DL (ref 1.7–2.3)
MCH RBC QN AUTO: 29.1 PG (ref 26.5–33)
MCHC RBC AUTO-ENTMCNC: 33.3 G/DL (ref 31.5–36.5)
MCV RBC AUTO: 87 FL (ref 78–100)
MONOCYTES # BLD AUTO: 0.6 10E3/UL (ref 0–1.3)
MONOCYTES NFR BLD AUTO: 9 %
NEUTROPHILS # BLD AUTO: 4.6 10E3/UL (ref 1.6–8.3)
NEUTROPHILS NFR BLD AUTO: 69 %
NRBC # BLD AUTO: 0 10E3/UL
NRBC BLD AUTO-RTO: 0 /100
PHOSPHATE SERPL-MCNC: 3.8 MG/DL (ref 2.5–4.5)
PLATELET # BLD AUTO: 100 10E3/UL (ref 150–450)
POTASSIUM SERPL-SCNC: 5 MMOL/L (ref 3.4–5.3)
PROT SERPL-MCNC: 7.6 G/DL (ref 6.4–8.3)
RBC # BLD AUTO: 4.67 10E6/UL (ref 4.4–5.9)
SODIUM SERPL-SCNC: 138 MMOL/L (ref 135–145)
SPECIMEN TYPE: NORMAL
WBC # BLD AUTO: 6.6 10E3/UL (ref 4–11)

## 2025-03-01 PROCEDURE — 83036 HEMOGLOBIN GLYCOSYLATED A1C: CPT

## 2025-03-01 PROCEDURE — 80053 COMPREHEN METABOLIC PANEL: CPT

## 2025-03-01 PROCEDURE — 84100 ASSAY OF PHOSPHORUS: CPT

## 2025-03-01 PROCEDURE — 85025 COMPLETE CBC W/AUTO DIFF WBC: CPT

## 2025-03-01 PROCEDURE — 36415 COLL VENOUS BLD VENIPUNCTURE: CPT

## 2025-03-01 PROCEDURE — 83735 ASSAY OF MAGNESIUM: CPT

## 2025-03-01 PROCEDURE — 81206 BCR/ABL1 GENE MAJOR BP: CPT

## 2025-03-01 PROCEDURE — G0452 MOLECULAR PATHOLOGY INTERPR: HCPCS | Mod: 26 | Performed by: STUDENT IN AN ORGANIZED HEALTH CARE EDUCATION/TRAINING PROGRAM

## 2025-03-03 ENCOUNTER — MYC MEDICAL ADVICE (OUTPATIENT)
Dept: ONCOLOGY | Facility: CLINIC | Age: 40
End: 2025-03-03
Payer: COMMERCIAL

## 2025-03-03 NOTE — ORAL ONC MGMT
Oral Chemotherapy Monitoring Program  Lab Follow Up    Reviewed lab results from 3/1.        12/13/2024     2:00 PM 12/19/2024    10:00 AM 1/20/2025     9:00 AM 1/31/2025     3:00 PM 2/17/2025     9:00 AM 2/18/2025    10:00 AM 3/3/2025    11:00 AM   ORAL CHEMOTHERAPY   Assessment Type Lab Monitoring;Monthly Follow up Refill Lab Monitoring Monthly Follow up Left Voicemail Refill Lab Monitoring   Diagnosis Code Chronic Myeloid Leukemia (CML) Chronic Myeloid Leukemia (CML) Chronic Myeloid Leukemia (CML) Chronic Myeloid Leukemia (CML) Chronic Myeloid Leukemia (CML) Chronic Myeloid Leukemia (CML) Chronic Myeloid Leukemia (CML)   Providers Dr. Telly Varner   [facility]   Is this patient followed by the [facility] OC team? Yes Yes Yes Yes Yes     Drug Name Sprycel (dasatinib) Sprycel (dasatinib) Sprycel (dasatinib) Sprycel (dasatinib) Sprycel (dasatinib) Sprycel (dasatinib) Sprycel (dasatinib)   Dose 80 mg 80 mg 80 mg 80 mg 80 mg 80 mg 80 mg   Current Schedule Daily Daily Daily Daily Daily Daily Daily   Cycle Details Continuous Continuous Continuous Continuous Continuous Continuous Continuous   Adherence Assessment Adherent   Adherent      Adverse Effects No AE identified during assessment  Increased AST/ALT/T BILI;Thrombocytopenia No AE identified during assessment      Thrombocytopenia   Grade 1       Pharmacist Intervention(thrombocytopenia)   No           Labs:  _  Result Component Current Result Ref Range   Sodium 138 (3/1/2025) 135 - 145 mmol/L     _  Result Component Current Result Ref Range   Potassium 5.0 (3/1/2025) 3.4 - 5.3 mmol/L     _  Result Component Current Result Ref Range   Calcium 9.7 (3/1/2025) 8.8 - 10.4 mg/dL     _  Result Component Current Result Ref Range   Magnesium 1.9 (3/1/2025) 1.7 - 2.3 mg/dL     _  Result Component Current Result Ref Range   Phosphorus 3.8  (3/1/2025) 2.5 - 4.5 mg/dL     _  Result Component Current Result Ref Range   Albumin 4.5 (3/1/2025) 3.5 - 5.2 g/dL     _  Result Component Current Result Ref Range   Urea Nitrogen 26.2 (H) (3/1/2025) 6.0 - 20.0 mg/dL     _  Result Component Current Result Ref Range   Creatinine 1.25 (H) (3/1/2025) 0.67 - 1.17 mg/dL     _  Result Component Current Result Ref Range   AST 25 (3/1/2025) 0 - 45 U/L     _  Result Component Current Result Ref Range   ALT 31 (3/1/2025) 0 - 70 U/L     _  Result Component Current Result Ref Range   Bilirubin Total 1.0 (3/1/2025) <=1.2 mg/dL     _  Result Component Current Result Ref Range   WBC Count 6.6 (3/1/2025) 4.0 - 11.0 10e3/uL     _  Result Component Current Result Ref Range   Hemoglobin 13.6 (3/1/2025) 13.3 - 17.7 g/dL     _  Result Component Current Result Ref Range   Platelet Count 100 (L) (3/1/2025) 150 - 450 10e3/uL     No results found for ANC within last 30 days.     _  Result Component Current Result Ref Range   Absolute Neutrophils 4.6 (3/1/2025) 1.6 - 8.3 10e3/uL        Assessment & Plan:  No concerning abnormalities. Results show improving thrombocytopenia, now grade 1. Patient was contacted via [employer].    Follow-Up:  3/18 appt with Dr. Telly Abad, PharmD  Oral Chemotherapy Monitoring Program  [facility]  [phone]

## 2025-03-17 DIAGNOSIS — C92.10 CML (CHRONIC MYELOCYTIC LEUKEMIA) (H): Primary | ICD-10-CM

## 2025-03-17 NOTE — PROGRESS NOTES
Virtual Visit Details    Type of service:  Video Visit     Originating Location (pt. Location): Home    Distant Location (provider location):  On-site  Platform used for Video Visit: Haydee        REASON FOR VISIT:  Management of chronic myeloid leukemia (CML)    HISTORY OF PRESENT ILLNESS:  Anil Montoya is a 39 year old with chronic myeloid leukemia (CML).  To summarize his course, he presented with a couple of weeks of dizziness in 09/2024 and was found to have hyperleukocytosis and thrombocytosis with moderate anemia as well as Tumor Lysis Syndrome.  Workup confirmed CML in chronic phase, hepatosplenomegaly on imaging, peripheral blood FISH and BCR-ABL p210 by PCR were detected, and bone marrow biopsy showed t(9;22) as the sole abnormality.  Peripheral blood BCR-ABL p210 by PCR was 62.5%.  He was started on dasatinib 100 mg daily in 09/2024.  Dasatinib was held for about 2 weeks in 10/2024 due to pancytopenia, was found to have iron deficiency and started an oral iron supplement, and blood cell counts improved so he resumed at 80 mg daily toward the end of 10/2024.  Visit for management of CML.    He is on his own today.  Energy level is improving.  No fevers, night sweats, or unintentional weight loss.  No dyspnea, cough, or chest pain.  Normal bowel function.  No bleeding or unusual bruising.  No new lumps or bumps.  No dizziness or chest pain.  Back working and managing well.    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 90 tablet 3    CONTOUR NEXT TEST test strip USE TO TEST DAILY AS DIRECTED      dasatinib (SPRYCEL) 80 MG tablet Take 1 tablet (80 mg) by mouth daily 30 tablet 0    empagliflozin (JARDIANCE) 25 MG TABS tablet Take 1 tablet (25 mg) by mouth daily. 90 tablet 3    Ferrous Sulfate (IRON PO) Take 60 mg by mouth daily.      furosemide (LASIX) 20 MG tablet Take 1 tablet (20 mg) by mouth daily      levothyroxine (SYNTHROID/LEVOTHROID) 125 MCG tablet Take 125 mcg by mouth daily. Only on Sunday      levothyroxine (SYNTHROID/LEVOTHROID) 150 MCG tablet Take 1 tablet (150 mcg) by mouth daily at 2 pm. 90 tablet 3    losartan (COZAAR) 50 MG tablet Take 1 tablet (50 mg) by mouth daily. 90 tablet 3    metFORMIN (GLUCOPHAGE) 1000 MG tablet Take 1 tablet (1,000 mg) by mouth 2 times daily (with meals). 180 tablet 3    metoprolol succinate ER (TOPROL XL) 25 MG 24 hr tablet Take 1 tablet (25 mg) by mouth daily. 90 tablet 3    semaglutide (OZEMPIC) 2 MG/1.5ML pen Inject 2 mg subcutaneously every 7 days.      Semaglutide, 2 MG/DOSE, (OZEMPIC) 8 MG/3ML pen Inject 2 mg subcutaneously every 7 days. 9 mL 3     No current facility-administered medications for this visit.     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  HEENT: Sclerae anicteric.  Lungs: Breathing comfortably. No cough.  MSK: Grossly normal movement.  Neuro: Grossly non-focal.  Skin/access: Normal skin tone.  Psych: Alert and oriented. No distress.  Performance status: ECOG 0    LABORATORY DATA: Reviewed by me  Recent Labs   Lab Test 03/01/25  1000 02/24/25  1110 02/15/25  1027 01/20/25  0858 11/26/24  0955 " 11/19/24  0901 11/12/24  1147 11/05/24  1018 09/23/24  0938 09/19/24  0437 09/18/24  0450 09/17/24  0637 09/16/24  1638 09/15/24  0435 09/14/24  1941 09/14/24  1523 06/01/22  1330 05/25/22  1155 05/18/22  1045   WBC 6.6 9.5 6.1 6.4   < > 11.5* 24.3* 16.6*   < > 216.7* 229.8* 290.9* 325.7*   < >  --    < > 6.3 6.9 3.4*   HGB 13.6 14.9 13.1* 15.3   < > 12.3* 12.3* 11.8*   < > 10.1* 9.2* 10.2* 10.0*   < >  --    < > 13.8 13.7 12.8*   * 100* 53* 117*   < > 205 372 424   < > 414 463* 552* 561*   < >  --    < > 275 379 334   ANEU  --   --   --   --   --  8.5* 19.7* 10.0*   < > 160.4* 135.6* 148.4* 182.4*   < >  --    < >  --   --   --    ANEUTAUTO 4.6  --  3.7 4.0   < >  --   --   --    < >  --   --   --   --   --   --   --  4.0 4.6 1.6   ABLA  --   --   --   --   --   --   --   --   --  2.2*  --  5.8* 13.0*  --   --    < >  --   --   --    ALYM  --   --   --   --   --  2.1 2.2 2.3   < > 6.5* 9.2* 11.6* 0.0*   < >  --    < >  --   --   --    ALYMPAUTO 1.3  --  1.2 1.4   < >  --   --   --    < >  --   --   --   --   --   --   --  1.1 1.2 0.8   RETICABSCT  --   --   --   --   --   --   --   --   --   --  0.090  --   --   --  0.118*  --   --   --   --    SED  --   --   --   --   --   --   --   --   --   --   --   --   --   --   --   --  19* 19* 26*    < > = values in this interval not displayed.     Recent Labs   Lab Test 03/01/25  1017 02/24/25  1110 02/15/25  1027 01/20/25  0858 10/15/24  0923 10/10/24  0929 10/07/24  0942 10/03/24  1433    140 143 141   < > 139 138 137   POTASSIUM 5.0 5.9* 5.1 4.4   < > 4.3 4.9 4.5   CHLORIDE 106 105 108* 106   < > 104 106 109*   CO2 21* 22 24 24   < > 19* 18* 17*   BUN 26.2* 26.7* 27.0* 24.5*   < > 15.0 24.4* 20.6*   CR 1.25* 1.35* 1.66* 1.33*   < > 1.12 1.40* 1.25*   IMAN 9.7 10.0 9.6 9.6   < > 8.6* 8.3* 8.6*   MAG 1.9  --  2.2 2.1   < > 2.0 2.0 1.9   PHOS 3.8  --  4.8* 4.1   < > 4.1 3.5 3.2   URIC  --   --   --   --   --  3.1* 3.7 3.3*   LDH  --   --   --   --   --  183 199  230    < > = values in this interval not displayed.     Recent Labs   Lab Test 03/01/25  1017 02/24/25  1110 02/15/25  1027 09/23/24  0938 09/19/24  0437 09/18/24  0450 09/17/24  0637   AST 25 26 24   < > 28 26 30   ALT 31 26 21   < > 9 10 12   ALKPHOS 95 92 98   < > 168* 158* 174*   BILITOTAL 1.0 0.9 0.9   < > 0.7 0.6 0.8   INR  --   --   --   --  1.36* 1.46* 1.50*    < > = values in this interval not displayed.      09/17/2024 blood BCR-ABL p210 by PCR 62.5%  12/11/2024 blood BCR-ABL p210 by PCR 22.1%  3/1/2025 blood BCR-ABL p210 by PCR 4.99%    IMPRESSION AND PLAN:  Anil Montoya is a 39 year old with chronic myeloid leukemia (CML).    CML continues to respond well to dasatinib.  Treatment was interrupted by pancytopenia that is likely related to a considerable burden of BCR-ABL dependent hematopoiesis at diagnosis.  There continues to be a positive trend with less than 10% BCR-ABL at the 6 month spencer (even with treatment interruption for low blood cell counts).  We agreed to continue dasatinib at 80 mg daily and to monitor for treatment toxicity or other issues.  We will consider increasing to 100 mg dasatinib if blood cell counts continue to improve to maximize the response.    Iron deficiency anemia has improved with oral iron.  Iron deficiency may be related to blood loss from past cardiac issues, but we will have a low threshold to pursue GI workup if there are ongoing issues after repletion of iron stores.  He will continue oral iron for now and we will monitor iron labs.    There are no active ID issues.  I have recommended an updated flu shot and COVID-19 vaccine.      He will continue to work with Cardiology for his complex cardiology issues, Endocrine for diabetes management, and his primary care provider Dr. Chester Monge for health maintenance and other medical issues.    We will request monitoring labs and a visit with me in about 3 months.  I reminded him to contact us if questions, concerns, or  new issues come up between visits.    I spent 20 minutes on the date of encounter of which 8 minutes spent in medical discussion.    The longitudinal plan of care for the diagnosis(es)/condition(s) as documented were addressed during this visit. Due to the added complexity in care, I will continue to support Anil in the subsequent management and with ongoing continuity of care.    Jesus Varner MD, PhD  Attending Physician, [facility], [place]  [phone] [facility]

## 2025-03-18 ENCOUNTER — VIRTUAL VISIT (OUTPATIENT)
Dept: ONCOLOGY | Facility: CLINIC | Age: 40
End: 2025-03-18
Attending: INTERNAL MEDICINE
Payer: COMMERCIAL

## 2025-03-18 VITALS — HEIGHT: 70 IN | WEIGHT: 210 LBS | BODY MASS INDEX: 30.06 KG/M2

## 2025-03-18 DIAGNOSIS — N18.2 TYPE 2 DIABETES MELLITUS WITH STAGE 2 CHRONIC KIDNEY DISEASE, UNSPECIFIED WHETHER LONG TERM INSULIN USE (H): ICD-10-CM

## 2025-03-18 DIAGNOSIS — Z95.4 S/P TVR (TRICUSPID VALVE REPLACEMENT): ICD-10-CM

## 2025-03-18 DIAGNOSIS — R94.31 LONG QT INTERVAL: ICD-10-CM

## 2025-03-18 DIAGNOSIS — G47.33 OSA (OBSTRUCTIVE SLEEP APNEA): ICD-10-CM

## 2025-03-18 DIAGNOSIS — D50.8 IRON DEFICIENCY ANEMIA SECONDARY TO INADEQUATE DIETARY IRON INTAKE: ICD-10-CM

## 2025-03-18 DIAGNOSIS — C92.10 CML (CHRONIC MYELOCYTIC LEUKEMIA) (H): Primary | ICD-10-CM

## 2025-03-18 DIAGNOSIS — E11.22 TYPE 2 DIABETES MELLITUS WITH STAGE 2 CHRONIC KIDNEY DISEASE, UNSPECIFIED WHETHER LONG TERM INSULIN USE (H): ICD-10-CM

## 2025-03-18 DIAGNOSIS — Z86.79 HISTORY OF ENDOCARDITIS IN ADULTHOOD: ICD-10-CM

## 2025-03-18 PROCEDURE — 1126F AMNT PAIN NOTED NONE PRSNT: CPT | Mod: 95 | Performed by: INTERNAL MEDICINE

## 2025-03-18 PROCEDURE — G2211 COMPLEX E/M VISIT ADD ON: HCPCS | Mod: 95 | Performed by: INTERNAL MEDICINE

## 2025-03-18 PROCEDURE — 98006 SYNCH AUDIO-VIDEO EST MOD 30: CPT | Performed by: INTERNAL MEDICINE

## 2025-03-18 ASSESSMENT — PAIN SCALES - GENERAL: PAINLEVEL_OUTOF10: NO PAIN (0)

## 2025-03-18 NOTE — NURSING NOTE
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    Is the patient currently in the state of [place]? YES    Visit mode: VIDEO    If the visit is dropped, the patient can be reconnected by:VIDEO VISIT: Text to cell phone:   Telephone Information:   Mobile [phone]       Will anyone else be joining the visit? NO  (If patient encounters technical issues they should call [phone]    Are changes needed to the allergy or medication list? Pt stated no changes to allergies, Pt declined med review, and Pt stated no med changes    Are refills needed on medications prescribed by this physician? NO    Rooming Documentation:  Questionnaire(s) completed    Reason for visit: NATALIA SOLORZANOF

## 2025-03-18 NOTE — LETTER
3/18/2025      Anil Montoya  [address] [address] [address]      Dear Colleague,    Thank you for referring your patient, Anil Montoya, to the [facility]. Please see a copy of my visit note below.    Virtual Visit Details    Type of service:  Video Visit     Originating Location (pt. Location): Home    Distant Location (provider location):  On-site  Platform used for Video Visit: ReginaldDigly        REASON FOR VISIT:  Management of chronic myeloid leukemia (CML)    HISTORY OF PRESENT ILLNESS:  Anil Montoya is a 39 year old with chronic myeloid leukemia (CML).  To summarize his course, he presented with a couple of weeks of dizziness in 09/2024 and was found to have hyperleukocytosis and thrombocytosis with moderate anemia as well as Tumor Lysis Syndrome.  Workup confirmed CML in chronic phase, hepatosplenomegaly on imaging, peripheral blood FISH and BCR-ABL p210 by PCR were detected, and bone marrow biopsy showed t(9;22) as the sole abnormality.  Peripheral blood BCR-ABL p210 by PCR was 62.5%.  He was started on dasatinib 100 mg daily in 09/2024.  Dasatinib was held for about 2 weeks in 10/2024 due to pancytopenia, was found to have iron deficiency and started an oral iron supplement, and blood cell counts improved so he resumed at 80 mg daily toward the end of 10/2024.  Visit for management of CML.    He is on his own today.  Energy level is improving.  No fevers, night sweats, or unintentional weight loss.  No dyspnea, cough, or chest pain.  Normal bowel function.  No bleeding or unusual bruising.  No new lumps or bumps.  No dizziness or chest pain.  Back working and managing well.    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 90 tablet 3     CONTOUR NEXT TEST test strip USE TO TEST DAILY AS DIRECTED       dasatinib (SPRYCEL) 80 MG tablet Take 1 tablet (80 mg) by mouth daily 30 tablet 0     empagliflozin (JARDIANCE) 25 MG TABS tablet Take 1 tablet (25 mg) by mouth daily. 90 tablet 3     Ferrous Sulfate (IRON PO) Take 60 mg by mouth daily.       furosemide (LASIX) 20 MG tablet Take 1 tablet (20 mg) by mouth daily       levothyroxine (SYNTHROID/LEVOTHROID) 125 MCG tablet Take 125 mcg by mouth daily. Only on Sunday       levothyroxine (SYNTHROID/LEVOTHROID) 150 MCG tablet Take 1 tablet (150 mcg) by mouth daily at 2 pm. 90 tablet 3     losartan (COZAAR) 50 MG tablet Take 1 tablet (50 mg) by mouth daily. 90 tablet 3     metFORMIN (GLUCOPHAGE) 1000 MG tablet Take 1 tablet (1,000 mg) by mouth 2 times daily (with meals). 180 tablet 3     metoprolol succinate ER (TOPROL XL) 25 MG 24 hr tablet Take 1 tablet (25 mg) by mouth daily. 90 tablet 3     semaglutide (OZEMPIC) 2 MG/1.5ML pen Inject 2 mg subcutaneously every 7 days.       Semaglutide, 2 MG/DOSE, (OZEMPIC) 8 MG/3ML pen Inject 2 mg subcutaneously every 7 days. 9 mL 3     No current facility-administered medications for this visit.     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  HEENT: Sclerae anicteric.  Lungs: Breathing comfortably. No " cough.  MSK: Grossly normal movement.  Neuro: Grossly non-focal.  Skin/access: Normal skin tone.  Psych: Alert and oriented. No distress.  Performance status: ECOG 0    LABORATORY DATA: Reviewed by me  Recent Labs   Lab Test 03/01/25  1000 02/24/25  1110 02/15/25  1027 01/20/25  0858 11/26/24  0955 11/19/24  0901 11/12/24  1147 11/05/24  1018 09/23/24  0938 09/19/24  0437 09/18/24  0450 09/17/24  0637 09/16/24  1638 09/15/24  0435 09/14/24  1941 09/14/24  1523 06/01/22  1330 05/25/22  1155 05/18/22  1045   WBC 6.6 9.5 6.1 6.4   < > 11.5* 24.3* 16.6*   < > 216.7* 229.8* 290.9* 325.7*   < >  --    < > 6.3 6.9 3.4*   HGB 13.6 14.9 13.1* 15.3   < > 12.3* 12.3* 11.8*   < > 10.1* 9.2* 10.2* 10.0*   < >  --    < > 13.8 13.7 12.8*   * 100* 53* 117*   < > 205 372 424   < > 414 463* 552* 561*   < >  --    < > 275 379 334   ANEU  --   --   --   --   --  8.5* 19.7* 10.0*   < > 160.4* 135.6* 148.4* 182.4*   < >  --    < >  --   --   --    ANEUTAUTO 4.6  --  3.7 4.0   < >  --   --   --    < >  --   --   --   --   --   --   --  4.0 4.6 1.6   ABLA  --   --   --   --   --   --   --   --   --  2.2*  --  5.8* 13.0*  --   --    < >  --   --   --    ALYM  --   --   --   --   --  2.1 2.2 2.3   < > 6.5* 9.2* 11.6* 0.0*   < >  --    < >  --   --   --    ALYMPAUTO 1.3  --  1.2 1.4   < >  --   --   --    < >  --   --   --   --   --   --   --  1.1 1.2 0.8   RETICABSCT  --   --   --   --   --   --   --   --   --   --  0.090  --   --   --  0.118*  --   --   --   --    SED  --   --   --   --   --   --   --   --   --   --   --   --   --   --   --   --  19* 19* 26*    < > = values in this interval not displayed.     Recent Labs   Lab Test 03/01/25  1017 02/24/25  1110 02/15/25  1027 01/20/25  0858 10/15/24  0923 10/10/24  0929 10/07/24  0942 10/03/24  1433    140 143 141   < > 139 138 137   POTASSIUM 5.0 5.9* 5.1 4.4   < > 4.3 4.9 4.5   CHLORIDE 106 105 108* 106   < > 104 106 109*   CO2 21* 22 24 24   < > 19* 18* 17*   BUN 26.2* 26.7*  27.0* 24.5*   < > 15.0 24.4* 20.6*   CR 1.25* 1.35* 1.66* 1.33*   < > 1.12 1.40* 1.25*   IMAN 9.7 10.0 9.6 9.6   < > 8.6* 8.3* 8.6*   MAG 1.9  --  2.2 2.1   < > 2.0 2.0 1.9   PHOS 3.8  --  4.8* 4.1   < > 4.1 3.5 3.2   URIC  --   --   --   --   --  3.1* 3.7 3.3*   LDH  --   --   --   --   --  183 199 230    < > = values in this interval not displayed.     Recent Labs   Lab Test 03/01/25  1017 02/24/25  1110 02/15/25  1027 09/23/24  0938 09/19/24  0437 09/18/24  0450 09/17/24  0637   AST 25 26 24   < > 28 26 30   ALT 31 26 21   < > 9 10 12   ALKPHOS 95 92 98   < > 168* 158* 174*   BILITOTAL 1.0 0.9 0.9   < > 0.7 0.6 0.8   INR  --   --   --   --  1.36* 1.46* 1.50*    < > = values in this interval not displayed.      09/17/2024 blood BCR-ABL p210 by PCR 62.5%  12/11/2024 blood BCR-ABL p210 by PCR 22.1%  3/1/2025 blood BCR-ABL p210 by PCR 4.99%    IMPRESSION AND PLAN:  Anil Montoya is a 39 year old with chronic myeloid leukemia (CML).    CML continues to respond well to dasatinib.  Treatment was interrupted by pancytopenia that is likely related to a considerable burden of BCR-ABL dependent hematopoiesis at diagnosis.  There continues to be a positive trend with less than 10% BCR-ABL at the 6 month spencer (even with treatment interruption for low blood cell counts).  We agreed to continue dasatinib at 80 mg daily and to monitor for treatment toxicity or other issues.  We will consider increasing to 100 mg dasatinib if blood cell counts continue to improve to maximize the response.    Iron deficiency anemia has improved with oral iron.  Iron deficiency may be related to blood loss from past cardiac issues, but we will have a low threshold to pursue GI workup if there are ongoing issues after repletion of iron stores.  He will continue oral iron for now and we will monitor iron labs.    There are no active ID issues.  I have recommended an updated flu shot and COVID-19 vaccine.      He will continue to work with Cardiology  for his complex cardiology issues, Endocrine for diabetes management, and his primary care provider Dr. Chester Monge for health maintenance and other medical issues.    We will request monitoring labs and a visit with me in about 3 months.  I reminded him to contact us if questions, concerns, or new issues come up between visits.    I spent 20 minutes on the date of encounter of which 8 minutes spent in medical discussion.    The longitudinal plan of care for the diagnosis(es)/condition(s) as documented were addressed during this visit. Due to the added complexity in care, I will continue to support Anil in the subsequent management and with ongoing continuity of care.    Jesus Varner MD, PhD  Attending Physician, [facility]  [phone] clinic      Again, thank you for allowing me to participate in the care of your patient.        Sincerely,        Jesus Varner MD    Electronically signed

## 2025-03-20 DIAGNOSIS — C92.10 CML (CHRONIC MYELOCYTIC LEUKEMIA) (H): Primary | ICD-10-CM

## 2025-03-20 RX ORDER — DASATINIB 80 MG/1
80 TABLET, FILM COATED ORAL DAILY
Qty: 30 TABLET | Refills: 2 | Status: SHIPPED | OUTPATIENT
Start: 2025-03-23

## 2025-03-23 NOTE — PROGRESS NOTES
[facility]    I had the pleasure of seeing Anil when he came for follow up of VF and THN.  This 39 year old sees Dr. Higgins and Dr. Dorsey for his history of:    DM2  2. 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 Complicated by CHB and then VF arrest 4/4/2022. S/p dual chamber [employer] ICD 4/2022. Noted to have R groin infection as well as L4-5/S1 discitis as well.  4. H/o VF arrest 4/2022 - as above, occurred while hospitalized for endocarditis and discitis.  S/p dual-chamber [employer] ICD 4/2022.  Recurrent VF while fasting 1/2024.  Recurrent VF requiring therapy 9/2024 in the setting of severe emotional distress  5. HTN  6. CML - dx'd 9/2024. Sees Dr. Varner  7. Anemia      Last Visit & Interval History:  I saw Anil 1/2024 at which time he was feeling well.  We reviewed an alert due to ATP/shock therapy administered 1/8/2024, with a PVC initiating a fast VT rhythm.  After ATP was unsuccessful, rhythm deteriorated into VF.  He had been quite dizzy and had squatted down to prevent syncope, then woke up in the driveway.  He had been fasting.  He was not taking carvedilol.  Blood work showed normal electrolytes with elevated creatinine.    He met with Dr. Clinton 10/2024 after another episode of appropriate ICD shock for VF 9/2024.  He had been under quite a bit of emotional stress.  Increased metoprolol had not been tolerated due to increased dizziness, LE edema and SKAGGS.    He presented to [facility]/2024 and was noted to have hyperleukocytosis and was transferred to [facility].  He was subsequently diagnosed with CML.  Some device settings were made (changed VVI to DDD).  Again, he did not tolerate increased BB therapy, but at that visit was tolerating 25 mg daily    Today's Visit:  Anil is doing well!  No recurrent shocks though was told he had some arrhythmias after winning a skeet shooting contest (nothing on 2/205 check). He feels better  "after reprogramming to DDD.    Denies CP, SOB. No edema, orthopnea, PND.     Denies fever, chills. Remains active without c/o CP, SOB.   Was pleased that CML is \"down to 4%\" on last checks    VITALS:  Vitals: /79   Pulse 87   Ht 1.765 m (5' 9.5\")   Wt 98 kg (216 lb)   BMI 31.44 kg/m      Diagnostic Testing:  EKG 10/2024, showed AsVp 96 bpm  Device interrogation 2/2025, showed 7%AP and 97% in DDDR 60/140.  8-year battery.  No atrial arrhythmias.  No ATP/shocks since 9/2024  Echocardiogram 9/2024-normal LVEF.  RV function normal.  Technically difficult, suboptimal.  S/p TV replacement with 31 mm epic stented valve with mean gradient up to 10 mmHg.  From 7.5).  Angiogram 3/2022-negative  Component      Latest Ref Rng 12/3/2024  11:44 AM 2/24/2025  11:10 AM 3/1/2025  10:00 AM   WBC      4.0 - 11.0 10e3/uL 7.5  9.5  6.6    RBC Count      4.40 - 5.90 10e6/uL 4.50  5.10  4.67    Hemoglobin      13.3 - 17.7 g/dL 12.3 (L)  14.9  13.6    Hematocrit      40.0 - 53.0 % 39.9 (L)  45.2  40.8    MCV      78 - 100 fL 89  89  87    MCH      26.5 - 33.0 pg 27.3  29.2  29.1    MCHC      31.5 - 36.5 g/dL 30.8 (L)  33.0  33.3    RDW      10.0 - 15.0 % 20.0 (H)  20.8 (H)  19.9 (H)    Platelet Count      150 - 450 10e3/uL 111 (L)  100 (L)  100 (L)       Component      Latest Ref Rng 2/15/2025  10:27 AM 2/24/2025  11:10 AM 3/1/2025  10:17 AM   Sodium      135 - 145 mmol/L 143  140  138    Potassium      3.4 - 5.3 mmol/L 5.1  5.9 (H)  5.0    Carbon Dioxide (CO2)      22 - 29 mmol/L 24  22  21 (L)    Anion Gap      7 - 15 mmol/L 11  13  11    Urea Nitrogen      6.0 - 20.0 mg/dL 27.0 (H)  26.7 (H)  26.2 (H)    Creatinine      0.67 - 1.17 mg/dL 1.66 (H)  1.35 (H)  1.25 (H)    GFR Estimate      >60 mL/min/1.73m2 54 (L)  69  76    Calcium      8.8 - 10.4 mg/dL 9.6  10.0  9.7    Chloride      98 - 107 mmol/L 108 (H)  105  106    Glucose      70 - 99 mg/dL 116 (H)  101 (H)  135 (H)         Plan:  Updated echo and follow-up with me " ~9/2025  In-office device check had been done in hospital 9/2024 so would expect in-office check to be 9/2025. Will confirm with EP RN    Assessment/Plan:    VF  As above, first noted with complete heart block during admission for endocarditis s/p bioprosthetic TVR 4/1/2022  Dual-chamber [employer] ICD placed 4/12/2022  Had appropriate shocks 1/2024 while fasting and again 9/2024 in the setting of severe emotional distress  Device has been reprogrammed DDDR (9/2024).  He had previously been programmed DDI    PLAN:  Continue routine device interrogations  Continue Meot 25 mg daily - tolerating well      H/o TV endocarditis  As above, hospitalized 4/2022 with tricuspid valve endocarditis complicated by CHB/VF arrest.  Also noted to have discitis and a groin infection  S/p TVR/1/2022  Updated echo 9/2024 with LVEF with nl LVEF and nl RV.  TV gradient increase noted, but prior to his device reprogramming to DDD    PLAN:  Echo 9/2025 now back to DDD programming          Lisa Umanzor PA-C, MSPAS      Orders Placed This Encounter   Procedures    Follow-Up with Cardiology [facility]    Echocardiogram Complete     No orders of the defined types were placed in this encounter.    There are no discontinued medications.      Encounter Diagnoses   Name Primary?    Ventricular fibrillation (H)     ICD (implantable cardioverter-defibrillator) in place     S/P TVR (tricuspid valve replacement) Yes       CURRENT MEDICATIONS:  Current Outpatient Medications   Medication Sig Dispense Refill    acetaminophen (TYLENOL) 325 MG tablet Take 2 tablets (650 mg) by mouth every 4 hours as needed for mild pain or fever 30 tablet 0    buPROPion (WELLBUTRIN XL) 300 MG 24 hr tablet Take 1 tablet (300 mg) by mouth every morning. 90 tablet 3    CONTOUR NEXT TEST test strip USE TO TEST DAILY AS DIRECTED      dasatinib (SPRYCEL) 80 MG tablet Take 1 tablet (80 mg) by mouth daily 30 tablet 2    empagliflozin (JARDIANCE) 25 MG TABS tablet Take 1 tablet (25  "mg) by mouth daily. 90 tablet 3    Ferrous Sulfate (IRON PO) Take 60 mg by mouth daily.      furosemide (LASIX) 20 MG tablet Take 1 tablet (20 mg) by mouth daily      levothyroxine (SYNTHROID/LEVOTHROID) 125 MCG tablet Take 125 mcg by mouth daily. Only on Sunday      levothyroxine (SYNTHROID/LEVOTHROID) 150 MCG tablet Take 1 tablet (150 mcg) by mouth daily at 2 pm. 90 tablet 3    losartan (COZAAR) 50 MG tablet Take 1 tablet (50 mg) by mouth daily. 90 tablet 3    metFORMIN (GLUCOPHAGE) 1000 MG tablet Take 1 tablet (1,000 mg) by mouth 2 times daily (with meals). 180 tablet 3    metoprolol succinate ER (TOPROL XL) 25 MG 24 hr tablet Take 1 tablet (25 mg) by mouth daily. 90 tablet 3    semaglutide (OZEMPIC) 2 MG/1.5ML pen Inject 2 mg subcutaneously every 7 days.      Semaglutide, 2 MG/DOSE, (OZEMPIC) 8 MG/3ML pen Inject 2 mg subcutaneously every 7 days. 9 mL 3       ALLERGIES     Allergies   Allergen Reactions    Vancomycin      \"Red Sonia Syndrome\"    Clindamycin Unknown    Ceftriaxone Rash         Review of Systems:  Skin:  Negative     Eyes:  Negative    ENT:  Negative    Respiratory:  Positive for dyspnea on exertion  Cardiovascular:  Negative for, palpitations, chest pain, edema, syncope or near-syncope, fatigue, lightheadedness, dizziness, exercise intolerance    Gastroenterology: Negative for melena, hematochezia  Genitourinary:  Negative    Musculoskeletal:  Negative    Neurologic:  Negative    Psychiatric:  Negative    Heme/Lymph/Imm:  Negative    Endocrine:  Positive for diabetes    Physical Exam:  Vitals: /79   Pulse 87   Ht 1.765 m (5' 9.5\")   Wt 98 kg (216 lb)   BMI 31.44 kg/m      Constitutional:  cooperative, alert and oriented, well developed, well nourished, in no acute distress        Skin:  warm and dry to the touch surgical scars well-healed      Head:  normocephalic, no masses or lesions        Eyes:  pupils equal and round, sclera white, conjunctivae and lids unremarkable        ENT:  no " pallor or cyanosis, dentition good        Neck:  no carotid bruit, JVP normal        Chest:  normal breath sounds, clear to auscultation, normal A-P diameter, normal symmetry, normal respiratory excursion, no use of accessory muscles        Cardiac: regular rhythm           holodiastolic murmur, RLSB, LLSB      Abdomen:  abdomen soft        Vascular: pulses full and equal, no bruits auscultated                                      Extremities and Back:  no deformities, clubbing, cyanosis, erythema observed        Neurological:  no gross motor deficits            PAST MEDICAL HISTORY:  Past Medical History:   Diagnosis Date    Acute kidney injury 04/18/2022    Fluid overload 04/18/2022    MSSA bacteremia 03/28/2022    Osteomyelitis of spine (H) 04/18/2022    Pneumonia     Pneumonia of left lower lobe due to infectious organism 03/22/2022    Sepsis (H) 04/18/2022       PAST SURGICAL HISTORY:  Past Surgical History:   Procedure Laterality Date    BONE MARROW BIOPSY, BONE SPECIMEN, NEEDLE/TROCAR Left 9/16/2024    Procedure: Bone marrow biopsy, bone specimen, needle/trocar, left posterior iliac crest;  Surgeon: Noni Sauer PA-C;  Location: [facility] OR    CV CORONARY ANGIOGRAM N/A 3/31/2022    Procedure: Coronary Angiogram;  Surgeon: Lidia Quintanilla MD;  Location:  [facility]    EP ICD INSERT SINGLE N/A 4/12/2022    Procedure: Implantable Cardioverter Defibrillator Device & Lead Implant Single or Dual;  Surgeon: Suleman Higgins MD;  Location:  [facility]    IR LUMBAR PUNCTURE  7/30/2012    REPLACE VALVE AORTIC N/A 4/1/2022    Procedure: AORTIC VALVE exploration;  Surgeon: Marti Melton MD;  Location:  OR    REPLACE VALVE TRICUSPID N/A 4/1/2022    Procedure: TRICUSPID VALVE REPLACEMENT;  Surgeon: Marti Melton MD;  Location:  OR       FAMILY HISTORY:  Family History   Problem Relation Age of Onset    Diabetes Mother     Diabetes Father     Diabetes Sister         SOCIAL HISTORY:  Social History     Socioeconomic History    Marital status:      Spouse name: None    Number of children: 4    Years of education: None    Highest education level: None   Tobacco Use    Smoking status: Never    Smokeless tobacco: Never   Vaping Use    Vaping status: Never Used   Substance and Sexual Activity    Alcohol use: Not Currently    Drug use: Never     Social Drivers of Health     Financial Resource Strain: Low Risk  (2/23/2025)    Financial Resource Strain     Within the past 12 months, have you or your family members you live with been unable to get utilities (heat, electricity) when it was really needed?: No   Food Insecurity: Low Risk  (2/23/2025)    Food Insecurity     Within the past 12 months, did you worry that your food would run out before you got money to buy more?: No     Within the past 12 months, did the food you bought just not last and you didn t have money to get more?: No   Transportation Needs: Low Risk  (2/23/2025)    Transportation Needs     Within the past 12 months, has lack of transportation kept you from medical appointments, getting your medicines, non-medical meetings or appointments, work, or from getting things that you need?: No   Physical Activity: Sufficiently Active (2/23/2025)    Exercise Vital Sign     Days of Exercise per Week: 5 days     Minutes of Exercise per Session: 120 min   Stress: No Stress Concern Present (2/23/2025)    [employer] - Occupational Stress Questionnaire     Feeling of Stress : Not at all   Social Connections: Unknown (2/23/2025)    Social Connection and Isolation Panel [NHANES]     Frequency of Social Gatherings with Friends and Family: More than three times a week   Interpersonal Safety: Low Risk  (2/24/2025)    Interpersonal Safety     Do you feel physically and emotionally safe where you currently live?: Yes     Within the past 12 months, have you been hit, slapped, kicked or otherwise  physically hurt by someone?: No     Within the past 12 months, have you been humiliated or emotionally abused in other ways by your partner or ex-partner?: No   Housing Stability: Low Risk  (2/23/2025)    Housing Stability     Do you have housing? : Yes     Are you worried about losing your housing?: No       CC  Fred Clinton MD  [address] [place] [address]

## 2025-03-24 ENCOUNTER — OFFICE VISIT (OUTPATIENT)
Dept: CARDIOLOGY | Facility: CLINIC | Age: 40
End: 2025-03-24
Payer: COMMERCIAL

## 2025-03-24 ENCOUNTER — LAB (OUTPATIENT)
Dept: LAB | Facility: CLINIC | Age: 40
End: 2025-03-24
Payer: COMMERCIAL

## 2025-03-24 ENCOUNTER — PATIENT OUTREACH (OUTPATIENT)
Dept: ONCOLOGY | Facility: HOSPITAL | Age: 40
End: 2025-03-24

## 2025-03-24 VITALS
WEIGHT: 216 LBS | BODY MASS INDEX: 30.92 KG/M2 | HEART RATE: 87 BPM | DIASTOLIC BLOOD PRESSURE: 79 MMHG | SYSTOLIC BLOOD PRESSURE: 115 MMHG | HEIGHT: 70 IN

## 2025-03-24 DIAGNOSIS — Z95.810 ICD (IMPLANTABLE CARDIOVERTER-DEFIBRILLATOR) IN PLACE: ICD-10-CM

## 2025-03-24 DIAGNOSIS — Z95.4 S/P TVR (TRICUSPID VALVE REPLACEMENT): Primary | ICD-10-CM

## 2025-03-24 DIAGNOSIS — I49.01 VENTRICULAR FIBRILLATION (H): ICD-10-CM

## 2025-03-24 DIAGNOSIS — C92.10 CML (CHRONIC MYELOCYTIC LEUKEMIA) (H): ICD-10-CM

## 2025-03-24 LAB
ALBUMIN SERPL BCG-MCNC: 4.7 G/DL (ref 3.5–5.2)
ALP SERPL-CCNC: 101 U/L (ref 40–150)
ALT SERPL W P-5'-P-CCNC: 27 U/L (ref 0–70)
ANION GAP SERPL CALCULATED.3IONS-SCNC: 12 MMOL/L (ref 7–15)
AST SERPL W P-5'-P-CCNC: 29 U/L (ref 0–45)
BASOPHILS # BLD AUTO: 0.1 10E3/UL (ref 0–0.2)
BASOPHILS NFR BLD AUTO: 1 %
BILIRUB SERPL-MCNC: 1.4 MG/DL
BUN SERPL-MCNC: 25 MG/DL (ref 6–20)
CALCIUM SERPL-MCNC: 9.6 MG/DL (ref 8.8–10.4)
CHLORIDE SERPL-SCNC: 108 MMOL/L (ref 98–107)
CREAT SERPL-MCNC: 1.37 MG/DL (ref 0.67–1.17)
EGFRCR SERPLBLD CKD-EPI 2021: 68 ML/MIN/1.73M2
EOSINOPHIL # BLD AUTO: 0.1 10E3/UL (ref 0–0.7)
EOSINOPHIL NFR BLD AUTO: 2 %
ERYTHROCYTE [DISTWIDTH] IN BLOOD BY AUTOMATED COUNT: 20.5 % (ref 10–15)
GLUCOSE SERPL-MCNC: 126 MG/DL (ref 70–99)
HCO3 SERPL-SCNC: 23 MMOL/L (ref 22–29)
HCT VFR BLD AUTO: 42.2 % (ref 40–53)
HGB BLD-MCNC: 14 G/DL (ref 13.3–17.7)
IMM GRANULOCYTES # BLD: 0 10E3/UL
IMM GRANULOCYTES NFR BLD: 0 %
LYMPHOCYTES # BLD AUTO: 1.7 10E3/UL (ref 0.8–5.3)
LYMPHOCYTES NFR BLD AUTO: 24 %
MAGNESIUM SERPL-MCNC: 2.3 MG/DL (ref 1.7–2.3)
MCH RBC QN AUTO: 30.9 PG (ref 26.5–33)
MCHC RBC AUTO-ENTMCNC: 33.2 G/DL (ref 31.5–36.5)
MCV RBC AUTO: 93 FL (ref 78–100)
MONOCYTES # BLD AUTO: 0.7 10E3/UL (ref 0–1.3)
MONOCYTES NFR BLD AUTO: 10 %
NEUTROPHILS # BLD AUTO: 4.2 10E3/UL (ref 1.6–8.3)
NEUTROPHILS NFR BLD AUTO: 62 %
NRBC # BLD AUTO: 0 10E3/UL
NRBC BLD AUTO-RTO: 0 /100
PHOSPHATE SERPL-MCNC: 5.1 MG/DL (ref 2.5–4.5)
PLATELET # BLD AUTO: 96 10E3/UL (ref 150–450)
POTASSIUM SERPL-SCNC: 4.3 MMOL/L (ref 3.4–5.3)
PROT SERPL-MCNC: 7.8 G/DL (ref 6.4–8.3)
RBC # BLD AUTO: 4.53 10E6/UL (ref 4.4–5.9)
SODIUM SERPL-SCNC: 143 MMOL/L (ref 135–145)
WBC # BLD AUTO: 6.8 10E3/UL (ref 4–11)

## 2025-03-24 PROCEDURE — 3074F SYST BP LT 130 MM HG: CPT | Performed by: PHYSICIAN ASSISTANT

## 2025-03-24 PROCEDURE — 3078F DIAST BP <80 MM HG: CPT | Performed by: PHYSICIAN ASSISTANT

## 2025-03-24 PROCEDURE — 99214 OFFICE O/P EST MOD 30 MIN: CPT | Performed by: PHYSICIAN ASSISTANT

## 2025-03-24 NOTE — LETTER
3/24/2025    Chester Monge MD  [address] [facility] [address]    RE: Anil Montoya       Dear Colleague,     I had the pleasure of seeing Anil Montoya in the [facility].  [facility]    I had the pleasure of seeing Anil when he came for follow up of VF and THN.  This 39 year old sees Dr. Higgins and Dr. Dorsey for his history of:    DM2  2. 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 Complicated by CHB and then VF arrest 4/4/2022. S/p dual chamber [employer] ICD 4/2022. Noted to have R groin infection as well as L4-5/S1 discitis as well.  4. H/o VF arrest 4/2022 - as above, occurred while hospitalized for endocarditis and discitis.  S/p dual-chamber [employer] ICD 4/2022.  Recurrent VF while fasting 1/2024.  Recurrent VF requiring therapy 9/2024 in the setting of severe emotional distress  5. HTN  6. CML - dx'd 9/2024. Sees Dr. Varner  7. Anemia      Last Visit & Interval History:  I saw Anil 1/2024 at which time he was feeling well.  We reviewed an alert due to ATP/shock therapy administered 1/8/2024, with a PVC initiating a fast VT rhythm.  After ATP was unsuccessful, rhythm deteriorated into VF.  He had been quite dizzy and had squatted down to prevent syncope, then woke up in the driveway.  He had been fasting.  He was not taking carvedilol.  Blood work showed normal electrolytes with elevated creatinine.    He met with Dr. Clinton 10/2024 after another episode of appropriate ICD shock for VF 9/2024.  He had been under quite a bit of emotional stress.  Increased metoprolol had not been tolerated due to increased dizziness, LE edema and SKAGGS.    He presented to [facility] 9/14/2024 and was noted to have hyperleukocytosis and was transferred to [facility].  He was subsequently diagnosed with CML.  Some device settings were made (changed VVI to DDD).  Again, he did not tolerate  "increased BB therapy, but at that visit was tolerating 25 mg daily    Today's Visit:  Anil is doing well!  No recurrent shocks though was told he had some arrhythmias after winning a skeet shooting contest (nothing on 2/205 check). He feels better after reprogramming to DDD.    Denies CP, SOB. No edema, orthopnea, PND.     Denies fever, chills. Remains active without c/o CP, SOB.   Was pleased that CML is \"down to 4%\" on last checks    VITALS:  Vitals: /79   Pulse 87   Ht 1.765 m (5' 9.5\")   Wt 98 kg (216 lb)   BMI 31.44 kg/m      Diagnostic Testing:  EKG 10/2024, showed AsVp 96 bpm  Device interrogation 2/2025, showed 7%AP and 97% in DDDR 60/140.  8-year battery.  No atrial arrhythmias.  No ATP/shocks since 9/2024  Echocardiogram 9/2024-normal LVEF.  RV function normal.  Technically difficult, suboptimal.  S/p TV replacement with 31 mm epic stented valve with mean gradient up to 10 mmHg.  From 7.5).  Angiogram 3/2022-negative  Component      Latest Ref Rng 12/3/2024  11:44 AM 2/24/2025  11:10 AM 3/1/2025  10:00 AM   WBC      4.0 - 11.0 10e3/uL 7.5  9.5  6.6    RBC Count      4.40 - 5.90 10e6/uL 4.50  5.10  4.67    Hemoglobin      13.3 - 17.7 g/dL 12.3 (L)  14.9  13.6    Hematocrit      40.0 - 53.0 % 39.9 (L)  45.2  40.8    MCV      78 - 100 fL 89  89  87    MCH      26.5 - 33.0 pg 27.3  29.2  29.1    MCHC      31.5 - 36.5 g/dL 30.8 (L)  33.0  33.3    RDW      10.0 - 15.0 % 20.0 (H)  20.8 (H)  19.9 (H)    Platelet Count      150 - 450 10e3/uL 111 (L)  100 (L)  100 (L)       Component      Latest Ref Rng 2/15/2025  10:27 AM 2/24/2025  11:10 AM 3/1/2025  10:17 AM   Sodium      135 - 145 mmol/L 143  140  138    Potassium      3.4 - 5.3 mmol/L 5.1  5.9 (H)  5.0    Carbon Dioxide (CO2)      22 - 29 mmol/L 24  22  21 (L)    Anion Gap      7 - 15 mmol/L 11  13  11    Urea Nitrogen      6.0 - 20.0 mg/dL 27.0 (H)  26.7 (H)  26.2 (H)    Creatinine      0.67 - 1.17 mg/dL 1.66 (H)  1.35 (H)  1.25 (H)    GFR " Estimate      >60 mL/min/1.73m2 54 (L)  69  76    Calcium      8.8 - 10.4 mg/dL 9.6  10.0  9.7    Chloride      98 - 107 mmol/L 108 (H)  105  106    Glucose      70 - 99 mg/dL 116 (H)  101 (H)  135 (H)         Plan:  Updated echo and follow-up with me ~9/2025  In-office device check had been done in hospital 9/2024 so would expect in-office check to be 9/2025. Will confirm with EP RN    Assessment/Plan:    VF  As above, first noted with complete heart block during admission for endocarditis s/p bioprosthetic TVR 4/1/2022  Dual-chamber [employer] ICD placed 4/12/2022  Had appropriate shocks 1/2024 while fasting and again 9/2024 in the setting of severe emotional distress  Device has been reprogrammed DDDR (9/2024).  He had previously been programmed DDI    PLAN:  Continue routine device interrogations  Continue Meot 25 mg daily - tolerating well      H/o TV endocarditis  As above, hospitalized 4/2022 with tricuspid valve endocarditis complicated by CHB/VF arrest.  Also noted to have discitis and a groin infection  S/p TVR/1/2022  Updated echo 9/2024 with LVEF with nl LVEF and nl RV.  TV gradient increase noted, but prior to his device reprogramming to DDD    PLAN:  Echo 9/2025 now back to DDD programming          Lisa Umanzor PA-C, MSPAS      Orders Placed This Encounter   Procedures     Follow-Up with Cardiology CARTER     Echocardiogram Complete     No orders of the defined types were placed in this encounter.    There are no discontinued medications.      Encounter Diagnoses   Name Primary?     Ventricular fibrillation (H)      ICD (implantable cardioverter-defibrillator) in place      S/P TVR (tricuspid valve replacement) Yes       CURRENT MEDICATIONS:  Current Outpatient Medications   Medication Sig Dispense Refill     acetaminophen (TYLENOL) 325 MG tablet Take 2 tablets (650 mg) by mouth every 4 hours as needed for mild pain or fever 30 tablet 0     buPROPion (WELLBUTRIN XL) 300 MG 24 hr tablet Take 1 tablet  "(300 mg) by mouth every morning. 90 tablet 3     CONTOUR NEXT TEST test strip USE TO TEST DAILY AS DIRECTED       dasatinib (SPRYCEL) 80 MG tablet Take 1 tablet (80 mg) by mouth daily 30 tablet 2     empagliflozin (JARDIANCE) 25 MG TABS tablet Take 1 tablet (25 mg) by mouth daily. 90 tablet 3     Ferrous Sulfate (IRON PO) Take 60 mg by mouth daily.       furosemide (LASIX) 20 MG tablet Take 1 tablet (20 mg) by mouth daily       levothyroxine (SYNTHROID/LEVOTHROID) 125 MCG tablet Take 125 mcg by mouth daily. Only on Sunday       levothyroxine (SYNTHROID/LEVOTHROID) 150 MCG tablet Take 1 tablet (150 mcg) by mouth daily at 2 pm. 90 tablet 3     losartan (COZAAR) 50 MG tablet Take 1 tablet (50 mg) by mouth daily. 90 tablet 3     metFORMIN (GLUCOPHAGE) 1000 MG tablet Take 1 tablet (1,000 mg) by mouth 2 times daily (with meals). 180 tablet 3     metoprolol succinate ER (TOPROL XL) 25 MG 24 hr tablet Take 1 tablet (25 mg) by mouth daily. 90 tablet 3     semaglutide (OZEMPIC) 2 MG/1.5ML pen Inject 2 mg subcutaneously every 7 days.       Semaglutide, 2 MG/DOSE, (OZEMPIC) 8 MG/3ML pen Inject 2 mg subcutaneously every 7 days. 9 mL 3       ALLERGIES     Allergies   Allergen Reactions     Vancomycin      \"Red Sonia Syndrome\"     Clindamycin Unknown     Ceftriaxone Rash         Review of Systems:  Skin:  Negative     Eyes:  Negative    ENT:  Negative    Respiratory:  Positive for dyspnea on exertion  Cardiovascular:  Negative for, palpitations, chest pain, edema, syncope or near-syncope, fatigue, lightheadedness, dizziness, exercise intolerance    Gastroenterology: Negative for melena, hematochezia  Genitourinary:  Negative    Musculoskeletal:  Negative    Neurologic:  Negative    Psychiatric:  Negative    Heme/Lymph/Imm:  Negative    Endocrine:  Positive for diabetes    Physical Exam:  Vitals: /79   Pulse 87   Ht 1.765 m (5' 9.5\")   Wt 98 kg (216 lb)   BMI 31.44 kg/m      Constitutional:  cooperative, alert and " oriented, well developed, well nourished, in no acute distress        Skin:  warm and dry to the touch surgical scars well-healed      Head:  normocephalic, no masses or lesions        Eyes:  pupils equal and round, sclera white, conjunctivae and lids unremarkable        ENT:  no pallor or cyanosis, dentition good        Neck:  no carotid bruit, JVP normal        Chest:  normal breath sounds, clear to auscultation, normal A-P diameter, normal symmetry, normal respiratory excursion, no use of accessory muscles        Cardiac: regular rhythm           holodiastolic murmur, RLSB, LLSB      Abdomen:  abdomen soft        Vascular: pulses full and equal, no bruits auscultated                                      Extremities and Back:  no deformities, clubbing, cyanosis, erythema observed        Neurological:  no gross motor deficits            PAST MEDICAL HISTORY:  Past Medical History:   Diagnosis Date     Acute kidney injury 04/18/2022     Fluid overload 04/18/2022     MSSA bacteremia 03/28/2022     Osteomyelitis of spine (H) 04/18/2022     Pneumonia      Pneumonia of left lower lobe due to infectious organism 03/22/2022     Sepsis (H) 04/18/2022       PAST SURGICAL HISTORY:  Past Surgical History:   Procedure Laterality Date     BONE MARROW BIOPSY, BONE SPECIMEN, NEEDLE/TROCAR Left 9/16/2024    Procedure: Bone marrow biopsy, bone specimen, needle/trocar, left posterior iliac crest;  Surgeon: Noni Sauer PA-C;  Location: [facility] OR     CV CORONARY ANGIOGRAM N/A 3/31/2022    Procedure: Coronary Angiogram;  Surgeon: Lidia Quintanilla MD;  Location:  HEART CARDIAC CATH LAB     EP ICD INSERT SINGLE N/A 4/12/2022    Procedure: Implantable Cardioverter Defibrillator Device & Lead Implant Single or Dual;  Surgeon: Suleman Higgins MD;  Location:  HEART CARDIAC CATH LAB     IR LUMBAR PUNCTURE  7/30/2012     REPLACE VALVE AORTIC N/A 4/1/2022    Procedure: AORTIC VALVE exploration;  Surgeon: Marti Melton  MD;  Location:  OR     REPLACE VALVE TRICUSPID N/A 4/1/2022    Procedure: TRICUSPID VALVE REPLACEMENT;  Surgeon: Marti Melton MD;  Location:  OR       FAMILY HISTORY:  Family History   Problem Relation Age of Onset     Diabetes Mother      Diabetes Father      Diabetes Sister        SOCIAL HISTORY:  Social History     Socioeconomic History     Marital status:      Spouse name: None     Number of children: 4     Years of education: None     Highest education level: None   Tobacco Use     Smoking status: Never     Smokeless tobacco: Never   Vaping Use     Vaping status: Never Used   Substance and Sexual Activity     Alcohol use: Not Currently     Drug use: Never     Social Drivers of Health     Financial Resource Strain: Low Risk  (2/23/2025)    Financial Resource Strain      Within the past 12 months, have you or your family members you live with been unable to get utilities (heat, electricity) when it was really needed?: No   Food Insecurity: Low Risk  (2/23/2025)    Food Insecurity      Within the past 12 months, did you worry that your food would run out before you got money to buy more?: No      Within the past 12 months, did the food you bought just not last and you didn t have money to get more?: No   Transportation Needs: Low Risk  (2/23/2025)    Transportation Needs      Within the past 12 months, has lack of transportation kept you from medical appointments, getting your medicines, non-medical meetings or appointments, work, or from getting things that you need?: No   Physical Activity: Sufficiently Active (2/23/2025)    Exercise Vital Sign      Days of Exercise per Week: 5 days      Minutes of Exercise per Session: 120 min   Stress: No Stress Concern Present (2/23/2025)    [employer] - Occupational Stress Questionnaire      Feeling of Stress : Not at all   Social Connections: Unknown (2/23/2025)    Social Connection and Isolation Panel [NHANES]       Frequency of Social Gatherings with Friends and Family: More than three times a week   Interpersonal Safety: Low Risk  (2/24/2025)    Interpersonal Safety      Do you feel physically and emotionally safe where you currently live?: Yes      Within the past 12 months, have you been hit, slapped, kicked or otherwise physically hurt by someone?: No      Within the past 12 months, have you been humiliated or emotionally abused in other ways by your partner or ex-partner?: No   Housing Stability: Low Risk  (2/23/2025)    Housing Stability      Do you have housing? : Yes      Are you worried about losing your housing?: No       CC  Fred Clinton MD  [address] [place] [address]            Thank you for allowing me to participate in the care of your patient.      Sincerely,     Marielena Umanzor PA-C     [facility]  cc:   Fred Clinton MD  [address] [place] [address]

## 2025-03-24 NOTE — PATIENT INSTRUCTIONS
Anil - it was nice to see you today!    Today we reviewed:    You've had an eventful year - CML and skeet shooting tournament!  Device check 2/2025 looked OK    MEDICATION CHANGES:    NONE    RECOMMENDATIONS:    Continue routine pacer checks    FOLLOW UP:    See us 9/2025 with echo and in-office device check    IMPORTANT PHONE NUMBERS FOR [facility] [place] [facility] ([place]):    Device nurses: [phone]

## 2025-03-24 NOTE — PROGRESS NOTES
Phoned pt per request: Pt is interested in a therapy appt. Will ask schedulers to call pt.     Oncology Distress Screening      Row Name 03/18/25 1200       How concerned do you feel regarding the following common issues people with cancer face. Please answer with a number from 0-10 where 0=not concerned and 10=very concerned. PT response of >=8  will result in outreach from Support Team.   1. How concerned are you about your ability to eat? 0       2. How concerned are you about unintended weight loss or your current weight? 0       3. How concerned are you about feeling depressed or very sad? 0       4. How concerned are you about feeling anxious or very scared? 0       5. Do you struggle with the loss of meaning and therese in your life? Not at all       6. How concerned are you about work and home life issues that may be affected by your cancer? 0       7. How concerned are you about knowing what resources are available to help you? 0       8. Do you currently have what you would describe as Jainism or spiritual struggles? Not at all       You can also ask to be contacted by one of our Oncology Supportive Care professionals.   9. If you want to be contacted by one of our professionals, I can send a message to them right now. Oncology Psychologist

## 2025-03-25 ENCOUNTER — PATIENT OUTREACH (OUTPATIENT)
Dept: CARE COORDINATION | Facility: CLINIC | Age: 40
End: 2025-03-25
Payer: COMMERCIAL

## 2025-03-25 ENCOUNTER — MYC MEDICAL ADVICE (OUTPATIENT)
Dept: CARDIOLOGY | Facility: CLINIC | Age: 40
End: 2025-03-25
Payer: COMMERCIAL

## 2025-03-25 ENCOUNTER — TELEPHONE (OUTPATIENT)
Dept: ONCOLOGY | Facility: CLINIC | Age: 40
End: 2025-03-25
Payer: COMMERCIAL

## 2025-03-25 NOTE — PROGRESS NOTES
Social Work - Distress Screen Intervention  [facility]    Identified Concern and Score from Distress Screenin. How concerned are you about your ability to eat? 0     2. How concerned are you about unintended weight loss or your current weight? 0     3. How concerned are you about feeling depressed or very sad?  0     4. How concerned are you about feeling anxious or very scared?  0     5. Do you struggle with the loss of meaning and therese in your life?  Not at all     6. How concerned are you about work and home life issues that may be affected by your cancer?  0     7. How concerned are you about knowing what resources are available to help you?  0     8. Do you currently have what you would describe as Mosque or spiritual struggles? Not at all     9. If you want to be contacted by one of our professionals, I can send a message to them right now.  Oncology Psychologist       Date of Distress Screen: 2025  Data: At time of last visit, patient scored positive on distress screening.  outreached to patient today to follow up on elevated distress and introduce psychosocial services and support.  Intervention/Education provided:  contacted patient by phone to discuss distress screening results.  Patient mostly interested in psychologist as he has been in process of  his wife, which has been in process for the last 2-3 years. Patient reports wife has been making it challenging to move through this process due delayed responses and patient has continued to struggle with the fact that he does not have custody nor is able to see his four children. Patient has a 17 year old daughter, 15 yr old son, 13 year old daughter and almost 10 year old son. Patient informs me that his oldest has not talked to him since January of last year. Now, with cancer dx patient feels there is an added layer of challenges. Patient shares his appreciation with SW outreach and SW offered  to provide resources. Patient is agreeable to SW reaching out to Kwaku Peters Psychologist to inquire about scheduling appt - SW confirmed to do this. SW also acknowledges Kwaku Peters outreached to patient, yesterday 3/24 requesting  to set up appointment. Patient currently works full time for [employer] ex. SW educated patient on additional resources available, and he declines at this time. Therefore, psychologist ref will be placed. Patient is understanding that if he is in need of any additional supports/resources, he can reach out to SW anytime. SW followed up with [employer] message to provide patient with  contact information for future needs.    Follow-up Required:  will remain available to patient for support as needed    MICHAEL Rogers, TONIA  Clinical , Adult Oncology  [facility]   [address] [place] [id number]  [email or web address]  Office Phone: [phone]  Support Groups at [facility]: Social Work Services for Cancer Patients ([email or web address])

## 2025-03-25 NOTE — ORAL ONC MGMT
Oral Chemotherapy Monitoring Program  Lab Follow Up    Reviewed lab results from 3/24/25.        12/19/2024    10:00 AM 1/20/2025     9:00 AM 1/31/2025     3:00 PM 2/17/2025     9:00 AM 2/18/2025    10:00 AM 3/3/2025    11:00 AM 3/20/2025    10:00 AM   ORAL CHEMOTHERAPY   Assessment Type Refill Lab Monitoring Monthly Follow up Left Voicemail Refill Lab Monitoring Refill;Chart Review   Diagnosis Code Chronic Myeloid Leukemia (CML) Chronic Myeloid Leukemia (CML) Chronic Myeloid Leukemia (CML) Chronic Myeloid Leukemia (CML) Chronic Myeloid Leukemia (CML) Chronic Myeloid Leukemia (CML) Chronic Myeloid Leukemia (CML)   Providers Dr. Telly Varner   [facility]   Is this patient followed by the [facility] OC team? Yes Yes Yes Yes      Drug Name Sprycel (dasatinib) Sprycel (dasatinib) Sprycel (dasatinib) Sprycel (dasatinib) Sprycel (dasatinib) Sprycel (dasatinib) Sprycel (dasatinib)   Dose 80 mg 80 mg 80 mg 80 mg 80 mg 80 mg 80 mg   Current Schedule Daily Daily Daily Daily Daily Daily Daily   Cycle Details Continuous Continuous Continuous Continuous Continuous Continuous Continuous   Adherence Assessment   Adherent       Adverse Effects  Increased AST/ALT/T BILI;Thrombocytopenia No AE identified during assessment       Thrombocytopenia  Grade 1        Pharmacist Intervention(thrombocytopenia)  No            Labs:  _  Result Component Current Result Ref Range   Sodium 143 (3/24/2025) 135 - 145 mmol/L     _  Result Component Current Result Ref Range   Potassium 4.3 (3/24/2025) 3.4 - 5.3 mmol/L     _  Result Component Current Result Ref Range   Calcium 9.6 (3/24/2025) 8.8 - 10.4 mg/dL     _  Result Component Current Result Ref Range   Magnesium 2.3 (3/24/2025) 1.7 - 2.3 mg/dL     _  Result Component Current Result Ref Range   Phosphorus 5.1 (H) (3/24/2025) 2.5 - 4.5 mg/dL     _  Result Component  Current Result Ref Range   Albumin 4.7 (3/24/2025) 3.5 - 5.2 g/dL     _  Result Component Current Result Ref Range   Urea Nitrogen 25.0 (H) (3/24/2025) 6.0 - 20.0 mg/dL     _  Result Component Current Result Ref Range   Creatinine 1.37 (H) (3/24/2025) 0.67 - 1.17 mg/dL     _  Result Component Current Result Ref Range   AST 29 (3/24/2025) 0 - 45 U/L     _  Result Component Current Result Ref Range   ALT 27 (3/24/2025) 0 - 70 U/L     _  Result Component Current Result Ref Range   Bilirubin Total 1.4 (H) (3/24/2025) <=1.2 mg/dL     _  Result Component Current Result Ref Range   WBC Count 6.8 (3/24/2025) 4.0 - 11.0 10e3/uL     _  Result Component Current Result Ref Range   Hemoglobin 14.0 (3/24/2025) 13.3 - 17.7 g/dL     _  Result Component Current Result Ref Range   Platelet Count 96 (L) (3/24/2025) 150 - 450 10e3/uL     No results found for ANC within last 30 days.     _  Result Component Current Result Ref Range   Absolute Neutrophils 4.2 (3/24/2025) 1.6 - 8.3 10e3/uL        Assessment & Plan:  No concerning abnormalities. Small increase in bilirubin, has happened in the past but came back down. Monitor for now.    Mychart sent to patient    Follow-Up:  Last assessment    Sarwat Merino  Oncology PharmD  March 25, 2025

## 2025-04-21 ENCOUNTER — LAB (OUTPATIENT)
Dept: LAB | Facility: CLINIC | Age: 40
End: 2025-04-21
Payer: COMMERCIAL

## 2025-04-21 DIAGNOSIS — C92.10 CML (CHRONIC MYELOCYTIC LEUKEMIA) (H): ICD-10-CM

## 2025-04-21 LAB
ALBUMIN SERPL BCG-MCNC: 4.4 G/DL (ref 3.5–5.2)
ALP SERPL-CCNC: 93 U/L (ref 40–150)
ALT SERPL W P-5'-P-CCNC: 24 U/L (ref 0–70)
ANION GAP SERPL CALCULATED.3IONS-SCNC: 11 MMOL/L (ref 7–15)
AST SERPL W P-5'-P-CCNC: 22 U/L (ref 0–45)
BASOPHILS # BLD MANUAL: 0.1 10E3/UL (ref 0–0.2)
BASOPHILS NFR BLD MANUAL: 1 %
BILIRUB SERPL-MCNC: 1.4 MG/DL
BUN SERPL-MCNC: 22.6 MG/DL (ref 6–20)
CALCIUM SERPL-MCNC: 9.4 MG/DL (ref 8.8–10.4)
CHLORIDE SERPL-SCNC: 104 MMOL/L (ref 98–107)
CREAT SERPL-MCNC: 1.38 MG/DL (ref 0.67–1.17)
EGFRCR SERPLBLD CKD-EPI 2021: 68 ML/MIN/1.73M2
ELLIPTOCYTES BLD QL SMEAR: SLIGHT
EOSINOPHIL # BLD MANUAL: 0 10E3/UL (ref 0–0.7)
EOSINOPHIL NFR BLD MANUAL: 0 %
ERYTHROCYTE [DISTWIDTH] IN BLOOD BY AUTOMATED COUNT: 18 % (ref 10–15)
GLUCOSE SERPL-MCNC: 124 MG/DL (ref 70–99)
HCO3 SERPL-SCNC: 26 MMOL/L (ref 22–29)
HCT VFR BLD AUTO: 41.1 % (ref 40–53)
HGB BLD-MCNC: 13.4 G/DL (ref 13.3–17.7)
LYMPHOCYTES # BLD MANUAL: 1.4 10E3/UL (ref 0.8–5.3)
LYMPHOCYTES NFR BLD MANUAL: 24 %
MAGNESIUM SERPL-MCNC: 2.1 MG/DL (ref 1.7–2.3)
MCH RBC QN AUTO: 32.1 PG (ref 26.5–33)
MCHC RBC AUTO-ENTMCNC: 32.6 G/DL (ref 31.5–36.5)
MCV RBC AUTO: 98 FL (ref 78–100)
MONOCYTES # BLD MANUAL: 0.2 10E3/UL (ref 0–1.3)
MONOCYTES NFR BLD MANUAL: 4 %
NEUTROPHILS # BLD MANUAL: 4.1 10E3/UL (ref 1.6–8.3)
NEUTROPHILS NFR BLD MANUAL: 71 %
PHOSPHATE SERPL-MCNC: 3.8 MG/DL (ref 2.5–4.5)
PLAT MORPH BLD: ABNORMAL
PLATELET # BLD AUTO: 57 10E3/UL (ref 150–450)
POTASSIUM SERPL-SCNC: 4.4 MMOL/L (ref 3.4–5.3)
PROT SERPL-MCNC: 7.4 G/DL (ref 6.4–8.3)
RBC # BLD AUTO: 4.18 10E6/UL (ref 4.4–5.9)
RBC MORPH BLD: ABNORMAL
SODIUM SERPL-SCNC: 141 MMOL/L (ref 135–145)
VARIANT LYMPHS BLD QL SMEAR: PRESENT
WBC # BLD AUTO: 5.8 10E3/UL (ref 4–11)

## 2025-04-22 ENCOUNTER — TELEPHONE (OUTPATIENT)
Dept: ONCOLOGY | Facility: CLINIC | Age: 40
End: 2025-04-22
Payer: COMMERCIAL

## 2025-04-22 NOTE — ORAL ONC MGMT
Oral Chemotherapy Monitoring Program  Lab Follow Up    Reviewed lab results from 4/21/25.        2/18/2025    10:00 AM 3/3/2025    11:00 AM 3/20/2025    10:00 AM 3/25/2025    11:00 AM 3/28/2025     3:00 PM 4/4/2025     2:00 PM 4/22/2025     9:00 AM   ORAL CHEMOTHERAPY   Assessment Type Refill Lab Monitoring Refill;Chart Review Lab Monitoring Left Voicemail Monthly Follow up Lab Monitoring   Diagnosis Code Chronic Myeloid Leukemia (CML) Chronic Myeloid Leukemia (CML) Chronic Myeloid Leukemia (CML) Chronic Myeloid Leukemia (CML) Chronic Myeloid Leukemia (CML) Chronic Myeloid Leukemia (CML) Chronic Myeloid Leukemia (CML)   Providers Dr. Telly Varner   [facility]   Drug Name Sprycel (dasatinib) Sprycel (dasatinib) Sprycel (dasatinib) Sprycel (dasatinib) Sprycel (dasatinib) Sprycel (dasatinib) Sprycel (dasatinib)   Dose 80 mg 80 mg 80 mg 80 mg 80 mg 80 mg 80 mg   Current Schedule Daily Daily Daily Daily Daily Daily Daily   Cycle Details Continuous Continuous Continuous Continuous Continuous Continuous Continuous   Adherence Assessment      Adherent    Adverse Effects      No AE identified during assessment Increased AST/ALT/T BILI;Thrombocytopenia   Increased AST/ALT/T BILI       Grade 1   Pharmacist Intervention(increased ast/alt/t bili)       No   Thrombocytopenia       Grade 2   Pharmacist Intervention(thrombocytopenia)       Yes   Intervention(s)       Increased lab monitoring       Labs:  _  Result Component Current Result Ref Range   Sodium 141 (4/21/2025) 135 - 145 mmol/L     _  Result Component Current Result Ref Range   Potassium 4.4 (4/21/2025) 3.4 - 5.3 mmol/L     _  Result Component Current Result Ref Range   Calcium 9.4 (4/21/2025) 8.8 - 10.4 mg/dL     _  Result Component Current Result Ref Range   Magnesium 2.1 (4/21/2025) 1.7 - 2.3 mg/dL     _  Result Component Current  Result Ref Range   Phosphorus 3.8 (4/21/2025) 2.5 - 4.5 mg/dL     _  Result Component Current Result Ref Range   Albumin 4.4 (4/21/2025) 3.5 - 5.2 g/dL     _  Result Component Current Result Ref Range   Urea Nitrogen 22.6 (H) (4/21/2025) 6.0 - 20.0 mg/dL     _  Result Component Current Result Ref Range   Creatinine 1.38 (H) (4/21/2025) 0.67 - 1.17 mg/dL     _  Result Component Current Result Ref Range   AST 22 (4/21/2025) 0 - 45 U/L     _  Result Component Current Result Ref Range   ALT 24 (4/21/2025) 0 - 70 U/L     _  Result Component Current Result Ref Range   Bilirubin Total 1.4 (H) (4/21/2025) <=1.2 mg/dL     _  Result Component Current Result Ref Range   WBC Count 5.8 (4/21/2025) 4.0 - 11.0 10e3/uL     _  Result Component Current Result Ref Range   Hemoglobin 13.4 (4/21/2025) 13.3 - 17.7 g/dL     _  Result Component Current Result Ref Range   Platelet Count 57 (L) (4/21/2025) 150 - 450 10e3/uL     _  Result Component Current Result Ref Range   Absolute Neutrophils 4.1 (4/21/2025) 1.6 - 8.3 10e3/uL     _  Result Component Current Result Ref Range   Absolute Neutrophils 4.2 (3/24/2025) 1.6 - 8.3 10e3/uL        Assessment & Plan:  Results show grade 1 bilirubin increase (stable from last lab appointment) and grade 2 thrombocytopenia. No changes indicated at this time but I requested labs in 2 weeks rather than 1 month to monitor his platelet count.    Questions answered to patient's satisfaction.    Follow-Up:  5/5 labs appointment    Lux Abad PharmD  Oral Chemotherapy Monitoring Program  [facility]  [phone]

## 2025-05-05 ENCOUNTER — LAB (OUTPATIENT)
Dept: ONCOLOGY | Facility: CLINIC | Age: 40
End: 2025-05-05
Attending: INTERNAL MEDICINE
Payer: COMMERCIAL

## 2025-05-05 DIAGNOSIS — C92.10 CML (CHRONIC MYELOCYTIC LEUKEMIA) (H): ICD-10-CM

## 2025-05-05 LAB
ALBUMIN SERPL BCG-MCNC: 4.7 G/DL (ref 3.5–5.2)
ALP SERPL-CCNC: 95 U/L (ref 40–150)
ALT SERPL W P-5'-P-CCNC: 28 U/L (ref 0–70)
ANION GAP SERPL CALCULATED.3IONS-SCNC: 12 MMOL/L (ref 7–15)
AST SERPL W P-5'-P-CCNC: 26 U/L (ref 0–45)
BASOPHILS # BLD AUTO: 0 10E3/UL (ref 0–0.2)
BASOPHILS NFR BLD AUTO: 0 %
BILIRUB SERPL-MCNC: 1.6 MG/DL
BUN SERPL-MCNC: 21.9 MG/DL (ref 6–20)
CALCIUM SERPL-MCNC: 9.7 MG/DL (ref 8.8–10.4)
CHLORIDE SERPL-SCNC: 101 MMOL/L (ref 98–107)
CREAT SERPL-MCNC: 1.49 MG/DL (ref 0.67–1.17)
EGFRCR SERPLBLD CKD-EPI 2021: 62 ML/MIN/1.73M2
ELLIPTOCYTES BLD QL SMEAR: SLIGHT
EOSINOPHIL # BLD AUTO: 0.1 10E3/UL (ref 0–0.7)
EOSINOPHIL NFR BLD AUTO: 2 %
ERYTHROCYTE [DISTWIDTH] IN BLOOD BY AUTOMATED COUNT: 16.8 % (ref 10–15)
GLUCOSE SERPL-MCNC: 120 MG/DL (ref 70–99)
HCO3 SERPL-SCNC: 24 MMOL/L (ref 22–29)
HCT VFR BLD AUTO: 41.8 % (ref 40–53)
HGB BLD-MCNC: 14.3 G/DL (ref 13.3–17.7)
IMM GRANULOCYTES # BLD: 0.1 10E3/UL
IMM GRANULOCYTES NFR BLD: 1 %
LYMPHOCYTES # BLD AUTO: 2.6 10E3/UL (ref 0.8–5.3)
LYMPHOCYTES NFR BLD AUTO: 35 %
MAGNESIUM SERPL-MCNC: 2 MG/DL (ref 1.7–2.3)
MCH RBC QN AUTO: 32.9 PG (ref 26.5–33)
MCHC RBC AUTO-ENTMCNC: 34.2 G/DL (ref 31.5–36.5)
MCV RBC AUTO: 96 FL (ref 78–100)
MONOCYTES # BLD AUTO: 0.7 10E3/UL (ref 0–1.3)
MONOCYTES NFR BLD AUTO: 9 %
NEUTROPHILS # BLD AUTO: 3.9 10E3/UL (ref 1.6–8.3)
NEUTROPHILS NFR BLD AUTO: 53 %
NRBC # BLD AUTO: 0 10E3/UL
NRBC BLD AUTO-RTO: 0 /100
PHOSPHATE SERPL-MCNC: 3.8 MG/DL (ref 2.5–4.5)
PLAT MORPH BLD: ABNORMAL
PLATELET # BLD AUTO: 61 10E3/UL (ref 150–450)
POLYCHROMASIA BLD QL SMEAR: SLIGHT
POTASSIUM SERPL-SCNC: 4.3 MMOL/L (ref 3.4–5.3)
PROT SERPL-MCNC: 8.3 G/DL (ref 6.4–8.3)
RBC # BLD AUTO: 4.35 10E6/UL (ref 4.4–5.9)
RBC MORPH BLD: ABNORMAL
SODIUM SERPL-SCNC: 137 MMOL/L (ref 135–145)
WBC # BLD AUTO: 7.4 10E3/UL (ref 4–11)

## 2025-05-05 PROCEDURE — 84100 ASSAY OF PHOSPHORUS: CPT | Performed by: INTERNAL MEDICINE

## 2025-05-05 PROCEDURE — 85004 AUTOMATED DIFF WBC COUNT: CPT | Performed by: INTERNAL MEDICINE

## 2025-05-05 PROCEDURE — 82947 ASSAY GLUCOSE BLOOD QUANT: CPT | Performed by: INTERNAL MEDICINE

## 2025-05-05 PROCEDURE — 36415 COLL VENOUS BLD VENIPUNCTURE: CPT

## 2025-05-05 PROCEDURE — 83735 ASSAY OF MAGNESIUM: CPT | Performed by: INTERNAL MEDICINE

## 2025-05-05 NOTE — PROGRESS NOTES
Medical Assistant Note:  Anil Montoya presents today for blood draw.    Patient seen by provider today: No.   present during visit today: Not Applicable.    Concerns: No Concerns.    Procedure:  Lab draw site: Left antecub, Needle type: butterfly, Gauge: 23 g.    Post Assessment:  Labs drawn without difficulty: No.    Discharge Plan:  Departure Mode: Ambulatory.    Face to Face Time: 10.    Velia Barahona [facility]

## 2025-05-06 ENCOUNTER — DOCUMENTATION ONLY (OUTPATIENT)
Dept: ONCOLOGY | Facility: CLINIC | Age: 40
End: 2025-05-06
Payer: COMMERCIAL

## 2025-05-06 NOTE — PROGRESS NOTES
Oral Chemotherapy Monitoring Program  Lab Follow Up    Reviewed CMP/CBC lab results from 5/5/25.    Assessment & Plan:  CMP stable, continue to monitor creatinine. CBC now shows grade 2 bilirubin increase, which was very minor compared to 4/22 labs, and continued grade 2 thrombocytopenia which very slightly improved. Given that he has had lower platelet readings previously and remained on dasatinib per oncologist, and that the trend appears to be improving, will defer from changes or extra labs at this time. Ideally would like to see platelets >75K. Continue to watch trend to determine if a second dose reduction may be necessary at next lab visit in a month.    Will send patient a [employer] message to relay results.    Follow-Up:  Labs on 6/16 @ 0845    [facility]  Pharmacy Intern  Oral Chemotherapy Monitoring Program  [facility]  [phone] (HEME-3)        3/3/2025    11:00 AM 3/20/2025    10:00 AM 3/25/2025    11:00 AM 3/28/2025     3:00 PM 4/4/2025     2:00 PM 4/22/2025     9:00 AM 5/6/2025    10:00 AM   ORAL CHEMOTHERAPY   Assessment Type Lab Monitoring Refill;Chart Review Lab Monitoring Left Voicemail Monthly Follow up Lab Monitoring Lab Monitoring   Diagnosis Code Chronic Myeloid Leukemia (CML) Chronic Myeloid Leukemia (CML) Chronic Myeloid Leukemia (CML) Chronic Myeloid Leukemia (CML) Chronic Myeloid Leukemia (CML) Chronic Myeloid Leukemia (CML) Chronic Myeloid Leukemia (CML)   Providers Dr. Telly Varner   Clinic Name/Location [facility]   Drug Name Sprycel (dasatinib) Sprycel (dasatinib) Sprycel (dasatinib) Sprycel (dasatinib) Sprycel (dasatinib) Sprycel (dasatinib) Sprycel (dasatinib)   Dose 80 mg 80 mg 80 mg 80 mg 80 mg 80 mg 80 mg   Current Schedule Daily Daily Daily Daily Daily Daily Daily   Cycle Details Continuous Continuous Continuous Continuous Continuous Continuous  Continuous   Adherence Assessment     Adherent     Adverse Effects     No AE identified during assessment Increased AST/ALT/T BILI;Thrombocytopenia Thrombocytopenia;Increased AST/ALT/T BILI   Increased AST/ALT/T BILI      Grade 1 Grade 2   Pharmacist Intervention(increased ast/alt/t bili)      No No   Thrombocytopenia      Grade 2 Grade 2   Pharmacist Intervention(thrombocytopenia)      Yes No   Intervention(s)      Increased lab monitoring        Labs:  _  Result Component Current Result Ref Range   Sodium 137 (5/5/2025) 135 - 145 mmol/L     _  Result Component Current Result Ref Range   Potassium 4.3 (5/5/2025) 3.4 - 5.3 mmol/L     _  Result Component Current Result Ref Range   Calcium 9.7 (5/5/2025) 8.8 - 10.4 mg/dL     _  Result Component Current Result Ref Range   Magnesium 2.0 (5/5/2025) 1.7 - 2.3 mg/dL     _  Result Component Current Result Ref Range   Phosphorus 3.8 (5/5/2025) 2.5 - 4.5 mg/dL     _  Result Component Current Result Ref Range   Albumin 4.7 (5/5/2025) 3.5 - 5.2 g/dL     _  Result Component Current Result Ref Range   Urea Nitrogen 21.9 (H) (5/5/2025) 6.0 - 20.0 mg/dL     _  Result Component Current Result Ref Range   Creatinine 1.49 (H) (5/5/2025) 0.67 - 1.17 mg/dL     _  Result Component Current Result Ref Range   AST 26 (5/5/2025) 0 - 45 U/L     _  Result Component Current Result Ref Range   ALT 28 (5/5/2025) 0 - 70 U/L     _  Result Component Current Result Ref Range   Bilirubin Total 1.6 (H) (5/5/2025) <=1.2 mg/dL     _  Result Component Current Result Ref Range   WBC Count 7.4 (5/5/2025) 4.0 - 11.0 10e3/uL     _  Result Component Current Result Ref Range   Hemoglobin 14.3 (5/5/2025) 13.3 - 17.7 g/dL     _  Result Component Current Result Ref Range   Platelet Count 61 (L) (5/5/2025) 150 - 450 10e3/uL     _  Result Component Current Result Ref Range   Absolute Neutrophils 3.9 (5/5/2025) 1.6 - 8.3 10e3/uL

## 2025-05-29 ENCOUNTER — ANCILLARY PROCEDURE (OUTPATIENT)
Dept: CARDIOLOGY | Facility: CLINIC | Age: 40
End: 2025-05-29
Attending: INTERNAL MEDICINE
Payer: COMMERCIAL

## 2025-05-29 DIAGNOSIS — I46.9 CARDIAC ARREST (H): ICD-10-CM

## 2025-05-29 DIAGNOSIS — Z95.810 ICD (IMPLANTABLE CARDIOVERTER-DEFIBRILLATOR) IN PLACE: ICD-10-CM

## 2025-05-29 DIAGNOSIS — I44.2 ATRIOVENTRICULAR BLOCK, COMPLETE (H): ICD-10-CM

## 2025-05-29 PROCEDURE — 93296 REM INTERROG EVL PM/IDS: CPT | Performed by: INTERNAL MEDICINE

## 2025-05-29 PROCEDURE — 93295 DEV INTERROG REMOTE 1/2/MLT: CPT | Performed by: INTERNAL MEDICINE

## 2025-06-02 ENCOUNTER — OFFICE VISIT (OUTPATIENT)
Dept: URGENT CARE | Facility: URGENT CARE | Age: 40
End: 2025-06-02
Payer: COMMERCIAL

## 2025-06-02 VITALS
BODY MASS INDEX: 29.35 KG/M2 | RESPIRATION RATE: 14 BRPM | DIASTOLIC BLOOD PRESSURE: 66 MMHG | WEIGHT: 205 LBS | OXYGEN SATURATION: 98 % | HEART RATE: 88 BPM | HEIGHT: 70 IN | TEMPERATURE: 98.4 F | SYSTOLIC BLOOD PRESSURE: 120 MMHG

## 2025-06-02 DIAGNOSIS — R05.3 PERSISTENT DRY COUGH: Primary | ICD-10-CM

## 2025-06-02 DIAGNOSIS — E11.22 TYPE 2 DIABETES MELLITUS WITH STAGE 2 CHRONIC KIDNEY DISEASE, UNSPECIFIED WHETHER LONG TERM INSULIN USE (H): ICD-10-CM

## 2025-06-02 DIAGNOSIS — J20.9 ACUTE BRONCHITIS WITH COEXISTING CONDITION REQUIRING PROPHYLACTIC TREATMENT: ICD-10-CM

## 2025-06-02 DIAGNOSIS — C92.10 CML (CHRONIC MYELOCYTIC LEUKEMIA) (H): ICD-10-CM

## 2025-06-02 DIAGNOSIS — N18.2 TYPE 2 DIABETES MELLITUS WITH STAGE 2 CHRONIC KIDNEY DISEASE, UNSPECIFIED WHETHER LONG TERM INSULIN USE (H): ICD-10-CM

## 2025-06-02 PROCEDURE — 99213 OFFICE O/P EST LOW 20 MIN: CPT | Performed by: FAMILY MEDICINE

## 2025-06-02 PROCEDURE — 3074F SYST BP LT 130 MM HG: CPT | Performed by: FAMILY MEDICINE

## 2025-06-02 PROCEDURE — 87798 DETECT AGENT NOS DNA AMP: CPT | Performed by: FAMILY MEDICINE

## 2025-06-02 PROCEDURE — 3078F DIAST BP <80 MM HG: CPT | Performed by: FAMILY MEDICINE

## 2025-06-02 RX ORDER — DOXYCYCLINE 100 MG/1
100 CAPSULE ORAL 2 TIMES DAILY
Qty: 14 CAPSULE | Refills: 0 | Status: SHIPPED | OUTPATIENT
Start: 2025-06-02 | End: 2025-06-09

## 2025-06-02 NOTE — PROGRESS NOTES
Urgent Care Clinic Visit    Chief Complaint   Patient presents with    Cough      Dry Cough, check left  ear x 10 days  -- taking nothing   -- HIS sister has whooping coughj               6/2/2025     2:46 PM   Additional Questions   Roomed by Domenic   Accompanied by self

## 2025-06-02 NOTE — PROGRESS NOTES
Chief Complaint   Patient presents with    Cough      Dry Cough, check left  ear x 10 days  -- taking nothing   -- HIS sister has whooping coughj     Anil was seen today for cough.    Diagnoses and all orders for this visit:    Persistent dry cough  -     B. pertussis/parapertussis PCR-NP; Future  -     B. pertussis/parapertussis PCR-NP    Acute bronchitis with coexisting condition requiring prophylactic treatment  -     doxycycline hyclate (VIBRAMYCIN) 100 MG capsule; Take 1 capsule (100 mg) by mouth 2 times daily for 7 days.    Type 2 diabetes mellitus with stage 2 chronic kidney disease, unspecified whether long term insulin use (H)    CML (chronic myelocytic leukemia) (H)      D/d  Asthma exacerbation, Bronchitis-viral, Pneumonia, Viral pharyngitis, Viral syndrome, and Viral upper respiratory illness    PLAN:  Symptomatic therapy suggested: push fluids, rest, use vaporizer or mist needed , use acetaminophen, ibuprofen as needed, and Return office visit if symptoms persist or worsen. Call or return to clinic prn if these symptoms worsen or fail to improve as anticipated.  Because of the persistent symptoms and with the other risk factors would consider treating with the antibiotic.  Patient was advised to follow-up with any new symptoms such as worsening fever, worsening cough, shortness of breath  Patient was stable at the time of discharge   Reviewed result with patients in details was educated about the diagnosis and also discussed in details about treatment plans, patient /parent understood and agreed with the plan    -Discussed return precautions and/or reasons to go to ED, including but not limited to: fevers/chills,       SUBJECTIVE:   Anil Montoya is a 39 year old male with CML, CKD, obesity, type 2 DM  who complains of nasal blockage, dry cough, productive cough, and chest congestion for 10 days. He denies a history of chills, myalgias, wheezing, and shortness of breath and admits to  a history of asthma but did not need rescue inhalor  Patient does not smoke cigarettes.  Also his sister has similar symptoms and has been diagnosed with whooping cough      OBJECTIVE:  He appears well, vital signs are as noted by the nurse. Ears normal.  Throat and pharynx normal.  Neck supple. No adenopathy in the neck. Nose is congested. Sinuses non tender. The chest is clear, without wheezes or rales.    Caryl Frederick MD

## 2025-06-02 NOTE — PATIENT INSTRUCTIONS
You are seen for upper respiratory infection Continue tylenol/ibuprofen for the fever and pain ,Continue pushing more fluids Symptomatic therapy suggested: push fluids, rest, use vaporizer or mist needed , use acetaminophen, ibuprofen as needed, and Return office visit if symptoms persist or worsen.   if there is worsening cough, sob , chest heaviness or fever above 102, confusion , fainting episode do follow up   Complete the course of the antibiotic     Caryl Frederick MD

## 2025-06-03 LAB
B PARAPERT DNA SPEC QL NAA+PROBE: NOT DETECTED
B PERT DNA SPEC QL NAA+PROBE: NOT DETECTED
MDC_IDC_EPISODE_DTM: NORMAL
MDC_IDC_EPISODE_DTM: NORMAL
MDC_IDC_EPISODE_ID: NORMAL
MDC_IDC_EPISODE_ID: NORMAL
MDC_IDC_EPISODE_TYPE: NORMAL
MDC_IDC_EPISODE_TYPE: NORMAL
MDC_IDC_LEAD_CONNECTION_STATUS: NORMAL
MDC_IDC_LEAD_CONNECTION_STATUS: NORMAL
MDC_IDC_LEAD_IMPLANT_DT: NORMAL
MDC_IDC_LEAD_IMPLANT_DT: NORMAL
MDC_IDC_LEAD_LOCATION: NORMAL
MDC_IDC_LEAD_LOCATION: NORMAL
MDC_IDC_LEAD_LOCATION_DETAIL_1: NORMAL
MDC_IDC_LEAD_LOCATION_DETAIL_1: NORMAL
MDC_IDC_LEAD_MFG: NORMAL
MDC_IDC_LEAD_MFG: NORMAL
MDC_IDC_LEAD_MODEL: NORMAL
MDC_IDC_LEAD_MODEL: NORMAL
MDC_IDC_LEAD_POLARITY_TYPE: NORMAL
MDC_IDC_LEAD_POLARITY_TYPE: NORMAL
MDC_IDC_LEAD_SERIAL: NORMAL
MDC_IDC_LEAD_SERIAL: NORMAL
MDC_IDC_MSMT_BATTERY_DTM: NORMAL
MDC_IDC_MSMT_BATTERY_REMAINING_LONGEVITY: 90 MO
MDC_IDC_MSMT_BATTERY_REMAINING_PERCENTAGE: 81 %
MDC_IDC_MSMT_BATTERY_STATUS: NORMAL
MDC_IDC_MSMT_CAP_CHARGE_DTM: NORMAL
MDC_IDC_MSMT_CAP_CHARGE_DTM: NORMAL
MDC_IDC_MSMT_CAP_CHARGE_ENERGY: 41 J
MDC_IDC_MSMT_CAP_CHARGE_TIME: 10.1 S
MDC_IDC_MSMT_CAP_CHARGE_TIME: 10.3 S
MDC_IDC_MSMT_CAP_CHARGE_TYPE: NORMAL
MDC_IDC_MSMT_CAP_CHARGE_TYPE: NORMAL
MDC_IDC_MSMT_LEADCHNL_RA_IMPEDANCE_VALUE: 656 OHM
MDC_IDC_MSMT_LEADCHNL_RV_IMPEDANCE_VALUE: 445 OHM
MDC_IDC_PG_IMPLANT_DTM: NORMAL
MDC_IDC_PG_MFG: NORMAL
MDC_IDC_PG_MODEL: NORMAL
MDC_IDC_PG_SERIAL: NORMAL
MDC_IDC_PG_TYPE: NORMAL
MDC_IDC_SESS_CLINIC_NAME: NORMAL
MDC_IDC_SESS_DTM: NORMAL
MDC_IDC_SESS_TYPE: NORMAL
MDC_IDC_SET_BRADY_AT_MODE_SWITCH_MODE: NORMAL
MDC_IDC_SET_BRADY_AT_MODE_SWITCH_RATE: 170 {BEATS}/MIN
MDC_IDC_SET_BRADY_LOWRATE: 60 {BEATS}/MIN
MDC_IDC_SET_BRADY_MAX_SENSOR_RATE: 140 {BEATS}/MIN
MDC_IDC_SET_BRADY_MAX_TRACKING_RATE: 140 {BEATS}/MIN
MDC_IDC_SET_BRADY_MODE: NORMAL
MDC_IDC_SET_BRADY_PAV_DELAY_HIGH: 100 MS
MDC_IDC_SET_BRADY_PAV_DELAY_LOW: 300 MS
MDC_IDC_SET_BRADY_SAV_DELAY_HIGH: 100 MS
MDC_IDC_SET_BRADY_SAV_DELAY_LOW: 300 MS
MDC_IDC_SET_LEADCHNL_RA_PACING_AMPLITUDE: 3.5 V
MDC_IDC_SET_LEADCHNL_RA_PACING_CAPTURE_MODE: NORMAL
MDC_IDC_SET_LEADCHNL_RA_PACING_POLARITY: NORMAL
MDC_IDC_SET_LEADCHNL_RA_PACING_PULSEWIDTH: 0.4 MS
MDC_IDC_SET_LEADCHNL_RA_SENSING_ADAPTATION_MODE: NORMAL
MDC_IDC_SET_LEADCHNL_RA_SENSING_POLARITY: NORMAL
MDC_IDC_SET_LEADCHNL_RA_SENSING_SENSITIVITY: 0.25 MV
MDC_IDC_SET_LEADCHNL_RV_PACING_AMPLITUDE: 3.5 V
MDC_IDC_SET_LEADCHNL_RV_PACING_CAPTURE_MODE: NORMAL
MDC_IDC_SET_LEADCHNL_RV_PACING_POLARITY: NORMAL
MDC_IDC_SET_LEADCHNL_RV_PACING_PULSEWIDTH: 0.4 MS
MDC_IDC_SET_LEADCHNL_RV_SENSING_ADAPTATION_MODE: NORMAL
MDC_IDC_SET_LEADCHNL_RV_SENSING_POLARITY: NORMAL
MDC_IDC_SET_LEADCHNL_RV_SENSING_SENSITIVITY: 0.6 MV
MDC_IDC_SET_ZONE_DETECTION_INTERVAL: 250 MS
MDC_IDC_SET_ZONE_DETECTION_INTERVAL: 286 MS
MDC_IDC_SET_ZONE_DETECTION_INTERVAL: 353 MS
MDC_IDC_SET_ZONE_STATUS: NORMAL
MDC_IDC_SET_ZONE_TYPE: NORMAL
MDC_IDC_SET_ZONE_VENDOR_TYPE: NORMAL
MDC_IDC_STAT_AT_BURDEN_PERCENT: 0 %
MDC_IDC_STAT_AT_DTM_END: NORMAL
MDC_IDC_STAT_AT_DTM_START: NORMAL
MDC_IDC_STAT_BRADY_DTM_END: NORMAL
MDC_IDC_STAT_BRADY_DTM_START: NORMAL
MDC_IDC_STAT_BRADY_RA_PERCENT_PACED: 6 %
MDC_IDC_STAT_BRADY_RV_PERCENT_PACED: 98 %
MDC_IDC_STAT_EPISODE_RECENT_COUNT: 0
MDC_IDC_STAT_EPISODE_RECENT_COUNT: 3
MDC_IDC_STAT_EPISODE_RECENT_COUNT_DTM_END: NORMAL
MDC_IDC_STAT_EPISODE_RECENT_COUNT_DTM_START: NORMAL
MDC_IDC_STAT_EPISODE_TYPE: NORMAL
MDC_IDC_STAT_EPISODE_VENDOR_TYPE: NORMAL
MDC_IDC_STAT_TACHYTHERAPY_ATP_DELIVERED_RECENT: 0
MDC_IDC_STAT_TACHYTHERAPY_ATP_DELIVERED_TOTAL: 1
MDC_IDC_STAT_TACHYTHERAPY_RECENT_DTM_END: NORMAL
MDC_IDC_STAT_TACHYTHERAPY_RECENT_DTM_START: NORMAL
MDC_IDC_STAT_TACHYTHERAPY_SHOCKS_ABORTED_RECENT: 0
MDC_IDC_STAT_TACHYTHERAPY_SHOCKS_ABORTED_TOTAL: 0
MDC_IDC_STAT_TACHYTHERAPY_SHOCKS_DELIVERED_RECENT: 1
MDC_IDC_STAT_TACHYTHERAPY_SHOCKS_DELIVERED_TOTAL: 2
MDC_IDC_STAT_TACHYTHERAPY_TOTAL_DTM_END: NORMAL
MDC_IDC_STAT_TACHYTHERAPY_TOTAL_DTM_START: NORMAL

## 2025-06-10 ENCOUNTER — APPOINTMENT (OUTPATIENT)
Dept: GENERAL RADIOLOGY | Facility: CLINIC | Age: 40
End: 2025-06-10
Attending: EMERGENCY MEDICINE
Payer: COMMERCIAL

## 2025-06-10 ENCOUNTER — HOSPITAL ENCOUNTER (EMERGENCY)
Facility: CLINIC | Age: 40
Discharge: ANOTHER HOSPITAL | End: 2025-06-11
Attending: EMERGENCY MEDICINE | Admitting: EMERGENCY MEDICINE
Payer: COMMERCIAL

## 2025-06-10 DIAGNOSIS — D64.9 ANEMIA, UNSPECIFIED TYPE: ICD-10-CM

## 2025-06-10 DIAGNOSIS — J18.9 PNEUMONIA OF RIGHT MIDDLE LOBE DUE TO INFECTIOUS ORGANISM: Primary | ICD-10-CM

## 2025-06-10 DIAGNOSIS — D69.6 THROMBOCYTOPENIA: ICD-10-CM

## 2025-06-10 DIAGNOSIS — N17.9 ACUTE KIDNEY INJURY SUPERIMPOSED ON CKD: ICD-10-CM

## 2025-06-10 DIAGNOSIS — N18.9 ACUTE KIDNEY INJURY SUPERIMPOSED ON CKD: ICD-10-CM

## 2025-06-10 DIAGNOSIS — I82.441: ICD-10-CM

## 2025-06-10 DIAGNOSIS — C92.10 BLAST CRISIS PHASE OF CHRONIC MYELOID LEUKEMIA (H): ICD-10-CM

## 2025-06-10 DIAGNOSIS — D72.829 LEUKOCYTOSIS, UNSPECIFIED TYPE: ICD-10-CM

## 2025-06-10 LAB
ALBUMIN SERPL BCG-MCNC: 4.2 G/DL (ref 3.5–5.2)
ALP SERPL-CCNC: 102 U/L (ref 40–150)
ALT SERPL W P-5'-P-CCNC: 17 U/L (ref 0–70)
ANION GAP SERPL CALCULATED.3IONS-SCNC: 12 MMOL/L (ref 7–15)
APTT PPP: 36 SECONDS (ref 22–38)
AST SERPL W P-5'-P-CCNC: 38 U/L (ref 0–45)
BILIRUB SERPL-MCNC: 0.6 MG/DL
BUN SERPL-MCNC: 46.7 MG/DL (ref 6–20)
CALCIUM SERPL-MCNC: 8.6 MG/DL (ref 8.8–10.4)
CHLORIDE SERPL-SCNC: 110 MMOL/L (ref 98–107)
CREAT SERPL-MCNC: 2.47 MG/DL (ref 0.67–1.17)
D DIMER PPP FEU-MCNC: 5.51 UG/ML FEU (ref 0–0.5)
EGFRCR SERPLBLD CKD-EPI 2021: 34 ML/MIN/1.73M2
FIBRINOGEN PPP-MCNC: 521 MG/DL (ref 170–510)
FLUAV RNA SPEC QL NAA+PROBE: NEGATIVE
FLUBV RNA RESP QL NAA+PROBE: NEGATIVE
GLUCOSE SERPL-MCNC: 117 MG/DL (ref 70–99)
HCO3 BLDV-SCNC: 16 MMOL/L (ref 21–28)
HCO3 SERPL-SCNC: 18 MMOL/L (ref 22–29)
INR PPP: 1.48 (ref 0.85–1.15)
LACTATE BLD-SCNC: 1.2 MMOL/L (ref 0.7–2)
LIPASE SERPL-CCNC: 70 U/L (ref 13–60)
NT-PROBNP SERPL-MCNC: 4922 PG/ML (ref 0–93)
PCO2 BLDV: 31 MM HG (ref 40–50)
PH BLDV: 7.33 [PH] (ref 7.32–7.43)
PO2 BLDV: 21 MM HG (ref 25–47)
POTASSIUM SERPL-SCNC: 5 MMOL/L (ref 3.4–5.3)
PROT SERPL-MCNC: 7.6 G/DL (ref 6.4–8.3)
PROTHROMBIN TIME: 17.9 SECONDS (ref 11.8–14.8)
RSV RNA SPEC NAA+PROBE: NEGATIVE
SAO2 % BLDV: 32 % (ref 70–75)
SARS-COV-2 RNA RESP QL NAA+PROBE: NEGATIVE
SODIUM SERPL-SCNC: 140 MMOL/L (ref 135–145)
TROPONIN T SERPL HS-MCNC: 31 NG/L
TROPONIN T SERPL HS-MCNC: 31 NG/L

## 2025-06-10 PROCEDURE — 71046 X-RAY EXAM CHEST 2 VIEWS: CPT

## 2025-06-10 PROCEDURE — 85379 FIBRIN DEGRADATION QUANT: CPT | Performed by: EMERGENCY MEDICINE

## 2025-06-10 PROCEDURE — 82803 BLOOD GASES ANY COMBINATION: CPT

## 2025-06-10 PROCEDURE — 93005 ELECTROCARDIOGRAM TRACING: CPT

## 2025-06-10 PROCEDURE — 85384 FIBRINOGEN ACTIVITY: CPT | Performed by: EMERGENCY MEDICINE

## 2025-06-10 PROCEDURE — 85007 BL SMEAR W/DIFF WBC COUNT: CPT | Performed by: EMERGENCY MEDICINE

## 2025-06-10 PROCEDURE — 99291 CRITICAL CARE FIRST HOUR: CPT | Mod: 25

## 2025-06-10 PROCEDURE — 84484 ASSAY OF TROPONIN QUANT: CPT | Performed by: EMERGENCY MEDICINE

## 2025-06-10 PROCEDURE — 36415 COLL VENOUS BLD VENIPUNCTURE: CPT | Performed by: EMERGENCY MEDICINE

## 2025-06-10 PROCEDURE — 85730 THROMBOPLASTIN TIME PARTIAL: CPT | Performed by: EMERGENCY MEDICINE

## 2025-06-10 PROCEDURE — 85610 PROTHROMBIN TIME: CPT | Performed by: EMERGENCY MEDICINE

## 2025-06-10 PROCEDURE — 83880 ASSAY OF NATRIURETIC PEPTIDE: CPT | Performed by: EMERGENCY MEDICINE

## 2025-06-10 PROCEDURE — 96365 THER/PROPH/DIAG IV INF INIT: CPT | Mod: 59

## 2025-06-10 PROCEDURE — 83690 ASSAY OF LIPASE: CPT | Performed by: EMERGENCY MEDICINE

## 2025-06-10 PROCEDURE — 87040 BLOOD CULTURE FOR BACTERIA: CPT | Performed by: EMERGENCY MEDICINE

## 2025-06-10 PROCEDURE — 87637 SARSCOV2&INF A&B&RSV AMP PRB: CPT | Performed by: EMERGENCY MEDICINE

## 2025-06-10 PROCEDURE — 85014 HEMATOCRIT: CPT | Performed by: EMERGENCY MEDICINE

## 2025-06-10 PROCEDURE — 250N000011 HC RX IP 250 OP 636: Performed by: EMERGENCY MEDICINE

## 2025-06-10 PROCEDURE — 99292 CRITICAL CARE ADDL 30 MIN: CPT

## 2025-06-10 PROCEDURE — 84155 ASSAY OF PROTEIN SERUM: CPT | Performed by: EMERGENCY MEDICINE

## 2025-06-10 RX ORDER — PIPERACILLIN SODIUM, TAZOBACTAM SODIUM 4; .5 G/20ML; G/20ML
4.5 INJECTION, POWDER, LYOPHILIZED, FOR SOLUTION INTRAVENOUS ONCE
Status: COMPLETED | OUTPATIENT
Start: 2025-06-10 | End: 2025-06-11

## 2025-06-10 RX ADMIN — PIPERACILLIN AND TAZOBACTAM 4.5 G: 4; .5 INJECTION, POWDER, FOR SOLUTION INTRAVENOUS at 22:31

## 2025-06-10 ASSESSMENT — ACTIVITIES OF DAILY LIVING (ADL)
ADLS_ACUITY_SCORE: 54

## 2025-06-10 NOTE — ED PROVIDER NOTES
"  Emergency Department Note      History of Present Illness     Chief Complaint   Cough      HPI   Anil Montoya is a 39 year old male with a history of diabetes mellitus II, hypothyroidism, and chronic myelocytic leukemia presents to the ED for an evaluation of a cough. The patient reports that 2 weeks ago he developed a cough and was initially treated for whooping cough/bronchitis with doxycycline which he states he finished yesterday. Since then and worsening today, he reports his cough returning and some chest pain. He states that he notes that he is more shortness of breath and that his chest pain worsens upon exertion. He describes that he has a \"bloating sensation\" when coughing or walking that begins in the center of his chest and radiates to his back. He notes that he always seems to have rhinorrhea and congestion and that nothing is productive with his cough at this time. He denies nausea, vomiting, abdominal pain, urinary symptoms, or black or bloody stool. The patient notes that he currently is on medication for leukemia since December 2024 and because of that he is unsure if he has had a fever at any point. He also reports history of cardiac arrest and pacemaker/defibrillator placement and notes that at that time the discomfort he had had was more present in his lower back before migrating to his heart.     Independent Historian   None    Review of External Notes   Reviewed office visit note from 6/2/25.    Past Medical History     Medical History and Problem List   Chronic myelocytic leukemia  Hypothyroidism  Diabetes mellitus II  MSSA bacteremia    Medications   Jardiance  Bupropion  Levothyroxine  Ozempic  Metformin  Losartan  Lasix  Aspirin  Sprycel  Ferrous sulfide    Surgical History   Tricuspid valve replacement  Lumbar puncture  Coronary angiogram  Cyst incision and drainage    Physical Exam     Patient Vitals for the past 24 hrs:   BP Temp Temp src Pulse Resp SpO2   06/11/25 " 0013 -- -- -- -- -- 95 %   06/11/25 0011 -- -- -- -- -- 97 %   06/11/25 0009 -- -- -- -- -- 94 %   06/10/25 2354 -- -- -- -- -- 96 %   06/10/25 2339 -- -- -- -- -- 96 %   06/10/25 2324 -- -- -- -- -- 99 %   06/10/25 2239 -- -- -- -- -- 94 %   06/10/25 2230 117/68 -- -- -- -- 96 %   06/10/25 1804 134/76 98.6  F (37  C) Temporal 86 20 99 %     Physical Exam  Constitutional:       General: Not in acute distress.     Appearance: Normal appearance.   HENT:      Head: Normocephalic and atraumatic.   Eyes:      Extraocular Movements: Extraocular movements intact.      Conjunctiva/sclera: Conjunctivae normal.   Cardiovascular:      Rate and Rhythm: Regular rate and rhythm.  Pulmonary:      Effort: Pulmonary effort is normal. No respiratory distress.      Breath sounds: Normal breath sounds.  Abdominal:      General: Abdomen is flat. There is no distension.      Palpations: Abdomen is soft.      Tenderness: There is no abdominal tenderness.   Musculoskeletal:      Cervical back: Normal range of motion. No rigidity.      Right lower leg: No edema.      Left lower leg: No edema.   Skin:     General: Skin is warm and dry.   Neurological:      General: No focal deficit present.      Mental Status: Alert and oriented to person, place, and time.   Psychiatric:         Mood and Affect: Mood normal.         Behavior: Behavior normal.    Diagnostics     Lab Results   Labs Ordered and Resulted from Time of ED Arrival to Time of ED Departure   D DIMER QUANTITATIVE - Abnormal       Result Value    D-Dimer Quantitative 5.51 (*)    COMPREHENSIVE METABOLIC PANEL - Abnormal    Sodium 140      Potassium 5.0      Carbon Dioxide (CO2) 18 (*)     Anion Gap 12      Urea Nitrogen 46.7 (*)     Creatinine 2.47 (*)     GFR Estimate 34 (*)     Calcium 8.6 (*)     Chloride 110 (*)     Glucose 117 (*)     Alkaline Phosphatase 102      AST 38      ALT 17      Protein Total 7.6      Albumin 4.2      Bilirubin Total 0.6     LIPASE - Abnormal    Lipase 70  (*)    TROPONIN T, HIGH SENSITIVITY - Abnormal    Troponin T, High Sensitivity 31 (*)    NT-PROBNP - Abnormal    NT-proBNP 4,922 (*)    CBC WITH PLATELETS AND DIFFERENTIAL - Abnormal    WBC Count 48.3 (*)     RBC Count 2.19 (*)     Hemoglobin 7.1 (*)     Hematocrit 21.9 (*)           MCH 32.4      MCHC 32.4      RDW 16.2 (*)     Platelet Count 21 (*)    FIBRINOGEN ACTIVITY - Abnormal    Fibrinogen Activity 521 (*)    INR - Abnormal    INR 1.48 (*)     PT 17.9 (*)    TROPONIN T, HIGH SENSITIVITY - Abnormal    Troponin T, High Sensitivity 31 (*)    ISTAT GASES LACTATE VENOUS POCT - Abnormal    Lactic Acid POCT 1.2      Bicarbonate Venous POCT 16 (*)     O2 Sat, Venous POCT 32 (*)     pCO2 Venous POCT 31 (*)     pH Venous POCT 7.33      pO2 Venous POCT 21 (*)    BASIC METABOLIC PANEL - Abnormal    Sodium 140      Potassium 4.8      Chloride 112 (*)     Carbon Dioxide (CO2) 17 (*)     Anion Gap 11      Urea Nitrogen 43.9 (*)     Creatinine 2.30 (*)     GFR Estimate 37 (*)     Calcium 8.4 (*)     Glucose 137 (*)    INFLUENZA A/B, RSV AND SARS-COV2 PCR - Normal    Influenza A PCR Negative      Influenza B PCR Negative      RSV PCR Negative      SARS CoV2 PCR Negative     PARTIAL THROMBOPLASTIN TIME - Normal    aPTT 36     MANUAL DIFFERENTIAL   RBC AND PLATELET MORPHOLOGY   BLOOD CULTURE   BLOOD CULTURE   LEGIONELLA URINARY ANTIGEN AND STREPTOCOCCUS PNEUMONIAE ANTIGEN   MRSA MSSA PCR, NASAL SWAB   RESPIRATORY PANEL PCR       Imaging   Chest XR,  PA & LAT   Final Result   IMPRESSION: Heart size and pulmonary vessels are normal. Dual-chamber pacemaker present. Sternal wires present.   New airspace infiltrate present right mid lung consistent with pneumonia.      US Lower Extremity Venous Duplex Bilateral    (Results Pending)       ECG results from 06/10/25   EKG 12 lead     Value    Systolic Blood Pressure     Diastolic Blood Pressure     Ventricular Rate 85    Atrial Rate 85    UT Interval 226    QRS Duration 192         QTc 564    P Axis 60    R AXIS 258    T Axis 52    Interpretation ECG      Atrial-sensed ventricular-paced rhythm with prolonged AV conduction  Abnormal ECG  When compared with ECG of 19-Sep-2024 10:40,  Sinus rhythm is no longer with complete heart block  Vent. rate has increased by  30 bpm           Independent Interpretation   Reviewed by Beto Nicole DO. See ED course for independent interpretation.     ED Course      Medications Administered   Medications   piperacillin-tazobactam (ZOSYN) 4.5 g vial to attach to  mL bag (has no administration in time range)   piperacillin-tazobactam (ZOSYN) 4.5 g vial to attach to  mL bag (0 g Intravenous Stopped 6/11/25 0013)   sodium chloride 0.9% BOLUS 1,000 mL (1,000 mLs Intravenous $New Bag 6/11/25 0017)       Procedures   Procedures     Discussion of Management   Hematology/Oncology, Dr. Avila  Admitting Hospitalist, Dr. Caldera    ED Course   ED Course as of 06/11/25 0314   Tue Christian 10, 2025   1854 EKG 12 lead  Ventricularly paced rhythm.  Rate of 85.  .  .  QTc 564.  No acute ST elevation or depression qualifying for Sgarbossa's criteria as compared with 9/19/2024 EKG.   1856 Chest XR,  PA & LAT  New airspace infiltrate present right mid lung consistent with pneumonia.   1856 Chest XR,  PA & LAT  My independent interpretation of chest x-ray, there is patchy right sided opacity.  No mediastinal widening.  No pneumothorax.   1907 I obtained history and examined the patient as noted above.     2029 I rechecked and updated the patient.     2038 I spoke with Dr. Avila of Hematology/Oncology regarding the patient. The patient is okay to be admitted at [facility] unless there is evidence of blast crisis. Holding chemo.    2230 I rechecked and updated the patient.     2301 I spoke with Dr. Avila again regarding the patient.    2333 I spoke with Dr. Avila again and updated on preliminary lab read of 2% blasts and 38% other cells. He  believes patient may be admitted at [facility].   Wed Jun 11, 2025   0036 I spoke with Dr. Caldera of Hospitalist Services regarding the patient.  Dr. Caldera declined admission at this time due to the patient's medical complexity and believes patient should be transferred to [facility].  I will page out to [facility].   0043 I rechecked and updated the patient.     0058 I spoke with Dr. Caldera regarding the patient.    0136 I spoke with Dr. Sandhu of Hospitalist services at the [facility].  They declined acceptance of transfer as they believe that patient may be kept at [facility] for admission.  They recommend awaiting a.m. pathologist review of blood.  If no evidence of blast crisis, patient may be at [facility].  However, if patient requires transferring, hematology oncology can accept the patient primarily as primary admitting service.   0230 Re-evalauted   0301 Creatinine(!): 2.30  improving       Additional Documentation  Social Determinants of Health: None    Medical Decision Making / Diagnosis     CMS Diagnoses: The patient has signs of sepsis   Sepsis ED evaluation   The patient has signs of sepsis as evidenced by:  1. Presence of 2 SIRS criteria, suspected infection, AND  2. Organ dysfunction: Sepsis work up in progress. Will continue to monitor for signs of organ dysfunction, COTAVIO with Cr >2 or Urine output <0.5/kg/hr for more than 2 hours despite fluid resuscitation due to sepsis, and Thrombocytopenia with PLT count < 100k due to sepsis    Sepsis Care Initiation: Starting at 2012 PM on 06/10/25, until specified. Prior to this documentation, sepsis, severe sepsis, or septic shock was NOT thought to be a significant cause of illness. This order represents the first time infection was seriously considered to be affecting the patient.    Lactic Acid Results:  Recent Labs   Lab Test 06/10/25  2113 04/05/22  0645 04/01/22  1300   LACT 1.2 2.8* 1.4       3 Hour Bundle 6 Hour  Bundle (Reassessment)   Blood Cultures before IV Antibiotics: Yes  Antibiotics given: see below  Prehospital fluid volume (mL):                     Total fluids given (ED +Pre-hospital):  The patient responded to a lesser volume of IV fluids. The initial volume ordered was 1000 mL.    Repeat Lactic Acid Level: Ordered by reflex for 2 hours after initial lactic acid collection.  Vasopressors: MAP>65 after initial IVF bolus, will continue to monitor fluid status and vital signs.  Repeat perfusion exam: I attest to having performed a repeat sepsis exam and assessment of perfusion at 3:00 AM .   BMI Readings from Last 1 Encounters:   06/02/25 29.41 kg/m        Anti-infectives (From admission through now)      Start     Dose/Rate Route Frequency Ordered Stop    06/10/25 2030  piperacillin-tazobactam (ZOSYN) 4.5 g vial to attach to  mL bag         4.5 g  over 30 Minutes Intravenous ONCE 06/10/25 2025 06/11/25 0013                [facility]   None               [facility]   Anil Jarrett George Montoya is a 39-year-old male as described above presents to the emergency department for worsening shortness of breath, worsening cough, left-sided lower chest pain with radiation to mid thoracic back with exertion with history of CML, 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  Based on patient's report, roughly 1 week history of worsening cough/upper respiratory tract infection symptoms for which patient underwent doxycycline treatment without improvement in symptoms.  Today, patient started developing left lower chest wall pain with radiation to mid back.  Preliminary chest x-ray ordered from triage suggestive of potential right-sided pneumonia which certainly fits patient's picture of secondary bacterial pneumonia after viral URI.  However, location of the patient's chest pain is inconsistent with sidedness of pneumonia.  Given patient's significant prior  cardiac history and leukemia history, I do not believe it is unreasonable to expand workup for further evaluation patient's symptoms.  Additional differential diagnoses considered include, but not limited to, pericarditis/myocarditis, pulmonary embolism, aortic dissection, and ischemic heart disease.  Shortness of breath/cardiac enzyme testing chest pain workup ordered.  D-dimer for screening for PE and indirect evaluation for dissection.  Discussed care plan with patient who voiced understanding and agreement with plan.  Answered all questions.  Additional workup and orders as listed in chart.     Ultimately, work up shows significant leukocytosis of 48.3K, anemia with hemoglobin 7.1, and platelet count of 21K.  Hematology oncology consulted.  They recommend holding on further chemotherapy while hospitalized.  They believe that patient's CBC is consistent of marrow suppression secondary to sepsis from pneumonia.  Hematologist oncologist has low concern for blast crisis especially given patient  has been well-managed on oral chemotherapy.  Unfortunately, there is no pathologist overnight to offer formal pathology review on differentials.  Per , preliminary result is that there is 2% blasts and 38% other cells.  Heme-onc updated on these findings and they suspect the other cells are most likely metamyelocytes and patient is appropriate for hospitalization at [facility] and can always be transferred at a later time if pathology review demonstrates blast crisis.  However, hospitalist at [facility] is not comfortable with this plan due to patient's high medical complexity and believe patient should be transferred to [facility] instead.  I called [facility] and discussed case with hospitalist team over there, they do not believe that patient qualifies for transfer at this time.  Ultimately, they recommend patient boarding in the ER at [facility] to await formal pathology review of CBC  differential and callback for transfer if findings suggestive of blast crisis given patient's hemodynamic stability and well appearance at this time.  I paged back out to our hospitalist service who agrees with seeing the patient on consult while patient boards in the ER pending pathology review of cells.  Patient updated regarding this plan.  Patient does have a significant D-dimer elevation of 5.51 in the ER.  Unfortunately, renal function demonstrates creatinine of 2.47 demonstrating OCTAVIO on CKD.  Suspect D-dimer elevation most likely secondary to associated with patient's blood dyscrasias and underlying infection especially given leukemia history.  No evidence of DIC at this time.  Fibrinogen activity 521.  No bleeding from lines.  On reevaluation, patient denies any ongoing chest pain or shortness of breath and reported pleuritic chest discomfort only present with heavy coughing.  Patient is nontachycardic and not hypoxic.  Given the other findings and complete clinical picture, low suspicion for pulmonary embolism.  Given OCTAVIO patient endorses that he always has deterioration in renal function when ill.  We did have shared decision discussion regarding CT imaging for pulmonary embolism and risk of contrast dye administration given patient's current renal function.  Patient is comfortable declining CT imaging at this time given he is otherwise asymptomatic during my reevaluation.  Patient however is agreeable with proceeding with screening lower extremity ultrasound for DVT.  Will continue to closely monitor patient's respiratory function.  If new hypoxia of new tachycardia, we will proceed with CT PE chest.     Patient was signed out to night shift ER physician pending pathologist review of blood.     Please refer to ED course above as part of continuation of MDM for details on the patient's treatment course and any potential changes or updates beyond my initial evaluation and MDM creation.      Disposition    The patient will board in the ED awaiting AM pathologist review of CBC for blasts.  If findings consistent with blast crisis, anticipate transfer to [facility] for further management, otherwise, patient may be hospitalized at [facility].    Diagnosis     ICD-10-CM    1. Pneumonia of right middle lobe due to infectious organism  J18.9       2. Acute kidney injury superimposed on CKD  N17.9     N18.9       3. Anemia, unspecified type  D64.9       4. Thrombocytopenia  D69.6       5. Leukocytosis, unspecified type  D72.829                Scribe Disclosure:  I, Denisse Barron, am serving as a scribe at 6:52 PM on 6/10/2025 to document services personally performed by Beto Nicole DO based on my observations and the provider's statements to me.        Beto Nicole DO  06/11/25 0312       Beto Nicole DO  06/11/25 0314

## 2025-06-10 NOTE — ED TRIAGE NOTES
"Arrives ambulatory from work. States developed a cough a couple of weeks ago and was seen in  and was diagnosis with a \"possible whooping cough\". States this assessment happened 6 days ago and his test for this came back negative.     States cough improving while on the antibiotic and today it got worse.     Also reports new back pain and abdominal pain, which started this morning.     Denies fevers at home. Tylenol and Motrin last around 0620.     "

## 2025-06-11 ENCOUNTER — HOSPITAL ENCOUNTER (OUTPATIENT)
Age: 40
End: 2025-06-11
Attending: STUDENT IN AN ORGANIZED HEALTH CARE EDUCATION/TRAINING PROGRAM
Payer: COMMERCIAL

## 2025-06-11 ENCOUNTER — APPOINTMENT (OUTPATIENT)
Dept: CARDIOLOGY | Facility: CLINIC | Age: 40
End: 2025-06-11
Attending: STUDENT IN AN ORGANIZED HEALTH CARE EDUCATION/TRAINING PROGRAM
Payer: COMMERCIAL

## 2025-06-11 ENCOUNTER — RESULTS FOLLOW-UP (OUTPATIENT)
Dept: NURSING | Facility: CLINIC | Age: 40
End: 2025-06-11

## 2025-06-11 VITALS
SYSTOLIC BLOOD PRESSURE: 145 MMHG | DIASTOLIC BLOOD PRESSURE: 80 MMHG | OXYGEN SATURATION: 97 % | RESPIRATION RATE: 21 BRPM | HEART RATE: 88 BPM | TEMPERATURE: 99.2 F

## 2025-06-11 LAB
ABO + RH BLD: NORMAL
ALBUMIN SERPL BCG-MCNC: 3.7 G/DL (ref 3.5–5.2)
ALBUMIN UR-MCNC: 10 MG/DL
ALP SERPL-CCNC: 90 U/L (ref 40–150)
ALT SERPL W P-5'-P-CCNC: 14 U/L (ref 0–70)
ANION GAP SERPL CALCULATED.3IONS-SCNC: 11 MMOL/L (ref 7–15)
ANION GAP SERPL CALCULATED.3IONS-SCNC: 12 MMOL/L (ref 7–15)
APPEARANCE UR: ABNORMAL
AST SERPL W P-5'-P-CCNC: 39 U/L (ref 0–45)
ATRIAL RATE - MUSE: 85 BPM
BASOPHILS # BLD MANUAL: 0 10E3/UL (ref 0–0.2)
BASOPHILS # BLD MANUAL: 0 10E3/UL (ref 0–0.2)
BASOPHILS # BLD MANUAL: 0.4 10E3/UL (ref 0–0.2)
BASOPHILS NFR BLD MANUAL: 0 %
BASOPHILS NFR BLD MANUAL: 0 %
BASOPHILS NFR BLD MANUAL: 1 %
BILIRUB SERPL-MCNC: 0.6 MG/DL
BILIRUB UR QL STRIP: NEGATIVE
BLASTS # BLD MANUAL: 21.6 10E3/UL
BLASTS # BLD MANUAL: 24.6 10E3/UL
BLASTS # BLD MANUAL: 34.3 10E3/UL
BLASTS NFR BLD MANUAL: 65 %
BLASTS NFR BLD MANUAL: 67 %
BLASTS NFR BLD MANUAL: 71 %
BLD GP AB SCN SERPL QL: NEGATIVE
BLD PROD TYP BPU: NORMAL
BLOOD COMPONENT TYPE: NORMAL
BUN SERPL-MCNC: 43.9 MG/DL (ref 6–20)
BUN SERPL-MCNC: 45.6 MG/DL (ref 6–20)
C PNEUM DNA SPEC QL NAA+PROBE: NOT DETECTED
CALCIUM SERPL-MCNC: 8.1 MG/DL (ref 8.8–10.4)
CALCIUM SERPL-MCNC: 8.4 MG/DL (ref 8.8–10.4)
CHLORIDE SERPL-SCNC: 107 MMOL/L (ref 98–107)
CHLORIDE SERPL-SCNC: 112 MMOL/L (ref 98–107)
CODING SYSTEM: NORMAL
COLOR UR AUTO: ABNORMAL
CREAT SERPL-MCNC: 2 MG/DL (ref 0.67–1.17)
CREAT SERPL-MCNC: 2.3 MG/DL (ref 0.67–1.17)
CROSSMATCH: NORMAL
DIASTOLIC BLOOD PRESSURE - MUSE: NORMAL MMHG
EGFRCR SERPLBLD CKD-EPI 2021: 37 ML/MIN/1.73M2
EGFRCR SERPLBLD CKD-EPI 2021: 43 ML/MIN/1.73M2
ELLIPTOCYTES BLD QL SMEAR: SLIGHT
EOSINOPHIL # BLD MANUAL: 0 10E3/UL (ref 0–0.7)
EOSINOPHIL # BLD MANUAL: 0.4 10E3/UL (ref 0–0.7)
EOSINOPHIL # BLD MANUAL: 0.5 10E3/UL (ref 0–0.7)
EOSINOPHIL NFR BLD MANUAL: 0 %
EOSINOPHIL NFR BLD MANUAL: 1 %
EOSINOPHIL NFR BLD MANUAL: 1 %
ERYTHROCYTE [DISTWIDTH] IN BLOOD BY AUTOMATED COUNT: 16 % (ref 10–15)
ERYTHROCYTE [DISTWIDTH] IN BLOOD BY AUTOMATED COUNT: 16 % (ref 10–15)
ERYTHROCYTE [DISTWIDTH] IN BLOOD BY AUTOMATED COUNT: 16.2 % (ref 10–15)
FERRITIN SERPL-MCNC: 1198 NG/ML (ref 31–409)
FLUAV H1 2009 PAND RNA SPEC QL NAA+PROBE: NOT DETECTED
FLUAV H1 RNA SPEC QL NAA+PROBE: NOT DETECTED
FLUAV H3 RNA SPEC QL NAA+PROBE: NOT DETECTED
FLUAV RNA SPEC QL NAA+PROBE: NOT DETECTED
FLUBV RNA SPEC QL NAA+PROBE: NOT DETECTED
FOLATE SERPL-MCNC: 8.9 NG/ML (ref 4.6–34.8)
FRAGMENTS BLD QL SMEAR: ABNORMAL
FRAGMENTS BLD QL SMEAR: ABNORMAL
GLUCOSE BLDC GLUCOMTR-MCNC: 145 MG/DL (ref 70–99)
GLUCOSE SERPL-MCNC: 122 MG/DL (ref 70–99)
GLUCOSE SERPL-MCNC: 137 MG/DL (ref 70–99)
GLUCOSE UR STRIP-MCNC: >=1000 MG/DL
HADV DNA SPEC QL NAA+PROBE: NOT DETECTED
HAPTOGLOB SERPL-MCNC: 130 MG/DL (ref 30–200)
HCO3 SERPL-SCNC: 17 MMOL/L (ref 22–29)
HCO3 SERPL-SCNC: 17 MMOL/L (ref 22–29)
HCOV PNL SPEC NAA+PROBE: NOT DETECTED
HCT VFR BLD AUTO: 18.1 % (ref 40–53)
HCT VFR BLD AUTO: 18.7 % (ref 40–53)
HCT VFR BLD AUTO: 21.9 % (ref 40–53)
HGB BLD-MCNC: 5.9 G/DL (ref 13.3–17.7)
HGB BLD-MCNC: 6 G/DL (ref 13.3–17.7)
HGB BLD-MCNC: 7.1 G/DL (ref 13.3–17.7)
HGB UR QL STRIP: ABNORMAL
HMPV RNA SPEC QL NAA+PROBE: NOT DETECTED
HPIV1 RNA SPEC QL NAA+PROBE: NOT DETECTED
HPIV2 RNA SPEC QL NAA+PROBE: NOT DETECTED
HPIV3 RNA SPEC QL NAA+PROBE: NOT DETECTED
HPIV4 RNA SPEC QL NAA+PROBE: NOT DETECTED
INTERPRETATION ECG - MUSE: NORMAL
IRON BINDING CAPACITY (ROCHE): 188 UG/DL (ref 240–430)
IRON SATN MFR SERPL: 74 % (ref 15–46)
IRON SERPL-MCNC: 139 UG/DL (ref 61–157)
ISSUE DATE AND TIME: NORMAL
KETONES UR STRIP-MCNC: NEGATIVE MG/DL
L PNEUMO1 AG UR QL IA: NEGATIVE
LDH SERPL L TO P-CCNC: 501 U/L (ref 0–250)
LEUKOCYTE ESTERASE UR QL STRIP: NEGATIVE
LVEF ECHO: NORMAL
LYMPHOCYTES # BLD MANUAL: 2.9 10E3/UL (ref 0.8–5.3)
LYMPHOCYTES # BLD MANUAL: 3.8 10E3/UL (ref 0.8–5.3)
LYMPHOCYTES # BLD MANUAL: 3.9 10E3/UL (ref 0.8–5.3)
LYMPHOCYTES NFR BLD MANUAL: 10 %
LYMPHOCYTES NFR BLD MANUAL: 12 %
LYMPHOCYTES NFR BLD MANUAL: 6 %
M PNEUMO DNA SPEC QL NAA+PROBE: NOT DETECTED
MCH RBC QN AUTO: 31.7 PG (ref 26.5–33)
MCH RBC QN AUTO: 32.4 PG (ref 26.5–33)
MCH RBC QN AUTO: 32.6 PG (ref 26.5–33)
MCHC RBC AUTO-ENTMCNC: 32.1 G/DL (ref 31.5–36.5)
MCHC RBC AUTO-ENTMCNC: 32.4 G/DL (ref 31.5–36.5)
MCHC RBC AUTO-ENTMCNC: 32.6 G/DL (ref 31.5–36.5)
MCV RBC AUTO: 100 FL (ref 78–100)
MCV RBC AUTO: 100 FL (ref 78–100)
MCV RBC AUTO: 99 FL (ref 78–100)
METAMYELOCYTES # BLD MANUAL: 1 10E3/UL
METAMYELOCYTES NFR BLD MANUAL: 3 %
MONOCYTES # BLD MANUAL: 2.3 10E3/UL (ref 0–1.3)
MONOCYTES # BLD MANUAL: 2.3 10E3/UL (ref 0–1.3)
MONOCYTES # BLD MANUAL: 4.8 10E3/UL (ref 0–1.3)
MONOCYTES NFR BLD MANUAL: 10 %
MONOCYTES NFR BLD MANUAL: 6 %
MONOCYTES NFR BLD MANUAL: 7 %
MRSA DNA SPEC QL NAA+PROBE: NEGATIVE
MUCOUS THREADS #/AREA URNS LPF: PRESENT /LPF
MYELOCYTES # BLD MANUAL: 2.3 10E3/UL
MYELOCYTES # BLD MANUAL: 4.8 10E3/UL
MYELOCYTES # BLD MANUAL: 4.9 10E3/UL
MYELOCYTES NFR BLD MANUAL: 10 %
MYELOCYTES NFR BLD MANUAL: 13 %
MYELOCYTES NFR BLD MANUAL: 7 %
NEUTROPHILS # BLD MANUAL: 1 10E3/UL (ref 1.6–8.3)
NEUTROPHILS # BLD MANUAL: 1.3 10E3/UL (ref 1.6–8.3)
NEUTROPHILS # BLD MANUAL: 1.5 10E3/UL (ref 1.6–8.3)
NEUTROPHILS NFR BLD MANUAL: 2 %
NEUTROPHILS NFR BLD MANUAL: 4 %
NEUTROPHILS NFR BLD MANUAL: 4 %
NITRATE UR QL: NEGATIVE
P AXIS - MUSE: 60 DEGREES
PATH REV: ABNORMAL
PH UR STRIP: 5 [PH] (ref 5–7)
PLAT MORPH BLD: ABNORMAL
PLATELET # BLD AUTO: 11 10E3/UL (ref 150–450)
PLATELET # BLD AUTO: 14 10E3/UL (ref 150–450)
PLATELET # BLD AUTO: 21 10E3/UL (ref 150–450)
POLYCHROMASIA BLD QL SMEAR: SLIGHT
POLYCHROMASIA BLD QL SMEAR: SLIGHT
POTASSIUM SERPL-SCNC: 4.8 MMOL/L (ref 3.4–5.3)
POTASSIUM SERPL-SCNC: 5.4 MMOL/L (ref 3.4–5.3)
PR INTERVAL - MUSE: 226 MS
PROT SERPL-MCNC: 6.7 G/DL (ref 6.4–8.3)
QRS DURATION - MUSE: 192 MS
QT - MUSE: 474 MS
QTC - MUSE: 564 MS
R AXIS - MUSE: 258 DEGREES
RBC # BLD AUTO: 1.81 10E6/UL (ref 4.4–5.9)
RBC # BLD AUTO: 1.89 10E6/UL (ref 4.4–5.9)
RBC # BLD AUTO: 2.19 10E6/UL (ref 4.4–5.9)
RBC MORPH BLD: ABNORMAL
RBC URINE: 8 /HPF
RETICS # AUTO: 0.01 10E6/UL (ref 0.03–0.1)
RETICS/RBC NFR AUTO: 0.7 % (ref 0.5–2)
RSV RNA SPEC QL NAA+PROBE: NOT DETECTED
RSV RNA SPEC QL NAA+PROBE: NOT DETECTED
RV+EV RNA SPEC QL NAA+PROBE: NOT DETECTED
S PNEUM AG SPEC QL: NEGATIVE
SA TARGET DNA: POSITIVE
SODIUM SERPL-SCNC: 136 MMOL/L (ref 135–145)
SODIUM SERPL-SCNC: 140 MMOL/L (ref 135–145)
SP GR UR STRIP: 1.02 (ref 1–1.03)
SPECIMEN EXP DATE BLD: NORMAL
SPECIMEN TYPE: NORMAL
SQUAMOUS EPITHELIAL: <1 /HPF
SYSTOLIC BLOOD PRESSURE - MUSE: NORMAL MMHG
T AXIS - MUSE: 52 DEGREES
UNIT ABO/RH: NORMAL
UNIT NUMBER: NORMAL
UNIT STATUS: NORMAL
UNIT TYPE ISBT: 6200
UNIT TYPE ISBT: 8400
UNIT TYPE ISBT: 8400
URATE CRY #/AREA URNS HPF: ABNORMAL /HPF
URATE SERPL-MCNC: 8.2 MG/DL (ref 3.4–7)
UROBILINOGEN UR STRIP-MCNC: NORMAL MG/DL
VENTRICULAR RATE- MUSE: 85 BPM
VIT B12 SERPL-MCNC: 313 PG/ML (ref 232–1245)
WBC # BLD AUTO: 32.2 10E3/UL (ref 4–11)
WBC # BLD AUTO: 37.8 10E3/UL (ref 4–11)
WBC # BLD AUTO: 48.3 10E3/UL (ref 4–11)
WBC URINE: 1 /HPF

## 2025-06-11 PROCEDURE — 99222 1ST HOSP IP/OBS MODERATE 55: CPT | Performed by: STUDENT IN AN ORGANIZED HEALTH CARE EDUCATION/TRAINING PROGRAM

## 2025-06-11 PROCEDURE — 85027 COMPLETE CBC AUTOMATED: CPT | Performed by: EMERGENCY MEDICINE

## 2025-06-11 PROCEDURE — 83615 LACTATE (LD) (LDH) ENZYME: CPT | Performed by: STUDENT IN AN ORGANIZED HEALTH CARE EDUCATION/TRAINING PROGRAM

## 2025-06-11 PROCEDURE — 36415 COLL VENOUS BLD VENIPUNCTURE: CPT | Performed by: EMERGENCY MEDICINE

## 2025-06-11 PROCEDURE — 99207 PR NO BILLABLE SERVICE THIS VISIT: CPT | Performed by: INTERNAL MEDICINE

## 2025-06-11 PROCEDURE — 81003 URINALYSIS AUTO W/O SCOPE: CPT | Performed by: STUDENT IN AN ORGANIZED HEALTH CARE EDUCATION/TRAINING PROGRAM

## 2025-06-11 PROCEDURE — 80048 BASIC METABOLIC PNL TOTAL CA: CPT | Performed by: EMERGENCY MEDICINE

## 2025-06-11 PROCEDURE — 88184 FLOWCYTOMETRY/ TC 1 MARKER: CPT | Performed by: STUDENT IN AN ORGANIZED HEALTH CARE EDUCATION/TRAINING PROGRAM

## 2025-06-11 PROCEDURE — 255N000002 HC RX 255 OP 636: Performed by: STUDENT IN AN ORGANIZED HEALTH CARE EDUCATION/TRAINING PROGRAM

## 2025-06-11 PROCEDURE — 82310 ASSAY OF CALCIUM: CPT | Performed by: EMERGENCY MEDICINE

## 2025-06-11 PROCEDURE — 85007 BL SMEAR W/DIFF WBC COUNT: CPT | Performed by: EMERGENCY MEDICINE

## 2025-06-11 PROCEDURE — 250N000011 HC RX IP 250 OP 636: Performed by: EMERGENCY MEDICINE

## 2025-06-11 PROCEDURE — 999N000208 ECHOCARDIOGRAM COMPLETE

## 2025-06-11 PROCEDURE — 84550 ASSAY OF BLOOD/URIC ACID: CPT | Performed by: STUDENT IN AN ORGANIZED HEALTH CARE EDUCATION/TRAINING PROGRAM

## 2025-06-11 PROCEDURE — 999N000157 HC STATISTIC RCP TIME EA 10 MIN

## 2025-06-11 PROCEDURE — 258N000003 HC RX IP 258 OP 636: Performed by: STUDENT IN AN ORGANIZED HEALTH CARE EDUCATION/TRAINING PROGRAM

## 2025-06-11 PROCEDURE — 86901 BLOOD TYPING SEROLOGIC RH(D): CPT | Performed by: EMERGENCY MEDICINE

## 2025-06-11 PROCEDURE — 94660 CPAP INITIATION&MGMT: CPT

## 2025-06-11 PROCEDURE — P9016 RBC LEUKOCYTES REDUCED: HCPCS | Performed by: EMERGENCY MEDICINE

## 2025-06-11 PROCEDURE — 88185 FLOWCYTOMETRY/TC ADD-ON: CPT | Performed by: EMERGENCY MEDICINE

## 2025-06-11 PROCEDURE — 82607 VITAMIN B-12: CPT | Performed by: STUDENT IN AN ORGANIZED HEALTH CARE EDUCATION/TRAINING PROGRAM

## 2025-06-11 PROCEDURE — 83010 ASSAY OF HAPTOGLOBIN QUANT: CPT | Performed by: STUDENT IN AN ORGANIZED HEALTH CARE EDUCATION/TRAINING PROGRAM

## 2025-06-11 PROCEDURE — 88189 FLOWCYTOMETRY/READ 16 & >: CPT | Performed by: STUDENT IN AN ORGANIZED HEALTH CARE EDUCATION/TRAINING PROGRAM

## 2025-06-11 PROCEDURE — 87633 RESP VIRUS 12-25 TARGETS: CPT | Performed by: STUDENT IN AN ORGANIZED HEALTH CARE EDUCATION/TRAINING PROGRAM

## 2025-06-11 PROCEDURE — 86923 COMPATIBILITY TEST ELECTRIC: CPT | Performed by: EMERGENCY MEDICINE

## 2025-06-11 PROCEDURE — 87899 AGENT NOS ASSAY W/OPTIC: CPT | Performed by: STUDENT IN AN ORGANIZED HEALTH CARE EDUCATION/TRAINING PROGRAM

## 2025-06-11 PROCEDURE — 36415 COLL VENOUS BLD VENIPUNCTURE: CPT | Performed by: STUDENT IN AN ORGANIZED HEALTH CARE EDUCATION/TRAINING PROGRAM

## 2025-06-11 PROCEDURE — 258N000003 HC RX IP 258 OP 636: Performed by: EMERGENCY MEDICINE

## 2025-06-11 PROCEDURE — 250N000009 HC RX 250: Performed by: EMERGENCY MEDICINE

## 2025-06-11 PROCEDURE — 87641 MR-STAPH DNA AMP PROBE: CPT | Performed by: STUDENT IN AN ORGANIZED HEALTH CARE EDUCATION/TRAINING PROGRAM

## 2025-06-11 PROCEDURE — 82728 ASSAY OF FERRITIN: CPT | Performed by: STUDENT IN AN ORGANIZED HEALTH CARE EDUCATION/TRAINING PROGRAM

## 2025-06-11 PROCEDURE — 85045 AUTOMATED RETICULOCYTE COUNT: CPT | Performed by: EMERGENCY MEDICINE

## 2025-06-11 PROCEDURE — 93306 TTE W/DOPPLER COMPLETE: CPT | Mod: 26 | Performed by: INTERNAL MEDICINE

## 2025-06-11 PROCEDURE — 83550 IRON BINDING TEST: CPT | Performed by: STUDENT IN AN ORGANIZED HEALTH CARE EDUCATION/TRAINING PROGRAM

## 2025-06-11 PROCEDURE — 82746 ASSAY OF FOLIC ACID SERUM: CPT | Performed by: STUDENT IN AN ORGANIZED HEALTH CARE EDUCATION/TRAINING PROGRAM

## 2025-06-11 PROCEDURE — 96366 THER/PROPH/DIAG IV INF ADDON: CPT

## 2025-06-11 PROCEDURE — 250N000013 HC RX MED GY IP 250 OP 250 PS 637: Performed by: EMERGENCY MEDICINE

## 2025-06-11 PROCEDURE — 96361 HYDRATE IV INFUSION ADD-ON: CPT

## 2025-06-11 PROCEDURE — 250N000013 HC RX MED GY IP 250 OP 250 PS 637: Performed by: STUDENT IN AN ORGANIZED HEALTH CARE EDUCATION/TRAINING PROGRAM

## 2025-06-11 PROCEDURE — 82962 GLUCOSE BLOOD TEST: CPT

## 2025-06-11 RX ORDER — DOXYCYCLINE 100 MG/1
100 CAPSULE ORAL EVERY 12 HOURS SCHEDULED
Status: DISCONTINUED | OUTPATIENT
Start: 2025-06-11 | End: 2025-06-11

## 2025-06-11 RX ORDER — PIPERACILLIN SODIUM, TAZOBACTAM SODIUM 4; .5 G/20ML; G/20ML
4.5 INJECTION, POWDER, LYOPHILIZED, FOR SOLUTION INTRAVENOUS EVERY 6 HOURS
Status: DISCONTINUED | OUTPATIENT
Start: 2025-06-11 | End: 2025-06-11 | Stop reason: HOSPADM

## 2025-06-11 RX ORDER — POLYETHYLENE GLYCOL 3350 17 G/17G
17 POWDER, FOR SOLUTION ORAL 2 TIMES DAILY PRN
Status: CANCELLED | OUTPATIENT
Start: 2025-06-11

## 2025-06-11 RX ORDER — IOPAMIDOL 755 MG/ML
500 INJECTION, SOLUTION INTRAVASCULAR ONCE
Status: COMPLETED | OUTPATIENT
Start: 2025-06-11 | End: 2025-06-11

## 2025-06-11 RX ORDER — METOPROLOL SUCCINATE 25 MG/1
25 TABLET, EXTENDED RELEASE ORAL DAILY
Status: CANCELLED | OUTPATIENT
Start: 2025-06-11

## 2025-06-11 RX ORDER — DASATINIB 80 MG/1
80 TABLET, FILM COATED ORAL DAILY
Status: DISCONTINUED | OUTPATIENT
Start: 2025-06-11 | End: 2025-06-11 | Stop reason: HOSPADM

## 2025-06-11 RX ORDER — LEVOTHYROXINE SODIUM 125 UG/1
125 TABLET ORAL DAILY
Status: CANCELLED | OUTPATIENT
Start: 2025-06-11

## 2025-06-11 RX ORDER — FOLIC ACID 1 MG/1
1 TABLET ORAL DAILY
Status: DISCONTINUED | OUTPATIENT
Start: 2025-06-11 | End: 2025-06-11 | Stop reason: HOSPADM

## 2025-06-11 RX ORDER — NICOTINE POLACRILEX 4 MG
15-30 LOZENGE BUCCAL
Status: DISCONTINUED | OUTPATIENT
Start: 2025-06-11 | End: 2025-06-11 | Stop reason: HOSPADM

## 2025-06-11 RX ORDER — BUPROPION HYDROCHLORIDE 300 MG/1
300 TABLET ORAL EVERY MORNING
Status: CANCELLED | OUTPATIENT
Start: 2025-06-11

## 2025-06-11 RX ORDER — LOSARTAN POTASSIUM 50 MG/1
50 TABLET ORAL DAILY
Status: CANCELLED | OUTPATIENT
Start: 2025-06-11

## 2025-06-11 RX ORDER — DASATINIB 80 MG/1
80 TABLET, FILM COATED ORAL DAILY
Status: CANCELLED | OUTPATIENT
Start: 2025-06-11

## 2025-06-11 RX ORDER — PIPERACILLIN SODIUM, TAZOBACTAM SODIUM 4; .5 G/20ML; G/20ML
4.5 INJECTION, POWDER, LYOPHILIZED, FOR SOLUTION INTRAVENOUS EVERY 6 HOURS
Status: DISCONTINUED | OUTPATIENT
Start: 2025-06-11 | End: 2025-06-11

## 2025-06-11 RX ORDER — AMOXICILLIN 250 MG
1 CAPSULE ORAL 2 TIMES DAILY PRN
Status: CANCELLED | OUTPATIENT
Start: 2025-06-11

## 2025-06-11 RX ORDER — FUROSEMIDE 20 MG/1
20 TABLET ORAL DAILY
Status: CANCELLED | OUTPATIENT
Start: 2025-06-11

## 2025-06-11 RX ORDER — HYDROXYUREA 500 MG/1
2000 CAPSULE ORAL 2 TIMES DAILY
Status: DISCONTINUED | OUTPATIENT
Start: 2025-06-11 | End: 2025-06-11 | Stop reason: HOSPADM

## 2025-06-11 RX ORDER — SODIUM CHLORIDE 9 MG/ML
INJECTION, SOLUTION INTRAVENOUS CONTINUOUS
Status: DISCONTINUED | OUTPATIENT
Start: 2025-06-11 | End: 2025-06-11 | Stop reason: HOSPADM

## 2025-06-11 RX ORDER — ACETAMINOPHEN 325 MG/1
650 TABLET ORAL EVERY 4 HOURS PRN
Status: CANCELLED | OUTPATIENT
Start: 2025-06-11

## 2025-06-11 RX ORDER — DEXTROSE MONOHYDRATE 25 G/50ML
25-50 INJECTION, SOLUTION INTRAVENOUS
Status: DISCONTINUED | OUTPATIENT
Start: 2025-06-11 | End: 2025-06-11 | Stop reason: HOSPADM

## 2025-06-11 RX ORDER — PROCHLORPERAZINE MALEATE 5 MG/1
5 TABLET ORAL EVERY 6 HOURS PRN
Status: CANCELLED | OUTPATIENT
Start: 2025-06-11

## 2025-06-11 RX ORDER — AMOXICILLIN 250 MG
2 CAPSULE ORAL 2 TIMES DAILY PRN
Status: CANCELLED | OUTPATIENT
Start: 2025-06-11

## 2025-06-11 RX ORDER — LEVOTHYROXINE SODIUM 150 UG/1
150 TABLET ORAL DAILY
Status: CANCELLED | OUTPATIENT
Start: 2025-06-11

## 2025-06-11 RX ADMIN — SODIUM CHLORIDE 1000 ML: 0.9 INJECTION, SOLUTION INTRAVENOUS at 00:17

## 2025-06-11 RX ADMIN — DASATINIB 80 MG: 80 TABLET, FILM COATED ORAL at 11:15

## 2025-06-11 RX ADMIN — SODIUM CHLORIDE 87 ML: 9 INJECTION, SOLUTION INTRAVENOUS at 04:40

## 2025-06-11 RX ADMIN — FOLIC ACID 1 MG: 1 TABLET ORAL at 10:51

## 2025-06-11 RX ADMIN — PIPERACILLIN AND TAZOBACTAM 4.5 G: 4; .5 INJECTION, POWDER, FOR SOLUTION INTRAVENOUS at 12:08

## 2025-06-11 RX ADMIN — SODIUM CHLORIDE: 0.9 INJECTION, SOLUTION INTRAVENOUS at 05:33

## 2025-06-11 RX ADMIN — HYDROXYUREA 2000 MG: 500 CAPSULE ORAL at 10:52

## 2025-06-11 RX ADMIN — HUMAN ALBUMIN MICROSPHERES AND PERFLUTREN 3 ML: 10; .22 INJECTION, SOLUTION INTRAVENOUS at 10:11

## 2025-06-11 RX ADMIN — PIPERACILLIN AND TAZOBACTAM 4.5 G: 4; .5 INJECTION, POWDER, FOR SOLUTION INTRAVENOUS at 05:34

## 2025-06-11 RX ADMIN — DOXYCYCLINE HYCLATE 100 MG: 100 CAPSULE ORAL at 02:15

## 2025-06-11 RX ADMIN — IOPAMIDOL 68 ML: 755 INJECTION, SOLUTION INTRAVENOUS at 04:36

## 2025-06-11 ASSESSMENT — ACTIVITIES OF DAILY LIVING (ADL)
ADLS_ACUITY_SCORE: 54

## 2025-06-11 NOTE — ED PROVIDER NOTES
Patient was initially seen by my partner Dr. Nicole please see his note for further details.  Briefly, patient presented to the emergency department with 1 week of worsening cough and upper respiratory symptoms.  Was found to have likely bacterial pneumonia and was started on IV antibiotics.  Blood work was notable for leukocytosis of 48,000 in the setting of known CML but new anemia with hemoglobin of 7.1 and platelet count of 21,000 down from baseline of 50-60,000.  Unfortunately, there is no pathologist overnight to offer formal pathology review on differentials.  Per , preliminary result is that there is 2% blasts and 38% other cells.  Final disposition is currently pending based on pathology results.  If pathology has evidence of blast crisis will need transfer to the [place].  I was asked to follow-up on patient's DVT ultrasound, CT PE study and morning labs.  DVT ultrasound was positive for a right posterior tibial DVT without proximal extension.  Given the patient's acute on chronic thrombocytopenia and concerns for possible DIC type picture, will hold off on anticoagulation.  Overnight hospitalist was consulted to help with this case.  DIC and tumor lysis syndrome labs were added.  Unclear picture based on current results.  Concern for possible evolving DIC with possible elements of tumor lysis and bone marrow suppression in the setting of significant infection.  Repeat morning labs shows worsening anemia and thrombocytopenia.  Case was discussed with on-call oncologist.  Agrees with plan for blood and platelet transfusion but recommended irradiated cells as it is unclear what this patient's picture is at this time.  Agrees with holding off on anticoagulation.  Still awaiting pathology results and patient's ultimate disposition.    Critical Care time was 60 minutes for this patient excluding procedures.      Gabe Pabon MD  06/11/25 0737       Gabe Pabon,  MD  06/11/25 2031

## 2025-06-11 NOTE — PROVIDER NOTIFICATION
Pt wore CPAP overnight for sleep without issue. Skin under mask looks good/intact. See settings/parameters below:       06/11/25 0303   CPAP/BiPAP/Settings   BIPAP/CPAP On Standby On   Oxygen (%) 21   CPAP/BiPAP Patient Parameters   CPAP (cm H2O) 5 cmh2o   RR Total (breaths/min) 17 breaths/min   Vt (mL) 997 mL   Minute Ventilation (L/min) 15.9 L/min   Peak Inspiratory Pressure (cm H2O) 5 cmH2O   Pt.  Leak (L/min) 15 L/min       /68   Pulse 86   Temp 98.6  F (37  C) (Temporal)   Resp 20   SpO2 95%     Ambreen Oliver, RT

## 2025-06-11 NOTE — PROGRESS NOTES
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    Also noted to have new anemia hemoglobin 7.1 and platelets 21K.  Please check with patient regarding his compliance on Dasatinib.  For now hold Dasatinib given significant cytopenias.  Start with folic acid 1 mg daily.    If differential counts comes back with many circulating blasts please call us back as that would be concerning for blast crisis.  Can send anemia workup with reticulocyte count and ferritin ,B12 levels, haptoglobin.    Treat infection, can likely restart Dasatinib at the time of discharge.  Would reach out to primary oncologist at that point in time.    Masoud Avila MD    Hematology, Oncology, and Transplant

## 2025-06-11 NOTE — CONSULTS
[facility]  Consult Note - Hospitalist Service  Date of Admission:  6/10/2025  Consult Requested by: Dr. Beto Nicole  Reason for Consult: Pneumonia sepsis history of CML, pending a.m. pathologist slide review for rule out blast crisis    Assessment & Plan   Anil Montoya is a 39 year old male who with a very complicated past medical history significant for type II DM, bacterial endocarditis status post tricuspid valve replacement complicated by VF and complete heart block now status post ICD and pacemaker placement, CML on Dasatinib, history of cryptococcal pneumonia, WARNER, history of pulmonary nodules who presented to the ER with complaint of of and shortness of breath and was found to have right midlung pneumonia, leukocytosis with concerns for blast crisis, acute anemia, acute on chronic thrombocytopenia, OCTAVIO, and other labs concerning for possible DIC.    Patient's peripheral smear is under review right now for the blast cell count but this cannot be verified overnight until the pathologist reads the slides and confirm the results.  Per preliminary report by  who looked at the slides, it reported 2% blast and 38% other cells.  Pt is currently boarding in the ER pending review of the slide and if it shows blast cells then likely patient needs to be transferred to the .  In the meantime, medicine team is consulted for the management of medical issues overnight.      Right midlung pneumonia  Patient reported 2-week history of cough and shortness of breath.  No fever or chills.  He reported sick contact with his sister and nephew as they were having whooping cough.  He presented to the urgent care on 6/2.  He was prescribed a 7-day course of doxycycline and at that time he tested negative for whooping cough.  Despite taking his medications he continues to feel short of breath with ongoing cough so he decided to come to the ER for further evaluation.  Patient did not  report any fever or chills or any phlegm production at home.  Upon presentation to the ER patient was afebrile, not tachycardic, saturating well on room air.  Lab workup revealed WBC 48.3 with other cell line derangements as well, see below.  Patient was started on Zosyn.  Overall patient does not meet criteria for sepsis    -Continue IV Zosyn.  Patient already received 7-day course of doxycycline so will not continue further doxycycline or azithromycin at this time for atypical coverage  .-Check a strep pneumo, Legionella, respiratory viral panel, MRSA nares  - Follow-up blood cultures      CML on Dasatinib  Leukocytosis with possible blast crisis  Acute anemia, no overt bleeding  Acute on chronic thrombocytopenia  Probable DIC in the setting of pneumonia versus CML with blast crisis versus both  Patient with history of CML and on Dasatinib.  He reports full compliance with Dasatinib.  He follows Dr. Villarreal felt outpatient.  Up until 1 month ago he had normal WBC and hemoglobin.  His platelets have been variable and could be anywhere between .  Upon presentation to the ER he was noted to have WBC of 48.3, hemoglobin of 7.1, platelet of 21.  Differential and peripheral smear in process.  Technically this is not hyperleukocytosis. Patient denies any overt bleeding, gum bleeding, or any bruises.  Dr. Nicole from ER discussed the case with Dr. Los Melendez of hematology was advised to call them back if patient's differential/peripheral smear shows many circulating blast as this would be concerning for blast crisis.  Currently awaiting slide review by pathologist, it shows blast then needs to discuss with hematology and likely transfer to [place].    Patient with also elevated INR, fibrinogen, and D-dimer.  These lab derangements are concerning for DIC, currently patient is not bleeding from anywhere.  Treatment of DIC is treating the underlying cause which could be pneumonia versus blast crisis versus both.  Will treat  pneumonia as above.     -Holding home Dasatinib as per advice of Dr. Los Melendez  - Start folic acid 1 mg daily  - Check reticulocyte count, iron studies, vitamin B12, folic acid, LDH, haptoglobin.  - Check TLS labs with creatinine, LDH, uric acid, and electrolytes daily.   -If INR is worsening, consider giving vitamin K  - Monitor CBC daily  - Monitor DIC labs daily  - Usually we transfuse when hemoglobin is less than 7 or when the platelets are less than 20 patient is bleeding, recommend checking with hematology before giving the transfusion.   -Hematology consultation      OCTAVIO on CKD stage 3  Patient with baseline creatinine of 1.2-1.49.  Creatinine upon admission was elevated at 2.47.  Patient denies any dysuria symptoms.  Patient does not appear dehydrated on exam.  Blood pressure is reassuring.  Low Suspicion for obstruction in this young male .  OCTAVIO could be due to anemia, infection/pneumonia, or due to underlying CML with blast crisis.  Patient with WBC count of 48K, technically this is not hyperleukocytosis so low suspicion of leukostasis causing OCTAVIO.  - Start patient on IV fluids  - Monitor kidney function daily  - Check UA  - Low threshold to involve nephrology if kidney function is worsening  - Strict I's and O's  - Avoid nephrotoxins    History of MSSA TV endocarditis status post bioprosthetic TVR in 2022  Elevated NT proBNP  This was complicated by CHB and VF arrest.  Patient is status post dual-chamber ICD and PPM.  Patient follows Dr. Higgins and Dr. Inman outpatient.  His last cardiology visit was on 3/20/2025.  At that time no changes were made in the medications.  On presentation to the hospital patient was noted to have elevated NT proBNP.  He is also complaining of shortness of breath but that could also be due to anemia as well as pneumonia.  He denies any orthopnea but he does have dyspnea on exertion and again this could be due to anemia and pneumonia.  Appears euvolemic on exam.  Prior to  admission patient was on Jardiance, and Lasix.  - Telemetry monitoring  - Echocardiogram  - Holding home Lasix and Jardiance at this time given OCTAVIO    WARNER  CPAP    Type 2 DM  Historically poorly controlled but now appears well-controlled with most recent hemoglobin A1c of 6.6 in March 2025.  Prior to admission patient was on semaglutide, Jardiance, and metformin.  Given OCTAVIO will hold metformin and Jardiance.  Will hold semaglutide while patient in the hospital.  Will provide sliding scale insulin    Elevated troponin, suspect demand  Elevated D-dimer  Left lower rib cage pain  Troponin elevated at 31.  Patient complained of chest pain when he presented to the ER, his pain was localized to the left lower rib cage and usually came on with coughing.  No chest pain with exertion.  No resting chest pain.  Chest pain has resolved on its own.  Repeat troponin remained flat.  No significant EKG changes.  Suspect this is demand in the setting of anemia and pneumonia.  Low suspicion for ACS.  No need to trend further.  Patient with significantly elevated D-dimer.  He is not tachycardic, not requiring oxygen, he reported some left lower rib cage pain which is now resolved on its own and usually comes on with coughing.  Considered the possibility of pulmonary embolism however patient is having OCTAVIO which can potentially worsen with the use of IV contrast.  This was discussed with the patient in the ER by Dr. Nicole, and patient is okay with holding of CT angiogram at this time.  Also if patient were noted to have PE even though I have low suspicion for this, anticoagulation would be difficult in this patient given platelet count in 20s and hemoglobin of 7.  In the ER bilateral lower extremity Dopplers ultrasound ordered, keeping in mind that this will not rule out PE.     Addendum:  Updated by Dr. Pabon at that patient was noted to have DVT involving right posterior tibial vein.  CT pulmonary angiogram was negative for PE but  "did show bilateral patchy nodular pulmonary opacities, likely infectious.  Given concerns for DIC, acute on chronic thrombocytopenia, and acute anemia with hemoglobin of 7.1 will hold off anticoagulation at this time.  Acknowledge that clot might progress without anticoagulation however there is risk of bleeding/worsening DIC with the anticoagulation.  Patient might be a candidate for IVC filter placement in the near future.  Hematology consult placed, will defer further management to them.                 Clinically Significant Risk Factors Present on Admission          # Hyperchloremia: Highest Cl = 110 mmol/L in last 2 days, will monitor as appropriate      # Hypocalcemia: Lowest Ca = 8.6 mg/dL in last 2 days, will monitor and replace as appropriate      # Coagulation Defect: INR = 1.48 (Ref range: 0.85 - 1.15) and/or PTT = 36 Seconds (Ref range: 22 - 38 Seconds), will monitor for bleeding  # Thrombocytopenia: Lowest platelets = 21 in last 2 days, will monitor for bleeding  # Acute Kidney Injury, unspecified: based on a >150% or 0.3 mg/dL increase in last creatinine compared to past 90 day average, will monitor renal function  # Hypertension: Home medication list includes antihypertensive(s)      # Anemia: based on hgb <11      # DMII: A1C = N/A within past 6 months    # Overweight: Estimated body mass index is 29.41 kg/m  as calculated from the following:    Height as of 6/2/25: 1.778 m (5' 10\").    Weight as of 6/2/25: 93 kg (205 lb).        # ICD device present       Мария Caldera MD  Hospitalist Service  Securely message with [employer] (more info)  Text page via [employer] Paging/Directory   ______________________________________________________________________    Chief Complaint   Cough  Shortness of breath    History is obtained from the patient, ER doctor, and the chart review    History of Present Illness   Anil Montoya is a 39 year old male who with a very complicated past medical history " significant for type II DM, bacterial endocarditis status post tricuspid valve replacement complicated by VF and complete heart block now status post ICD and pacemaker placement, CML on Dasatinib, history of cryptococcal pneumonia, WARNER, history of pulmonary nodules who presented to the ER with complaint of of and shortness of breath and was found to have right midlung pneumonia, leukocytosis with concerns for blast crisis, acute anemia, acute on chronic thrombocytopenia, OCTAVIO, and other labs concerning for possible DIC.      Patient presented to the ER with 2-week history of cough, shortness of breath.  No fever or chills.  He reported recent sick contact.  He tested for whooping cough and this was negative.  He was treated with a 7-day course of doxycycline but that did not improve his symptoms.  He denied any bleeding from anywhere.  He reports compliance with the Dasatinib.  He denies any chest pain, abdominal pain, dysuria symptoms.  He denies any muscle pain or joint pain.    Past Medical History    Past Medical History:   Diagnosis Date    Acute kidney injury 04/18/2022    Fluid overload 04/18/2022    MSSA bacteremia 03/28/2022    Osteomyelitis of spine (H) 04/18/2022    Pneumonia     Pneumonia of left lower lobe due to infectious organism 03/22/2022    Sepsis (H) 04/18/2022       Past Surgical History   Past Surgical History:   Procedure Laterality Date    BONE MARROW BIOPSY, BONE SPECIMEN, NEEDLE/TROCAR Left 9/16/2024    Procedure: Bone marrow biopsy, bone specimen, needle/trocar, left posterior iliac crest;  Surgeon: Noni Sauer PA-C;  Location: [facility] OR    CV CORONARY ANGIOGRAM N/A 3/31/2022    Procedure: Coronary Angiogram;  Surgeon: Lidia Quintanilla MD;  Location: [facility] CARDIAC CATH LAB    EP ICD INSERT SINGLE N/A 4/12/2022    Procedure: Implantable Cardioverter Defibrillator Device & Lead Implant Single or Dual;  Surgeon: Suleman Higgins MD;  Location:  [facility] CARDIAC CATH LAB    IR  LUMBAR PUNCTURE  7/30/2012    REPLACE VALVE AORTIC N/A 4/1/2022    Procedure: AORTIC VALVE exploration;  Surgeon: Marti Melton MD;  Location:  OR    REPLACE VALVE TRICUSPID N/A 4/1/2022    Procedure: TRICUSPID VALVE REPLACEMENT;  Surgeon: Marti Melton MD;  Location:  OR       Medications   I have reviewed this patient's current medications       Review of Systems    The 10 point Review of Systems is negative other than noted in the HPI or here.      Physical Exam   Vital Signs: Temp: 98.6  F (37  C) Temp src: Temporal BP: 117/68 Pulse: 86   Resp: 20 SpO2: 97 % O2 Device: None (Room air)    Weight: 0 lbs 0 oz    Constitutional: awake, alert, cooperative, no apparent distress, and appears stated age  Eyes: Anicteric sclera  ENT: normocephalic, without obvious abnormality  Respiratory: On room air, equal air entry bilaterally, no wheezing or crackles  Cardiovascular: Normal rate, regular rhythm, no murmur  GI: Soft, nontender, nondistended  Musculoskeletal: no lower extremity pitting edema present  Neurologic: Awake, alert, oriented x 3, no focal deficit  Neuropsychiatric: Appropriate mood and affect    Medical Decision Making       60 MINUTES SPENT BY ME on the date of service doing chart review, history, exam, documentation & further activities per the note.      Data   ------------------------- PAST 24 HR DATA REVIEWED -----------------------------------------------

## 2025-06-11 NOTE — PHARMACY-ADMISSION MEDICATION HISTORY
Pharmacist Admission Medication History    Admission medication history is complete. The information provided in this note is only as accurate as the sources available at the time of the update.    Information Source(s): Patient via in-person    Pertinent Information: None    Changes made to PTA medication list:  Added: None  Deleted: duplicate Ozempic  Changed: None    Allergies reviewed with patient and updates made in EHR: yes    Medication History Completed By: Sonia Duran RPH 6/11/2025 7:50 AM    PTA Med List   Medication Sig Last Dose/Taking    acetaminophen (TYLENOL) 325 MG tablet Take 2 tablets (650 mg) by mouth every 4 hours as needed for mild pain or fever Taking As Needed    buPROPion (WELLBUTRIN XL) 300 MG 24 hr tablet Take 1 tablet (300 mg) by mouth every morning. 6/10/2025 Morning    dasatinib (SPRYCEL) 80 MG tablet Take 1 tablet (80 mg) by mouth daily 6/10/2025 Morning    empagliflozin (JARDIANCE) 25 MG TABS tablet Take 1 tablet (25 mg) by mouth daily. 6/10/2025 Morning    Ferrous Sulfate (IRON PO) Take 60 mg by mouth daily. 6/10/2025 Morning    furosemide (LASIX) 20 MG tablet Take 1 tablet (20 mg) by mouth daily 6/10/2025 Morning    levothyroxine (SYNTHROID/LEVOTHROID) 125 MCG tablet Take 125 mcg by mouth daily. Only on Sunday 6/8/2025 Morning    levothyroxine (SYNTHROID/LEVOTHROID) 150 MCG tablet Take 1 tablet (150 mcg) by mouth daily at 2 pm. 6/10/2025 Morning    losartan (COZAAR) 50 MG tablet Take 1 tablet (50 mg) by mouth daily. 6/10/2025 Morning    metFORMIN (GLUCOPHAGE) 1000 MG tablet Take 1 tablet (1,000 mg) by mouth 2 times daily (with meals). 6/10/2025 Morning    metoprolol succinate ER (TOPROL XL) 25 MG 24 hr tablet Take 1 tablet (25 mg) by mouth daily. 6/10/2025 Morning    Semaglutide, 2 MG/DOSE, (OZEMPIC) 8 MG/3ML pen Inject 2 mg subcutaneously every 7 days. 6/8/2025

## 2025-06-11 NOTE — ED NOTES
11:37 PM   Contacted lab at the request of Dr. Nicole and Hem/Onc provider regarding blast cell count. Per lab, the blast cell count cannot be verified until a pathologist reads the slides and confirm results. There will not be a pathologist until tomorrow, so it cannot be confirmed tonight. In a very preliminary note done by a  who looked at the slides initially, it reports 2% blast and 38% other cells. Again, this cannot be verified, but may indicate presence of blast cells. Notified Dr. Nicole with this information.

## 2025-06-11 NOTE — ED PROVIDER NOTES
Took over care of the patient at handoff.    08:30 I did get a call from pathology, concern for acute leukemia with lots of blasts, she has not counted them yet but looks like blast crisis.    Patient has a history of CML.  Was diagnosed with pneumonia and started on Zosyn.  He also was diagnosed with a DVT.  CT PE scan does not show any PEs.  Unfortunately the patient's lab work has worsened here during his visit in the emergency department.   Hemoglobin and platelets have dropped without any source of bleeding.  Concern for DIC versus tumor lysis syndrome.  Have not started the patient on any anticoagulation for DVT secondary to hemoglobin and platelets. 2 units of PRBCs and 1 unit Platelets have been ordered.    I did go in and evaluate the patient.  He does not have any complaints at this time.  He is getting an echocardiogram during my evaluation.  We did discuss that there is concern for acute leukemia.  And that he will need transfer.    I spoke with Dr. Ellis with Hematology. Recommends transfer to the [facility].     I spoke with Dr. Avila with Hematology at the [facility], this is a hematologic emergency, and the patient is the highest priority to get a bed.  Most likely will not get a bed tonight, not sure if he will have a bed tomorrow.  ED at the [facility] is not accepting any transfers.  Recommends looking out of system for a bed.  Also recommends 2 g of hydroxyurea.  Patient should also take his Dasatinib, at his home dose.    I spoke with Dr. Copeland with hematology at [facility].  She agrees this is a hematologic emergency and the patient needs immediate transfer.  She will accept the patient to the ED to initiate chemotherapy.  I spoke with Sol with the transfer system at [facility], and Dr. Robe Wilkerson is excepting the patient in the ED for auto except.    Patient is receiving 1 unit of PRBCs.  Blood bank is still preparing the other unit of PRBCs and platelets.  I did order irradiated blood and  platelets as the patient's repeat blood work shows a lower hemoglobin and lower platelets.    Patient agrees to transfer, and I think it would be most efficient if he went by air.  Patient will finish the first unit of PRBCs with the air crew, he can get the rest of the blood products at [facility].  Patient remains vitally stable.  He is awake, alert.  Patient is transferred to [facility].

## 2025-06-11 NOTE — ED NOTES
"[facility]  ED Nurse Handoff Report    ED Chief complaint: Cough  . ED Diagnosis:   Final diagnoses:   None       Allergies:   Allergies   Allergen Reactions    Vancomycin      \"Red Sonia Syndrome\"    Clindamycin Unknown    Ceftriaxone Rash       Code Status: Full Code    Activity level - Baseline/Home:  independent.  Activity Level - Current:   independent.   Lift room needed: No.   Bariatric: No   Needed: No   Isolation: No.   Infection: Not Applicable.     Respiratory status: Room air    Vital Signs (within 30 minutes):   Vitals:    06/10/25 1804 06/10/25 2230   BP: 134/76 117/68   Pulse: 86    Resp: 20    Temp: 98.6  F (37  C)    TempSrc: Temporal    SpO2: 99% 96%       Cardiac Rhythm:  ,      Pain level:    Patient confused: Yes.   Patient Falls Risk: bed/chair alarm on, nonskid shoes/slippers when out of bed, activity supervised, room door open, and toileting schedule implemented.   Elimination Status: Has voided     Patient Report - Initial Complaint: cough.   Focused Assessment: Anil Montoya is a 39 year old male with a history of diabetes mellitus II, hypothyroidism, and chronic myelocytic leukemia presents to the ED for an evaluation of a cough. The patient reports that 2 weeks ago he developed a cough and was initially treated for whooping cough/bronchitis with doxycycline which he states he finished yesterday. Since then and worsening today, he reports his cough returning and some chest pain. He states that he notes that he is more shortness of breath and that his chest pain worsens upon exertion. He describes that he has a \"bloating sensation\" when coughing or walking that begins in the center of his chest and radiates to his back. He notes that he always seems to have rhinorrhea and congestion and that nothing is productive with his cough at this time. He denies nausea, vomiting, abdominal pain, urinary symptoms, or black or bloody stool. The patient " notes that he currently is on medication for leukemia since December 2024 and because of that he is unsure if he has had a fever at any point. He also reports history of cardiac arrest and pacemaker/defibrillator placement and notes that at that time the discomfort he had had was more present in his lower back before migrating to his heart.      Abnormal Results:   Labs Ordered and Resulted from Time of ED Arrival to Time of ED Departure   D DIMER QUANTITATIVE - Abnormal       Result Value    D-Dimer Quantitative 5.51 (*)    COMPREHENSIVE METABOLIC PANEL - Abnormal    Sodium 140      Potassium 5.0      Carbon Dioxide (CO2) 18 (*)     Anion Gap 12      Urea Nitrogen 46.7 (*)     Creatinine 2.47 (*)     GFR Estimate 34 (*)     Calcium 8.6 (*)     Chloride 110 (*)     Glucose 117 (*)     Alkaline Phosphatase 102      AST 38      ALT 17      Protein Total 7.6      Albumin 4.2      Bilirubin Total 0.6     LIPASE - Abnormal    Lipase 70 (*)    TROPONIN T, HIGH SENSITIVITY - Abnormal    Troponin T, High Sensitivity 31 (*)    NT-PROBNP - Abnormal    NT-proBNP 4,922 (*)    CBC WITH PLATELETS AND DIFFERENTIAL - Abnormal    WBC Count 48.3 (*)     RBC Count 2.19 (*)     Hemoglobin 7.1 (*)     Hematocrit 21.9 (*)           MCH 32.4      MCHC 32.4      RDW 16.2 (*)     Platelet Count 21 (*)    FIBRINOGEN ACTIVITY - Abnormal    Fibrinogen Activity 521 (*)    INR - Abnormal    INR 1.48 (*)     PT 17.9 (*)    TROPONIN T, HIGH SENSITIVITY - Abnormal    Troponin T, High Sensitivity 31 (*)    ISTAT GASES LACTATE VENOUS POCT - Abnormal    Lactic Acid POCT 1.2      Bicarbonate Venous POCT 16 (*)     O2 Sat, Venous POCT 32 (*)     pCO2 Venous POCT 31 (*)     pH Venous POCT 7.33      pO2 Venous POCT 21 (*)    INFLUENZA A/B, RSV AND SARS-COV2 PCR - Normal    Influenza A PCR Negative      Influenza B PCR Negative      RSV PCR Negative      SARS CoV2 PCR Negative     PARTIAL THROMBOPLASTIN TIME - Normal    aPTT 36     MANUAL  DIFFERENTIAL   RBC AND PLATELET MORPHOLOGY   BLOOD CULTURE   BLOOD CULTURE        Chest XR,  PA & LAT   Final Result   IMPRESSION: Heart size and pulmonary vessels are normal. Dual-chamber pacemaker present. Sternal wires present.   New airspace infiltrate present right mid lung consistent with pneumonia.          Treatments provided: see MAR  Family Comments: bedside  OBS brochure/video discussed/provided to patient:  N/A  ED Medications:   Medications   sodium chloride 0.9% BOLUS 1,000 mL (1,000 mLs Intravenous $New Bag 6/11/25 0017)   piperacillin-tazobactam (ZOSYN) 4.5 g vial to attach to  mL bag (0 g Intravenous Stopped 6/11/25 0013)       Drips infusing:  No  For the majority of the shift this patient was Green.   Interventions performed were see notes.    Sepsis treatment initiated: No    Cares/treatment/interventions/medications to be completed following ED care: n/a    ED Nurse Name: Tiffanie Gore RN  12:36 AM

## 2025-06-12 LAB
BACTERIA SPEC CULT: NORMAL
BACTERIA SPEC CULT: NORMAL
PATH REPORT.COMMENTS IMP SPEC: ABNORMAL
PATH REPORT.COMMENTS IMP SPEC: YES
PATH REPORT.FINAL DX SPEC: ABNORMAL
PATH REPORT.MICROSCOPIC SPEC OTHER STN: ABNORMAL
PATH REPORT.MICROSCOPIC SPEC OTHER STN: ABNORMAL

## 2025-06-12 PROCEDURE — 99207 BLOOD MORPHOLOGY PATHOLOGIST REVIEW: CPT | Performed by: PATHOLOGY

## 2025-06-13 LAB
PATH REPORT.COMMENTS IMP SPEC: ABNORMAL
PATH REPORT.COMMENTS IMP SPEC: YES
PATH REPORT.FINAL DX SPEC: ABNORMAL
PATH REPORT.MICROSCOPIC SPEC OTHER STN: ABNORMAL
PATH REPORT.RELEVANT HX SPEC: ABNORMAL

## 2025-06-14 ENCOUNTER — HEALTH MAINTENANCE LETTER (OUTPATIENT)
Age: 40
End: 2025-06-14

## 2025-06-16 LAB
BACTERIA SPEC CULT: NO GROWTH
BACTERIA SPEC CULT: NO GROWTH

## 2025-06-23 ENCOUNTER — PATIENT OUTREACH (OUTPATIENT)
Dept: ONCOLOGY | Facility: CLINIC | Age: 40
End: 2025-06-23
Payer: COMMERCIAL

## 2025-06-23 NOTE — PROGRESS NOTES
[facility]: Cancer Care                                                                                          Pt has been admitted to [facility], no updates in care everywhere since 6/17.  Call to pt to follow up and check in.  Pt is scheduled to see Dr Varner in a  few weeks, trying to confirm if pt transferring care back to us or will stay with [facility] for treatment.  Requested a call back.    Theresa Guerrero, MICKN, RN  RN Care Coordinator  [facility]

## 2025-06-24 ENCOUNTER — PATIENT OUTREACH (OUTPATIENT)
Dept: ONCOLOGY | Facility: CLINIC | Age: 40
End: 2025-06-24
Payer: COMMERCIAL

## 2025-06-26 ENCOUNTER — TELEPHONE (OUTPATIENT)
Dept: TRANSPLANT | Facility: CLINIC | Age: 40
End: 2025-06-26
Payer: COMMERCIAL

## 2025-06-26 ENCOUNTER — PATIENT OUTREACH (OUTPATIENT)
Dept: ONCOLOGY | Facility: CLINIC | Age: 40
End: 2025-06-26
Payer: COMMERCIAL

## 2025-06-26 DIAGNOSIS — C92.00 AML (ACUTE MYELOGENOUS LEUKEMIA) (H): Primary | ICD-10-CM

## 2025-06-26 NOTE — PROGRESS NOTES
[facility]: Cancer Care                                                                                          Call received from pt, states he remains at [facility] and had a consult with the BMT team there today.  Pt states he would prefer to have all oncology care and BMT treatment here due to his home and support system being closer here.  Educated that we have consulted the BMT team here already and we are just waiting for discharge to get pt set up for a consult.  Writer able to provide basic info about BMT work up, hospital stay and post discharge appts, along with need for caregiver.  Pt states that he does live alone but his sister will be is caregiver and will be very involved in the planning of his care.  Educated on possible consolidation chemo maybe needed depending on timing of transplant, more detailed info will be given at Dr Varner appt and BMT consult.  Pt states understanding and will keep team updated on discharge planning.    Theresa Guerrero, MICKN, RN  RN Care Coordinator  [facility]

## 2025-06-27 PROBLEM — C92.10: Status: ACTIVE | Noted: 2024-09-16

## 2025-07-08 ENCOUNTER — PATIENT OUTREACH (OUTPATIENT)
Dept: ONCOLOGY | Facility: CLINIC | Age: 40
End: 2025-07-08
Payer: COMMERCIAL

## 2025-07-08 NOTE — PROGRESS NOTES
[facility]: Cancer Care                                                                                          Pt left VM asking if his endocrinology appt can be moved up.  Pt is scheduled in October, but note that pt is on their wait list.  Pt also scheduled to see his PCP in August who has been managing his diabetic medications.  My chart message sent to pt with update on the wait list but encouraged pt to call them and check in, number provided.  Also instructed pt to reach out to his PCP to move up his appt with him to address diabetic needs.    SHARON Sanchez, RN  RN Care Coordinator  [facility]

## 2025-07-12 ENCOUNTER — NURSE TRIAGE (OUTPATIENT)
Dept: NURSING | Facility: CLINIC | Age: 40
End: 2025-07-12
Payer: COMMERCIAL

## 2025-07-12 NOTE — TELEPHONE ENCOUNTER
"Patient calling to request prescription for \"saline\" flushes for his PICC.    Patient had his PICC placed about a month ago at [facility] and was recently discharged from there, but forgot to ask about flushes for his PICC.     Advised patient to call [facility] for after-hours provider, and patient verbalized understanding and agreed with plan.  Denied need for writer to transfer him to the number.    Mavis Piña RN  [facility]    Reason for Disposition   [1] Caller has URGENT medicine question about med that PCP or specialist prescribed AND [2] triager unable to answer question    Protocols used: Medication Question Call-A-AH    "

## 2025-07-14 DIAGNOSIS — N18.2 TYPE 2 DIABETES MELLITUS WITH STAGE 2 CHRONIC KIDNEY DISEASE, WITHOUT LONG-TERM CURRENT USE OF INSULIN (H): Primary | ICD-10-CM

## 2025-07-14 DIAGNOSIS — E11.22 TYPE 2 DIABETES MELLITUS WITH STAGE 2 CHRONIC KIDNEY DISEASE, WITHOUT LONG-TERM CURRENT USE OF INSULIN (H): Primary | ICD-10-CM

## 2025-07-14 NOTE — TELEPHONE ENCOUNTER
Patient calls with multiple concerns.    Patient is looking to change ozempic medication as his Onc providers believe it is interfering with his diagnosis. Future appointment scheduled with provider for ongoing medication management.    Patient needs a refill on Contour test strips. Previous endocrinology provider will no longer prescribe them and future endocrinology appointment is not until October.    Can a covering provider prescribe Contour test strips? Pharmacy and medication selected.    Thank you.

## 2025-07-15 DIAGNOSIS — Z79.899 ENCOUNTER FOR LONG-TERM (CURRENT) USE OF MEDICATIONS: ICD-10-CM

## 2025-07-15 DIAGNOSIS — C92.10 CML (CHRONIC MYELOCYTIC LEUKEMIA) (H): Primary | ICD-10-CM

## 2025-07-15 NOTE — PROGRESS NOTES
REASON FOR VISIT:  Management of chronic myeloid leukemia (CML)    HISTORY OF PRESENT ILLNESS:  Anil Montoya is a 39 year old with chronic myeloid leukemia (CML).  To summarize his course, he presented with a couple of weeks of dizziness in 09/2024 and was found to have hyperleukocytosis and thrombocytosis with moderate anemia as well as Tumor Lysis Syndrome.  Workup confirmed CML in chronic phase, hepatosplenomegaly on imaging, peripheral blood FISH and BCR-ABL p210 by PCR were detected, and bone marrow biopsy showed t(9;22) as the sole abnormality.  Peripheral blood BCR-ABL p210 by PCR was 62.5%.  He was started on dasatinib 100 mg daily in 09/2024.  Dasatinib was held for about 2 weeks in 10/2024 due to pancytopenia, was found to have iron deficiency and started an oral iron supplement, and blood cell counts improved so he resumed at 80 mg daily toward the end of 10/2024.  BCR-ABL had decreased to about 5% by 03/2025.  He presented in 06/2025 with shortness of breath, pneumonia, and leukocytosis with circulating blasts.  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  Dasatinib was stopped and venetoclax (14 days), decitabine (5 days), and 30 mg ponatinib (continuous) was started in 06/2025 for MBP-CML adapted from Nuha et al. Lancet Haematol 2024; 11: e839-49.  The course was complicated by neutropenic fever, OCTAVIO on CKD, transient bilirubin elevation, mild lipase elevation, distal DVT that spontaneously resolved, upper GI bleeding, and rash that improved.  Post-induction bone marrow biopsy in 07/2025 was normocellular with no increase in blasts, normal karyotype, and BCR-ABL around 0.1% consistent with a MR3 level response.  Visit for management of CML.    He is with his sister Christine.  Recovering from recent induction chemo.  No fevers, night sweats, or unintentional weight loss.   No dyspnea, cough, or chest pain.  Normal bowel function.  No bleeding or unusual bruising.  No new lumps or bumps.  No dizziness or chest pain.    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      albuterol (PROVENTIL) (2.5 MG/3ML) 0.083% neb solution Inhale 2.5 mg into the lungs.      furosemide (LASIX) 40 MG tablet       loratadine (CLARITIN) 10 MG tablet Take 10 mg by mouth.      melatonin 5 MG tablet       ondansetron (ZOFRAN) 8 MG tablet Take 8 mg by mouth.      pantoprazole (PROTONIX) 40 MG EC tablet Take 40 mg by mouth.      PONATinib HCl (ICLUSIG) 30 MG tablet Take 30 mg by mouth      posaconazole (NOXAFIL) 100 MG DR tablet Take 300 mg by mouth.      prochlorperazine (COMPAZINE) 10 MG tablet Take 10 mg by mouth.      venetoclax (VENCLEXTA) 100 MG tablet Take 100 mg by mouth      acetaminophen (TYLENOL) 325 MG tablet Take 2 tablets (650 mg) by mouth every 4 hours as needed for mild pain or fever 30 tablet 0    buPROPion (WELLBUTRIN XL) 300 MG 24 hr tablet Take 1 tablet (300 mg) by mouth every morning. 90 tablet 3    CONTOUR NEXT TEST test strip Use to test blood sugar 3 times daily or as directed. 150 strip 0    empagliflozin (JARDIANCE) 25 MG TABS tablet Take 1 tablet (25 mg) by mouth daily. 90 tablet 3    Ferrous Sulfate (IRON PO) Take 60 mg by mouth daily.      furosemide (LASIX) 20 MG tablet Take 1 tablet (20 mg) by mouth daily      levothyroxine (SYNTHROID/LEVOTHROID) 125 MCG tablet Take 125 mcg by mouth daily. Only on Sunday      levothyroxine (SYNTHROID/LEVOTHROID) 150 MCG tablet Take 1 tablet (150 mcg) by mouth daily at 2 pm. 90 tablet 3    losartan (COZAAR)  50 MG tablet Take 1 tablet (50 mg) by mouth daily. 90 tablet 3    metFORMIN (GLUCOPHAGE) 1000 MG tablet Take 1 tablet (1,000 mg) by mouth 2 times daily (with meals). 180 tablet 3    metoprolol succinate ER (TOPROL XL) 25 MG 24 hr tablet Take 1 tablet (25 mg) by mouth daily. 90 tablet 2    Semaglutide, 2 MG/DOSE, (OZEMPIC) 8 MG/3ML pen Inject 2 mg subcutaneously every 7 days. 9 mL 3     No current facility-administered medications for this visit.     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  Wt Readings from Last 5 Encounters:   06/02/25 93 kg (205 lb)   03/24/25 98 kg (216 lb)   03/18/25 95.3 kg (210 lb)   02/24/25 96.2 kg (212 lb)   12/17/24 88.5 kg (195 lb)     General: Well appearing.  HEENT: Sclerae anicteric.  Lungs: Breathing comfortably. No cough.  MSK: Grossly normal movement.  Neuro: Grossly non-focal.  Skin/access: Normal skin tone.  Psych: Alert and oriented. No distress.  Performance status: ECOG 1    LABORATORY DATA: Reviewed by me  Recent Labs   Lab Test 07/16/25  1322 06/11/25  1003 06/11/25  0544 06/10/25  1926 09/19/24  0437 09/18/24  0450 09/15/24  0435 09/14/24  1941 09/14/24  1523 06/01/22  1330 05/25/22  1155 05/18/22  1045   WBC 6.3 32.2* 37.8* 48.3*   < > 229.8*   < >  --    < > 6.3 6.9 3.4*   HGB 8.9* 5.9* 6.0* 7.1*   < > 9.2*   < >  --    < > 13.8 13.7 12.8*   * 11* 14* 21*   < > 463*   < >  --    < > 275 379 334   ANEU 4.4 1.3* 1.5* 1.0*   < > 135.6*   < >  --    < > 4.0 4.6 1.6   ABLA  --  21.6* 24.6* 34.3*   < >  --    < >  --    < >  --   --   --    ALYM 1.1 3.9 3.8 2.9   < > 9.2*   < >  --    < > 1.1 1.2 0.8   RETICABSCT  --  0.013*  --   --   --  0.090  --  0.118*  --   --   --   --    SED  --   --   --   --   --   --   --   --   --  19* 19* 26*    < > = values in this interval not displayed.     Recent Labs   Lab Test 07/16/25  1322 06/11/25  0544 06/11/25  0235  06/10/25  1926 05/05/25  1349 04/21/25  0731 10/15/24  0923 10/10/24  0929 10/07/24  0942    136 140   < > 137 141   < > 139 138   POTASSIUM 4.1 5.4* 4.8   < > 4.3 4.4   < > 4.3 4.9   CHLORIDE 102 107 112*   < > 101 104   < > 104 106   CO2 21* 17* 17*   < > 24 26   < > 19* 18*   BUN 27.8* 45.6* 43.9*   < > 21.9* 22.6*   < > 15.0 24.4*   CR 1.44* 2.00* 2.30*   < > 1.49* 1.38*   < > 1.12 1.40*   IMAN 9.7 8.1* 8.4*   < > 9.7 9.4   < > 8.6* 8.3*   MAG 2.0  --   --   --  2.0 2.1   < > 2.0 2.0   PHOS 5.0*  --   --   --  3.8 3.8   < > 4.1 3.5   URIC  --   --  8.2*  --   --   --   --  3.1* 3.7   LDH  --   --  501*  --   --   --   --  183 199    < > = values in this interval not displayed.     Recent Labs   Lab Test 07/16/25  1322 06/11/25  0544 06/10/25  1926 09/23/24  0938 09/19/24  0437 09/18/24  0450   AST 20 39 38   < > 28 26   ALT 20 14 17   < > 9 10   ALKPHOS 134 90 102   < > 168* 158*   BILITOTAL 1.8* 0.6 0.6   < > 0.7 0.6   INR  --   --  1.48*  --  1.36* 1.46*    < > = values in this interval not displayed.     09/17/2024 blood BCR-ABL p210 by PCR 62.5%  12/11/2024 blood BCR-ABL p210 by PCR 22.1%  3/1/2025 blood BCR-ABL p210 by PCR 4.99%  6/12/2025 blood BCR-ABL p210 by PCR 50.4%  7/11/2025 blood BCR-ABL p210 by PCR 0.1%    IMPRESSION AND PLAN:  Anil Montoya is a 39 year old with chronic myeloid leukemia (CML).    Unfortunately, CML progressed to myeloid blast phase and he received induction chemotherapy at [facility] with venetoclax (14 days), decitabine (5 days), and 30 mg ponatinib (continuous) that started in 06/14/2025.  The bone marrow biopsy around Day 14 and available post-treatment bone marrow biopsy results indicate an excellent MR3 level response and blood counts are recovering well.  We discussed that alloBMT followed by BCR-ABL TKI maintenance would provide the best chance for CML cure, and he has a visit with our BMT team later this week.  To maintain/deepen the remission, we agreed to  continue to the current treatment with venetoclax, decitabine, and venetoclax starting ASAP.  We again reviewed the risks/benefits and side effects of treatment and that greatest risk is CML.  We are hoping he can proceed with alloBMT ASAP.      Past iron deficiency anemia improved with oral iron and recent blood cell transfusions.    There are no active ID issues.  He will continue acyclovir and posaconazole (continuous for venetoclax dosing to start) as well as levofloxacin when ANC is less than 1.0 x 10^9/L for infection prophylaxis.      He will continue to work with Cardiology for his complex cardiology issues, Endocrine and PCP for diabetes management, and his primary care provider Dr. Chester Monge for health maintenance and other medical issues.    We will request infusion appointments and monitoring labs and an CARTER visit in a couple of weeks.  He has a BMT visit later this week.  I reminded him to contact us if questions, concerns, or new issues come up between visits.    Total of 60 minutes on patient visit, reviewing records, interpreting test results, placing orders, and documentation on the day of service.    The longitudinal plan of care for the diagnosis(es)/condition(s) as documented were addressed during this visit. Due to the added complexity in care, I will continue to support Anil in the subsequent management and with ongoing continuity of care.    Jesus Varner MD, PhD  Attending Physician, [facility]  [phone] [facility]

## 2025-07-16 ENCOUNTER — ONCOLOGY VISIT (OUTPATIENT)
Dept: ONCOLOGY | Facility: CLINIC | Age: 40
End: 2025-07-16
Attending: INTERNAL MEDICINE
Payer: COMMERCIAL

## 2025-07-16 ENCOUNTER — PATIENT OUTREACH (OUTPATIENT)
Dept: ONCOLOGY | Facility: CLINIC | Age: 40
End: 2025-07-16

## 2025-07-16 ENCOUNTER — LAB (OUTPATIENT)
Dept: LAB | Facility: CLINIC | Age: 40
End: 2025-07-16
Payer: COMMERCIAL

## 2025-07-16 ENCOUNTER — ENROLLMENT (OUTPATIENT)
Dept: HOME HEALTH SERVICES | Facility: HOME HEALTH | Age: 40
End: 2025-07-16
Payer: COMMERCIAL

## 2025-07-16 DIAGNOSIS — Z95.4 S/P TVR (TRICUSPID VALVE REPLACEMENT): ICD-10-CM

## 2025-07-16 DIAGNOSIS — N18.2 CKD (CHRONIC KIDNEY DISEASE) STAGE 2, GFR 60-89 ML/MIN: ICD-10-CM

## 2025-07-16 DIAGNOSIS — N18.2 TYPE 2 DIABETES MELLITUS WITH STAGE 2 CHRONIC KIDNEY DISEASE, UNSPECIFIED WHETHER LONG TERM INSULIN USE (H): Primary | ICD-10-CM

## 2025-07-16 DIAGNOSIS — C92.10 CML (CHRONIC MYELOCYTIC LEUKEMIA) (H): Primary | ICD-10-CM

## 2025-07-16 DIAGNOSIS — E11.22 TYPE 2 DIABETES MELLITUS WITH STAGE 2 CHRONIC KIDNEY DISEASE, UNSPECIFIED WHETHER LONG TERM INSULIN USE (H): Primary | ICD-10-CM

## 2025-07-16 DIAGNOSIS — C92.10 CML (CHRONIC MYELOCYTIC LEUKEMIA) (H): ICD-10-CM

## 2025-07-16 DIAGNOSIS — C92.10 BLASTIC PHASE CHRONIC MYELOID LEUKEMIA (H): ICD-10-CM

## 2025-07-16 DIAGNOSIS — C92.00 ACUTE MYELOID LEUKEMIA NOT HAVING ACHIEVED REMISSION (H): ICD-10-CM

## 2025-07-16 DIAGNOSIS — C92.01 ACUTE MYELOID LEUKEMIA IN REMISSION (H): ICD-10-CM

## 2025-07-16 DIAGNOSIS — C92.00 ACUTE MYELOID LEUKEMIA NOT HAVING ACHIEVED REMISSION (H): Primary | ICD-10-CM

## 2025-07-16 LAB
ABO + RH BLD: NORMAL
ALBUMIN SERPL BCG-MCNC: 4.1 G/DL (ref 3.5–5.2)
ALP SERPL-CCNC: 134 U/L (ref 40–150)
ALT SERPL W P-5'-P-CCNC: 20 U/L (ref 0–70)
ANION GAP SERPL CALCULATED.3IONS-SCNC: 16 MMOL/L (ref 7–15)
AST SERPL W P-5'-P-CCNC: 20 U/L (ref 0–45)
BASOPHILS # BLD MANUAL: 0.2 10E3/UL (ref 0–0.2)
BASOPHILS NFR BLD MANUAL: 4 %
BILIRUB SERPL-MCNC: 1.8 MG/DL
BLD GP AB SCN SERPL QL: NEGATIVE
BUN SERPL-MCNC: 27.8 MG/DL (ref 6–20)
CALCIUM SERPL-MCNC: 9.7 MG/DL (ref 8.8–10.4)
CHLORIDE SERPL-SCNC: 102 MMOL/L (ref 98–107)
CREAT SERPL-MCNC: 1.44 MG/DL (ref 0.67–1.17)
EGFRCR SERPLBLD CKD-EPI 2021: 64 ML/MIN/1.73M2
ELLIPTOCYTES BLD QL SMEAR: SLIGHT
EOSINOPHIL # BLD MANUAL: 0 10E3/UL (ref 0–0.7)
EOSINOPHIL NFR BLD MANUAL: 0 %
ERYTHROCYTE [DISTWIDTH] IN BLOOD BY AUTOMATED COUNT: 19.2 % (ref 10–15)
GLUCOSE SERPL-MCNC: 310 MG/DL (ref 70–99)
HCO3 SERPL-SCNC: 21 MMOL/L (ref 22–29)
HCT VFR BLD AUTO: 27.2 % (ref 40–53)
HGB BLD-MCNC: 8.9 G/DL (ref 13.3–17.7)
LYMPHOCYTES # BLD MANUAL: 1.1 10E3/UL (ref 0.8–5.3)
LYMPHOCYTES NFR BLD MANUAL: 18 %
MAGNESIUM SERPL-MCNC: 2 MG/DL (ref 1.7–2.3)
MCH RBC QN AUTO: 30 PG (ref 26.5–33)
MCHC RBC AUTO-ENTMCNC: 32.7 G/DL (ref 31.5–36.5)
MCV RBC AUTO: 92 FL (ref 78–100)
MONOCYTES # BLD MANUAL: 0.5 10E3/UL (ref 0–1.3)
MONOCYTES NFR BLD MANUAL: 9 %
NEUTROPHILS # BLD MANUAL: 4.4 10E3/UL (ref 1.6–8.3)
NEUTROPHILS NFR BLD MANUAL: 69 %
NRBC # BLD AUTO: 0.2 10E3/UL
NRBC BLD MANUAL-RTO: 4 %
PHOSPHATE SERPL-MCNC: 5 MG/DL (ref 2.5–4.5)
PLAT MORPH BLD: ABNORMAL
PLATELET # BLD AUTO: 121 10E3/UL (ref 150–450)
POLYCHROMASIA BLD QL SMEAR: SLIGHT
POTASSIUM SERPL-SCNC: 4.1 MMOL/L (ref 3.4–5.3)
PROT SERPL-MCNC: 8.9 G/DL (ref 6.4–8.3)
RBC # BLD AUTO: 2.97 10E6/UL (ref 4.4–5.9)
RBC MORPH BLD: ABNORMAL
SODIUM SERPL-SCNC: 139 MMOL/L (ref 135–145)
SPECIMEN EXP DATE BLD: NORMAL
WBC # BLD AUTO: 6.3 10E3/UL (ref 4–11)

## 2025-07-16 PROCEDURE — 99215 OFFICE O/P EST HI 40 MIN: CPT | Performed by: INTERNAL MEDICINE

## 2025-07-16 PROCEDURE — G2211 COMPLEX E/M VISIT ADD ON: HCPCS | Performed by: INTERNAL MEDICINE

## 2025-07-16 PROCEDURE — 86900 BLOOD TYPING SEROLOGIC ABO: CPT | Performed by: NURSE PRACTITIONER

## 2025-07-16 PROCEDURE — 85027 COMPLETE CBC AUTOMATED: CPT

## 2025-07-16 PROCEDURE — 86644 CMV ANTIBODY: CPT

## 2025-07-16 PROCEDURE — 84100 ASSAY OF PHOSPHORUS: CPT

## 2025-07-16 PROCEDURE — 85007 BL SMEAR W/DIFF WBC COUNT: CPT

## 2025-07-16 PROCEDURE — 99212 OFFICE O/P EST SF 10 MIN: CPT | Performed by: INTERNAL MEDICINE

## 2025-07-16 PROCEDURE — 36592 COLLECT BLOOD FROM PICC: CPT

## 2025-07-16 PROCEDURE — 83735 ASSAY OF MAGNESIUM: CPT

## 2025-07-16 PROCEDURE — 86832 HLA CLASS I HIGH DEFIN QUAL: CPT

## 2025-07-16 PROCEDURE — 86828 HLA CLASS I&II ANTIBODY QUAL: CPT

## 2025-07-16 PROCEDURE — 82247 BILIRUBIN TOTAL: CPT

## 2025-07-16 PROCEDURE — 81382 HLA II TYPING 1 LOC HR: CPT

## 2025-07-16 RX ORDER — HEPARIN SODIUM,PORCINE 10 UNIT/ML
5-20 VIAL (ML) INTRAVENOUS DAILY PRN
Status: CANCELLED | OUTPATIENT
Start: 2025-07-17

## 2025-07-16 RX ORDER — LORATADINE 10 MG/1
10 TABLET ORAL
COMMUNITY
Start: 2025-07-11 | End: 2025-08-10

## 2025-07-16 RX ORDER — ALBUTEROL SULFATE 0.83 MG/ML
2.5 SOLUTION RESPIRATORY (INHALATION)
OUTPATIENT
Start: 2025-07-21

## 2025-07-16 RX ORDER — METHYLPREDNISOLONE SODIUM SUCCINATE 40 MG/ML
40 INJECTION INTRAMUSCULAR; INTRAVENOUS
Start: 2025-07-21

## 2025-07-16 RX ORDER — HEPARIN SODIUM (PORCINE) LOCK FLUSH IV SOLN 100 UNIT/ML 100 UNIT/ML
5 SOLUTION INTRAVENOUS
OUTPATIENT
Start: 2025-07-18

## 2025-07-16 RX ORDER — PANTOPRAZOLE SODIUM 40 MG/1
40 TABLET, DELAYED RELEASE ORAL
COMMUNITY
Start: 2025-07-12

## 2025-07-16 RX ORDER — LORAZEPAM 2 MG/ML
0.5 INJECTION INTRAMUSCULAR EVERY 4 HOURS PRN
OUTPATIENT
Start: 2025-07-18

## 2025-07-16 RX ORDER — MEPERIDINE HYDROCHLORIDE 25 MG/ML
25 INJECTION INTRAMUSCULAR; INTRAVENOUS; SUBCUTANEOUS
OUTPATIENT
Start: 2025-07-21

## 2025-07-16 RX ORDER — HEPARIN SODIUM,PORCINE 10 UNIT/ML
5-20 VIAL (ML) INTRAVENOUS DAILY PRN
OUTPATIENT
Start: 2025-07-20

## 2025-07-16 RX ORDER — HEPARIN SODIUM (PORCINE) LOCK FLUSH IV SOLN 100 UNIT/ML 100 UNIT/ML
5 SOLUTION INTRAVENOUS
OUTPATIENT
Start: 2025-07-21

## 2025-07-16 RX ORDER — LORAZEPAM 2 MG/ML
0.5 INJECTION INTRAMUSCULAR EVERY 4 HOURS PRN
OUTPATIENT
Start: 2025-07-19

## 2025-07-16 RX ORDER — DIPHENHYDRAMINE HYDROCHLORIDE 50 MG/ML
50 INJECTION, SOLUTION INTRAMUSCULAR; INTRAVENOUS
Start: 2025-07-19

## 2025-07-16 RX ORDER — ALBUTEROL SULFATE 90 UG/1
1-2 INHALANT RESPIRATORY (INHALATION)
Start: 2025-07-19

## 2025-07-16 RX ORDER — LORAZEPAM 2 MG/ML
0.5 INJECTION INTRAMUSCULAR EVERY 4 HOURS PRN
OUTPATIENT
Start: 2025-07-21

## 2025-07-16 RX ORDER — LEVOFLOXACIN 250 MG/1
250 TABLET, FILM COATED ORAL DAILY
Qty: 30 TABLET | Refills: 3 | Status: SHIPPED | OUTPATIENT
Start: 2025-07-16 | End: 2025-07-16

## 2025-07-16 RX ORDER — DIPHENHYDRAMINE HYDROCHLORIDE 50 MG/ML
50 INJECTION, SOLUTION INTRAMUSCULAR; INTRAVENOUS
Status: CANCELLED
Start: 2025-07-17

## 2025-07-16 RX ORDER — HEPARIN SODIUM,PORCINE 10 UNIT/ML
5-20 VIAL (ML) INTRAVENOUS DAILY PRN
OUTPATIENT
Start: 2025-07-18

## 2025-07-16 RX ORDER — HEPARIN SODIUM (PORCINE) LOCK FLUSH IV SOLN 100 UNIT/ML 100 UNIT/ML
5 SOLUTION INTRAVENOUS
OUTPATIENT
Start: 2025-07-19

## 2025-07-16 RX ORDER — EPINEPHRINE 1 MG/ML
0.3 INJECTION, SOLUTION INTRAMUSCULAR; SUBCUTANEOUS EVERY 5 MIN PRN
OUTPATIENT
Start: 2025-07-21

## 2025-07-16 RX ORDER — LORAZEPAM 2 MG/ML
0.5 INJECTION INTRAMUSCULAR EVERY 4 HOURS PRN
OUTPATIENT
Start: 2025-07-20

## 2025-07-16 RX ORDER — METHYLPREDNISOLONE SODIUM SUCCINATE 40 MG/ML
40 INJECTION INTRAMUSCULAR; INTRAVENOUS
Start: 2025-07-18

## 2025-07-16 RX ORDER — LORAZEPAM 2 MG/ML
0.5 INJECTION INTRAMUSCULAR EVERY 4 HOURS PRN
Status: CANCELLED | OUTPATIENT
Start: 2025-07-17

## 2025-07-16 RX ORDER — LEVOFLOXACIN 250 MG/1
250 TABLET, FILM COATED ORAL DAILY
Qty: 30 TABLET | Refills: 3 | Status: SHIPPED | OUTPATIENT
Start: 2025-07-16

## 2025-07-16 RX ORDER — ALBUTEROL SULFATE 90 UG/1
1-2 INHALANT RESPIRATORY (INHALATION)
Start: 2025-07-21

## 2025-07-16 RX ORDER — ALBUTEROL SULFATE 90 UG/1
1-2 INHALANT RESPIRATORY (INHALATION)
Start: 2025-07-18

## 2025-07-16 RX ORDER — EPINEPHRINE 1 MG/ML
0.3 INJECTION, SOLUTION INTRAMUSCULAR; SUBCUTANEOUS EVERY 5 MIN PRN
OUTPATIENT
Start: 2025-07-20

## 2025-07-16 RX ORDER — METHYLPREDNISOLONE SODIUM SUCCINATE 40 MG/ML
40 INJECTION INTRAMUSCULAR; INTRAVENOUS
Start: 2025-07-19

## 2025-07-16 RX ORDER — DIPHENHYDRAMINE HYDROCHLORIDE 50 MG/ML
25 INJECTION, SOLUTION INTRAMUSCULAR; INTRAVENOUS
Start: 2025-07-21

## 2025-07-16 RX ORDER — MEPERIDINE HYDROCHLORIDE 25 MG/ML
25 INJECTION INTRAMUSCULAR; INTRAVENOUS; SUBCUTANEOUS
Status: CANCELLED | OUTPATIENT
Start: 2025-07-17

## 2025-07-16 RX ORDER — ALBUTEROL SULFATE 0.83 MG/ML
2.5 SOLUTION RESPIRATORY (INHALATION)
COMMUNITY
Start: 2025-07-11

## 2025-07-16 RX ORDER — FUROSEMIDE 40 MG/1
TABLET ORAL
COMMUNITY
Start: 2025-07-13

## 2025-07-16 RX ORDER — POSACONAZOLE 100 MG/1
300 TABLET, DELAYED RELEASE ORAL EVERY MORNING
Qty: 90 TABLET | Refills: 3 | Status: SHIPPED | OUTPATIENT
Start: 2025-07-16

## 2025-07-16 RX ORDER — ALBUTEROL SULFATE 0.83 MG/ML
2.5 SOLUTION RESPIRATORY (INHALATION)
Status: CANCELLED | OUTPATIENT
Start: 2025-07-17

## 2025-07-16 RX ORDER — HEPARIN SODIUM (PORCINE) LOCK FLUSH IV SOLN 100 UNIT/ML 100 UNIT/ML
5 SOLUTION INTRAVENOUS
OUTPATIENT
Start: 2025-07-20

## 2025-07-16 RX ORDER — METHYLPREDNISOLONE SODIUM SUCCINATE 40 MG/ML
40 INJECTION INTRAMUSCULAR; INTRAVENOUS
Status: CANCELLED
Start: 2025-07-17

## 2025-07-16 RX ORDER — ALBUTEROL SULFATE 90 UG/1
1-2 INHALANT RESPIRATORY (INHALATION)
Start: 2025-07-20

## 2025-07-16 RX ORDER — ALBUTEROL SULFATE 90 UG/1
1-2 INHALANT RESPIRATORY (INHALATION)
Status: CANCELLED
Start: 2025-07-17

## 2025-07-16 RX ORDER — DIPHENHYDRAMINE HYDROCHLORIDE 50 MG/ML
25 INJECTION, SOLUTION INTRAMUSCULAR; INTRAVENOUS
Start: 2025-07-18

## 2025-07-16 RX ORDER — ALBUTEROL SULFATE 0.83 MG/ML
2.5 SOLUTION RESPIRATORY (INHALATION)
OUTPATIENT
Start: 2025-07-19

## 2025-07-16 RX ORDER — MEPERIDINE HYDROCHLORIDE 25 MG/ML
25 INJECTION INTRAMUSCULAR; INTRAVENOUS; SUBCUTANEOUS
OUTPATIENT
Start: 2025-07-19

## 2025-07-16 RX ORDER — EPINEPHRINE 1 MG/ML
0.3 INJECTION, SOLUTION INTRAMUSCULAR; SUBCUTANEOUS EVERY 5 MIN PRN
OUTPATIENT
Start: 2025-07-19

## 2025-07-16 RX ORDER — DIPHENHYDRAMINE HYDROCHLORIDE 50 MG/ML
50 INJECTION, SOLUTION INTRAMUSCULAR; INTRAVENOUS
Start: 2025-07-20

## 2025-07-16 RX ORDER — DIPHENHYDRAMINE HYDROCHLORIDE 50 MG/ML
50 INJECTION, SOLUTION INTRAMUSCULAR; INTRAVENOUS
Start: 2025-07-21

## 2025-07-16 RX ORDER — EPINEPHRINE 1 MG/ML
0.3 INJECTION, SOLUTION INTRAMUSCULAR; SUBCUTANEOUS EVERY 5 MIN PRN
Status: CANCELLED | OUTPATIENT
Start: 2025-07-17

## 2025-07-16 RX ORDER — HEPARIN SODIUM,PORCINE 10 UNIT/ML
5-20 VIAL (ML) INTRAVENOUS DAILY PRN
OUTPATIENT
Start: 2025-07-19

## 2025-07-16 RX ORDER — ACYCLOVIR 400 MG/1
400 TABLET ORAL
COMMUNITY
Start: 2025-07-11 | End: 2025-08-10

## 2025-07-16 RX ORDER — PROCHLORPERAZINE MALEATE 10 MG
10 TABLET ORAL
COMMUNITY
Start: 2025-06-17 | End: 2026-06-17

## 2025-07-16 RX ORDER — POSACONAZOLE 100 MG/1
300 TABLET, DELAYED RELEASE ORAL
COMMUNITY
Start: 2025-06-14

## 2025-07-16 RX ORDER — MEPERIDINE HYDROCHLORIDE 25 MG/ML
25 INJECTION INTRAMUSCULAR; INTRAVENOUS; SUBCUTANEOUS
OUTPATIENT
Start: 2025-07-18

## 2025-07-16 RX ORDER — HEPARIN SODIUM (PORCINE) LOCK FLUSH IV SOLN 100 UNIT/ML 100 UNIT/ML
5 SOLUTION INTRAVENOUS
Status: CANCELLED | OUTPATIENT
Start: 2025-07-17

## 2025-07-16 RX ORDER — DIPHENHYDRAMINE HYDROCHLORIDE 50 MG/ML
25 INJECTION, SOLUTION INTRAMUSCULAR; INTRAVENOUS
Start: 2025-07-20

## 2025-07-16 RX ORDER — DIPHENHYDRAMINE HYDROCHLORIDE 50 MG/ML
25 INJECTION, SOLUTION INTRAMUSCULAR; INTRAVENOUS
Start: 2025-07-19

## 2025-07-16 RX ORDER — ALBUTEROL SULFATE 0.83 MG/ML
2.5 SOLUTION RESPIRATORY (INHALATION)
OUTPATIENT
Start: 2025-07-20

## 2025-07-16 RX ORDER — HEPARIN SODIUM,PORCINE 10 UNIT/ML
5-20 VIAL (ML) INTRAVENOUS DAILY PRN
OUTPATIENT
Start: 2025-07-21

## 2025-07-16 RX ORDER — EPINEPHRINE 1 MG/ML
0.3 INJECTION, SOLUTION INTRAMUSCULAR; SUBCUTANEOUS EVERY 5 MIN PRN
OUTPATIENT
Start: 2025-07-18

## 2025-07-16 RX ORDER — ONDANSETRON 8 MG/1
8 TABLET, FILM COATED ORAL
COMMUNITY
Start: 2025-06-17 | End: 2026-06-17

## 2025-07-16 RX ORDER — METHYLPREDNISOLONE SODIUM SUCCINATE 40 MG/ML
40 INJECTION INTRAMUSCULAR; INTRAVENOUS
Start: 2025-07-20

## 2025-07-16 RX ORDER — DIPHENHYDRAMINE HYDROCHLORIDE 50 MG/ML
25 INJECTION, SOLUTION INTRAMUSCULAR; INTRAVENOUS
Status: CANCELLED
Start: 2025-07-17

## 2025-07-16 RX ORDER — ALBUTEROL SULFATE 0.83 MG/ML
2.5 SOLUTION RESPIRATORY (INHALATION)
OUTPATIENT
Start: 2025-07-18

## 2025-07-16 RX ORDER — DIPHENHYDRAMINE HYDROCHLORIDE 50 MG/ML
50 INJECTION, SOLUTION INTRAMUSCULAR; INTRAVENOUS
Start: 2025-07-18

## 2025-07-16 RX ORDER — MEPERIDINE HYDROCHLORIDE 25 MG/ML
25 INJECTION INTRAMUSCULAR; INTRAVENOUS; SUBCUTANEOUS
OUTPATIENT
Start: 2025-07-20

## 2025-07-16 NOTE — LETTER
7/16/2025      Anil Montoya  [address] [address] [address]      Dear Colleague,    Thank you for referring your patient, Anil Montoya, to the [facility]. Please see a copy of my visit note below.    REASON FOR VISIT:  Management of chronic myeloid leukemia (CML)    HISTORY OF PRESENT ILLNESS:  Anil Montoya is a 39 year old with chronic myeloid leukemia (CML).  To summarize his course, he presented with a couple of weeks of dizziness in 09/2024 and was found to have hyperleukocytosis and thrombocytosis with moderate anemia as well as Tumor Lysis Syndrome.  Workup confirmed CML in chronic phase, hepatosplenomegaly on imaging, peripheral blood FISH and BCR-ABL p210 by PCR were detected, and bone marrow biopsy showed t(9;22) as the sole abnormality.  Peripheral blood BCR-ABL p210 by PCR was 62.5%.  He was started on dasatinib 100 mg daily in 09/2024.  Dasatinib was held for about 2 weeks in 10/2024 due to pancytopenia, was found to have iron deficiency and started an oral iron supplement, and blood cell counts improved so he resumed at 80 mg daily toward the end of 10/2024.  BCR-ABL had decreased to about 5% by 03/2025.  He presented in 06/2025 with shortness of breath, pneumonia, and leukocytosis with circulating blasts.  He was admitted at 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  Dasatinib was stopped and venetoclax (14 days), decitabine (5 days), and 30 mg ponatinib (continuous) was started in 06/2025 for MBP-CML adapted from Short et al. Lancet Haematol 2024; 11: e839-49.  The course was complicated by neutropenic fever, OCTAVIO on CKD, transient bilirubin elevation, mild lipase elevation, distal DVT that spontaneously resolved, upper GI bleeding, and rash that improved.  Post-induction bone marrow biopsy in 07/2025 was normocellular  with no increase in blasts, normal karyotype, and BCR-ABL around 0.1% consistent with a MR3 level response.  Visit for management of CML.    He is with his sister Christine.  Recovering from recent induction chemo.  No fevers, night sweats, or unintentional weight loss.  No dyspnea, cough, or chest pain.  Normal bowel function.  No bleeding or unusual bruising.  No new lumps or bumps.  No dizziness or chest pain.    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       albuterol (PROVENTIL) (2.5 MG/3ML) 0.083% neb solution Inhale 2.5 mg into the lungs.       furosemide (LASIX) 40 MG tablet        loratadine (CLARITIN) 10 MG tablet Take 10 mg by mouth.       melatonin 5 MG tablet        ondansetron (ZOFRAN) 8 MG tablet Take 8 mg by mouth.       pantoprazole (PROTONIX) 40 MG EC tablet Take 40 mg by mouth.       PONATinib HCl (ICLUSIG) 30 MG tablet Take 30 mg by mouth       posaconazole (NOXAFIL) 100 MG DR tablet Take 300 mg by mouth.       prochlorperazine (COMPAZINE) 10 MG tablet Take 10 mg by mouth.       venetoclax (VENCLEXTA) 100 MG tablet Take 100 mg by mouth       acetaminophen (TYLENOL) 325 MG tablet Take 2 tablets (650 mg) by mouth every 4 hours as needed for mild pain or fever 30 tablet 0     buPROPion (WELLBUTRIN XL) 300 MG 24 hr tablet Take 1 tablet (300 mg) by mouth every morning. 90 tablet 3     CONTOUR NEXT TEST test strip Use to test blood sugar 3 times daily or as directed. 150 strip 0     empagliflozin (JARDIANCE) 25 MG TABS tablet Take 1 tablet (25 mg) by mouth daily. 90 tablet 3     Ferrous Sulfate (IRON PO) Take 60 mg by mouth daily.       furosemide (LASIX)  20 MG tablet Take 1 tablet (20 mg) by mouth daily       levothyroxine (SYNTHROID/LEVOTHROID) 125 MCG tablet Take 125 mcg by mouth daily. Only on Sunday       levothyroxine (SYNTHROID/LEVOTHROID) 150 MCG tablet Take 1 tablet (150 mcg) by mouth daily at 2 pm. 90 tablet 3     losartan (COZAAR) 50 MG tablet Take 1 tablet (50 mg) by mouth daily. 90 tablet 3     metFORMIN (GLUCOPHAGE) 1000 MG tablet Take 1 tablet (1,000 mg) by mouth 2 times daily (with meals). 180 tablet 3     metoprolol succinate ER (TOPROL XL) 25 MG 24 hr tablet Take 1 tablet (25 mg) by mouth daily. 90 tablet 2     Semaglutide, 2 MG/DOSE, (OZEMPIC) 8 MG/3ML pen Inject 2 mg subcutaneously every 7 days. 9 mL 3     No current facility-administered medications for this visit.     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  Wt Readings from Last 5 Encounters:   06/02/25 93 kg (205 lb)   03/24/25 98 kg (216 lb)   03/18/25 95.3 kg (210 lb)   02/24/25 96.2 kg (212 lb)   12/17/24 88.5 kg (195 lb)     General: Well appearing.  HEENT: Sclerae anicteric.  Lungs: Breathing comfortably. No cough.  MSK: Grossly normal movement.  Neuro: Grossly non-focal.  Skin/access: Normal skin tone.  Psych: Alert and oriented. No distress.  Performance status: ECOG 1    LABORATORY DATA: Reviewed by me  Recent Labs   Lab Test 07/16/25  1322 06/11/25  1003 06/11/25  0544 06/10/25  1926 09/19/24  0437 09/18/24  0450 09/15/24  0435 09/14/24  1941 09/14/24  1523 06/01/22  1330 05/25/22  1155 05/18/22  1045   WBC 6.3 32.2* 37.8* 48.3*   < > 229.8*   < >  --    < > 6.3 6.9 3.4*   HGB 8.9* 5.9* 6.0* 7.1*   < > 9.2*   < >  --    < > 13.8 13.7 12.8*   * 11* 14* 21*   < > 463*   < >  --    < > 275 379 334   ANEU 4.4 1.3* 1.5* 1.0*   < > 135.6*   < >  --    < > 4.0 4.6 1.6   ABLA  --  21.6* 24.6* 34.3*   < >  --    < >  --    < >  --   --   --    ALYM 1.1 3.9 3.8 2.9   < > 9.2*    < >  --    < > 1.1 1.2 0.8   RETICABSCT  --  0.013*  --   --   --  0.090  --  0.118*  --   --   --   --    SED  --   --   --   --   --   --   --   --   --  19* 19* 26*    < > = values in this interval not displayed.     Recent Labs   Lab Test 07/16/25  1322 06/11/25  0544 06/11/25  0235 06/10/25  1926 05/05/25  1349 04/21/25  0731 10/15/24  0923 10/10/24  0929 10/07/24  0942    136 140   < > 137 141   < > 139 138   POTASSIUM 4.1 5.4* 4.8   < > 4.3 4.4   < > 4.3 4.9   CHLORIDE 102 107 112*   < > 101 104   < > 104 106   CO2 21* 17* 17*   < > 24 26   < > 19* 18*   BUN 27.8* 45.6* 43.9*   < > 21.9* 22.6*   < > 15.0 24.4*   CR 1.44* 2.00* 2.30*   < > 1.49* 1.38*   < > 1.12 1.40*   IMAN 9.7 8.1* 8.4*   < > 9.7 9.4   < > 8.6* 8.3*   MAG 2.0  --   --   --  2.0 2.1   < > 2.0 2.0   PHOS 5.0*  --   --   --  3.8 3.8   < > 4.1 3.5   URIC  --   --  8.2*  --   --   --   --  3.1* 3.7   LDH  --   --  501*  --   --   --   --  183 199    < > = values in this interval not displayed.     Recent Labs   Lab Test 07/16/25  1322 06/11/25  0544 06/10/25  1926 09/23/24  0938 09/19/24  0437 09/18/24  0450   AST 20 39 38   < > 28 26   ALT 20 14 17   < > 9 10   ALKPHOS 134 90 102   < > 168* 158*   BILITOTAL 1.8* 0.6 0.6   < > 0.7 0.6   INR  --   --  1.48*  --  1.36* 1.46*    < > = values in this interval not displayed.     09/17/2024 blood BCR-ABL p210 by PCR 62.5%  12/11/2024 blood BCR-ABL p210 by PCR 22.1%  3/1/2025 blood BCR-ABL p210 by PCR 4.99%  6/12/2025 blood BCR-ABL p210 by PCR 50.4%  7/11/2025 blood BCR-ABL p210 by PCR 0.1%    IMPRESSION AND PLAN:  Anil Montoya is a 39 year old with chronic myeloid leukemia (CML).    Unfortunately, CML progressed to myeloid blast phase and he received induction chemotherapy at [facility] with venetoclax (14 days), decitabine (5 days), and 30 mg ponatinib (continuous) that started in 06/14/2025.  The bone marrow biopsy around Day 14 and available post-treatment bone marrow biopsy results  indicate an excellent MR3 level response and blood counts are recovering well.  We discussed that alloBMT followed by BCR-ABL TKI maintenance would provide the best chance for CML cure, and he has a visit with our BMT team later this week.  To maintain/deepen the remission, we agreed to continue to the current treatment with venetoclax, decitabine, and venetoclax starting ASAP.  We again reviewed the risks/benefits and side effects of treatment and that greatest risk is CML.  We are hoping he can proceed with alloBMT ASAP.      Past iron deficiency anemia improved with oral iron and recent blood cell transfusions.    There are no active ID issues.  He will continue acyclovir and posaconazole (continuous for venetoclax dosing to start) as well as levofloxacin when ANC is less than 1.0 x 10^9/L for infection prophylaxis.      He will continue to work with Cardiology for his complex cardiology issues, Endocrine and PCP for diabetes management, and his primary care provider Dr. Chester Monge for health maintenance and other medical issues.    We will request infusion appointments and monitoring labs and an CARTER visit in a couple of weeks.  He has a BMT visit later this week.  I reminded him to contact us if questions, concerns, or new issues come up between visits.    Total of 60 minutes on patient visit, reviewing records, interpreting test results, placing orders, and documentation on the day of service.    The longitudinal plan of care for the diagnosis(es)/condition(s) as documented were addressed during this visit. Due to the added complexity in care, I will continue to support Anil in the subsequent management and with ongoing continuity of care.    Jesus Varner MD, PhD  Attending Physician, [facility]  [phone] clinic    Again, thank you for allowing me to participate in the care of your patient.        Sincerely,        Jesus Varner MD    Electronically signed

## 2025-07-16 NOTE — NURSING NOTE
"Oncology Rooming Note    July 16, 2025 2:19 PM   Anil Montoya is a 39 year old male who presents for:    Chief Complaint   Patient presents with    Oncology Clinic Visit     Blastic phase chronic myeloid leukemia     Initial Vitals: There were no vitals taken for this visit. Estimated body mass index is 29.41 kg/m  as calculated from the following:    Height as of 6/2/25: 1.778 m (5' 10\").    Weight as of 6/2/25: 93 kg (205 lb). There is no height or weight on file to calculate BSA.  Data Unavailable Comment: Data Unavailable   No LMP for male patient.  Allergies reviewed: Yes  Medications reviewed: Yes    Medications: Medication refills not needed today.  Pharmacy name entered into [employer]:    [id number] - [place], [place] - [address]  [facility] - [place], [place] - [address]  [facility] - [place], [place] - [address]    PHQ9:  Did this patient require a PHQ9?: No      Clinical concerns: pt needs refills depending on if he is continuing them     Need more diabetic meds, isnt on ozempic      Al Marcano              "

## 2025-07-16 NOTE — NURSING NOTE
Chief Complaint   Patient presents with    Blood Draw     Labs drawn via PICC by RN in lab.      Labs drawn via PICC.  Line flushed and Saline locked.     Laurie Alonzo RN

## 2025-07-17 ENCOUNTER — HOME INFUSION (OUTPATIENT)
Dept: HOME HEALTH SERVICES | Facility: HOME HEALTH | Age: 40
End: 2025-07-17
Payer: COMMERCIAL

## 2025-07-17 ENCOUNTER — INFUSION THERAPY VISIT (OUTPATIENT)
Dept: ONCOLOGY | Facility: CLINIC | Age: 40
End: 2025-07-17
Attending: INTERNAL MEDICINE
Payer: COMMERCIAL

## 2025-07-17 ENCOUNTER — HOME INFUSION BILLING (OUTPATIENT)
Dept: HOME HEALTH SERVICES | Facility: HOME HEALTH | Age: 40
End: 2025-07-17
Payer: COMMERCIAL

## 2025-07-17 ENCOUNTER — TELEPHONE (OUTPATIENT)
Dept: ONCOLOGY | Facility: CLINIC | Age: 40
End: 2025-07-17

## 2025-07-17 VITALS
HEART RATE: 93 BPM | HEIGHT: 69 IN | WEIGHT: 202.4 LBS | BODY MASS INDEX: 29.98 KG/M2 | DIASTOLIC BLOOD PRESSURE: 74 MMHG | SYSTOLIC BLOOD PRESSURE: 113 MMHG | OXYGEN SATURATION: 99 % | RESPIRATION RATE: 16 BRPM | TEMPERATURE: 98.1 F

## 2025-07-17 DIAGNOSIS — C92.00 ACUTE MYELOID LEUKEMIA NOT HAVING ACHIEVED REMISSION (H): Primary | ICD-10-CM

## 2025-07-17 LAB
CMV IGG SERPL IA-ACNC: <0.2 U/ML
CMV IGG SERPL IA-ACNC: NORMAL

## 2025-07-17 PROCEDURE — A6257 TRANSPARENT FILM <= 16 SQ IN: HCPCS

## 2025-07-17 PROCEDURE — A4245 ALCOHOL WIPES PER BOX: HCPCS

## 2025-07-17 PROCEDURE — A4452 WATERPROOF TAPE: HCPCS

## 2025-07-17 PROCEDURE — S1015 IV TUBING EXTENSION SET: HCPCS

## 2025-07-17 PROCEDURE — A9270 NON-COVERED ITEM OR SERVICE: HCPCS

## 2025-07-17 PROCEDURE — A5200 PERCUTANEOUS CATHETER ANCHOR: HCPCS

## 2025-07-17 PROCEDURE — A4455 ADHESIVE REMOVER PER OUNCE: HCPCS

## 2025-07-17 PROCEDURE — 258N000003 HC RX IP 258 OP 636: Performed by: INTERNAL MEDICINE

## 2025-07-17 PROCEDURE — 250N000011 HC RX IP 250 OP 636: Performed by: INTERNAL MEDICINE

## 2025-07-17 RX ADMIN — DECITABINE 43 MG: 50 INJECTION, POWDER, LYOPHILIZED, FOR SOLUTION INTRAVENOUS at 11:40

## 2025-07-17 RX ADMIN — SODIUM CHLORIDE 250 ML: 0.9 INJECTION, SOLUTION INTRAVENOUS at 11:40

## 2025-07-17 ASSESSMENT — PAIN SCALES - GENERAL: PAINLEVEL_OUTOF10: MILD PAIN (3)

## 2025-07-17 NOTE — PATIENT INSTRUCTIONS
[facility] Triage and after hours / weekends / holidays:  [phone]    Please call the triage or after hours line if you experience a temperature greater than or equal to 100.4, shaking chills, have uncontrolled nausea, vomiting and/or diarrhea, dizziness, shortness of breath, chest pain, bleeding, unexplained bruising, or if you have any other new/concerning symptoms, questions or concerns.      If you are having any concerning symptoms or wish to speak to a provider before your next infusion visit, please call triage to notify them so we can adequately serve you.     If you need a refill on a narcotic prescription or other medication, please call before your infusion appointment.

## 2025-07-17 NOTE — ORAL ONC MGMT
Oral Chemotherapy Monitoring Program    Lab Monitoring Plan  CBC and Lipase every two weeks x 6 then monthly. CMP monthly. Uric acid and phosphorus with infusion visits.  Subjective/Objective:  Anil Montoya is a 39 year old male contacted by phone for an initial visit for oral chemotherapy education. This is cycle 2 for Anil, he had cycle one at [facility].        6/14/2025    12:00 PM 7/15/2025     2:00 PM 7/15/2025     3:00 PM 7/16/2025    12:00 PM 7/17/2025    12:05 PM 7/17/2025     4:00 PM 7/17/2025     4:51 PM   ORAL CHEMOTHERAPY   Assessment Type Other Initial Work up Initial Work up Refill Refill New Teach New Teach   Diagnosis Code Acute Myeloid Leukemia (AML) Acute Myeloid Leukemia (AML) Acute Myeloid Leukemia (AML) Acute Myeloid Leukemia (AML)  Acute Myeloid Leukemia (AML) Acute Myeloid Leukemia (AML)   Providers Dr. Telly Varner   [facility]   Drug Name Iclusig (ponatinib) Iclusig (ponatinib) Venclexta (venetoclax) Iclusig (ponatinib) Venclexta (venetoclax) Venclexta (venetoclax) Iclusig (ponatinib)   Dose 30 mg 30 mg 100 mg   100 mg 30 mg   Current Schedule Daily Daily Daily   Daily Daily   Cycle Details Continuous Continuous Continuous   Continuous Continuous   Planned next cycle start date  7/16/2025 7/16/2025 7/17/2025 7/17/2025       Last PHQ-2 Score on record:       2/23/2025     9:13 PM 1/11/2024     9:42 AM   PHQ-2 ( 1999 [employer])   Q1: Little interest or pleasure in doing things 0 0   Q2: Feeling down, depressed or hopeless 0 0   PHQ-2 Score 0  0   Q1: Little interest or pleasure in doing things Not at all    Q2: Feeling down, depressed or hopeless Not at all    PHQ-2 Score 0        Patient-reported       Vitals:  BP:   BP Readings from Last 1 Encounters:   07/17/25 113/74     Wt Readings from Last 1 Encounters:   07/17/25 91.8 kg (202  "lb 6.4 oz)     Estimated body surface area is 2.11 meters squared as calculated from the following:    Height as of an earlier encounter on 7/17/25: 1.74 m (5' 8.5\").    Weight as of an earlier encounter on 7/17/25: 91.8 kg (202 lb 6.4 oz).    Labs:  _  Result Component Current Result Ref Range   Sodium 139 (7/16/2025) 135 - 145 mmol/L     _  Result Component Current Result Ref Range   Potassium 4.1 (7/16/2025) 3.4 - 5.3 mmol/L     _  Result Component Current Result Ref Range   Calcium 9.7 (7/16/2025) 8.8 - 10.4 mg/dL     _  Result Component Current Result Ref Range   Magnesium 2.0 (7/16/2025) 1.7 - 2.3 mg/dL     _  Result Component Current Result Ref Range   Phosphorus 5.0 (H) (7/16/2025) 2.5 - 4.5 mg/dL     _  Result Component Current Result Ref Range   Albumin 4.1 (7/16/2025) 3.5 - 5.2 g/dL     _  Result Component Current Result Ref Range   Urea Nitrogen 27.8 (H) (7/16/2025) 6.0 - 20.0 mg/dL     _  Result Component Current Result Ref Range   Creatinine 1.44 (H) (7/16/2025) 0.67 - 1.17 mg/dL     _  Result Component Current Result Ref Range   AST 20 (7/16/2025) 0 - 45 U/L     _  Result Component Current Result Ref Range   ALT 20 (7/16/2025) 0 - 70 U/L     _  Result Component Current Result Ref Range   Bilirubin Total 1.8 (H) (7/16/2025) <=1.2 mg/dL     _  Result Component Current Result Ref Range   WBC Count 6.3 (7/16/2025) 4.0 - 11.0 10e3/uL     _  Result Component Current Result Ref Range   Hemoglobin 8.9 (L) (7/16/2025) 13.3 - 17.7 g/dL     _  Result Component Current Result Ref Range   Platelet Count 121 (L) (7/16/2025) 150 - 450 10e3/uL     _  Result Component Current Result Ref Range   Absolute Neutrophils 4.4 (7/16/2025) 1.6 - 8.3 10e3/uL     No results found for ANC within last 30 days.        Assessment:  Patient is appropriate to start therapy.    Plan:  Basic chemotherapy teaching was reviewed with the patient including dose and drug monitoring plan. He declined detailed review of teaching information as " he was on it already for cycle one with his previous provider. Link to written materials was provided and all questions answered to Anil stated satisfaction. He said he only need #7 more tablets of Venclexta to finish the 3 weeks of dosing for cycle 2.    Follow-Up:  In about one week.     Robe MckeonD  [facility]  [phone]  July 17, 2025

## 2025-07-17 NOTE — PROGRESS NOTES
Infusion Nursing Note:  Anil Montoya presents today for Cycle 2 Day 1 Decitibine.    Patient seen by provider today: No   present during visit today: Not Applicable.    Note:     Pt new to [facility]; previously received chemo at [facility]. Oriented to room/call light and AVS - highlighting phone number to call and when. Pt and sister verbalized understanding. Pt had a visit with Dr. Varner yesterday and denies any new issues or concerns overnight.     Taking venetoclax and ponatinib as prescribed. Pt states he has anti-emetics at home and has not had nausea with this during cycle 1.     Intravenous Access:  PICC.   Dressing changed.     Treatment Conditions:  Lab Results   Component Value Date    HGB 8.9 (L) 07/16/2025    WBC 6.3 07/16/2025    ANEU 4.4 07/16/2025     (L) 07/16/2025        Lab Results   Component Value Date     07/16/2025    POTASSIUM 4.1 07/16/2025    MAG 2.0 07/16/2025    CR 1.44 (H) 07/16/2025    IMAN 9.7 07/16/2025    BILITOTAL 1.8 (H) 07/16/2025    ALBUMIN 4.1 07/16/2025    ALT 20 07/16/2025    AST 20 07/16/2025     Results reviewed, labs MET treatment parameters, ok to proceed with treatment.  Labs drawn reviewed by dr. Varner.     Post Infusion Assessment:  Patient tolerated infusion without incident.  Blood return noted pre and post infusion.  Site patent and intact, free from redness, edema or discomfort.  No evidence of extravasations.       Discharge Plan:   Discharge instructions reviewed with: Patient.  Copy of AVS reviewed with patient and/or family.  Patient will return 7/18 for next appointment.  Patient discharged in stable condition accompanied by: sister  Departure Mode: Ambulatory.      Paola Prasad RN

## 2025-07-17 NOTE — PROGRESS NOTES
[facility]  Start of Care/Resumption of Care Note    [facility] SOC    DX: Acute myeloid leukemia not having achieved remission (H) [C92.00]    Therapy:     Next Dose Due: 7/18/2025    Delivery plan:  to deliver by EOD    Nursing plan: Pharmacy Only    Teaching Plan: Liaison Teach Done?: No    Other Info: None    Jose Antonio Leong RN 07/17/25

## 2025-07-18 ENCOUNTER — OFFICE VISIT (OUTPATIENT)
Dept: TRANSPLANT | Facility: CLINIC | Age: 40
End: 2025-07-18
Attending: INTERNAL MEDICINE
Payer: COMMERCIAL

## 2025-07-18 VITALS
HEART RATE: 90 BPM | SYSTOLIC BLOOD PRESSURE: 114 MMHG | DIASTOLIC BLOOD PRESSURE: 77 MMHG | WEIGHT: 202.2 LBS | BODY MASS INDEX: 30.29 KG/M2 | RESPIRATION RATE: 16 BRPM | TEMPERATURE: 97.7 F | OXYGEN SATURATION: 100 %

## 2025-07-18 DIAGNOSIS — C92.00 AML (ACUTE MYELOGENOUS LEUKEMIA) (H): Primary | ICD-10-CM

## 2025-07-18 DIAGNOSIS — C92.01 ACUTE MYELOID LEUKEMIA IN REMISSION (H): Primary | ICD-10-CM

## 2025-07-18 DIAGNOSIS — I44.2 ATRIOVENTRICULAR BLOCK, COMPLETE (H): ICD-10-CM

## 2025-07-18 PROBLEM — Z86.74 HISTORY OF CARDIAC ARREST: Status: ACTIVE | Noted: 2022-04-22

## 2025-07-18 PROBLEM — Z86.74 HISTORY OF CARDIAC ARREST: Status: RESOLVED | Noted: 2022-04-22 | Resolved: 2025-07-18

## 2025-07-18 PROBLEM — I38 ENDOCARDITIS: Status: RESOLVED | Noted: 2022-04-01 | Resolved: 2025-07-18

## 2025-07-18 PROBLEM — I49.01 VENTRICULAR FIBRILLATION (H): Status: RESOLVED | Noted: 2022-04-18 | Resolved: 2025-07-18

## 2025-07-18 PROBLEM — R79.89 ELEVATED SERUM CREATININE: Status: ACTIVE | Noted: 2025-06-30

## 2025-07-18 PROBLEM — I38 ENDOCARDITIS: Status: ACTIVE | Noted: 2022-04-01

## 2025-07-18 PROBLEM — Z95.4 S/P TVR (TRICUSPID VALVE REPLACEMENT): Status: RESOLVED | Noted: 2022-04-18 | Resolved: 2025-07-18

## 2025-07-18 PROBLEM — Z86.79 HISTORY OF ENDOCARDITIS IN ADULTHOOD: Status: RESOLVED | Noted: 2022-04-01 | Resolved: 2025-07-18

## 2025-07-18 PROBLEM — N18.31 STAGE 3A CHRONIC KIDNEY DISEASE (H): Status: ACTIVE | Noted: 2025-06-11

## 2025-07-18 PROBLEM — A49.01 METHICILLIN SUSCEPTIBLE STAPHYLOCOCCUS AUREUS INFECTION: Status: ACTIVE | Noted: 2022-05-04

## 2025-07-18 PROBLEM — Z95.2 HEART VALVE TRANSPLANTED: Status: RESOLVED | Noted: 2022-04-18 | Resolved: 2025-07-18

## 2025-07-18 PROBLEM — I46.9 CARDIAC ARREST (H): Status: RESOLVED | Noted: 2022-04-18 | Resolved: 2025-07-18

## 2025-07-18 PROBLEM — Z95.2 HEART VALVE TRANSPLANTED: Status: ACTIVE | Noted: 2022-04-18

## 2025-07-18 PROBLEM — R79.89 ELEVATED SERUM CREATININE: Status: RESOLVED | Noted: 2025-06-30 | Resolved: 2025-07-18

## 2025-07-18 PROBLEM — Z95.810 STATUS POST INTERNAL CARDIAC DEFIBRILLATOR PROCEDURE: Status: RESOLVED | Noted: 2025-06-11 | Resolved: 2025-07-18

## 2025-07-18 PROBLEM — Z95.810 STATUS POST INTERNAL CARDIAC DEFIBRILLATOR PROCEDURE: Status: ACTIVE | Noted: 2025-06-11

## 2025-07-18 PROBLEM — N18.31 STAGE 3A CHRONIC KIDNEY DISEASE (H): Status: RESOLVED | Noted: 2025-06-11 | Resolved: 2025-07-18

## 2025-07-18 PROBLEM — A49.01 METHICILLIN SUSCEPTIBLE STAPHYLOCOCCUS AUREUS INFECTION: Status: RESOLVED | Noted: 2022-05-04 | Resolved: 2025-07-18

## 2025-07-18 PROCEDURE — 1126F AMNT PAIN NOTED NONE PRSNT: CPT | Performed by: INTERNAL MEDICINE

## 2025-07-18 PROCEDURE — 99215 OFFICE O/P EST HI 40 MIN: CPT | Performed by: INTERNAL MEDICINE

## 2025-07-18 PROCEDURE — S5501 HIT COMPLEX CATH CARE: HCPCS

## 2025-07-18 PROCEDURE — G2211 COMPLEX E/M VISIT ADD ON: HCPCS | Performed by: INTERNAL MEDICINE

## 2025-07-18 PROCEDURE — 3074F SYST BP LT 130 MM HG: CPT | Performed by: INTERNAL MEDICINE

## 2025-07-18 PROCEDURE — 99213 OFFICE O/P EST LOW 20 MIN: CPT | Mod: 25 | Performed by: INTERNAL MEDICINE

## 2025-07-18 PROCEDURE — 3078F DIAST BP <80 MM HG: CPT | Performed by: INTERNAL MEDICINE

## 2025-07-18 ASSESSMENT — PAIN SCALES - GENERAL: PAINLEVEL_OUTOF10: NO PAIN (0)

## 2025-07-18 NOTE — LETTER
7/18/2025      Anil Montoya  [address] [facility] [address]      Dear Colleague,    Thank you for referring your patient, Anil Montoya, to the [facility] [place]. Please see a copy of my visit note below.           BMT Consultation    Anil Montoya is a 39 year old male referred by Dr. Varner for CML blast crisis.    Oncology History   Blastic phase chronic myeloid leukemia (H)   9/16/2024 Initial Diagnosis    CML (chronic myelocytic leukemia) (H)     9/18/2024 - 3/20/2025 Chemotherapy    Oral ONC CML Chronic Phase - Dasatinib  Plan Provider: Jesus Varner MD  Treatment goal: Other  Line of treatment: First Line     Acute myeloid leukemia not having achieved remission (H)   6/13/2025 -  Chemotherapy    IP - OP ONC AML - Decitabine (5 days) / Venetoclax  Plan Provider: Jesus Varner MD  Treatment goal: Other  Line of treatment: [No plan line of treatment]     7/16/2025 Initial Diagnosis    Acute myeloid leukemia not having achieved remission (H)           Hematologic history:   CML blast crisis    7/11/25: bone marrow CR: MRD neg by flow, 46 XY, PCR 0.1% BCRABL  6/12/25: Acute myeloid leukemia with monocytic differentiation, with 90% marrow blasts and 65% circulating blasts. 46,XY,t(9;22)(q34;q11.2)[20]   1) KRAS: Chr12(GRCh37):g.82559628C>T; NM_033360.3(KRAS):c.38G>A; p.Yuu28Idq (38%)     2) STAG2: ChrX(GRCh37):g.810775862B>G; NM_[id number](STAG2):c.3203C>G; p.Akm8038* (75%)     FLT3: Not Detected   IDH1: Not Detected   IDH2: Not Detected   Variants of Uncertain Significance:   1) RAD21: Chr8(GRCh37):g.325888559kol;   NM_006265.2(RAD21):c.-276del; p.? (49%)       Date BCRABL PCR   9/17/24 62.5%   12/11/24 22.1%   3/1/25 4.99%   6/12/25 50.4%   7/11/25 0.1%       Date Treatment Response Toxicities/Complications   9/24 Dasatinib HR    6/14/25 C1D1 Dec/priyanka/ponatinib CR    7//17/25 C2D1  Dec/priyanka/ponatinib                  HPI:  Please see my entry above for hematologic history.  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 He was diagnosed with CML in September 2024 with BCR-ABL p210 at 62.5% and was initiated on dasatinib. In June 2025 patient developed progressive cough and was found to have pneumonia; other evaluation significant for leukocytosis (WBC 48.3) and 71% circulating blasts.      Dasatinib stopped on 06/11. Patient received hydrea on 06/10 and 06/11 for cytoreduction. Bone marrow biopsy confirmed CML Blast Phase/ Acute myeloid leukemia with monocytic differentiation, with 90% marrow blasts and 65% circulating blasts. Chromosomes confirmed BCR-ABL1 fusion. BCR-ABL p210 at 50.4%. Kinase domain negative. NGS revealed KRAS/STAG2 mutations.     He initiated induction with decitabine, venetoclax, and ponatinib on 06/14.  While hospitalized, he developed the following problems:    OCTAVIO with CKD: resolved by discharge  Bilateral Pulmonary Infiltrates R > L (PNA Versus DAH): He received IV zosyn from 06/11-06/17.  Extensive infectious workup was negative.   Abnormal Echocardiogram: A baseline ECHO was obtained on 06/12 that was notable for significantly increased tricuspid valve gradients, pericardial thickening and RWMA. Consensus considered the finding acute and not lasting.   RLE DVT: Imaging reported a DVT in the right posterior tibial vein mid to distal calf. Due to thrombocytopenia and bleeding risk, anticoagulation was not started. Patient reported no pain to RLE and remained asymptomatic. A repeat RLE US on 06/12 was negative for acute DVT.   Hyperglycemia: Patient's home anti-hyperglycemic regimen was held during admission. Discharge medications included Metformin and Jardiance.  Ozempic was discontinued due to potential interactions with ponatinib and based on anecdotal worry for relapse.   Upper GI Bleed:  Managed with IV PPI. Patient  continued on oral PPI at discharge.   Neutropenic Fever: patient developed persistent fevers from 06/26-06/28. He was initiated on cefepime and then linezolid (d/t allergies). Cefepime, Cefdinir, Ceftriaxone were avoided due to rash.   Rash/Drug Eruption: after initiated Cefepime and Linezolid patient developed rash over his body. He was initiated on triamcinolone cream. Allergy and Dermatology consulted who recommended avoiding Cefepime, Ceftriaxone, Zosyn, Allopurinol, Cefdinir. Rash improved.     ASSESSMENT AND PLAN:  Chronic myelogenous leukemia with the development of myeloid blast crisis-is a 39-year-old that has an unfortunate early transition of CML to myeloid blast crisis after only 6 months or so of TKI therapy. 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 He is not known to have immuno deficiency of any kind, and he does not have a significant past medical history as best we can tell. Nonetheless his prior history may increase his risk of allogeneic transplant and this will be something that we need to consult our infectious disease colleagues to review.He also gives an unusual history of being unable to be treated with insulin.. He has endocrine consultation in a couple weeks which will be important to help develop a strategy for managing his hyperglycemia during transplant.    Today in clinic, I reviewed the rationale for allogeneic transplant and indicated that this is the best option moving forward to attempt to cure his underlying disease. I pointed out to him that myeloid blast crisis is a remarkably difficult disease to treat but fortunately his early induction results are excellent, and he is a candidate for longterm maintenance which can  significantly decrease his risk of recurrence after allogeneic transplant. He has significant comorbidities, and may not be an appropriate candidate for a miloablative regimen, although this is preferred and we will anticipate using busulfan and fludarabine. For now, I recommended that he continue his treatment with Dr. Varner as we sort out his donor options. He has 2 full siblings, and is likely to have many matched unrelated donors as well.    Will a transplant be safe?  HCTCI estimated to be 4.  Estimated 2-year non-relapse mortality is 41%.  With PTCy as the backbone of GVHD prophylaxis,  life-threatening grade III-IV acute GVHD and moderate/severe chronic GVHD incidence is <20%.      Will a transplant be efficacious?   CIBMTR Survival Calculator estimates 1 year overall survival to be 60%. Relapse of the underlying disease is the most frequent cause of transplant failure, which might be mitigated by maintenance strategies.    Plan:    Referring Oncologist Office will do the following:  NA          BMT Office will do the following  ID consultation regarding previous infection history (ordered 7/21)  PFTs now given history of cryptococcal pneumonia (ordered 7/21)  Endocrine consultation for DM management (already scheduled)      Primary Desired Protocol: TX4353-66  Arm: Bu/Flyu  Preferred Graft Source: PBSC    Alternate/Back-up Protocol: HL6376-16  Arm: NA    Clinical Trials Discussed: No  Approximate Timeline to workup: 6 weeks    Orders placed for interim testing prior to transplant workup: Yes        ROS:    10 point ROS neg other than the symptoms noted above in the HPI.        Past Medical History:   Diagnosis Date     Acute kidney injury 04/18/2022     Bilateral pulmonary infiltrates on CXR 06/22/2012     Cardiac arrest (H) 04/18/2022     Cryptococcal pneumonitis (H) 07/05/2012    Cryptococcal pneumonia       Endocarditis 04/01/2022     Fluid overload 04/18/2022     History of cardiac arrest 04/22/2022      History of endocarditis in adulthood 04/01/2022     Methicillin susceptible Staphylococcus aureus infection 05/04/2022     MSSA bacteremia 03/28/2022     WARNER (obstructive sleep apnea) 04/18/2022     Osteomyelitis of spine (H) 04/18/2022     Pneumonia      Pneumonia of left lower lobe due to infectious organism 03/22/2022     S/P TVR (tricuspid valve replacement)-31 mm Epic  04/18/2022     Sepsis (H) 04/18/2022     Status post internal cardiac defibrillator procedure 06/11/2025     Ventricular fibrillation (H) 04/18/2022       Past Surgical History:   Procedure Laterality Date     BONE MARROW BIOPSY, BONE SPECIMEN, NEEDLE/TROCAR Left 9/16/2024    Procedure: Bone marrow biopsy, bone specimen, needle/trocar, left posterior iliac crest;  Surgeon: Noni Sauer PA-C;  Location: [facility] OR     CV CORONARY ANGIOGRAM N/A 3/31/2022    Procedure: Coronary Angiogram;  Surgeon: Lidia Quintanilla MD;  Location:  HE[facility] CARDI[facility] CATH LAB     EP ICD INSERT SINGLE N/A 4/12/2022    Procedure: Implantable Cardioverter Defibrillator Device & Lead Implant Single or Dual;  Surgeon: Suleman Higgins MD;  Location:  HEART CARDI[facility] CATH LAB     IR LUMBAR PUNCTURE  7/30/2012     REPLACE VALVE AORTIC N/A 4/1/2022    Procedure: AORTIC VALVE exploration;  Surgeon: Marti Melton MD;  Location:  OR     REPLACE VALVE TRICUSPID N/A 4/1/2022    Procedure: TRICUSPID VALVE REPLACEMENT;  Surgeon: Marti Melton MD;  Location:  OR       Family History   Problem Relation Age of Onset     Diabetes Mother      Diabetes Father      Diabetes Sister        Social History     Tobacco Use     Smoking status: Never     Smokeless tobacco: Never   Vaping Use     Vaping status: Never Used   Substance Use Topics     Alcohol use: Not Currently     Drug use: Never         Allergies   Allergen Reactions     Allopurinol Rash     Morbilliform eruption with elevated LFTs and creatinine.  See allergy note from 07/01/2025      "Piperacillin Sod-Tazobactam So Rash     Morbilliform eruption with elevated LFTs and creatinine.  See allergy note from 07/01/2025     Vancomycin      \"Red Sonia Syndrome\"     Clindamycin Unknown     Ceftriaxone Rash     Corn-Containing Products GI Disturbance     GI disturbance     Linezolid Rash     Rash - concurrent with 2nd abx        Current Outpatient Medications   Medication Sig Dispense Refill     acyclovir (ZOVIRAX) 400 MG tablet Take 400 mg by mouth.       albuterol (PROVENTIL) (2.5 MG/3ML) 0.083% neb solution Inhale 2.5 mg into the lungs.       buPROPion (WELLBUTRIN XL) 300 MG 24 hr tablet Take 1 tablet (300 mg) by mouth every morning. 90 tablet 3     CONTOUR NEXT TEST test strip Use to test blood sugar 3 times daily or as directed. 150 strip 0     Emergency Supply Kit, Central, Patient use for emergency only. Contents: 3 sodium chloride 0.9% flushes, 1 dressing kit, 1 microclave ext set 14\", 4 nitrile gloves (med), 6 alcohol prep pads, 1 bacitracin, 1 syringe (10 cc 20 G 1\"). Call [phone] to reorder. 865129 kit 0     empagliflozin (JARDIANCE) 25 MG TABS tablet Take 1 tablet (25 mg) by mouth daily. 90 tablet 3     Ferrous Sulfate (IRON PO) Take 60 mg by mouth daily.       furosemide (LASIX) 20 MG tablet Take 1 tablet (20 mg) by mouth daily (Patient not taking: Reported on 7/19/2025)       furosemide (LASIX) 40 MG tablet        levofloxacin (LEVAQUIN) 250 MG tablet Take 1 tablet (250 mg) by mouth daily. Take when absolute neutrophil count less than 1.0 (x 10^9/L) 30 tablet 3     levothyroxine (SYNTHROID/LEVOTHROID) 125 MCG tablet Take 125 mcg by mouth daily. Only on Sunday       levothyroxine (SYNTHROID/LEVOTHROID) 150 MCG tablet Take 1 tablet (150 mcg) by mouth daily at 2 pm. 90 tablet 3     loratadine (CLARITIN) 10 MG tablet Take 10 mg by mouth.       losartan (COZAAR) 50 MG tablet Take 1 tablet (50 mg) by mouth daily. 90 tablet 3     melatonin 5 MG tablet        metFORMIN (GLUCOPHAGE) 1000 MG " tablet Take 1 tablet (1,000 mg) by mouth 2 times daily (with meals). 180 tablet 3     metoprolol succinate ER (TOPROL XL) 25 MG 24 hr tablet Take 1 tablet (25 mg) by mouth daily. 90 tablet 2     ondansetron (ZOFRAN) 8 MG tablet Take 8 mg by mouth.       pantoprazole (PROTONIX) 40 MG EC tablet Take 40 mg by mouth.       PONATinib HCl (ICLUSIG) 30 MG tablet Take 30 mg by mouth       posaconazole (NOXAFIL) 100 MG DR tablet Take 3 tablets (300 mg) by mouth every morning. 90 tablet 3     posaconazole (NOXAFIL) 100 MG DR tablet Take 300 mg by mouth.       prochlorperazine (COMPAZINE) 10 MG tablet Take 10 mg by mouth.       Semaglutide, 2 MG/DOSE, (OZEMPIC) 8 MG/3ML pen Inject 2 mg subcutaneously every 7 days. 9 mL 3     sodium chloride, PF, 0.9% PF flush Inject 10 mLs into the vein as needed for line flush. Flush IV before and after medication administration as directed and/or at least every 24 hours. 042016 mL 0     venetoclax (VENCLEXTA) 100 MG tablet Take 100 mg by mouth         KPS:  80    Physical Exam:   /77   Pulse 90   Temp 97.7  F (36.5  C) (Oral)   Resp 16   Wt 91.7 kg (202 lb 3.2 oz)   SpO2 100%   BMI 30.29 kg/m      Physical Exam  Vitals reviewed.   Constitutional:       Appearance: Normal appearance.   HENT:      Mouth/Throat:      Mouth: Mucous membranes are moist.   Eyes:      Conjunctiva/sclera: Conjunctivae normal.   Cardiovascular:      Rate and Rhythm: Normal rate and regular rhythm.      Pulses: Normal pulses.   Pulmonary:      Effort: Pulmonary effort is normal.   Musculoskeletal:         General: No swelling.   Skin:     General: Skin is warm.   Neurological:      General: No focal deficit present.      Mental Status: He is alert.      Cranial Nerves: Cranial nerve deficit present.   Psychiatric:         Mood and Affect: Mood normal.         Behavior: Behavior normal.         Thought Content: Thought content normal.         Judgment: Judgment normal.            TODAY'S ASSESSMENT BY  "SYSTEMS     HEMATOLOGY, COAGULATION    H/o CML    IMMUNOCOMPROMISED HOST  Unsure - in question given previous cryptococcal pneumonia    PULMONARY  Past history of cryptococcal pneumonia with \"scarring\"    LIVER  At risk for cirrhosis due to [place]    GASTROINTESTINAL  NA    RENAL  NA    CARDIOVASCULAR  H/o tricuspid replacement    ENDOCRINE  Proposed insulin intolerance    MUSCULOSKELETAL  none    NEUROLOGIC  none    PSYCHIATRIC  none    SOCIAL  Good social support    Anil understood the above assessment and recommendations.  Multiple questions answered.  No barriers to learning identified.       Known issues that I take into account for medical decisions, with salient changes to the plan considering these complexities noted above.    Patient Active Problem List   Diagnosis     Long QT interval     Type 2 diabetes mellitus with stage 2 chronic kidney disease (H)     Transient hyperglycemia post procedure     ICD (implantable cardioverter-defibrillator) in place     WARNER (obstructive sleep apnea)     Morbid obesity (H)     Blastic phase chronic myeloid leukemia (H)     CKD (chronic kidney disease) stage 2, GFR 60-89 ml/min     Iron deficiency anemia secondary to inadequate dietary iron intake     Acute myeloid leukemia not having achieved remission (H)     Attention deficit hyperactivity disorder (ADHD)     Hypogonadism male     Atrioventricular block, complete (H)       Today's summary: He has an appropriate candidate for allogeneic transplant, although we will need to do some investigation to determine whether he is capable of receiving a myeloablative regimen based on his prior history.    I spent 120 minutes in the care of this patient today, which included time necessary for preparation for the visit, obtaining history, ordering medications/tests/procedures as medically indicated, review of pertinent medical literature, counseling of the patient, communication of recommendations to the care team, and documentation " time.    The longitudinal plan of care for the diagnosis(es)/condition(s) as documented were addressed during this visit. Due to the added complexity in care, I will continue to support Anil in the subsequent management and with ongoing continuity of care.    FARZANEH STRANGE MD  July 18, 2025        ------------------------------------------------------------------------------------------------------------------------------------------------    Patient Care Team         Relationship Specialty Notifications Start End    Chester Monge MD PCP - General Internal Medicine  2/24/25     Phone: [phone] Pager: [phone] Fax: [phone]        [address] [place] [address]    Jesus Varner MD MD Hematology Admissions 9/18/24     Phone: [phone] Fax: [phone]         [address] [place] [address]    Noni Sauer, PATrinyC Physician Assistant Physician Assistant - Medical  9/18/24     Phone: [phone] Fax: [phone]         [address] [place] [address], [facility] [address], [address] [place] [address]    Theresa Guerrero, RN Specialty Care Coordinator Hematology & Oncology Admissions 9/24/24     Ana Sullivan APRN CNP Nurse Practitioner  Admissions 10/23/24     Phone: [phone] Fax: [phone]         [address] [place] [address]    Jesus Varner MD Assigned Cancer Care Provider   12/23/24     Phone: [phone] Fax: [phone]         [address] [place] [address]    Mik Payne MD Physician Endocrinology, Diabetes, and Metabolism  1/13/25     Phone: [phone] Fax: [phone] [address] [facility] [address]    Chester Monge MD Assigned PCP   3/23/25     Phone: [phone] Pager: [phone] Fax: [phone]        [address] [facility] [address]    Marielena Umanzor PA-C Assigned Heart and Vascular Provider   4/23/25     Phone: [phone] Fax: [phone] [address] [facility] [address]    Ramandeep Alanis, RN hospitals Resource Team Primary Care - CC Admissions 7/17/25 7/18/25    Phone: [phone]         Jesus Varner MD Home Infusion Following Provider Hematology  7/17/25     Phone: [phone] Fax: [phone] [address] [facility] [address] [facility] [address]    Jose Antonio Leong RN hospitals Resource Team  Admissions 7/17/25 7/18/25    Sondra Marion APRN CNP Nurse Practitioner Nurse Practitioner Primary Care  7/17/25     Phone: [phone] Fax: [phone] [address] [place] [address]              Again, thank you for allowing me to participate in the care of your patient.        Sincerely,        FARZANEH STRANGE MD    Electronically signed

## 2025-07-18 NOTE — PROGRESS NOTES
BMT Consultation    Anil Montoya is a 39 year old male referred by Dr. Varner for CML blast crisis.    Oncology History   Blastic phase chronic myeloid leukemia (H)   9/16/2024 Initial Diagnosis    CML (chronic myelocytic leukemia) (H)     9/18/2024 - 3/20/2025 Chemotherapy    Oral ONC CML Chronic Phase - Dasatinib  Plan Provider: Jesus Varner MD  Treatment goal: Other  Line of treatment: First Line     Acute myeloid leukemia not having achieved remission (H)   6/13/2025 -  Chemotherapy    IP - OP ONC AML - Decitabine (5 days) / Venetoclax  Plan Provider: Jesus Varner MD  Treatment goal: Other  Line of treatment: [No plan line of treatment]     7/16/2025 Initial Diagnosis    Acute myeloid leukemia not having achieved remission (H)           Hematologic history:   CML blast crisis    7/11/25: bone marrow CR: MRD neg by flow, 46 XY, PCR 0.1% BCRABL  6/12/25: Acute myeloid leukemia with monocytic differentiation, with 90% marrow blasts and 65% circulating blasts. 46,XY,t(9;22)(q34;q11.2)[20]   1) KRAS: Chr12(GRCh37):g.30346344Q>T; NM_033360.3(KRAS):c.38G>A; p.Gsc85Hii (38%)     2) STAG2: ChrX(GRCh37):g.189295640Q>G; [id number](STAG2):c.3203C>G; p.Rhd1562* (75%)     FLT3: Not Detected   IDH1: Not Detected   IDH2: Not Detected   Variants of Uncertain Significance:   1) RAD21: Chr8(GRCh37):g.420460710fqa;   NM_006265.2(RAD21):c.-276del; p.? (49%)       Date BCRABL PCR   9/17/24 62.5%   12/11/24 22.1%   3/1/25 4.99%   6/12/25 50.4%   7/11/25 0.1%       Date Treatment Response Toxicities/Complications   9/24 Dasatinib HR    6/14/25 C1D1 Dec/priyanka/ponatinib CR    7//17/25 C2D1 Dec/priyanka/ponatinib                  HPI:  Please see my entry above for hematologic history.  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 He was diagnosed with CML in September 2024 with BCR-ABL p210 at 62.5% and was initiated on dasatinib. In June 2025 patient  developed progressive cough and was found to have pneumonia; other evaluation significant for leukocytosis (WBC 48.3) and 71% circulating blasts.      Dasatinib stopped on 06/11. Patient received hydrea on 06/10 and 06/11 for cytoreduction. Bone marrow biopsy confirmed CML Blast Phase/ Acute myeloid leukemia with monocytic differentiation, with 90% marrow blasts and 65% circulating blasts. Chromosomes confirmed BCR-ABL1 fusion. BCR-ABL p210 at 50.4%. Kinase domain negative. NGS revealed KRAS/STAG2 mutations.     He initiated induction with decitabine, venetoclax, and ponatinib on 06/14.  While hospitalized, he developed the following problems:    OCTAVIO with CKD: resolved by discharge  Bilateral Pulmonary Infiltrates R > L (PNA Versus DAH): He received IV zosyn from 06/11-06/17.  Extensive infectious workup was negative.   Abnormal Echocardiogram: A baseline ECHO was obtained on 06/12 that was notable for significantly increased tricuspid valve gradients, pericardial thickening and RWMA. Consensus considered the finding acute and not lasting.   RLE DVT: Imaging reported a DVT in the right posterior tibial vein mid to distal calf. Due to thrombocytopenia and bleeding risk, anticoagulation was not started. Patient reported no pain to RLE and remained asymptomatic. A repeat RLE US on 06/12 was negative for acute DVT.   Hyperglycemia: Patient's home anti-hyperglycemic regimen was held during admission. Discharge medications included Metformin and Jardiance.  Ozempic was discontinued due to potential interactions with ponatinib and based on anecdotal worry for relapse.   Upper GI Bleed:  Managed with IV PPI. Patient continued on oral PPI at discharge.   Neutropenic Fever: patient developed persistent fevers from 06/26-06/28. He was initiated on cefepime and then linezolid (d/t allergies). Cefepime, Cefdinir, Ceftriaxone were avoided due to rash.   Rash/Drug Eruption: after initiated Cefepime and Linezolid patient developed  rash over his body. He was initiated on triamcinolone cream. Allergy and Dermatology consulted who recommended avoiding Cefepime, Ceftriaxone, Zosyn, Allopurinol, Cefdinir. Rash improved.     ASSESSMENT AND PLAN:  Chronic myelogenous leukemia with the development of myeloid blast crisis-is a 39-year-old that has an unfortunate early transition of CML to myeloid blast crisis after only 6 months or so of TKI therapy. 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 He is not known to have immuno deficiency of any kind, and he does not have a significant past medical history as best we can tell. Nonetheless his prior history may increase his risk of allogeneic transplant and this will be something that we need to consult our infectious disease colleagues to review.He also gives an unusual history of being unable to be treated with insulin.. He has endocrine consultation in a couple weeks which will be important to help develop a strategy for managing his hyperglycemia during transplant.    Today in clinic, I reviewed the rationale for allogeneic transplant and indicated that this is the best option moving forward to attempt to cure his underlying disease. I pointed out to him that myeloid blast crisis is a remarkably difficult disease to treat but fortunately his early induction results are excellent, and he is a candidate for longterm maintenance which can significantly decrease his risk of recurrence after allogeneic transplant. He has significant comorbidities, and may not be an appropriate candidate for a miloablative regimen, although this is preferred and we will anticipate using busulfan and fludarabine. For now, I recommended that he continue his treatment with   Telly as we sort out his donor options. He has 2 full siblings, and is likely to have many matched unrelated donors as well.    Will a transplant be safe?  HCTCI estimated to be 4.  Estimated 2-year non-relapse mortality is 41%.  With PTCy as the backbone of GVHD prophylaxis,  life-threatening grade III-IV acute GVHD and moderate/severe chronic GVHD incidence is <20%.      Will a transplant be efficacious?   CIBMTR Survival Calculator estimates 1 year overall survival to be 60%. Relapse of the underlying disease is the most frequent cause of transplant failure, which might be mitigated by maintenance strategies.    Plan:    Referring Oncologist Office will do the following:  NA          BMT Office will do the following  ID consultation regarding previous infection history (ordered 7/21)  PFTs now given history of cryptococcal pneumonia (ordered 7/21)  Endocrine consultation for DM management (already scheduled)      Primary Desired Protocol: FM0268-62  Arm: Bu/Flyu  Preferred Graft Source: [facility]    Alternate/Back-up Protocol: MT2022-52  Arm: NA    Clinical Trials Discussed: No  Approximate Timeline to workup: 6 weeks    Orders placed for interim testing prior to transplant workup: Yes        ROS:    10 point ROS neg other than the symptoms noted above in the HPI.        Past Medical History:   Diagnosis Date    Acute kidney injury 04/18/2022    Bilateral pulmonary infiltrates on CXR 06/22/2012    Cardiac arrest (H) 04/18/2022    Cryptococcal pneumonitis (H) 07/05/2012    Cryptococcal pneumonia      Endocarditis 04/01/2022    Fluid overload 04/18/2022    History of cardiac arrest 04/22/2022    History of endocarditis in adulthood 04/01/2022    Methicillin susceptible Staphylococcus aureus infection 05/04/2022    MSSA bacteremia 03/28/2022    WARNER (obstructive sleep apnea) 04/18/2022    Osteomyelitis of spine (H) 04/18/2022    Pneumonia     Pneumonia of left lower lobe due to infectious organism 03/22/2022    S/P TVR  "(tricuspid valve replacement)-31 mm Epic  04/18/2022    Sepsis (H) 04/18/2022    Status post internal cardiac defibrillator procedure 06/11/2025    Ventricular fibrillation (H) 04/18/2022       Past Surgical History:   Procedure Laterality Date    BONE MARROW BIOPSY, BONE SPECIMEN, NEEDLE/TROCAR Left 9/16/2024    Procedure: Bone marrow biopsy, bone specimen, needle/trocar, left posterior iliac crest;  Surgeon: Noni Sauer PA-C;  Location: [facility] OR    CV CORONARY ANGIOGRAM N/A 3/31/2022    Procedure: Coronary Angiogram;  Surgeon: Lidia Quintanilla MD;  Location:  [facility]    EP ICD INSERT SINGLE N/A 4/12/2022    Procedure: Implantable Cardioverter Defibrillator Device & Lead Implant Single or Dual;  Surgeon: Suleman Higgins MD;  Location:  [facility]ATH [facility]    IR LUMBAR PUNCTURE  7/30/2012    REPLACE VALVE AORTIC N/A 4/1/2022    Procedure: AORTIC VALVE exploration;  Surgeon: Marti Melton MD;  Location:  OR    REPLACE VALVE TRICUSPID N/A 4/1/2022    Procedure: TRICUSPID VALVE REPLACEMENT;  Surgeon: Marti Melton MD;  Location:  OR       Family History   Problem Relation Age of Onset    Diabetes Mother     Diabetes Father     Diabetes Sister        Social History     Tobacco Use    Smoking status: Never    Smokeless tobacco: Never   Vaping Use    Vaping status: Never Used   Substance Use Topics    Alcohol use: Not Currently    Drug use: Never         Allergies   Allergen Reactions    Allopurinol Rash     Morbilliform eruption with elevated LFTs and creatinine.  See allergy note from 07/01/2025    Piperacillin Sod-Tazobactam So Rash     Morbilliform eruption with elevated LFTs and creatinine.  See allergy note from 07/01/2025    Vancomycin      \"Red Sonia Syndrome\"    Clindamycin Unknown    Ceftriaxone Rash    Corn-Containing Products GI Disturbance     GI disturbance    Linezolid Rash     Rash - concurrent with 2nd abx        Current Outpatient Medications " "  Medication Sig Dispense Refill    acyclovir (ZOVIRAX) 400 MG tablet Take 400 mg by mouth.      albuterol (PROVENTIL) (2.5 MG/3ML) 0.083% neb solution Inhale 2.5 mg into the lungs.      buPROPion (WELLBUTRIN XL) 300 MG 24 hr tablet Take 1 tablet (300 mg) by mouth every morning. 90 tablet 3    CONTOUR NEXT TEST test strip Use to test blood sugar 3 times daily or as directed. 150 strip 0    Emergency Supply Kit, Central, Patient use for emergency only. Contents: 3 sodium chloride 0.9% flushes, 1 dressing kit, 1 microclave ext set 14\", 4 nitrile gloves (med), 6 alcohol prep pads, 1 bacitracin, 1 syringe (10 cc 20 G 1\"). Call [phone] to reorder. 820555 kit 0    empagliflozin (JARDIANCE) 25 MG TABS tablet Take 1 tablet (25 mg) by mouth daily. 90 tablet 3    Ferrous Sulfate (IRON PO) Take 60 mg by mouth daily.      furosemide (LASIX) 20 MG tablet Take 1 tablet (20 mg) by mouth daily (Patient not taking: Reported on 7/19/2025)      furosemide (LASIX) 40 MG tablet       levofloxacin (LEVAQUIN) 250 MG tablet Take 1 tablet (250 mg) by mouth daily. Take when absolute neutrophil count less than 1.0 (x 10^9/L) 30 tablet 3    levothyroxine (SYNTHROID/LEVOTHROID) 125 MCG tablet Take 125 mcg by mouth daily. Only on Sunday      levothyroxine (SYNTHROID/LEVOTHROID) 150 MCG tablet Take 1 tablet (150 mcg) by mouth daily at 2 pm. 90 tablet 3    loratadine (CLARITIN) 10 MG tablet Take 10 mg by mouth.      losartan (COZAAR) 50 MG tablet Take 1 tablet (50 mg) by mouth daily. 90 tablet 3    melatonin 5 MG tablet       metFORMIN (GLUCOPHAGE) 1000 MG tablet Take 1 tablet (1,000 mg) by mouth 2 times daily (with meals). 180 tablet 3    metoprolol succinate ER (TOPROL XL) 25 MG 24 hr tablet Take 1 tablet (25 mg) by mouth daily. 90 tablet 2    ondansetron (ZOFRAN) 8 MG tablet Take 8 mg by mouth.      pantoprazole (PROTONIX) 40 MG EC tablet Take 40 mg by mouth.      PONATinib HCl (ICLUSIG) 30 MG tablet Take 30 mg by mouth      posaconazole " "(NOXAFIL) 100 MG DR tablet Take 3 tablets (300 mg) by mouth every morning. 90 tablet 3    posaconazole (NOXAFIL) 100 MG DR tablet Take 300 mg by mouth.      prochlorperazine (COMPAZINE) 10 MG tablet Take 10 mg by mouth.      Semaglutide, 2 MG/DOSE, (OZEMPIC) 8 MG/3ML pen Inject 2 mg subcutaneously every 7 days. 9 mL 3    sodium chloride, PF, 0.9% PF flush Inject 10 mLs into the vein as needed for line flush. Flush IV before and after medication administration as directed and/or at least every 24 hours. 006886 mL 0    venetoclax (VENCLEXTA) 100 MG tablet Take 100 mg by mouth         KPS:  80    Physical Exam:   /77   Pulse 90   Temp 97.7  F (36.5  C) (Oral)   Resp 16   Wt 91.7 kg (202 lb 3.2 oz)   SpO2 100%   BMI 30.29 kg/m      Physical Exam  Vitals reviewed.   Constitutional:       Appearance: Normal appearance.   HENT:      Mouth/Throat:      Mouth: Mucous membranes are moist.   Eyes:      Conjunctiva/sclera: Conjunctivae normal.   Cardiovascular:      Rate and Rhythm: Normal rate and regular rhythm.      Pulses: Normal pulses.   Pulmonary:      Effort: Pulmonary effort is normal.   Musculoskeletal:         General: No swelling.   Skin:     General: Skin is warm.   Neurological:      General: No focal deficit present.      Mental Status: He is alert.      Cranial Nerves: Cranial nerve deficit present.   Psychiatric:         Mood and Affect: Mood normal.         Behavior: Behavior normal.         Thought Content: Thought content normal.         Judgment: Judgment normal.            TODAY'S ASSESSMENT BY SYSTEMS     HEMATOLOGY, COAGULATION    H/o CML    IMMUNOCOMPROMISED HOST  Unsure - in question given previous cryptococcal pneumonia    PULMONARY  Past history of cryptococcal pneumonia with \"scarring\"    LIVER  At risk for cirrhosis due to SWE[place]    GASTROINTESTINAL  NA    RENAL  NA    CARDIOVASCULAR  H/o tricuspid replacement    ENDOCRINE  Proposed insulin " intolerance    MUSCULOSKELETAL  none    NEUROLOGIC  none    PSYCHIATRIC  none    SOCIAL  Good social support    Anil understood the above assessment and recommendations.  Multiple questions answered.  No barriers to learning identified.       Known issues that I take into account for medical decisions, with salient changes to the plan considering these complexities noted above.    Patient Active Problem List   Diagnosis    Long QT interval    Type 2 diabetes mellitus with stage 2 chronic kidney disease (H)    Transient hyperglycemia post procedure    ICD (implantable cardioverter-defibrillator) in place    WARNER (obstructive sleep apnea)    Morbid obesity (H)    Blastic phase chronic myeloid leukemia (H)    CKD (chronic kidney disease) stage 2, GFR 60-89 ml/min    Iron deficiency anemia secondary to inadequate dietary iron intake    Acute myeloid leukemia not having achieved remission (H)    Attention deficit hyperactivity disorder (ADHD)    Hypogonadism male    Atrioventricular block, complete (H)       Today's summary: He has an appropriate candidate for allogeneic transplant, although we will need to do some investigation to determine whether he is capable of receiving a myeloablative regimen based on his prior history.    I spent 120 minutes in the care of this patient today, which included time necessary for preparation for the visit, obtaining history, ordering medications/tests/procedures as medically indicated, review of pertinent medical literature, counseling of the patient, communication of recommendations to the care team, and documentation time.    The longitudinal plan of care for the diagnosis(es)/condition(s) as documented were addressed during this visit. Due to the added complexity in care, I will continue to support Anil in the subsequent management and with ongoing continuity of care.    FARZANEH STRANGE MD  July 18,  2025        ------------------------------------------------------------------------------------------------------------------------------------------------    Patient Care Team         Relationship Specialty Notifications Start End    Chester Monge MD PCP - General Internal Medicine  2/24/25     Phone: [phone] Pager: [phone] Fax: [phone]        [address] [facility] [facility] [address]    Jesus Varner MD MD Hematology Admissions 9/18/24     Phone: [phone] Fax: [phone]         [address] [place] [address]    Noni Sauer PATrinyC Physician Assistant Physician Assistant - Medical  9/18/24     Phone: [phone] Fax: [phone]         [address] [place] [address], [facility] [address], [address] [place] [address]    Theresa Guerrero, RN Specialty Care Coordinator Hematology & Oncology Admissions 9/24/24     Ana Sullivan, APRN CNP Nurse Practitioner  Admissions 10/23/24     Phone: [phone] Fax: [phone]         [address] [place] [address]    Jesus Varner MD Assigned Cancer Care Provider   12/23/24     Phone: [phone] Fax: [phone]         [address] [place] [address]    Mik Payne MD Physician Endocrinology, Diabetes, and Metabolism  1/13/25     Phone: [phone] Fax: [phone] [address] [address]    Chester Monge MD Assigned PCP   3/23/25     Phone: [phone] Pager: [phone] Fax: [phone]        [address] [facility] [address]    Marielena Umanzor PA-C Assigned Heart and Vascular Provider   4/23/25     Phone: [phone] Fax: [phone] [address] [facility] [address]    Ramandeep Alanis, RN [facility] Resource Team Primary Care - CC Admissions 7/17/25 7/18/25    Phone: [phone]         Jesus Varner MD Home Infusion Following Provider Hematology  7/17/25     Phone: [phone] Fax: [phone]         [address] [place] [address]  [address] [facility] [address]    Jose Antonio Leong, RN [facility] Resource Team  Admissions 7/17/25 7/18/25    Sondra Marion APRN CNP Nurse Practitioner Nurse Practitioner Primary Care  7/17/25     Phone: [phone] Fax: [phone] [address] [facility] [address]

## 2025-07-18 NOTE — NURSING NOTE
"Oncology Rooming Note    July 18, 2025 12:02 PM   Anil Montoya is a 39 year old male who presents for:    Chief Complaint   Patient presents with    Oncology Clinic Visit     Acute myeloid leukemia not having achieved remission     Initial Vitals: /77   Pulse 90   Temp 97.7  F (36.5  C) (Oral)   Resp 16   Wt 91.7 kg (202 lb 3.2 oz)   SpO2 100%   BMI 30.29 kg/m   Estimated body mass index is 30.29 kg/m  as calculated from the following:    Height as of 7/17/25: 1.74 m (5' 8.5\").    Weight as of this encounter: 91.7 kg (202 lb 3.2 oz). Body surface area is 2.11 meters squared.  No Pain (0) Comment: Data Unavailable   No LMP for male patient.  Allergies reviewed: Yes  Medications reviewed: Yes    Medications: Medication refills not needed today.  Pharmacy name entered into BidRazor:    [id number] - [place], [place] - [address]  [facility] - [place], [place] - [address]   [facility] - [place], [place] - [address] [facility]    PHQ9:  Did this patient require a PHQ9?: No      Clinical concerns: Pt needs short term disability paperwork      Jose Antonio Francisco              "

## 2025-07-19 ENCOUNTER — INFUSION THERAPY VISIT (OUTPATIENT)
Dept: ONCOLOGY | Facility: CLINIC | Age: 40
End: 2025-07-19
Attending: INTERNAL MEDICINE
Payer: COMMERCIAL

## 2025-07-19 VITALS
OXYGEN SATURATION: 95 % | TEMPERATURE: 98.4 F | DIASTOLIC BLOOD PRESSURE: 75 MMHG | RESPIRATION RATE: 15 BRPM | SYSTOLIC BLOOD PRESSURE: 114 MMHG | HEART RATE: 88 BPM

## 2025-07-19 DIAGNOSIS — C92.00 ACUTE MYELOID LEUKEMIA NOT HAVING ACHIEVED REMISSION (H): Primary | ICD-10-CM

## 2025-07-19 LAB
ABO + RH BLD: NORMAL
ABO + RH BLD: NORMAL
BLD GP AB SCN SERPL QL: NEGATIVE
BLD GP AB SCN SERPL QL: NEGATIVE
BLD PROD TYP BPU: NORMAL
BLOOD COMPONENT TYPE: NORMAL
CODING SYSTEM: NORMAL
CROSSMATCH: NORMAL
SPECIMEN EXP DATE BLD: NORMAL
SPECIMEN EXP DATE BLD: NORMAL
UNIT ABO/RH: NORMAL
UNIT ABO/RH: NORMAL
UNIT NUMBER: NORMAL
UNIT STATUS: NORMAL
UNIT TYPE ISBT: 5100
UNIT TYPE ISBT: 600

## 2025-07-19 PROCEDURE — 258N000003 HC RX IP 258 OP 636: Performed by: INTERNAL MEDICINE

## 2025-07-19 PROCEDURE — 86900 BLOOD TYPING SEROLOGIC ABO: CPT | Performed by: INTERNAL MEDICINE

## 2025-07-19 PROCEDURE — S5501 HIT COMPLEX CATH CARE: HCPCS

## 2025-07-19 PROCEDURE — 250N000011 HC RX IP 250 OP 636: Performed by: INTERNAL MEDICINE

## 2025-07-19 PROCEDURE — 96413 CHEMO IV INFUSION 1 HR: CPT

## 2025-07-19 RX ORDER — HEPARIN SODIUM (PORCINE) LOCK FLUSH IV SOLN 100 UNIT/ML 100 UNIT/ML
5 SOLUTION INTRAVENOUS
Status: CANCELLED | OUTPATIENT
Start: 2025-07-19

## 2025-07-19 RX ORDER — HEPARIN SODIUM,PORCINE 10 UNIT/ML
5-20 VIAL (ML) INTRAVENOUS DAILY PRN
Status: DISCONTINUED | OUTPATIENT
Start: 2025-07-19 | End: 2025-07-19 | Stop reason: HOSPADM

## 2025-07-19 RX ORDER — EPINEPHRINE 1 MG/ML
0.3 INJECTION, SOLUTION INTRAMUSCULAR; SUBCUTANEOUS EVERY 5 MIN PRN
Status: CANCELLED | OUTPATIENT
Start: 2025-07-19

## 2025-07-19 RX ORDER — HEPARIN SODIUM,PORCINE 10 UNIT/ML
5-20 VIAL (ML) INTRAVENOUS DAILY PRN
Status: CANCELLED | OUTPATIENT
Start: 2025-07-19

## 2025-07-19 RX ORDER — DIPHENHYDRAMINE HYDROCHLORIDE 50 MG/ML
50 INJECTION, SOLUTION INTRAMUSCULAR; INTRAVENOUS
Status: CANCELLED
Start: 2025-07-19

## 2025-07-19 RX ADMIN — SODIUM CHLORIDE 250 ML: 0.9 INJECTION, SOLUTION INTRAVENOUS at 10:43

## 2025-07-19 RX ADMIN — Medication 5 ML: at 11:46

## 2025-07-19 RX ADMIN — DECITABINE 43 MG: 50 INJECTION, POWDER, LYOPHILIZED, FOR SOLUTION INTRAVENOUS at 10:43

## 2025-07-19 ASSESSMENT — PAIN SCALES - GENERAL: PAINLEVEL_OUTOF10: NO PAIN (0)

## 2025-07-19 NOTE — PATIENT INSTRUCTIONS
Contact Numbers    [facility] Main Line (for Scheduling/Triage/After Hours Nurse Line): [phone]    Please call the [facility] nurse triage or the after hours nurse line if you experience a temperature greater than or equal to 100.4, shaking chills, have uncontrolled nausea, vomiting and/or diarrhea, dizziness, lightheadedness, shortness of breath, chest pain, bleeding, unexplained bruising, or if you have any other new/concerning symptoms, questions or concerns.     If you are having any concerning symptoms or wish to speak to a provider before your next infusion visit, please call your care coordinator or triage to notify them so we can adequately serve you.     If you need any refills on medications (narcotics or other medications), please call before your infusion appointment.

## 2025-07-19 NOTE — PROGRESS NOTES
Infusion Nursing Note:  Anil Montoya presents today for C2D3 Decitabine.    Patient seen by provider today: No   present during visit today: Not Applicable.    Note: Patient presents to Infusion Clinic feeling well today. Denies fever, chills, pain, or any other symptoms of infection/illness. Patient wishes to proceed with treatment today. Patient instructed to call care team with any questions or concerns or with any new or worsening symptoms.     Patient confirmed he is taking Venetoclax as prescribed.     Type/screen drawn today.  Administrations This Visit       decitabine (DACOGEN) 43 mg in sodium chloride 0.9 % 118.6 mL infusion       Admin Date  07/19/2025 Action  $New Bag Dose  43 mg Rate  118.6 mL/hr Route  Intravenous Documented By  Lisa Major RN              heparin lock flush 10 unit/mL injection 5-20 mL       Admin Date  07/19/2025 Action  $Given Dose  5 mL Route  Intracatheter Documented By  Lisa Major RN              sodium chloride 0.9% BOLUS 250 mL       Admin Date  07/19/2025 Action  $New Bag Dose  250 mL Route  Intravenous Documented By  Lisa Major, RN                Intravenous Access:  PICC double lumen. Both lumens flushed today.    Treatment Conditions:  Lab Results   Component Value Date    HGB 8.9 (L) 07/16/2025    WBC 6.3 07/16/2025    ANEU 4.4 07/16/2025     (L) 07/16/2025     Lab Results   Component Value Date     07/16/2025    POTASSIUM 4.1 07/16/2025    MAG 2.0 07/16/2025    CR 1.44 (H) 07/16/2025    IMAN 9.7 07/16/2025    BILITOTAL 1.8 (H) 07/16/2025    ALBUMIN 4.1 07/16/2025    ALT 20 07/16/2025    AST 20 07/16/2025   Results reviewed, labs MET treatment parameters, ok to proceed with treatment.    Electrolytes not needed today.   /75 (BP Location: Left arm, Patient Position: Sitting, Cuff Size: Adult Regular)   Pulse 88   Temp 98.4  F (36.9  C) (Oral)   Resp 15   SpO2 95%     Post Infusion  Assessment:  Patient tolerated infusion without incident.  Blood return noted pre and post infusion.  Site patent and intact, free from redness, edema or discomfort.  No evidence of extravasations.     Discharge Plan:   Patient declined prescription refills.  Discharge instructions reviewed with: Patient.  Patient and/or family verbalized understanding of discharge instructions and all questions answered.  AVS to patient via VocalZoomHART. Patient will return 7/20 for next appointment.   Patient discharged in stable condition accompanied by: self.  Departure Mode: Ambulatory.    Lisa Major RN

## 2025-07-20 ENCOUNTER — INFUSION THERAPY VISIT (OUTPATIENT)
Dept: ONCOLOGY | Facility: CLINIC | Age: 40
End: 2025-07-20
Attending: INTERNAL MEDICINE
Payer: COMMERCIAL

## 2025-07-20 VITALS
DIASTOLIC BLOOD PRESSURE: 74 MMHG | RESPIRATION RATE: 18 BRPM | TEMPERATURE: 97.4 F | HEART RATE: 90 BPM | OXYGEN SATURATION: 100 % | SYSTOLIC BLOOD PRESSURE: 112 MMHG

## 2025-07-20 DIAGNOSIS — C92.00 ACUTE MYELOID LEUKEMIA NOT HAVING ACHIEVED REMISSION (H): ICD-10-CM

## 2025-07-20 DIAGNOSIS — C92.10 BLASTIC PHASE CHRONIC MYELOID LEUKEMIA (H): Primary | ICD-10-CM

## 2025-07-20 LAB
ABO + RH BLD: NORMAL
BASOPHILS # BLD AUTO: 0.1 10E3/UL (ref 0–0.2)
BASOPHILS NFR BLD AUTO: 1 %
BLD GP AB SCN SERPL QL: NEGATIVE
BLD PROD TYP BPU: NORMAL
BLOOD COMPONENT TYPE: NORMAL
CODING SYSTEM: NORMAL
CROSSMATCH: NORMAL
EOSINOPHIL # BLD AUTO: 0 10E3/UL (ref 0–0.7)
EOSINOPHIL NFR BLD AUTO: 0 %
ERYTHROCYTE [DISTWIDTH] IN BLOOD BY AUTOMATED COUNT: 23.5 % (ref 10–15)
HCT VFR BLD AUTO: 29.3 % (ref 40–53)
HGB BLD-MCNC: 9.6 G/DL (ref 13.3–17.7)
IMM GRANULOCYTES # BLD: 0.2 10E3/UL
IMM GRANULOCYTES NFR BLD: 3 %
LYMPHOCYTES # BLD AUTO: 0.9 10E3/UL (ref 0.8–5.3)
LYMPHOCYTES NFR BLD AUTO: 12 %
MCH RBC QN AUTO: 31.3 PG (ref 26.5–33)
MCHC RBC AUTO-ENTMCNC: 32.8 G/DL (ref 31.5–36.5)
MCV RBC AUTO: 95 FL (ref 78–100)
MONOCYTES # BLD AUTO: 0.9 10E3/UL (ref 0–1.3)
MONOCYTES NFR BLD AUTO: 12 %
NEUTROPHILS # BLD AUTO: 5.4 10E3/UL (ref 1.6–8.3)
NEUTROPHILS NFR BLD AUTO: 73 %
NRBC # BLD AUTO: 0.1 10E3/UL
NRBC BLD AUTO-RTO: 1 /100
PLATELET # BLD AUTO: 104 10E3/UL (ref 150–450)
RBC # BLD AUTO: 3.07 10E6/UL (ref 4.4–5.9)
SPECIMEN EXP DATE BLD: NORMAL
UNIT ABO/RH: NORMAL
UNIT NUMBER: NORMAL
UNIT STATUS: NORMAL
UNIT TYPE ISBT: 5100
WBC # BLD AUTO: 7.4 10E3/UL (ref 4–11)

## 2025-07-20 PROCEDURE — 85004 AUTOMATED DIFF WBC COUNT: CPT | Performed by: PHYSICIAN ASSISTANT

## 2025-07-20 PROCEDURE — 96413 CHEMO IV INFUSION 1 HR: CPT

## 2025-07-20 PROCEDURE — S5501 HIT COMPLEX CATH CARE: HCPCS

## 2025-07-20 PROCEDURE — 258N000003 HC RX IP 258 OP 636: Performed by: INTERNAL MEDICINE

## 2025-07-20 PROCEDURE — 86900 BLOOD TYPING SEROLOGIC ABO: CPT | Performed by: PHYSICIAN ASSISTANT

## 2025-07-20 PROCEDURE — 250N000011 HC RX IP 250 OP 636: Performed by: INTERNAL MEDICINE

## 2025-07-20 RX ORDER — DIPHENHYDRAMINE HYDROCHLORIDE 50 MG/ML
50 INJECTION, SOLUTION INTRAMUSCULAR; INTRAVENOUS
Status: CANCELLED
Start: 2025-07-20

## 2025-07-20 RX ORDER — HEPARIN SODIUM,PORCINE 10 UNIT/ML
5-20 VIAL (ML) INTRAVENOUS DAILY PRN
Status: CANCELLED | OUTPATIENT
Start: 2025-07-20

## 2025-07-20 RX ORDER — EPINEPHRINE 1 MG/ML
0.3 INJECTION, SOLUTION, CONCENTRATE INTRAVENOUS EVERY 5 MIN PRN
Status: CANCELLED | OUTPATIENT
Start: 2025-07-20

## 2025-07-20 RX ORDER — HEPARIN SODIUM,PORCINE 10 UNIT/ML
5-20 VIAL (ML) INTRAVENOUS DAILY PRN
Status: DISCONTINUED | OUTPATIENT
Start: 2025-07-20 | End: 2025-07-20 | Stop reason: HOSPADM

## 2025-07-20 RX ORDER — HEPARIN SODIUM (PORCINE) LOCK FLUSH IV SOLN 100 UNIT/ML 100 UNIT/ML
5 SOLUTION INTRAVENOUS
Status: CANCELLED | OUTPATIENT
Start: 2025-07-20

## 2025-07-20 RX ADMIN — DECITABINE 43 MG: 50 INJECTION, POWDER, LYOPHILIZED, FOR SOLUTION INTRAVENOUS at 09:25

## 2025-07-20 RX ADMIN — SODIUM CHLORIDE 250 ML: 0.9 INJECTION, SOLUTION INTRAVENOUS at 09:25

## 2025-07-20 RX ADMIN — Medication 5 ML: at 10:28

## 2025-07-20 ASSESSMENT — PAIN SCALES - GENERAL: PAINLEVEL_OUTOF10: NO PAIN (0)

## 2025-07-20 NOTE — PATIENT INSTRUCTIONS
Contact Numbers    [facility] [place]for Scheduling/Triage/After Hours Nurse Line): [phone]    Please call the [facility] nurse triage or the after hours nurse line if you experience a temperature greater than or equal to 100.4, shaking chills, have uncontrolled nausea, vomiting and/or diarrhea, dizziness, lightheadedness, shortness of breath, chest pain, bleeding, unexplained bruising, or if you have any other new/concerning symptoms, questions or concerns.     If you are having any concerning symptoms or wish to speak to a provider before your next infusion visit, please call your care coordinator or triage to notify them so we can adequately serve you.     If you need any refills on medications (narcotics or other medications), please call before your infusion appointment.    Lab Results:  Recent Results (from the past 12 hours)   Prepare red blood cells (unit)    Collection Time: 07/20/25  8:23 AM   Result Value Ref Range    Blood Component Type Red Blood Cells     Product Code [id number]     Unit Status Not used     Unit Number [id number]     CROSSMATCH Compatible     CODING SYSTEM [id number]     UNIT ABO/RH O+     UNIT TYPE ISBT 5100    CBC with platelets and differential    Collection Time: 07/20/25  8:41 AM   Result Value Ref Range    WBC Count 7.4 4.0 - 11.0 10e3/uL    RBC Count 3.07 (L) 4.40 - 5.90 10e6/uL    Hemoglobin 9.6 (L) 13.3 - 17.7 g/dL    Hematocrit 29.3 (L) 40.0 - 53.0 %    MCV 95 78 - 100 fL    MCH 31.3 26.5 - 33.0 pg    MCHC 32.8 31.5 - 36.5 g/dL    RDW 23.5 (H) 10.0 - 15.0 %    Platelet Count 104 (L) 150 - 450 10e3/uL    % Neutrophils 73 %    % Lymphocytes 12 %    % Monocytes 12 %    % Eosinophils 0 %    % Basophils 1 %    % Immature Granulocytes 3 %    NRBCs per 100 WBC 1 (H) <1 /100    Absolute Neutrophils 5.4 1.6 - 8.3 10e3/uL    Absolute Lymphocytes 0.9 0.8 - 5.3 10e3/uL    Absolute Monocytes 0.9 0.0 - 1.3 10e3/uL    Absolute Eosinophils 0.0 0.0 - 0.7 10e3/uL    Absolute Basophils 0.1 0.0 -  0.2 10e3/uL    Absolute Immature Granulocytes 0.2 <=0.4 10e3/uL    Absolute NRBCs 0.1 10e3/uL

## 2025-07-21 ENCOUNTER — INFUSION THERAPY VISIT (OUTPATIENT)
Dept: ONCOLOGY | Facility: CLINIC | Age: 40
End: 2025-07-21
Attending: INTERNAL MEDICINE
Payer: COMMERCIAL

## 2025-07-21 ENCOUNTER — MYC MEDICAL ADVICE (OUTPATIENT)
Dept: ONCOLOGY | Facility: CLINIC | Age: 40
End: 2025-07-21

## 2025-07-21 ENCOUNTER — TELEPHONE (OUTPATIENT)
Dept: ONCOLOGY | Facility: CLINIC | Age: 40
End: 2025-07-21

## 2025-07-21 VITALS
RESPIRATION RATE: 20 BRPM | HEART RATE: 75 BPM | SYSTOLIC BLOOD PRESSURE: 111 MMHG | DIASTOLIC BLOOD PRESSURE: 75 MMHG | OXYGEN SATURATION: 96 % | TEMPERATURE: 96 F

## 2025-07-21 DIAGNOSIS — C92.00 ACUTE MYELOID LEUKEMIA NOT HAVING ACHIEVED REMISSION (H): Primary | ICD-10-CM

## 2025-07-21 DIAGNOSIS — C92.10 CML (CHRONIC MYELOCYTIC LEUKEMIA) (H): ICD-10-CM

## 2025-07-21 PROBLEM — I44.2 ATRIOVENTRICULAR BLOCK, COMPLETE (H): Status: ACTIVE | Noted: 2025-07-21

## 2025-07-21 PROCEDURE — S5501 HIT COMPLEX CATH CARE: HCPCS

## 2025-07-21 PROCEDURE — 96413 CHEMO IV INFUSION 1 HR: CPT

## 2025-07-21 PROCEDURE — 36415 COLL VENOUS BLD VENIPUNCTURE: CPT

## 2025-07-21 PROCEDURE — 250N000011 HC RX IP 250 OP 636: Performed by: INTERNAL MEDICINE

## 2025-07-21 PROCEDURE — 86901 BLOOD TYPING SEROLOGIC RH(D): CPT

## 2025-07-21 PROCEDURE — 258N000003 HC RX IP 258 OP 636: Performed by: INTERNAL MEDICINE

## 2025-07-21 RX ORDER — HEPARIN SODIUM,PORCINE 10 UNIT/ML
5-20 VIAL (ML) INTRAVENOUS DAILY PRN
Status: DISCONTINUED | OUTPATIENT
Start: 2025-07-21 | End: 2025-07-21 | Stop reason: HOSPADM

## 2025-07-21 RX ADMIN — DECITABINE 43 MG: 50 INJECTION, POWDER, LYOPHILIZED, FOR SOLUTION INTRAVENOUS at 10:55

## 2025-07-21 RX ADMIN — SODIUM CHLORIDE 250 ML: 0.9 INJECTION, SOLUTION INTRAVENOUS at 10:49

## 2025-07-21 ASSESSMENT — EJECTION FRACTION: LAST EJECTION FRACTION (EF) PRIOR TO CONDITIONING (%): NO

## 2025-07-21 ASSESSMENT — PAIN SCALES - GENERAL: PAINLEVEL_OUTOF10: NO PAIN (0)

## 2025-07-21 NOTE — PATIENT INSTRUCTIONS
Contact Numbers    [facility] [place] Main Line/TRIAGE: [phone]    Call with chills and/or temperature greater than or equal to 100.5, uncontrolled nausea/vomiting, diarrhea, constipation, dizziness, shortness of breath, chest pain, bleeding, unexplained bruising, or any new/concerning symptoms, questions/concerns.     If you are having any concerning symptoms or wish to speak to a provider before your next infusion visit, please call your care coordinator or triage to notify them so we can adequately serve you.       After Hours: [phone]    If after hours, weekends, or holidays, call main hospital  and ask for Oncology doctor on call.      July 2025 Sunday Monday Tuesday Wednesday Thursday Friday Saturday             1     2     3     4     5       6     7     8     9     10     11     12       13     14     15     16    Lab Peripheral   1:00 PM   (15 min.)   [facility] LAB DRAW   [facility]    LAB   1:15 PM   (15 min.)   UC LAB   [facility] [place]    Return Patient   1:45 PM   (30 min.)   Jesus Varner MD   [facility] 17    Infusion 180  10:00 AM   (180 min.)    ONC INFUSION NURSE   [facility] 18    Infusion 90   9:30 AM   (90 min.)    ONC INFUSION NURSE   [facility]    New  12:00 PM   (60 min.)   Tavares Doss MD   [facility] Blood and Marrow Transplant [facility]    BMT NURSE COORD   1:00 PM   (30 min.)   Ina Hyde, MARNIE   [facility] Blood and Marrow Transplant [facility]    BMT SOCIAL WORK   1:15 PM   (60 min.)   Edilson Pastor, MSW   [facility] Blood and Marrow Transplant [facility] 19    Infusion 90  10:00 AM   (90 min.)    ONC INFUSION NURSE   [facility]   20    Infusion 90   9:00 AM   (90 min.)    ONC INFUSION NURSE   [facility] 21    Infusion  90  10:00 AM   (90 min.)   UC ONC INFUSION NURSE   [facility] 22     23     24     25     26       27     28    Infusion 180   9:00 AM   (180 min.)   UC ONC INFUSION NURSE   [facility] 29 30 31 August 2025 Sunday Monday Tuesday Wednesday Thursday Friday Saturday                            1    Infusion 180   1:00 PM   (180 min.)    ONC INFUSION NURSE   [facility] 2       3     4    Office Visit - Video   4:55 PM   (30 min.)   Chester Monge MD   [facility] 5     6     7     8     9       10     11     12    NEW DIABETES   9:15 AM   (60 min.)   Sondra Marion APRN CNP   [facility] 13     14     15     16       17     18     19     20     21     22     23       24     25    Provider Visit   8:40 AM   (30 min.)   Chester Monge MD   [facility] 26     27     28     29     30       31                                                     Lab Results:  No results found for this or any previous visit (from the past 12 hours).

## 2025-07-21 NOTE — PROGRESS NOTES
Blood and Marrow Transplant - New Evaluation Appointment    Spoke with Anil wheeler Starla, patient's sister, following visit with Dr. Doss. I explained the role of the nurse coordinator throughout the process, as well as general time line and expectations for next steps. We discussed the necessity of a caregiver and the program's proximity requirements. All questions were answered.     Plan: Allogeneic Transplant, pending further Cardiac and ID workup       Contact information provided for :  yes    Allo:  HLA typing drawn: Yes    PRA typing drawn:  Yes    CMV-IgG and ABO-Rh drawn or in record: N/A    Contact information provided for : Yes    Will sibling typing kits need to be sent? N/A    Financial Release for URD search obtained:  Yes      Phase Status updated: yes    BMT FRAILTY ASSESSMENT (55+)       Pt is 55+, documented frail, internal: No  If YES, order Cancer Rehab Referral

## 2025-07-21 NOTE — TELEPHONE ENCOUNTER
STD forms received via [email or web address]     Forms to be completed and put in folder for provider to approve.    Fax #:  [phone]      Gabbi Montenegro

## 2025-07-21 NOTE — TELEPHONE ENCOUNTER
Spoke with pt informing him appt that was added was for pulmonary function testing per orders from Dr. Doss after visit on &/18/25, for BMT.  Stated would send him phone number for ID in [employer].  Other questions about BMT are best directed to BMT Clinic.  Pt appreciated call and information. Reina Chow RN, OCN, RN Care Coordinator, [facility]

## 2025-07-21 NOTE — PROGRESS NOTES
Infusion Nursing Note:  Anil Montoya presents today for Cycle 10, day 15 Decitabine.    Patient seen by provider today: No    Note: Patient presents to clinic today feeling well with no questions.  Pt would like to proceed with therapy today. Pt denies changes to allergies or medications overnight. Pt did not request or require any intervention for pain today.    Intravenous Access:  PICC; pt has supplies and verbalized he understands [from previous teaching] how and when to flush lines at home.    Treatment Conditions:  Not Applicable.    Post Infusion Assessment:  Patient tolerated infusion without incident.  Blood return noted pre and post infusion.  Site patent and intact, free from redness, edema or discomfort.  No evidence of extravasations.    Discharge Plan:   Patient declined prescription refills.  Discharge instructions reviewed with: Patient.  Patient and/or family verbalized understanding of discharge instructions and all questions answered.  AVS to patient via MYCHART.  Patient will return 7/24/25 [on wait list] for next appointment, pt will monitor MyChart.   Patient discharged in stable condition accompanied by: self.  Departure Mode: Ambulatory.    Marielena Ovalles RN

## 2025-07-22 PROCEDURE — S5501 HIT COMPLEX CATH CARE: HCPCS

## 2025-07-23 ENCOUNTER — PATIENT OUTREACH (OUTPATIENT)
Dept: ONCOLOGY | Facility: CLINIC | Age: 40
End: 2025-07-23
Payer: COMMERCIAL

## 2025-07-23 ENCOUNTER — TELEPHONE (OUTPATIENT)
Dept: INFECTIOUS DISEASES | Facility: CLINIC | Age: 40
End: 2025-07-23
Payer: COMMERCIAL

## 2025-07-23 DIAGNOSIS — C92.00 AML (ACUTE MYELOGENOUS LEUKEMIA) (H): ICD-10-CM

## 2025-07-23 DIAGNOSIS — Z71.9 VISIT FOR COUNSELING: Primary | ICD-10-CM

## 2025-07-23 LAB
A*LOCUS SEROLOGIC EQUIVALENT: 3
A*SEROLOGIC EQUIVALENT: 2403
ABNGS COMMENTS: NORMAL
ABTEST METHOD: NORMAL
B*LOCUS SEROLOGIC EQUIVALENT: 7
B*SEROLOGIC EQUIVALENT: 49
BW-1: NORMAL
BW-2: NORMAL
C*LOCUS SEROLOGIC EQUIVALENT: 7
C*SEROLOGIC EQUIVALENT: 7
DQB1*LOCUS SEROLOGIC EQUIVALENT: 9
DQB1*SEROLOGIC EQUIVALENT: 5
DRB1*LOCUS SEROLOGIC EQUIVALENT: 7
DRB1*SEROLOGIC EQUIVALENT: 14
DRB3*LOCUS SEROLOGIC EQUIVALENT: 52
DRB4*SEROLOGIC EQUIVALENT: NORMAL
DRNGS COMMENTS: NORMAL
DRSSO TEST METHOD: NORMAL
FLOWPRA1 CELL: NORMAL
FLOWPRA1 COMMENTS: NORMAL
FLOWPRA1 RESULT: NORMAL
FLOWPRA1 TEST METHOD: NORMAL
FLOWPRA2 CELL: NORMAL
FLOWPRA2 COMMENTS: NORMAL
FLOWPRA2 RESULT: NORMAL
FLOWPRA2 TEST METHOD: NORMAL
HLA-A HIGH RES: NORMAL
HLA-A HIGH RES: NORMAL
HLA-B HIGH RES: NORMAL
HLA-B HIGH RES: NORMAL
HLA-CW HIGH RES: NORMAL
HLA-CW HIGH RES: NORMAL
HLA-DPA1 HIGH RES: NORMAL
HLA-DPB1 HIGH RES: NORMAL
HLA-DPB1 HIGH RES: NORMAL
HLA-DPB1 UNRESLVD ALLELES HIGH RES: NORMAL
HLA-DPB1 UNRESLVD ALLELES HIGH RES: NORMAL
HLA-DQA1 HIGH RES: NORMAL
HLA-DQA1 HIGH RES: NORMAL
HLA-DQB1 HIGH RES: NORMAL
HLA-DQB1 HIGH RES: NORMAL
HLA-DRB1 HIGH RES: NORMAL
HLA-DRB1 HIGH RES: NORMAL
HLA-DRB3 HIGH RES: NORMAL
HLA-DRB4 HIGH RES: NORMAL
SA 1  COMMENTS: NORMAL
SA 1 CELL: NORMAL
SA 1 TEST METHOD: NORMAL
SA1 HI RISK ABY: NORMAL
SA1 MOD RISK ABY: NORMAL

## 2025-07-23 PROCEDURE — S5501 HIT COMPLEX CATH CARE: HCPCS

## 2025-07-23 NOTE — TELEPHONE ENCOUNTER
STD paperwork completed, checked for accuracy, signed and faxed to Mario @ [phone]. A copy was made, sent to scanning and original mailed to patient at home address.    Successful transmission verified in Right Fax.    Gabbi Montenegro

## 2025-07-23 NOTE — PROGRESS NOTES
Writer received referral to Cancer Risk Management/Genetic Counseling.    Referred for:    Hx of CML now CML blast crisis/AML    Per chart review:  Plan: allogeneic transplant is the best option moving forward to attempt to cure his underlying disease    Referred By    Provider Department Location Phone   Tavares Doss MD  Bmt [facility] [phone]     Referral reviewed for appropriate plan, and sent to New Patient Scheduling ([phone]) for completion.    Lara Kirby RN, BSN  Oncology New Patient Nurse Navigator   [facility]

## 2025-07-24 ENCOUNTER — TELEPHONE (OUTPATIENT)
Dept: ONCOLOGY | Facility: CLINIC | Age: 40
End: 2025-07-24
Payer: COMMERCIAL

## 2025-07-24 ENCOUNTER — INFUSION THERAPY VISIT (OUTPATIENT)
Dept: INFUSION THERAPY | Facility: CLINIC | Age: 40
End: 2025-07-24
Attending: INTERNAL MEDICINE
Payer: COMMERCIAL

## 2025-07-24 DIAGNOSIS — Z79.899 ENCOUNTER FOR LONG-TERM (CURRENT) USE OF MEDICATIONS: ICD-10-CM

## 2025-07-24 DIAGNOSIS — C92.00 ACUTE MYELOID LEUKEMIA NOT HAVING ACHIEVED REMISSION (H): Primary | ICD-10-CM

## 2025-07-24 DIAGNOSIS — Z71.9 VISIT FOR COUNSELING: ICD-10-CM

## 2025-07-24 DIAGNOSIS — C92.10 BLASTIC PHASE CHRONIC MYELOID LEUKEMIA (H): Primary | ICD-10-CM

## 2025-07-24 DIAGNOSIS — C92.00 ACUTE MYELOID LEUKEMIA NOT HAVING ACHIEVED REMISSION (H): ICD-10-CM

## 2025-07-24 DIAGNOSIS — C92.10 CML (CHRONIC MYELOCYTIC LEUKEMIA) (H): ICD-10-CM

## 2025-07-24 DIAGNOSIS — C92.00 AML (ACUTE MYELOGENOUS LEUKEMIA) (H): Primary | ICD-10-CM

## 2025-07-24 LAB
ABO + RH BLD: NORMAL
ALBUMIN SERPL BCG-MCNC: 4 G/DL (ref 3.5–5.2)
ALP SERPL-CCNC: 138 U/L (ref 40–150)
ALT SERPL W P-5'-P-CCNC: 21 U/L (ref 0–70)
ANION GAP SERPL CALCULATED.3IONS-SCNC: 14 MMOL/L (ref 7–15)
AST SERPL W P-5'-P-CCNC: 24 U/L (ref 0–45)
BASOPHILS # BLD AUTO: 0.1 10E3/UL (ref 0–0.2)
BASOPHILS NFR BLD AUTO: 1 %
BILIRUB SERPL-MCNC: 1.9 MG/DL
BLD GP AB SCN SERPL QL: NEGATIVE
BUN SERPL-MCNC: 39.7 MG/DL (ref 6–20)
CALCIUM SERPL-MCNC: 9.1 MG/DL (ref 8.8–10.4)
CHLORIDE SERPL-SCNC: 101 MMOL/L (ref 98–107)
CREAT SERPL-MCNC: 1.68 MG/DL (ref 0.67–1.17)
EGFRCR SERPLBLD CKD-EPI 2021: 53 ML/MIN/1.73M2
EOSINOPHIL # BLD AUTO: 0 10E3/UL (ref 0–0.7)
EOSINOPHIL NFR BLD AUTO: 0 %
ERYTHROCYTE [DISTWIDTH] IN BLOOD BY AUTOMATED COUNT: 24 % (ref 10–15)
GLUCOSE SERPL-MCNC: 370 MG/DL (ref 70–99)
HCO3 SERPL-SCNC: 21 MMOL/L (ref 22–29)
HCT VFR BLD AUTO: 27.5 % (ref 40–53)
HGB BLD-MCNC: 9.3 G/DL (ref 13.3–17.7)
IMM GRANULOCYTES # BLD: 0.2 10E3/UL
IMM GRANULOCYTES NFR BLD: 2 %
LDH SERPL L TO P-CCNC: 249 U/L (ref 0–250)
LIPASE SERPL-CCNC: 383 U/L (ref 13–60)
LYMPHOCYTES # BLD AUTO: 0.7 10E3/UL (ref 0.8–5.3)
LYMPHOCYTES NFR BLD AUTO: 9 %
MAGNESIUM SERPL-MCNC: 2.2 MG/DL (ref 1.7–2.3)
MCH RBC QN AUTO: 32.4 PG (ref 26.5–33)
MCHC RBC AUTO-ENTMCNC: 33.8 G/DL (ref 31.5–36.5)
MCV RBC AUTO: 96 FL (ref 78–100)
MONOCYTES # BLD AUTO: 0.8 10E3/UL (ref 0–1.3)
MONOCYTES NFR BLD AUTO: 10 %
NEUTROPHILS # BLD AUTO: 6.1 10E3/UL (ref 1.6–8.3)
NEUTROPHILS NFR BLD AUTO: 78 %
NRBC # BLD AUTO: 0.1 10E3/UL
NRBC BLD AUTO-RTO: 1 /100
PHOSPHATE SERPL-MCNC: 4 MG/DL (ref 2.5–4.5)
PLATELET # BLD AUTO: 70 10E3/UL (ref 150–450)
POTASSIUM SERPL-SCNC: 4.4 MMOL/L (ref 3.4–5.3)
PROT SERPL-MCNC: 8.1 G/DL (ref 6.4–8.3)
RBC # BLD AUTO: 2.87 10E6/UL (ref 4.4–5.9)
SODIUM SERPL-SCNC: 136 MMOL/L (ref 135–145)
SPECIMEN EXP DATE BLD: NORMAL
URATE SERPL-MCNC: 8.9 MG/DL (ref 3.4–7)
WBC # BLD AUTO: 7.8 10E3/UL (ref 4–11)

## 2025-07-24 PROCEDURE — 36592 COLLECT BLOOD FROM PICC: CPT

## 2025-07-24 PROCEDURE — 86901 BLOOD TYPING SEROLOGIC RH(D): CPT | Performed by: PHYSICIAN ASSISTANT

## 2025-07-24 PROCEDURE — 85014 HEMATOCRIT: CPT | Performed by: PHYSICIAN ASSISTANT

## 2025-07-24 PROCEDURE — 84550 ASSAY OF BLOOD/URIC ACID: CPT | Performed by: INTERNAL MEDICINE

## 2025-07-24 PROCEDURE — 83690 ASSAY OF LIPASE: CPT | Performed by: INTERNAL MEDICINE

## 2025-07-24 PROCEDURE — 83615 LACTATE (LD) (LDH) ENZYME: CPT | Performed by: INTERNAL MEDICINE

## 2025-07-24 PROCEDURE — S5501 HIT COMPLEX CATH CARE: HCPCS

## 2025-07-24 PROCEDURE — 83735 ASSAY OF MAGNESIUM: CPT | Performed by: INTERNAL MEDICINE

## 2025-07-24 PROCEDURE — 84100 ASSAY OF PHOSPHORUS: CPT | Performed by: INTERNAL MEDICINE

## 2025-07-24 PROCEDURE — 84155 ASSAY OF PROTEIN SERUM: CPT | Performed by: INTERNAL MEDICINE

## 2025-07-24 RX ORDER — HEPARIN SODIUM (PORCINE) LOCK FLUSH IV SOLN 100 UNIT/ML 100 UNIT/ML
5 SOLUTION INTRAVENOUS
OUTPATIENT
Start: 2025-07-24

## 2025-07-24 RX ORDER — EPINEPHRINE 1 MG/ML
0.3 INJECTION, SOLUTION INTRAMUSCULAR; SUBCUTANEOUS EVERY 5 MIN PRN
OUTPATIENT
Start: 2025-07-24

## 2025-07-24 RX ORDER — HEPARIN SODIUM,PORCINE 10 UNIT/ML
5-20 VIAL (ML) INTRAVENOUS DAILY PRN
OUTPATIENT
Start: 2025-07-24

## 2025-07-24 RX ORDER — DIPHENHYDRAMINE HYDROCHLORIDE 50 MG/ML
50 INJECTION, SOLUTION INTRAMUSCULAR; INTRAVENOUS
Start: 2025-07-24

## 2025-07-24 NOTE — ORAL ONC MGMT
Oral Chemotherapy Monitoring Program    Subjective/Objective:  Anil Montoya is a 39 year old male contacted by phone for a follow-up visit for oral chemotherapy.  He is feeling well after last weeks cycle start with azacitdine infusions. Heading to play disc golf today prior to his lab appt/dressing change. He states everything is going well since he started Cycle 2 (first cycle with [place]) on 7/17. He does not report any side effects.     We discussed the venetoclax dosing for 3 weeks on 1 week on. He asked the reason for why taking breaks. We discussed how he was previously on TKIs for CML and those medications are given continuously, whereas venetoclax is commonly given with weeks off to allow for count recovery between cycles. Patient verbalized understanding.         7/15/2025     3:00 PM 7/16/2025    12:00 PM 7/17/2025    12:05 PM 7/17/2025     4:00 PM 7/17/2025     4:51 PM 7/24/2025    12:00 PM 7/24/2025    12:32 PM   ORAL CHEMOTHERAPY   Assessment Type Initial Work up Refill Refill New Teach New Teach Initial Follow up Initial Follow up   Diagnosis Code Acute Myeloid Leukemia (AML) Acute Myeloid Leukemia (AML)  Acute Myeloid Leukemia (AML) Acute Myeloid Leukemia (AML) Acute Myeloid Leukemia (AML) Acute Myeloid Leukemia (AML)   Providers Dr. Telly Varner   [facility]   Drug Name Venclexta (venetoclax)  Iclusig (ponatinib)  Venclexta (venetoclax)  Venclexta (venetoclax)  Iclusig (ponatinib)  Venclexta (venetoclax) Iclusig (ponatinib)   Dose 100 mg   100 mg 30 mg 100 mg 30 mg   Current Schedule Daily   Daily Daily Daily Daily   Cycle Details Continuous   Continuous Continuous 3 weeks on, 1 week off Continuous   Start Date of Last Cycle      7/17/2025 7/17/2025   Planned next cycle start date 7/16/2025 7/17/2025 7/17/2025 8/14/2025 8/16/2025        "Data saved with a previous flowsheet row definition       Last PHQ-2 Score on record:       2/23/2025     9:13 PM 1/11/2024     9:42 AM   PHQ-2 ( 1999 [employer])   Q1: Little interest or pleasure in doing things 0 0   Q2: Feeling down, depressed or hopeless 0 0   PHQ-2 Score 0  0   Q1: Little interest or pleasure in doing things Not at all    Q2: Feeling down, depressed or hopeless Not at all    PHQ-2 Score 0        Patient-reported       Vitals:  BP:   BP Readings from Last 1 Encounters:   07/21/25 111/75     Wt Readings from Last 1 Encounters:   07/18/25 91.7 kg (202 lb 3.2 oz)     Estimated body surface area is 2.11 meters squared as calculated from the following:    Height as of 7/17/25: 1.74 m (5' 8.5\").    Weight as of 7/18/25: 91.7 kg (202 lb 3.2 oz).    Labs:  _  Result Component Current Result Ref Range   Sodium 139 (7/16/2025) 135 - 145 mmol/L     _  Result Component Current Result Ref Range   Potassium 4.1 (7/16/2025) 3.4 - 5.3 mmol/L     _  Result Component Current Result Ref Range   Calcium 9.7 (7/16/2025) 8.8 - 10.4 mg/dL     _  Result Component Current Result Ref Range   Magnesium 2.0 (7/16/2025) 1.7 - 2.3 mg/dL     _  Result Component Current Result Ref Range   Phosphorus 5.0 (H) (7/16/2025) 2.5 - 4.5 mg/dL     _  Result Component Current Result Ref Range   Albumin 4.1 (7/16/2025) 3.5 - 5.2 g/dL     _  Result Component Current Result Ref Range   Urea Nitrogen 27.8 (H) (7/16/2025) 6.0 - 20.0 mg/dL     _  Result Component Current Result Ref Range   Creatinine 1.44 (H) (7/16/2025) 0.67 - 1.17 mg/dL     _  Result Component Current Result Ref Range   AST 20 (7/16/2025) 0 - 45 U/L     _  Result Component Current Result Ref Range   ALT 20 (7/16/2025) 0 - 70 U/L     _  Result Component Current Result Ref Range   Bilirubin Total 1.8 (H) (7/16/2025) <=1.2 mg/dL     _  Result Component Current Result Ref Range   WBC Count 7.4 (7/20/2025) 4.0 - 11.0 10e3/uL     _  Result Component Current Result Ref Range "   Hemoglobin 9.6 (L) (7/20/2025) 13.3 - 17.7 g/dL     _  Result Component Current Result Ref Range   Platelet Count 104 (L) (7/20/2025) 150 - 450 10e3/uL     _  Result Component Current Result Ref Range   Absolute Neutrophils 5.4 (7/20/2025) 1.6 - 8.3 10e3/uL     No results found for ANC within last 30 days.          Assessment/Plan:  He appears to be tolerating therapy well and will continue with ponatinib daily and venetoclax for 3 weeks on 1 week off as prescribed. Confirmed that patient has our oral chemo monitoring phone number and patient understands he can contact us regarding side effects.    Follow-Up:  7/24 afternoon labs    Refill Due:  Sending 1 more week of venetoclax today for total 21 days (had 14 tablets on hand at the start of cycle 2)    Jessica Dodson, Sandee  Oral Chemotherapy Monitoring Program  [facility]  [phone]

## 2025-07-24 NOTE — PROGRESS NOTES
Infusion Nursing Note:  Anil Jarrett George Montoya presents today for PICC labs\dressing change, Possible blood transfusion.    Patient seen by provider today: No   present during visit today: Not Applicable.    Note: no new concerns. .      Intravenous Access:  PICC.    Treatment Conditions:  Lab Results   Component Value Date    HGB 9.3 (L) 07/24/2025    WBC 7.8 07/24/2025    ANEU 6.1 07/24/2025    PLT 70 (L) 07/24/2025        Results reviewed, labs did NOT meet treatment parameters: for platelet or prbc transfusion.      Post Infusion Assessment:  Patient tolerated labs and dressing change without incident.  Site patent and intact, free from redness, edema or discomfort.  No evidence of extravasations.       Discharge Plan:   Discharge instructions reviewed with: Patient.  Patient and/or family verbalized understanding of discharge instructions and all questions answered.  Patient discharged in stable condition accompanied by: self.  Departure Mode: Ambulatory.      Maribel Goldman RN

## 2025-07-25 PROCEDURE — S5501 HIT COMPLEX CATH CARE: HCPCS

## 2025-07-26 LAB
ABO + RH BLD: NORMAL
BLD GP AB SCN SERPL QL: NEGATIVE
BLD PROD TYP BPU: NORMAL
BLD PROD TYP BPU: NORMAL
BLOOD COMPONENT TYPE: NORMAL
BLOOD COMPONENT TYPE: NORMAL
CODING SYSTEM: NORMAL
CODING SYSTEM: NORMAL
SPECIMEN EXP DATE BLD: NORMAL
UNIT ABO/RH: NORMAL
UNIT NUMBER: NORMAL
UNIT NUMBER: NORMAL
UNIT STATUS: NORMAL
UNIT STATUS: NORMAL
UNIT TYPE ISBT: 8400

## 2025-07-26 PROCEDURE — S5501 HIT COMPLEX CATH CARE: HCPCS

## 2025-07-26 RX ORDER — DIPHENHYDRAMINE HYDROCHLORIDE 50 MG/ML
50 INJECTION, SOLUTION INTRAMUSCULAR; INTRAVENOUS
Start: 2025-07-26

## 2025-07-26 RX ORDER — HEPARIN SODIUM (PORCINE) LOCK FLUSH IV SOLN 100 UNIT/ML 100 UNIT/ML
5 SOLUTION INTRAVENOUS
OUTPATIENT
Start: 2025-07-26

## 2025-07-26 RX ORDER — EPINEPHRINE 1 MG/ML
0.3 INJECTION, SOLUTION INTRAMUSCULAR; SUBCUTANEOUS EVERY 5 MIN PRN
OUTPATIENT
Start: 2025-07-26

## 2025-07-26 RX ORDER — HEPARIN SODIUM,PORCINE 10 UNIT/ML
5-20 VIAL (ML) INTRAVENOUS DAILY PRN
OUTPATIENT
Start: 2025-07-26

## 2025-07-27 PROCEDURE — S5501 HIT COMPLEX CATH CARE: HCPCS

## 2025-07-28 ENCOUNTER — INFUSION THERAPY VISIT (OUTPATIENT)
Dept: ONCOLOGY | Facility: CLINIC | Age: 40
End: 2025-07-28
Attending: INTERNAL MEDICINE
Payer: COMMERCIAL

## 2025-07-28 VITALS
OXYGEN SATURATION: 100 % | DIASTOLIC BLOOD PRESSURE: 71 MMHG | SYSTOLIC BLOOD PRESSURE: 109 MMHG | TEMPERATURE: 97.7 F | RESPIRATION RATE: 14 BRPM | HEART RATE: 79 BPM

## 2025-07-28 DIAGNOSIS — C92.01 ACUTE MYELOID LEUKEMIA IN REMISSION (H): ICD-10-CM

## 2025-07-28 DIAGNOSIS — C92.00 ACUTE MYELOID LEUKEMIA NOT HAVING ACHIEVED REMISSION (H): ICD-10-CM

## 2025-07-28 DIAGNOSIS — C92.10 BLASTIC PHASE CHRONIC MYELOID LEUKEMIA (H): Primary | ICD-10-CM

## 2025-07-28 LAB
BASOPHILS # BLD AUTO: 0 10E3/UL (ref 0–0.2)
BASOPHILS NFR BLD AUTO: 1 %
DLCOCOR-%PRED-PRE: 60 %
DLCOCOR-PRE: 18.4 ML/MIN/MMHG
DLCOUNC-%PRED-PRE: 46 %
DLCOUNC-PRE: 14.11 ML/MIN/MMHG
DLCOUNC-PRED: 30.66 ML/MIN/MMHG
ELLIPTOCYTES BLD QL SMEAR: SLIGHT
EOSINOPHIL # BLD AUTO: 0 10E3/UL (ref 0–0.7)
EOSINOPHIL NFR BLD AUTO: 0 %
ERV-PRED: 1.55 L
ERYTHROCYTE [DISTWIDTH] IN BLOOD BY AUTOMATED COUNT: 24.9 % (ref 10–15)
EXPTIME-PRE: 6.6 SEC
FEF2575-%PRED-POST: 73 %
FEF2575-%PRED-PRE: 61 %
FEF2575-POST: 2.87 L/SEC
FEF2575-PRE: 2.41 L/SEC
FEF2575-PRED: 3.9 L/SEC
FEFMAX-%PRED-PRE: 71 %
FEFMAX-PRE: 7.13 L/SEC
FEFMAX-PRED: 9.99 L/SEC
FEV1-%PRED-PRE: 66 %
FEV1-PRE: 2.58 L
FEV1FEV6-PRE: 81 %
FEV1FEV6-PRED: 82 %
FEV1FVC-PRE: 80 %
FEV1FVC-PRED: 82 %
FEV1SVC-PRED: 72 L
FIFMAX-PRE: 4.34 L/SEC
FRCPLETH-%PRED-PRE: 73 %
FRCPLETH-PRE: 2.35 L
FRCPLETH-PRED: 3.18 L
FVC-%PRED-PRE: 67 %
FVC-PRE: 3.22 L
FVC-PRED: 4.76 L
GAW-PRED: 1.03 L/S/CMH2O
HCT VFR BLD AUTO: 25.1 % (ref 40–53)
HGB BLD-MCNC: 8.4 G/DL (ref 13.3–17.7)
IC-PRED: 3.76 L
IMM GRANULOCYTES # BLD: 0.1 10E3/UL
IMM GRANULOCYTES NFR BLD: 2 %
LYMPHOCYTES # BLD AUTO: 0.6 10E3/UL (ref 0.8–5.3)
LYMPHOCYTES NFR BLD AUTO: 10 %
Lab: 97 %
MCH RBC QN AUTO: 32.3 PG (ref 26.5–33)
MCHC RBC AUTO-ENTMCNC: 33.5 G/DL (ref 31.5–36.5)
MCV RBC AUTO: 97 FL (ref 78–100)
MONOCYTES # BLD AUTO: 0.5 10E3/UL (ref 0–1.3)
MONOCYTES NFR BLD AUTO: 8 %
NEUTROPHILS # BLD AUTO: 4.8 10E3/UL (ref 1.6–8.3)
NEUTROPHILS NFR BLD AUTO: 79 %
NRBC # BLD AUTO: 0 10E3/UL
NRBC BLD AUTO-RTO: 1 /100
PLAT MORPH BLD: ABNORMAL
PLATELET # BLD AUTO: 36 10E3/UL (ref 150–450)
RBC # BLD AUTO: 2.6 10E6/UL (ref 4.4–5.9)
RBC MORPH BLD: ABNORMAL
RVPLETH-PRED: 1.53 L
SGAW-PRED: 0.2 1/CMH2O*S
SRAW-PRED: < 4.76 CMH2O*S
TLCPLETH-PRED: 6.93 L
VA-%PRED-PRE: 61 %
VA-PRE: 3.95 L
VC-PRED: 5.41 L
WBC # BLD AUTO: 6 10E3/UL (ref 4–11)

## 2025-07-28 PROCEDURE — 86850 RBC ANTIBODY SCREEN: CPT | Performed by: PHYSICIAN ASSISTANT

## 2025-07-28 PROCEDURE — S5501 HIT COMPLEX CATH CARE: HCPCS

## 2025-07-28 PROCEDURE — 94729 DIFFUSING CAPACITY: CPT | Performed by: INTERNAL MEDICINE

## 2025-07-28 PROCEDURE — 85025 COMPLETE CBC W/AUTO DIFF WBC: CPT | Performed by: PHYSICIAN ASSISTANT

## 2025-07-28 PROCEDURE — 94726 PLETHYSMOGRAPHY LUNG VOLUMES: CPT | Performed by: INTERNAL MEDICINE

## 2025-07-28 PROCEDURE — 258N000003 HC RX IP 258 OP 636: Performed by: INTERNAL MEDICINE

## 2025-07-28 PROCEDURE — 94060 EVALUATION OF WHEEZING: CPT | Performed by: INTERNAL MEDICINE

## 2025-07-28 PROCEDURE — 96360 HYDRATION IV INFUSION INIT: CPT

## 2025-07-28 RX ORDER — ALBUTEROL SULFATE 0.83 MG/ML
2.5 SOLUTION RESPIRATORY (INHALATION)
OUTPATIENT
Start: 2025-07-28

## 2025-07-28 RX ORDER — MEPERIDINE HYDROCHLORIDE 25 MG/ML
25 INJECTION INTRAMUSCULAR; INTRAVENOUS; SUBCUTANEOUS
OUTPATIENT
Start: 2025-07-28

## 2025-07-28 RX ORDER — EPINEPHRINE 1 MG/ML
0.3 INJECTION, SOLUTION, CONCENTRATE INTRAVENOUS EVERY 5 MIN PRN
OUTPATIENT
Start: 2025-07-28

## 2025-07-28 RX ORDER — HEPARIN SODIUM,PORCINE 10 UNIT/ML
5-20 VIAL (ML) INTRAVENOUS DAILY PRN
Status: DISCONTINUED | OUTPATIENT
Start: 2025-07-28 | End: 2025-07-28 | Stop reason: HOSPADM

## 2025-07-28 RX ORDER — ALBUTEROL SULFATE 90 UG/1
1-2 INHALANT RESPIRATORY (INHALATION)
Start: 2025-07-28

## 2025-07-28 RX ORDER — METHYLPREDNISOLONE SODIUM SUCCINATE 40 MG/ML
40 INJECTION INTRAMUSCULAR; INTRAVENOUS
Start: 2025-07-28

## 2025-07-28 RX ORDER — DIPHENHYDRAMINE HYDROCHLORIDE 50 MG/ML
50 INJECTION, SOLUTION INTRAMUSCULAR; INTRAVENOUS
Start: 2025-07-28

## 2025-07-28 RX ORDER — HEPARIN SODIUM,PORCINE 10 UNIT/ML
5-20 VIAL (ML) INTRAVENOUS DAILY PRN
OUTPATIENT
Start: 2025-07-28

## 2025-07-28 RX ORDER — DIPHENHYDRAMINE HYDROCHLORIDE 50 MG/ML
25 INJECTION, SOLUTION INTRAMUSCULAR; INTRAVENOUS
Start: 2025-07-28

## 2025-07-28 RX ORDER — HEPARIN SODIUM (PORCINE) LOCK FLUSH IV SOLN 100 UNIT/ML 100 UNIT/ML
5 SOLUTION INTRAVENOUS
OUTPATIENT
Start: 2025-07-28

## 2025-07-28 RX ADMIN — SODIUM CHLORIDE 1000 ML: 0.9 INJECTION, SOLUTION INTRAVENOUS at 09:27

## 2025-07-28 ASSESSMENT — PAIN SCALES - GENERAL: PAINLEVEL_OUTOF10: NO PAIN (0)

## 2025-07-28 NOTE — NURSING NOTE
Chief Complaint   Patient presents with    Blood Draw     Labs drawn via PICC     Labs collected from PICC.  Line flushed with saline and saline locked.  Vitals taken and pt checked in for appt.    Jennifer REYES RN PHN BSN  BMT/Oncology Lab

## 2025-07-28 NOTE — PROGRESS NOTES
Infusion Nursing Note:  Anil Montoya presents today for IV fluids (DID NOT MEET parameters for transfusions today).    Patient seen by provider today: No   present during visit today: Not Applicable.    Note: Patient arrives to infusion feeling well today.  He reports no changes over the weekend.  He denies any fevers, chills, cough, or other infectious symptoms.  He denies any dizziness, lightheadedness, shortness of breath, fatigue, or bleeding.  He denies any nausea, vomiting, diarrhea, constipation, or urinary symptoms.  He offers no new concerns at this time.    Intravenous Access:  PICC.    Treatment Conditions:  Lab Results   Component Value Date    HGB 8.4 (L) 07/28/2025    WBC 6.0 07/28/2025    ANEU 4.8 07/28/2025    PLT 36 (LL) 07/28/2025            Post Infusion Assessment:  Patient tolerated infusion without incident.  Blood return noted pre and post infusion.  Site patent and intact, free from redness, edema or discomfort.  PICC line heparin locked and intact upon discharge.    Discharge Plan:   Patient declined prescription refills.  Discharge instructions reviewed with: Patient.  Patient and/or family verbalized understanding of discharge instructions and all questions answered.  AVS to patient via [employer].  Patient will return 8/1/25 for next appointment.   Patient discharged in stable condition accompanied by: self.  Departure Mode: Ambulatory.      NICHOLAS PRYOR RN

## 2025-07-29 ENCOUNTER — NURSE TRIAGE (OUTPATIENT)
Dept: ONCOLOGY | Facility: CLINIC | Age: 40
End: 2025-07-29
Payer: COMMERCIAL

## 2025-07-29 ENCOUNTER — HOME INFUSION (OUTPATIENT)
Dept: HOME HEALTH SERVICES | Facility: HOME HEALTH | Age: 40
End: 2025-07-29
Payer: COMMERCIAL

## 2025-07-29 DIAGNOSIS — R74.8 ELEVATED LIPASE: ICD-10-CM

## 2025-07-29 DIAGNOSIS — C92.00 ACUTE MYELOID LEUKEMIA NOT HAVING ACHIEVED REMISSION (H): ICD-10-CM

## 2025-07-29 DIAGNOSIS — C92.10 BLASTIC PHASE CHRONIC MYELOID LEUKEMIA (H): Primary | ICD-10-CM

## 2025-07-29 LAB
ABO + RH BLD: NORMAL
BLD GP AB SCN SERPL QL: NEGATIVE
SPECIMEN EXP DATE BLD: NORMAL

## 2025-07-29 PROCEDURE — S5501 HIT COMPLEX CATH CARE: HCPCS

## 2025-07-29 NOTE — TELEPHONE ENCOUNTER
Oncology Nurse Triage - Reporting Symptoms    Situation:   Anil reporting the following symptoms: stomach discomfort. He said this happened when he was on Venetoclax through [place] previously.     Background:   Treating Provider:  Dr. Varner    Date of last office visit: 7/16/25    Recent treatments: Yes: Venetoclax and ponatinib- ponatinib is on hold.   7/28/25 IVF    Assessment  Onset of symptoms: 4-5 days ago   Stomach on right side, below the ribs is painful and slightly distended. He had this happen when he was on Venetoclax through [place] previously. Tomorrow is his last day of this cycle.   He denies any N/V, fevers/chills, reports regular bowel movements.     Pt wonders what he should do, as there was talk of extending the Venetoclax for another 7 day?    Recommendations:   Informed will send message to care team to review, as pt has follow up appt in clinic tomorrow. Will check if additional labs are needed prior to 7/31. Informed pt will call him back with recommendations.     Call to pt to update on recommendations for labs prior to CARTER visit tomorrow and CARTER will assess tomorrow how pt is doing/review labs to determine if continuing or stopping Venetoclox is warranted. Pt in agreement of plan.   [id number] request sent to scheduling for labs on 2nd floor if possible, at 1245 on 7/30.

## 2025-07-29 NOTE — PROGRESS NOTES
REASON FOR VISIT:  Management of chronic myeloid leukemia (CML)  Jul 30, 2025     HISTORY OF PRESENT ILLNESS:  Anil Montoya is a 39 year old with chronic myeloid leukemia (CML).  To summarize his course, he presented with a couple of weeks of dizziness in 09/2024 and was found to have hyperleukocytosis and thrombocytosis with moderate anemia as well as Tumor Lysis Syndrome.  Workup confirmed CML in chronic phase, hepatosplenomegaly on imaging, peripheral blood FISH and BCR-ABL p210 by PCR were detected, and bone marrow biopsy showed t(9;22) as the sole abnormality.  Peripheral blood BCR-ABL p210 by PCR was 62.5%.  He was started on dasatinib 100 mg daily in 09/2024.  Dasatinib was held for about 2 weeks in 10/2024 due to pancytopenia, was found to have iron deficiency and started an oral iron supplement, and blood cell counts improved so he resumed at 80 mg daily toward the end of 10/2024.  BCR-ABL had decreased to about 5% by 03/2025.  He presented in 06/2025 with shortness of breath, pneumonia, and leukocytosis with circulating blasts.  He was admitted at 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  Dasatinib was stopped and venetoclax (14 days), decitabine (5 days), and 30 mg ponatinib (continuous) was started in 06/2025 for MBP-CML adapted from Nuha et al. Lancet Haematol 2024; 11: e839-49.  The course was complicated by neutropenic fever, OCTAVIO on CKD, transient bilirubin elevation, mild lipase elevation, distal DVT that spontaneously resolved, upper GI bleeding, and rash that improved.  Post-induction bone marrow biopsy in 07/2025 was normocellular with no increase in blasts, normal karyotype, and BCR-ABL around 0.1% consistent with a MR3 level response.  Visit for management of CML.    INTERVAL HISTORY:   Anil is here today for follow up  He feels well. His right sided abdominal pain and distension  "are resolved. He says this happened with his first cycle of venetoclax as well. He is eating well and drinking fluids without issue. No nausea or pain today. No swelling or skin concerns-- some scattered scabbing from small traumas like fixing his . No active bleeding or bruising.    No fevers, night sweats, or unintentional weight loss.  No dyspnea, cough, or chest pain.  Normal bowel function.  No bleeding or unusual bruising.  No new lumps or bumps.  No dizziness or chest pain.    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      albuterol (PROVENTIL) (2.5 MG/3ML) 0.083% neb solution Inhale 2.5 mg into the lungs.      buPROPion (WELLBUTRIN XL) 300 MG 24 hr tablet Take 1 tablet (300 mg) by mouth every morning. 90 tablet 3    CONTOUR NEXT TEST test strip Use to test blood sugar 3 times daily or as directed. 150 strip 0    Emergency Supply Kit, Central, Patient use for emergency only. Contents: 3 sodium chloride 0.9% flushes, 1 dressing kit, 1 microclave ext set 14\", 4 nitrile gloves (med), 6 alcohol prep pads, 1 bacitracin, 1 syringe (10 cc 20 G 1\"). Call [phone] to reorder. [id number] kit 0    empagliflozin (JARDIANCE) 25 MG TABS tablet Take 1 tablet (25 mg) by mouth daily. 90 tablet 3    Ferrous Sulfate (IRON PO) Take 60 mg by mouth daily.      furosemide (LASIX) 40 MG tablet       levofloxacin (LEVAQUIN) 250 MG tablet Take 1 tablet (250 mg) by mouth daily. Take when absolute neutrophil count less than 1.0 (x 10^9/L) 30 tablet 3    levothyroxine (SYNTHROID/LEVOTHROID) 125 MCG tablet Take 125 mcg by mouth daily. Only on Sunday      levothyroxine " (SYNTHROID/LEVOTHROID) 150 MCG tablet Take 1 tablet (150 mcg) by mouth daily at 2 pm. 90 tablet 3    loratadine (CLARITIN) 10 MG tablet Take 10 mg by mouth.      losartan (COZAAR) 50 MG tablet Take 1 tablet (50 mg) by mouth daily. 90 tablet 3    melatonin 5 MG tablet       metFORMIN (GLUCOPHAGE) 1000 MG tablet Take 1 tablet (1,000 mg) by mouth 2 times daily (with meals). 180 tablet 3    metoprolol succinate ER (TOPROL XL) 25 MG 24 hr tablet Take 1 tablet (25 mg) by mouth daily. 90 tablet 2    ondansetron (ZOFRAN) 8 MG tablet Take 8 mg by mouth.      pantoprazole (PROTONIX) 40 MG EC tablet Take 40 mg by mouth.      PONATinib HCl (ICLUSIG) 30 MG tablet Take 30 mg by mouth      posaconazole (NOXAFIL) 100 MG DR tablet Take 3 tablets (300 mg) by mouth every morning. 90 tablet 3    posaconazole (NOXAFIL) 100 MG DR tablet Take 300 mg by mouth.      prochlorperazine (COMPAZINE) 10 MG tablet Take 10 mg by mouth.      Semaglutide, 2 MG/DOSE, (OZEMPIC) 8 MG/3ML pen Inject 2 mg subcutaneously every 7 days. 9 mL 3    sodium chloride, PF, 0.9% PF flush Inject 10 mLs into the vein as needed for line flush. Flush IV before and after medication administration as directed and/or at least every 24 hours. 942781 mL 0    [START ON 8/14/2025] venetoclax (VENCLEXTA) 100 MG tablet Take 1 tablet (100 mg) by mouth daily for 7 days Do not start cycle until directed by care team. Take with food and water. 7 tablet 0    furosemide (LASIX) 20 MG tablet Take 1 tablet (20 mg) by mouth daily (Patient not taking: Reported on 7/30/2025)       No current facility-administered medications for this visit.     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  HEENT:  EOMI, PERRL. Sclerae are anicteric. Oral mucosa is pink and moist with no lesions or thrush.   Lymph: Neck is supple with no lymphadenopathy in the cervical or supraclavicular areas.   Heart: Regular rate and rhythm.   Lungs: Clear to auscultation bilaterally.   Abdomen: Bowel sounds present, soft, nontender with no palpable hepatosplenomegaly or masses.   Extremities: No lower extremity edema noted bilaterally.   Neuro: Alert and oriented x3, CN grossly intact, steady gait  Skin: No rashes, petechiae, or bruising noted on exposed skin.    Wt Readings from Last 4 Encounters:   07/30/25 96.3 kg (212 lb 3.2 oz)   07/18/25 91.7 kg (202 lb 3.2 oz)   07/17/25 91.8 kg (202 lb 6.4 oz)   06/02/25 93 kg (205 lb)       LABORATORY DATA:    I personally reviewed the following labs:    Most Recent 3 CBC's:  Recent Labs   Lab Test 07/30/25  1310 07/28/25  0900 07/24/25  1422   WBC 4.9 6.0 7.8   HGB 8.0* 8.4* 9.3*    97 96   PLT 28* 36* 70*     Most Recent 3 BMP's:  Recent Labs   Lab Test 07/30/25  1310 07/24/25  1422 07/16/25  1322    136 139   POTASSIUM 4.2 4.4 4.1   CHLORIDE 104 101 102   CO2 20* 21* 21*   BUN 25.1* 39.7* 27.8*   CR 1.24* 1.68* 1.44*   ANIONGAP 12 14 16*   IMAN 8.8 9.1 9.7   * 370* 310*     Most Recent 2 LFT's:  Recent Labs   Lab Test 07/30/25  1310 07/24/25  1422   AST 19 24   ALT 18 21   ALKPHOS 137 138   BILITOTAL 1.2 1.9*        09/17/2024 blood BCR-ABL p210 by PCR 62.5%  12/11/2024 blood BCR-ABL p210 by PCR 22.1%  3/1/2025 blood BCR-ABL p210 by PCR 4.99%  6/12/2025 blood BCR-ABL p210 by PCR 50.4%  7/11/2025 blood BCR-ABL p210 by PCR 0.1%    IMPRESSION AND PLAN:  Anil Montoya is a 39 year old with chronic myeloid leukemia (CML).    Unfortunately, CML progressed to myeloid blast phase and he received induction chemotherapy at [facility] with venetoclax (14 days), decitabine (5 days), and 30 mg ponatinib (continuous) that started in 06/14/2025. D14 BMBx and available post-treatment bone  marrow biopsy results indicate an excellent MR3 level response and blood counts are recovering well.   - Plan for alloBMT followed by BCR-ABL TKI maintenance to provide the best chance for CML cure  - BMT consult with Dr Doss on 7/18  - For now continue venetoclax, decitabine-- at [facility] when able-- C2D1 = 7/17  - Juan 100 mg daily for days 1-21  - Ponatinib 30 mg daily-- on hold with recent elevated lipase  - Goal for Allo BMT asap--   - RTC mid- August for next cycle, anticipate 1-2 more cycles while BMT gets everything ready    Abdominal pain-- located stomach on right side, below ribs is painful and slightly distended   Lipase elevated-- recently held ponatinib--   Resolved today.     Heme / ZOË  Past iron deficiency anemia improved with oral iron and recent blood cell transfusions.    ID / ppx  There are no active ID issues  - Continue acyclovir and posaconazole (continuous for venetoclax dosing to start)  - Continue levofloxacin when ANC is less than 1.0    Cardiology / health maintenance  He will continue to work with Cardiology for his complex cardiology issues, Endocrine and PCP for diabetes management, and his primary care provider Dr. Chester Monge for health maintenance and other medical issues.  - Echo 7/11/25-- EF 40% Mildly enlarged right ventricular chamber size, mildly reduced systolic function   - Caution with fluids/ blood      35 minutes spent on the date of the encounter doing chart review, review of test results, interpretation of tests, patient visit, and documentation     The longitudinal plan of care for the diagnosis(es)/condition(s) as documented were addressed during this visit. Due to the added complexity in care, I will continue to support Anil in the subsequent management and with ongoing continuity of care.    Ana Sullivan [facility]

## 2025-07-30 ENCOUNTER — ONCOLOGY VISIT (OUTPATIENT)
Dept: ONCOLOGY | Facility: CLINIC | Age: 40
End: 2025-07-30
Attending: NURSE PRACTITIONER
Payer: COMMERCIAL

## 2025-07-30 VITALS
SYSTOLIC BLOOD PRESSURE: 104 MMHG | WEIGHT: 212.2 LBS | TEMPERATURE: 98.8 F | DIASTOLIC BLOOD PRESSURE: 67 MMHG | HEART RATE: 90 BPM | RESPIRATION RATE: 18 BRPM | BODY MASS INDEX: 31.79 KG/M2 | OXYGEN SATURATION: 98 %

## 2025-07-30 DIAGNOSIS — C92.10 BLASTIC PHASE CHRONIC MYELOID LEUKEMIA (H): ICD-10-CM

## 2025-07-30 DIAGNOSIS — C92.10 CML (CHRONIC MYELOCYTIC LEUKEMIA) (H): ICD-10-CM

## 2025-07-30 DIAGNOSIS — C92.00 ACUTE MYELOID LEUKEMIA NOT HAVING ACHIEVED REMISSION (H): ICD-10-CM

## 2025-07-30 DIAGNOSIS — C92.01 ACUTE MYELOID LEUKEMIA IN REMISSION (H): Primary | ICD-10-CM

## 2025-07-30 DIAGNOSIS — R74.8 ELEVATED LIPASE: ICD-10-CM

## 2025-07-30 LAB
ALBUMIN SERPL BCG-MCNC: 4 G/DL (ref 3.5–5.2)
ALP SERPL-CCNC: 137 U/L (ref 40–150)
ALT SERPL W P-5'-P-CCNC: 18 U/L (ref 0–70)
ANION GAP SERPL CALCULATED.3IONS-SCNC: 12 MMOL/L (ref 7–15)
AST SERPL W P-5'-P-CCNC: 19 U/L (ref 0–45)
BASOPHILS # BLD AUTO: 0 10E3/UL (ref 0–0.2)
BASOPHILS NFR BLD AUTO: 1 %
BILIRUB SERPL-MCNC: 1.2 MG/DL
BUN SERPL-MCNC: 25.1 MG/DL (ref 6–20)
CALCIUM SERPL-MCNC: 8.8 MG/DL (ref 8.8–10.4)
CHLORIDE SERPL-SCNC: 104 MMOL/L (ref 98–107)
CREAT SERPL-MCNC: 1.24 MG/DL (ref 0.67–1.17)
DACRYOCYTES BLD QL SMEAR: SLIGHT
EGFRCR SERPLBLD CKD-EPI 2021: 77 ML/MIN/1.73M2
EOSINOPHIL # BLD AUTO: 0 10E3/UL (ref 0–0.7)
EOSINOPHIL NFR BLD AUTO: 0 %
ERYTHROCYTE [DISTWIDTH] IN BLOOD BY AUTOMATED COUNT: 25.8 % (ref 10–15)
GLUCOSE SERPL-MCNC: 324 MG/DL (ref 70–99)
HCO3 SERPL-SCNC: 20 MMOL/L (ref 22–29)
HCT VFR BLD AUTO: 24.5 % (ref 40–53)
HGB BLD-MCNC: 8 G/DL (ref 13.3–17.7)
IMM GRANULOCYTES # BLD: 0.1 10E3/UL
IMM GRANULOCYTES NFR BLD: 1 %
LDH SERPL L TO P-CCNC: 225 U/L (ref 0–250)
LIPASE SERPL-CCNC: 202 U/L (ref 13–60)
LYMPHOCYTES # BLD AUTO: 0.6 10E3/UL (ref 0.8–5.3)
LYMPHOCYTES NFR BLD AUTO: 12 %
MAGNESIUM SERPL-MCNC: 2.1 MG/DL (ref 1.7–2.3)
MCH RBC QN AUTO: 32.5 PG (ref 26.5–33)
MCHC RBC AUTO-ENTMCNC: 32.7 G/DL (ref 31.5–36.5)
MCV RBC AUTO: 100 FL (ref 78–100)
MONOCYTES # BLD AUTO: 0.5 10E3/UL (ref 0–1.3)
MONOCYTES NFR BLD AUTO: 10 %
NEUTROPHILS # BLD AUTO: 3.8 10E3/UL (ref 1.6–8.3)
NEUTROPHILS NFR BLD AUTO: 77 %
NRBC # BLD AUTO: 0 10E3/UL
NRBC BLD AUTO-RTO: 1 /100
PHOSPHATE SERPL-MCNC: 3.2 MG/DL (ref 2.5–4.5)
PLAT MORPH BLD: ABNORMAL
PLATELET # BLD AUTO: 28 10E3/UL (ref 150–450)
POLYCHROMASIA BLD QL SMEAR: SLIGHT
POTASSIUM SERPL-SCNC: 4.2 MMOL/L (ref 3.4–5.3)
PROT SERPL-MCNC: 8 G/DL (ref 6.4–8.3)
RBC # BLD AUTO: 2.46 10E6/UL (ref 4.4–5.9)
RBC MORPH BLD: ABNORMAL
SODIUM SERPL-SCNC: 136 MMOL/L (ref 135–145)
URATE SERPL-MCNC: 7.5 MG/DL (ref 3.4–7)
WBC # BLD AUTO: 4.9 10E3/UL (ref 4–11)

## 2025-07-30 PROCEDURE — 84550 ASSAY OF BLOOD/URIC ACID: CPT | Performed by: NURSE PRACTITIONER

## 2025-07-30 PROCEDURE — 85025 COMPLETE CBC W/AUTO DIFF WBC: CPT | Performed by: NURSE PRACTITIONER

## 2025-07-30 PROCEDURE — 86901 BLOOD TYPING SEROLOGIC RH(D): CPT | Performed by: NURSE PRACTITIONER

## 2025-07-30 PROCEDURE — 83735 ASSAY OF MAGNESIUM: CPT | Performed by: NURSE PRACTITIONER

## 2025-07-30 PROCEDURE — 84100 ASSAY OF PHOSPHORUS: CPT | Performed by: NURSE PRACTITIONER

## 2025-07-30 PROCEDURE — 83690 ASSAY OF LIPASE: CPT | Performed by: NURSE PRACTITIONER

## 2025-07-30 PROCEDURE — 99213 OFFICE O/P EST LOW 20 MIN: CPT | Performed by: NURSE PRACTITIONER

## 2025-07-30 PROCEDURE — 82040 ASSAY OF SERUM ALBUMIN: CPT | Performed by: NURSE PRACTITIONER

## 2025-07-30 PROCEDURE — 83615 LACTATE (LD) (LDH) ENZYME: CPT | Performed by: NURSE PRACTITIONER

## 2025-07-30 PROCEDURE — 99214 OFFICE O/P EST MOD 30 MIN: CPT | Performed by: NURSE PRACTITIONER

## 2025-07-30 PROCEDURE — 36592 COLLECT BLOOD FROM PICC: CPT | Performed by: NURSE PRACTITIONER

## 2025-07-30 PROCEDURE — S5501 HIT COMPLEX CATH CARE: HCPCS

## 2025-07-30 ASSESSMENT — PAIN SCALES - GENERAL: PAINLEVEL_OUTOF10: NO PAIN (0)

## 2025-07-30 NOTE — NURSING NOTE
Chief Complaint   Patient presents with    Oncology Clinic Visit     Blastic phase chronic myeloid leukemia    Blood Draw     Labs drawn via PICC by RN. VS taken.     Labs collected from PICC.  Line flushed with saline and saline locked.  Vitals taken and pt checked in for appt.     Kati Hill RN

## 2025-07-30 NOTE — CONFIDENTIAL NOTE
DIAGNOSIS:   Acute myeloid leukemia in remission (H)    DATE RECEIVED:  08.07.2025     NOTES (Gather within 2 years) STATUS DETAILS   OFFICE NOTE from referring provider   Internal 07.18.2025  Tavares Doss MD    LABS (any labs) Internal    MEDICATION LIST Internal

## 2025-07-30 NOTE — LETTER
7/30/2025      Anil Montoya  [address] [address]      Dear Colleague,    Thank you for referring your patient, Anil Montoya, to the [facility]. Please see a copy of my visit note below.    REASON FOR VISIT:  Management of chronic myeloid leukemia (CML)  Jul 30, 2025     HISTORY OF PRESENT ILLNESS:  Anil Montoya is a 39 year old with chronic myeloid leukemia (CML).  To summarize his course, he presented with a couple of weeks of dizziness in 09/2024 and was found to have hyperleukocytosis and thrombocytosis with moderate anemia as well as Tumor Lysis Syndrome.  Workup confirmed CML in chronic phase, hepatosplenomegaly on imaging, peripheral blood FISH and BCR-ABL p210 by PCR were detected, and bone marrow biopsy showed t(9;22) as the sole abnormality.  Peripheral blood BCR-ABL p210 by PCR was 62.5%.  He was started on dasatinib 100 mg daily in 09/2024.  Dasatinib was held for about 2 weeks in 10/2024 due to pancytopenia, was found to have iron deficiency and started an oral iron supplement, and blood cell counts improved so he resumed at 80 mg daily toward the end of 10/2024.  BCR-ABL had decreased to about 5% by 03/2025.  He presented in 06/2025 with shortness of breath, pneumonia, and leukocytosis with circulating blasts.  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  Dasatinib was stopped and venetoclax (14 days), decitabine (5 days), and 30 mg ponatinib (continuous) was started in 06/2025 for MBP-CML adapted from Nuha et al. Lancet Haematol 2024; 11: e839-49.  The course was complicated by neutropenic fever, OCTAVIO on CKD, transient bilirubin elevation, mild lipase elevation, distal DVT that spontaneously resolved, upper GI bleeding, and rash that improved.  Post-induction bone marrow biopsy in 07/2025  "was normocellular with no increase in blasts, normal karyotype, and BCR-ABL around 0.1% consistent with a MR3 level response.  Visit for management of CML.    INTERVAL HISTORY:   Anil is here today for follow up  He feels well. His right sided abdominal pain and distension are resolved. He says this happened with his first cycle of venetoclax as well. He is eating well and drinking fluids without issue. No nausea or pain today. No swelling or skin concerns-- some scattered scabbing from small traumas like fixing his . No active bleeding or bruising.    No fevers, night sweats, or unintentional weight loss.  No dyspnea, cough, or chest pain.  Normal bowel function.  No bleeding or unusual bruising.  No new lumps or bumps.  No dizziness or chest pain.    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       albuterol (PROVENTIL) (2.5 MG/3ML) 0.083% neb solution Inhale 2.5 mg into the lungs.       buPROPion (WELLBUTRIN XL) 300 MG 24 hr tablet Take 1 tablet (300 mg) by mouth every morning. 90 tablet 3     CONTOUR NEXT TEST test strip Use to test blood sugar 3 times daily or as directed. 150 strip 0     Emergency Supply Kit, Central, Patient use for emergency only. Contents: 3 sodium chloride 0.9% flushes, 1 dressing kit, 1 microclave ext set 14\", 4 nitrile gloves (med), 6 alcohol prep pads, 1 bacitracin, 1 syringe (10 cc 20 G 1\"). Call [phone] to reorder. 141932 kit 0     empagliflozin (JARDIANCE) 25 MG TABS tablet Take 1 tablet (25 mg) by mouth daily. 90 tablet 3     Ferrous Sulfate (IRON PO) Take 60 mg by mouth daily.       furosemide (LASIX) 40 MG tablet    "     levofloxacin (LEVAQUIN) 250 MG tablet Take 1 tablet (250 mg) by mouth daily. Take when absolute neutrophil count less than 1.0 (x 10^9/L) 30 tablet 3     levothyroxine (SYNTHROID/LEVOTHROID) 125 MCG tablet Take 125 mcg by mouth daily. Only on Sunday       levothyroxine (SYNTHROID/LEVOTHROID) 150 MCG tablet Take 1 tablet (150 mcg) by mouth daily at 2 pm. 90 tablet 3     loratadine (CLARITIN) 10 MG tablet Take 10 mg by mouth.       losartan (COZAAR) 50 MG tablet Take 1 tablet (50 mg) by mouth daily. 90 tablet 3     melatonin 5 MG tablet        metFORMIN (GLUCOPHAGE) 1000 MG tablet Take 1 tablet (1,000 mg) by mouth 2 times daily (with meals). 180 tablet 3     metoprolol succinate ER (TOPROL XL) 25 MG 24 hr tablet Take 1 tablet (25 mg) by mouth daily. 90 tablet 2     ondansetron (ZOFRAN) 8 MG tablet Take 8 mg by mouth.       pantoprazole (PROTONIX) 40 MG EC tablet Take 40 mg by mouth.       PONATinib HCl (ICLUSIG) 30 MG tablet Take 30 mg by mouth       posaconazole (NOXAFIL) 100 MG DR tablet Take 3 tablets (300 mg) by mouth every morning. 90 tablet 3     posaconazole (NOXAFIL) 100 MG DR tablet Take 300 mg by mouth.       prochlorperazine (COMPAZINE) 10 MG tablet Take 10 mg by mouth.       Semaglutide, 2 MG/DOSE, (OZEMPIC) 8 MG/3ML pen Inject 2 mg subcutaneously every 7 days. 9 mL 3     sodium chloride, PF, 0.9% PF flush Inject 10 mLs into the vein as needed for line flush. Flush IV before and after medication administration as directed and/or at least every 24 hours. 330190 mL 0     [START ON 8/14/2025] venetoclax (VENCLEXTA) 100 MG tablet Take 1 tablet (100 mg) by mouth daily for 7 days Do not start cycle until directed by care team. Take with food and water. 7 tablet 0     furosemide (LASIX) 20 MG tablet Take 1 tablet (20 mg) by mouth daily (Patient not taking: Reported on 7/30/2025)       No current facility-administered medications for this visit.     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  HEENT: EOMI, PERRL. Sclerae are anicteric. Oral mucosa is pink and moist with no lesions or thrush.   Lymph: Neck is supple with no lymphadenopathy in the cervical or supraclavicular areas.   Heart: Regular rate and rhythm.   Lungs: Clear to auscultation bilaterally.   Abdomen: Bowel sounds present, soft, nontender with no palpable hepatosplenomegaly or masses.   Extremities: No lower extremity edema noted bilaterally.   Neuro: Alert and oriented x3, CN grossly intact, steady gait  Skin: No rashes, petechiae, or bruising noted on exposed skin.    Wt Readings from Last 4 Encounters:   07/30/25 96.3 kg (212 lb 3.2 oz)   07/18/25 91.7 kg (202 lb 3.2 oz)   07/17/25 91.8 kg (202 lb 6.4 oz)   06/02/25 93 kg (205 lb)       LABORATORY DATA:    I personally reviewed the following labs:    Most Recent 3 CBC's:  Recent Labs   Lab Test 07/30/25  1310 07/28/25  0900 07/24/25  1422   WBC 4.9 6.0 7.8   HGB 8.0* 8.4* 9.3*    97 96   PLT 28* 36* 70*     Most Recent 3 BMP's:  Recent Labs   Lab Test 07/30/25  1310 07/24/25  1422 07/16/25  1322    136 139   POTASSIUM 4.2 4.4 4.1   CHLORIDE 104 101 102   CO2 20* 21* 21*   BUN 25.1* 39.7* 27.8*   CR 1.24* 1.68* 1.44*   ANIONGAP 12 14 16*   IMAN 8.8 9.1 9.7   * 370* 310*     Most Recent 2 LFT's:  Recent Labs   Lab Test 07/30/25  1310 07/24/25  1422   AST 19 24   ALT 18 21   ALKPHOS 137 138   BILITOTAL 1.2 1.9*        09/17/2024 blood BCR-ABL p210 by PCR 62.5%  12/11/2024 blood BCR-ABL p210 by PCR 22.1%  3/1/2025 blood BCR-ABL p210 by PCR 4.99%  6/12/2025 blood BCR-ABL p210 by PCR 50.4%  7/11/2025 blood BCR-ABL p210 by PCR 0.1%    IMPRESSION AND PLAN:  Anil Montoya is a 39 year old with  chronic myeloid leukemia (CML).    Unfortunately, CML progressed to myeloid blast phase and he received induction chemotherapy at [facility] with venetoclax (14 days), decitabine (5 days), and 30 mg ponatinib (continuous) that started in 06/14/2025. D14 BMBx and available post-treatment bone marrow biopsy results indicate an excellent MR3 level response and blood counts are recovering well.   - Plan for alloBMT followed by BCR-ABL TKI maintenance to provide the best chance for CML cure  - BMT consult with Dr Doss on 7/18  - For now continue venetoclax, decitabine-- at [facility] when able-- C2D1 = 7/17  - Juan 100 mg daily for days 1-21  - Ponatinib 30 mg daily-- on hold with recent elevated lipase  - Goal for Allo BMT asap--   - RTC mid- August for next cycle, anticipate 1-2 more cycles while BMT gets everything ready    Abdominal pain-- located stomach on right side, below ribs is painful and slightly distended   Lipase elevated-- recently held ponatinib--   Resolved today.     Heme / ZOË  Past iron deficiency anemia improved with oral iron and recent blood cell transfusions.    ID / ppx  There are no active ID issues  - Continue acyclovir and posaconazole (continuous for venetoclax dosing to start)  - Continue levofloxacin when ANC is less than 1.0    Cardiology / health maintenance  He will continue to work with Cardiology for his complex cardiology issues, Endocrine and PCP for diabetes management, and his primary care provider Dr. Chester Monge for health maintenance and other medical issues.  - Echo 7/11/25-- EF 40% Mildly enlarged right ventricular chamber size, mildly reduced systolic function   - Caution with fluids/ blood      35 minutes spent on the date of the encounter doing chart review, review of test results, interpretation of tests, patient visit, and documentation     The longitudinal plan of care for the diagnosis(es)/condition(s) as documented were addressed during this visit. Due to the added  complexity in care, I will continue to support Anil in the subsequent management and with ongoing continuity of care.    Ana Sullivan ACNP-BC      Again, thank you for allowing me to participate in the care of your patient.        Sincerely,        Ana Sullivan, APRN CNP    Electronically signed

## 2025-07-30 NOTE — NURSING NOTE
"Oncology Rooming Note    July 30, 2025 1:19 PM   Anil Montoya is a 39 year old male who presents for:    Chief Complaint   Patient presents with    Oncology Clinic Visit     Blastic phase chronic myeloid leukemia    Blood Draw     Labs drawn via PICC by RN. VS taken.     Initial Vitals: /67 (BP Location: Left arm, Patient Position: Sitting, Cuff Size: Adult Regular)   Pulse 90   Temp 98.8  F (37.1  C) (Oral)   Resp 18   Wt 96.3 kg (212 lb 3.2 oz)   SpO2 98%   BMI 31.79 kg/m   Estimated body mass index is 31.79 kg/m  as calculated from the following:    Height as of 7/17/25: 1.74 m (5' 8.5\").    Weight as of this encounter: 96.3 kg (212 lb 3.2 oz). Body surface area is 2.16 meters squared.  No Pain (0) Comment: Data Unavailable   No LMP for male patient.  Allergies reviewed: Yes  Medications reviewed: Yes    Medications: Medication refills not needed today.  Pharmacy name entered into [facility]:    [id number] - [place], [place] - [address]  [facility] - [place], [place] -  [address]   [facility] - [place], [place] - [address]    PHQ9:  Did this patient require a PHQ9?: No      Clinical concerns: none      Lolly Maher            "

## 2025-07-31 PROCEDURE — S5501 HIT COMPLEX CATH CARE: HCPCS

## 2025-08-01 ENCOUNTER — APPOINTMENT (OUTPATIENT)
Dept: LAB | Facility: CLINIC | Age: 40
End: 2025-08-01
Attending: INTERNAL MEDICINE
Payer: COMMERCIAL

## 2025-08-03 LAB
ABO + RH BLD: NORMAL
BLD GP AB SCN SERPL QL: NEGATIVE
SPECIMEN EXP DATE BLD: NORMAL

## 2025-08-04 ENCOUNTER — TELEPHONE (OUTPATIENT)
Dept: PULMONOLOGY | Facility: CLINIC | Age: 40
End: 2025-08-04

## 2025-08-04 ENCOUNTER — INFUSION THERAPY VISIT (OUTPATIENT)
Dept: INFUSION THERAPY | Facility: CLINIC | Age: 40
End: 2025-08-04
Attending: INTERNAL MEDICINE
Payer: COMMERCIAL

## 2025-08-04 ENCOUNTER — DOCUMENTATION ONLY (OUTPATIENT)
Dept: ONCOLOGY | Facility: CLINIC | Age: 40
End: 2025-08-04

## 2025-08-04 ENCOUNTER — VIRTUAL VISIT (OUTPATIENT)
Dept: INTERNAL MEDICINE | Facility: CLINIC | Age: 40
End: 2025-08-04
Payer: COMMERCIAL

## 2025-08-04 VITALS
DIASTOLIC BLOOD PRESSURE: 69 MMHG | TEMPERATURE: 97.1 F | OXYGEN SATURATION: 100 % | RESPIRATION RATE: 16 BRPM | HEART RATE: 82 BPM | SYSTOLIC BLOOD PRESSURE: 103 MMHG

## 2025-08-04 DIAGNOSIS — C92.00 ACUTE MYELOID LEUKEMIA NOT HAVING ACHIEVED REMISSION (H): ICD-10-CM

## 2025-08-04 DIAGNOSIS — C92.10 BLASTIC PHASE CHRONIC MYELOID LEUKEMIA (H): ICD-10-CM

## 2025-08-04 DIAGNOSIS — C92.50 ACUTE MYELOMONOCYTIC LEUKEMIA NOT HAVING ACHIEVED REMISSION (H): ICD-10-CM

## 2025-08-04 DIAGNOSIS — C92.10 CML (CHRONIC MYELOCYTIC LEUKEMIA) (H): ICD-10-CM

## 2025-08-04 DIAGNOSIS — C92.10 BLASTIC PHASE CHRONIC MYELOID LEUKEMIA (H): Primary | ICD-10-CM

## 2025-08-04 DIAGNOSIS — E11.22 TYPE 2 DIABETES MELLITUS WITH STAGE 2 CHRONIC KIDNEY DISEASE, WITHOUT LONG-TERM CURRENT USE OF INSULIN (H): Primary | ICD-10-CM

## 2025-08-04 DIAGNOSIS — N18.2 TYPE 2 DIABETES MELLITUS WITH STAGE 2 CHRONIC KIDNEY DISEASE, WITHOUT LONG-TERM CURRENT USE OF INSULIN (H): Primary | ICD-10-CM

## 2025-08-04 DIAGNOSIS — C92.00 ACUTE MYELOID LEUKEMIA NOT HAVING ACHIEVED REMISSION (H): Primary | ICD-10-CM

## 2025-08-04 DIAGNOSIS — R74.8 ELEVATED LIPASE: ICD-10-CM

## 2025-08-04 PROBLEM — M46.20 OSTEOMYELITIS OF VERTEBRA (H): Status: ACTIVE | Noted: 2022-04-18

## 2025-08-04 PROBLEM — R79.89 HIGH SERUM FERRITIN: Status: ACTIVE | Noted: 2025-06-13

## 2025-08-04 PROBLEM — Z86.79 HISTORY OF ENDOCARDITIS: Status: ACTIVE | Noted: 2022-04-22

## 2025-08-04 PROBLEM — E78.2 MIXED HYPERLIPIDEMIA: Status: ACTIVE | Noted: 2017-12-08

## 2025-08-04 PROBLEM — E11.9 TYPE 2 DIABETES MELLITUS (H): Status: ACTIVE | Noted: 2018-05-23

## 2025-08-04 PROBLEM — R79.1 ABNORMAL COAGULATION PROFILE: Status: ACTIVE | Noted: 2025-06-11

## 2025-08-04 PROBLEM — I10 PRIMARY HYPERTENSION: Status: ACTIVE | Noted: 2025-06-13

## 2025-08-04 PROBLEM — B95.61 BACTEREMIA DUE TO STAPHYLOCOCCUS AUREUS: Status: ACTIVE | Noted: 2022-03-28

## 2025-08-04 PROBLEM — D84.9 IMMUNODEFICIENCY: Status: ACTIVE | Noted: 2025-06-13

## 2025-08-04 PROBLEM — D61.818 ACQUIRED PANCYTOPENIA (H): Status: ACTIVE | Noted: 2025-06-11

## 2025-08-04 PROBLEM — Z95.810 IMPLANTABLE CARDIOVERTER-DEFIBRILLATOR (ICD) IN SITU: Status: ACTIVE | Noted: 2022-04-18

## 2025-08-04 PROBLEM — R74.02 ELEVATION OF LEVELS OF LACTIC ACID DEHYDROGENASE (LDH): Status: ACTIVE | Noted: 2025-06-13

## 2025-08-04 PROBLEM — R94.31 PROLONGED QT INTERVAL: Status: ACTIVE | Noted: 2025-06-13

## 2025-08-04 PROBLEM — G47.33 OBSTRUCTIVE SLEEP APNEA SYNDROME: Status: ACTIVE | Noted: 2025-06-11

## 2025-08-04 PROBLEM — R78.81 BACTEREMIA DUE TO STAPHYLOCOCCUS AUREUS: Status: ACTIVE | Noted: 2022-03-28

## 2025-08-04 LAB
ALBUMIN SERPL BCG-MCNC: 4.3 G/DL (ref 3.5–5.2)
ALP SERPL-CCNC: 114 U/L (ref 40–150)
ALT SERPL W P-5'-P-CCNC: 21 U/L (ref 0–70)
ANION GAP SERPL CALCULATED.3IONS-SCNC: 17 MMOL/L (ref 7–15)
AST SERPL W P-5'-P-CCNC: 19 U/L (ref 0–45)
BASOPHILS # BLD AUTO: 0 10E3/UL (ref 0–0.2)
BASOPHILS NFR BLD AUTO: 1 %
BILIRUB SERPL-MCNC: 1.4 MG/DL
BUN SERPL-MCNC: 24.4 MG/DL (ref 6–20)
CALCIUM SERPL-MCNC: 8.8 MG/DL (ref 8.8–10.4)
CHLORIDE SERPL-SCNC: 103 MMOL/L (ref 98–107)
CREAT SERPL-MCNC: 1.32 MG/DL (ref 0.67–1.17)
EGFRCR SERPLBLD CKD-EPI 2021: 71 ML/MIN/1.73M2
EOSINOPHIL # BLD AUTO: 0 10E3/UL (ref 0–0.7)
EOSINOPHIL NFR BLD AUTO: 1 %
ERYTHROCYTE [DISTWIDTH] IN BLOOD BY AUTOMATED COUNT: 25.8 % (ref 10–15)
GLUCOSE SERPL-MCNC: 289 MG/DL (ref 70–99)
HCO3 SERPL-SCNC: 17 MMOL/L (ref 22–29)
HCT VFR BLD AUTO: 25.5 % (ref 40–53)
HGB BLD-MCNC: 8.2 G/DL (ref 13.3–17.7)
IMM GRANULOCYTES # BLD: 0 10E3/UL
IMM GRANULOCYTES NFR BLD: 0 %
LDH SERPL L TO P-CCNC: 212 U/L (ref 0–250)
LIPASE SERPL-CCNC: 181 U/L (ref 13–60)
LYMPHOCYTES # BLD AUTO: 0.4 10E3/UL (ref 0.8–5.3)
LYMPHOCYTES NFR BLD AUTO: 26 %
MAGNESIUM SERPL-MCNC: 2 MG/DL (ref 1.7–2.3)
MCH RBC QN AUTO: 32.9 PG (ref 26.5–33)
MCHC RBC AUTO-ENTMCNC: 32.2 G/DL (ref 31.5–36.5)
MCV RBC AUTO: 102 FL (ref 78–100)
MONOCYTES # BLD AUTO: 0.2 10E3/UL (ref 0–1.3)
MONOCYTES NFR BLD AUTO: 10 %
NEUTROPHILS # BLD AUTO: 1 10E3/UL (ref 1.6–8.3)
NEUTROPHILS NFR BLD AUTO: 62 %
NRBC # BLD AUTO: 0.1 10E3/UL
NRBC BLD AUTO-RTO: 3 /100
PHOSPHATE SERPL-MCNC: 3.4 MG/DL (ref 2.5–4.5)
PLAT MORPH BLD: NORMAL
PLATELET # BLD AUTO: 24 10E3/UL (ref 150–450)
POTASSIUM SERPL-SCNC: 4 MMOL/L (ref 3.4–5.3)
PROT SERPL-MCNC: 7.9 G/DL (ref 6.4–8.3)
RBC # BLD AUTO: 2.49 10E6/UL (ref 4.4–5.9)
RBC MORPH BLD: NORMAL
SODIUM SERPL-SCNC: 137 MMOL/L (ref 135–145)
URATE SERPL-MCNC: 8.9 MG/DL (ref 3.4–7)
WBC # BLD AUTO: 1.7 10E3/UL (ref 4–11)

## 2025-08-04 PROCEDURE — 83735 ASSAY OF MAGNESIUM: CPT | Performed by: INTERNAL MEDICINE

## 2025-08-04 PROCEDURE — 84550 ASSAY OF BLOOD/URIC ACID: CPT | Performed by: INTERNAL MEDICINE

## 2025-08-04 PROCEDURE — 83690 ASSAY OF LIPASE: CPT | Performed by: INTERNAL MEDICINE

## 2025-08-04 PROCEDURE — 85004 AUTOMATED DIFF WBC COUNT: CPT | Performed by: INTERNAL MEDICINE

## 2025-08-04 PROCEDURE — 36592 COLLECT BLOOD FROM PICC: CPT

## 2025-08-04 PROCEDURE — 86900 BLOOD TYPING SEROLOGIC ABO: CPT | Performed by: INTERNAL MEDICINE

## 2025-08-04 PROCEDURE — 80069 RENAL FUNCTION PANEL: CPT | Performed by: INTERNAL MEDICINE

## 2025-08-04 PROCEDURE — 82947 ASSAY GLUCOSE BLOOD QUANT: CPT | Performed by: INTERNAL MEDICINE

## 2025-08-04 PROCEDURE — 83615 LACTATE (LD) (LDH) ENZYME: CPT | Performed by: INTERNAL MEDICINE

## 2025-08-04 PROCEDURE — 96360 HYDRATION IV INFUSION INIT: CPT

## 2025-08-04 PROCEDURE — 258N000003 HC RX IP 258 OP 636: Performed by: INTERNAL MEDICINE

## 2025-08-04 PROCEDURE — 98006 SYNCH AUDIO-VIDEO EST MOD 30: CPT | Performed by: INTERNAL MEDICINE

## 2025-08-04 PROCEDURE — 1126F AMNT PAIN NOTED NONE PRSNT: CPT | Mod: 95 | Performed by: INTERNAL MEDICINE

## 2025-08-04 PROCEDURE — 96361 HYDRATE IV INFUSION ADD-ON: CPT

## 2025-08-04 RX ORDER — METHYLPREDNISOLONE SODIUM SUCCINATE 40 MG/ML
40 INJECTION INTRAMUSCULAR; INTRAVENOUS
Status: CANCELLED
Start: 2025-08-04

## 2025-08-04 RX ORDER — HEPARIN SODIUM (PORCINE) LOCK FLUSH IV SOLN 100 UNIT/ML 100 UNIT/ML
5 SOLUTION INTRAVENOUS
Status: CANCELLED | OUTPATIENT
Start: 2025-08-04

## 2025-08-04 RX ORDER — ALBUTEROL SULFATE 0.83 MG/ML
2.5 SOLUTION RESPIRATORY (INHALATION)
Status: CANCELLED | OUTPATIENT
Start: 2025-08-04

## 2025-08-04 RX ORDER — EPINEPHRINE 1 MG/ML
0.3 INJECTION, SOLUTION INTRAMUSCULAR; SUBCUTANEOUS EVERY 5 MIN PRN
Status: CANCELLED | OUTPATIENT
Start: 2025-08-04

## 2025-08-04 RX ORDER — HEPARIN SODIUM,PORCINE 10 UNIT/ML
5-20 VIAL (ML) INTRAVENOUS DAILY PRN
Status: CANCELLED | OUTPATIENT
Start: 2025-08-04

## 2025-08-04 RX ORDER — DIPHENHYDRAMINE HYDROCHLORIDE 50 MG/ML
25 INJECTION, SOLUTION INTRAMUSCULAR; INTRAVENOUS
Status: CANCELLED
Start: 2025-08-04

## 2025-08-04 RX ORDER — ALBUTEROL SULFATE 90 UG/1
1-2 INHALANT RESPIRATORY (INHALATION)
Status: CANCELLED
Start: 2025-08-04

## 2025-08-04 RX ORDER — DIPHENHYDRAMINE HYDROCHLORIDE 50 MG/ML
50 INJECTION, SOLUTION INTRAMUSCULAR; INTRAVENOUS
Status: CANCELLED
Start: 2025-08-04

## 2025-08-04 RX ORDER — MEPERIDINE HYDROCHLORIDE 25 MG/ML
25 INJECTION INTRAMUSCULAR; INTRAVENOUS; SUBCUTANEOUS
Status: CANCELLED | OUTPATIENT
Start: 2025-08-04

## 2025-08-04 RX ADMIN — SODIUM CHLORIDE 1000 ML: 0.9 INJECTION, SOLUTION INTRAVENOUS at 13:28

## 2025-08-06 ENCOUNTER — VIRTUAL VISIT (OUTPATIENT)
Dept: ONCOLOGY | Facility: CLINIC | Age: 40
End: 2025-08-06
Attending: INTERNAL MEDICINE
Payer: COMMERCIAL

## 2025-08-06 DIAGNOSIS — C92.00 AML (ACUTE MYELOGENOUS LEUKEMIA) (H): Primary | ICD-10-CM

## 2025-08-06 DIAGNOSIS — Z85.6 HX OF CHRONIC MYELOID LEUKEMIA: ICD-10-CM

## 2025-08-06 DIAGNOSIS — Z71.9 VISIT FOR COUNSELING: ICD-10-CM

## 2025-08-06 DIAGNOSIS — Z76.82 STEM CELL TRANSPLANT CANDIDATE: ICD-10-CM

## 2025-08-06 DIAGNOSIS — C92.00 ACUTE MYELOID LEUKEMIA NOT HAVING ACHIEVED REMISSION (H): ICD-10-CM

## 2025-08-06 LAB
ABO + RH BLD: NORMAL
BLD GP AB SCN SERPL QL: NEGATIVE
SPECIMEN EXP DATE BLD: NORMAL

## 2025-08-06 PROCEDURE — 96041 GENETIC COUNSELING SVC EA 30: CPT | Mod: GT,95 | Performed by: GENETIC COUNSELOR, MS

## 2025-08-07 ENCOUNTER — INFUSION THERAPY VISIT (OUTPATIENT)
Dept: INFUSION THERAPY | Facility: CLINIC | Age: 40
End: 2025-08-07
Attending: INTERNAL MEDICINE
Payer: COMMERCIAL

## 2025-08-07 ENCOUNTER — PRE VISIT (OUTPATIENT)
Dept: INFECTIOUS DISEASES | Facility: CLINIC | Age: 40
End: 2025-08-07
Payer: COMMERCIAL

## 2025-08-07 ENCOUNTER — NURSE TRIAGE (OUTPATIENT)
Dept: ONCOLOGY | Facility: CLINIC | Age: 40
End: 2025-08-07

## 2025-08-07 ENCOUNTER — INFUSION THERAPY VISIT (OUTPATIENT)
Dept: INFUSION THERAPY | Facility: CLINIC | Age: 40
End: 2025-08-07
Attending: STUDENT IN AN ORGANIZED HEALTH CARE EDUCATION/TRAINING PROGRAM
Payer: COMMERCIAL

## 2025-08-07 ENCOUNTER — OFFICE VISIT (OUTPATIENT)
Dept: INFECTIOUS DISEASES | Facility: CLINIC | Age: 40
End: 2025-08-07
Attending: STUDENT IN AN ORGANIZED HEALTH CARE EDUCATION/TRAINING PROGRAM
Payer: COMMERCIAL

## 2025-08-07 VITALS
RESPIRATION RATE: 18 BRPM | DIASTOLIC BLOOD PRESSURE: 69 MMHG | HEART RATE: 94 BPM | SYSTOLIC BLOOD PRESSURE: 101 MMHG | TEMPERATURE: 98.9 F | OXYGEN SATURATION: 100 %

## 2025-08-07 VITALS
TEMPERATURE: 97.9 F | HEART RATE: 90 BPM | WEIGHT: 209.5 LBS | BODY MASS INDEX: 31.03 KG/M2 | SYSTOLIC BLOOD PRESSURE: 110 MMHG | HEIGHT: 69 IN | DIASTOLIC BLOOD PRESSURE: 77 MMHG | OXYGEN SATURATION: 100 %

## 2025-08-07 DIAGNOSIS — Z88.0 H/O PENICILLIN-TYPE ANTIBIOTIC ALLERGY: ICD-10-CM

## 2025-08-07 DIAGNOSIS — N18.2 TYPE 2 DIABETES MELLITUS WITH STAGE 2 CHRONIC KIDNEY DISEASE, WITHOUT LONG-TERM CURRENT USE OF INSULIN (H): ICD-10-CM

## 2025-08-07 DIAGNOSIS — Z86.79 HISTORY OF ENDOCARDITIS: ICD-10-CM

## 2025-08-07 DIAGNOSIS — E11.22 TYPE 2 DIABETES MELLITUS WITH STAGE 2 CHRONIC KIDNEY DISEASE, WITHOUT LONG-TERM CURRENT USE OF INSULIN (H): ICD-10-CM

## 2025-08-07 DIAGNOSIS — Z85.6 HX OF CHRONIC MYELOID LEUKEMIA: ICD-10-CM

## 2025-08-07 DIAGNOSIS — R74.8 ELEVATED LIPASE: ICD-10-CM

## 2025-08-07 DIAGNOSIS — C92.00 AML (ACUTE MYELOGENOUS LEUKEMIA) (H): ICD-10-CM

## 2025-08-07 DIAGNOSIS — C92.01 ACUTE MYELOID LEUKEMIA IN REMISSION (H): ICD-10-CM

## 2025-08-07 DIAGNOSIS — C92.10 BLASTIC PHASE CHRONIC MYELOID LEUKEMIA (H): ICD-10-CM

## 2025-08-07 DIAGNOSIS — Z86.19 HISTORY OF CRYPTOCOCCOSIS: ICD-10-CM

## 2025-08-07 DIAGNOSIS — C92.00 ACUTE MYELOID LEUKEMIA NOT HAVING ACHIEVED REMISSION (H): Primary | ICD-10-CM

## 2025-08-07 DIAGNOSIS — C92.00 ACUTE MYELOID LEUKEMIA NOT HAVING ACHIEVED REMISSION (H): ICD-10-CM

## 2025-08-07 DIAGNOSIS — Z01.818 PRE-TRANSPLANT EVALUATION FOR STEM CELL TRANSPLANT: Primary | ICD-10-CM

## 2025-08-07 LAB
ALBUMIN SERPL BCG-MCNC: 4.4 G/DL (ref 3.5–5.2)
ALP SERPL-CCNC: 115 U/L (ref 40–150)
ALT SERPL W P-5'-P-CCNC: 18 U/L (ref 0–70)
ANION GAP SERPL CALCULATED.3IONS-SCNC: 18 MMOL/L (ref 7–15)
AST SERPL W P-5'-P-CCNC: 17 U/L (ref 0–45)
BASOPHILS # BLD AUTO: 0 10E3/UL (ref 0–0.2)
BASOPHILS NFR BLD AUTO: 1 %
BILIRUB SERPL-MCNC: 2.4 MG/DL
BUN SERPL-MCNC: 22.7 MG/DL (ref 6–20)
CALCIUM SERPL-MCNC: 9 MG/DL (ref 8.8–10.4)
CHLORIDE SERPL-SCNC: 100 MMOL/L (ref 98–107)
CREAT SERPL-MCNC: 1.35 MG/DL (ref 0.67–1.17)
DACRYOCYTES BLD QL SMEAR: SLIGHT
EGFRCR SERPLBLD CKD-EPI 2021: 69 ML/MIN/1.73M2
ELLIPTOCYTES BLD QL SMEAR: SLIGHT
EOSINOPHIL # BLD AUTO: 0 10E3/UL (ref 0–0.7)
EOSINOPHIL NFR BLD AUTO: 1 %
ERYTHROCYTE [DISTWIDTH] IN BLOOD BY AUTOMATED COUNT: 26.1 % (ref 10–15)
EST. AVERAGE GLUCOSE BLD GHB EST-MCNC: 117 MG/DL
GLUCOSE SERPL-MCNC: 266 MG/DL (ref 70–99)
HBA1C MFR BLD: 5.7 %
HCO3 SERPL-SCNC: 19 MMOL/L (ref 22–29)
HCT VFR BLD AUTO: 26.3 % (ref 40–53)
HGB BLD-MCNC: 8.6 G/DL (ref 13.3–17.7)
IMM GRANULOCYTES # BLD: 0 10E3/UL
IMM GRANULOCYTES NFR BLD: 1 %
LDH SERPL L TO P-CCNC: 189 U/L (ref 0–250)
LIPASE SERPL-CCNC: 120 U/L (ref 13–60)
LYMPHOCYTES # BLD AUTO: 0.3 10E3/UL (ref 0.8–5.3)
LYMPHOCYTES NFR BLD AUTO: 35 %
MAGNESIUM SERPL-MCNC: 1.8 MG/DL (ref 1.7–2.3)
MCH RBC QN AUTO: 33.3 PG (ref 26.5–33)
MCHC RBC AUTO-ENTMCNC: 32.7 G/DL (ref 31.5–36.5)
MCV RBC AUTO: 102 FL (ref 78–100)
MONOCYTES # BLD AUTO: 0.1 10E3/UL (ref 0–1.3)
MONOCYTES NFR BLD AUTO: 10 %
NEUTROPHILS # BLD AUTO: 0.4 10E3/UL (ref 1.6–8.3)
NEUTROPHILS NFR BLD AUTO: 51 %
NRBC # BLD AUTO: 0 10E3/UL
NRBC BLD AUTO-RTO: 4 /100
PHOSPHATE SERPL-MCNC: 3.7 MG/DL (ref 2.5–4.5)
PLAT MORPH BLD: ABNORMAL
PLATELET # BLD AUTO: 27 10E3/UL (ref 150–450)
POTASSIUM SERPL-SCNC: 3.8 MMOL/L (ref 3.4–5.3)
PROT SERPL-MCNC: 8 G/DL (ref 6.4–8.3)
RBC # BLD AUTO: 2.58 10E6/UL (ref 4.4–5.9)
RBC MORPH BLD: ABNORMAL
SODIUM SERPL-SCNC: 137 MMOL/L (ref 135–145)
URATE SERPL-MCNC: 8.5 MG/DL (ref 3.4–7)
WBC # BLD AUTO: 0.7 10E3/UL (ref 4–11)

## 2025-08-07 PROCEDURE — 83036 HEMOGLOBIN GLYCOSYLATED A1C: CPT | Performed by: INTERNAL MEDICINE

## 2025-08-07 PROCEDURE — 83615 LACTATE (LD) (LDH) ENZYME: CPT | Performed by: NURSE PRACTITIONER

## 2025-08-07 PROCEDURE — 85014 HEMATOCRIT: CPT | Performed by: NURSE PRACTITIONER

## 2025-08-07 PROCEDURE — 258N000003 HC RX IP 258 OP 636: Performed by: INTERNAL MEDICINE

## 2025-08-07 PROCEDURE — 99213 OFFICE O/P EST LOW 20 MIN: CPT | Performed by: STUDENT IN AN ORGANIZED HEALTH CARE EDUCATION/TRAINING PROGRAM

## 2025-08-07 PROCEDURE — 250N000011 HC RX IP 250 OP 636: Performed by: INTERNAL MEDICINE

## 2025-08-07 PROCEDURE — 86850 RBC ANTIBODY SCREEN: CPT | Performed by: NURSE PRACTITIONER

## 2025-08-07 PROCEDURE — 84550 ASSAY OF BLOOD/URIC ACID: CPT | Performed by: NURSE PRACTITIONER

## 2025-08-07 PROCEDURE — 83690 ASSAY OF LIPASE: CPT | Performed by: NURSE PRACTITIONER

## 2025-08-07 PROCEDURE — 84100 ASSAY OF PHOSPHORUS: CPT | Performed by: NURSE PRACTITIONER

## 2025-08-07 PROCEDURE — 88230 TISSUE CULTURE LYMPHOCYTE: CPT | Performed by: GENETIC COUNSELOR, MS

## 2025-08-07 PROCEDURE — 82040 ASSAY OF SERUM ALBUMIN: CPT | Performed by: NURSE PRACTITIONER

## 2025-08-07 PROCEDURE — 83735 ASSAY OF MAGNESIUM: CPT | Performed by: NURSE PRACTITIONER

## 2025-08-07 RX ORDER — HEPARIN SODIUM (PORCINE) LOCK FLUSH IV SOLN 100 UNIT/ML 100 UNIT/ML
5 SOLUTION INTRAVENOUS
Status: DISCONTINUED | OUTPATIENT
Start: 2025-08-07 | End: 2025-08-07 | Stop reason: HOSPADM

## 2025-08-07 RX ORDER — HEPARIN SODIUM,PORCINE 10 UNIT/ML
5-20 VIAL (ML) INTRAVENOUS DAILY PRN
OUTPATIENT
Start: 2025-08-07

## 2025-08-07 RX ORDER — DIPHENHYDRAMINE HYDROCHLORIDE 50 MG/ML
25 INJECTION, SOLUTION INTRAMUSCULAR; INTRAVENOUS
Start: 2025-08-07

## 2025-08-07 RX ORDER — EPINEPHRINE 1 MG/ML
0.3 INJECTION, SOLUTION INTRAMUSCULAR; SUBCUTANEOUS EVERY 5 MIN PRN
OUTPATIENT
Start: 2025-08-07

## 2025-08-07 RX ORDER — METHYLPREDNISOLONE SODIUM SUCCINATE 40 MG/ML
40 INJECTION INTRAMUSCULAR; INTRAVENOUS
Start: 2025-08-07

## 2025-08-07 RX ORDER — MEPERIDINE HYDROCHLORIDE 25 MG/ML
25 INJECTION INTRAMUSCULAR; INTRAVENOUS; SUBCUTANEOUS
OUTPATIENT
Start: 2025-08-07

## 2025-08-07 RX ORDER — ALBUTEROL SULFATE 90 UG/1
1-2 INHALANT RESPIRATORY (INHALATION)
Start: 2025-08-07

## 2025-08-07 RX ORDER — ALBUTEROL SULFATE 0.83 MG/ML
2.5 SOLUTION RESPIRATORY (INHALATION)
OUTPATIENT
Start: 2025-08-07

## 2025-08-07 RX ORDER — HEPARIN SODIUM (PORCINE) LOCK FLUSH IV SOLN 100 UNIT/ML 100 UNIT/ML
5 SOLUTION INTRAVENOUS
OUTPATIENT
Start: 2025-08-07

## 2025-08-07 RX ORDER — DIPHENHYDRAMINE HYDROCHLORIDE 50 MG/ML
50 INJECTION, SOLUTION INTRAMUSCULAR; INTRAVENOUS
Start: 2025-08-07

## 2025-08-07 RX ADMIN — SODIUM CHLORIDE 1000 ML: 0.9 INJECTION, SOLUTION INTRAVENOUS at 13:38

## 2025-08-07 ASSESSMENT — PAIN SCALES - GENERAL
PAINLEVEL_OUTOF10: NO PAIN (0)
PAINLEVEL_OUTOF10: NO PAIN (0)

## 2025-08-08 ENCOUNTER — RESULTS FOLLOW-UP (OUTPATIENT)
Dept: INTERNAL MEDICINE | Facility: CLINIC | Age: 40
End: 2025-08-08
Payer: COMMERCIAL

## 2025-08-10 LAB
ABO + RH BLD: NORMAL
BLD GP AB SCN SERPL QL: NEGATIVE
SPECIMEN EXP DATE BLD: NORMAL

## 2025-08-11 ENCOUNTER — NURSE TRIAGE (OUTPATIENT)
Dept: ONCOLOGY | Facility: CLINIC | Age: 40
End: 2025-08-11

## 2025-08-11 ENCOUNTER — DOCUMENTATION ONLY (OUTPATIENT)
Dept: LAB | Facility: CLINIC | Age: 40
End: 2025-08-11
Payer: COMMERCIAL

## 2025-08-11 ENCOUNTER — DOCUMENTATION ONLY (OUTPATIENT)
Dept: ONCOLOGY | Facility: CLINIC | Age: 40
End: 2025-08-11

## 2025-08-11 ENCOUNTER — LAB (OUTPATIENT)
Dept: INFUSION THERAPY | Facility: CLINIC | Age: 40
End: 2025-08-11
Attending: INTERNAL MEDICINE
Payer: COMMERCIAL

## 2025-08-11 ENCOUNTER — OFFICE VISIT (OUTPATIENT)
Dept: TRANSPLANT | Facility: CLINIC | Age: 40
End: 2025-08-11
Attending: PHYSICIAN ASSISTANT
Payer: COMMERCIAL

## 2025-08-11 VITALS
RESPIRATION RATE: 16 BRPM | SYSTOLIC BLOOD PRESSURE: 116 MMHG | TEMPERATURE: 97.8 F | DIASTOLIC BLOOD PRESSURE: 74 MMHG | BODY MASS INDEX: 31.2 KG/M2 | WEIGHT: 211.3 LBS | HEART RATE: 80 BPM | OXYGEN SATURATION: 91 %

## 2025-08-11 DIAGNOSIS — C92.00 AML (ACUTE MYELOGENOUS LEUKEMIA) (H): ICD-10-CM

## 2025-08-11 DIAGNOSIS — C92.00 ACUTE MYELOID LEUKEMIA NOT HAVING ACHIEVED REMISSION (H): Primary | ICD-10-CM

## 2025-08-11 DIAGNOSIS — Z01.818 PRE-TRANSPLANT EVALUATION FOR STEM CELL TRANSPLANT: ICD-10-CM

## 2025-08-11 DIAGNOSIS — Z76.82 STEM CELL TRANSPLANT CANDIDATE: ICD-10-CM

## 2025-08-11 DIAGNOSIS — R74.8 ELEVATED LIPASE: ICD-10-CM

## 2025-08-11 DIAGNOSIS — Z85.6 HX OF CHRONIC MYELOID LEUKEMIA: ICD-10-CM

## 2025-08-11 DIAGNOSIS — C92.10 BLASTIC PHASE CHRONIC MYELOID LEUKEMIA (H): ICD-10-CM

## 2025-08-11 LAB
ALBUMIN SERPL BCG-MCNC: 4.3 G/DL (ref 3.5–5.2)
ALP SERPL-CCNC: 100 U/L (ref 40–150)
ALT SERPL W P-5'-P-CCNC: 14 U/L (ref 0–70)
ANION GAP SERPL CALCULATED.3IONS-SCNC: 15 MMOL/L (ref 7–15)
AST SERPL W P-5'-P-CCNC: 15 U/L (ref 0–45)
BASOPHILS # BLD AUTO: 0 10E3/UL (ref 0–0.2)
BASOPHILS NFR BLD AUTO: 4 %
BILIRUB SERPL-MCNC: 1.9 MG/DL
BUN SERPL-MCNC: 23 MG/DL (ref 6–20)
CALCIUM SERPL-MCNC: 9.2 MG/DL (ref 8.8–10.4)
CHLORIDE SERPL-SCNC: 100 MMOL/L (ref 98–107)
CREAT SERPL-MCNC: 1.54 MG/DL (ref 0.67–1.17)
EGFRCR SERPLBLD CKD-EPI 2021: 59 ML/MIN/1.73M2
EOSINOPHIL # BLD AUTO: 0 10E3/UL (ref 0–0.7)
EOSINOPHIL NFR BLD AUTO: 1 %
ERYTHROCYTE [DISTWIDTH] IN BLOOD BY AUTOMATED COUNT: ABNORMAL %
GLUCOSE SERPL-MCNC: 137 MG/DL (ref 70–99)
HCO3 SERPL-SCNC: 22 MMOL/L (ref 22–29)
HCT VFR BLD AUTO: 25.1 % (ref 40–53)
HGB BLD-MCNC: 8.3 G/DL (ref 13.3–17.7)
IMM GRANULOCYTES # BLD: 0 10E3/UL
IMM GRANULOCYTES NFR BLD: 0 %
LDH SERPL L TO P-CCNC: 194 U/L (ref 0–250)
LIPASE SERPL-CCNC: 144 U/L (ref 13–60)
LYMPHOCYTES # BLD AUTO: 0.5 10E3/UL (ref 0.8–5.3)
LYMPHOCYTES NFR BLD AUTO: 60 %
MAGNESIUM SERPL-MCNC: 1.9 MG/DL (ref 1.7–2.3)
MCH RBC QN AUTO: 33.9 PG (ref 26.5–33)
MCHC RBC AUTO-ENTMCNC: 33.1 G/DL (ref 31.5–36.5)
MCV RBC AUTO: 102 FL (ref 78–100)
MONOCYTES # BLD AUTO: 0 10E3/UL (ref 0–1.3)
MONOCYTES NFR BLD AUTO: 5 %
NEUTROPHILS # BLD AUTO: 0.2 10E3/UL (ref 1.6–8.3)
NEUTROPHILS NFR BLD AUTO: 30 %
NRBC # BLD AUTO: 0 10E3/UL
NRBC BLD AUTO-RTO: 3 /100
PHOSPHATE SERPL-MCNC: 4.7 MG/DL (ref 2.5–4.5)
PLAT MORPH BLD: ABNORMAL
PLATELET # BLD AUTO: 47 10E3/UL (ref 150–450)
POLYCHROMASIA BLD QL SMEAR: SLIGHT
POTASSIUM SERPL-SCNC: 3.8 MMOL/L (ref 3.4–5.3)
PROT SERPL-MCNC: 7.7 G/DL (ref 6.4–8.3)
RBC # BLD AUTO: 2.45 10E6/UL (ref 4.4–5.9)
RBC MORPH BLD: ABNORMAL
SODIUM SERPL-SCNC: 137 MMOL/L (ref 135–145)
URATE SERPL-MCNC: 9.4 MG/DL (ref 3.4–7)
WBC # BLD AUTO: 0.8 10E3/UL (ref 4–11)

## 2025-08-11 PROCEDURE — 83690 ASSAY OF LIPASE: CPT | Performed by: NURSE PRACTITIONER

## 2025-08-11 PROCEDURE — 86850 RBC ANTIBODY SCREEN: CPT | Performed by: NURSE PRACTITIONER

## 2025-08-11 PROCEDURE — 11104 PUNCH BX SKIN SINGLE LESION: CPT | Performed by: PHYSICIAN ASSISTANT

## 2025-08-11 PROCEDURE — 85014 HEMATOCRIT: CPT | Performed by: NURSE PRACTITIONER

## 2025-08-11 PROCEDURE — 84100 ASSAY OF PHOSPHORUS: CPT | Performed by: NURSE PRACTITIONER

## 2025-08-11 PROCEDURE — 88233 TISSUE CULTURE SKIN/BIOPSY: CPT | Performed by: PHYSICIAN ASSISTANT

## 2025-08-11 PROCEDURE — 82040 ASSAY OF SERUM ALBUMIN: CPT | Performed by: NURSE PRACTITIONER

## 2025-08-11 PROCEDURE — 83615 LACTATE (LD) (LDH) ENZYME: CPT | Performed by: NURSE PRACTITIONER

## 2025-08-11 PROCEDURE — 86777 TOXOPLASMA ANTIBODY: CPT | Performed by: STUDENT IN AN ORGANIZED HEALTH CARE EDUCATION/TRAINING PROGRAM

## 2025-08-11 PROCEDURE — 83735 ASSAY OF MAGNESIUM: CPT | Performed by: NURSE PRACTITIONER

## 2025-08-11 PROCEDURE — 84550 ASSAY OF BLOOD/URIC ACID: CPT | Performed by: NURSE PRACTITIONER

## 2025-08-11 RX ORDER — ALBUTEROL SULFATE 90 UG/1
1-2 INHALANT RESPIRATORY (INHALATION)
Status: CANCELLED
Start: 2025-08-11

## 2025-08-11 RX ORDER — ALBUTEROL SULFATE 0.83 MG/ML
2.5 SOLUTION RESPIRATORY (INHALATION)
Status: CANCELLED | OUTPATIENT
Start: 2025-08-11

## 2025-08-11 RX ORDER — MEPERIDINE HYDROCHLORIDE 25 MG/ML
25 INJECTION INTRAMUSCULAR; INTRAVENOUS; SUBCUTANEOUS
Status: CANCELLED | OUTPATIENT
Start: 2025-08-11

## 2025-08-11 RX ORDER — DIPHENHYDRAMINE HYDROCHLORIDE 50 MG/ML
25 INJECTION, SOLUTION INTRAMUSCULAR; INTRAVENOUS
Status: CANCELLED
Start: 2025-08-11

## 2025-08-11 RX ORDER — METHYLPREDNISOLONE SODIUM SUCCINATE 40 MG/ML
40 INJECTION INTRAMUSCULAR; INTRAVENOUS
Status: CANCELLED
Start: 2025-08-11

## 2025-08-11 RX ORDER — LIDOCAINE HYDROCHLORIDE AND EPINEPHRINE 10; 10 MG/ML; UG/ML
5 INJECTION, SOLUTION INFILTRATION; PERINEURAL ONCE
Status: DISCONTINUED | OUTPATIENT
Start: 2025-08-11 | End: 2025-08-11 | Stop reason: HOSPADM

## 2025-08-11 RX ORDER — EPINEPHRINE 1 MG/ML
0.3 INJECTION, SOLUTION INTRAMUSCULAR; SUBCUTANEOUS EVERY 5 MIN PRN
Status: CANCELLED | OUTPATIENT
Start: 2025-08-11

## 2025-08-11 RX ORDER — HEPARIN SODIUM (PORCINE) LOCK FLUSH IV SOLN 100 UNIT/ML 100 UNIT/ML
5 SOLUTION INTRAVENOUS
Status: CANCELLED | OUTPATIENT
Start: 2025-08-11

## 2025-08-11 RX ORDER — HEPARIN SODIUM,PORCINE 10 UNIT/ML
5-20 VIAL (ML) INTRAVENOUS DAILY PRN
Status: CANCELLED | OUTPATIENT
Start: 2025-08-11

## 2025-08-11 RX ORDER — DIPHENHYDRAMINE HYDROCHLORIDE 50 MG/ML
50 INJECTION, SOLUTION INTRAMUSCULAR; INTRAVENOUS
Status: CANCELLED
Start: 2025-08-11

## 2025-08-11 ASSESSMENT — PAIN SCALES - GENERAL: PAINLEVEL_OUTOF10: NO PAIN (0)

## 2025-08-12 ENCOUNTER — INFUSION THERAPY VISIT (OUTPATIENT)
Dept: INFUSION THERAPY | Facility: CLINIC | Age: 40
End: 2025-08-12
Attending: INTERNAL MEDICINE
Payer: COMMERCIAL

## 2025-08-12 VITALS
DIASTOLIC BLOOD PRESSURE: 68 MMHG | TEMPERATURE: 97.1 F | HEART RATE: 89 BPM | OXYGEN SATURATION: 99 % | SYSTOLIC BLOOD PRESSURE: 101 MMHG

## 2025-08-12 DIAGNOSIS — C92.00 ACUTE MYELOID LEUKEMIA NOT HAVING ACHIEVED REMISSION (H): Primary | ICD-10-CM

## 2025-08-12 DIAGNOSIS — C92.10 BLASTIC PHASE CHRONIC MYELOID LEUKEMIA (H): ICD-10-CM

## 2025-08-12 LAB
LAB ORDER RESULT STATUS: NORMAL
PERFORMING LABORATORY: NORMAL
T GONDII IGG SER QL: <3 IU/ML (ref 0–7.1)
TEST NAME: NORMAL

## 2025-08-12 PROCEDURE — 258N000003 HC RX IP 258 OP 636: Performed by: INTERNAL MEDICINE

## 2025-08-12 PROCEDURE — 96360 HYDRATION IV INFUSION INIT: CPT

## 2025-08-12 RX ORDER — ALBUTEROL SULFATE 90 UG/1
1-2 INHALANT RESPIRATORY (INHALATION)
Status: CANCELLED
Start: 2025-08-12

## 2025-08-12 RX ORDER — EPINEPHRINE 1 MG/ML
0.3 INJECTION, SOLUTION INTRAMUSCULAR; SUBCUTANEOUS EVERY 5 MIN PRN
Status: CANCELLED | OUTPATIENT
Start: 2025-08-12

## 2025-08-12 RX ORDER — DIPHENHYDRAMINE HYDROCHLORIDE 50 MG/ML
25 INJECTION, SOLUTION INTRAMUSCULAR; INTRAVENOUS
Status: CANCELLED
Start: 2025-08-12

## 2025-08-12 RX ORDER — DIPHENHYDRAMINE HYDROCHLORIDE 50 MG/ML
50 INJECTION, SOLUTION INTRAMUSCULAR; INTRAVENOUS
Status: CANCELLED
Start: 2025-08-12

## 2025-08-12 RX ORDER — HEPARIN SODIUM (PORCINE) LOCK FLUSH IV SOLN 100 UNIT/ML 100 UNIT/ML
5 SOLUTION INTRAVENOUS
Status: CANCELLED | OUTPATIENT
Start: 2025-08-12

## 2025-08-12 RX ORDER — ALBUTEROL SULFATE 0.83 MG/ML
2.5 SOLUTION RESPIRATORY (INHALATION)
Status: CANCELLED | OUTPATIENT
Start: 2025-08-12

## 2025-08-12 RX ORDER — MEPERIDINE HYDROCHLORIDE 25 MG/ML
25 INJECTION INTRAMUSCULAR; INTRAVENOUS; SUBCUTANEOUS
Status: CANCELLED | OUTPATIENT
Start: 2025-08-12

## 2025-08-12 RX ORDER — METHYLPREDNISOLONE SODIUM SUCCINATE 40 MG/ML
40 INJECTION INTRAMUSCULAR; INTRAVENOUS
Status: CANCELLED
Start: 2025-08-12

## 2025-08-12 RX ORDER — HEPARIN SODIUM,PORCINE 10 UNIT/ML
5-20 VIAL (ML) INTRAVENOUS DAILY PRN
Status: CANCELLED | OUTPATIENT
Start: 2025-08-12

## 2025-08-12 RX ADMIN — SODIUM CHLORIDE 1000 ML: 9 INJECTION, SOLUTION INTRAVENOUS at 14:02

## 2025-08-13 ENCOUNTER — PATIENT OUTREACH (OUTPATIENT)
Dept: CARE COORDINATION | Facility: CLINIC | Age: 40
End: 2025-08-13
Payer: COMMERCIAL

## 2025-08-13 LAB
ABO + RH BLD: NORMAL
BLD GP AB SCN SERPL QL: NEGATIVE
SPECIMEN EXP DATE BLD: NORMAL

## 2025-08-14 ENCOUNTER — LAB (OUTPATIENT)
Dept: INFUSION THERAPY | Facility: CLINIC | Age: 40
End: 2025-08-14
Attending: INTERNAL MEDICINE
Payer: COMMERCIAL

## 2025-08-14 ENCOUNTER — VIRTUAL VISIT (OUTPATIENT)
Dept: ONCOLOGY | Facility: CLINIC | Age: 40
End: 2025-08-14
Attending: NURSE PRACTITIONER
Payer: COMMERCIAL

## 2025-08-14 VITALS — BODY MASS INDEX: 30.81 KG/M2 | HEIGHT: 69 IN | WEIGHT: 208 LBS

## 2025-08-14 DIAGNOSIS — C92.00 ACUTE MYELOID LEUKEMIA NOT HAVING ACHIEVED REMISSION (H): Primary | ICD-10-CM

## 2025-08-14 DIAGNOSIS — C92.10 BLASTIC PHASE CHRONIC MYELOID LEUKEMIA (H): ICD-10-CM

## 2025-08-14 DIAGNOSIS — R74.8 ELEVATED LIPASE: ICD-10-CM

## 2025-08-14 DIAGNOSIS — C92.10 CML (CHRONIC MYELOCYTIC LEUKEMIA) (H): Primary | ICD-10-CM

## 2025-08-14 LAB
ALBUMIN SERPL BCG-MCNC: 4.3 G/DL (ref 3.5–5.2)
ALP SERPL-CCNC: 92 U/L (ref 40–150)
ALT SERPL W P-5'-P-CCNC: 14 U/L (ref 0–70)
ANION GAP SERPL CALCULATED.3IONS-SCNC: 13 MMOL/L (ref 7–15)
AST SERPL W P-5'-P-CCNC: 17 U/L (ref 0–45)
BASOPHILS # BLD AUTO: 0.05 10E3/UL (ref 0–0.2)
BASOPHILS NFR BLD AUTO: 5.1 %
BILIRUB SERPL-MCNC: 1.4 MG/DL
BUN SERPL-MCNC: 17.2 MG/DL (ref 6–20)
CALCIUM SERPL-MCNC: 9.2 MG/DL (ref 8.8–10.4)
CHLORIDE SERPL-SCNC: 102 MMOL/L (ref 98–107)
CREAT SERPL-MCNC: 1.2 MG/DL (ref 0.67–1.17)
EGFRCR SERPLBLD CKD-EPI 2021: 80 ML/MIN/1.73M2
ELLIPTOCYTES BLD QL SMEAR: SLIGHT
EOSINOPHIL # BLD AUTO: <0.03 10E3/UL (ref 0–0.7)
EOSINOPHIL NFR BLD AUTO: 0 %
ERYTHROCYTE [DISTWIDTH] IN BLOOD BY AUTOMATED COUNT: 23.4 % (ref 10–15)
GLUCOSE SERPL-MCNC: 104 MG/DL (ref 70–99)
HCO3 SERPL-SCNC: 23 MMOL/L (ref 22–29)
HCT VFR BLD AUTO: 26.1 % (ref 40–53)
HGB BLD-MCNC: 8.6 G/DL (ref 13.3–17.7)
IMM GRANULOCYTES # BLD: <0.03 10E3/UL
IMM GRANULOCYTES NFR BLD: 0 %
LDH SERPL L TO P-CCNC: 188 U/L (ref 0–250)
LIPASE SERPL-CCNC: 125 U/L (ref 13–60)
LYMPHOCYTES # BLD AUTO: 0.68 10E3/UL (ref 0.8–5.3)
LYMPHOCYTES NFR BLD AUTO: 69.4 %
MAGNESIUM SERPL-MCNC: 1.8 MG/DL (ref 1.7–2.3)
MCH RBC QN AUTO: 33.2 PG (ref 26.5–33)
MCHC RBC AUTO-ENTMCNC: 33 G/DL (ref 31.5–36.5)
MCV RBC AUTO: 100.8 FL (ref 78–100)
MONOCYTES # BLD AUTO: 0.11 10E3/UL (ref 0–1.3)
MONOCYTES NFR BLD AUTO: 11.2 %
NEUTROPHILS # BLD AUTO: 0.14 10E3/UL (ref 1.6–8.3)
NEUTROPHILS NFR BLD AUTO: 14.3 %
NRBC # BLD AUTO: <0.03 10E3/UL
NRBC BLD AUTO-RTO: 0 /100
PHOSPHATE SERPL-MCNC: 3.5 MG/DL (ref 2.5–4.5)
PLAT MORPH BLD: ABNORMAL
PLATELET # BLD AUTO: 63 10E3/UL (ref 150–450)
POTASSIUM SERPL-SCNC: 3.8 MMOL/L (ref 3.4–5.3)
PROT SERPL-MCNC: 7.3 G/DL (ref 6.4–8.3)
RBC # BLD AUTO: 2.59 10E6/UL (ref 4.4–5.9)
RBC MORPH BLD: ABNORMAL
SODIUM SERPL-SCNC: 138 MMOL/L (ref 135–145)
URATE SERPL-MCNC: 7.9 MG/DL (ref 3.4–7)
WBC # BLD AUTO: 0.98 10E3/UL (ref 4–11)

## 2025-08-14 PROCEDURE — 86901 BLOOD TYPING SEROLOGIC RH(D): CPT | Performed by: NURSE PRACTITIONER

## 2025-08-14 PROCEDURE — 82435 ASSAY OF BLOOD CHLORIDE: CPT | Performed by: NURSE PRACTITIONER

## 2025-08-14 PROCEDURE — 83735 ASSAY OF MAGNESIUM: CPT | Performed by: NURSE PRACTITIONER

## 2025-08-14 PROCEDURE — 83615 LACTATE (LD) (LDH) ENZYME: CPT | Performed by: NURSE PRACTITIONER

## 2025-08-14 PROCEDURE — 84681 ASSAY OF C-PEPTIDE: CPT | Performed by: NURSE PRACTITIONER

## 2025-08-14 PROCEDURE — 83690 ASSAY OF LIPASE: CPT | Performed by: NURSE PRACTITIONER

## 2025-08-14 PROCEDURE — 85025 COMPLETE CBC W/AUTO DIFF WBC: CPT | Performed by: NURSE PRACTITIONER

## 2025-08-14 PROCEDURE — 84550 ASSAY OF BLOOD/URIC ACID: CPT | Performed by: NURSE PRACTITIONER

## 2025-08-14 PROCEDURE — 84100 ASSAY OF PHOSPHORUS: CPT | Performed by: NURSE PRACTITIONER

## 2025-08-14 RX ORDER — DIPHENHYDRAMINE HYDROCHLORIDE 50 MG/ML
50 INJECTION, SOLUTION INTRAMUSCULAR; INTRAVENOUS
Start: 2025-08-14

## 2025-08-14 RX ORDER — DIPHENHYDRAMINE HYDROCHLORIDE 50 MG/ML
25 INJECTION, SOLUTION INTRAMUSCULAR; INTRAVENOUS
Start: 2025-08-14

## 2025-08-14 RX ORDER — MEPERIDINE HYDROCHLORIDE 25 MG/ML
25 INJECTION INTRAMUSCULAR; INTRAVENOUS; SUBCUTANEOUS
OUTPATIENT
Start: 2025-08-14

## 2025-08-14 RX ORDER — ACYCLOVIR 400 MG/1
400 TABLET ORAL 2 TIMES DAILY
Qty: 60 TABLET | Refills: 1 | Status: SHIPPED | OUTPATIENT
Start: 2025-08-14

## 2025-08-14 RX ORDER — ALBUTEROL SULFATE 0.83 MG/ML
2.5 SOLUTION RESPIRATORY (INHALATION)
OUTPATIENT
Start: 2025-08-14

## 2025-08-14 RX ORDER — LORATADINE 10 MG/1
10 TABLET ORAL DAILY
Qty: 30 TABLET | Refills: 1 | Status: SHIPPED | OUTPATIENT
Start: 2025-08-14

## 2025-08-14 RX ORDER — FUROSEMIDE 40 MG/1
40 TABLET ORAL DAILY
Qty: 30 TABLET | Refills: 1 | Status: SHIPPED | OUTPATIENT
Start: 2025-08-14

## 2025-08-14 RX ORDER — HEPARIN SODIUM (PORCINE) LOCK FLUSH IV SOLN 100 UNIT/ML 100 UNIT/ML
5 SOLUTION INTRAVENOUS
OUTPATIENT
Start: 2025-08-14

## 2025-08-14 RX ORDER — EPINEPHRINE 1 MG/ML
0.3 INJECTION, SOLUTION INTRAMUSCULAR; SUBCUTANEOUS EVERY 5 MIN PRN
OUTPATIENT
Start: 2025-08-14

## 2025-08-14 RX ORDER — PANTOPRAZOLE SODIUM 40 MG/1
40 TABLET, DELAYED RELEASE ORAL DAILY
Qty: 30 TABLET | Refills: 1 | Status: SHIPPED | OUTPATIENT
Start: 2025-08-14

## 2025-08-14 RX ORDER — ALBUTEROL SULFATE 90 UG/1
1-2 INHALANT RESPIRATORY (INHALATION)
Start: 2025-08-14

## 2025-08-14 RX ORDER — METHYLPREDNISOLONE SODIUM SUCCINATE 40 MG/ML
40 INJECTION INTRAMUSCULAR; INTRAVENOUS
Start: 2025-08-14

## 2025-08-14 RX ORDER — HEPARIN SODIUM,PORCINE 10 UNIT/ML
5-20 VIAL (ML) INTRAVENOUS DAILY PRN
OUTPATIENT
Start: 2025-08-14

## 2025-08-14 ASSESSMENT — PAIN SCALES - GENERAL: PAINLEVEL_OUTOF10: NO PAIN (0)

## 2025-08-16 ENCOUNTER — RESULTS FOLLOW-UP (OUTPATIENT)
Dept: ENDOCRINOLOGY | Facility: CLINIC | Age: 40
End: 2025-08-16
Payer: COMMERCIAL

## 2025-08-16 DIAGNOSIS — N18.2 TYPE 2 DIABETES MELLITUS WITH STAGE 2 CHRONIC KIDNEY DISEASE, WITHOUT LONG-TERM CURRENT USE OF INSULIN (H): Primary | ICD-10-CM

## 2025-08-16 DIAGNOSIS — E11.22 TYPE 2 DIABETES MELLITUS WITH STAGE 2 CHRONIC KIDNEY DISEASE, WITHOUT LONG-TERM CURRENT USE OF INSULIN (H): Primary | ICD-10-CM

## 2025-08-17 LAB
ABO + RH BLD: NORMAL
BLD GP AB SCN SERPL QL: NEGATIVE
SPECIMEN EXP DATE BLD: NORMAL

## 2025-08-18 ENCOUNTER — INFUSION THERAPY VISIT (OUTPATIENT)
Dept: INFUSION THERAPY | Facility: CLINIC | Age: 40
End: 2025-08-18
Attending: INTERNAL MEDICINE
Payer: COMMERCIAL

## 2025-08-18 ENCOUNTER — HOME INFUSION BILLING (OUTPATIENT)
Dept: HOME HEALTH SERVICES | Facility: HOME HEALTH | Age: 40
End: 2025-08-18
Payer: COMMERCIAL

## 2025-08-18 VITALS
RESPIRATION RATE: 16 BRPM | WEIGHT: 213.8 LBS | HEART RATE: 75 BPM | DIASTOLIC BLOOD PRESSURE: 80 MMHG | SYSTOLIC BLOOD PRESSURE: 117 MMHG | BODY MASS INDEX: 31.57 KG/M2 | OXYGEN SATURATION: 100 % | TEMPERATURE: 97.3 F

## 2025-08-18 DIAGNOSIS — C92.00 ACUTE MYELOID LEUKEMIA NOT HAVING ACHIEVED REMISSION (H): Primary | ICD-10-CM

## 2025-08-18 DIAGNOSIS — R74.8 ELEVATED LIPASE: ICD-10-CM

## 2025-08-18 DIAGNOSIS — C92.10 BLASTIC PHASE CHRONIC MYELOID LEUKEMIA (H): ICD-10-CM

## 2025-08-18 LAB
ALBUMIN SERPL BCG-MCNC: 4 G/DL (ref 3.5–5.2)
ALP SERPL-CCNC: 84 U/L (ref 40–150)
ALT SERPL W P-5'-P-CCNC: 13 U/L (ref 0–70)
ANION GAP SERPL CALCULATED.3IONS-SCNC: 11 MMOL/L (ref 7–15)
AST SERPL W P-5'-P-CCNC: 17 U/L (ref 0–45)
BASOPHILS # BLD MANUAL: 0.01 10E3/UL (ref 0–0.2)
BASOPHILS NFR BLD MANUAL: 1 %
BILIRUB SERPL-MCNC: 0.9 MG/DL
BUN SERPL-MCNC: 14.2 MG/DL (ref 6–20)
CALCIUM SERPL-MCNC: 9 MG/DL (ref 8.8–10.4)
CHLORIDE SERPL-SCNC: 109 MMOL/L (ref 98–107)
CREAT SERPL-MCNC: 1.11 MG/DL (ref 0.67–1.17)
DACRYOCYTES BLD QL SMEAR: SLIGHT
EGFRCR SERPLBLD CKD-EPI 2021: 88 ML/MIN/1.73M2
ELLIPTOCYTES BLD QL SMEAR: SLIGHT
EOSINOPHIL # BLD MANUAL: 0 10E3/UL (ref 0–0.7)
EOSINOPHIL NFR BLD MANUAL: 0 %
ERYTHROCYTE [DISTWIDTH] IN BLOOD BY AUTOMATED COUNT: 23.1 % (ref 10–15)
FRAGMENTS BLD QL SMEAR: ABNORMAL
GLUCOSE SERPL-MCNC: 105 MG/DL (ref 70–99)
HCO3 SERPL-SCNC: 22 MMOL/L (ref 22–29)
HCT VFR BLD AUTO: 27.1 % (ref 40–53)
HGB BLD-MCNC: 8.5 G/DL (ref 13.3–17.7)
LDH SERPL L TO P-CCNC: 236 U/L (ref 0–250)
LIPASE SERPL-CCNC: 106 U/L (ref 13–60)
LYMPHOCYTES # BLD MANUAL: 0.57 10E3/UL (ref 0.8–5.3)
LYMPHOCYTES NFR BLD MANUAL: 44 %
MAGNESIUM SERPL-MCNC: 1.8 MG/DL (ref 1.7–2.3)
MCH RBC QN AUTO: 33.2 PG (ref 26.5–33)
MCHC RBC AUTO-ENTMCNC: 31.4 G/DL (ref 31.5–36.5)
MCV RBC AUTO: 105.9 FL (ref 78–100)
METAMYELOCYTES # BLD MANUAL: 0.01 10E3/UL
METAMYELOCYTES NFR BLD MANUAL: 1 %
MONOCYTES # BLD MANUAL: 0.08 10E3/UL (ref 0–1.3)
MONOCYTES NFR BLD MANUAL: 6 %
MYELOCYTES # BLD MANUAL: 0.01 10E3/UL
MYELOCYTES NFR BLD MANUAL: 1 %
NEUTROPHILS # BLD MANUAL: 0.48 10E3/UL (ref 1.6–8.3)
NEUTROPHILS NFR BLD MANUAL: 37 %
NRBC # BLD AUTO: 0.1 10E3/UL
NRBC BLD MANUAL-RTO: 5 %
OTHER CELLS # BLD MANUAL: 0.13 10E3/UL
OTHER CELLS NFR BLD MANUAL: 10 %
PATH REV: ABNORMAL
PHOSPHATE SERPL-MCNC: 3.9 MG/DL (ref 2.5–4.5)
PLAT MORPH BLD: ABNORMAL
PLATELET # BLD AUTO: 78 10E3/UL (ref 150–450)
POLYCHROMASIA BLD QL SMEAR: SLIGHT
POTASSIUM SERPL-SCNC: 4.2 MMOL/L (ref 3.4–5.3)
PROT SERPL-MCNC: 7 G/DL (ref 6.4–8.3)
RBC # BLD AUTO: 2.56 10E6/UL (ref 4.4–5.9)
RBC MORPH BLD: ABNORMAL
SODIUM SERPL-SCNC: 142 MMOL/L (ref 135–145)
STOMATOCYTES BLD QL SMEAR: SLIGHT
URATE SERPL-MCNC: 5.8 MG/DL (ref 3.4–7)
WBC # BLD AUTO: 1.33 10E3/UL (ref 4–11)

## 2025-08-18 PROCEDURE — 84155 ASSAY OF PROTEIN SERUM: CPT | Performed by: NURSE PRACTITIONER

## 2025-08-18 PROCEDURE — 84100 ASSAY OF PHOSPHORUS: CPT | Performed by: NURSE PRACTITIONER

## 2025-08-18 PROCEDURE — 86900 BLOOD TYPING SEROLOGIC ABO: CPT | Performed by: NURSE PRACTITIONER

## 2025-08-18 PROCEDURE — 83735 ASSAY OF MAGNESIUM: CPT | Performed by: NURSE PRACTITIONER

## 2025-08-18 PROCEDURE — 36415 COLL VENOUS BLD VENIPUNCTURE: CPT | Performed by: NURSE PRACTITIONER

## 2025-08-18 PROCEDURE — 85007 BL SMEAR W/DIFF WBC COUNT: CPT | Performed by: NURSE PRACTITIONER

## 2025-08-18 PROCEDURE — 83690 ASSAY OF LIPASE: CPT | Performed by: NURSE PRACTITIONER

## 2025-08-18 PROCEDURE — 36592 COLLECT BLOOD FROM PICC: CPT

## 2025-08-18 PROCEDURE — 83615 LACTATE (LD) (LDH) ENZYME: CPT | Performed by: NURSE PRACTITIONER

## 2025-08-18 PROCEDURE — 84550 ASSAY OF BLOOD/URIC ACID: CPT | Performed by: NURSE PRACTITIONER

## 2025-08-18 PROCEDURE — 85027 COMPLETE CBC AUTOMATED: CPT | Performed by: NURSE PRACTITIONER

## 2025-08-18 RX ORDER — METHYLPREDNISOLONE SODIUM SUCCINATE 40 MG/ML
40 INJECTION INTRAMUSCULAR; INTRAVENOUS
Start: 2025-08-20

## 2025-08-18 RX ORDER — METHYLPREDNISOLONE SODIUM SUCCINATE 40 MG/ML
40 INJECTION INTRAMUSCULAR; INTRAVENOUS
Start: 2025-08-18

## 2025-08-18 RX ORDER — HEPARIN SODIUM,PORCINE 10 UNIT/ML
5-20 VIAL (ML) INTRAVENOUS DAILY PRN
OUTPATIENT
Start: 2025-08-21

## 2025-08-18 RX ORDER — HEPARIN SODIUM (PORCINE) LOCK FLUSH IV SOLN 100 UNIT/ML 100 UNIT/ML
5 SOLUTION INTRAVENOUS
OUTPATIENT
Start: 2025-08-18

## 2025-08-18 RX ORDER — HEPARIN SODIUM,PORCINE 10 UNIT/ML
5-20 VIAL (ML) INTRAVENOUS DAILY PRN
OUTPATIENT
Start: 2025-08-20

## 2025-08-18 RX ORDER — HEPARIN SODIUM,PORCINE 10 UNIT/ML
5-20 VIAL (ML) INTRAVENOUS DAILY PRN
OUTPATIENT
Start: 2025-08-19

## 2025-08-18 RX ORDER — DIPHENHYDRAMINE HYDROCHLORIDE 50 MG/ML
25 INJECTION, SOLUTION INTRAMUSCULAR; INTRAVENOUS
Start: 2025-08-21

## 2025-08-18 RX ORDER — METHYLPREDNISOLONE SODIUM SUCCINATE 40 MG/ML
40 INJECTION INTRAMUSCULAR; INTRAVENOUS
Start: 2025-08-21

## 2025-08-18 RX ORDER — ALBUTEROL SULFATE 0.83 MG/ML
2.5 SOLUTION RESPIRATORY (INHALATION)
OUTPATIENT
Start: 2025-08-19

## 2025-08-18 RX ORDER — ALBUTEROL SULFATE 0.83 MG/ML
2.5 SOLUTION RESPIRATORY (INHALATION)
OUTPATIENT
Start: 2025-08-22

## 2025-08-18 RX ORDER — LORAZEPAM 2 MG/ML
0.5 INJECTION INTRAMUSCULAR EVERY 4 HOURS PRN
OUTPATIENT
Start: 2025-08-18

## 2025-08-18 RX ORDER — HEPARIN SODIUM (PORCINE) LOCK FLUSH IV SOLN 100 UNIT/ML 100 UNIT/ML
5 SOLUTION INTRAVENOUS
OUTPATIENT
Start: 2025-08-22

## 2025-08-18 RX ORDER — ALBUTEROL SULFATE 90 UG/1
1-2 INHALANT RESPIRATORY (INHALATION)
Start: 2025-08-18

## 2025-08-18 RX ORDER — MEPERIDINE HYDROCHLORIDE 25 MG/ML
25 INJECTION INTRAMUSCULAR; INTRAVENOUS; SUBCUTANEOUS
OUTPATIENT
Start: 2025-08-20

## 2025-08-18 RX ORDER — DIPHENHYDRAMINE HYDROCHLORIDE 50 MG/ML
50 INJECTION, SOLUTION INTRAMUSCULAR; INTRAVENOUS
Start: 2025-08-21

## 2025-08-18 RX ORDER — DIPHENHYDRAMINE HYDROCHLORIDE 50 MG/ML
25 INJECTION, SOLUTION INTRAMUSCULAR; INTRAVENOUS
Start: 2025-08-22

## 2025-08-18 RX ORDER — DIPHENHYDRAMINE HYDROCHLORIDE 50 MG/ML
50 INJECTION, SOLUTION INTRAMUSCULAR; INTRAVENOUS
Start: 2025-08-20

## 2025-08-18 RX ORDER — ALBUTEROL SULFATE 0.83 MG/ML
2.5 SOLUTION RESPIRATORY (INHALATION)
OUTPATIENT
Start: 2025-08-20

## 2025-08-18 RX ORDER — ALBUTEROL SULFATE 90 UG/1
1-2 INHALANT RESPIRATORY (INHALATION)
Start: 2025-08-21

## 2025-08-18 RX ORDER — ALBUTEROL SULFATE 0.83 MG/ML
2.5 SOLUTION RESPIRATORY (INHALATION)
OUTPATIENT
Start: 2025-08-21

## 2025-08-18 RX ORDER — EPINEPHRINE 1 MG/ML
0.3 INJECTION, SOLUTION INTRAMUSCULAR; SUBCUTANEOUS EVERY 5 MIN PRN
OUTPATIENT
Start: 2025-08-20

## 2025-08-18 RX ORDER — DIPHENHYDRAMINE HYDROCHLORIDE 50 MG/ML
50 INJECTION, SOLUTION INTRAMUSCULAR; INTRAVENOUS
Start: 2025-08-18

## 2025-08-18 RX ORDER — EPINEPHRINE 1 MG/ML
0.3 INJECTION, SOLUTION INTRAMUSCULAR; SUBCUTANEOUS EVERY 5 MIN PRN
OUTPATIENT
Start: 2025-08-21

## 2025-08-18 RX ORDER — MEPERIDINE HYDROCHLORIDE 25 MG/ML
25 INJECTION INTRAMUSCULAR; INTRAVENOUS; SUBCUTANEOUS
OUTPATIENT
Start: 2025-08-19

## 2025-08-18 RX ORDER — ALBUTEROL SULFATE 90 UG/1
1-2 INHALANT RESPIRATORY (INHALATION)
Start: 2025-08-20

## 2025-08-18 RX ORDER — LORAZEPAM 2 MG/ML
0.5 INJECTION INTRAMUSCULAR EVERY 4 HOURS PRN
OUTPATIENT
Start: 2025-08-21

## 2025-08-18 RX ORDER — LORAZEPAM 2 MG/ML
0.5 INJECTION INTRAMUSCULAR EVERY 4 HOURS PRN
OUTPATIENT
Start: 2025-08-20

## 2025-08-18 RX ORDER — HEPARIN SODIUM (PORCINE) LOCK FLUSH IV SOLN 100 UNIT/ML 100 UNIT/ML
5 SOLUTION INTRAVENOUS
OUTPATIENT
Start: 2025-08-21

## 2025-08-18 RX ORDER — MEPERIDINE HYDROCHLORIDE 25 MG/ML
25 INJECTION INTRAMUSCULAR; INTRAVENOUS; SUBCUTANEOUS
OUTPATIENT
Start: 2025-08-18

## 2025-08-18 RX ORDER — LORAZEPAM 2 MG/ML
0.5 INJECTION INTRAMUSCULAR EVERY 4 HOURS PRN
OUTPATIENT
Start: 2025-08-19

## 2025-08-18 RX ORDER — EPINEPHRINE 1 MG/ML
0.3 INJECTION, SOLUTION INTRAMUSCULAR; SUBCUTANEOUS EVERY 5 MIN PRN
OUTPATIENT
Start: 2025-08-19

## 2025-08-18 RX ORDER — ALBUTEROL SULFATE 90 UG/1
1-2 INHALANT RESPIRATORY (INHALATION)
Start: 2025-08-19

## 2025-08-18 RX ORDER — DIPHENHYDRAMINE HYDROCHLORIDE 50 MG/ML
25 INJECTION, SOLUTION INTRAMUSCULAR; INTRAVENOUS
Start: 2025-08-19

## 2025-08-18 RX ORDER — MEPERIDINE HYDROCHLORIDE 25 MG/ML
25 INJECTION INTRAMUSCULAR; INTRAVENOUS; SUBCUTANEOUS
OUTPATIENT
Start: 2025-08-22

## 2025-08-18 RX ORDER — HEPARIN SODIUM,PORCINE 10 UNIT/ML
5-20 VIAL (ML) INTRAVENOUS DAILY PRN
OUTPATIENT
Start: 2025-08-22

## 2025-08-18 RX ORDER — DIPHENHYDRAMINE HYDROCHLORIDE 50 MG/ML
25 INJECTION, SOLUTION INTRAMUSCULAR; INTRAVENOUS
Start: 2025-08-20

## 2025-08-18 RX ORDER — METHYLPREDNISOLONE SODIUM SUCCINATE 40 MG/ML
40 INJECTION INTRAMUSCULAR; INTRAVENOUS
Start: 2025-08-22

## 2025-08-18 RX ORDER — HEPARIN SODIUM (PORCINE) LOCK FLUSH IV SOLN 100 UNIT/ML 100 UNIT/ML
5 SOLUTION INTRAVENOUS
OUTPATIENT
Start: 2025-08-20

## 2025-08-18 RX ORDER — DIPHENHYDRAMINE HYDROCHLORIDE 50 MG/ML
25 INJECTION, SOLUTION INTRAMUSCULAR; INTRAVENOUS
Start: 2025-08-18

## 2025-08-18 RX ORDER — MEPERIDINE HYDROCHLORIDE 25 MG/ML
25 INJECTION INTRAMUSCULAR; INTRAVENOUS; SUBCUTANEOUS
OUTPATIENT
Start: 2025-08-21

## 2025-08-18 RX ORDER — ALBUTEROL SULFATE 0.83 MG/ML
2.5 SOLUTION RESPIRATORY (INHALATION)
OUTPATIENT
Start: 2025-08-18

## 2025-08-18 RX ORDER — HEPARIN SODIUM,PORCINE 10 UNIT/ML
5-20 VIAL (ML) INTRAVENOUS DAILY PRN
OUTPATIENT
Start: 2025-08-18

## 2025-08-18 RX ORDER — EPINEPHRINE 1 MG/ML
0.3 INJECTION, SOLUTION INTRAMUSCULAR; SUBCUTANEOUS EVERY 5 MIN PRN
OUTPATIENT
Start: 2025-08-18

## 2025-08-18 RX ORDER — HEPARIN SODIUM (PORCINE) LOCK FLUSH IV SOLN 100 UNIT/ML 100 UNIT/ML
5 SOLUTION INTRAVENOUS
OUTPATIENT
Start: 2025-08-19

## 2025-08-18 RX ORDER — DIPHENHYDRAMINE HYDROCHLORIDE 50 MG/ML
50 INJECTION, SOLUTION INTRAMUSCULAR; INTRAVENOUS
Start: 2025-08-22

## 2025-08-18 RX ORDER — EPINEPHRINE 1 MG/ML
0.3 INJECTION, SOLUTION INTRAMUSCULAR; SUBCUTANEOUS EVERY 5 MIN PRN
OUTPATIENT
Start: 2025-08-22

## 2025-08-18 RX ORDER — LORAZEPAM 2 MG/ML
0.5 INJECTION INTRAMUSCULAR EVERY 4 HOURS PRN
OUTPATIENT
Start: 2025-08-22

## 2025-08-18 RX ORDER — ALBUTEROL SULFATE 90 UG/1
1-2 INHALANT RESPIRATORY (INHALATION)
Start: 2025-08-22

## 2025-08-18 RX ORDER — METHYLPREDNISOLONE SODIUM SUCCINATE 40 MG/ML
40 INJECTION INTRAMUSCULAR; INTRAVENOUS
Start: 2025-08-19

## 2025-08-18 RX ORDER — DIPHENHYDRAMINE HYDROCHLORIDE 50 MG/ML
50 INJECTION, SOLUTION INTRAMUSCULAR; INTRAVENOUS
Start: 2025-08-19

## 2025-08-19 PROCEDURE — S5501 HIT COMPLEX CATH CARE: HCPCS

## 2025-08-20 LAB
ABO + RH BLD: NORMAL
BLD GP AB SCN SERPL QL: NEGATIVE
SPECIMEN EXP DATE BLD: NORMAL

## 2025-08-20 PROCEDURE — S5501 HIT COMPLEX CATH CARE: HCPCS

## 2025-08-21 ENCOUNTER — INFUSION THERAPY VISIT (OUTPATIENT)
Dept: INFUSION THERAPY | Facility: CLINIC | Age: 40
End: 2025-08-21
Attending: NURSE PRACTITIONER
Payer: COMMERCIAL

## 2025-08-21 ENCOUNTER — INFUSION THERAPY VISIT (OUTPATIENT)
Dept: INFUSION THERAPY | Facility: CLINIC | Age: 40
End: 2025-08-21
Attending: INTERNAL MEDICINE
Payer: COMMERCIAL

## 2025-08-21 ENCOUNTER — DOCUMENTATION ONLY (OUTPATIENT)
Dept: ONCOLOGY | Facility: CLINIC | Age: 40
End: 2025-08-21

## 2025-08-21 DIAGNOSIS — C92.10 CML (CHRONIC MYELOCYTIC LEUKEMIA) (H): ICD-10-CM

## 2025-08-21 DIAGNOSIS — Z76.82 STEM CELL TRANSPLANT CANDIDATE: Primary | ICD-10-CM

## 2025-08-21 DIAGNOSIS — C92.00 ACUTE MYELOID LEUKEMIA NOT HAVING ACHIEVED REMISSION (H): Primary | ICD-10-CM

## 2025-08-21 DIAGNOSIS — C92.01 ACUTE MYELOID LEUKEMIA IN REMISSION (H): ICD-10-CM

## 2025-08-21 DIAGNOSIS — C92.10 BLASTIC PHASE CHRONIC MYELOID LEUKEMIA (H): ICD-10-CM

## 2025-08-21 DIAGNOSIS — C92.00 ACUTE MYELOID LEUKEMIA NOT HAVING ACHIEVED REMISSION (H): ICD-10-CM

## 2025-08-21 DIAGNOSIS — C92.10 CML (CHRONIC MYELOCYTIC LEUKEMIA) (H): Primary | ICD-10-CM

## 2025-08-21 DIAGNOSIS — C92.10 BLASTIC PHASE CHRONIC MYELOID LEUKEMIA (H): Primary | ICD-10-CM

## 2025-08-21 DIAGNOSIS — R74.8 ELEVATED LIPASE: ICD-10-CM

## 2025-08-21 LAB
ALBUMIN SERPL BCG-MCNC: 4.2 G/DL (ref 3.5–5.2)
ALP SERPL-CCNC: 89 U/L (ref 40–150)
ALT SERPL W P-5'-P-CCNC: 10 U/L (ref 0–70)
ANION GAP SERPL CALCULATED.3IONS-SCNC: 12 MMOL/L (ref 7–15)
APTT PPP: 32 SECONDS (ref 22–38)
AST SERPL W P-5'-P-CCNC: 14 U/L (ref 0–45)
BASOPHILS # BLD MANUAL: 0.04 10E3/UL (ref 0–0.2)
BASOPHILS NFR BLD MANUAL: 1 %
BILIRUB SERPL-MCNC: 1 MG/DL
BLASTS # BLD MANUAL: 1.48 10E3/UL
BLASTS NFR BLD MANUAL: 39 %
BUN SERPL-MCNC: 11.6 MG/DL (ref 6–20)
CALCIUM SERPL-MCNC: 9 MG/DL (ref 8.8–10.4)
CHLORIDE SERPL-SCNC: 105 MMOL/L (ref 98–107)
CREAT SERPL-MCNC: 1.14 MG/DL (ref 0.67–1.17)
DACRYOCYTES BLD QL SMEAR: SLIGHT
EGFRCR SERPLBLD CKD-EPI 2021: 85 ML/MIN/1.73M2
ELLIPTOCYTES BLD QL SMEAR: SLIGHT
EOSINOPHIL # BLD MANUAL: 0 10E3/UL (ref 0–0.7)
EOSINOPHIL NFR BLD MANUAL: 0 %
ERYTHROCYTE [DISTWIDTH] IN BLOOD BY AUTOMATED COUNT: 21.9 % (ref 10–15)
FIBRINOGEN PPP-MCNC: 522 MG/DL (ref 170–510)
FRAGMENTS BLD QL SMEAR: SLIGHT
GLUCOSE SERPL-MCNC: 140 MG/DL (ref 70–99)
HCO3 SERPL-SCNC: 22 MMOL/L (ref 22–29)
HCT VFR BLD AUTO: 27.3 % (ref 40–53)
HGB BLD-MCNC: 8.9 G/DL (ref 13.3–17.7)
INR PPP: 1.24 (ref 0.85–1.15)
LDH SERPL L TO P-CCNC: 263 U/L (ref 0–250)
LIPASE SERPL-CCNC: 64 U/L (ref 13–60)
LYMPHOCYTES # BLD MANUAL: 0.61 10E3/UL (ref 0.8–5.3)
LYMPHOCYTES NFR BLD MANUAL: 16 %
MAGNESIUM SERPL-MCNC: 1.7 MG/DL (ref 1.7–2.3)
MCH RBC QN AUTO: 33.7 PG (ref 26.5–33)
MCHC RBC AUTO-ENTMCNC: 32.6 G/DL (ref 31.5–36.5)
MCV RBC AUTO: 103.4 FL (ref 78–100)
METAMYELOCYTES # BLD MANUAL: 0.11 10E3/UL
METAMYELOCYTES NFR BLD MANUAL: 3 %
MONOCYTES # BLD MANUAL: 0 10E3/UL (ref 0–1.3)
MONOCYTES NFR BLD MANUAL: 0 %
MYELOCYTES # BLD MANUAL: 0.11 10E3/UL
MYELOCYTES NFR BLD MANUAL: 3 %
NEUTROPHILS # BLD MANUAL: 1.44 10E3/UL (ref 1.6–8.3)
NEUTROPHILS NFR BLD MANUAL: 38 %
PATH REV: ABNORMAL
PHOSPHATE SERPL-MCNC: 3.1 MG/DL (ref 2.5–4.5)
PLAT MORPH BLD: ABNORMAL
PLATELET # BLD AUTO: 76 10E3/UL (ref 150–450)
POLYCHROMASIA BLD QL SMEAR: SLIGHT
POTASSIUM SERPL-SCNC: 3.8 MMOL/L (ref 3.4–5.3)
PROT SERPL-MCNC: 7.5 G/DL (ref 6.4–8.3)
PROTHROMBIN TIME: 15.6 SECONDS (ref 11.8–14.8)
RBC # BLD AUTO: 2.64 10E6/UL (ref 4.4–5.9)
RBC MORPH BLD: ABNORMAL
SODIUM SERPL-SCNC: 139 MMOL/L (ref 135–145)
URATE SERPL-MCNC: 5.6 MG/DL (ref 3.4–7)
VARIANT LYMPHS BLD QL SMEAR: PRESENT
WBC # BLD AUTO: 3.81 10E3/UL (ref 4–11)

## 2025-08-21 PROCEDURE — 83615 LACTATE (LD) (LDH) ENZYME: CPT | Performed by: INTERNAL MEDICINE

## 2025-08-21 PROCEDURE — 36415 COLL VENOUS BLD VENIPUNCTURE: CPT

## 2025-08-21 PROCEDURE — 83735 ASSAY OF MAGNESIUM: CPT | Performed by: INTERNAL MEDICINE

## 2025-08-21 PROCEDURE — S5501 HIT COMPLEX CATH CARE: HCPCS

## 2025-08-21 PROCEDURE — 84550 ASSAY OF BLOOD/URIC ACID: CPT | Performed by: INTERNAL MEDICINE

## 2025-08-21 PROCEDURE — 83690 ASSAY OF LIPASE: CPT | Performed by: INTERNAL MEDICINE

## 2025-08-21 PROCEDURE — 84100 ASSAY OF PHOSPHORUS: CPT | Performed by: INTERNAL MEDICINE

## 2025-08-21 PROCEDURE — 82310 ASSAY OF CALCIUM: CPT | Performed by: INTERNAL MEDICINE

## 2025-08-21 RX ORDER — DIPHENHYDRAMINE HYDROCHLORIDE 50 MG/ML
25 INJECTION, SOLUTION INTRAMUSCULAR; INTRAVENOUS
Start: 2025-08-21

## 2025-08-21 RX ORDER — MEPERIDINE HYDROCHLORIDE 25 MG/ML
25 INJECTION INTRAMUSCULAR; INTRAVENOUS; SUBCUTANEOUS
OUTPATIENT
Start: 2025-08-21

## 2025-08-21 RX ORDER — METHYLPREDNISOLONE SODIUM SUCCINATE 40 MG/ML
40 INJECTION INTRAMUSCULAR; INTRAVENOUS
Start: 2025-08-21

## 2025-08-21 RX ORDER — EPINEPHRINE 1 MG/ML
0.3 INJECTION, SOLUTION INTRAMUSCULAR; SUBCUTANEOUS EVERY 5 MIN PRN
OUTPATIENT
Start: 2025-08-21

## 2025-08-21 RX ORDER — ALBUTEROL SULFATE 0.83 MG/ML
2.5 SOLUTION RESPIRATORY (INHALATION)
OUTPATIENT
Start: 2025-08-21

## 2025-08-21 RX ORDER — HEPARIN SODIUM,PORCINE 10 UNIT/ML
5-20 VIAL (ML) INTRAVENOUS DAILY PRN
OUTPATIENT
Start: 2025-08-21

## 2025-08-21 RX ORDER — HEPARIN SODIUM (PORCINE) LOCK FLUSH IV SOLN 100 UNIT/ML 100 UNIT/ML
5 SOLUTION INTRAVENOUS
OUTPATIENT
Start: 2025-08-21

## 2025-08-21 RX ORDER — ALBUTEROL SULFATE 90 UG/1
1-2 INHALANT RESPIRATORY (INHALATION)
Start: 2025-08-21

## 2025-08-21 RX ORDER — DIPHENHYDRAMINE HYDROCHLORIDE 50 MG/ML
50 INJECTION, SOLUTION INTRAMUSCULAR; INTRAVENOUS
Start: 2025-08-21

## 2025-08-22 ENCOUNTER — HOSPITAL ENCOUNTER (INPATIENT)
Facility: CLINIC | Age: 40
End: 2025-08-22
Attending: STUDENT IN AN ORGANIZED HEALTH CARE EDUCATION/TRAINING PROGRAM | Admitting: STUDENT IN AN ORGANIZED HEALTH CARE EDUCATION/TRAINING PROGRAM
Payer: COMMERCIAL

## 2025-08-22 PROBLEM — C92.10 CML (CHRONIC MYELOCYTIC LEUKEMIA) (H): Status: ACTIVE | Noted: 2025-08-22

## 2025-08-22 PROCEDURE — 999N001093 HLA VIRTUAL CROSSMATCH (VXM), LIVING DONOR: Performed by: INTERNAL MEDICINE

## 2025-08-22 PROCEDURE — S5501 HIT COMPLEX CATH CARE: HCPCS

## 2025-08-22 ASSESSMENT — ACTIVITIES OF DAILY LIVING (ADL)
ADLS_ACUITY_SCORE: 58
ADLS_ACUITY_SCORE: 54

## 2025-08-23 ENCOUNTER — APPOINTMENT (OUTPATIENT)
Dept: GENERAL RADIOLOGY | Facility: CLINIC | Age: 40
End: 2025-08-23
Attending: INTERNAL MEDICINE
Payer: COMMERCIAL

## 2025-08-23 PROCEDURE — 71045 X-RAY EXAM CHEST 1 VIEW: CPT

## 2025-08-23 PROCEDURE — S5501 HIT COMPLEX CATH CARE: HCPCS

## 2025-08-23 PROCEDURE — 71045 X-RAY EXAM CHEST 1 VIEW: CPT | Mod: 26 | Performed by: STUDENT IN AN ORGANIZED HEALTH CARE EDUCATION/TRAINING PROGRAM

## 2025-08-24 PROCEDURE — S5501 HIT COMPLEX CATH CARE: HCPCS

## 2025-08-24 ASSESSMENT — ACTIVITIES OF DAILY LIVING (ADL)
ADLS_ACUITY_SCORE: 35
ADLS_ACUITY_SCORE: 33
DEPENDENT_IADLS:: INDEPENDENT
ADLS_ACUITY_SCORE: 35
ADLS_ACUITY_SCORE: 33
ADLS_ACUITY_SCORE: 35
ADLS_ACUITY_SCORE: 33
ADLS_ACUITY_SCORE: 35
ADLS_ACUITY_SCORE: 33
ADLS_ACUITY_SCORE: 33
ADLS_ACUITY_SCORE: 35
ADLS_ACUITY_SCORE: 33

## 2025-08-25 ENCOUNTER — APPOINTMENT (OUTPATIENT)
Dept: GENERAL RADIOLOGY | Facility: CLINIC | Age: 40
End: 2025-08-25
Attending: HOSPITALIST
Payer: COMMERCIAL

## 2025-08-25 PROCEDURE — S5501 HIT COMPLEX CATH CARE: HCPCS

## 2025-08-25 PROCEDURE — 71045 X-RAY EXAM CHEST 1 VIEW: CPT

## 2025-08-25 ASSESSMENT — ACTIVITIES OF DAILY LIVING (ADL)
ADLS_ACUITY_SCORE: 35
ADLS_ACUITY_SCORE: 33
ADLS_ACUITY_SCORE: 33
ADLS_ACUITY_SCORE: 35
ADLS_ACUITY_SCORE: 33
ADLS_ACUITY_SCORE: 35
ADLS_ACUITY_SCORE: 35
ADLS_ACUITY_SCORE: 33
ADLS_ACUITY_SCORE: 35
ADLS_ACUITY_SCORE: 35
ADLS_ACUITY_SCORE: 33
ADLS_ACUITY_SCORE: 35
ADLS_ACUITY_SCORE: 33
ADLS_ACUITY_SCORE: 35

## 2025-08-26 PROCEDURE — S5501 HIT COMPLEX CATH CARE: HCPCS

## 2025-08-27 ENCOUNTER — APPOINTMENT (OUTPATIENT)
Dept: CARDIOLOGY | Facility: CLINIC | Age: 40
End: 2025-08-27
Payer: COMMERCIAL

## 2025-08-27 PROCEDURE — 93325 DOPPLER ECHO COLOR FLOW MAPG: CPT

## 2025-08-27 PROCEDURE — 93325 DOPPLER ECHO COLOR FLOW MAPG: CPT | Mod: 26 | Performed by: INTERNAL MEDICINE

## 2025-08-27 PROCEDURE — 93321 DOPPLER ECHO F-UP/LMTD STD: CPT | Mod: 26 | Performed by: INTERNAL MEDICINE

## 2025-08-27 PROCEDURE — S5501 HIT COMPLEX CATH CARE: HCPCS

## 2025-08-27 PROCEDURE — 93308 TTE F-UP OR LMTD: CPT | Mod: 26 | Performed by: INTERNAL MEDICINE

## 2025-08-27 ASSESSMENT — ACTIVITIES OF DAILY LIVING (ADL)
ADLS_ACUITY_SCORE: 35
ADLS_ACUITY_SCORE: 36
ADLS_ACUITY_SCORE: 35
ADLS_ACUITY_SCORE: 36
ADLS_ACUITY_SCORE: 35

## 2025-08-28 ENCOUNTER — APPOINTMENT (OUTPATIENT)
Dept: CT IMAGING | Facility: CLINIC | Age: 40
End: 2025-08-28
Attending: PHYSICIAN ASSISTANT
Payer: COMMERCIAL

## 2025-08-28 ENCOUNTER — APPOINTMENT (OUTPATIENT)
Dept: NEUROLOGY | Facility: CLINIC | Age: 40
End: 2025-08-28
Payer: COMMERCIAL

## 2025-08-28 PROCEDURE — 70450 CT HEAD/BRAIN W/O DYE: CPT

## 2025-08-28 PROCEDURE — 70450 CT HEAD/BRAIN W/O DYE: CPT | Mod: 26 | Performed by: RADIOLOGY

## 2025-08-28 PROCEDURE — C1752 CATH,HEMODIALYSIS,SHORT-TERM: HCPCS

## 2025-08-28 PROCEDURE — 95718 EEG PHYS/QHP 2-12 HR W/VEEG: CPT | Mod: GC | Performed by: PSYCHIATRY & NEUROLOGY

## 2025-08-28 PROCEDURE — 272N000504 HC NEEDLE CR4

## 2025-08-28 PROCEDURE — 95711 VEEG 2-12 HR UNMONITORED: CPT

## 2025-08-28 PROCEDURE — S5501 HIT COMPLEX CATH CARE: HCPCS

## 2025-08-28 PROCEDURE — C1769 GUIDE WIRE: HCPCS

## 2025-08-28 ASSESSMENT — ACTIVITIES OF DAILY LIVING (ADL)
ADLS_ACUITY_SCORE: 35
ADLS_ACUITY_SCORE: 35
ADLS_ACUITY_SCORE: 42
ADLS_ACUITY_SCORE: 35
ADLS_ACUITY_SCORE: 42
ADLS_ACUITY_SCORE: 35
ADLS_ACUITY_SCORE: 35
ADLS_ACUITY_SCORE: 42
ADLS_ACUITY_SCORE: 42
ADLS_ACUITY_SCORE: 35
ADLS_ACUITY_SCORE: 42
ADLS_ACUITY_SCORE: 35
ADLS_ACUITY_SCORE: 35
ADLS_ACUITY_SCORE: 42
ADLS_ACUITY_SCORE: 35
ADLS_ACUITY_SCORE: 35
ADLS_ACUITY_SCORE: 42
ADLS_ACUITY_SCORE: 35
ADLS_ACUITY_SCORE: 36
ADLS_ACUITY_SCORE: 35

## 2025-08-29 ENCOUNTER — APPOINTMENT (OUTPATIENT)
Dept: GENERAL RADIOLOGY | Facility: CLINIC | Age: 40
End: 2025-08-29
Attending: PHYSICIAN ASSISTANT
Payer: COMMERCIAL

## 2025-08-29 ENCOUNTER — APPOINTMENT (OUTPATIENT)
Dept: NEUROLOGY | Facility: CLINIC | Age: 40
End: 2025-08-29
Payer: COMMERCIAL

## 2025-08-29 ENCOUNTER — APPOINTMENT (OUTPATIENT)
Dept: ULTRASOUND IMAGING | Facility: CLINIC | Age: 40
End: 2025-08-29
Attending: PHYSICIAN ASSISTANT
Payer: COMMERCIAL

## 2025-08-29 PROCEDURE — S5501 HIT COMPLEX CATH CARE: HCPCS

## 2025-08-29 PROCEDURE — 93976 VASCULAR STUDY: CPT

## 2025-08-29 PROCEDURE — 74018 RADEX ABDOMEN 1 VIEW: CPT | Mod: 26 | Performed by: RADIOLOGY

## 2025-08-29 PROCEDURE — 93976 VASCULAR STUDY: CPT | Mod: 26 | Performed by: STUDENT IN AN ORGANIZED HEALTH CARE EDUCATION/TRAINING PROGRAM

## 2025-08-29 PROCEDURE — 71045 X-RAY EXAM CHEST 1 VIEW: CPT | Mod: 77

## 2025-08-29 PROCEDURE — 95718 EEG PHYS/QHP 2-12 HR W/VEEG: CPT | Mod: GC | Performed by: PSYCHIATRY & NEUROLOGY

## 2025-08-29 PROCEDURE — 71045 X-RAY EXAM CHEST 1 VIEW: CPT | Mod: 26 | Performed by: RADIOLOGY

## 2025-08-29 PROCEDURE — 95711 VEEG 2-12 HR UNMONITORED: CPT

## 2025-08-29 PROCEDURE — 71045 X-RAY EXAM CHEST 1 VIEW: CPT

## 2025-08-29 PROCEDURE — 74018 RADEX ABDOMEN 1 VIEW: CPT

## 2025-08-29 ASSESSMENT — ACTIVITIES OF DAILY LIVING (ADL)
ADLS_ACUITY_SCORE: 45
ADLS_ACUITY_SCORE: 42
ADLS_ACUITY_SCORE: 46
ADLS_ACUITY_SCORE: 45
ADLS_ACUITY_SCORE: 42
ADLS_ACUITY_SCORE: 45

## 2025-08-30 ENCOUNTER — APPOINTMENT (OUTPATIENT)
Dept: CARDIOLOGY | Facility: CLINIC | Age: 40
End: 2025-08-30
Attending: PHYSICIAN ASSISTANT
Payer: COMMERCIAL

## 2025-08-30 PROCEDURE — 999N000208 ECHOCARDIOGRAM COMPLETE

## 2025-08-30 PROCEDURE — S5501 HIT COMPLEX CATH CARE: HCPCS

## 2025-08-30 ASSESSMENT — ACTIVITIES OF DAILY LIVING (ADL)
ADLS_ACUITY_SCORE: 42
ADLS_ACUITY_SCORE: 45
ADLS_ACUITY_SCORE: 46
ADLS_ACUITY_SCORE: 44
ADLS_ACUITY_SCORE: 42
ADLS_ACUITY_SCORE: 46
ADLS_ACUITY_SCORE: 44
ADLS_ACUITY_SCORE: 44
ADLS_ACUITY_SCORE: 42
ADLS_ACUITY_SCORE: 46
ADLS_ACUITY_SCORE: 46
ADLS_ACUITY_SCORE: 44
ADLS_ACUITY_SCORE: 46
ADLS_ACUITY_SCORE: 42
ADLS_ACUITY_SCORE: 46
ADLS_ACUITY_SCORE: 46
ADLS_ACUITY_SCORE: 42
ADLS_ACUITY_SCORE: 44

## 2025-08-31 ENCOUNTER — ANESTHESIA (OUTPATIENT)
Dept: INTENSIVE CARE | Facility: CLINIC | Age: 40
End: 2025-08-31
Payer: COMMERCIAL

## 2025-08-31 ENCOUNTER — APPOINTMENT (OUTPATIENT)
Dept: GENERAL RADIOLOGY | Facility: CLINIC | Age: 40
End: 2025-08-31
Attending: STUDENT IN AN ORGANIZED HEALTH CARE EDUCATION/TRAINING PROGRAM
Payer: COMMERCIAL

## 2025-08-31 ENCOUNTER — ANESTHESIA EVENT (OUTPATIENT)
Dept: INTENSIVE CARE | Facility: CLINIC | Age: 40
End: 2025-08-31
Payer: COMMERCIAL

## 2025-08-31 PROCEDURE — 71045 X-RAY EXAM CHEST 1 VIEW: CPT

## 2025-08-31 PROCEDURE — 250N000011 HC RX IP 250 OP 636

## 2025-08-31 PROCEDURE — 250N000009 HC RX 250

## 2025-08-31 PROCEDURE — 370N000003 HC ANESTHESIA WARD SERVICE: Performed by: ANESTHESIOLOGY

## 2025-08-31 PROCEDURE — 71045 X-RAY EXAM CHEST 1 VIEW: CPT | Mod: 26 | Performed by: RADIOLOGY

## 2025-08-31 PROCEDURE — S5501 HIT COMPLEX CATH CARE: HCPCS

## 2025-08-31 RX ORDER — PROPOFOL 10 MG/ML
INJECTION, EMULSION INTRAVENOUS PRN
Status: DISCONTINUED | OUTPATIENT
Start: 2025-08-31 | End: 2025-08-31

## 2025-08-31 RX ADMIN — PROPOFOL 100 MG: 10 INJECTION, EMULSION INTRAVENOUS at 01:15

## 2025-08-31 RX ADMIN — ROCURONIUM BROMIDE 100 MG: 10 INJECTION, SOLUTION INTRAVENOUS at 01:15

## 2025-08-31 ASSESSMENT — ACTIVITIES OF DAILY LIVING (ADL)
ADLS_ACUITY_SCORE: 48
ADLS_ACUITY_SCORE: 46
ADLS_ACUITY_SCORE: 48
ADLS_ACUITY_SCORE: 46
ADLS_ACUITY_SCORE: 48
ADLS_ACUITY_SCORE: 46
ADLS_ACUITY_SCORE: 48
ADLS_ACUITY_SCORE: 46
ADLS_ACUITY_SCORE: 48

## 2025-08-31 ASSESSMENT — LIFESTYLE VARIABLES: TOBACCO_USE: 0

## 2025-09-01 ENCOUNTER — APPOINTMENT (OUTPATIENT)
Dept: GENERAL RADIOLOGY | Facility: CLINIC | Age: 40
End: 2025-09-01
Attending: STUDENT IN AN ORGANIZED HEALTH CARE EDUCATION/TRAINING PROGRAM
Payer: COMMERCIAL

## 2025-09-01 PROCEDURE — 74018 RADEX ABDOMEN 1 VIEW: CPT | Mod: 26 | Performed by: RADIOLOGY

## 2025-09-01 PROCEDURE — S5501 HIT COMPLEX CATH CARE: HCPCS

## 2025-09-01 PROCEDURE — 999N000065 XR ABDOMEN PORT 1 VIEW

## 2025-09-01 ASSESSMENT — ACTIVITIES OF DAILY LIVING (ADL)
ADLS_ACUITY_SCORE: 48
ADLS_ACUITY_SCORE: 51
ADLS_ACUITY_SCORE: 48
ADLS_ACUITY_SCORE: 51
ADLS_ACUITY_SCORE: 48
ADLS_ACUITY_SCORE: 51
ADLS_ACUITY_SCORE: 48
ADLS_ACUITY_SCORE: 51
ADLS_ACUITY_SCORE: 48
ADLS_ACUITY_SCORE: 51
ADLS_ACUITY_SCORE: 48
ADLS_ACUITY_SCORE: 48
ADLS_ACUITY_SCORE: 51

## 2025-09-02 ENCOUNTER — APPOINTMENT (OUTPATIENT)
Dept: CT IMAGING | Facility: CLINIC | Age: 40
End: 2025-09-02
Attending: STUDENT IN AN ORGANIZED HEALTH CARE EDUCATION/TRAINING PROGRAM
Payer: COMMERCIAL

## 2025-09-02 PROCEDURE — 70450 CT HEAD/BRAIN W/O DYE: CPT | Mod: 26 | Performed by: RADIOLOGY

## 2025-09-02 PROCEDURE — 70450 CT HEAD/BRAIN W/O DYE: CPT

## 2025-09-02 PROCEDURE — 71260 CT THORAX DX C+: CPT | Mod: 26 | Performed by: STUDENT IN AN ORGANIZED HEALTH CARE EDUCATION/TRAINING PROGRAM

## 2025-09-02 PROCEDURE — 71260 CT THORAX DX C+: CPT

## 2025-09-02 PROCEDURE — 74177 CT ABD & PELVIS W/CONTRAST: CPT | Mod: 26 | Performed by: STUDENT IN AN ORGANIZED HEALTH CARE EDUCATION/TRAINING PROGRAM

## 2025-09-02 ASSESSMENT — ACTIVITIES OF DAILY LIVING (ADL)
ADLS_ACUITY_SCORE: 51
ADLS_ACUITY_SCORE: 56
ADLS_ACUITY_SCORE: 51
ADLS_ACUITY_SCORE: 56
ADLS_ACUITY_SCORE: 51
ADLS_ACUITY_SCORE: 56
ADLS_ACUITY_SCORE: 51
ADLS_ACUITY_SCORE: 56
ADLS_ACUITY_SCORE: 56
ADLS_ACUITY_SCORE: 51
ADLS_ACUITY_SCORE: 56
ADLS_ACUITY_SCORE: 51

## 2025-09-03 ASSESSMENT — ACTIVITIES OF DAILY LIVING (ADL)
ADLS_ACUITY_SCORE: 56
ADLS_ACUITY_SCORE: 52
ADLS_ACUITY_SCORE: 56
ADLS_ACUITY_SCORE: 52
ADLS_ACUITY_SCORE: 56
ADLS_ACUITY_SCORE: 56
ADLS_ACUITY_SCORE: 52
ADLS_ACUITY_SCORE: 56
ADLS_ACUITY_SCORE: 52
ADLS_ACUITY_SCORE: 56
ADLS_ACUITY_SCORE: 56

## 2025-09-04 DIAGNOSIS — Z45.02 FITTING AND ADJUSTMENT OF AUTOMATIC IMPLANTABLE CARDIOVERTER-DEFIBRILLATOR: Primary | ICD-10-CM

## 2025-09-04 LAB
SCANNED LAB RESULT: NORMAL
TEST NAME: NORMAL

## 2025-09-04 ASSESSMENT — ACTIVITIES OF DAILY LIVING (ADL)
ADLS_ACUITY_SCORE: 52

## (undated) DEVICE — CATH DIAG 4FR JL 4.5 538417

## (undated) DEVICE — CATH ANGIO INFINITI 3DRC 4FRX100CM 538476

## (undated) DEVICE — SU PROLENE 4-0 RB-1DA 36" 8557H

## (undated) DEVICE — Device

## (undated) DEVICE — SU VICRYL 0 CTX 36" J370H

## (undated) DEVICE — DRAIN CHEST TUBE 32FR STR 8032

## (undated) DEVICE — SOMASENSOR CEREBRAL OXIMETER ADULT SAFB-SM

## (undated) DEVICE — SU PROLENE 5-0 RB-2DA 30" 8710H

## (undated) DEVICE — TOTE ANGIO CORP PC15AT SAN32CC83O

## (undated) DEVICE — CONNECTOR DRAIN CHEST Y EXTENSION SET 19909

## (undated) DEVICE — INTRO SHEATH 4FRX10CM PINNACLE RSS402

## (undated) DEVICE — CANNULA PERFUSION AORTIC ROOT 22FR 20012

## (undated) DEVICE — PREP CHLORAPREP W/ORANGE TINT 10.5ML 930715

## (undated) DEVICE — TAPE MICROFOAM 3" 1528-3

## (undated) DEVICE — SU ETHIBOND 3-0 BBDA 36" X588H

## (undated) DEVICE — PACK MINI VAC CUSTOM CARDOPULMONARY BB5Z97R15

## (undated) DEVICE — INTRODUCER CATH VASC 5FRX10CM  MPIS-501-NT-U-SST

## (undated) DEVICE — TOURNIQUET VASCULAR

## (undated) DEVICE — BONE WAX OSTENE 3.5GM CT410

## (undated) DEVICE — LEAD ELEC MYOCARDIO PACING TEMPORARY MEDTRONIC

## (undated) DEVICE — RAD INTRODUCER KIT MICRO 5FRX10CM .018 NITINOL G/W

## (undated) DEVICE — RAD G/W INQWIRE .035X260CM J-TIP EXCHANGE IQ35F260J1O5RS

## (undated) DEVICE — SU ETHIBOND 2-0 SHDA 30" X563H

## (undated) DEVICE — SU STEEL 6 CCS 4X18" M654G

## (undated) DEVICE — LINEN GOWN OVERSIZE 5408

## (undated) DEVICE — SU DEVICE COR KNOT KIT STD SHORT 2/KIT 030884

## (undated) DEVICE — SU PROLENE 4-0 SHDA 36" 8521H

## (undated) DEVICE — WAND SUCTION LP SOFT 15.2CM SU-22702

## (undated) DEVICE — LINEN LEG ROLL 5489

## (undated) DEVICE — CANNULA PERFUSION ARTERIAL 22FR 12" 77422

## (undated) DEVICE — SYR 30ML LL W/O NDL 302832

## (undated) DEVICE — SU ETHIBOND 0 CT-1 CR 8X18" CX21D

## (undated) DEVICE — CANNULA PERFUSION VENOUS 32FR 12-15" SGL STAGE STR 66132

## (undated) DEVICE — DEFIB PRO-PADZ LVP LQD GEL ADULT 8900-2105-01

## (undated) DEVICE — PACK OPEN HEART PV12OH524

## (undated) DEVICE — SOL NACL 0.9% IRRIG 1000ML BOTTLE 2F7124

## (undated) DEVICE — SPONGE PEANUT

## (undated) DEVICE — SUCTION CATH AIRLIFE TRI-FLO W/CONTROL PORT 14FR  T60C

## (undated) DEVICE — SU PROLENE 3-0 SHDA 36" 8522H

## (undated) DEVICE — KIT HAND CONTROL ANGIOTOUCH ACIST 65CM AT-P65

## (undated) DEVICE — MANIFOLD KIT ANGIO AUTOMATED 014613

## (undated) DEVICE — LINEN TOWEL PACK X30 5481

## (undated) DEVICE — SURGICEL HEMOSTAT 2X14" 1951

## (undated) DEVICE — DEVICE ASSEMBLY SUCTION/ANTI COAG BTC93

## (undated) DEVICE — CABLE MYO/LEAD PACING WHITE DISP 019-530

## (undated) DEVICE — CANNULA PERFUSION 24FR SGL STAGE RT ANGLE 69324

## (undated) DEVICE — SHEATH PRELUDE SNAP 13CM 6FR

## (undated) DEVICE — TUBING PERFUSION 1/4X1/16X8FT

## (undated) DEVICE — KIT WASH CELL SAVING ATL2001

## (undated) DEVICE — CABLE PACING ALLIGATOR CLIP 301-CG

## (undated) DEVICE — CANNULA PERFUSION 14FRX16" LEFT 12016

## (undated) DEVICE — VALVE VRV 9156R2

## (undated) DEVICE — SU VICRYL 3-0 FS-1 27" J442H

## (undated) DEVICE — ENDO TROCAR REPROC FIRST ENTRY KII FIOS 05X100MM THRD CTF03

## (undated) DEVICE — DRAIN CHEST TUBE RIGHT ANGLED 32FR 8132

## (undated) DEVICE — SU SILK 1 TIE 10X30" SA87G

## (undated) DEVICE — PACK PCMKR PERM SRG PROC LF SAN32PC573

## (undated) DEVICE — LINEN TOWEL PACK X5 5464

## (undated) DEVICE — LINEN TOWEL PACK X6 WHITE 5487

## (undated) DEVICE — GLOVE PROTEXIS W/NEU-THERA 7.0  2D73TE70

## (undated) DEVICE — INTRO GLIDESHEATH SLENDER 6FR 10X45CM 60-1060

## (undated) DEVICE — SU ETHIBOND 2-0 V-5DA 10X30" PXX52

## (undated) DEVICE — PREP CHLORAPREP CLEAR 3ML 930400

## (undated) DEVICE — SU PLEDGET SOFT TFE 3/8"X3/26"X1/16" PCP40

## (undated) DEVICE — SHEATH PRELUDE SNAP 13CM 9FR

## (undated) DEVICE — SUCTION CANISTER MEDIVAC LINER 3000ML W/LID 65651-530

## (undated) DEVICE — PROTECTOR ARM ONE-STEP TRENDELENBURG 40418

## (undated) DEVICE — COVER TABLE POLY 65X90" 8186

## (undated) DEVICE — RESERVOIR CELL SAVING BLOOD COLLECTION EL2120

## (undated) DEVICE — PACK TUBING MINI VAC CUSTOM 3/8X3/8T BB7V94R1

## (undated) DEVICE — CABLE MYO/LEAD PACING BLUE DISP 019-535

## (undated) RX ORDER — BUPIVACAINE HYDROCHLORIDE 2.5 MG/ML
INJECTION, SOLUTION EPIDURAL; INFILTRATION; INTRACAUDAL
Status: DISPENSED
Start: 2022-04-12

## (undated) RX ORDER — HEPARIN SODIUM 1000 [USP'U]/ML
INJECTION, SOLUTION INTRAVENOUS; SUBCUTANEOUS
Status: DISPENSED
Start: 2022-04-01

## (undated) RX ORDER — CEFAZOLIN SODIUM IN 0.9 % NACL 3 G/100 ML
INTRAVENOUS SOLUTION, PIGGYBACK (ML) INTRAVENOUS
Status: DISPENSED
Start: 2022-04-01

## (undated) RX ORDER — CEFAZOLIN SODIUM 1 G/3ML
INJECTION, POWDER, FOR SOLUTION INTRAMUSCULAR; INTRAVENOUS
Status: DISPENSED
Start: 2022-04-01

## (undated) RX ORDER — FENTANYL CITRATE 50 UG/ML
INJECTION, SOLUTION INTRAMUSCULAR; INTRAVENOUS
Status: DISPENSED
Start: 2022-03-31

## (undated) RX ORDER — LIDOCAINE HYDROCHLORIDE 10 MG/ML
INJECTION, SOLUTION EPIDURAL; INFILTRATION; INTRACAUDAL; PERINEURAL
Status: DISPENSED
Start: 2022-04-12

## (undated) RX ORDER — FENTANYL CITRATE 0.05 MG/ML
INJECTION, SOLUTION INTRAMUSCULAR; INTRAVENOUS
Status: DISPENSED
Start: 2022-04-01

## (undated) RX ORDER — FENTANYL CITRATE 50 UG/ML
INJECTION, SOLUTION INTRAMUSCULAR; INTRAVENOUS
Status: DISPENSED
Start: 2022-04-12

## (undated) RX ORDER — FENTANYL CITRATE 50 UG/ML
INJECTION, SOLUTION INTRAMUSCULAR; INTRAVENOUS
Status: DISPENSED
Start: 2022-04-01

## (undated) RX ORDER — LIDOCAINE HYDROCHLORIDE 20 MG/ML
INJECTION, SOLUTION EPIDURAL; INFILTRATION; INTRACAUDAL; PERINEURAL
Status: DISPENSED
Start: 2022-04-01

## (undated) RX ORDER — PROTAMINE SULFATE 10 MG/ML
INJECTION, SOLUTION INTRAVENOUS
Status: DISPENSED
Start: 2022-04-01

## (undated) RX ORDER — PROPOFOL 10 MG/ML
INJECTION, EMULSION INTRAVENOUS
Status: DISPENSED
Start: 2022-04-01

## (undated) RX ORDER — DEXMEDETOMIDINE HYDROCHLORIDE 4 UG/ML
INJECTION, SOLUTION INTRAVENOUS
Status: DISPENSED
Start: 2022-04-01

## (undated) RX ORDER — GLYCOPYRROLATE 0.2 MG/ML
INJECTION, SOLUTION INTRAMUSCULAR; INTRAVENOUS
Status: DISPENSED
Start: 2024-09-16

## (undated) RX ORDER — ACETAMINOPHEN 325 MG/1
TABLET ORAL
Status: DISPENSED
Start: 2024-09-16

## (undated) RX ORDER — FENTANYL CITRATE-0.9 % NACL/PF 10 MCG/ML
PLASTIC BAG, INJECTION (ML) INTRAVENOUS
Status: DISPENSED
Start: 2024-09-16

## (undated) RX ORDER — FENTANYL CITRATE 50 UG/ML
INJECTION, SOLUTION INTRAMUSCULAR; INTRAVENOUS
Status: DISPENSED
Start: 2024-09-16